# Patient Record
Sex: FEMALE | Race: BLACK OR AFRICAN AMERICAN | NOT HISPANIC OR LATINO | URBAN - METROPOLITAN AREA
[De-identification: names, ages, dates, MRNs, and addresses within clinical notes are randomized per-mention and may not be internally consistent; named-entity substitution may affect disease eponyms.]

---

## 2018-12-06 ENCOUNTER — EMERGENCY (EMERGENCY)
Facility: HOSPITAL | Age: 48
LOS: 1 days | Discharge: HOME | End: 2018-12-06
Attending: EMERGENCY MEDICINE | Admitting: EMERGENCY MEDICINE
Payer: MEDICAID

## 2018-12-06 VITALS
HEART RATE: 104 BPM | DIASTOLIC BLOOD PRESSURE: 75 MMHG | TEMPERATURE: 97 F | SYSTOLIC BLOOD PRESSURE: 112 MMHG | OXYGEN SATURATION: 100 % | RESPIRATION RATE: 16 BRPM

## 2018-12-06 LAB
ALBUMIN SERPL ELPH-MCNC: 3.7 G/DL — SIGNIFICANT CHANGE UP (ref 3.3–5)
ALP SERPL-CCNC: 103 U/L — SIGNIFICANT CHANGE UP (ref 40–120)
ALT FLD-CCNC: 13 U/L — SIGNIFICANT CHANGE UP (ref 4–33)
APPEARANCE UR: CLEAR — SIGNIFICANT CHANGE UP
APTT BLD: 34.9 SEC — SIGNIFICANT CHANGE UP (ref 27.5–36.3)
AST SERPL-CCNC: 12 U/L — SIGNIFICANT CHANGE UP (ref 4–32)
BASE EXCESS BLDV CALC-SCNC: 3.5 MMOL/L — SIGNIFICANT CHANGE UP
BASOPHILS # BLD AUTO: 0.02 K/UL — SIGNIFICANT CHANGE UP (ref 0–0.2)
BASOPHILS NFR BLD AUTO: 0.3 % — SIGNIFICANT CHANGE UP (ref 0–2)
BILIRUB SERPL-MCNC: < 0.2 MG/DL — LOW (ref 0.2–1.2)
BILIRUB UR-MCNC: NEGATIVE — SIGNIFICANT CHANGE UP
BLOOD GAS VENOUS - CREATININE: 0.9 MG/DL — SIGNIFICANT CHANGE UP (ref 0.5–1.3)
BLOOD UR QL VISUAL: NEGATIVE — SIGNIFICANT CHANGE UP
BUN SERPL-MCNC: 22 MG/DL — SIGNIFICANT CHANGE UP (ref 7–23)
CALCIUM SERPL-MCNC: 8.8 MG/DL — SIGNIFICANT CHANGE UP (ref 8.4–10.5)
CHLORIDE BLDV-SCNC: 106 MMOL/L — SIGNIFICANT CHANGE UP (ref 96–108)
CHLORIDE SERPL-SCNC: 104 MMOL/L — SIGNIFICANT CHANGE UP (ref 98–107)
CO2 SERPL-SCNC: 25 MMOL/L — SIGNIFICANT CHANGE UP (ref 22–31)
COLOR SPEC: COLORLESS — SIGNIFICANT CHANGE UP
CREAT SERPL-MCNC: 0.91 MG/DL — SIGNIFICANT CHANGE UP (ref 0.5–1.3)
EOSINOPHIL # BLD AUTO: 0 K/UL — SIGNIFICANT CHANGE UP (ref 0–0.5)
EOSINOPHIL NFR BLD AUTO: 0 % — SIGNIFICANT CHANGE UP (ref 0–6)
GAS PNL BLDV: 140 MMOL/L — SIGNIFICANT CHANGE UP (ref 136–146)
GLUCOSE BLDV-MCNC: 89 — SIGNIFICANT CHANGE UP (ref 70–99)
GLUCOSE SERPL-MCNC: 85 MG/DL — SIGNIFICANT CHANGE UP (ref 70–99)
GLUCOSE UR-MCNC: NEGATIVE — SIGNIFICANT CHANGE UP
HCG UR-SCNC: NEGATIVE — SIGNIFICANT CHANGE UP
HCO3 BLDV-SCNC: 26 MMOL/L — SIGNIFICANT CHANGE UP (ref 20–27)
HCT VFR BLD CALC: 32.5 % — LOW (ref 34.5–45)
HCT VFR BLDV CALC: 33.3 % — LOW (ref 34.5–45)
HGB BLD-MCNC: 10.1 G/DL — LOW (ref 11.5–15.5)
HGB BLDV-MCNC: 10.8 G/DL — LOW (ref 11.5–15.5)
IMM GRANULOCYTES # BLD AUTO: 0.03 # — SIGNIFICANT CHANGE UP
IMM GRANULOCYTES NFR BLD AUTO: 0.4 % — SIGNIFICANT CHANGE UP (ref 0–1.5)
INR BLD: 0.98 — SIGNIFICANT CHANGE UP (ref 0.88–1.17)
KETONES UR-MCNC: NEGATIVE — SIGNIFICANT CHANGE UP
LACTATE BLDV-MCNC: 1.1 MMOL/L — SIGNIFICANT CHANGE UP (ref 0.5–2)
LEUKOCYTE ESTERASE UR-ACNC: NEGATIVE — SIGNIFICANT CHANGE UP
LYMPHOCYTES # BLD AUTO: 2.9 K/UL — SIGNIFICANT CHANGE UP (ref 1–3.3)
LYMPHOCYTES # BLD AUTO: 39.3 % — SIGNIFICANT CHANGE UP (ref 13–44)
MCHC RBC-ENTMCNC: 26.6 PG — LOW (ref 27–34)
MCHC RBC-ENTMCNC: 31.1 % — LOW (ref 32–36)
MCV RBC AUTO: 85.5 FL — SIGNIFICANT CHANGE UP (ref 80–100)
MONOCYTES # BLD AUTO: 0.83 K/UL — SIGNIFICANT CHANGE UP (ref 0–0.9)
MONOCYTES NFR BLD AUTO: 11.3 % — SIGNIFICANT CHANGE UP (ref 2–14)
NEUTROPHILS # BLD AUTO: 3.59 K/UL — SIGNIFICANT CHANGE UP (ref 1.8–7.4)
NEUTROPHILS NFR BLD AUTO: 48.7 % — SIGNIFICANT CHANGE UP (ref 43–77)
NITRITE UR-MCNC: NEGATIVE — SIGNIFICANT CHANGE UP
NRBC # FLD: 0 — SIGNIFICANT CHANGE UP
NT-PROBNP SERPL-SCNC: 36.89 PG/ML — SIGNIFICANT CHANGE UP
PCO2 BLDV: 48 MMHG — SIGNIFICANT CHANGE UP (ref 41–51)
PH BLDV: 7.39 PH — SIGNIFICANT CHANGE UP (ref 7.32–7.43)
PH UR: 6 — SIGNIFICANT CHANGE UP (ref 5–8)
PLATELET # BLD AUTO: 281 K/UL — SIGNIFICANT CHANGE UP (ref 150–400)
PMV BLD: 10.1 FL — SIGNIFICANT CHANGE UP (ref 7–13)
PO2 BLDV: 34 MMHG — LOW (ref 35–40)
POTASSIUM BLDV-SCNC: 4.3 MMOL/L — SIGNIFICANT CHANGE UP (ref 3.4–4.5)
POTASSIUM SERPL-MCNC: 4.4 MMOL/L — SIGNIFICANT CHANGE UP (ref 3.5–5.3)
POTASSIUM SERPL-SCNC: 4.4 MMOL/L — SIGNIFICANT CHANGE UP (ref 3.5–5.3)
PROT SERPL-MCNC: 7.1 G/DL — SIGNIFICANT CHANGE UP (ref 6–8.3)
PROT UR-MCNC: NEGATIVE — SIGNIFICANT CHANGE UP
PROTHROM AB SERPL-ACNC: 10.9 SEC — SIGNIFICANT CHANGE UP (ref 9.8–13.1)
RBC # BLD: 3.8 M/UL — SIGNIFICANT CHANGE UP (ref 3.8–5.2)
RBC # FLD: 14.2 % — SIGNIFICANT CHANGE UP (ref 10.3–14.5)
SAO2 % BLDV: 59.1 % — LOW (ref 60–85)
SODIUM SERPL-SCNC: 140 MMOL/L — SIGNIFICANT CHANGE UP (ref 135–145)
SP GR SPEC: 1.02 — SIGNIFICANT CHANGE UP (ref 1–1.04)
TROPONIN T, HIGH SENSITIVITY: < 6 NG/L — SIGNIFICANT CHANGE UP (ref ?–14)
TSH SERPL-MCNC: 2.24 UIU/ML — SIGNIFICANT CHANGE UP (ref 0.27–4.2)
UROBILINOGEN FLD QL: NORMAL — SIGNIFICANT CHANGE UP
WBC # BLD: 7.37 K/UL — SIGNIFICANT CHANGE UP (ref 3.8–10.5)
WBC # FLD AUTO: 7.37 K/UL — SIGNIFICANT CHANGE UP (ref 3.8–10.5)

## 2018-12-06 PROCEDURE — 70450 CT HEAD/BRAIN W/O DYE: CPT | Mod: 26

## 2018-12-06 PROCEDURE — 71045 X-RAY EXAM CHEST 1 VIEW: CPT | Mod: 26

## 2018-12-06 PROCEDURE — 99284 EMERGENCY DEPT VISIT MOD MDM: CPT

## 2018-12-06 RX ORDER — SODIUM CHLORIDE 9 MG/ML
1000 INJECTION INTRAMUSCULAR; INTRAVENOUS; SUBCUTANEOUS ONCE
Qty: 0 | Refills: 0 | Status: COMPLETED | OUTPATIENT
Start: 2018-12-06 | End: 2018-12-06

## 2018-12-06 RX ADMIN — SODIUM CHLORIDE 1000 MILLILITER(S): 9 INJECTION INTRAMUSCULAR; INTRAVENOUS; SUBCUTANEOUS at 19:01

## 2018-12-06 NOTE — ED ADULT TRIAGE NOTE - CHIEF COMPLAINT QUOTE
patient coming [facility] as having blurred vision and dizziness and pain in the back with unsteady gait since few months getting worst".

## 2018-12-06 NOTE — ED ADULT TRIAGE NOTE - RESPIRATORY RATE (BREATHS/MIN)
16 Principal Discharge DX:	Abdominal pain Principal Discharge DX:	Abdominal pain  Goal:	ATTENDING ADDENDUM (Dr. Vin Spann): Goals of care include resolution of emergent/urgent symptoms and concerns, and restoration to baseline level of homeostasis.  Secondary Diagnosis:	Cyst of left ovary

## 2018-12-06 NOTE — ED PROVIDER NOTE - OBJECTIVE STATEMENT
47 yo female with PMH schizophrenia presenting to the ED from [facility] inpatient for unsteady gait and lightheadedness. Pt noted to be orthostatic positive by medical team at [facility], concerned that her medications may be playing role in her symptoms. In the ED, pt endorses mild generalized headache but declined analgesia. 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 She states she is three months pregnant but has been in [facility] longer than that. 49 yo female with PMH schizophrenia presenting to the ED from [facility] for unsteady gait and lightheadedness. Pt noted to be orthostatic positive by medical team at [facility], concerned that her medications may be playing role in her symptoms. In the ED, pt endorses mild generalized headache but declined analgesia. 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 She states she is three months pregnant but has been in [facility] longer than that.    Attending/Sung: 49 yo F, [facility] inpatient resident, poor historian referred for orthostasis. Pt reports ~ 3months of intermittent SSCP , lightheadedness, and generalized weakness. Denies fever/chills, SOB, exertional symptoms. ROS revealed general fatigue, and increase urinary frequency.

## 2018-12-06 NOTE — ED PROVIDER NOTE - PHYSICAL EXAMINATION
Attending/Sung: Well-appearing, NAD, obese; PERRL/EOMI, non-icterus, supple, no NEWTON, no JVD, RRR, CTAB; Abd-soft, NT/ND, no HSM; no LE edema, A&Ox3, nonfocal; Skin-warm/dry

## 2018-12-06 NOTE — ED PROVIDER NOTE - NSFOLLOWUPINSTRUCTIONS_ED_ALL_ED_FT
You need to stay hydrated by drinking water. Follow up with your medical physicians at [facility] within 24 hours.  Return to the emergency department promptly for any urgent concerns. [facility] should call the emergency department back within 24 hours for your thyroid blood lab results as these take more time to result.

## 2018-12-07 ENCOUNTER — EMERGENCY (EMERGENCY)
Facility: HOSPITAL | Age: 48
LOS: 1 days | Discharge: HOME | End: 2018-12-07
Attending: EMERGENCY MEDICINE | Admitting: EMERGENCY MEDICINE
Payer: MEDICAID

## 2018-12-07 VITALS
OXYGEN SATURATION: 99 % | DIASTOLIC BLOOD PRESSURE: 83 MMHG | SYSTOLIC BLOOD PRESSURE: 119 MMHG | RESPIRATION RATE: 18 BRPM | TEMPERATURE: 98 F | HEART RATE: 99 BPM

## 2018-12-07 VITALS — HEART RATE: 96 BPM

## 2018-12-07 PROCEDURE — 93306 TTE W/DOPPLER COMPLETE: CPT | Mod: 26

## 2018-12-07 PROCEDURE — 99284 EMERGENCY DEPT VISIT MOD MDM: CPT

## 2018-12-07 NOTE — ED PROVIDER NOTE - OBJECTIVE STATEMENT
48y F currently inpatient at [facility] seen yesterday in ED for dizziness presents again today c/o dizziness. Yesterday pt had unremarkable ED workup including CT head and labs including troponin and EKG. Today pt c/o dizziness and occurs with positional changes and exertion. Pt currently on clozapine and haldol. Pt is poor historian but denies CP, SOB, or lower extremity pain and edema 48y F currently inpatient at [facility] seen yesterday in ED for dizziness presents again today c/o dizziness. Yesterday pt had unremarkable ED workup including CT head and labs,  including troponin and EKG. Today pt c/o dizziness that occurs with positional changes and exertion. Pt currently on clozapine and haldol. Pt is poor historian but denies CP, SOB, or lower extremity pain and edema.

## 2018-12-07 NOTE — ED PROVIDER NOTE - MEDICAL DECISION MAKING DETAILS
48y F on clozapine with dizziness. Will obtain echo to r/o myocarditis. Obtain EKG, finger stick, and reassess

## 2018-12-07 NOTE — ED ADULT NURSE REASSESSMENT NOTE - NS ED NURSE REASSESS COMMENT FT1
Dr Bonner made aware of BGL of 64 mg/dl pt alert x 4 Dr Bonner advised pt may eat/drink and recheck BGL

## 2018-12-07 NOTE — ED PROVIDER NOTE - CONDUCTED A DETAILED DISCUSSION WITH PATIENT AND/OR GUARDIAN REGARDING, MDM
radiology results/discussed with [facility] staff at the bedside./return to ED if symptoms worsen, persist or questions arise/need for outpatient follow-up

## 2018-12-07 NOTE — ED ADULT TRIAGE NOTE - CHIEF COMPLAINT QUOTE
Patient brought to ER by EMS from [facility] for c/o lightheadedness and dizziness. Pt was fully assessed yesterday and findings were negative.

## 2018-12-07 NOTE — ED PROVIDER NOTE - CRANIAL NERVE AND PUPILLARY EXAM
no ataxia, dysmetria or disdiadochokinesia, negative rombergs, no pronator drift/cranial nerves 2-12 intact

## 2018-12-07 NOTE — ED PROVIDER NOTE - PROGRESS NOTE DETAILS
Ha: Pt has been asymptomatic since arriving in the ED, echo is unremarkable, pt ambulating without difficulty, tolerating PO. will d/c back to [facility] with copies of echo results. will also try to contact [facility] physician to relay results. Ha: discussed with DR Steen of [facility] @ [phone] regarding ED workup for the patient. Pt's vitals are normal with an episode of tachycardia while agitated. Pt now calm with normal HR and BP, pt ambulating without difficulty, no concerning signs/ symptoms. Neurology visits [facility] twice weekly. Recommended that DR steen to have the patient evaluated by neuro in the facility if she continues to complain of dizziness.

## 2018-12-07 NOTE — ED PROVIDER NOTE - MUSCULOSKELETAL, MLM
Spine appears normal, range of motion is not limited, no muscle or joint tenderness. No lower extremity edema. No calf tenderness

## 2018-12-07 NOTE — ED PROVIDER NOTE - NSFOLLOWUPINSTRUCTIONS_ED_ALL_ED_FT
Follow up as an outpatient with cardiology and neurology if your dizziness persists. Return to the Emergency Department for any new or worsening symptoms. Copies of your results have been provided to you.

## 2018-12-08 LAB
BACTERIA UR CULT: SIGNIFICANT CHANGE UP
SPECIMEN SOURCE: SIGNIFICANT CHANGE UP

## 2019-01-15 ENCOUNTER — EMERGENCY (EMERGENCY)
Facility: HOSPITAL | Age: 49
LOS: 1 days | Discharge: HOME | End: 2019-01-15
Attending: EMERGENCY MEDICINE | Admitting: EMERGENCY MEDICINE
Payer: MEDICAID

## 2019-01-15 VITALS
TEMPERATURE: 98 F | OXYGEN SATURATION: 100 % | HEART RATE: 106 BPM | DIASTOLIC BLOOD PRESSURE: 74 MMHG | SYSTOLIC BLOOD PRESSURE: 123 MMHG | RESPIRATION RATE: 18 BRPM

## 2019-01-15 VITALS — HEART RATE: 99 BPM

## 2019-01-15 PROBLEM — F20.9 SCHIZOPHRENIA, UNSPECIFIED: Chronic | Status: ACTIVE | Noted: 2018-12-06

## 2019-01-15 PROCEDURE — 93010 ELECTROCARDIOGRAM REPORT: CPT

## 2019-01-15 PROCEDURE — 99283 EMERGENCY DEPT VISIT LOW MDM: CPT | Mod: 25

## 2019-01-15 NOTE — ED PROVIDER NOTE - OBJECTIVE STATEMENT
Cabot: 48F [place] resident, here multiple times with intermittent lightheadedness with negative head CT, trops, normal EKG, normal ECHO, now back with minutes of lightheadedness that resolved.  Now asx and would like to eat, have a pregnancy test, and go home.  Denies F/C/N/V/D/urinary sx/CP/SOB/vision changes/numbness/weakness.

## 2019-01-15 NOTE — ED PROVIDER NOTE - NSFOLLOWUPINSTRUCTIONS_ED_ALL_ED_FT
You have been seen for lightheadedness.  You now are back to baseline, and your EKG was normal. Your pregnancy test was negative.  It is safe for you to be discharged at this time.  Please come back if you have recurrence of symptoms or other concerns.

## 2019-01-15 NOTE — ED CLERICAL - CLERICAL COMMENTS
Pt with hx of schizoaffective disorder [facility] in [facility] dizz[facility], lightheadedness, hypotension and tachycardia.

## 2019-01-15 NOTE — ED PROVIDER NOTE - MEDICAL DECISION MAKING DETAILS
Cabot: 48F [place] resident, here multiple times with intermittent lightheadedness with negative head CT, trops, normal EKG, normal ECHO, now back with minutes of lightheadedness that resolved. Normal exam and EKG.  Upreg neg.  Will discharge home.

## 2019-01-15 NOTE — PROVIDER CONTACT NOTE (OTHER) - ASSESSMENT
Pt from [facility] accompanied by staff. As per staff pt's [facility] ID and documents were given to EMT or Registration staff , and was never returned. Case discussed with Anca Blevins Spr, we looked all around and couldn't find. Then [facility] staff said she will call [facility] to get.

## 2019-01-15 NOTE — ED PROVIDER NOTE - PHYSICAL EXAMINATION
HDS, NAD, MMM, eyes clear, lungs CTAB, heart sounds normal, abd soft, NT, ND, no CVAT, LEs without edema, wwp, skin normal temperature and color, neuro: AAOx3, PERRLA, EOMIB, CN 2-12 intact, 5/5 strength, SILT, no drift

## 2019-02-01 ENCOUNTER — EMERGENCY (EMERGENCY)
Facility: HOSPITAL | Age: 49
LOS: 1 days | Discharge: HOME | End: 2019-02-01
Attending: EMERGENCY MEDICINE | Admitting: EMERGENCY MEDICINE
Payer: MEDICAID

## 2019-02-01 VITALS
RESPIRATION RATE: 16 BRPM | SYSTOLIC BLOOD PRESSURE: 120 MMHG | OXYGEN SATURATION: 98 % | HEART RATE: 88 BPM | DIASTOLIC BLOOD PRESSURE: 74 MMHG

## 2019-02-01 VITALS
RESPIRATION RATE: 16 BRPM | SYSTOLIC BLOOD PRESSURE: 96 MMHG | TEMPERATURE: 98 F | OXYGEN SATURATION: 98 % | HEART RATE: 96 BPM | DIASTOLIC BLOOD PRESSURE: 69 MMHG

## 2019-02-01 LAB
ALBUMIN SERPL ELPH-MCNC: 3.9 G/DL — SIGNIFICANT CHANGE UP (ref 3.3–5)
ALP SERPL-CCNC: 86 U/L — SIGNIFICANT CHANGE UP (ref 40–120)
ALT FLD-CCNC: 6 U/L — SIGNIFICANT CHANGE UP (ref 4–33)
ANION GAP SERPL CALC-SCNC: 12 MMO/L — SIGNIFICANT CHANGE UP (ref 7–14)
AST SERPL-CCNC: 14 U/L — SIGNIFICANT CHANGE UP (ref 4–32)
BASOPHILS # BLD AUTO: 0.02 K/UL — SIGNIFICANT CHANGE UP (ref 0–0.2)
BASOPHILS NFR BLD AUTO: 0.3 % — SIGNIFICANT CHANGE UP (ref 0–2)
BILIRUB SERPL-MCNC: 0.2 MG/DL — SIGNIFICANT CHANGE UP (ref 0.2–1.2)
BUN SERPL-MCNC: 13 MG/DL — SIGNIFICANT CHANGE UP (ref 7–23)
CALCIUM SERPL-MCNC: 9 MG/DL — SIGNIFICANT CHANGE UP (ref 8.4–10.5)
CHLORIDE SERPL-SCNC: 103 MMOL/L — SIGNIFICANT CHANGE UP (ref 98–107)
CO2 SERPL-SCNC: 27 MMOL/L — SIGNIFICANT CHANGE UP (ref 22–31)
CREAT SERPL-MCNC: 0.85 MG/DL — SIGNIFICANT CHANGE UP (ref 0.5–1.3)
EOSINOPHIL # BLD AUTO: 0 K/UL — SIGNIFICANT CHANGE UP (ref 0–0.5)
EOSINOPHIL NFR BLD AUTO: 0 % — SIGNIFICANT CHANGE UP (ref 0–6)
GLUCOSE SERPL-MCNC: 85 MG/DL — SIGNIFICANT CHANGE UP (ref 70–99)
HCT VFR BLD CALC: 37.7 % — SIGNIFICANT CHANGE UP (ref 34.5–45)
HGB BLD-MCNC: 11.7 G/DL — SIGNIFICANT CHANGE UP (ref 11.5–15.5)
IMM GRANULOCYTES NFR BLD AUTO: 0.5 % — SIGNIFICANT CHANGE UP (ref 0–1.5)
LYMPHOCYTES # BLD AUTO: 2.37 K/UL — SIGNIFICANT CHANGE UP (ref 1–3.3)
LYMPHOCYTES # BLD AUTO: 32.2 % — SIGNIFICANT CHANGE UP (ref 13–44)
MCHC RBC-ENTMCNC: 27.1 PG — SIGNIFICANT CHANGE UP (ref 27–34)
MCHC RBC-ENTMCNC: 31 % — LOW (ref 32–36)
MCV RBC AUTO: 87.3 FL — SIGNIFICANT CHANGE UP (ref 80–100)
MONOCYTES # BLD AUTO: 0.71 K/UL — SIGNIFICANT CHANGE UP (ref 0–0.9)
MONOCYTES NFR BLD AUTO: 9.6 % — SIGNIFICANT CHANGE UP (ref 2–14)
NEUTROPHILS # BLD AUTO: 4.22 K/UL — SIGNIFICANT CHANGE UP (ref 1.8–7.4)
NEUTROPHILS NFR BLD AUTO: 57.4 % — SIGNIFICANT CHANGE UP (ref 43–77)
NRBC # FLD: 0 K/UL — LOW (ref 25–125)
PLATELET # BLD AUTO: 278 K/UL — SIGNIFICANT CHANGE UP (ref 150–400)
PMV BLD: 10.2 FL — SIGNIFICANT CHANGE UP (ref 7–13)
POTASSIUM SERPL-MCNC: 4.5 MMOL/L — SIGNIFICANT CHANGE UP (ref 3.5–5.3)
POTASSIUM SERPL-SCNC: 4.5 MMOL/L — SIGNIFICANT CHANGE UP (ref 3.5–5.3)
PROT SERPL-MCNC: 7.2 G/DL — SIGNIFICANT CHANGE UP (ref 6–8.3)
RBC # BLD: 4.32 M/UL — SIGNIFICANT CHANGE UP (ref 3.8–5.2)
RBC # FLD: 14.8 % — HIGH (ref 10.3–14.5)
SODIUM SERPL-SCNC: 142 MMOL/L — SIGNIFICANT CHANGE UP (ref 135–145)
TROPONIN T, HIGH SENSITIVITY: < 6 NG/L — SIGNIFICANT CHANGE UP (ref ?–14)
WBC # BLD: 7.36 K/UL — SIGNIFICANT CHANGE UP (ref 3.8–10.5)
WBC # FLD AUTO: 7.36 K/UL — SIGNIFICANT CHANGE UP (ref 3.8–10.5)

## 2019-02-01 PROCEDURE — 99285 EMERGENCY DEPT VISIT HI MDM: CPT

## 2019-02-01 NOTE — ED ADULT NURSE NOTE - CHIEF COMPLAINT QUOTE
Pt brought in by EMS from [facility] for hypotensive and dizziness. As per EMS pts BP was in the 80s on arrival. Pt denies chest pain/ SOB, n/v/d, fever or chills.

## 2019-02-01 NOTE — ED PROVIDER NOTE - OBJECTIVE STATEMENT
48 F [facility] resident with PMH of schizophrenia who presents with lightheadedness. She developed lightheadedness and dizziness earlier this afternoon. She was lying on the floor due to weakness. Per notes, SBP 85/60 supine and 80/60 sitting. She denies headaches, vision changes, chest pain, palpitations. She does endorse abdominal pain and diarrhea for the past 3 days, but no fevers, chills, chest pain, palpitations, dysuria. Patient has had recurrent episodes of lightheadedness and dizziness since last month after she started clozapine in November. She has been weaning off clozapine with intent to be discontinued.  Workup including CT head, EKG, TTE, troponin in DEc 2018. Outpatient random cortisol 8.3 (01/2019). Review of outpatient CBC and CMP also wnl. Patient was sent in for evaluation of clozapine related cardiotoxicity. 48 F [facility] resident with PMH of schizophrenia who presents with lightheadedness. She developed lightheadedness and dizziness earlier this afternoon. She was lying on the floor due to weakness. Per notes, SBP 85/60 supine and 80/60 sitting. She denies headaches, vision changes, chest pain, palpitations. She does endorse abdominal pain and diarrhea for the past 3 days, but no fevers, chills, chest pain, palpitations, dysuria. Patient has had recurrent episodes of lightheadedness and dizziness since last month after she started clozapine in November. She has been weaning off clozapine with intent to be discontinued.  Workup including CT head, EKG, TTE, troponin in Dec 2018. Outpatient random cortisol 8.3 (01/2019). Review of outpatient CBC and CMP also wnl. Patient was sent in for evaluation of clozapine related cardiotoxicity.

## 2019-02-01 NOTE — ED PROVIDER NOTE - MEDICAL DECISION MAKING DETAILS
Brice:  pt with periodic episodes of hypotension with work up for possible cardiomyopathy from clozaril but echo negative, pt's psychiatrist aware that cause of hypotension may be clozaril use and will adjust accordingly.
Spontaneous, unlabored and symmetrical

## 2019-02-01 NOTE — ED ADULT NURSE NOTE - NSIMPLEMENTINTERV_GEN_ALL_ED
Implemented All Universal Safety Interventions:  [place] to call system. Call bell, personal items and telephone within reach. Instruct patient to call for assistance. Room bathroom lighting operational. Non-slip footwear when patient is off stretcher. Physically safe environment: no spills, clutter or unnecessary equipment. Stretcher in lowest position, wheels locked, appropriate side rails in place.

## 2019-02-01 NOTE — ED PROVIDER NOTE - PHYSICAL EXAMINATION
PHYSICAL EXAM:   General: No acute distress.  HEENT: NCAT.  PERRL.  EOMI.  No scleral icterus or injection.  Moist MM.  No oropharyngeal exudates.    Neck: Supple.  Full ROM.  No JVD.  No thyromegaly. No lymphadenopathy.   Heart: RRR.  Normal S1 and S2.  No murmurs, rubs, or gallops.   Lungs: CTAB. No wheezes, crackles, or rhonchi.    Abdomen: BS+, soft, ND.  No organomegaly. + periumbilical   Skin: Warm and dry.  No rashes.  Extremities: No edema, clubbing, or cyanosis.  2+ peripheral pulses b/l.  Musculoskeletal: No deformities.  No spinal or paraspinal tenderness.  Neuro: A&Ox3.  CN II-XII intact.  5/5 strength in UE and LE b/l.  Tactile sensation intact in UE and LE b/l.  Cerebellar function intact

## 2019-02-01 NOTE — ED PROVIDER NOTE - NS ED ROS FT
REVIEW OF SYSTEMS:    CONSTITUTIONAL: + dizziness  EYES/ENT: No visual changes;  No vertigo or throat pain   NECK: No pain or stiffness  RESPIRATORY: No cough, wheezing, hemoptysis; No shortness of breath  CARDIOVASCULAR: No chest pain or palpitations  GASTROINTESTINAL: + abdominal pain  GENITOURINARY: No dysuria, frequency or hematuria  NEUROLOGICAL: No numbness or weakness  SKIN: No itching, burning, rashes, or lesions   All other review of systems is negative unless indicated above.

## 2019-02-01 NOTE — ED PROVIDER NOTE - PROGRESS NOTE DETAILS
spoke with Dr. Buffy Zaidi at [facility]- discussed that symtpoms most like related to clozapine, of which plan to eventually discontinue. Labs reviewed within normal limits. Troponin neg and EKG wnl. BPs stable

## 2019-02-01 NOTE — ED PROVIDER NOTE - NSFOLLOWUPINSTRUCTIONS_ED_ALL_ED_FT
-- Continue all previously prescribed medications as directed unless otherwise instructed.    -- Follow up with your primary care physician in 48-72 hours- bring copies of your results.    -- Return to the ER for worsening or persistent symptoms, and/or ANY NEW OR CONCERNING SYMPTOMS.    -- If you have issues obtaining follow up, please call: [phone] to obtain a [facility] doctor or specialist who takes your insurance in your area.

## 2019-02-01 NOTE — ED ADULT TRIAGE NOTE - CHIEF COMPLAINT QUOTE
Pt brought in by EMS from [facility] for hypotensive and dizziness. As per EMS pts BP was in the 80s on arrival. Pt denies chest pain/ SOB, n/v/d, fever or chills.

## 2019-12-05 ENCOUNTER — EMERGENCY (EMERGENCY)
Facility: HOSPITAL | Age: 49
LOS: 1 days | Discharge: HOME | End: 2019-12-05
Attending: EMERGENCY MEDICINE | Admitting: EMERGENCY MEDICINE
Payer: MEDICAID

## 2019-12-05 VITALS
RESPIRATION RATE: 18 BRPM | TEMPERATURE: 98 F | HEART RATE: 99 BPM | DIASTOLIC BLOOD PRESSURE: 69 MMHG | OXYGEN SATURATION: 98 % | SYSTOLIC BLOOD PRESSURE: 111 MMHG

## 2019-12-05 VITALS
TEMPERATURE: 98 F | DIASTOLIC BLOOD PRESSURE: 74 MMHG | SYSTOLIC BLOOD PRESSURE: 107 MMHG | RESPIRATION RATE: 16 BRPM | HEART RATE: 94 BPM | OXYGEN SATURATION: 97 %

## 2019-12-05 LAB
ALBUMIN SERPL ELPH-MCNC: 3.7 G/DL — SIGNIFICANT CHANGE UP (ref 3.3–5)
ALP SERPL-CCNC: 89 U/L — SIGNIFICANT CHANGE UP (ref 40–120)
ALT FLD-CCNC: 7 U/L — SIGNIFICANT CHANGE UP (ref 4–33)
ANION GAP SERPL CALC-SCNC: 11 MMO/L — SIGNIFICANT CHANGE UP (ref 7–14)
AST SERPL-CCNC: 10 U/L — SIGNIFICANT CHANGE UP (ref 4–32)
BASOPHILS # BLD AUTO: 0.03 K/UL — SIGNIFICANT CHANGE UP (ref 0–0.2)
BASOPHILS NFR BLD AUTO: 0.3 % — SIGNIFICANT CHANGE UP (ref 0–2)
BILIRUB SERPL-MCNC: < 0.2 MG/DL — LOW (ref 0.2–1.2)
BUN SERPL-MCNC: 24 MG/DL — HIGH (ref 7–23)
CALCIUM SERPL-MCNC: 8.9 MG/DL — SIGNIFICANT CHANGE UP (ref 8.4–10.5)
CHLORIDE SERPL-SCNC: 105 MMOL/L — SIGNIFICANT CHANGE UP (ref 98–107)
CO2 SERPL-SCNC: 27 MMOL/L — SIGNIFICANT CHANGE UP (ref 22–31)
CREAT SERPL-MCNC: 0.86 MG/DL — SIGNIFICANT CHANGE UP (ref 0.5–1.3)
EOSINOPHIL # BLD AUTO: 0.17 K/UL — SIGNIFICANT CHANGE UP (ref 0–0.5)
EOSINOPHIL NFR BLD AUTO: 1.7 % — SIGNIFICANT CHANGE UP (ref 0–6)
GLUCOSE SERPL-MCNC: 96 MG/DL — SIGNIFICANT CHANGE UP (ref 70–99)
HCG SERPL-ACNC: < 5 MIU/ML — SIGNIFICANT CHANGE UP
HCT VFR BLD CALC: 31.6 % — LOW (ref 34.5–45)
HGB BLD-MCNC: 9.6 G/DL — LOW (ref 11.5–15.5)
IMM GRANULOCYTES NFR BLD AUTO: 0.4 % — SIGNIFICANT CHANGE UP (ref 0–1.5)
LYMPHOCYTES # BLD AUTO: 3.09 K/UL — SIGNIFICANT CHANGE UP (ref 1–3.3)
LYMPHOCYTES # BLD AUTO: 31.1 % — SIGNIFICANT CHANGE UP (ref 13–44)
MCHC RBC-ENTMCNC: 26.1 PG — LOW (ref 27–34)
MCHC RBC-ENTMCNC: 30.4 % — LOW (ref 32–36)
MCV RBC AUTO: 85.9 FL — SIGNIFICANT CHANGE UP (ref 80–100)
MONOCYTES # BLD AUTO: 1.09 K/UL — HIGH (ref 0–0.9)
MONOCYTES NFR BLD AUTO: 11 % — SIGNIFICANT CHANGE UP (ref 2–14)
NEUTROPHILS # BLD AUTO: 5.51 K/UL — SIGNIFICANT CHANGE UP (ref 1.8–7.4)
NEUTROPHILS NFR BLD AUTO: 55.5 % — SIGNIFICANT CHANGE UP (ref 43–77)
NRBC # FLD: 0.03 K/UL — SIGNIFICANT CHANGE UP (ref 0–0)
PLATELET # BLD AUTO: 317 K/UL — SIGNIFICANT CHANGE UP (ref 150–400)
PMV BLD: 9.7 FL — SIGNIFICANT CHANGE UP (ref 7–13)
POTASSIUM SERPL-MCNC: 4.4 MMOL/L — SIGNIFICANT CHANGE UP (ref 3.5–5.3)
POTASSIUM SERPL-SCNC: 4.4 MMOL/L — SIGNIFICANT CHANGE UP (ref 3.5–5.3)
PROT SERPL-MCNC: 7.1 G/DL — SIGNIFICANT CHANGE UP (ref 6–8.3)
RBC # BLD: 3.68 M/UL — LOW (ref 3.8–5.2)
RBC # FLD: 15.5 % — HIGH (ref 10.3–14.5)
SODIUM SERPL-SCNC: 143 MMOL/L — SIGNIFICANT CHANGE UP (ref 135–145)
TROPONIN T, HIGH SENSITIVITY: 8 NG/L — SIGNIFICANT CHANGE UP (ref ?–14)
TROPONIN T, HIGH SENSITIVITY: 8 NG/L — SIGNIFICANT CHANGE UP (ref ?–14)
WBC # BLD: 9.93 K/UL — SIGNIFICANT CHANGE UP (ref 3.8–10.5)
WBC # FLD AUTO: 9.93 K/UL — SIGNIFICANT CHANGE UP (ref 3.8–10.5)

## 2019-12-05 PROCEDURE — 71046 X-RAY EXAM CHEST 2 VIEWS: CPT | Mod: 26

## 2019-12-05 PROCEDURE — 99284 EMERGENCY DEPT VISIT MOD MDM: CPT

## 2019-12-05 NOTE — ED PROVIDER NOTE - PATIENT PORTAL LINK FT
You can access the FollowMyHealth Patient Portal offered by [facility] by registering at the following website: [email or web address]. By joining [employer]’s FollowMyHealth portal, you will also be able to view your health information using other applications (apps) compatible with our system.

## 2019-12-05 NOTE — ED PROVIDER NOTE - CLINICAL SUMMARY MEDICAL DECISION MAKING FREE TEXT BOX
49 F [place] resident ([facility]) with PMH of schizophrenia pw cc of chest discomfort . Resolved, no cardiac hx, low concern for ACS based on story, will get trop/cxr/ekg and likely d/c if negative 49 F [place] resident ([facility]) with PMH of schizophrenia pw cc of chest discomfort . Resolved, no cardiac hx, low concern for ACS based on story, will get trop/cxr/ekg and likely d/c if negative  RGerber PGY1 - received signout. No concern for ACS. Discharging patient at this time. SW arranged transport.   d/w Dr. Lund

## 2019-12-05 NOTE — PROVIDER CONTACT NOTE (OTHER) - ASSESSMENT
Pt is cleared to return, called [facility] [phone], spoke with Kayy CARTER, confirmed that pt is a resident. She asked me to send her by Taxi. Called [facility] obtained Auth # [id number]. Benjamin Israeli. P/U 11 PM. I put her in the Taxi. Monica form was filled out.

## 2019-12-05 NOTE — ED PROVIDER NOTE - NS ED ROS FT
CONSTITUTIONAL: No fevers, no chills  Cardiovascular: No Chest pain currently   Gastrointestinal: No n/v/d, no abd pain  Genitourinary: no dysuria, no hematuria  SKIN: no rashes.  NEURO: no weakness or numbness  PSYCHIATRIC: refer to HPI

## 2019-12-05 NOTE — ED PROVIDER NOTE - PHYSICAL EXAMINATION
General:  NAD  HEENT: pupils equal and reactive, normal oropharynx, normal external ears bilaterally   Cardiac: RRR, no MRG appreciated  Resp: lungs clear to auscultation bilaterally, symmetric chest wall rise  Abd: soft, nontender, nondistended, normoactive bowel sounds  : no CVA tenderness  Neuro: Moving all extremities  Skin:  normal color for race

## 2019-12-05 NOTE — ED ADULT NURSE NOTE - NSIMPLEMENTINTERV_GEN_ALL_ED
Implemented All Universal Safety Interventions:  [place] to call system. Call bell, personal items and telephone within reach. Instruct patient to call for assistance. Room bathroom lighting operational. Non-slip footwear when patient is off stretcher. Physically safe environment: no spills, clutter or unnecessary equipment. Stretcher in lowest position, wheels locked, appropriate side rails in place. 21-Oct-2018 09:45

## 2019-12-05 NOTE — ED PROVIDER NOTE - ATTENDING CONTRIBUTION TO CARE
agree with resident note    "49 F [place] resident ([facility]) with PMH of schizophrenia pw cc of chest discomfort     Was outside, while sitting anterior chest wall started feeling chest pressure.  Pressure resolved within 1 minute. Went and told the supervisor at [facility], 911 was called.  No cardiac hx, no hx of stress tests. ECHO - in Dec trop -  in feb . No SOB. No hx of blood clots. No leg swelling. No blood in cough, no cough.   No FH of early Cardiac events   Does not smoke or drink"    When speaking to her said she was "exhausted" and that prompted her to come in.  Denies CP actively.  Denies SOb    PE: well appearing in no distress; VSS: CTAB/L; s1 s2 no m/r/g abd soft/NT/ND ext: no edema

## 2019-12-05 NOTE — ED PROVIDER NOTE - PROGRESS NOTE DETAILS
Esteban PGY1  Received sign out on patient. Work up negative. Trop 8, rpt 8. Cleared for discharge home.

## 2019-12-05 NOTE — ED PROVIDER NOTE - OBJECTIVE STATEMENT
49 F [place] resident ([facility]) with PMH of schizophrenia pw cc of chest discomfort     Was outside, while sitting anterior chest wall started feeling chest pressure.  Pressure resolved within 1 minute. Went and told the supervisor at [facility], 911 was called.  No cardiac hx, no hx of stress tests. ECHO - in Dec trop -  in feb . No SOB. No hx of blood clots. No leg swelling. No blood in cough, no cough.   No FH of early Cardiac events   Does not smoke or drink  Unsure of meds  NDKA

## 2019-12-05 NOTE — ED ADULT TRIAGE NOTE - CHIEF COMPLAINT QUOTE
Arrives via EMS from [place], with c/o palpitations, SOB, and sweating while standing at the stove.  c/o light headiness and blurred vision that has resolved

## 2019-12-05 NOTE — ED ADULT NURSE NOTE - OBJECTIVE STATEMENT
49F hx of schizophrenia, [facility] outpatient presents with "chest weakness" earlier today. Pt states she felt weak in the chest and that she wanted to pass out. States she had blurry vision earlier, but that its getting better now. Pt states that someone wants to harm her  and she must get back to [facility]. Pt denies chest pain, SOB, dizziness, blurry vision. Resp even and unlabored. NSR on CM.

## 2019-12-06 NOTE — ED ADULT NURSE REASSESSMENT NOTE - NS ED NURSE REASSESS COMMENT FT1
Pt discharged to outpatient [facility]. Report given to staff by EMMA Coker. IV removed, discharge paperwork provided. Pt sent back via taxi set up by  Ronaldo. Pt stable and in no apparent distress. Group home paperwork completed.

## 2020-04-14 ENCOUNTER — EMERGENCY (EMERGENCY)
Facility: HOSPITAL | Age: 50
LOS: 1 days | Discharge: HOME | End: 2020-04-14
Attending: EMERGENCY MEDICINE | Admitting: EMERGENCY MEDICINE
Payer: MEDICAID

## 2020-04-14 VITALS
OXYGEN SATURATION: 100 % | HEART RATE: 86 BPM | SYSTOLIC BLOOD PRESSURE: 123 MMHG | DIASTOLIC BLOOD PRESSURE: 76 MMHG | TEMPERATURE: 98 F | RESPIRATION RATE: 18 BRPM

## 2020-04-14 VITALS
TEMPERATURE: 99 F | OXYGEN SATURATION: 98 % | DIASTOLIC BLOOD PRESSURE: 69 MMHG | SYSTOLIC BLOOD PRESSURE: 110 MMHG | HEART RATE: 106 BPM | RESPIRATION RATE: 16 BRPM

## 2020-04-14 LAB
ALBUMIN SERPL ELPH-MCNC: 3.5 G/DL — SIGNIFICANT CHANGE UP (ref 3.3–5)
ALP SERPL-CCNC: 85 U/L — SIGNIFICANT CHANGE UP (ref 40–120)
ALT FLD-CCNC: SIGNIFICANT CHANGE UP U/L (ref 4–33)
ANION GAP SERPL CALC-SCNC: 14 MMO/L — SIGNIFICANT CHANGE UP (ref 7–14)
AST SERPL-CCNC: 28 U/L — SIGNIFICANT CHANGE UP (ref 4–32)
BASOPHILS # BLD AUTO: 0.03 K/UL — SIGNIFICANT CHANGE UP (ref 0–0.2)
BASOPHILS NFR BLD AUTO: 0.3 % — SIGNIFICANT CHANGE UP (ref 0–2)
BILIRUB SERPL-MCNC: < 0.2 MG/DL — LOW (ref 0.2–1.2)
BUN SERPL-MCNC: 15 MG/DL — SIGNIFICANT CHANGE UP (ref 7–23)
CALCIUM SERPL-MCNC: 9.1 MG/DL — SIGNIFICANT CHANGE UP (ref 8.4–10.5)
CHLORIDE SERPL-SCNC: 105 MMOL/L — SIGNIFICANT CHANGE UP (ref 98–107)
CO2 SERPL-SCNC: 25 MMOL/L — SIGNIFICANT CHANGE UP (ref 22–31)
CREAT SERPL-MCNC: 1.09 MG/DL — SIGNIFICANT CHANGE UP (ref 0.5–1.3)
EOSINOPHIL # BLD AUTO: 0.17 K/UL — SIGNIFICANT CHANGE UP (ref 0–0.5)
EOSINOPHIL NFR BLD AUTO: 1.4 % — SIGNIFICANT CHANGE UP (ref 0–6)
GLUCOSE SERPL-MCNC: 96 MG/DL — SIGNIFICANT CHANGE UP (ref 70–99)
HCG SERPL-ACNC: < 5 MIU/ML — SIGNIFICANT CHANGE UP
HCT VFR BLD CALC: 27 % — LOW (ref 34.5–45)
HGB BLD-MCNC: 7.9 G/DL — LOW (ref 11.5–15.5)
IMM GRANULOCYTES NFR BLD AUTO: 0.6 % — SIGNIFICANT CHANGE UP (ref 0–1.5)
LYMPHOCYTES # BLD AUTO: 2.63 K/UL — SIGNIFICANT CHANGE UP (ref 1–3.3)
LYMPHOCYTES # BLD AUTO: 22.1 % — SIGNIFICANT CHANGE UP (ref 13–44)
MAGNESIUM SERPL-MCNC: 1.8 MG/DL — SIGNIFICANT CHANGE UP (ref 1.6–2.6)
MCHC RBC-ENTMCNC: 22.6 PG — LOW (ref 27–34)
MCHC RBC-ENTMCNC: 29.3 % — LOW (ref 32–36)
MCV RBC AUTO: 77.1 FL — LOW (ref 80–100)
MONOCYTES # BLD AUTO: 1.22 K/UL — HIGH (ref 0–0.9)
MONOCYTES NFR BLD AUTO: 10.3 % — SIGNIFICANT CHANGE UP (ref 2–14)
NEUTROPHILS # BLD AUTO: 7.77 K/UL — HIGH (ref 1.8–7.4)
NEUTROPHILS NFR BLD AUTO: 65.3 % — SIGNIFICANT CHANGE UP (ref 43–77)
NRBC # FLD: 0.07 K/UL — SIGNIFICANT CHANGE UP (ref 0–0)
PHOSPHATE SERPL-MCNC: 3 MG/DL — SIGNIFICANT CHANGE UP (ref 2.5–4.5)
PLATELET # BLD AUTO: 351 K/UL — SIGNIFICANT CHANGE UP (ref 150–400)
PMV BLD: 9.9 FL — SIGNIFICANT CHANGE UP (ref 7–13)
POTASSIUM SERPL-MCNC: 5.3 MMOL/L — SIGNIFICANT CHANGE UP (ref 3.5–5.3)
POTASSIUM SERPL-SCNC: 5.3 MMOL/L — SIGNIFICANT CHANGE UP (ref 3.5–5.3)
PROT SERPL-MCNC: 6.9 G/DL — SIGNIFICANT CHANGE UP (ref 6–8.3)
RBC # BLD: 3.5 M/UL — LOW (ref 3.8–5.2)
RBC # FLD: 18.4 % — HIGH (ref 10.3–14.5)
SODIUM SERPL-SCNC: 144 MMOL/L — SIGNIFICANT CHANGE UP (ref 135–145)
TROPONIN T, HIGH SENSITIVITY: < 6 NG/L — SIGNIFICANT CHANGE UP (ref ?–14)
WBC # BLD: 11.89 K/UL — HIGH (ref 3.8–10.5)
WBC # FLD AUTO: 11.89 K/UL — HIGH (ref 3.8–10.5)

## 2020-04-14 PROCEDURE — 71045 X-RAY EXAM CHEST 1 VIEW: CPT | Mod: 26

## 2020-04-14 PROCEDURE — 99285 EMERGENCY DEPT VISIT HI MDM: CPT

## 2020-04-14 RX ORDER — SODIUM CHLORIDE 9 MG/ML
500 INJECTION INTRAMUSCULAR; INTRAVENOUS; SUBCUTANEOUS ONCE
Refills: 0 | Status: COMPLETED | OUTPATIENT
Start: 2020-04-14 | End: 2020-04-14

## 2020-04-14 RX ADMIN — SODIUM CHLORIDE 500 MILLILITER(S): 9 INJECTION INTRAMUSCULAR; INTRAVENOUS; SUBCUTANEOUS at 21:50

## 2020-04-14 NOTE — ED PROVIDER NOTE - NSFOLLOWUPINSTRUCTIONS_ED_ALL_ED_FT
Patient advised to follow up with PRIMARY CARE DOCTOR and told to return to the emergency department immediately for any new or concerning symptoms OR ANY OTHER COMPLAINTS. Patient agrees with plan.    -Patient found to have orthostatic hypotension, was given fluids   -Patient also noted to be anemic, would recommend patient start iron   -Monitor for any signs of bleeding and would repeat CBC outpatient.   -If any new or worsening symptoms patient to return to ER.

## 2020-04-14 NOTE — ED PROVIDER NOTE - CLINICAL SUMMARY MEDICAL DECISION MAKING FREE TEXT BOX
Patient is a 49 y.o female with PMHx of schizoaffective disorder, anemia who resides at [facility] and presents to ED c/o lightheadedness and sob over past day with slight headache. DDx- orthostatic, dehydrated. Plan- labs, cxr fluids, will monitor and reassess.

## 2020-04-14 NOTE — ED PROVIDER NOTE - OBJECTIVE STATEMENT
HPI: Patient is a 49 y.o female with PMHx of schizoaffective disorder, anemia who resides at [place] and presents to ED c/o lightheadedness and sob over past day with slight headache. Pt states that for several years she has had this waxing and waning dizziness described more as lightheaded like "I feel I am gonna faint". Pt also notes intermittent sob for years, seemed to be worse recently. Pt notes previous headache but not currently having pain. Pt denies coughing, headaches, cp, pleuritic pain, LE swelling, n/v/d, abdominal pain, diplopia, numbness or tingling, weakness, unsteady gait or any other complaints. Pt has been seen in past for similar complaint. Had Echo 2 years ago that was normal. Most recent trops neg x 2 in december.

## 2020-04-14 NOTE — ED PROVIDER NOTE - ATTENDING CONTRIBUTION TO CARE
Agree with above, pt with likely orthostatic HOTN, no h/o chest pain, dyspnea, LOC, headache, f/c/s. Pt with drop in her Hgb from baseline, likely subacute to chronic, pt denies any h/o bleeding, no tachycardia. Pt states she feels improved. Will call [place] to discuss close f/u and repeat CBC.

## 2020-04-14 NOTE — ED PROVIDER NOTE - PROGRESS NOTE DETAILS
JESSI Marin- Patient trop <6, cxr normal. Vitals stable. Patient slightly anemic, we were going to transfuse but patient refusing. Unable to get consent. Pt denies bleeding. Pt otherwise stable so plan to DC back to [place]. Spoke to Staff Ayesha who is aware of patient and need for monitor of CBC for anemia and to have patient return if continued symptoms. Aware patient should start iron. All results sent back with patient. Pt understood plan. SW consulted to arrange transport. JESSI Marin- Spoke to Ms. Mckeon from [place], answered all questions/concerns. Aware of dispo plan and need to monitor anemia. Understood. Strict return instructions given.

## 2020-04-14 NOTE — ED ADULT NURSE NOTE - NSIMPLEMENTINTERV_GEN_ALL_ED
Implemented All Fall Risk Interventions:  [place] to call system. Call bell, personal items and telephone within reach. Instruct patient to call for assistance. Room bathroom lighting operational. Non-slip footwear when patient is off stretcher. Physically safe environment: no spills, clutter or unnecessary equipment. Stretcher in lowest position, wheels locked, appropriate side rails in place. Provide visual cue, wrist band, yellow gown, etc. Monitor gait and stability. Monitor for mental status changes and reorient to person, place, and time. Review medications for side effects contributing to fall risk. Reinforce activity limits and safety measures with patient and family.

## 2020-04-14 NOTE — ED ADULT NURSE NOTE - OBJECTIVE STATEMENT
pt received in room 22, A&OX3, pt from [facility] c/o dizziness & lightheadedness. Denies CP, SOB, cough, fevers, N/V/D. Denies any dizziness or lightheadedness to this author at this time. Resp even and unlabored. Pt has +orthostatic vs, notified provider. 20G IV placed to L hand. labs drawn and sent. Will continue to monitor.

## 2020-04-14 NOTE — ED PROVIDER NOTE - CARE PLAN
Principal Discharge DX:	Lightheaded  Secondary Diagnosis:	Anemia  Secondary Diagnosis:	Orthostatic dizziness

## 2020-04-14 NOTE — ED PROVIDER NOTE - PHYSICAL EXAMINATION
Vital signs reviewed.   CONSTITUTIONAL: Well-appearing; well-nourished; in no apparent distress. Non-toxic appearing.   HEAD: Normocephalic, atraumatic.  EYES: PERRL, EOM intact, conjunctiva and sclera WNL.  ENT: normal nose; no rhinorrhea  NECK/LYMPH: Supple; non-tender;  CARD: Normal S1, S2; no murmurs, rubs, or gallops noted.  RESP: Normal chest excursion with respiration; breath sounds clear and equal bilaterally; no wheezes, rhonchi, or rales.  ABD/GI: soft, non-distended; non-tender  EXT/MS: moves all extremities;  no pedal edema.  SKIN: Normal for age and race; warm; dry;   NEURO: Awake, alert, oriented x 3, no gross deficits. 5/5 strength ue/le. No slurred speech or facial droop. No pronator drift. Pt ambulatory in unit.   PSYCH: Normal mood; appropriate affect.

## 2020-04-14 NOTE — ED PROVIDER NOTE - PATIENT PORTAL LINK FT
You can access the FollowMyHealth Patient Portal offered by [facility] by registering at the following website: [email or web address]. By joining [employer]’s FollowMyHealth portal, you will also be able to view your health information using other applications (apps) compatible with our system.

## 2020-04-15 NOTE — PROVIDER CONTACT NOTE (OTHER) - ASSESSMENT
SW contacted residence at [phone]; spoke with MsMarizol Linda to confirm that pt can return to the residence.  Confirmed pt's mode of travel as ambulette; discussed with provider who is in agreement with pt traveling via ambulette. PETE contacted residence at [phone]; spoke with Ms. Mckeon to confirm that pt can return to the residence.  Confirmed pt's mode of travel as ambulette; discussed with provider who is in agreement with pt traveling via ambulette.  Addendum:  Pt has Medicaid; ID # [id number].  Called [facility] to obtain auth for ambulette-[facility] confirmation # is [id number]; [employer] informed.

## 2020-04-15 NOTE — PROVIDER CONTACT NOTE (OTHER) - SITUATION
Notified that pt is from [facility]; group home residence on the grounds of [facility].

## 2020-06-11 ENCOUNTER — INPATIENT (INPATIENT)
Facility: HOSPITAL | Age: 50
LOS: 5 days | Discharge: NURSING, REHAB OR LONG-TERM CARE | End: 2020-06-17
Attending: INTERNAL MEDICINE | Admitting: INTERNAL MEDICINE
Payer: MEDICAID

## 2020-06-11 VITALS
OXYGEN SATURATION: 99 % | HEART RATE: 115 BPM | TEMPERATURE: 99 F | SYSTOLIC BLOOD PRESSURE: 109 MMHG | DIASTOLIC BLOOD PRESSURE: 83 MMHG | RESPIRATION RATE: 18 BRPM

## 2020-06-11 DIAGNOSIS — D64.9 ANEMIA, UNSPECIFIED: ICD-10-CM

## 2020-06-11 LAB
ALBUMIN SERPL ELPH-MCNC: 3.8 G/DL — SIGNIFICANT CHANGE UP (ref 3.3–5)
ALP SERPL-CCNC: 94 U/L — SIGNIFICANT CHANGE UP (ref 40–120)
ALT FLD-CCNC: 12 U/L — SIGNIFICANT CHANGE UP (ref 4–33)
ANION GAP SERPL CALC-SCNC: 12 MMO/L — SIGNIFICANT CHANGE UP (ref 7–14)
APPEARANCE UR: CLEAR — SIGNIFICANT CHANGE UP
AST SERPL-CCNC: 15 U/L — SIGNIFICANT CHANGE UP (ref 4–32)
BACTERIA # UR AUTO: NEGATIVE — SIGNIFICANT CHANGE UP
BASOPHILS # BLD AUTO: 0.03 K/UL — SIGNIFICANT CHANGE UP (ref 0–0.2)
BASOPHILS NFR BLD AUTO: 0.4 % — SIGNIFICANT CHANGE UP (ref 0–2)
BILIRUB SERPL-MCNC: < 0.2 MG/DL — LOW (ref 0.2–1.2)
BILIRUB UR-MCNC: NEGATIVE — SIGNIFICANT CHANGE UP
BLD GP AB SCN SERPL QL: NEGATIVE — SIGNIFICANT CHANGE UP
BLOOD UR QL VISUAL: NEGATIVE — SIGNIFICANT CHANGE UP
BUN SERPL-MCNC: 16 MG/DL — SIGNIFICANT CHANGE UP (ref 7–23)
CALCIUM SERPL-MCNC: 8.6 MG/DL — SIGNIFICANT CHANGE UP (ref 8.4–10.5)
CHLORIDE SERPL-SCNC: 103 MMOL/L — SIGNIFICANT CHANGE UP (ref 98–107)
CO2 SERPL-SCNC: 26 MMOL/L — SIGNIFICANT CHANGE UP (ref 22–31)
COLOR SPEC: YELLOW — SIGNIFICANT CHANGE UP
CREAT SERPL-MCNC: 1 MG/DL — SIGNIFICANT CHANGE UP (ref 0.5–1.3)
D DIMER BLD IA.RAPID-MCNC: 316 NG/ML — SIGNIFICANT CHANGE UP
EOSINOPHIL # BLD AUTO: 0.13 K/UL — SIGNIFICANT CHANGE UP (ref 0–0.5)
EOSINOPHIL NFR BLD AUTO: 1.6 % — SIGNIFICANT CHANGE UP (ref 0–6)
GLUCOSE SERPL-MCNC: 112 MG/DL — HIGH (ref 70–99)
GLUCOSE UR-MCNC: NEGATIVE — SIGNIFICANT CHANGE UP
HCG SERPL-ACNC: < 5 MIU/ML — SIGNIFICANT CHANGE UP
HCT VFR BLD CALC: 24.8 % — LOW (ref 34.5–45)
HGB BLD-MCNC: 7 G/DL — CRITICAL LOW (ref 11.5–15.5)
HYALINE CASTS # UR AUTO: SIGNIFICANT CHANGE UP
IMM GRANULOCYTES NFR BLD AUTO: 1.2 % — SIGNIFICANT CHANGE UP (ref 0–1.5)
KETONES UR-MCNC: NEGATIVE — SIGNIFICANT CHANGE UP
LEUKOCYTE ESTERASE UR-ACNC: SIGNIFICANT CHANGE UP
LYMPHOCYTES # BLD AUTO: 1.83 K/UL — SIGNIFICANT CHANGE UP (ref 1–3.3)
LYMPHOCYTES # BLD AUTO: 22.6 % — SIGNIFICANT CHANGE UP (ref 13–44)
MCHC RBC-ENTMCNC: 20.4 PG — LOW (ref 27–34)
MCHC RBC-ENTMCNC: 28.2 % — LOW (ref 32–36)
MCV RBC AUTO: 72.3 FL — LOW (ref 80–100)
MONOCYTES # BLD AUTO: 1.02 K/UL — HIGH (ref 0–0.9)
MONOCYTES NFR BLD AUTO: 12.6 % — SIGNIFICANT CHANGE UP (ref 2–14)
NEUTROPHILS # BLD AUTO: 4.98 K/UL — SIGNIFICANT CHANGE UP (ref 1.8–7.4)
NEUTROPHILS NFR BLD AUTO: 61.6 % — SIGNIFICANT CHANGE UP (ref 43–77)
NITRITE UR-MCNC: NEGATIVE — SIGNIFICANT CHANGE UP
NRBC # FLD: 0.15 K/UL — SIGNIFICANT CHANGE UP (ref 0–0)
NRBC FLD-RTO: 1.9 — SIGNIFICANT CHANGE UP
OB PNL STL: NEGATIVE — SIGNIFICANT CHANGE UP
PH UR: 6 — SIGNIFICANT CHANGE UP (ref 5–8)
PLATELET # BLD AUTO: 376 K/UL — SIGNIFICANT CHANGE UP (ref 150–400)
PMV BLD: 9.1 FL — SIGNIFICANT CHANGE UP (ref 7–13)
POTASSIUM SERPL-MCNC: 4.2 MMOL/L — SIGNIFICANT CHANGE UP (ref 3.5–5.3)
POTASSIUM SERPL-SCNC: 4.2 MMOL/L — SIGNIFICANT CHANGE UP (ref 3.5–5.3)
PROT SERPL-MCNC: 6.9 G/DL — SIGNIFICANT CHANGE UP (ref 6–8.3)
PROT UR-MCNC: 20 — SIGNIFICANT CHANGE UP
RBC # BLD: 3.43 M/UL — LOW (ref 3.8–5.2)
RBC # FLD: 19.9 % — HIGH (ref 10.3–14.5)
RBC CASTS # UR COMP ASSIST: SIGNIFICANT CHANGE UP (ref 0–?)
RH IG SCN BLD-IMP: POSITIVE — SIGNIFICANT CHANGE UP
SODIUM SERPL-SCNC: 141 MMOL/L — SIGNIFICANT CHANGE UP (ref 135–145)
SP GR SPEC: 1.03 — SIGNIFICANT CHANGE UP (ref 1–1.04)
SQUAMOUS # UR AUTO: SIGNIFICANT CHANGE UP
TROPONIN T, HIGH SENSITIVITY: 7 NG/L — SIGNIFICANT CHANGE UP (ref ?–14)
UROBILINOGEN FLD QL: NORMAL — SIGNIFICANT CHANGE UP
VALPROATE SERPL-MCNC: 55.6 UG/ML — SIGNIFICANT CHANGE UP (ref 50–100)
WBC # BLD: 8.09 K/UL — SIGNIFICANT CHANGE UP (ref 3.8–10.5)
WBC # FLD AUTO: 8.09 K/UL — SIGNIFICANT CHANGE UP (ref 3.8–10.5)
WBC UR QL: SIGNIFICANT CHANGE UP (ref 0–?)

## 2020-06-11 PROCEDURE — 99285 EMERGENCY DEPT VISIT HI MDM: CPT

## 2020-06-11 PROCEDURE — 71046 X-RAY EXAM CHEST 2 VIEWS: CPT | Mod: 26

## 2020-06-11 RX ORDER — SODIUM CHLORIDE 9 MG/ML
1000 INJECTION INTRAMUSCULAR; INTRAVENOUS; SUBCUTANEOUS ONCE
Refills: 0 | Status: COMPLETED | OUTPATIENT
Start: 2020-06-11 | End: 2020-06-11

## 2020-06-11 RX ORDER — PANTOPRAZOLE SODIUM 20 MG/1
80 TABLET, DELAYED RELEASE ORAL ONCE
Refills: 0 | Status: COMPLETED | OUTPATIENT
Start: 2020-06-11 | End: 2020-06-11

## 2020-06-11 RX ADMIN — SODIUM CHLORIDE 1000 MILLILITER(S): 9 INJECTION INTRAMUSCULAR; INTRAVENOUS; SUBCUTANEOUS at 19:45

## 2020-06-11 RX ADMIN — SODIUM CHLORIDE 1000 MILLILITER(S): 9 INJECTION INTRAMUSCULAR; INTRAVENOUS; SUBCUTANEOUS at 22:07

## 2020-06-11 RX ADMIN — PANTOPRAZOLE SODIUM 80 MILLIGRAM(S): 20 TABLET, DELAYED RELEASE ORAL at 22:15

## 2020-06-11 NOTE — ED PROVIDER NOTE - OBJECTIVE STATEMENT
49F hx of anxiety/schizophrenia presents from outpt group home with a cc of SOB lightheadedness and dizziness x1 day, reports today was walking when felt light headed and dizzy a/w SOB has been having episode of lightheadedness for days now, no chest pain, SOB is also chronic, did not fall, no LOC, recalls entire event, also notes did not eat regular meals today, was given food and since then has been feeling better, no prior hx of dvt or pe, no chills, diaphoresis, LE swelling at baseline. No palpitations, limited historian however does endorse medication compliance. Denies n/v/f/c/. Denies headache, syncope, Denies chest palpitations, abdominal pain. Denies dysuria, hematuria, hematochezia, BRBPR, tarry stools, diarrhea, constipation.

## 2020-06-11 NOTE — ED PROVIDER NOTE - CLINICAL SUMMARY MEDICAL DECISION MAKING FREE TEXT BOX
Joaquín PGY3: 49F hx of anxiety/schizophrenia presents from outpt group home with a cc of SOB lightheadedness and dizziness x1 day, reports today was walking when felt light headed and dizzy a/w SOB has been having episode of lightheadedness for days now, no chest pain, SOB is also chronic, did not fall, no LOC, recalls entire event, also notes did not eat regular meals today, was given food and since then has been feeling better exam vss non toxic tachycardic otherwise non focal, cannot exclude pe, will get d dimer, check labs, low c/f acs, consider dehydration vs pe vs transient hypoglycemia, reassess thereafter.

## 2020-06-11 NOTE — ED ADULT NURSE NOTE - OBJECTIVE STATEMENT
Patient received in exam room 22. history of schizophrenia. Brought in by EMS from [facility] for weakness and lightheadedness today. Alert and oriented x3, ambulates. Denies nausea, vomiting, fevers, chills, diarrhea, chest pain, sob. Skin intact. Labs to be sent. In no acute distress. Will monitor. Patient received in exam room 22. history of schizophrenia. Brought in by EMS from [place] [id number] for weakness and lightheadedness today. Alert and oriented x3, ambulates. Denies nausea, vomiting, fevers, chills, diarrhea, chest pain, sob. Skin intact. Labs sent. 20 gauge IV to right hand inserted. In no acute distress. Will monitor.

## 2020-06-11 NOTE — ED PROVIDER NOTE - ATTENDING CONTRIBUTION TO CARE
I have personally performed a face to face bedside history and physical examination of this patient. I have discussed the history, examination, review of systems, assessment and plan of management with the resident. I have reviewed the electronic medical record and amended it to reflect my history, review of systems, physical exam, assessment and plan.    Brief HPI:  49F hx of anxiety/schizophrenia presents from outpt group home with a cc of SOB lightheadedness and dizziness x1 day.      Vitals:   Reviewed    Exam:    GEN:  Non-toxic appearing, non-distressed, speaking full sentences, non-diaphoretic, AAOx3  HEENT:  NCAT, neck supple, EOMI, PERRLA, sclera anicteric, no conjunctival pallor or injection, no stridor, normal voice, no tonsillar exudate, uvula midline, tympanic membranes and external auditory canals normal appearing bilaterally   CV:  regular rhythm and rate, s1/s2 audible, no murmurs, rubs or gallops, peripheral pulses 2+ and symmetric  PULM:  non-labored respirations, lungs clear to auscultation bilaterally, no wheezes, crackles or rales  ABD:  non distended, non-tender, no rebound, no guarding, negative Odom's sign, bowel sounds normal, no cvat  MSK:  no gross deformity, non-tender extremities and joints, range of motion grossly normal appearing, no extremity edema, extremities warm and well perfused   NEURO:  AAOx3, CN II-XII intact, motor 5/5 in upper and lower extremities bilaterally, sensation grossly intact in extremities and trunk, finger to nose testing wnl, no nystagmus, negative Romberg, no pronator drift, no gait deficit  SKIN:  warm, dry, no rash or vesicles     A/P:  49F hx of anxiety/schizophrenia presents from outpt group home with a cc of SOB lightheadedness and dizziness x1 day.  Low c/f ACS given lack of cp.  No clear infectious etiology.  Possible anemia given history.  Will send labs, cxr, supportive care.  Dispo pending.

## 2020-06-11 NOTE — ED ADULT NURSE REASSESSMENT NOTE - NS ED NURSE REASSESS COMMENT FT1
pt is awake, alert and oriented, breathing even and unlabored at rest with no complaints at this time. pt is on continuous cardiac monitoring and continuous pulse ox, oxygen titrated to 2.5 liters and pt is at 96% O2 with respirations 20-25. will continue to monitor.

## 2020-06-11 NOTE — ED ADULT NURSE NOTE - NSIMPLEMENTINTERV_GEN_ALL_ED
Implemented All Fall with Harm Risk Interventions:  [place] to call system. Call bell, personal items and telephone within reach. Instruct patient to call for assistance. Room bathroom lighting operational. Non-slip footwear when patient is off stretcher. Physically safe environment: no spills, clutter or unnecessary equipment. Stretcher in lowest position, wheels locked, appropriate side rails in place. Provide visual cue, wrist band, yellow gown, etc. Monitor gait and stability. Monitor for mental status changes and reorient to person, place, and time. Review medications for side effects contributing to fall risk. Reinforce activity limits and safety measures with patient and family. Provide visual clues: red socks.

## 2020-06-11 NOTE — ED PROVIDER NOTE - PROGRESS NOTE DETAILS
Joaquín PGY3: noted low h/h to 7  and positive d dimer, in ED, pt denies hx of darker colored stools, no BRBPR, discussed need for cta w ivc pt declines both blood transfusion and cta w ivc, pt is able to express understanding of risk of missed PE and anemia including respiratory arrest, syncope, and possibly death, pt agrees w plan for admission to hospital for further evaluation. Joaquín PGY3: informed [facility] pt to be admitted, spoke with RN on call.

## 2020-06-12 DIAGNOSIS — D50.9 IRON DEFICIENCY ANEMIA, UNSPECIFIED: ICD-10-CM

## 2020-06-12 DIAGNOSIS — Z29.9 ENCOUNTER FOR PROPHYLACTIC MEASURES, UNSPECIFIED: ICD-10-CM

## 2020-06-12 DIAGNOSIS — D64.9 ANEMIA, UNSPECIFIED: ICD-10-CM

## 2020-06-12 DIAGNOSIS — K59.01 SLOW TRANSIT CONSTIPATION: ICD-10-CM

## 2020-06-12 DIAGNOSIS — F41.9 ANXIETY DISORDER, UNSPECIFIED: ICD-10-CM

## 2020-06-12 DIAGNOSIS — F20.9 SCHIZOPHRENIA, UNSPECIFIED: ICD-10-CM

## 2020-06-12 DIAGNOSIS — Z02.9 ENCOUNTER FOR ADMINISTRATIVE EXAMINATIONS, UNSPECIFIED: ICD-10-CM

## 2020-06-12 DIAGNOSIS — E66.9 OBESITY, UNSPECIFIED: ICD-10-CM

## 2020-06-12 LAB
24R-OH-CALCIDIOL SERPL-MCNC: 23 NG/ML — LOW (ref 30–80)
ANION GAP SERPL CALC-SCNC: 12 MMO/L — SIGNIFICANT CHANGE UP (ref 7–14)
ANISOCYTOSIS BLD QL: SIGNIFICANT CHANGE UP
APTT BLD: 29 SEC — SIGNIFICANT CHANGE UP (ref 27.5–36.3)
BASOPHILS # BLD AUTO: 0.02 K/UL — SIGNIFICANT CHANGE UP (ref 0–0.2)
BASOPHILS NFR BLD AUTO: 0.3 % — SIGNIFICANT CHANGE UP (ref 0–2)
BASOPHILS NFR SPEC: 1.8 % — SIGNIFICANT CHANGE UP (ref 0–2)
BLASTS # FLD: 0 % — SIGNIFICANT CHANGE UP (ref 0–0)
BUN SERPL-MCNC: 13 MG/DL — SIGNIFICANT CHANGE UP (ref 7–23)
CALCIUM SERPL-MCNC: 7.9 MG/DL — LOW (ref 8.4–10.5)
CHLORIDE SERPL-SCNC: 106 MMOL/L — SIGNIFICANT CHANGE UP (ref 98–107)
CHOLEST SERPL-MCNC: 131 MG/DL — SIGNIFICANT CHANGE UP (ref 120–199)
CO2 SERPL-SCNC: 24 MMOL/L — SIGNIFICANT CHANGE UP (ref 22–31)
CREAT SERPL-MCNC: 0.75 MG/DL — SIGNIFICANT CHANGE UP (ref 0.5–1.3)
CULTURE RESULTS: SIGNIFICANT CHANGE UP
ELLIPTOCYTES BLD QL SMEAR: SLIGHT — SIGNIFICANT CHANGE UP
EOSINOPHIL # BLD AUTO: 0.1 K/UL — SIGNIFICANT CHANGE UP (ref 0–0.5)
EOSINOPHIL NFR BLD AUTO: 1.3 % — SIGNIFICANT CHANGE UP (ref 0–6)
EOSINOPHIL NFR FLD: 0.9 % — SIGNIFICANT CHANGE UP (ref 0–6)
FERRITIN SERPL-MCNC: 7.23 NG/ML — LOW (ref 15–150)
FOLATE SERPL-MCNC: 8.7 NG/ML — SIGNIFICANT CHANGE UP (ref 4.7–20)
GIANT PLATELETS BLD QL SMEAR: PRESENT — SIGNIFICANT CHANGE UP
GLUCOSE SERPL-MCNC: 90 MG/DL — SIGNIFICANT CHANGE UP (ref 70–99)
HCT VFR BLD CALC: 22.5 % — LOW (ref 34.5–45)
HCT VFR BLD CALC: 22.7 % — LOW (ref 34.5–45)
HCT VFR BLD CALC: 24.2 % — LOW (ref 34.5–45)
HDLC SERPL-MCNC: 45 MG/DL — SIGNIFICANT CHANGE UP (ref 45–65)
HGB BLD-MCNC: 6.3 G/DL — CRITICAL LOW (ref 11.5–15.5)
HGB BLD-MCNC: 6.6 G/DL — CRITICAL LOW (ref 11.5–15.5)
HGB BLD-MCNC: 6.7 G/DL — CRITICAL LOW (ref 11.5–15.5)
HYPOCHROMIA BLD QL: SLIGHT — SIGNIFICANT CHANGE UP
IMM GRANULOCYTES NFR BLD AUTO: 0.8 % — SIGNIFICANT CHANGE UP (ref 0–1.5)
INR BLD: 0.96 — SIGNIFICANT CHANGE UP (ref 0.88–1.17)
IRON SATN MFR SERPL: 36 UG/DL — SIGNIFICANT CHANGE UP (ref 30–160)
IRON SATN MFR SERPL: 448 UG/DL — SIGNIFICANT CHANGE UP (ref 140–530)
LIPID PNL WITH DIRECT LDL SERPL: 76 MG/DL — SIGNIFICANT CHANGE UP
LYMPHOCYTES # BLD AUTO: 2.4 K/UL — SIGNIFICANT CHANGE UP (ref 1–3.3)
LYMPHOCYTES # BLD AUTO: 32 % — SIGNIFICANT CHANGE UP (ref 13–44)
LYMPHOCYTES NFR SPEC AUTO: 22.5 % — SIGNIFICANT CHANGE UP (ref 13–44)
MAGNESIUM SERPL-MCNC: 1.9 MG/DL — SIGNIFICANT CHANGE UP (ref 1.6–2.6)
MCHC RBC-ENTMCNC: 20 PG — LOW (ref 27–34)
MCHC RBC-ENTMCNC: 20.6 PG — LOW (ref 27–34)
MCHC RBC-ENTMCNC: 21.1 PG — LOW (ref 27–34)
MCHC RBC-ENTMCNC: 27.7 % — LOW (ref 32–36)
MCHC RBC-ENTMCNC: 28 % — LOW (ref 32–36)
MCHC RBC-ENTMCNC: 28.8 % — LOW (ref 32–36)
MCV RBC AUTO: 72.2 FL — LOW (ref 80–100)
MCV RBC AUTO: 73.2 FL — LOW (ref 80–100)
MCV RBC AUTO: 73.5 FL — LOW (ref 80–100)
METAMYELOCYTES # FLD: 0 % — SIGNIFICANT CHANGE UP (ref 0–1)
MICROCYTES BLD QL: SIGNIFICANT CHANGE UP
MONOCYTES # BLD AUTO: 1.34 K/UL — HIGH (ref 0–0.9)
MONOCYTES NFR BLD AUTO: 17.8 % — HIGH (ref 2–14)
MONOCYTES NFR BLD: 9 % — SIGNIFICANT CHANGE UP (ref 2–9)
MYELOCYTES NFR BLD: 0 % — SIGNIFICANT CHANGE UP (ref 0–0)
NEUTROPHIL AB SER-ACNC: 61.3 % — SIGNIFICANT CHANGE UP (ref 43–77)
NEUTROPHILS # BLD AUTO: 3.59 K/UL — SIGNIFICANT CHANGE UP (ref 1.8–7.4)
NEUTROPHILS NFR BLD AUTO: 47.8 % — SIGNIFICANT CHANGE UP (ref 43–77)
NEUTS BAND # BLD: 0 % — SIGNIFICANT CHANGE UP (ref 0–6)
NRBC # BLD: 2 /100WBC — SIGNIFICANT CHANGE UP
NRBC # FLD: 0.09 K/UL — SIGNIFICANT CHANGE UP (ref 0–0)
NRBC # FLD: 0.11 K/UL — SIGNIFICANT CHANGE UP (ref 0–0)
NRBC # FLD: 0.12 K/UL — SIGNIFICANT CHANGE UP (ref 0–0)
NRBC FLD-RTO: 1.3 — SIGNIFICANT CHANGE UP
NRBC FLD-RTO: 1.5 — SIGNIFICANT CHANGE UP
NRBC FLD-RTO: 1.7 — SIGNIFICANT CHANGE UP
OTHER - HEMATOLOGY %: 0 — SIGNIFICANT CHANGE UP
OVALOCYTES BLD QL SMEAR: SLIGHT — SIGNIFICANT CHANGE UP
PHOSPHATE SERPL-MCNC: 2.2 MG/DL — LOW (ref 2.5–4.5)
PLATELET # BLD AUTO: 329 K/UL — SIGNIFICANT CHANGE UP (ref 150–400)
PLATELET # BLD AUTO: 340 K/UL — SIGNIFICANT CHANGE UP (ref 150–400)
PLATELET # BLD AUTO: 342 K/UL — SIGNIFICANT CHANGE UP (ref 150–400)
PLATELET COUNT - ESTIMATE: NORMAL — SIGNIFICANT CHANGE UP
PMV BLD: 8.8 FL — SIGNIFICANT CHANGE UP (ref 7–13)
PMV BLD: 9.1 FL — SIGNIFICANT CHANGE UP (ref 7–13)
PMV BLD: 9.4 FL — SIGNIFICANT CHANGE UP (ref 7–13)
POIKILOCYTOSIS BLD QL AUTO: SIGNIFICANT CHANGE UP
POLYCHROMASIA BLD QL SMEAR: SIGNIFICANT CHANGE UP
POTASSIUM SERPL-MCNC: 4.2 MMOL/L — SIGNIFICANT CHANGE UP (ref 3.5–5.3)
POTASSIUM SERPL-SCNC: 4.2 MMOL/L — SIGNIFICANT CHANGE UP (ref 3.5–5.3)
PROMYELOCYTES # FLD: 0 % — SIGNIFICANT CHANGE UP (ref 0–0)
PROTHROM AB SERPL-ACNC: 10.9 SEC — SIGNIFICANT CHANGE UP (ref 9.8–13.1)
RBC # BLD: 3.06 M/UL — LOW (ref 3.8–5.2)
RBC # BLD: 3.13 M/UL — LOW (ref 3.8–5.2)
RBC # BLD: 3.35 M/UL — LOW (ref 3.8–5.2)
RBC # FLD: 19.9 % — HIGH (ref 10.3–14.5)
RBC # FLD: 20 % — HIGH (ref 10.3–14.5)
RBC # FLD: 20.2 % — HIGH (ref 10.3–14.5)
RETICS #: 76 K/UL — SIGNIFICANT CHANGE UP (ref 25–125)
RETICS/RBC NFR: 2.3 % — SIGNIFICANT CHANGE UP (ref 0.5–2.5)
SARS-COV-2 RNA SPEC QL NAA+PROBE: SIGNIFICANT CHANGE UP
SMUDGE CELLS # BLD: PRESENT — SIGNIFICANT CHANGE UP
SODIUM SERPL-SCNC: 142 MMOL/L — SIGNIFICANT CHANGE UP (ref 135–145)
SPECIMEN SOURCE: SIGNIFICANT CHANGE UP
TRIGL SERPL-MCNC: 95 MG/DL — SIGNIFICANT CHANGE UP (ref 10–149)
TROPONIN T, HIGH SENSITIVITY: 8 NG/L — SIGNIFICANT CHANGE UP (ref ?–14)
TSH SERPL-MCNC: 4.06 UIU/ML — SIGNIFICANT CHANGE UP (ref 0.27–4.2)
UIBC SERPL-MCNC: 411.5 UG/DL — HIGH (ref 110–370)
VARIANT LYMPHS # BLD: 4.5 % — SIGNIFICANT CHANGE UP
WBC # BLD: 6.8 K/UL — SIGNIFICANT CHANGE UP (ref 3.8–10.5)
WBC # BLD: 7.18 K/UL — SIGNIFICANT CHANGE UP (ref 3.8–10.5)
WBC # BLD: 7.51 K/UL — SIGNIFICANT CHANGE UP (ref 3.8–10.5)
WBC # FLD AUTO: 6.8 K/UL — SIGNIFICANT CHANGE UP (ref 3.8–10.5)
WBC # FLD AUTO: 7.18 K/UL — SIGNIFICANT CHANGE UP (ref 3.8–10.5)
WBC # FLD AUTO: 7.51 K/UL — SIGNIFICANT CHANGE UP (ref 3.8–10.5)

## 2020-06-12 PROCEDURE — 12345: CPT | Mod: NC

## 2020-06-12 PROCEDURE — 99222 1ST HOSP IP/OBS MODERATE 55: CPT | Mod: GC

## 2020-06-12 PROCEDURE — 99223 1ST HOSP IP/OBS HIGH 75: CPT

## 2020-06-12 PROCEDURE — 99223 1ST HOSP IP/OBS HIGH 75: CPT | Mod: GC

## 2020-06-12 RX ORDER — POTASSIUM PHOSPHATE, MONOBASIC POTASSIUM PHOSPHATE, DIBASIC 236; 224 MG/ML; MG/ML
15 INJECTION, SOLUTION INTRAVENOUS ONCE
Refills: 0 | Status: COMPLETED | OUTPATIENT
Start: 2020-06-12 | End: 2020-06-12

## 2020-06-12 RX ORDER — DIVALPROEX SODIUM 500 MG/1
1000 TABLET, DELAYED RELEASE ORAL AT BEDTIME
Refills: 0 | Status: DISCONTINUED | OUTPATIENT
Start: 2020-06-12 | End: 2020-06-17

## 2020-06-12 RX ORDER — OLANZAPINE 15 MG/1
20 TABLET, FILM COATED ORAL AT BEDTIME
Refills: 0 | Status: DISCONTINUED | OUTPATIENT
Start: 2020-06-12 | End: 2020-06-17

## 2020-06-12 RX ORDER — IRON SUCROSE 20 MG/ML
100 INJECTION, SOLUTION INTRAVENOUS EVERY 24 HOURS
Refills: 0 | Status: COMPLETED | OUTPATIENT
Start: 2020-06-12 | End: 2020-06-14

## 2020-06-12 RX ADMIN — OLANZAPINE 20 MILLIGRAM(S): 15 TABLET, FILM COATED ORAL at 21:59

## 2020-06-12 RX ADMIN — POTASSIUM PHOSPHATE, MONOBASIC POTASSIUM PHOSPHATE, DIBASIC 62.5 MILLIMOLE(S): 236; 224 INJECTION, SOLUTION INTRAVENOUS at 18:50

## 2020-06-12 RX ADMIN — DIVALPROEX SODIUM 1000 MILLIGRAM(S): 500 TABLET, DELAYED RELEASE ORAL at 21:59

## 2020-06-12 RX ADMIN — IRON SUCROSE 210 MILLIGRAM(S): 20 INJECTION, SOLUTION INTRAVENOUS at 16:35

## 2020-06-12 NOTE — H&P ADULT - ASSESSMENT
48 yo F PMHx anxiety, schizophrenia, [facility] resident, who presents with sob, lightheadedness, and dizziness x1 day. 48 yo F PMHx anxiety, schizophrenia, [facility] resident, who presents with sob and lightheadedness 2/2 microcytic anemia likely 2/2 chronic LGIB given BRBPR. 48 yo F PMHx anxiety, schizophrenia, [place] resident, who presents with sob and lightheadedness 2/2 microcytic anemia likely 2/2 chronic LGIB given BRBPR.    [ x ]  Lab studies reviewed  [ x ]  Radiology reviewed 48 yo F PMHx anxiety, schizophrenia, [place] resident, who presents with sob and lightheadedness 2/2 microcytic anemia likely 2/2 chronic LGIB given BRBPR.    [ x ]  Lab studies reviewed  [ x ]  Radiology reviewed  [ x ]  Old charts reviewed

## 2020-06-12 NOTE — BEHAVIORAL HEALTH ASSESSMENT NOTE - NS ED BHA ALCOHOL
Patient tolerating oral liquids without difficulty. No apparent signs and/or symptoms of distress noted at this time. No complaints voiced at this time. Discharge instructions reviewed with patient/family/friend with good verbal feedback received. Patient ready for discharge     None known

## 2020-06-12 NOTE — H&P ADULT - PROBLEM SELECTOR PLAN 1
Microcytic anemia suggesting iron deficiency vs. chronic bleed ? GIB though FOBT neg.   -2 large bore PIVs  -maintain active type and screen  -check coags  -iron studies (serum Fe, TIBC, ferritin, retic ct, B12, folate) Microcytic anemia suggesting iron deficiency vs. chronic bleed ? GIB though FOBT neg.   -2 large bore PIVs  -maintain active type and screen  -check coags  -iron studies (serum Fe, TIBC, ferritin, retic ct, B12, folate)  -GI consult  -CBC Q12 Microcytic anemia suggesting iron deficiency vs. chronic bleed ? GIB though FOBT neg.   -Retic index 0.68, hypoproliferative  -2 large bore PIVs  -maintain active type and screen  -check coags  -iron studies (serum Fe, TIBC, ferritin, retic ct, B12, folate)  -GI consult  -CBC Q12 Microcytic anemia suggesting iron deficiency vs. chronic bleed ? GIB though FOBT neg.  -reports blood on toilet paper for some time; indicates not from vaginal area  -Retic index 0.68, hypoproliferative  -2 large bore PIVs  -maintain active type and screen  -check coags  -iron studies (serum Fe, TIBC, ferritin, retic ct, B12, folate)  -GI consult  -CBC Q12 Presented with report of shortness of breath, dizziness, lightheadedness for the past day.  Tachycardic in ED  Noted with microcytic anemia suggesting iron deficiency vs. chronic bleed ? GIB though FOBT neg.  -reports blood on toilet paper over the recent past; indicates not from vaginal area  -Retic index 0.68, hypoproliferative  -2 large bore PIVs  -maintain active type and screen  -check coags  -iron studies (serum Fe, TIBC, ferritin, retic ct, B12, folate)  -GI consult  -CBC Q12  -Ensure optimal hydration Presented with report of shortness of breath, dizziness, lightheadedness, blurred vision for the past day.  Easily fatigued.  Tachycardic in ED.  Patient now reports symptoms occurring intermittently over the past years.  Recalls being told that she is anemic.  Microcytic anemia suggesting iron deficiency vs. chronic bleed ?GIB though FOBT neg.  -reports small steaks of blood on toilet paper intermittently over the past years; indicates not from vaginal area, but due to "cuts" at anus because of hard stools  -Retic index 0.68, hypoproliferative  -2 large bore PIVs  -maintain active type and screen  -check coags  -iron studies (serum Fe, TIBC, ferritin, retic ct, B12, folate)  -would give vitamin C to aid absorption or iron when starting iron supplement.  Also, would likely benefit from stool softener/bowel regimen when starting iron  -GI consult  -CBC Q12  -Ensure optimal hydration

## 2020-06-12 NOTE — PROGRESS NOTE ADULT - PROBLEM SELECTOR PLAN 3
-no issues presently, but w/ intermittent difficulty  -ensure optimal hydration  -may need stool softener/bowel regimen when

## 2020-06-12 NOTE — H&P ADULT - ATTENDING COMMENTS
49 years old female, [facility] resident, with past history significant for Schizophrenia, Anxiety and Obesity, presented to the ED secondary to shortness of breath and dizziness.  Discovered w/ Hgb of 7.9, and anemia profile/iron studies indicative of iron deficiency anemia.  Patient, however, w/ report of blood on toilet paper for some time now; states not from vaginal area.  Currently not receptive re possibility of blood transfusion.  GI consult being submitted; holding off on supplementing iron (along w/ vitamin C to aid absorption) until after GI eval completed.  Maintain active Type & Cross.  Low threshold for Psychiatry eval if signs of decompensation; currently appears to laugh inappropriately at times.    All other management as prescribed. 49 years old female, [facility] resident, with past history significant for Schizophrenia, Anxiety and Obesity, presented to the ED secondary to shortness of breath and dizziness.  Discovered w/ Hgb of 7.9, and anemia profile/iron studies indicative of iron deficiency anemia.  Patient, however, w/ report of blood on toilet paper for some time now; states not from vaginal area.  Currently not receptive re possibility of blood transfusion.  GI consult being submitted; holding off on supplementing iron (along w/ vitamin C to aid absorption) until after GI eval completed.  Maintain active Type & Cross.  Low threshold for Psychiatry eval if signs of decompensation; currently appears to laugh inappropriately at times.  F/U repeat lab-work, GI eval.    All other management as prescribed. 49 years old female, [facility] resident, with past history significant for Anemia, Schizophrenia, Anxiety, Obesity and Constipation, presented to the ED secondary to shortness of breath and dizziness.  Discovered w/ Hgb of 7.9, and anemia profile/iron studies indicative of iron deficiency anemia.  Patient, reports small streaks blood on toilet paper x years; associated with difficult bowel movements only, per patient.  States "cuts" occurring at the anus with forceful defecation due to constipation.  Last instance was ~ 3 days ago, and patient states cuts have healed.  No vaginal bleeding cited.  Reports being told years ago about being anemic.  Has been experiencing fatigue with exertion, shortness of breath, dizziness, lightheadedness and blurred vision intermittently over the years.  Currently not receptive re possibility of blood transfusion.  GI consult being submitted; holding off on supplementing iron (along w/ vitamin C to aid absorption) until after GI eval completed.  Maintain active Type & Cross.  Low threshold for Psychiatry eval if signs of decompensation; currently appears to laugh inappropriately at times.  F/U repeat lab-work, GI eval.    All other management as prescribed. 49 years old female, [facility] resident, with past history significant for Anemia, Schizophrenia, Anxiety, Obesity and Constipation, presented to the ED secondary to shortness of breath and dizziness.  Noted w/ Hgb of 7.9, and anemia profile/iron studies indicative of iron deficiency anemia.  Patient, reports small streaks blood on toilet paper x years; associated with difficult bowel movements only, per patient.  States "cuts" occurring at the anus with forceful defecation due to constipation.  Last instance was ~ 3 days ago, and patient states cuts have healed.  No vaginal bleeding cited.  Reports being told years ago about being anemic.  Has been experiencing fatigue with exertion, shortness of breath, dizziness, lightheadedness and blurred vision intermittently over the years.  Review of previous ED visits indicate similar complaints/reasons for presentations since ~ 2018.  Currently not receptive re possibility of blood transfusion.  GI consult being submitted; holding off on supplementing iron (along w/ vitamin C to aid absorption) until after GI eval completed.  Maintain active Type & Cross.  Low threshold for Psychiatry eval if signs of decompensation; currently appears to laugh inappropriately at times.  F/U repeat lab-work, GI eval.    All other management as prescribed.

## 2020-06-12 NOTE — PATIENT PROFILE ADULT - DISASTER - FALL HARM RISK TYPE OF ASSESSMENT
admission
direct patient care (not related to procedure)/documentation/consultation with other physicians/interpretation of diagnostic studies

## 2020-06-12 NOTE — PROGRESS NOTE ADULT - ASSESSMENT
48 yo F PMHx anxiety, schizophrenia, [facility] resident, who presents with sob and lightheadedness 2/2 microcytic anemia likely 2/2 acute on chronic LGIB given BRBPR with acute blood loss anemia

## 2020-06-12 NOTE — BEHAVIORAL HEALTH ASSESSMENT NOTE - SUMMARY
48 yo F PMHx anxiety, schizophrenia, [place] resident, who presents with sob and lightheadedness 2/2 microcytic anemia likely 2/2 chronic LGIB given BRBPR.     Pt is consistently refusing blood transfusion based on delusional belief that her blood is literally fire and everyone else's blood is water, therefore she would die (the water would put out the fire) if she received a blood transfusion. Understands the risks of not getting blood. Accepts alternative treatment. 50 yo F PMHx anxiety, schizophrenia, [place] resident, who presents with sob and lightheadedness 2/2 microcytic anemia likely 2/2 chronic LGIB given BRBPR.     Pt is consistently refusing blood transfusion based on delusion that her blood is literally fire and everyone else's blood is water, therefore she would die (the water would put out the fire) if she received a blood transfusion. Her rationale for refusing blood is reasonable / logical given her belief. It is possible that her "spiritual belief" is loosely related to Islam, although she denies being one. Understands that the risk of refusing blood is possible death. She would accept alternative treatment. 48 yo F PMHx anxiety, schizophrenia, [place] resident, who presents with sob and lightheadedness 2/2 microcytic anemia likely 2/2 chronic LGIB given BRBPR.     Pt is consistently refusing blood transfusion based on delusion that her blood is literally fire and everyone else's blood is water, therefore she would die (the water would put out the fire) if she received a blood transfusion. Because the patient does not display a factual understanding of what a blood transfusion would do for her, she does not have capacity to refuse blood transfusion in an emergency situation. In other words, she does not agree that a blood transfusion would be a life-saving intervention for her. This is in contrast to Jehovah's witnesses, who understand/believe that a blood transfusion would save their lives but still do not accept it for moral/Rastafari reasons.

## 2020-06-12 NOTE — BEHAVIORAL HEALTH ASSESSMENT NOTE - NSBHADMITCOUNSEL_PSY_A_CORE
risks and benefits of treatment options/client/family/caregiver education/prognosis/Discussed with pt the potential risks/benefits/alternatives of treatment with a blood transfusion./risk factor reduction

## 2020-06-12 NOTE — CONSULT NOTE ADULT - SUBJECTIVE AND OBJECTIVE BOX
Chief Complaint:  Patient is a 49y old  Female who presents with a chief complaint of SOB, anemia (2020 11:17)      HPI:    49 year old man with schizophrenia, obesity, anxiety and resident of [facility] ([phone]), who presents with "near faint."     Allergies:  No Known Allergies      Home Medications:    · 	haloperidol decanoate: Last Dose Taken:  , 125 milligram(s) intramuscular every 4 weeks  · 	Depakote 500 mg oral delayed release tablet: Last Dose Taken:  , 2 tab(s) orally once a day (at bedtime)  · 	ZyPREXA 10 mg oral tablet: Last Dose Taken:  , 2 tab(s) orally once a day (at bedtime)  · 	ZyPREXA 5 mg oral tablet: Last Dose Taken:  , 1 tab(s) orally once a day (at bedtime)  · 	biotin 5 mg oral capsule: Last Dose Taken:  , 1 cap(s) orally 3 times a day      Hospital Medications:  diVALproex DR 1000 milliGRAM(s) Oral at bedtime  OLANZapine 20 milliGRAM(s) Oral at bedtime      PMHX/PSHX:  Anemia  Anxiety  Obesity  Constipation  Schizophrenia  No significant past surgical history      Family history:  Patient unable to provide medical history      Social History:     ROS:     General:  No weight loss, fevers, chills, night sweats, fatigue  Eyes:  No vision changes, no yellowing of eyes   ENT:  No throat pain, runny nose  CV:  No chest pain, palpitations  Resp:  No SOB, cough, wheezing  GI:  See HPI  :  No burning with urination, no hematuria   Muscle:  No muscle pain, weakness  Neuro:  No numbness/tingling, memory problems  Psych:  No fatigue, insomnia, mood problems  Heme:  No easy bruisability  Skin:  No rash, itching       PHYSICAL EXAM:     GENERAL:  Appears stated age, well-groomed, well-nourished, no distress  HEENT:  NC/AT,  conjunctivae clear and pink,  no JVD  CHEST:  Full & symmetric excursion, no increased effort, breath sounds clear  HEART:  Regular rhythm, S1, S2, no murmur/rub/S3/S4, no abdominal bruit, no edema  ABDOMEN:  Soft, non-tender, non-distended, normoactive bowel sounds,  no masses ,  EXTREMITIES:  no cyanosis,clubbing or edema  SKIN:  No rash/erythema/ecchymoses/petechiae/wounds/abscess/warm/dry  NEURO:  Alert, oriented    Vital Signs:  Vital Signs Last 24 Hrs  T(C): 37.1 (2020 12:31), Max: 37.2 (2020 18:16)  T(F): 98.7 (2020 12:31), Max: 99 (2020 18:16)  HR: 93 (:) (78 - 115)  BP: 137/79 (2020 12:31) (108/61 - 137/79)  BP(mean): 87 (2020 20:32) (87 - 87)  RR: 18 (2020 00:30) (18 - 24)  SpO2: 98% (:) (97% - 100%)  Daily Height in cm: 168.91 (2020 00:30)    Daily     LABS:                        6.6    7.51  )-----------( 329      ( 2020 09:09 )             22.7     06-12    142  |  106  |  13  ----------------------------<  90  4.2   |  24  |  0.75    Ca    7.9<L>      2020 06:00  Phos  2.2     06-12  Mg     1.9     06-12    TPro  6.9  /  Alb  3.8  /  TBili  < 0.2<L>  /  DBili  x   /  AST  15  /  ALT  12  /  AlkPhos  94  06-11    LIVER FUNCTIONS - ( 2020 19:40 )  Alb: 3.8 g/dL / Pro: 6.9 g/dL / ALK PHOS: 94 u/L / ALT: 12 u/L / AST: 15 u/L / GGT: x           PT/INR - ( 2020 06:00 )   PT: 10.9 SEC;   INR: 0.96     PTT - ( 2020 06:00 )  PTT:29.0 SEC  Urinalysis Basic - ( 2020 19:57 )    Color: YELLOW / Appearance: CLEAR / S.028 / pH: 6.0  Gluc: NEGATIVE / Ketone: NEGATIVE  / Bili: NEGATIVE / Urobili: NORMAL   Blood: NEGATIVE / Protein: 20 / Nitrite: NEGATIVE   Leuk Esterase: SMALL / RBC: 0-2 / WBC 3-5   Sq Epi: FEW / Non Sq Epi: x / Bacteria: NEGATIVE          Imaging: Chief Complaint:  Patient is a 49y old  Female who presents with a chief complaint of SOB, anemia (2020 11:17)      HPI:    49 year old man with schizophrenia, obesity, anxiety and resident of [facility] ([phone]), who presents with "near faint" for one day and found to be anemic. Gastroenterology consulted for further evaluation of anemia and reported rectal bleeding. She reports weakness and some lightheadedness while walkingfor one day. No chest pain or shortness of breath. As for bleeding, she admits to small amount of bleeding with bowel movements for 3-4 years. Bleeding is described as a single drop of blood on toilet paper after wiping however now occurs very infrequently (approx. one time monthly as compared to daily years ago). She denies blood filling the toilet bowel. She states her stools are soft and denies significant straining/pushing with bowel movements. No black stools or hematemesis. On occasion, she admits stools are hard and bulky. She has not had a prior upper endoscopy or colonoscopy. She is not aware of any family history of colon/gastric/pancreatic cancer. Of note, patient noted to be speaking to no one/responding to interval stimuli upon initiation of encounter.       Allergies:  No Known Allergies      Home Medications:    · 	haloperidol decanoate: Last Dose Taken:  , 125 milligram(s) intramuscular every 4 weeks  · 	Depakote 500 mg oral delayed release tablet: Last Dose Taken:  , 2 tab(s) orally once a day (at bedtime)  · 	ZyPREXA 10 mg oral tablet: Last Dose Taken:  , 2 tab(s) orally once a day (at bedtime)  · 	ZyPREXA 5 mg oral tablet: Last Dose Taken:  , 1 tab(s) orally once a day (at bedtime)  · 	biotin 5 mg oral capsule: Last Dose Taken:  , 1 cap(s) orally 3 times a day      Hospital Medications:  diVALproex DR 1000 milliGRAM(s) Oral at bedtime  OLANZapine 20 milliGRAM(s) Oral at bedtime      PMHX/PSHX:  Anemia  Anxiety  Obesity  Constipation  Schizophrenia  No significant past surgical history      Family history:  Patient unable to provide medical history      Social History:     ROS:     General:  No weight loss, fevers, chills, night sweats, fatigue  Eyes:  No vision changes, no yellowing of eyes   ENT:  No throat pain, runny nose  CV:  No chest pain, palpitations  Resp:  No SOB, cough, wheezing  GI:  See HPI  :  No burning with urination, no hematuria   Muscle:  No muscle pain, weakness  Neuro:  No numbness/tingling, memory problems  Psych:  No fatigue, insomnia, mood problems  Heme:  No easy bruisability  Skin:  No rash, itching       PHYSICAL EXAM:     GENERAL:  Morbidly obese, no acute distress, cooperative, appears to be responding to internal stimuli at points   HEENT:  Sclera anicteric   CHEST:  No increased respiratory effort, breathing non-labored   HEART:  Regular rhythm & rate   ABDOMEN:  Soft, obese, non-tender, no palpable masses   EXTREMITIES:  no LE edema  SKIN:  No rash/erythema/ecchymoses/jaundice   NEURO:  Alert, orientedx2 (person, place)     Vital Signs:  Vital Signs Last 24 Hrs  T(C): 37.1 (2020 12:31), Max: 37.2 (2020 18:16)  T(F): 98.7 (2020 12:31), Max: 99 (2020 18:16)  HR: 93 (2020 12:31) (78 - 115)  BP: 137/79 (2020 12:31) (108/61 - 137/79)  BP(mean): 87 (2020 20:32) (87 - 87)  RR: 18 (2020 00:30) (18 - 24)  SpO2: 98% (2020 12:31) (97% - 100%)  Daily Height in cm: 168.91 (2020 00:30)    Daily     LABS:                        6.6    7.51  )-----------( 329      ( 2020 09:09 )             22.7     06-12    142  |  106  |  13  ----------------------------<  90  4.2   |  24  |  0.75    Ca    7.9<L>      2020 06:00  Phos  2.2     06-12  Mg     1.9     06-12    TPro  6.9  /  Alb  3.8  /  TBili  < 0.2<L>  /  DBili  x   /  AST  15  /  ALT  12  /  AlkPhos  94  06-11    LIVER FUNCTIONS - ( 2020 19:40 )  Alb: 3.8 g/dL / Pro: 6.9 g/dL / ALK PHOS: 94 u/L / ALT: 12 u/L / AST: 15 u/L / GGT: x           PT/INR - ( 2020 06:00 )   PT: 10.9 SEC;   INR: 0.96     PTT - ( 2020 06:00 )  PTT:29.0 SEC  Urinalysis Basic - ( 2020 19:57 )    Color: YELLOW / Appearance: CLEAR / S.028 / pH: 6.0  Gluc: NEGATIVE / Ketone: NEGATIVE  / Bili: NEGATIVE / Urobili: NORMAL   Blood: NEGATIVE / Protein: 20 / Nitrite: NEGATIVE   Leuk Esterase: SMALL / RBC: 0-2 / WBC 3-5   Sq Epi: FEW / Non Sq Epi: x / Bacteria: NEGATIVE      Imaging: Chief Complaint:  Patient is a 49y old  Female who presents with a chief complaint of SOB, anemia (2020 11:17)      HPI:    49 year old man with schizophrenia, obesity, anxiety and resident of [facility] ([phone]), who presents with "near faint" for one day and found to be anemic. Gastroenterology consulted for further evaluation of anemia and reported rectal bleeding. She reports weakness and some lightheadedness while walkingfor one day. No chest pain or shortness of breath. As for bleeding, she admits to small amount of bleeding with bowel movements for 3-4 years. Bleeding is described as a single drop of blood on toilet paper after wiping however now occurs very infrequently (approx. one time monthly as compared to daily years ago). She denies blood filling the toilet bowel. She states her stools are soft and denies significant straining/pushing with bowel movements. No black stools or hematemesis. On occasion, she admits stools are hard and bulky. She has not had a prior upper endoscopy or colonoscopy. She is not aware of any family history of colon/gastric/pancreatic cancer. Of note, patient noted to be speaking to no one/responding to interval stimuli upon initiation of encounter.       Allergies:  No Known Allergies      Home Medications:    · 	haloperidol decanoate: Last Dose Taken:  , 125 milligram(s) intramuscular every 4 weeks  · 	Depakote 500 mg oral delayed release tablet: Last Dose Taken:  , 2 tab(s) orally once a day (at bedtime)  · 	ZyPREXA 10 mg oral tablet: Last Dose Taken:  , 2 tab(s) orally once a day (at bedtime)  · 	ZyPREXA 5 mg oral tablet: Last Dose Taken:  , 1 tab(s) orally once a day (at bedtime)  · 	biotin 5 mg oral capsule: Last Dose Taken:  , 1 cap(s) orally 3 times a day      Hospital Medications:  diVALproex DR 1000 milliGRAM(s) Oral at bedtime  OLANZapine 20 milliGRAM(s) Oral at bedtime      PMHX/PSHX:  Anemia  Anxiety  Obesity  Constipation  Schizophrenia  No significant past surgical history      Family history:  Patient unable to provide medical history      Social History:     ROS:     General:  No weight loss, fevers, chills, night sweats, fatigue  Eyes:  No vision changes, no yellowing of eyes   ENT:  No throat pain, runny nose  CV:  No chest pain, palpitations  Resp:  No SOB, cough, wheezing  GI:  See HPI  :  No burning with urination, no hematuria   Muscle:  No muscle pain, weakness  Neuro:  No numbness/tingling, memory problems  Psych:  No fatigue, insomnia, mood problems  Heme:  No easy bruisability  Skin:  No rash, itching       PHYSICAL EXAM:     GENERAL:  Morbidly obese, no acute distress, cooperative, appears to be responding to internal stimuli at points   HEENT:  Sclera anicteric   CHEST:  No increased respiratory effort, breathing non-labored   HEART:  Regular rhythm & rate   ABDOMEN:  Soft, obese, non-tender, no palpable masses   EXTREMITIES:  no LE edema  SKIN:  No rash/erythema/ecchymoses/jaundice   NEURO:  Alert, orientedx2 (person, place)     Vital Signs:  Vital Signs Last 24 Hrs  T(C): 37.1 (2020 12:31), Max: 37.2 (2020 18:16)  T(F): 98.7 (2020 12:31), Max: 99 (2020 18:16)  HR: 93 (2020 12:31) (78 - 115)  BP: 137/79 (2020 12:31) (108/61 - 137/79)  BP(mean): 87 (2020 20:32) (87 - 87)  RR: 18 (2020 00:30) (18 - 24)  SpO2: 98% (2020 12:31) (97% - 100%)  Daily Height in cm: 168.91 (2020 00:30)    Daily     LABS:                            6.6    7.51  )-----------( 329      ( 2020 09:09 )             22.7     06-12    142  |  106  |  13  ----------------------------<  90  4.2   |  24  |  0.75    Ca    7.9<L>      2020 06:00  Phos  2.2     06-12  Mg     1.9     06-12    TPro  6.9  /  Alb  3.8  /  TBili  < 0.2<L>  /  DBili  x   /  AST  15  /  ALT  12  /  AlkPhos  94  06-11    LIVER FUNCTIONS - ( 2020 19:40 )  Alb: 3.8 g/dL / Pro: 6.9 g/dL / ALK PHOS: 94 u/L / ALT: 12 u/L / AST: 15 u/L / GGT: x           PT/INR - ( 2020 06:00 )   PT: 10.9 SEC;   INR: 0.96     PTT - ( 2020 06:00 )  PTT:29.0 SEC  Urinalysis Basic - ( 2020 19:57 )    Color: YELLOW / Appearance: CLEAR / S.028 / pH: 6.0  Gluc: NEGATIVE / Ketone: NEGATIVE  / Bili: NEGATIVE / Urobili: NORMAL   Blood: NEGATIVE / Protein: 20 / Nitrite: NEGATIVE   Leuk Esterase: SMALL / RBC: 0-2 / WBC 3-5   Sq Epi: FEW / Non Sq Epi: x / Bacteria: NEGATIVE      Imaging: Chief Complaint:  Patient is a 49y old  Female who presents with a chief complaint of SOB, anemia (2020 11:17)      HPI:    49 year old man with schizophrenia, obesity, anxiety and resident of [facility] ([phone]), who presents with "near faint" for one day and found to be anemic. Gastroenterology consulted for further evaluation of anemia and reported rectal bleeding. She reports weakness and some lightheadedness while walkingfor one day. No chest pain or shortness of breath. As for bleeding, she admits to small amount of bleeding with bowel movements for 3-4 years. Bleeding is described as a single drop of blood on toilet paper after wiping however now occurs very infrequently (approx. one time monthly as compared to daily years ago). She denies blood filling the toilet bowel. She states her stools are soft and denies significant straining/pushing with bowel movements. No black stools or hematemesis. On occasion, she admits stools are hard and bulky. She has not had a prior upper endoscopy or colonoscopy. She is not aware of any family history of colon/gastric/pancreatic cancer. Of note, patient noted to be speaking to no one/responding to interval stimuli upon initiation of encounter.       Allergies:  No Known Allergies      Home Medications:    · 	haloperidol decanoate: Last Dose Taken:  , 125 milligram(s) intramuscular every 4 weeks  · 	Depakote 500 mg oral delayed release tablet: Last Dose Taken:  , 2 tab(s) orally once a day (at bedtime)  · 	ZyPREXA 10 mg oral tablet: Last Dose Taken:  , 2 tab(s) orally once a day (at bedtime)  · 	ZyPREXA 5 mg oral tablet: Last Dose Taken:  , 1 tab(s) orally once a day (at bedtime)  · 	biotin 5 mg oral capsule: Last Dose Taken:  , 1 cap(s) orally 3 times a day      Hospital Medications:  diVALproex DR 1000 milliGRAM(s) Oral at bedtime  OLANZapine 20 milliGRAM(s) Oral at bedtime      PMHX/PSHX:  Anemia  Anxiety  Obesity  Constipation  Schizophrenia  No significant past surgical history      Family history:  Patient unable to provide medical history      Social History:     ROS:     General:  No weight loss, fevers, chills, night sweats, fatigue  Eyes:  No vision changes, no yellowing of eyes   ENT:  No throat pain, runny nose  CV:  No chest pain, palpitations  Resp:  No SOB, cough, wheezing  GI:  See HPI  :  No burning with urination, no hematuria   Muscle:  No muscle pain, weakness  Neuro:  No numbness/tingling, memory problems  Psych:  No fatigue, insomnia, mood problems  Heme:  No easy bruisability  Skin:  No rash, itching       PHYSICAL EXAM:     GENERAL:  Morbidly obese, no acute distress, cooperative, appears to be responding to internal stimuli at points   HEENT:  Sclera anicteric   CHEST:  No increased respiratory effort, breathing non-labored   HEART:  Regular rhythm & rate   ABDOMEN:  Soft, obese, non-tender, no palpable masses   EXTREMITIES:  no LE edema  SKIN:  No rash/erythema/ecchymoses/jaundice   NEURO:  Alert, orientedx2 (person, place)   RECTAL: brown stool, limited exam given body habitus    Vital Signs:  Vital Signs Last 24 Hrs  T(C): 37.1 (2020 12:31), Max: 37.2 (2020 18:16)  T(F): 98.7 (2020 12:31), Max: 99 (2020 18:16)  HR: 93 (2020 12:31) (78 - 115)  BP: 137/79 (2020 12:31) (108/61 - 137/79)  BP(mean): 87 (2020 20:32) (87 - 87)  RR: 18 (2020 00:30) (18 - 24)  SpO2: 98% (2020 12:31) (97% - 100%)  Daily Height in cm: 168.91 (2020 00:30)    Daily     LABS:                            6.6    7.51  )-----------( 329      ( 2020 09:09 )             22.7     06-12    142  |  106  |  13  ----------------------------<  90  4.2   |  24  |  0.75    Ca    7.9<L>      2020 06:00  Phos  2.2     06-12  Mg     1.9     06-12    TPro  6.9  /  Alb  3.8  /  TBili  < 0.2<L>  /  DBili  x   /  AST  15  /  ALT  12  /  AlkPhos  94  06-11    LIVER FUNCTIONS - ( 2020 19:40 )  Alb: 3.8 g/dL / Pro: 6.9 g/dL / ALK PHOS: 94 u/L / ALT: 12 u/L / AST: 15 u/L / GGT: x           PT/INR - ( 2020 06:00 )   PT: 10.9 SEC;   INR: 0.96     PTT - ( 2020 06:00 )  PTT:29.0 SEC  Urinalysis Basic - ( 2020 19:57 )    Color: YELLOW / Appearance: CLEAR / S.028 / pH: 6.0  Gluc: NEGATIVE / Ketone: NEGATIVE  / Bili: NEGATIVE / Urobili: NORMAL   Blood: NEGATIVE / Protein: 20 / Nitrite: NEGATIVE   Leuk Esterase: SMALL / RBC: 0-2 / WBC 3-5   Sq Epi: FEW / Non Sq Epi: x / Bacteria: NEGATIVE      Imaging:

## 2020-06-12 NOTE — H&P ADULT - PROBLEM SELECTOR PLAN 6
1.  PCP Name                           =   2.  PCP Contacted at Admission =  [  ] Y       [  ] N          [  ] N/A  3.  PCP Contacted at Discharge  =  [  ] Y       [  ] N          [  ] N/A  4.  Post-Discharge Appointment Date and Location =   5.  Summary of Handoff Given to PCP                    = DVT PPx: SCDs 2/2 bleed  Diet: regular    Transitions of Care Status:  1.  Name of PCP: Diane Dubon  2.  PCP Contacted on Admission: [ ] Y    [ ] N    3.  PCP contacted at Discharge: [ ] Y    [ ] N    [ ] N/A  4.  Post-Discharge Appointment Date and Location:  5.  Summary of Handoff given to PCP:

## 2020-06-12 NOTE — CONSULT NOTE ADULT - ATTENDING COMMENTS
GI consulted for anemia and scant rectal bleeding in setting of constipation.   Patient has BRYON. Likely chronic given HD stability.   Currently refusing transfusions, but per psych does not have capacity.   Tentatively will plan for EGD/colonoscopy on Monday.     Above plan was reviewed extensively with primary team, Dr. Ly. Would encourage primary team to obtain second GI opinion if they have additional concerns.

## 2020-06-12 NOTE — H&P ADULT - HISTORY OF PRESENT ILLNESS
48 yo F PMHx anxiety, schizophrenia, [facility] resident, who presents with sob, lightheadedness, and dizziness x1 day. It started while she was out walking and she first felt her symptoms. Reports intermittent episodes of dizziness and lightheadedness for several days now. Did not eat regular full meals today. Feels better after eating.   No cp, abdominal pain, fevers, sweats, or chills. No palpitations. No cough, sore throat, or sick contacts.     ED Course:  T 99, , /83, RR 18, 99% RA  Became tachypneic to 24, started on NC @4L satting 99%  S/p panto 80mg IV x1, 1L NS x1  Hgb 7.0 (MCV 72.3)  D dimer 316  hst tpt 7  Valproate 55.6  UA neg  CXR: prelim - clear Patient somewhat unreliable historian.     48 yo F PMHx obesity anxiety, schizophrenia, [facility] resident ([phone]), who presents with sob, lightheadedness, and dizziness x1 day. She feels like she has to breath more rapidly. It started while she was out walking and she first felt her symptoms. Reports intermittent episodes of lightheadedness and generalized weakness while walking for several days now. Did not eat regular full meals today. Felt a little better after eating. Notes that she has been seeing bright red blood on her toilet paper, but could not say for how long this has been happening. Believes she may have been on iron tabs in the past for anemia. Notes that she does not have periods anymore. No hematuria. No bleeding elsewhere. No melena or hematemesis.     No cp, abdominal pain, fevers, sweats, or chills. No palpitations. No cough, sore throat, or sick contacts.     ED Course:  T 99, , /83, RR 18, 99% RA  Became tachypneic to 24, started on NC @4L satting 99%  S/p panto 80mg IV x1, 1L NS x1  Hgb 7.0 (MCV 72.3)  D dimer 316  hst tpt 7->8  Valproate 55.6  UA neg  CXR: prelim - clear Patient somewhat unreliable historian.     50 yo F PMHx obesity anxiety, schizophrenia, [facility] resident ([phone]), who presents with sob, lightheadedness, and dizziness x1 day. She feels like she has to breath more rapidly. It started while she was out walking and she first felt her symptoms. Reports intermittent episodes of lightheadedness and generalized weakness while walking for several days now. Did not eat regular full meals today. Felt a little better after eating. Notes that she has been seeing small streaks of bright red blood on her toilet paper - intermittently, over the past years, and associated with constipation.  States anal "cuts" whenever stools are hard and difficult to expel.  Last episode was ~ 3 days ago; no difficulties presently.  Believes she may have been on iron tabs in the past for anemia. Notes that she does not have periods anymore. No hematuria. No bleeding elsewhere. No melena or hematemesis.     No cp, abdominal pain, fevers, sweats, or chills. No palpitations. No cough, sore throat, or sick contacts.     ED Course:  T 99, , /83, RR 18, 99% RA  Became tachypneic to 24, started on NC @4L satting 99%  S/p panto 80mg IV x1, 1L NS x1  Hgb 7.0 (MCV 72.3)  D dimer 316  hst tpt 7->8  Valproate 55.6  UA neg  CXR: prelim - clear

## 2020-06-12 NOTE — BEHAVIORAL HEALTH ASSESSMENT NOTE - NSBHREFERDETAILS_PSY_A_CORE_FT
does pt have capacity to refuse blood transfusion for Hb 6.5 does pt have capacity to refuse blood transfusion for Hb 6.5?

## 2020-06-12 NOTE — H&P ADULT - NSHPPHYSICALEXAM_GEN_ALL_CORE
T(C): 36.4 (06-11-20 @ 20:32), Max: 37.2 (06-11-20 @ 18:16)  HR: 82 (06-11-20 @ 23:45) (78 - 115)  BP: 114/50 (06-11-20 @ 23:45) (108/61 - 117/75)  RR: 22 (06-11-20 @ 23:45) (18 - 24)  SpO2: 99% (06-11-20 @ 23:45) (97% - 99%)    PHYSICAL EXAM:  GENERAL: NAD, well-developed  HEAD:  Atraumatic, Normocephalic  EYES: EOMI, PERRLA, conjunctiva and sclera clear  NECK: Supple, No JVD  CHEST/LUNG: Clear to auscultation bilaterally; No wheeze  HEART: NL s1s2, regular rate and rhythm; No murmurs, rubs, or gallops  ABDOMEN: Soft, Nontender, Nondistended; Bowel sounds present  EXTREMITIES:  2+ Peripheral Pulses, No clubbing, cyanosis, or edema  PSYCH: AAOx3  NEUROLOGY: non-focal  SKIN: No rashes or lesions T(C): 36.4 (06-11-20 @ 20:32), Max: 37.2 (06-11-20 @ 18:16)  HR: 82 (06-11-20 @ 23:45) (78 - 115)  BP: 114/50 (06-11-20 @ 23:45) (108/61 - 117/75)  RR: 22 (06-11-20 @ 23:45) (18 - 24)  SpO2: 99% (06-11-20 @ 23:45) (97% - 99%)    PHYSICAL EXAM:  GENERAL: NAD, obese  HEAD:  Atraumatic, Normocephalic  EYES: EOMI, PERRLA, conjunctival pallor  NECK: Supple, No JVD  CHEST/LUNG: Clear to auscultation bilaterally; No wheeze  HEART: NL s1s2, regular rate and rhythm; No murmurs, rubs, or gallops  ABDOMEN: Soft, Nontender, Nondistended; Bowel sounds present  EXTREMITIES:  2+ Peripheral Pulses, No clubbing, cyanosis, or edema  PSYCH: AAOx3  NEUROLOGY: non-focal  SKIN: No rashes or lesions T(C): 36.4 (06-11-20 @ 20:32), Max: 37.2 (06-11-20 @ 18:16)  HR: 82 (06-11-20 @ 23:45) (78 - 115)  BP: 114/50 (06-11-20 @ 23:45) (108/61 - 117/75)  RR: 22 (06-11-20 @ 23:45) (18 - 24)  SpO2: 99% (06-11-20 @ 23:45) (97% - 99%)    PHYSICAL EXAM:  GENERAL: NAD, obese  HEAD:  Atraumatic, Normocephalic  EYES: EOMI, PERRL, conjunctival pallor  NECK: Supple, No JVD  CHEST/LUNG: Clear to auscultation bilaterally; No wheeze  HEART: NL s1s2, regular rate and rhythm; No murmurs, rubs, or gallops  ABDOMEN: Soft, Nontender, Nondistended; Bowel sounds present  EXTREMITIES:  2+ Peripheral Pulses, No clubbing, cyanosis, or edema  PSYCH: AAOx3; tends to laugh inappropriately at times  NEUROLOGY: non-focal.  SCHMIDT against gravity  SKIN: No rashes or lesions

## 2020-06-12 NOTE — H&P ADULT - PROBLEM SELECTOR PLAN 2
-20mg zyprexa qhs -20mg zyprexa qhs  -divalproex 1000mg qhs -20mg Zyprexa qhs  -divalproex 1000mg qhs  -f/u TSH, vit D level -20mg Zyprexa qhs  -divalproex 1000mg qhs  -f/u TSH, vit D levels

## 2020-06-12 NOTE — H&P ADULT - PROBLEM SELECTOR PLAN 4
DVT PPx: SCDs 2/2 bleed  Diet:     Transitions of Care Status:  1.  Name of PCP:  2.  PCP Contacted on Admission: [ ] Y    [ ] N    3.  PCP contacted at Discharge: [ ] Y    [ ] N    [ ] N/A  4.  Post-Discharge Appointment Date and Location:  5.  Summary of Handoff given to PCP: -Lipid panel  -A1c -no overt issues presently  -Zyprexa as above

## 2020-06-12 NOTE — BEHAVIORAL HEALTH ASSESSMENT NOTE - OTHER
sitting persistent delusion about  named Pierce, having 100+ kids, currently pregnant with many kids none intermittent inappropriate laughter

## 2020-06-12 NOTE — BEHAVIORAL HEALTH ASSESSMENT NOTE - ORIENTATION OTHER
says it's June 7th, 2020 for her, but for the rest of us it's July 23, 2020 - says she lives in a slightly different time

## 2020-06-12 NOTE — H&P ADULT - PROBLEM SELECTOR PLAN 5
DVT PPx: SCDs 2/2 bleed  Diet: regular    Transitions of Care Status:  1.  Name of PCP: Diane Dubon  2.  PCP Contacted on Admission: [ ] Y    [ ] N    3.  PCP contacted at Discharge: [ ] Y    [ ] N    [ ] N/A  4.  Post-Discharge Appointment Date and Location:  5.  Summary of Handoff given to PCP: -Lipid panel  -A1c

## 2020-06-12 NOTE — BEHAVIORAL HEALTH ASSESSMENT NOTE - NSBHCHARTREVIEWVS_PSY_A_CORE FT
Vital Signs Last 24 Hrs  T(C): 37.1 (12 Jun 2020 12:31), Max: 37.2 (11 Jun 2020 18:16)  T(F): 98.7 (12 Jun 2020 12:31), Max: 99 (11 Jun 2020 18:16)  HR: 93 (12 Jun 2020 12:31) (78 - 115)  BP: 137/79 (12 Jun 2020 12:31) (108/61 - 137/79)  BP(mean): 87 (11 Jun 2020 20:32) (87 - 87)  RR: 18 (12 Jun 2020 00:30) (18 - 24)  SpO2: 98% (12 Jun 2020 12:31) (97% - 100%)

## 2020-06-12 NOTE — CHART NOTE - NSCHARTNOTEFT_GEN_A_CORE
Spoke with Alondra Garcia, Care Manager at [facility].  Patient does not have a HCP on chart.  Psychiatry has seen patient and states she does not have capacity to make health care decisions.

## 2020-06-12 NOTE — H&P ADULT - NSHPREVIEWOFSYSTEMS_GEN_ALL_CORE
REVIEW OF SYSTEMS:    CONSTITUTIONAL: No weakness, fevers or chills  EYES: No visual changes  ENT: No vertigo or throat pain   NECK: No pain or stiffness  RESPIRATORY: No cough, wheezing, hemoptysis; No shortness of breath  CARDIOVASCULAR: No chest pain or palpitations  GASTROINTESTINAL: No abdominal or epigastric pain. No nausea, vomiting, or hematemesis; No diarrhea or constipation. No melena or hematochezia.  GENITOURINARY: No dysuria, frequency or hematuria  NEUROLOGICAL: No numbness or weakness  SKIN: No itching, burning, rashes, or lesions   LYMPHATICS: No swollen lymph nodes.  All other review of systems is negative unless indicated above. REVIEW OF SYSTEMS:    CONSTITUTIONAL: +weakness. No fevers or chills  EYES: No visual changes  ENT: No vertigo or throat pain   NECK: No pain or stiffness  RESPIRATORY: No cough, wheezing, hemoptysis; +shortness of breath  CARDIOVASCULAR: No chest pain or palpitations  GASTROINTESTINAL: +BRBPR.  GENITOURINARY: No dysuria, frequency or hematuria  NEUROLOGICAL: No numbness or weakness  SKIN: No itching, burning, rashes, or lesions   LYMPHATICS: No swollen lymph nodes.  All other review of systems is negative unless indicated above.

## 2020-06-12 NOTE — PROGRESS NOTE ADULT - PROBLEM SELECTOR PLAN 2
-20mg Zyprexa qhs  -divalproex 1000mg qhs  - psych eval for capacity to refuse care - affect and speech are inappropriate

## 2020-06-12 NOTE — H&P ADULT - PROBLEM SELECTOR PLAN 3
-zyprexa as above -no overt issues presently  -Zyprexa as above -no issues presently, but w/ intermittent difficulty  -ensure optimal hydration  -may need stool softener/bowel regimen when - especially when starting iron tx  -f/u TSH level in the AM

## 2020-06-12 NOTE — H&P ADULT - NSICDXPASTMEDICALHX_GEN_ALL_CORE_FT
PAST MEDICAL HISTORY:  Schizophrenia PAST MEDICAL HISTORY:  Anemia     Anxiety     Constipation     Obesity     Schizophrenia

## 2020-06-12 NOTE — ED ADULT NURSE REASSESSMENT NOTE - NS ED NURSE REASSESS COMMENT FT1
transfer delayed due to shift change, pt left unit at 0012, pt was alert and oriented, breathing even and unlabored, NAD. pt transported by wheelchair to floor.

## 2020-06-12 NOTE — H&P ADULT - NSHPSOCIALHISTORY_GEN_ALL_CORE
Lives in the [facility]. No smoking, alcohol, or recreational drugs. [facility] is her emergency contact. Lives in the [facility].   No smoking, alcohol, or recreational drugs. [facility] is her emergency contact.

## 2020-06-12 NOTE — H&P ADULT - NSHPLABSRESULTS_GEN_ALL_CORE
7.0    8.09  )-----------( 376      ( 2020 19:40 )             24.8       06-11    141  |  103  |  16  ----------------------------<  112<H>  4.2   |  26  |  1.00    Ca    8.6      2020 19:40    TPro  6.9  /  Alb  3.8  /  TBili  < 0.2<L>  /  DBili  x   /  AST  15  /  ALT  12  /  AlkPhos  94  06-11              Urinalysis Basic - ( 2020 19:57 )    Color: YELLOW / Appearance: CLEAR / S.028 / pH: 6.0  Gluc: NEGATIVE / Ketone: NEGATIVE  / Bili: NEGATIVE / Urobili: NORMAL   Blood: NEGATIVE / Protein: 20 / Nitrite: NEGATIVE   Leuk Esterase: SMALL / RBC: 0-2 / WBC 3-5   Sq Epi: FEW / Non Sq Epi: x / Bacteria: NEGATIVE      Valproic Acid Level, Serum: 55.6 ug/mL (20 @ 19:40)  D-Dimer Assay, Quantitative: 316:   Troponin T, High Sensitivity: 7:  Occult Blood: NEGATIVE: 7.0    8.09  )-----------( 376      ( 2020 19:40 )             24.8       06-11    141  |  103  |  16  ----------------------------<  112<H>  4.2   |  26  |  1.00    Ca    8.6      2020 19:40    TPro  6.9  /  Alb  3.8  /  TBili  < 0.2<L>  /  DBili  x   /  AST  15  /  ALT  12  /  AlkPhos  94  06-11              Urinalysis Basic - ( 2020 19:57 )    Color: YELLOW / Appearance: CLEAR / S.028 / pH: 6.0  Gluc: NEGATIVE / Ketone: NEGATIVE  / Bili: NEGATIVE / Urobili: NORMAL   Blood: NEGATIVE / Protein: 20 / Nitrite: NEGATIVE   Leuk Esterase: SMALL / RBC: 0-2 / WBC 3-5   Sq Epi: FEW / Non Sq Epi: x / Bacteria: NEGATIVE      Valproic Acid Level, Serum: 55.6 ug/mL (20 @ 19:40)  D-Dimer Assay, Quantitative: 316:   Troponin T, High Sensitivity: 7:  Occult Blood: NEGATIVE:    EKG 20 @ 19:50  NSR 93, QTc 427

## 2020-06-12 NOTE — BEHAVIORAL HEALTH ASSESSMENT NOTE - CASE SUMMARY
Patient seen/evaluated with BEV Maynard, agree with above. 50 yo F h/o chronic schizophrenia and anxiety, domiciled at [facility], admitted with SOB and lightheadedness found to be anemic with Hb 6.5, currently refusing blood transfusion however is agreeing to get endoscopy and colonoscopy. On exam pt with multiple delusions that her blood is made of fire and other people's blood is made of water so the two cannot mix, also states she is pregnant with multiple babies, and talks about her  who (per residence staff) does not actually exist. Denies SI/HI, denies Ah/VH/PI/IOR. She is A&Ox3. Impression: chronic schizophrenia; anxiety. Plan: as above- c/w current psychotropic meds as above. At present time, pt lacks capacity to refuse blood transfusion- even though she communicates a consistent choice, the rest of her discussion and reasoning about the transfusion exists in a delusional/psychotic framework, and theoretically if her psychosis were to be fully (or mostly) treated, these delusions would resolve and she would perhaps agree to the procedure. In addition, in contrast to [employer] patients, this patient does not believe in the efficacy of the transfusion and believe that in fact she would die from getting the transfusion. Ethically speaking, at this point or until her medical situation is deemed an emergency where she faces an imminent risk of death, I do not think she should be forced to receive a blood transfusion against her well. Pt accepting of any other intervention to treat her anemia, including iron supplements. In addition, patient agreeable to return to hospital should her symptoms get even worse after discharge. Please call our team if we can be of further assistance with this patient, spectra [phone] (weekdays 9-4:30pm).

## 2020-06-12 NOTE — BEHAVIORAL HEALTH ASSESSMENT NOTE - RISK ASSESSMENT
Low Acute Suicide Risk risk factors: chronic mental illness, prior psychiatric hospitalization, chronic delusion    protective factors: wants to live, no current SI/I/P, no prior suicide attempts, medication compliant, stable residence at [place]

## 2020-06-12 NOTE — CONSULT NOTE ADULT - ASSESSMENT
Impression:    #Anemia, microcytic: Suspect. Iron studies consistent with severe iron deficiency (iron sat 8%, ferritin 7)  #Schizophrenia   #Morbid obesity Impression:    #Symptomatic anemia: Microcytic and hypochromic. Admission Hb 6.3 on admission - previously 7.9 in 04/2020. Iron studies consistent with severe iron deficiency (iron sat 8%, ferritin 7). Brown stool on rectal exam.   - In absence of overt GI bleeding, differential remains broad and includes occult blood loss from GI tract vs. malignancy vs. bone marrow suppression/process.    #Schizophrenia   #Morbid obesity     Recommendations:  - transfuse 1U pRBC for symptomatic anemia   - follow up post-transfusion CBC and trend Hb daily   - can performed upper endoscopy and colonoscopy for evaluation of anemia once patient is resuscitated however she is currently refusing - please determine whether patient has capacity to make healthcare decisions       Etelvina Shipley PGY-4  Gastroenterology Fellow  Pager #[phone] or [phone]  Pager #[phone] 5pm-7am on weekdays, and on weekends Impression:    #Symptomatic anemia: Microcytic and hypochromic. Admission Hb 6.3 on admission - previously 7.9 in 04/2020. Iron studies consistent with severe iron deficiency (iron sat 8%, ferritin 7). Brown stool on rectal exam.   - In absence of overt GI bleeding, differential remains broad and includes occult blood loss from GI tract vs. malignancy vs. bone marrow suppression/process.    #Schizophrenia   #Morbid obesity     Recommendations:  - transfuse 1U pRBC for symptomatic anemia   - follow up post-transfusion CBC and trend Hb daily   - can performed upper endoscopy and colonoscopy for evaluation of anemia once patient is resuscitated with pRBC transfusion, consent for procedures can be obtained, and patient is willing to take bowel preparation - earliest on Monday Elizabeth 15, 2020; alternatively can consider CT colonography and barium esophagram with follow through to duodenum to rule out large intraluminal masses   -     Etelvina Shipley PGY-4  Gastroenterology Fellow  Pager #[phone] or [phone]  Pager #[phone] 5pm-7am on weekdays, and on weekends Impression:    #Symptomatic anemia: Microcytic and hypochromic. Admission Hb 6.3 on admission - previously 7.9 in 04/2020. Iron studies consistent with severe iron deficiency (iron sat 8%, ferritin 7). Brown stool on rectal exam.   - In absence of overt GI bleeding, differential remains broad and includes occult blood loss from GI tract vs. malignancy vs. bone marrow suppression/process.    #Schizophrenia   #Morbid obesity   #Constipation    Recommendations:  - transfuse 1U pRBC for symptomatic anemia (no capacity per psych to refuse)  - IV iron (would avoid PO while hospitalized)  - follow up post-transfusion CBC and trend Hb daily   - Miralax BID for constipation  - will tentatively plan for colonoscopy/EGD on Monday; patient will need to be prepped over weekend. as she does not have capacity, she will need a two physician consent for the procedure (no HCP identified)  - given HD stability at this time and likely chronic nature of anemia, no emergent indication for procedure. this will be reassessed as needed and timing of endoscopy can be adjusted if needed  - please feel free to obtain second opinion GI consultation    Etelvina Shipley PGY-4  Gastroenterology Fellow  Pager #[phone] or [phone]  Pager #[phone] 5pm-7am on weekdays, and on weekends Impression:    #Symptomatic anemia: Microcytic and hypochromic. Admission Hb 6.3 on admission - previously 7.9 in 04/2020. Iron studies consistent with severe iron deficiency (iron sat 8%, ferritin 7). Brown stool on rectal exam.   - In absence of overt GI bleeding, differential remains broad and includes occult blood loss from GI tract vs. malignancy vs. bone marrow suppression/process.    #Schizophrenia   #Morbid obesity   #Constipation    Recommendations:  - transfuse 1U pRBC for symptomatic anemia (no capacity per psych to refuse)  - IV iron (would avoid PO while hospitalized)  - follow up post-transfusion CBC and trend Hb daily   - Miralax BID for constipation  - will tentatively plan for colonoscopy/EGD on Monday; patient will need to be prepped over weekend. as she does not have capacity, she will need a two physician consent for the procedure (no HCP identified)  - if unable to perform colonoscopy (ie patient unwilling to prep, etc), can consider CTAP and/or UGIS with SB follow through to further evaluate for underlying GI pathology. however, this would only be diagnostic and would be miss small lesions, ulcers, ectasias, etc that may be contributing to patient's anemia  - given HD stability at this time and likely chronic nature of anemia, no emergent indication for procedure. this will be reassessed as needed and timing of endoscopy can be adjusted if needed  - please feel free to obtain second opinion GI consultation    Etelvina Shipley PGY-4  Gastroenterology Fellow  Pager #[phone] or [phone]  Pager #[phone] 5pm-7am on weekdays, and on weekends

## 2020-06-12 NOTE — BEHAVIORAL HEALTH ASSESSMENT NOTE - NSBHCONSULTFOLLOWAFTERCARE_PSY_A_CORE FT
pt to return to [place] pt to return to [facility] and outpatient psychiatrist once medically stable

## 2020-06-12 NOTE — PROGRESS NOTE ADULT - PROBLEM SELECTOR PLAN 6
DVT PPx: SCDs 2/2 bleed  Diet: npo for now     Transitions of Care Status:  1.  Name of PCP: Diane Dubon  2.  PCP Contacted on Admission: [ ] Y    [ ] N    3.  PCP contacted at Discharge: [ ] Y    [ ] N    [ ] N/A  4.  Post-Discharge Appointment Date and Location:  5.  Summary of Handoff given to PCP:

## 2020-06-12 NOTE — BEHAVIORAL HEALTH ASSESSMENT NOTE - HPI (INCLUDE ILLNESS QUALITY, SEVERITY, DURATION, TIMING, CONTEXT, MODIFYING FACTORS, ASSOCIATED SIGNS AND SYMPTOMS)
Pt seen and examined at bedside. Pt is pleasant and cooperative. A&O to person, place, month, year, but not date. Pt states that she is in the hospital because she was feeling dizzy and was found to have low hemoglobin. She understands that this is because her "blood is leaking from somewhere inside" and that the GI doctor wants to "put a camera up her behind and down her mouth to find the leak". She is worried about pain from these procedures, but understands that she "will be knocked out" and is willing to undergo these procedures. However, she does not want to receive a blood transfusion because her blood is fire and everyone else's blood is water, so the water would put out the fire and she would die if she received a blood transfusion. States that she would accept blood from her , Pierce, because his blood is also fire. Pt denies that she is a [employer]. Pt understands that she could die from blood loss if she does not receive a transfusion. Strongly affirms that even in an emergency, when blood transfusion would be potentially lifesaving, she still would not accept blood. Pt inquires about alternative treatments to blood transfusion - she is willing to be treated by anything else.    Spoke to pt's , Baylee [phone], who confirmed pt's persistent delusion about having a  named Pierce, having 100+ kids, being currently pregnant with multiple kids. Baylee states that the patient does not have a  or any children. She has not heard anything about refusing blood. Says patient is compliant with meds and ADLs. 49F PPx of schizophrenia and anxiety, lives at [place], p/w SOB and lightheadedness, found to have low Hb<7, possible GI bleed. Pt seen and examined at bedside. Pt is pleasant and cooperative. A&O to person, place, month, year, but not date. Pt states that she is in the hospital because she was feeling dizzy and was found to have low hemoglobin. She understands that this is because her "blood is leaking from somewhere inside" and that the GI doctor wants to "put a camera up her behind and down her mouth to find the leak". She is worried about pain from these procedures, but understands that she "will be knocked out" and is willing to undergo these procedures. However, she does not want to receive a blood transfusion because it is her "spiritual belief" that her blood is fire and everyone else's blood is water, so the water would put out the fire and she would die if she received a blood transfusion. States that she would accept blood from her , Pierce, because his blood is also fire. Pt denies that she is a [employer]. Pt understands that she could die from blood loss if she does not receive a transfusion. Strongly affirms that even in an emergency, when blood transfusion would be potentially lifesaving, she still would not accept blood. Pt inquires about alternative treatments to blood transfusion - she is willing to be treated by anything else. Pt believes she is treated at [facility] for anxiety, no mention of schizophrenia.    Spoke to pt's , Baylee [phone], who confirmed pt's persistent delusion about having a  named Pierce, having 100+ kids, being currently pregnant with multiple kids. Baylee states that the patient does not have a  or any children. She has not heard anything about refusing blood. Says patient is compliant with meds and ADLs.

## 2020-06-12 NOTE — PROGRESS NOTE ADULT - PROBLEM SELECTOR PLAN 1
- refusing PRBC - h/h 6.6  - says no further bleed, but unreliable historian  - GI consulted - npo, on PPI  - psych eval for capacity - at this time, symptomatic anemia but hemodynamically stable    - will consider Venofer - refusing PRBC - h/h 6.6  - says no further bleed, but unreliable historian  - GI consulted - npo, on PPI  - psych eval for capacity - at this time, symptomatic anemia but hemodynamically stable    - will consider Venofer  - I guerline GI fellow - even if pt lacks capcity, ethically very challagening to give transfusion against pt started wishes - however if agreeable to EGD, c-scope, might benefit from early intervention while hemodynamically stable prior to h/h deteriorating further - fellow to d/w attending and f/u - consent for EGD likely to be obtained based on [facility] documentation of next of kin / guardian or individual who has been consenting inn the past - refusing PRBC - h/h 6.6  - says no further bleed, but unreliable historian  - GI consulted - npo, on PPI  - psych eval for capacity - at this time, symptomatic anemia but hemodynamically stable    - will consider Venofer  - I guerline GI fellow - even if pt lacks capcity, ethically very challagening to give transfusion against pt started wishes - however if agreeable to EGD, c-scope, might benefit from early intervention while hemodynamically stable prior to h/h deteriorating further - fellow to d/w attending and f/u - consent for EGD likely to be obtained based on [facility] documentation of next of kin / guardian or individual who has been consenting inn the past - I called pt's SW at [facility], wasn't able to get through, left VM but psych team spoke to her confirming pt has been delusional for some time and has no  or children - might need 2pc for EGD

## 2020-06-12 NOTE — BEHAVIORAL HEALTH ASSESSMENT NOTE - NSBHCHARTREVIEWLAB_PSY_A_CORE FT
6.6    7.51  )-----------( 329      ( 12 Jun 2020 09:09 )             22.7   06-12    142  |  106  |  13  ----------------------------<  90  4.2   |  24  |  0.75    Ca    7.9<L>      12 Jun 2020 06:00  Phos  2.2     06-12  Mg     1.9     06-12    TPro  6.9  /  Alb  3.8  /  TBili  < 0.2<L>  /  DBili  x   /  AST  15  /  ALT  12  /  AlkPhos  94  06-11

## 2020-06-13 LAB
ANION GAP SERPL CALC-SCNC: 11 MMO/L — SIGNIFICANT CHANGE UP (ref 7–14)
BUN SERPL-MCNC: 7 MG/DL — SIGNIFICANT CHANGE UP (ref 7–23)
CALCIUM SERPL-MCNC: 8.5 MG/DL — SIGNIFICANT CHANGE UP (ref 8.4–10.5)
CHLORIDE SERPL-SCNC: 108 MMOL/L — HIGH (ref 98–107)
CO2 SERPL-SCNC: 25 MMOL/L — SIGNIFICANT CHANGE UP (ref 22–31)
CREAT SERPL-MCNC: 0.81 MG/DL — SIGNIFICANT CHANGE UP (ref 0.5–1.3)
GLUCOSE SERPL-MCNC: 101 MG/DL — HIGH (ref 70–99)
HCT VFR BLD CALC: 24.1 % — LOW (ref 34.5–45)
HCT VFR BLD CALC: 25 % — LOW (ref 34.5–45)
HGB BLD-MCNC: 6.8 G/DL — CRITICAL LOW (ref 11.5–15.5)
HGB BLD-MCNC: 7 G/DL — CRITICAL LOW (ref 11.5–15.5)
MAGNESIUM SERPL-MCNC: 2.1 MG/DL — SIGNIFICANT CHANGE UP (ref 1.6–2.6)
MCHC RBC-ENTMCNC: 20.3 PG — LOW (ref 27–34)
MCHC RBC-ENTMCNC: 20.7 PG — LOW (ref 27–34)
MCHC RBC-ENTMCNC: 28 % — LOW (ref 32–36)
MCHC RBC-ENTMCNC: 28.2 % — LOW (ref 32–36)
MCV RBC AUTO: 72.7 FL — LOW (ref 80–100)
MCV RBC AUTO: 73.5 FL — LOW (ref 80–100)
NRBC # FLD: 0.11 K/UL — SIGNIFICANT CHANGE UP (ref 0–0)
NRBC # FLD: 0.18 K/UL — SIGNIFICANT CHANGE UP (ref 0–0)
NRBC FLD-RTO: 1.6 — SIGNIFICANT CHANGE UP
NRBC FLD-RTO: 2.1 — SIGNIFICANT CHANGE UP
PHOSPHATE SERPL-MCNC: 3.4 MG/DL — SIGNIFICANT CHANGE UP (ref 2.5–4.5)
PLATELET # BLD AUTO: 331 K/UL — SIGNIFICANT CHANGE UP (ref 150–400)
PLATELET # BLD AUTO: 349 K/UL — SIGNIFICANT CHANGE UP (ref 150–400)
PMV BLD: 9 FL — SIGNIFICANT CHANGE UP (ref 7–13)
PMV BLD: 9 FL — SIGNIFICANT CHANGE UP (ref 7–13)
POTASSIUM SERPL-MCNC: 4 MMOL/L — SIGNIFICANT CHANGE UP (ref 3.5–5.3)
POTASSIUM SERPL-SCNC: 4 MMOL/L — SIGNIFICANT CHANGE UP (ref 3.5–5.3)
RBC # BLD: 3.28 M/UL — LOW (ref 3.8–5.2)
RBC # BLD: 3.44 M/UL — LOW (ref 3.8–5.2)
RBC # FLD: 20 % — HIGH (ref 10.3–14.5)
RBC # FLD: 20.1 % — HIGH (ref 10.3–14.5)
SODIUM SERPL-SCNC: 144 MMOL/L — SIGNIFICANT CHANGE UP (ref 135–145)
WBC # BLD: 6.78 K/UL — SIGNIFICANT CHANGE UP (ref 3.8–10.5)
WBC # BLD: 8.58 K/UL — SIGNIFICANT CHANGE UP (ref 3.8–10.5)
WBC # FLD AUTO: 6.78 K/UL — SIGNIFICANT CHANGE UP (ref 3.8–10.5)
WBC # FLD AUTO: 8.58 K/UL — SIGNIFICANT CHANGE UP (ref 3.8–10.5)

## 2020-06-13 PROCEDURE — 99233 SBSQ HOSP IP/OBS HIGH 50: CPT

## 2020-06-13 PROCEDURE — 99232 SBSQ HOSP IP/OBS MODERATE 35: CPT | Mod: GC

## 2020-06-13 RX ADMIN — OLANZAPINE 20 MILLIGRAM(S): 15 TABLET, FILM COATED ORAL at 21:19

## 2020-06-13 RX ADMIN — IRON SUCROSE 210 MILLIGRAM(S): 20 INJECTION, SOLUTION INTRAVENOUS at 17:17

## 2020-06-13 RX ADMIN — DIVALPROEX SODIUM 1000 MILLIGRAM(S): 500 TABLET, DELAYED RELEASE ORAL at 21:19

## 2020-06-13 NOTE — PROGRESS NOTE ADULT - ATTENDING COMMENTS
As above.    Impression:  Anemia, iron deficient, severe    Recommendations:    Patient refusing colonoscopy and CT colonography.  Check stool DNA and upper GI series.  Check H.pylori stool antigen.  Empiric omeprazole 20 mg daily. As above.    Impression:  Anemia, iron deficient, severe  Schizophrenia    Recommendations:    Patient refusing colonoscopy and CT colonography.  Check stool DNA and upper GI series.  Check H.pylori stool antigen.  Empiric omeprazole 20 mg daily.

## 2020-06-13 NOTE — PROGRESS NOTE ADULT - ASSESSMENT
48 yo F PMHx anxiety, schizophrenia, [facility] resident, who presents with sob and lightheadedness 2/2 microcytic anemia likely 2/2 acute on chronic LGIB given BRBPR with acute blood loss anemia

## 2020-06-13 NOTE — PROGRESS NOTE ADULT - PROBLEM SELECTOR PLAN 2
Medication:   Requested Prescriptions     Pending Prescriptions Disp Refills    montelukast (SINGULAIR) 10 MG tablet 30 tablet 11     Sig: TAKE 1 TABLET (10MG) BY MOUTH EVERY DAY    glimepiride (AMARYL) 4 MG tablet 15 tablet 11     Sig: TAKE 1/2 TABLET (2MG) BY MOUTH DAILY        Last Filled: Both were filled 2/1/2019 #30 w/11 refills and #15 w/11 refills     Patient Phone Number: [phone] (home)     Last appt: 7/31/2019   Next appt: 2/19/2020    Last OARRS: No flowsheet data found. [facility] psych input  cw meds  capacity recommendations noted

## 2020-06-13 NOTE — PROGRESS NOTE ADULT - PROBLEM SELECTOR PLAN 6
DVT PPx: SCDs 2/2 bleed  Diet: CLD for now     Transitions of Care Status:  1.  Name of PCP: Diane Dubon  2.  PCP Contacted on Admission: [ ] Y    [ ] N    3.  PCP contacted at Discharge: [ ] Y    [ ] N    [ ] N/A  4.  Post-Discharge Appointment Date and Location:  5.  Summary of Handoff given to PCP:

## 2020-06-13 NOTE — PROGRESS NOTE ADULT - PROBLEM SELECTOR PLAN 1
- refusing PRBC - h/h mid 6 range, awaiting CBC for today   - says no further bleed, but unreliable historian - per nursing no bleed noted   - GI consult [facility]guni - discussed with GI attending extensively yesterday - plan for c-scope on mon if pt remains agreeable  - agree with psych that does not have capacity to refuse pRBC and as long as remains hemodynamically stable, unethical to administer against stated wishes.   - cw IV Venofer  - will update GI about clinical course, labs  - no family, no HCP per [facility] PETE and CM

## 2020-06-13 NOTE — PROGRESS NOTE ADULT - ASSESSMENT
Impression:    #Microcytic anemia: hgb stable overnight. No overt bleeding. Patient refusing blood products although deemed to not have capacity. Patient frrequently changing mind on endoscopic evaluation. Iron studies consistent with severe iron deficiency (iron sat 8%, ferritin 7). Brown stool on rectal exam. In absence of overt GI bleeding, differential includes occult blood loss GI tract (possible GI pruimary malignancy vs other malignancy) vs malabsorption    #Schizophrenia: deemed to not have capacity  #Morbid obesity   #Constipation    Recommendations:  - please transfuse 1 unit pRBC for anemia as patient deemed to not have capacity per psych to refuse  - IV iron supplementation  - minimize blood draws and try to use pediatric testing tubes  - monitor CBC, CMP and INR  - Miralax BID for constipation  - will plan for colonoscopy/EGD on Monday; patient will need to be prepped over weekend. as she does not have capacity, she will need a two physician consent for the procedure (no HCP identified)  - keep on CLD tmro, and NPO at midnight for procedure monday  - GI fellow to order bowel prep tmro  - if patient unable to prep, will consider NGT palcement for prep or can consider CTAP and/or UGIS with small bowel follow through and/or CT colonography to further evaluate for underlying GI pathology. However, these radiologic studies would only be diagnostic and would be miss small lesions, ulcers, ectasias, etc that may be contributing to patient's anemia  - given HD stability at this time and likely chronic nature of anemia from indolent process with relatively stable hgb and stable vitals there is no emergent indication for procedure  - please feel free to obtain second opinion GI consultation if preferred  - supportive care per primary team Impression:    #Microcytic anemia: hgb stable overnight. No overt bleeding. Patient refusing blood products although deemed to not have capacity. Patient refusing colonoscopy now, suspect she will not comply with prep and procedure. Iron studies consistent with severe iron deficiency (iron sat 8%, ferritin 7). Brown stool on rectal exam. In absence of overt GI bleeding, differential includes occult blood loss GI tract (possible GI pruimary malignancy vs other malignancy) vs malabsorption    #Schizophrenia: deemed to not have capacity  #Morbid obesity   #Constipation    Recommendations:  - please transfuse 1 unit pRBC for anemia as patient deemed to not have capacity per psych to refuse  - IV iron supplementation  - minimize blood draws and try to use pediatric testing tubes  - monitor CBC, CMP and INR  - Miralax BID for constipation  - will plan for colonoscopy/EGD on Monday; patient will need to be prepped over weekend. as she does not have capacity, she will need a two physician consent for the procedure (no HCP identified)  - keep on CLD tmro, and NPO at midnight for procedure monday  - GI fellow to order bowel prep tmro  - if patient unable/refuses to prep, can consider CTAP and/or UGIS with small bowel follow through and/or CT colonography (although will need prep for this) to further evaluate for underlying GI pathology. However, these radiologic studies would only be diagnostic and would be miss small lesions, ulcers, ectasias, etc that may be contributing to patient's anemia  - given HD stability at this time and likely chronic nature of anemia from indolent process with relatively stable hgb and stable vitals there is no emergent indication for procedure  - please feel free to obtain second opinion GI consultation if preferred  - supportive care per primary team

## 2020-06-14 LAB
ANION GAP SERPL CALC-SCNC: 11 MMO/L — SIGNIFICANT CHANGE UP (ref 7–14)
BUN SERPL-MCNC: 6 MG/DL — LOW (ref 7–23)
CALCIUM SERPL-MCNC: 8.5 MG/DL — SIGNIFICANT CHANGE UP (ref 8.4–10.5)
CHLORIDE SERPL-SCNC: 109 MMOL/L — HIGH (ref 98–107)
CO2 SERPL-SCNC: 25 MMOL/L — SIGNIFICANT CHANGE UP (ref 22–31)
CREAT SERPL-MCNC: 0.84 MG/DL — SIGNIFICANT CHANGE UP (ref 0.5–1.3)
GLUCOSE SERPL-MCNC: 92 MG/DL — SIGNIFICANT CHANGE UP (ref 70–99)
HCT VFR BLD CALC: 23.5 % — LOW (ref 34.5–45)
HCT VFR BLD CALC: 26 % — LOW (ref 34.5–45)
HGB BLD-MCNC: 6.7 G/DL — CRITICAL LOW (ref 11.5–15.5)
HGB BLD-MCNC: 7.2 G/DL — LOW (ref 11.5–15.5)
MAGNESIUM SERPL-MCNC: 2.1 MG/DL — SIGNIFICANT CHANGE UP (ref 1.6–2.6)
MCHC RBC-ENTMCNC: 20.3 PG — LOW (ref 27–34)
MCHC RBC-ENTMCNC: 21.1 PG — LOW (ref 27–34)
MCHC RBC-ENTMCNC: 27.7 % — LOW (ref 32–36)
MCHC RBC-ENTMCNC: 28.5 % — LOW (ref 32–36)
MCV RBC AUTO: 73.4 FL — LOW (ref 80–100)
MCV RBC AUTO: 73.9 FL — LOW (ref 80–100)
NRBC # FLD: 0.19 K/UL — SIGNIFICANT CHANGE UP (ref 0–0)
NRBC # FLD: 0.3 K/UL — SIGNIFICANT CHANGE UP (ref 0–0)
NRBC FLD-RTO: 2.6 — SIGNIFICANT CHANGE UP
NRBC FLD-RTO: 3.5 — SIGNIFICANT CHANGE UP
PHOSPHATE SERPL-MCNC: 3.6 MG/DL — SIGNIFICANT CHANGE UP (ref 2.5–4.5)
PLATELET # BLD AUTO: 362 K/UL — SIGNIFICANT CHANGE UP (ref 150–400)
PLATELET # BLD AUTO: 404 K/UL — HIGH (ref 150–400)
PMV BLD: 9 FL — SIGNIFICANT CHANGE UP (ref 7–13)
PMV BLD: 9.6 FL — SIGNIFICANT CHANGE UP (ref 7–13)
POTASSIUM SERPL-MCNC: 4 MMOL/L — SIGNIFICANT CHANGE UP (ref 3.5–5.3)
POTASSIUM SERPL-SCNC: 4 MMOL/L — SIGNIFICANT CHANGE UP (ref 3.5–5.3)
RBC # BLD: 3.18 M/UL — LOW (ref 3.8–5.2)
RBC # BLD: 3.54 M/UL — LOW (ref 3.8–5.2)
RBC # FLD: 20.3 % — HIGH (ref 10.3–14.5)
RBC # FLD: 20.3 % — HIGH (ref 10.3–14.5)
SARS-COV-2 RNA SPEC QL NAA+PROBE: SIGNIFICANT CHANGE UP
SODIUM SERPL-SCNC: 145 MMOL/L — SIGNIFICANT CHANGE UP (ref 135–145)
WBC # BLD: 7.2 K/UL — SIGNIFICANT CHANGE UP (ref 3.8–10.5)
WBC # BLD: 8.54 K/UL — SIGNIFICANT CHANGE UP (ref 3.8–10.5)
WBC # FLD AUTO: 7.2 K/UL — SIGNIFICANT CHANGE UP (ref 3.8–10.5)
WBC # FLD AUTO: 8.54 K/UL — SIGNIFICANT CHANGE UP (ref 3.8–10.5)

## 2020-06-14 PROCEDURE — 99233 SBSQ HOSP IP/OBS HIGH 50: CPT

## 2020-06-14 RX ADMIN — DIVALPROEX SODIUM 1000 MILLIGRAM(S): 500 TABLET, DELAYED RELEASE ORAL at 21:54

## 2020-06-14 RX ADMIN — IRON SUCROSE 210 MILLIGRAM(S): 20 INJECTION, SOLUTION INTRAVENOUS at 16:10

## 2020-06-14 RX ADMIN — OLANZAPINE 20 MILLIGRAM(S): 15 TABLET, FILM COATED ORAL at 21:54

## 2020-06-14 NOTE — CHART NOTE - NSCHARTNOTEFT_GEN_A_CORE
Patient refusing endoscopic evaluation. Will order bowel prep today however if patient refuses will not be able to proceed with procedure. Hgb has been stable without overt bleeding.  - CLD, NPO at midnight  - GI fellow to order prep  - will need repeat COVID swab stat prior to procedure as needed within 72 hours of procedure

## 2020-06-14 NOTE — PROGRESS NOTE ADULT - PROBLEM SELECTOR PLAN 1
- Hb remains about stable, refusing pRBC   - says no further bleed, but unreliable historian - per nursing no bleed noted   - GI consult falguni - plan for c-scope but may very well refuse   - agree with psych that does not have capacity to refused emergent pRBC however as long as remains hemodynamically stable, unethical to administer against stated wishes.   - cw IV Venofer  - no family, no HCP per [facility] PETE and CM

## 2020-06-14 NOTE — PROGRESS NOTE ADULT - PROBLEM SELECTOR PLAN 3
-no issues presently, but w/ intermittent difficulty  - ensure optimal hydration, may need stool softener/bowel regimen prn

## 2020-06-14 NOTE — PROVIDER CONTACT NOTE (CRITICAL VALUE NOTIFICATION) - ACTION/TREATMENT ORDERED:
MICAELA Lehman aware. No new orders.
continue to monitor for bleeding precautions
No intervention at this time
No intervention at this time

## 2020-06-14 NOTE — PROGRESS NOTE ADULT - ASSESSMENT
48 yo F PMHx anxiety, schizophrenia, [facility] resident, who presents with sob and lightheadedness iron deficiency anemia in setting of acute on chronic LGIB given BRBPR.   Refusing pRBC and refusing endoscopic eval.

## 2020-06-15 LAB
ANION GAP SERPL CALC-SCNC: 12 MMO/L — SIGNIFICANT CHANGE UP (ref 7–14)
BLD GP AB SCN SERPL QL: NEGATIVE — SIGNIFICANT CHANGE UP
BUN SERPL-MCNC: 6 MG/DL — LOW (ref 7–23)
CALCIUM SERPL-MCNC: 8.5 MG/DL — SIGNIFICANT CHANGE UP (ref 8.4–10.5)
CHLORIDE SERPL-SCNC: 106 MMOL/L — SIGNIFICANT CHANGE UP (ref 98–107)
CO2 SERPL-SCNC: 25 MMOL/L — SIGNIFICANT CHANGE UP (ref 22–31)
CREAT SERPL-MCNC: 0.83 MG/DL — SIGNIFICANT CHANGE UP (ref 0.5–1.3)
GLUCOSE SERPL-MCNC: 94 MG/DL — SIGNIFICANT CHANGE UP (ref 70–99)
HCT VFR BLD CALC: 23.8 % — LOW (ref 34.5–45)
HGB BLD-MCNC: 6.8 G/DL — CRITICAL LOW (ref 11.5–15.5)
MAGNESIUM SERPL-MCNC: 2.1 MG/DL — SIGNIFICANT CHANGE UP (ref 1.6–2.6)
MCHC RBC-ENTMCNC: 21 PG — LOW (ref 27–34)
MCHC RBC-ENTMCNC: 28.6 % — LOW (ref 32–36)
MCV RBC AUTO: 73.5 FL — LOW (ref 80–100)
NRBC # FLD: 0.26 K/UL — SIGNIFICANT CHANGE UP (ref 0–0)
NRBC FLD-RTO: 3.2 — SIGNIFICANT CHANGE UP
PHOSPHATE SERPL-MCNC: 3.6 MG/DL — SIGNIFICANT CHANGE UP (ref 2.5–4.5)
PLATELET # BLD AUTO: 372 K/UL — SIGNIFICANT CHANGE UP (ref 150–400)
PMV BLD: 9.3 FL — SIGNIFICANT CHANGE UP (ref 7–13)
POTASSIUM SERPL-MCNC: 3.8 MMOL/L — SIGNIFICANT CHANGE UP (ref 3.5–5.3)
POTASSIUM SERPL-SCNC: 3.8 MMOL/L — SIGNIFICANT CHANGE UP (ref 3.5–5.3)
RBC # BLD: 3.24 M/UL — LOW (ref 3.8–5.2)
RBC # FLD: 20.3 % — HIGH (ref 10.3–14.5)
RH IG SCN BLD-IMP: POSITIVE — SIGNIFICANT CHANGE UP
SODIUM SERPL-SCNC: 143 MMOL/L — SIGNIFICANT CHANGE UP (ref 135–145)
WBC # BLD: 8.05 K/UL — SIGNIFICANT CHANGE UP (ref 3.8–10.5)
WBC # FLD AUTO: 8.05 K/UL — SIGNIFICANT CHANGE UP (ref 3.8–10.5)

## 2020-06-15 PROCEDURE — 99232 SBSQ HOSP IP/OBS MODERATE 35: CPT

## 2020-06-15 PROCEDURE — 99233 SBSQ HOSP IP/OBS HIGH 50: CPT

## 2020-06-15 PROCEDURE — 99232 SBSQ HOSP IP/OBS MODERATE 35: CPT | Mod: GC

## 2020-06-15 RX ADMIN — OLANZAPINE 20 MILLIGRAM(S): 15 TABLET, FILM COATED ORAL at 22:35

## 2020-06-15 RX ADMIN — DIVALPROEX SODIUM 1000 MILLIGRAM(S): 500 TABLET, DELAYED RELEASE ORAL at 22:35

## 2020-06-15 NOTE — PROGRESS NOTE ADULT - ATTENDING COMMENTS
Patient seen and examined with the GI fellow. I agree with the above assessment and plan. Thank you for allowing us to care for your patient.    Agree with above.

## 2020-06-15 NOTE — PROGRESS NOTE BEHAVIORAL HEALTH - CASE SUMMARY
Pt seen with PGY2 Dr. Mir, agree with above. Pt lacks capacity to refuse colonoscopy at this time, however would not be ethically appropriate to force her. Will continue to follow patient while here.

## 2020-06-15 NOTE — PROGRESS NOTE BEHAVIORAL HEALTH - NSBHCONSULTFOLLOWAFTERCARE_PSY_A_CORE FT
pt to return to [facility] and outpatient psychiatrist once medically stable

## 2020-06-15 NOTE — PROGRESS NOTE ADULT - PROBLEM SELECTOR PLAN 1
- Hb remains relatively stable, refusing pRBC, denying symptoms of anemia  - pt states no further bleed, but unreliable historian - no bleed reported by staff  - GI consulted - patient refusing bowel prep or endoscopic evaluation   - agree with psych that does not have capacity to refused emergent pRBC however as long as remains hemodynamically stable, unethical to administer against stated wishes.   - cw IV Venofer  - no family, no HCP per [facility] SW and CM  - continue to monitor hg daily, pedi tubes if possible

## 2020-06-15 NOTE — PROGRESS NOTE ADULT - ASSESSMENT
Impression:    #Microcytic anemia: hgb stable overnight. No overt bleeding. Patient refusing blood products although deemed to not have capacity. Patient refusing colonoscopy now, suspect she will not comply with prep and procedure. Iron studies consistent with severe iron deficiency (iron sat 8%, ferritin 7). Brown stool on rectal exam. In absence of overt GI bleeding, differential includes occult blood loss GI tract (possible GI pruimary malignancy vs other malignancy) vs malabsorption    #Schizophrenia: deemed to not have capacity  #Morbid obesity   #Constipation    Recommendations:    - IV iron supplementation  - minimize blood draws and try to use pediatric testing tubes  - monitor CBC (Hb trend) CMP (accelerated prerenal azotemia) daily   -  upper endoscopy and colonoscopy indication for evaluation of anemia on non-urgent basis given patient remains hemodynamically stable and Hb has remained largely unchanged without overt GI bleeding for 72 hours   - patient does not have capacity to medical decision-making and will need two physician consent (no HCP identified) for any endoscopic evaluation however this will not possible as long as patient is not cooperative with bowel preparation  - can consider CT A/P and/or UGIS with small bowel follow through and/or CT colonography (will require bowel preparation as well) to evaluate for underlying GI pathology; however, these radiologic studies would only be diagnostic and would be miss small lesions, ulcers, ectasias, etc. that may be contributory to anemia   - please feel free to obtain second opinion GI consultation if preferred  - supportive care per primary team      Etelvina Shipley PGY-4  Gastroenterology Fellow  Pager #[phone]  Pager #[phone] 5pm-7am on weekdays, and on weekends

## 2020-06-15 NOTE — PROGRESS NOTE BEHAVIORAL HEALTH - NSBHCHARTREVIEWINVESTIGATE_PSY_A_CORE FT
< from: 12 Lead ECG (06.11.20 @ 19:50) >      Ventricular Rate 93 BPM    Atrial Rate 93 BPM    P-R Interval 170 ms    QRS Duration 72 ms    Q-T Interval 344 ms    QTC Calculation(Bezet) 427 ms    P Axis 55 degrees    R Axis 3 degrees    T Axis -5 degrees    Diagnosis Line Normal sinus rhythm  Cannot rule out Anterior infarct , age undetermined  Abnormal ECG    < end of copied text >

## 2020-06-15 NOTE — PROGRESS NOTE BEHAVIORAL HEALTH - NSBHFUPINTERVALHXFT_PSY_A_CORE
Patient seen sleeping, pretends to fall asleep at one point during interview but mostly cooperative. She states she is more sedated because of the depakote. Has good appetite and has been sleeping well - asks multiple times for food. Of note, she is NPO for a planned colonoscopy. Patient states she does not want to get the colonscopy done. When asked why, she says "for spiritual reasons" but will not elaborate. She states she cannot explain, and that it is just for spiritual reasons. She understands that the scope is to determine where she is bleeding from, but she states "I'm in the process of healing myself, it's not finished yet, but I am healing." She says she still feels lightheaded sometimes, and that's how she knows she's not completely healed.

## 2020-06-15 NOTE — PROGRESS NOTE BEHAVIORAL HEALTH - RISK ASSESSMENT
risk factors: chronic mental illness, prior psychiatric hospitalization, chronic delusions    protective factors: wants to live, no current SI/I/P, no prior suicide attempts, medication compliant, stable residence at [facility]

## 2020-06-15 NOTE — PROGRESS NOTE BEHAVIORAL HEALTH - NSBHCHARTREVIEWLAB_PSY_A_CORE FT
6.8    8.05  )-----------( 372      ( 15 Shon 2020 06:00 )             23.8     06-15    143  |  106  |  6<L>  ----------------------------<  94  3.8   |  25  |  0.83    Ca    8.5      15 Shon 2020 06:00  Phos  3.6     06-15  Mg     2.1     06-15

## 2020-06-15 NOTE — PROGRESS NOTE ADULT - ASSESSMENT
48 yo F PMHx anxiety, schizophrenia, [facility] resident, who presents with sob and lightheadedness iron deficiency anemia in setting of acute on chronic LGIB given BRBPR.   Refusing pRBC and refusing endoscopic eval.

## 2020-06-15 NOTE — PROGRESS NOTE BEHAVIORAL HEALTH - SUMMARY
50 yo F PMHx anxiety, schizophrenia, [facility] resident, who presents with sob and lightheadedness 2/2 microcytic anemia likely 2/2 chronic LGIB given BRBPR.     Pt now refusing colonoscopy, is able to communicate a consistent choice, her discussion and reasoning exists in a delusions/psychotic framework - she is unable to state clear explanation as to why she is refusing, states it's for "spiritual" reasons. As of Friday, patient was willing to get this imaging study done. Patient continues to lack capacity, however as long as remains hemodynamically stable, unethical to administer tests against her stated wishes. Patient agreeable to return to hospital should her symptoms get worse after discharge.

## 2020-06-16 ENCOUNTER — TRANSCRIPTION ENCOUNTER (OUTPATIENT)
Age: 50
End: 2020-06-16

## 2020-06-16 DIAGNOSIS — D64.9 ANEMIA, UNSPECIFIED: ICD-10-CM

## 2020-06-16 LAB
ALBUMIN SERPL ELPH-MCNC: 3.6 G/DL — SIGNIFICANT CHANGE UP (ref 3.3–5)
ALP SERPL-CCNC: 99 U/L — SIGNIFICANT CHANGE UP (ref 40–120)
ALT FLD-CCNC: 35 U/L — HIGH (ref 4–33)
ANION GAP SERPL CALC-SCNC: 11 MMO/L — SIGNIFICANT CHANGE UP (ref 7–14)
AST SERPL-CCNC: 36 U/L — HIGH (ref 4–32)
BILIRUB SERPL-MCNC: < 0.2 MG/DL — LOW (ref 0.2–1.2)
BUN SERPL-MCNC: 9 MG/DL — SIGNIFICANT CHANGE UP (ref 7–23)
CALCIUM SERPL-MCNC: 8.7 MG/DL — SIGNIFICANT CHANGE UP (ref 8.4–10.5)
CHLORIDE SERPL-SCNC: 107 MMOL/L — SIGNIFICANT CHANGE UP (ref 98–107)
CO2 SERPL-SCNC: 26 MMOL/L — SIGNIFICANT CHANGE UP (ref 22–31)
CREAT SERPL-MCNC: 0.92 MG/DL — SIGNIFICANT CHANGE UP (ref 0.5–1.3)
FOLATE SERPL-MCNC: 10.4 NG/ML — SIGNIFICANT CHANGE UP (ref 4.7–20)
GLUCOSE SERPL-MCNC: 90 MG/DL — SIGNIFICANT CHANGE UP (ref 70–99)
HCT VFR BLD CALC: 25.7 % — LOW (ref 34.5–45)
HGB BLD-MCNC: 7.3 G/DL — LOW (ref 11.5–15.5)
MCHC RBC-ENTMCNC: 21 PG — LOW (ref 27–34)
MCHC RBC-ENTMCNC: 28.4 % — LOW (ref 32–36)
MCV RBC AUTO: 74.1 FL — LOW (ref 80–100)
NRBC # FLD: 0.33 K/UL — SIGNIFICANT CHANGE UP (ref 0–0)
NRBC FLD-RTO: 4.1 — SIGNIFICANT CHANGE UP
PLATELET # BLD AUTO: 384 K/UL — SIGNIFICANT CHANGE UP (ref 150–400)
PMV BLD: 8.9 FL — SIGNIFICANT CHANGE UP (ref 7–13)
POTASSIUM SERPL-MCNC: 3.8 MMOL/L — SIGNIFICANT CHANGE UP (ref 3.5–5.3)
POTASSIUM SERPL-SCNC: 3.8 MMOL/L — SIGNIFICANT CHANGE UP (ref 3.5–5.3)
PROT SERPL-MCNC: 6.8 G/DL — SIGNIFICANT CHANGE UP (ref 6–8.3)
RBC # BLD: 3.47 M/UL — LOW (ref 3.8–5.2)
RBC # FLD: 21 % — HIGH (ref 10.3–14.5)
SODIUM SERPL-SCNC: 144 MMOL/L — SIGNIFICANT CHANGE UP (ref 135–145)
VIT B12 SERPL-MCNC: 1644 PG/ML — HIGH (ref 200–900)
WBC # BLD: 8.11 K/UL — SIGNIFICANT CHANGE UP (ref 3.8–10.5)
WBC # FLD AUTO: 8.11 K/UL — SIGNIFICANT CHANGE UP (ref 3.8–10.5)

## 2020-06-16 PROCEDURE — 99233 SBSQ HOSP IP/OBS HIGH 50: CPT

## 2020-06-16 RX ADMIN — DIVALPROEX SODIUM 1000 MILLIGRAM(S): 500 TABLET, DELAYED RELEASE ORAL at 21:26

## 2020-06-16 RX ADMIN — OLANZAPINE 20 MILLIGRAM(S): 15 TABLET, FILM COATED ORAL at 21:26

## 2020-06-16 NOTE — DISCHARGE NOTE PROVIDER - CARE PROVIDER_API CALL
Diane Dubon  INTERNAL MEDICINE  [address] [address] [place], [place] [id number]  Phone: [phone]  Fax: [phone]  Follow Up Time:

## 2020-06-16 NOTE — DISCHARGE NOTE PROVIDER - NSDCCPCAREPLAN_GEN_ALL_CORE_FT
PRINCIPAL DISCHARGE DIAGNOSIS  Diagnosis: Anemia  Assessment and Plan of Treatment: Blood transfusion was recommended however you declined. EGD/Colonoscopy was recommened however you declined. Hemoglobin stabilized prior to discharge.  Follow-up with your outpatient provider for further care/recommendations. Monitor for signs/symptoms indicating worsening of disease, such as, easy bleeding/bruising, pale skin, fatigue, dizziness, increased heart rate, or chest pain.      SECONDARY DISCHARGE DIAGNOSES  Diagnosis: Schizophrenia, unspecified type  Assessment and Plan of Treatment: Continue with  Zyprexa and Divalproex. Follow up with psychiatrist per routine. PRINCIPAL DISCHARGE DIAGNOSIS  Diagnosis: Anemia  Assessment and Plan of Treatment: Blood transfusion was recommended however you declined. EGD/Colonoscopy was recommened however you declined. Hemoglobin stabilized prior to discharge.  Follow-up with your outpatient provider for further care/recommendations. Monitor for signs/symptoms indicating worsening of disease, such as, easy bleeding/bruising, pale skin, fatigue, dizziness, increased heart rate, or chest pain.      SECONDARY DISCHARGE DIAGNOSES  Diagnosis: Schizophrenia, unspecified type  Assessment and Plan of Treatment: Continue with  Zyprexa and Divalproex. No changes were made to your psychiatric medications during hospitalization. Follow up with psychiatrist per routine.

## 2020-06-16 NOTE — PROGRESS NOTE ADULT - PROBLEM SELECTOR PLAN 1
Denies symptoms, hg stable, no recent reported bleeding. Patient continues to refuse pRBC even if recommended and declining GI endoscopic evaluation.   - agree with psych that does not have capacity to refused emergent pRBC however as long as remains hemodynamically stable, unethical to administer against stated wishes  - cw IV Venofer  - no family, no HCP per [facility] SW and CM  - continue to monitor hg daily, pedi tubes if possible

## 2020-06-16 NOTE — PROVIDER CONTACT NOTE (OTHER) - ASSESSMENT
Admitted due to anemia
Vitals stable. Asymptomatic. No s/s of acute distress. Patient denies: lightheadedness, dizziness, headache, SOB, chest pain.

## 2020-06-16 NOTE — DISCHARGE NOTE PROVIDER - HOSPITAL COURSE
48 yo F PMHx anxiety, schizophrenia, [facility] resident, who presents with sob and lightheadedness 2/2 microcytic anemia likely 2/2 chronic LGIB given BRBPR.        Hospital course:         Symptomatic anemia.     -Presented with report of shortness of breath, dizziness, lightheadedness, blurred vision for the past day.  Easily fatigued. Patient now reports symptoms occurring intermittently over the past years.  Recalls being told that she is anemic.    Microcytic anemia suggesting iron deficiency vs. chronic bleed ?GIB though FOBT neg.    -reports small steaks of blood on toilet paper intermittently over the past years; indicates not from vaginal area, but due to "cuts" at anus because of hard stools    -Hgb 6.3 - 7.3 throughout hospitalization --> pt refusing blood and despite not having capacity it was decided not to transfuse pt as it is unethical given pt's hemodynamic stability     -S/p Venofer 100 mg x3 days     -GI consulted    -Was planned for EGD/Colonoscopy on 6/15, however pt refused prep and procedure --> given stable anemia and hemodynamic stability, this is non urgent and can be done out pt if pt eventually agrees     -Pt's hgb stabilized prior to discharge             Schizophrenia.      -Continued home meds: 20mg Zyprexa qhs and divalproex 1000mg qhs            Preventive measure.     -DVT PPx: SCDs 2/2 bleed    -Covid on admission negative     -Repeat covid on 6/14 negative    -Diet: initially on clears and was then advanced to regular        Pt medically stable for discharge on ___ as per discussion with ____.        Dispo: Back to [place] 48 yo F PMHx anxiety, schizophrenia, [facility] resident, who presents with sob and lightheadedness 2/2 microcytic anemia likely 2/2 chronic LGIB given BRBPR.        Hospital course:         Symptomatic anemia.     -Presented with report of shortness of breath, dizziness, lightheadedness, blurred vision for the past day.  Easily fatigued. Patient now reports symptoms occurring intermittently over the past years.  Recalls being told that she is anemic.    Microcytic anemia suggesting iron deficiency vs. chronic bleed ?GIB though FOBT neg.    -reports small steaks of blood on toilet paper intermittently over the past years; indicates not from vaginal area, but due to "cuts" at anus because of hard stools    -Hgb 6.3 - 7.3 throughout hospitalization --> pt refusing blood and despite not having capacity it was decided not to transfuse pt as it is unethical given pt's hemodynamic stability     -S/p Venofer 100 mg x3 days     -GI consulted    -Was planned for EGD/Colonoscopy on 6/15, however pt refused prep and procedure --> given stable anemia and hemodynamic stability, this is non urgent and can be done out pt if pt eventually agrees     -Pt's hgb stabilized prior to discharge             Schizophrenia.      -Continued home meds: 20mg Zyprexa qhs and divalproex 1000mg qhs            Preventive measure.     -DVT PPx: SCDs 2/2 bleed    -Covid on admission negative     -Repeat covid on 6/14 negative    -Diet: initially on clears and was then advanced to regular        Discussed case with pt's  on 6/16 (Christen [phone] - case manger at [facility]) who was updated on discharge plan and was in agreement.    Pt medically stable for discharge on 6/17 as per discussion with Dr. Menon.        Dispo: Back to [facility] 50 yo F PMHx anxiety, schizophrenia, [facility] resident, who presents with sob and lightheadedness 2/2 microcytic anemia likely 2/2 chronic LGIB given BRBPR.        Hospital course:         Symptomatic anemia.     -Presented with report of shortness of breath, dizziness, lightheadedness, blurred vision for the past day.  Easily fatigued. Patient now reports symptoms occurring intermittently over the past years.  Recalls being told that she is anemic.    Microcytic anemia suggesting iron deficiency vs. chronic bleed ?GIB though FOBT neg.    -reports small steaks of blood on toilet paper intermittently over the past years; indicates not from vaginal area, but due to "cuts" at anus because of hard stools    -Hgb 6.3 - 7.3 throughout hospitalization --> pt refusing blood and despite not having capacity it was decided not to transfuse pt as it is unethical given pt's hemodynamic stability     -S/p Venofer 100 mg x3 days     -GI consulted    -Was planned for EGD/Colonoscopy on 6/15, however pt refused prep and procedure --> given stable anemia and hemodynamic stability, this is non urgent and can be done out pt if pt eventually agrees     -Pt's hgb stabilized prior to discharge             Schizophrenia.      -Continued home meds: 20mg Zyprexa qhs and divalproex 1000mg qhs            Preventive measure.     -DVT PPx: SCDs 2/2 bleed    -Covid on admission negative     -Repeat covid on 6/14 negative    -Diet: initially on clears and was then advanced to regular        Discussed case with pt's  on 6/16 (Christen [phone] - case manger at [facility]) who was updated on discharge plan and was in agreement.    Pt medically stable for discharge on 6/17 as per discussion with Dr. Menon.        Dispo: Back to [facility]            Patient refusing pRBC transfusion and GI endoscopic evaluation, evaluated by psych for capacity, per psych does not have capacity to refused emergent pRBC however as long as remains hemodynamically stable, unethical to administer against stated wishes. Remains hemodynamically stable, no further signs of bleeding, hemoglobin stable, no transfusion indication at this point. Stable for discharge.

## 2020-06-16 NOTE — DISCHARGE NOTE PROVIDER - NSFOLLOWUPCLINICS_GEN_ALL_ED_FT
[facility]  Gastroenterology  [address] [place] [id number]  Phone: [phone]  Fax:   Follow Up Time:

## 2020-06-16 NOTE — PROVIDER CONTACT NOTE (OTHER) - SITUATION
Unable to draw lab with pediatric tubes. Attempted twice but blood wouldn't fill the tube. Was able to fill regular adult tubes.

## 2020-06-16 NOTE — DISCHARGE NOTE PROVIDER - NSDCMRMEDTOKEN_GEN_ALL_CORE_FT
biotin 5 mg oral capsule: 1 cap(s) orally 3 times a day  Depakote 500 mg oral delayed release tablet: 2 tab(s) orally once a day (at bedtime)  haloperidol decanoate: 125 milligram(s) intramuscular every 4 weeks  ZyPREXA 10 mg oral tablet: 2 tab(s) orally once a day (at bedtime)  ZyPREXA 5 mg oral tablet: 1 tab(s) orally once a day (at bedtime) biotin 5 mg oral capsule: 1 cap(s) orally 3 times a day  Depakote 500 mg oral delayed release tablet: 2 tab(s) orally once a day (at bedtime)  haloperidol decanoate: 125 milligram(s) intramuscular every 4 weeks  ZyPREXA 10 mg oral tablet: 2 tab(s) orally once a day (at bedtime)

## 2020-06-16 NOTE — PROGRESS NOTE ADULT - ASSESSMENT
50 yo F PMHx anxiety, schizophrenia, [facility] resident, who presents with sob and lightheadedness iron deficiency anemia in setting of acute on chronic LGIB given BRBPR.   Refusing pRBC and refusing endoscopic eval.

## 2020-06-17 ENCOUNTER — TRANSCRIPTION ENCOUNTER (OUTPATIENT)
Age: 50
End: 2020-06-17

## 2020-06-17 VITALS
SYSTOLIC BLOOD PRESSURE: 121 MMHG | DIASTOLIC BLOOD PRESSURE: 76 MMHG | TEMPERATURE: 98 F | OXYGEN SATURATION: 95 % | HEART RATE: 86 BPM | RESPIRATION RATE: 17 BRPM

## 2020-06-17 LAB
HCT VFR BLD CALC: 27 % — LOW (ref 34.5–45)
HGB BLD-MCNC: 7.5 G/DL — LOW (ref 11.5–15.5)
MCHC RBC-ENTMCNC: 21.1 PG — LOW (ref 27–34)
MCHC RBC-ENTMCNC: 27.8 % — LOW (ref 32–36)
MCV RBC AUTO: 75.8 FL — LOW (ref 80–100)
NRBC # FLD: 0.29 K/UL — SIGNIFICANT CHANGE UP (ref 0–0)
NRBC FLD-RTO: 3.2 — SIGNIFICANT CHANGE UP
PLATELET # BLD AUTO: 364 K/UL — SIGNIFICANT CHANGE UP (ref 150–400)
PMV BLD: 8.7 FL — SIGNIFICANT CHANGE UP (ref 7–13)
RBC # BLD: 3.56 M/UL — LOW (ref 3.8–5.2)
RBC # FLD: 22.1 % — HIGH (ref 10.3–14.5)
WBC # BLD: 8.99 K/UL — SIGNIFICANT CHANGE UP (ref 3.8–10.5)
WBC # FLD AUTO: 8.99 K/UL — SIGNIFICANT CHANGE UP (ref 3.8–10.5)

## 2020-06-17 PROCEDURE — 99239 HOSP IP/OBS DSCHRG MGMT >30: CPT

## 2020-06-17 RX ORDER — OLANZAPINE 15 MG/1
1 TABLET, FILM COATED ORAL
Qty: 0 | Refills: 0 | DISCHARGE

## 2020-06-17 NOTE — PROGRESS NOTE ADULT - ATTENDING COMMENTS
Hemoglobin stable, 7.5 today, no further bleeding, patient denies symptoms  Earlier in hospital course, patient refusing pRBC transfusion and GI endoscopic evaluation, evaluated by psych for capacity, per psych does not have capacity to refused emergent pRBC however as long as remains hemodynamically stable, unethical to administer against stated wishes. Remains hemodynamically stable, no further signs of bleeding, no transfusion indication at this point.   Medically stable for DC, discharge planning time 35 minutes

## 2020-06-17 NOTE — PROGRESS NOTE ADULT - REASON FOR ADMISSION
SOB, anemia
symptomatic anemia
SOB, anemia

## 2020-06-17 NOTE — PROGRESS NOTE ADULT - ASSESSMENT
48 yo F PMHx anxiety, schizophrenia, [facility] resident, who presents with sob and lightheadedness iron deficiency anemia in setting of acute on chronic LGIB given BRBPR.   Refusing pRBC and refusing endoscopic eval.

## 2020-06-17 NOTE — PROGRESS NOTE ADULT - PROBLEM SELECTOR PLAN 1
Denies symptoms, hg stable, no recent reported bleeding. Patient continues to refuse pRBC if recommended and declining GI endoscopic evaluation.   - agree with psych that does not have capacity to refused emergent pRBC however as long as remains hemodynamically stable, unethical to administer against stated wishes  - no family, no HCP per [facility] SW and CM  - Hg stable, 7.5, no indication for transfusion

## 2020-06-17 NOTE — PROGRESS NOTE ADULT - PROVIDER SPECIALTY LIST ADULT
Gastroenterology
Hospitalist
Gastroenterology

## 2020-06-17 NOTE — PROGRESS NOTE ADULT - SUBJECTIVE AND OBJECTIVE BOX
[facility] Division of Hospital Medicine  Elizabeth Menon MD  Pager #[phone]    Patient is a 49y old  Female who presents with a chief complaint of SOB, anemia    SUBJECTIVE / OVERNIGHT EVENTS: No acute events, no bleeding, pt reports feeling well without pain or discomfort    MEDICATIONS  (STANDING):  diVALproex DR 1000 milliGRAM(s) Oral at bedtime  OLANZapine 20 milliGRAM(s) Oral at bedtime    MEDICATIONS  (PRN):    Vital Signs Last 24 Hrs  T(C): 36.6 (17 Jun 2020 06:01), Max: 37 (16 Jun 2020 13:33)  T(F): 97.9 (17 Jun 2020 06:01), Max: 98.6 (16 Jun 2020 13:33)  HR: 86 (17 Jun 2020 06:01) (85 - 88)  BP: 121/76 (17 Jun 2020 06:01) (103/70 - 121/76)  BP(mean): --  RR: 17 (17 Jun 2020 06:01) (17 - 17)  SpO2: 95% (17 Jun 2020 06:01) (95% - 97%)    I&O's Detail    CAPILLARY BLOOD GLUCOSE    PHYSICAL EXAM:  GENERAL: NAD, obese, laying in bed  CHEST/LUNG: Clear to auscultation bilaterally; No wheezing  HEART: Regular rate and rhythm; No murmurs, rubs, or gallops  ABDOMEN: Soft, Nontender, Nondistended; Bowel sounds present  EXTREMITIES: warm and well perfused, No clubbing, cyanosis, or edema  PSYCH: calm  SKIN: No rashes or lesions    LABS:                         7.5    8.99  )-----------( 364      ( 17 Jun 2020 06:00 )             27.0     06-16    144  |  107  |  9   ----------------------------<  90  3.8   |  26  |  0.92    Ca    8.7      16 Jun 2020 05:43    TPro  6.8  /  Alb  3.6  /  TBili  < 0.2<L>  /  DBili  x   /  AST  36<H>  /  ALT  35<H>  /  AlkPhos  99  06-16    RADIOLOGY, & ADDITIONAL TESTS: Reviewed.     Consultant(s) Notes Reviewed:  GI
Chief Complaint:  Patient is a 49y old  Female who presents with a chief complaint of SOB, anemia (2020 09:07)      Interval Events: no reported overnight bleeding. Denies shortness of breath, n/v, melena, or hematemesis.    Allergies:  No Known Allergies      Hospital Medications:  diVALproex DR 1000 milliGRAM(s) Oral at bedtime  iron sucrose IVPB 100 milliGRAM(s) IV Intermittent every 24 hours  OLANZapine 20 milliGRAM(s) Oral at bedtime      PMHX/PSHX:  Anemia  Anxiety  Obesity  Constipation  Schizophrenia  No significant past surgical history      Family history:  Patient unable to provide medical history      ROS:     General:  No wt loss, fevers, chills, night sweats, fatigue,   Eyes:  Good vision, no reported pain  ENT:  No sore throat, pain, runny nose, dysphagia  CV:  No pain, palpitations, hypo/hypertension  Resp:  No dyspnea, cough, tachypnea, wheezing  GI:  See HPI  :  No pain, bleeding, incontinence, nocturia  Muscle:  No pain, weakness  Neuro:  No weakness, tingling, memory problems  Psych:  No fatigue, insomnia, mood problems, depression  Endocrine:  No polyuria, polydipsia, cold/heat intolerance  Heme:  No petechiae, ecchymosis, easy bruisability  Skin:  No rash, edema      PHYSICAL EXAM:     Vital Signs:  Vital Signs Last 24 Hrs  T(C): 36.7 (2020 05:42), Max: 37.1 (2020 12:31)  T(F): 98.1 (2020 05:42), Max: 98.7 (2020 12:31)  HR: 102 (2020 05:42) (93 - 102)  BP: 132/77 (2020 05:42) (132/77 - 145/88)  BP(mean): --  RR: 17 (2020 05:42) (12 - 17)  SpO2: 95% (2020 05:42) (95% - 98%)  Daily     Daily     GENERAL:  Morbidly obese, no acute distress, cooperative  HEENT:  Sclera anicteric   CHEST:  No increased respiratory effort, breathing non-labored   HEART:  Regular rhythm & rate   ABDOMEN:  Soft, obese, non-tender, no palpable masses   EXTREMITIES:  no LE edema  SKIN:  No rash/erythema/ecchymoses/jaundice   NEURO:  Alert, orientedx2 (person, place)     LABS:                        6.7    6.80  )-----------( 342      ( 2020 22:20 )             24.2     06-12    142  |  106  |  13  ----------------------------<  90  4.2   |  24  |  0.75    Ca    7.9<L>      2020 06:00  Phos  2.2     06-12  Mg     1.9     06-12    TPro  6.9  /  Alb  3.8  /  TBili  < 0.2<L>  /  DBili  x   /  AST  15  /  ALT  12  /  AlkPhos  94  06-11    LIVER FUNCTIONS - ( 2020 19:40 )  Alb: 3.8 g/dL / Pro: 6.9 g/dL / ALK PHOS: 94 u/L / ALT: 12 u/L / AST: 15 u/L / GGT: x           PT/INR - ( 2020 06:00 )   PT: 10.9 SEC;   INR: 0.96          PTT - ( 2020 06:00 )  PTT:29.0 SEC  Urinalysis Basic - ( 2020 19:57 )    Color: YELLOW / Appearance: CLEAR / S.028 / pH: 6.0  Gluc: NEGATIVE / Ketone: NEGATIVE  / Bili: NEGATIVE / Urobili: NORMAL   Blood: NEGATIVE / Protein: 20 / Nitrite: NEGATIVE   Leuk Esterase: SMALL / RBC: 0-2 / WBC 3-5   Sq Epi: FEW / Non Sq Epi: x / Bacteria: NEGATIVE          Imaging:
[facility] Division of Hospital Medicine  Elizabeth Menon MD  Pager #[phone]    Patient is a 49y old  Female who presents with a chief complaint of SOB, anemia    SUBJECTIVE / OVERNIGHT EVENTS: No acute events, no reported bleeding, pt reports feeling well without chest pain, headache, shortness of breath. continues to refuse GI endoscopic evaluation or blood transfusions is recommended    MEDICATIONS  (STANDING):  diVALproex DR 1000 milliGRAM(s) Oral at bedtime  OLANZapine 20 milliGRAM(s) Oral at bedtime    MEDICATIONS  (PRN):    Vital Signs Last 24 Hrs  T(C): 36.3 (16 Jun 2020 04:47), Max: 37.2 (15 Shon 2020 21:53)  T(F): 97.4 (16 Jun 2020 04:47), Max: 98.9 (15 Shon 2020 21:53)  HR: 74 (16 Jun 2020 04:47) (73 - 80)  BP: 126/78 (16 Jun 2020 04:47) (111/55 - 126/78)  BP(mean): --  RR: 14 (16 Jun 2020 04:47) (13 - 14)  SpO2: 95% (16 Jun 2020 04:47) (95% - 97%)    I&O's Detail    CAPILLARY BLOOD GLUCOSE    PHYSICAL EXAM:  GENERAL: NAD, obese, laying in bed  CHEST/LUNG: Clear to auscultation bilaterally; No wheezing  HEART: Regular rate and rhythm; No murmurs, rubs, or gallops  ABDOMEN: Soft, Nontender, Nondistended; Bowel sounds present  EXTREMITIES: warm and well perfused, No clubbing, cyanosis, or edema  PSYCH: calm  SKIN: No rashes or lesions    LABS:                         7.3    8.11  )-----------( 384      ( 16 Jun 2020 05:43 )             25.7     06-16    144  |  107  |  9   ----------------------------<  90  3.8   |  26  |  0.92    Ca    8.7      16 Jun 2020 05:43  Phos  3.6     06-15  Mg     2.1     06-15    TPro  6.8  /  Alb  3.6  /  TBili  < 0.2<L>  /  DBili  x   /  AST  36<H>  /  ALT  35<H>  /  AlkPhos  99  06-16    RADIOLOGY, & ADDITIONAL TESTS: Reviewed.     Consultant(s) Notes Reviewed:  GI
[facility] Division of Hospital Medicine  Elizabeth Menon MD  Pager #[phone]    Patient is a 49y old  Female who presents with a chief complaint of SOB, anemia    SUBJECTIVE / OVERNIGHT EVENTS: No acute events, continued to decline transfusions or GI endoscopic evaluation, denies any pain or discomfort, no chest pain, no dizziness, no shortness of breath    MEDICATIONS  (STANDING):  diVALproex DR 1000 milliGRAM(s) Oral at bedtime  OLANZapine 20 milliGRAM(s) Oral at bedtime    MEDICATIONS  (PRN):    Vital Signs Last 24 Hrs  T(C): 37.1 (15 Shon 2020 13:11), Max: 37.3 (14 Jun 2020 15:31)  T(F): 98.8 (15 Shon 2020 13:11), Max: 99.2 (14 Jun 2020 15:31)  HR: 80 (15 Shon 2020 13:11) (76 - 92)  BP: 122/89 (15 Shon 2020 13:11) (120/73 - 135/85)  BP(mean): --  RR: 18 (15 Shon 2020 06:10) (18 - 18)  SpO2: 97% (15 Shon 2020 13:11) (97% - 100%)    I&O's Detail    CAPILLARY BLOOD GLUCOSE    PHYSICAL EXAM:  GENERAL: NAD, obese   CHEST/LUNG: Clear to auscultation bilaterally; No wheezing  HEART: Regular rate and rhythm; No murmurs, rubs, or gallops  ABDOMEN: Soft, Nontender, Nondistended; Bowel sounds present  EXTREMITIES: warm and well perfused, No clubbing, cyanosis, or edema  PSYCH: calm  SKIN: No rashes or lesions    LABS:                         6.8    8.05  )-----------( 372      ( 15 Shon 2020 06:00 )             23.8     06-15    143  |  106  |  6<L>  ----------------------------<  94  3.8   |  25  |  0.83    Ca    8.5      15 Shon 2020 06:00  Phos  3.6     06-15  Mg     2.1     06-15      RADIOLOGY, & ADDITIONAL TESTS: Reviewed.     Consultant(s) Notes Reviewed:  GI  Care Discussed with Consultants/Other Providers:
Patient is a 49y old  Female who presents with a chief complaint of SOB, anemia    SUBJECTIVE / OVERNIGHT EVENTS:    Feels fine, no CP, SOB  Still doesn't want blood, also states doesn't want endoscopy     MEDICATIONS  (STANDING):  diVALproex DR 1000 milliGRAM(s) Oral at bedtime  iron sucrose IVPB 100 milliGRAM(s) IV Intermittent every 24 hours  OLANZapine 20 milliGRAM(s) Oral at bedtime    T(C): 36.2 (06-14-20 @ 05:29), Max: 36.6 (06-13-20 @ 22:00)  HR: 82 (06-14-20 @ 05:29) (82 - 89)  BP: 113/67 (06-14-20 @ 05:29) (113/67 - 125/71)  RR: 17 (06-14-20 @ 05:29) (17 - 18)  SpO2: 98% (06-14-20 @ 05:29) (96% - 98%)    PHYSICAL EXAM:  GENERAL: NAD, obese   CHEST/LUNG: Clear to auscultation bilaterally; No wheeze  HEART: Regular rate and rhythm; No murmurs, rubs, or gallops  ABDOMEN: Soft, Nontender, Nondistended; Bowel sounds present  EXTREMITIES:   warm and well perfused, No clubbing, cyanosis, or edema  PSYCH: odd  NEUROLOGY: non-focal  SKIN: No rashes or lesions    LABS:                        6.7    7.20  )-----------( 362      ( 14 Jun 2020 06:03 )             23.5     06-14    145  |  109<H>  |  6<L>  ----------------------------<  92  4.0   |  25  |  0.84    Ca    8.5      14 Jun 2020 06:03  Phos  3.6     06-14  Mg     2.1     06-14      Microbiology: Culture Results:   >=3 organisms. Probable collection contamination. (06-11 @ 23:53)      Consultant(s) Notes Reviewed:  GI  Care Discussed with Consultants/Other Providers:
Patient is a 49y old  Female who presents with a chief complaint of SOB, anemia (2020 00:21)      SUBJECTIVE / OVERNIGHT EVENTS: denies further bloody BM - says last BM this morning normal - had inappropriate affect, non-sensical speech - per nursing was having conversation with herself all morning.     ROS:  No HA/DZ  No Vision changes   No CP, SOB  No N/V/D  No Edema  No Rash  NO weakness, numbness    MEDICATIONS  (STANDING):  diVALproex DR 1000 milliGRAM(s) Oral at bedtime  OLANZapine 20 milliGRAM(s) Oral at bedtime    MEDICATIONS  (PRN):      T(C): 37 (20 @ 00:30)  HR: 97 (20 @ 00:30)  BP: 129/81 (20 @ 00:30)  RR: 18 (20 @ 00:30)  SpO2: 100% (20 @ 00:30)  CAPILLARY BLOOD GLUCOSE        I&O's Summary      PHYSICAL EXAM:  GENERAL: NAD, well-developed, AOx3  HEAD:  Atraumatic, Normocephalic  EYES: EOMI, PERRL, conjunctiva and sclera clear  NECK: Supple, No JVD  CHEST/LUNG: Clear to auscultation bilaterally  HEART: Regular rate and rhythm; No murmurs, rubs, or gallops, No Edema  ABDOMEN: Soft, Nontender, Nondistended; Bowel sounds present  EXTREMITIES:  2+ Peripheral Pulses, No clubbing, cyanosis  PSYCH: No SI/HI, inappropriate affect, nonsensical speech   NEUROLOGY: non-focal  SKIN: No rashes or lesions    LABS:                        6.6    7.51  )-----------( 329      ( 2020 09:09 )             22.7     06-12    142  |  106  |  13  ----------------------------<  90  4.2   |  24  |  0.75    Ca    7.9<L>      2020 06:00  Phos  2.2     06-12  Mg     1.9     06-12    TPro  6.9  /  Alb  3.8  /  TBili  < 0.2<L>  /  DBili  x   /  AST  15  /  ALT  12  /  AlkPhos  94  06-11    PT/INR - ( 2020 06:00 )   PT: 10.9 SEC;   INR: 0.96          PTT - ( 2020 06:00 )  PTT:29.0 SEC      Urinalysis Basic - ( 2020 19:57 )    Color: YELLOW / Appearance: CLEAR / S.028 / pH: 6.0  Gluc: NEGATIVE / Ketone: NEGATIVE  / Bili: NEGATIVE / Urobili: NORMAL   Blood: NEGATIVE / Protein: 20 / Nitrite: NEGATIVE   Leuk Esterase: SMALL / RBC: 0-2 / WBC 3-5   Sq Epi: FEW / Non Sq Epi: x / Bacteria: NEGATIVE            RADIOLOGY & ADDITIONAL TESTS:    Imaging Personally Reviewed:    Consultant(s) Notes Reviewed:      Care Discussed with Consultants/Other Providers:
Patient is a 49y old  Female who presents with a chief complaint of SOB, anemia (2020 13:06)      SUBJECTIVE / OVERNIGHT EVENTS: no overnight events - remains delusional about blood transfusion, still refusing but remains agreeable to c-scope. denies further bleed or black stool. is asymptomatic     ROS:  No HA/DZ  No Vision changes   No CP, SOB  No N/V/D  No Edema  No Rash  NO weakness, numbness    MEDICATIONS  (STANDING):  diVALproex DR 1000 milliGRAM(s) Oral at bedtime  iron sucrose IVPB 100 milliGRAM(s) IV Intermittent every 24 hours  OLANZapine 20 milliGRAM(s) Oral at bedtime    MEDICATIONS  (PRN):      T(C): 36.7 (20 @ 05:42)  HR: 102 (20 @ 05:42)  BP: 132/77 (20 @ 05:42)  RR: 17 (20 @ 05:42)  SpO2: 95% (20 @ 05:42)  CAPILLARY BLOOD GLUCOSE        I&O's Summary      PHYSICAL EXAM:  GENERAL: NAD, well-developed, AOx3  HEAD:  Atraumatic, Normocephalic  EYES: EOMI, PERRL, conjunctiva and sclera clear  NECK: Supple, No JVD  CHEST/LUNG: Clear to auscultation bilaterally  HEART: Regular rate and rhythm; No murmurs, rubs, or gallops, No Edema  ABDOMEN: Soft, Nontender, Nondistended; Bowel sounds present  EXTREMITIES:  2+ Peripheral Pulses, No clubbing, cyanosis  PSYCH: No SI/HI, inappropriate affect, delusional thoughts   NEUROLOGY: non-focal  SKIN: No rashes or lesions    LABS:                        6.7    6.80  )-----------( 342      ( 2020 22:20 )             24.2     06-12    142  |  106  |  13  ----------------------------<  90  4.2   |  24  |  0.75    Ca    7.9<L>      2020 06:00  Phos  2.2     06-12  Mg     1.9     06-12    TPro  6.9  /  Alb  3.8  /  TBili  < 0.2<L>  /  DBili  x   /  AST  15  /  ALT  12  /  AlkPhos  94  06-11    PT/INR - ( 2020 06:00 )   PT: 10.9 SEC;   INR: 0.96          PTT - ( 2020 06:00 )  PTT:29.0 SEC      Urinalysis Basic - ( 2020 19:57 )    Color: YELLOW / Appearance: CLEAR / S.028 / pH: 6.0  Gluc: NEGATIVE / Ketone: NEGATIVE  / Bili: NEGATIVE / Urobili: NORMAL   Blood: NEGATIVE / Protein: 20 / Nitrite: NEGATIVE   Leuk Esterase: SMALL / RBC: 0-2 / WBC 3-5   Sq Epi: FEW / Non Sq Epi: x / Bacteria: NEGATIVE            RADIOLOGY & ADDITIONAL TESTS:    Imaging Personally Reviewed:    Consultant(s) Notes Reviewed:      Care Discussed with Consultants/Other Providers:
Chief Complaint:  Patient is a 49y old  Female who presents with a chief complaint of symptomatic anemia (14 Jun 2020 12:26)      Interval Events:     Patient refusing colonoscopy. She did not attempt to take bowel preparation. She had bowel movements this week but admits she did not examine stool for any blood.     Allergies:  No Known Allergies      Hospital Medications:  diVALproex DR 1000 milliGRAM(s) Oral at bedtime  OLANZapine 20 milliGRAM(s) Oral at bedtime      PMHX/PSHX:  Anemia  Anxiety  Obesity  Constipation  Schizophrenia  No significant past surgical history      Family history:  Patient unable to provide medical history      ROS:     General:  No weight loss, fevers, chills, night sweats, fatigue   Eyes:  No vision changes  ENT:  No sore throat, pain, runny nose  CV:  No chest pain, palpitations, dizziness   Resp:  No SOB, cough, wheezing  GI:  See HPI  :  No burning with urination, hematuria  Muscle:  No pain, weakness  Neuro:  No weakness/tingling, memory problems  Psych:  No fatigue, insomnia, mood problems, depression  Heme:  No easy bruisability  Skin:  No rash, edema      PHYSICAL EXAM:     GENERAL:  Morbidly obese, no acute distress, sleeping buy arousable   HEENT:  Sclera anicteric   CHEST:  No increased respiratory effort, breathing non-labored   HEART:  Regular rhythm & rate   ABDOMEN:  Soft, obese, non-tender, no palpable masses   EXTREMITIES:  no LE edema  SKIN:  No rash/erythema/ecchymoses/jaundice   NEURO:  Alert, orientedx2 (person, place)       Vital Signs:  Vital Signs Last 24 Hrs  T(C): 36.8 (15 Shon 2020 06:10), Max: 37.3 (14 Jun 2020 15:31)  T(F): 98.2 (15 Shon 2020 06:10), Max: 99.2 (14 Jun 2020 15:31)  HR: 82 (15 Shon 2020 06:10) (76 - 92)  BP: 120/73 (15 Shon 2020 06:10) (120/73 - 135/85)  BP(mean): --  RR: 18 (15 Shon 2020 06:10) (18 - 18)  SpO2: 100% (15 Shon 2020 06:10) (97% - 100%)  Daily     Daily     LABS:                        6.8    8.05  )-----------( 372      ( 15 Shon 2020 06:00 )             23.8     06-15    143  |  106  |  6<L>  ----------------------------<  94  3.8   |  25  |  0.83    Ca    8.5      15 Shon 2020 06:00  Phos  3.6     06-15  Mg     2.1     06-15      Imaging:

## 2020-06-17 NOTE — DISCHARGE NOTE NURSING/CASE MANAGEMENT/SOCIAL WORK - PATIENT PORTAL LINK FT
You can access the FollowMyHealth Patient Portal offered by [facility] by registering at the following website: [email or web address]. By joining [employer]’s FollowMyHealth portal, you will also be able to view your health information using other applications (apps) compatible with our system.

## 2020-06-24 PROBLEM — Z00.00 ENCOUNTER FOR PREVENTIVE HEALTH EXAMINATION: Status: ACTIVE | Noted: 2020-06-24

## 2021-03-04 ENCOUNTER — EMERGENCY (EMERGENCY)
Facility: HOSPITAL | Age: 51
LOS: 1 days | Discharge: HOME | End: 2021-03-04
Attending: HOSPITALIST | Admitting: HOSPITALIST
Payer: MEDICAID

## 2021-03-04 VITALS
DIASTOLIC BLOOD PRESSURE: 75 MMHG | TEMPERATURE: 98 F | OXYGEN SATURATION: 100 % | SYSTOLIC BLOOD PRESSURE: 134 MMHG | RESPIRATION RATE: 18 BRPM | HEART RATE: 79 BPM

## 2021-03-04 VITALS
RESPIRATION RATE: 18 BRPM | DIASTOLIC BLOOD PRESSURE: 78 MMHG | SYSTOLIC BLOOD PRESSURE: 113 MMHG | OXYGEN SATURATION: 95 % | HEIGHT: 66.5 IN | HEART RATE: 79 BPM | TEMPERATURE: 98 F

## 2021-03-04 PROBLEM — F41.9 ANXIETY DISORDER, UNSPECIFIED: Chronic | Status: ACTIVE | Noted: 2020-06-12

## 2021-03-04 PROBLEM — K59.00 CONSTIPATION, UNSPECIFIED: Chronic | Status: ACTIVE | Noted: 2020-06-12

## 2021-03-04 PROBLEM — D64.9 ANEMIA, UNSPECIFIED: Chronic | Status: ACTIVE | Noted: 2020-06-12

## 2021-03-04 PROBLEM — E66.9 OBESITY, UNSPECIFIED: Chronic | Status: ACTIVE | Noted: 2020-06-12

## 2021-03-04 LAB
ALBUMIN SERPL ELPH-MCNC: 4.4 G/DL — SIGNIFICANT CHANGE UP (ref 3.3–5)
ALP SERPL-CCNC: 111 U/L — SIGNIFICANT CHANGE UP (ref 40–120)
ALT FLD-CCNC: 11 U/L — SIGNIFICANT CHANGE UP (ref 4–33)
ANION GAP SERPL CALC-SCNC: 12 MMOL/L — SIGNIFICANT CHANGE UP (ref 7–14)
AST SERPL-CCNC: 8 U/L — SIGNIFICANT CHANGE UP (ref 4–32)
BASOPHILS # BLD AUTO: 0.02 K/UL — SIGNIFICANT CHANGE UP (ref 0–0.2)
BASOPHILS NFR BLD AUTO: 0.3 % — SIGNIFICANT CHANGE UP (ref 0–2)
BILIRUB SERPL-MCNC: 0.3 MG/DL — SIGNIFICANT CHANGE UP (ref 0.2–1.2)
BUN SERPL-MCNC: 13 MG/DL — SIGNIFICANT CHANGE UP (ref 7–23)
CALCIUM SERPL-MCNC: 9.9 MG/DL — SIGNIFICANT CHANGE UP (ref 8.4–10.5)
CHLORIDE SERPL-SCNC: 103 MMOL/L — SIGNIFICANT CHANGE UP (ref 98–107)
CO2 SERPL-SCNC: 26 MMOL/L — SIGNIFICANT CHANGE UP (ref 22–31)
CREAT SERPL-MCNC: 0.93 MG/DL — SIGNIFICANT CHANGE UP (ref 0.5–1.3)
EOSINOPHIL # BLD AUTO: 0 K/UL — SIGNIFICANT CHANGE UP (ref 0–0.5)
EOSINOPHIL NFR BLD AUTO: 0 % — SIGNIFICANT CHANGE UP (ref 0–6)
GLUCOSE SERPL-MCNC: 85 MG/DL — SIGNIFICANT CHANGE UP (ref 70–99)
HCT VFR BLD CALC: 38.6 % — SIGNIFICANT CHANGE UP (ref 34.5–45)
HGB BLD-MCNC: 11.2 G/DL — LOW (ref 11.5–15.5)
IANC: 4 K/UL — SIGNIFICANT CHANGE UP (ref 1.5–8.5)
IMM GRANULOCYTES NFR BLD AUTO: 0.3 % — SIGNIFICANT CHANGE UP (ref 0–1.5)
LYMPHOCYTES # BLD AUTO: 2.55 K/UL — SIGNIFICANT CHANGE UP (ref 1–3.3)
LYMPHOCYTES # BLD AUTO: 35.7 % — SIGNIFICANT CHANGE UP (ref 13–44)
MCHC RBC-ENTMCNC: 25.5 PG — LOW (ref 27–34)
MCHC RBC-ENTMCNC: 29 GM/DL — LOW (ref 32–36)
MCV RBC AUTO: 87.9 FL — SIGNIFICANT CHANGE UP (ref 80–100)
MONOCYTES # BLD AUTO: 0.55 K/UL — SIGNIFICANT CHANGE UP (ref 0–0.9)
MONOCYTES NFR BLD AUTO: 7.7 % — SIGNIFICANT CHANGE UP (ref 2–14)
NEUTROPHILS # BLD AUTO: 4 K/UL — SIGNIFICANT CHANGE UP (ref 1.8–7.4)
NEUTROPHILS NFR BLD AUTO: 56 % — SIGNIFICANT CHANGE UP (ref 43–77)
NRBC # BLD: 0 /100 WBCS — SIGNIFICANT CHANGE UP
NRBC # FLD: 0 K/UL — SIGNIFICANT CHANGE UP
PLATELET # BLD AUTO: 396 K/UL — SIGNIFICANT CHANGE UP (ref 150–400)
POTASSIUM SERPL-MCNC: 4 MMOL/L — SIGNIFICANT CHANGE UP (ref 3.5–5.3)
POTASSIUM SERPL-SCNC: 4 MMOL/L — SIGNIFICANT CHANGE UP (ref 3.5–5.3)
PROT SERPL-MCNC: 8.2 G/DL — SIGNIFICANT CHANGE UP (ref 6–8.3)
RBC # BLD: 4.39 M/UL — SIGNIFICANT CHANGE UP (ref 3.8–5.2)
RBC # FLD: 15.3 % — HIGH (ref 10.3–14.5)
SODIUM SERPL-SCNC: 141 MMOL/L — SIGNIFICANT CHANGE UP (ref 135–145)
WBC # BLD: 7.14 K/UL — SIGNIFICANT CHANGE UP (ref 3.8–10.5)
WBC # FLD AUTO: 7.14 K/UL — SIGNIFICANT CHANGE UP (ref 3.8–10.5)

## 2021-03-04 PROCEDURE — 99284 EMERGENCY DEPT VISIT MOD MDM: CPT | Mod: 25

## 2021-03-04 PROCEDURE — 93010 ELECTROCARDIOGRAM REPORT: CPT

## 2021-03-04 RX ORDER — SODIUM CHLORIDE 9 MG/ML
1000 INJECTION, SOLUTION INTRAVENOUS ONCE
Refills: 0 | Status: COMPLETED | OUTPATIENT
Start: 2021-03-04 | End: 2021-03-04

## 2021-03-04 RX ADMIN — SODIUM CHLORIDE 1000 MILLILITER(S): 9 INJECTION, SOLUTION INTRAVENOUS at 15:45

## 2021-03-04 NOTE — ED ADULT NURSE NOTE - CHIEF COMPLAINT QUOTE
c/o dizziness and blurry vision onset this afternoon while out shopping, pt from [facility] with Hx of schizoaffective disorder, answers to questions appropriately, denies SI, Hi or AVH, calm and cooperative, denies c/p, SOB. PERRLA extremities are strong and equal. pt with Hx of anemia and blood transfusion in the past, reports symptoms are similar to the past episodes of anemia.

## 2021-03-04 NOTE — ED PROVIDER NOTE - OBJECTIVE STATEMENT
49 y/o F w/ hx anemia presents with pre-syncopal episode, per patient while she was walking at the mall around 1130am felt episode of dizziness, light-headedness, blurry vision that lasted about 5 mintues and then spontaneously resolved. While she was dizzy, per EMS patient had lower blood pressure and her heart rate was fast. Patient has not eaten anything today. Denies falling, head trauma, injury. States after resting and sitting her symptoms resolved within 3 minutes. Denies chest pressure/pain/palpitations. States this has happened 6 times previously, ever since she was 17. Saw a neurologist many years ago, was told she was fine, had a CT scan many years ago which was reportedly normal. Denies visual changes, flashes/floaters, abd pain, chest pain, fevers/chills, upper respiratory sx. Denies covid exposure sick contacts.    meds: biotin, iron pills, zyprexa  allergies: denies  surg: denies  social: denies smoking drinking or illicit drugs

## 2021-03-04 NOTE — PROVIDER CONTACT NOTE (OTHER) - SITUATION
Pt. is a resident of [facility] on the [facility] of [facility] ([address], [address], [place] [address] p. [phone]).

## 2021-03-04 NOTE — ED PROVIDER NOTE - ATTENDING CONTRIBUTION TO CARE
50F with hx of iron def anemia (on iron supplementation, refuses transfusions) p/w near syncopal episode. patient was walking around the mall and felt lightheaded and dizzy, felt like she was going to pass out. told someone she was with and they called EMS. sx lasted a few mins and 50F with hx of iron def anemia (on iron supplementation, refuses transfusions) p/w near syncopal episode. patient was walking around the mall and felt lightheaded and dizzy, felt like she was going to pass out. told someone she was with and they called EMS. sx lasted a few mins and and improved. thinks it is related to feeling hungry at the time. no cp, sob.    ***GEN - NAD; well appearing; A+O x3 ***HEAD - NC/AT ***EYES/NOSE - PERRL, EOMI, mucous membranes moist, no discharge ***THROAT: Oral cavity and pharynx normal. No inflammation, swelling, exudate, or lesions.  ***NECK: Neck supple, non-tender without lymphadenopathy, no masses, no thyromegaly.   ***PULMONARY - CTA b/l, symmetric breath sounds. ***CARDIAC -s1s2, RRR, no M,G,R  ***ABDOMEN - +BS, ND, NT, soft, no guarding, no rebound, no masses   ***BACK - no CVA tenderness, Normal  spine ***EXTREMITIES - symmetric pulses, 2+ dp, capillary refill < 2 seconds, no clubbing, no cyanosis, no edema ***SKIN - no rash or bruising   ***NEUROLOGIC - alert, CN 2-12 intact    MDM: 50F with episode of near syncope. hx of anemia. no bleeding. feeling at baseline now. will check labs, give fluids, ekg and reassess.

## 2021-03-04 NOTE — ED ADULT NURSE NOTE - OBJECTIVE STATEMENT
EMT/paramedic
pt presents with c/o dizzziness and blurred vision that started today, states she is from [facility]

## 2021-03-04 NOTE — ED PROVIDER NOTE - NSFOLLOWUPINSTRUCTIONS_ED_ALL_ED_FT
You were seen in the Emergency Department for dizziness.  1) Advance activity as tolerated.    2) Continue all previously prescribed medications as directed.    3) Follow up with your primary care physician in 24-48 hours - take copies of your results.    4) Return to the Emergency Department for worsening or persistent symptoms, and/or ANY NEW OR CONCERNING SYMPTOMS as described below.    Dizziness    Dizziness can manifest as a feeling of unsteadiness or light-headedness. You may feel like you are about to faint. This condition can be caused by a number of things, including medicines, dehydration, or illness. Drink enough fluid to keep your urine clear or pale yellow. Do not drink alcohol and limit your caffeine intake. Avoid quick or sudden movements.  Rise slowly from chairs and steady yourself until you feel okay. In the morning, first sit up on the side of the bed.    SEEK IMMEDIATE MEDICAL CARE IF YOU HAVE ANY OF THE FOLLOWING SYMPTOMS: vomiting, changes in your vision or speech, weakness in your arms or legs, trouble speaking or swallowing, chest pain, abdominal pain, shortness of breath, sweating, bleeding, headache, neck pain, or fever.

## 2021-03-04 NOTE — ED PROVIDER NOTE - PATIENT PORTAL LINK FT
You can access the FollowMyHealth Patient Portal offered by [facility] by registering at the following website: [email or web address]. By joining [employer]’s FollowMyHealth portal, you will also be able to view your health information using other applications (apps) compatible with our system. You can access the FollowMyHealth Patient Portal offered by [facility] by registering at the following website: [email or web address]. By joining [employer]’s FollowMyHealth portal, you will also be able to view your health information using other applications (apps) compatible with our system.

## 2021-03-04 NOTE — ED PROVIDER NOTE - PHYSICAL EXAMINATION
[Const] well-appearing, resting comfortably, no acute distress  [HEENT] PERRL, EOMI, moist mucus membranes  [Neck] Supple, trachea midline  [CV] +S1/S2, no m/r/g appreciated  [Lungs] Clear to auscultations bilaterally, no adventitious lung sounds  [Abd] soft, non-tender, nondistended in all 4 quadrants  [MSK] 5/5 upper extremity and lower extremity str bilaterally  [Skin] warm, dry, well-perfused  [Neuro] A&Ox3, Cranial Nerves II-XII intact

## 2021-03-04 NOTE — ED ADULT TRIAGE NOTE - CHIEF COMPLAINT QUOTE
c/o dizziness and blurry vision onset this afternoon while out shopping, pt from [facility] with Hx of schizoaffective disorder, answers to questions appropriately, denies SI, Hi or AVH, calm and cooperative, denies c/p, SOB. PERRLA extremities are strong and equal. pt with Hx of anemia and blood transfusion in the past, reports symptoms are similar to the past episodes of anemia.

## 2021-03-04 NOTE — PROVIDER CONTACT NOTE (OTHER) - ASSESSMENT
SW spoke to , ([phone]). SW confirmed pts mode of transportation is taxi and pt travels independently. Clinical provider is in agreement with taxi back to group home. PETE arranged taxi via [employer] auth online (#[phone]), "[employer] ([phone]) to complete trip. Verbal huddle completed. PETE spoke to Christen Van,  ([phone]). SW confirmed pts mode of transportation is taxi and pt travels independently. Clinical provider is in agreement with taxi back to group home. PETE arranged taxi via [employer] auth online (#[phone]), [employer] ([phone]) to complete trip. Verbal huddle completed.

## 2021-03-04 NOTE — PROVIDER CONTACT NOTE (OTHER) - BACKGROUND
PETE has spoken with Christen Garcia, , and confirmed that pt.’s mode of transportation is taxi and that pt. travel independently.
Per Resident Epi, pt is cleared and can turn return to their previous residence, [place] [id number] ([address]).

## 2021-03-04 NOTE — ED PROVIDER NOTE - CLINICAL SUMMARY MEDICAL DECISION MAKING FREE TEXT BOX
49 y/o F w/ hx anemia, schizophrenia presents with presyncopal episode, occurred 6 times in the past. ddx vasovagal orthostatic, patient also did not eat today, benign pex and vss. OBtain labs, give fluids, ekg/u-preg, likely d/c.

## 2021-03-05 LAB — SARS-COV-2 RNA SPEC QL NAA+PROBE: SIGNIFICANT CHANGE UP

## 2021-04-22 ENCOUNTER — EMERGENCY (EMERGENCY)
Facility: HOSPITAL | Age: 51
LOS: 1 days | Discharge: HOME | End: 2021-04-22
Attending: EMERGENCY MEDICINE | Admitting: EMERGENCY MEDICINE
Payer: MEDICAID

## 2021-04-22 VITALS
DIASTOLIC BLOOD PRESSURE: 63 MMHG | TEMPERATURE: 98 F | HEART RATE: 77 BPM | OXYGEN SATURATION: 100 % | SYSTOLIC BLOOD PRESSURE: 135 MMHG | RESPIRATION RATE: 16 BRPM

## 2021-04-22 VITALS
HEART RATE: 90 BPM | SYSTOLIC BLOOD PRESSURE: 108 MMHG | HEIGHT: 66.5 IN | DIASTOLIC BLOOD PRESSURE: 53 MMHG | RESPIRATION RATE: 17 BRPM | OXYGEN SATURATION: 98 % | TEMPERATURE: 98 F

## 2021-04-22 LAB
ALBUMIN SERPL ELPH-MCNC: 4.2 G/DL — SIGNIFICANT CHANGE UP (ref 3.3–5)
ALP SERPL-CCNC: 118 U/L — SIGNIFICANT CHANGE UP (ref 40–120)
ALT FLD-CCNC: 25 U/L — SIGNIFICANT CHANGE UP (ref 4–33)
ANION GAP SERPL CALC-SCNC: 11 MMOL/L — SIGNIFICANT CHANGE UP (ref 7–14)
AST SERPL-CCNC: 15 U/L — SIGNIFICANT CHANGE UP (ref 4–32)
BASOPHILS # BLD AUTO: 0.02 K/UL — SIGNIFICANT CHANGE UP (ref 0–0.2)
BASOPHILS NFR BLD AUTO: 0.2 % — SIGNIFICANT CHANGE UP (ref 0–2)
BILIRUB SERPL-MCNC: 0.2 MG/DL — SIGNIFICANT CHANGE UP (ref 0.2–1.2)
BUN SERPL-MCNC: 15 MG/DL — SIGNIFICANT CHANGE UP (ref 7–23)
CALCIUM SERPL-MCNC: 9.5 MG/DL — SIGNIFICANT CHANGE UP (ref 8.4–10.5)
CHLORIDE SERPL-SCNC: 106 MMOL/L — SIGNIFICANT CHANGE UP (ref 98–107)
CO2 SERPL-SCNC: 26 MMOL/L — SIGNIFICANT CHANGE UP (ref 22–31)
CREAT SERPL-MCNC: 0.93 MG/DL — SIGNIFICANT CHANGE UP (ref 0.5–1.3)
EOSINOPHIL # BLD AUTO: 0.01 K/UL — SIGNIFICANT CHANGE UP (ref 0–0.5)
EOSINOPHIL NFR BLD AUTO: 0.1 % — SIGNIFICANT CHANGE UP (ref 0–6)
GLUCOSE SERPL-MCNC: 89 MG/DL — SIGNIFICANT CHANGE UP (ref 70–99)
HCG SERPL-ACNC: <5 MIU/ML — SIGNIFICANT CHANGE UP
HCT VFR BLD CALC: 35.5 % — SIGNIFICANT CHANGE UP (ref 34.5–45)
HGB BLD-MCNC: 11 G/DL — LOW (ref 11.5–15.5)
HIV 1+2 AB+HIV1 P24 AG SERPL QL IA: SIGNIFICANT CHANGE UP
IANC: 5.2 K/UL — SIGNIFICANT CHANGE UP (ref 1.5–8.5)
IMM GRANULOCYTES NFR BLD AUTO: 0.7 % — SIGNIFICANT CHANGE UP (ref 0–1.5)
LYMPHOCYTES # BLD AUTO: 2.86 K/UL — SIGNIFICANT CHANGE UP (ref 1–3.3)
LYMPHOCYTES # BLD AUTO: 32.2 % — SIGNIFICANT CHANGE UP (ref 13–44)
MCHC RBC-ENTMCNC: 26.2 PG — LOW (ref 27–34)
MCHC RBC-ENTMCNC: 31 GM/DL — LOW (ref 32–36)
MCV RBC AUTO: 84.5 FL — SIGNIFICANT CHANGE UP (ref 80–100)
MONOCYTES # BLD AUTO: 0.74 K/UL — SIGNIFICANT CHANGE UP (ref 0–0.9)
MONOCYTES NFR BLD AUTO: 8.3 % — SIGNIFICANT CHANGE UP (ref 2–14)
NEUTROPHILS # BLD AUTO: 5.2 K/UL — SIGNIFICANT CHANGE UP (ref 1.8–7.4)
NEUTROPHILS NFR BLD AUTO: 58.5 % — SIGNIFICANT CHANGE UP (ref 43–77)
NRBC # BLD: 0 /100 WBCS — SIGNIFICANT CHANGE UP
NRBC # FLD: 0 K/UL — SIGNIFICANT CHANGE UP
PLATELET # BLD AUTO: 404 K/UL — HIGH (ref 150–400)
POTASSIUM SERPL-MCNC: 4.3 MMOL/L — SIGNIFICANT CHANGE UP (ref 3.5–5.3)
POTASSIUM SERPL-SCNC: 4.3 MMOL/L — SIGNIFICANT CHANGE UP (ref 3.5–5.3)
PROT SERPL-MCNC: 7.7 G/DL — SIGNIFICANT CHANGE UP (ref 6–8.3)
RBC # BLD: 4.2 M/UL — SIGNIFICANT CHANGE UP (ref 3.8–5.2)
RBC # FLD: 15.3 % — HIGH (ref 10.3–14.5)
SODIUM SERPL-SCNC: 143 MMOL/L — SIGNIFICANT CHANGE UP (ref 135–145)
WBC # BLD: 8.89 K/UL — SIGNIFICANT CHANGE UP (ref 3.8–10.5)
WBC # FLD AUTO: 8.89 K/UL — SIGNIFICANT CHANGE UP (ref 3.8–10.5)

## 2021-04-22 PROCEDURE — 93010 ELECTROCARDIOGRAM REPORT: CPT

## 2021-04-22 PROCEDURE — 99284 EMERGENCY DEPT VISIT MOD MDM: CPT | Mod: 25

## 2021-04-22 NOTE — ED PROVIDER NOTE - PHYSICAL EXAMINATION
General:  NAD  HEENT: pupils equal and reactive, normal external ears bilaterally   Cardiac: RRR, no MRG appreciated  Resp: lungs clear to auscultation bilaterally, symmetric chest wall rise  Abd: soft, nontender, nondistended,   : no CVA tenderness  Neuro: Moving all extremities  Skin:  normal color for race

## 2021-04-22 NOTE — ED PROVIDER NOTE - NSFOLLOWUPCLINICS_GEN_ALL_ED_FT
[facility]  Cardiology  [address] [place] [address]  Phone: [phone]  Fax:   Follow Up Time: 1-3 Days

## 2021-04-22 NOTE — ED ADULT NURSE NOTE - OBJECTIVE STATEMENT
Yrn RN: Patient arrives to room 2, alert and oriented X 3, ambulatory, c/o a syncopal episode earlier today. Pt says she was food shopping when she had the syncopal episode. Also endorsed some blurred vision. Currently subsided. Respirations even and unlabored, chest rise equal b/l. Denies hitting head. Phx: schizophrenia, anxiety, obesity. Pt pleasant on assessment. Appears comfortable. States "I would like HIV test". 20 G to R AC. Labs drawn and sent. VSS. NAD. Bed set in lowest position. Report to primary RN Connie.

## 2021-04-22 NOTE — ED PROVIDER NOTE - NS ED ROS FT
CONSTITUTIONAL: No fevers, no chills  Eyes: No vision changes  Cardiovascular: No Chest pain  Respiratory: No SOB  Gastrointestinal: No n/v/d, no abd pain  Genitourinary: no dysuria, no hematuria  SKIN: no rashes.  NEURO: no headache, no weakness or numbness  PSYCHIATRIC: refer to HPI  Endocrine: No unexplained weight gain

## 2021-04-22 NOTE — PROVIDER CONTACT NOTE (OTHER) - BACKGROUND
PETE has spoken with Chloe, therapy aide, and confirmed that pt.’s mode of transportation is taxi and that pt. travels independently.

## 2021-04-22 NOTE — ED PROVIDER NOTE - PROGRESS NOTE DETAILS
Jerry PGY-3:  Bloodwork wnl, orthostatics negative, pt continues to be well appearing. Will refer to cardiology, give return precautions and d/c

## 2021-04-22 NOTE — ED ADULT NURSE NOTE - CHIEF COMPLAINT QUOTE
pt resides at [place] [address], was shopping at [place] and had a whitnessed syncopal episode. currently c/o blurry vision. MD CHAPMAN.S to triage, no code stroke called.

## 2021-04-22 NOTE — ED PROVIDER NOTE - NSFOLLOWUPINSTRUCTIONS_ED_ALL_ED_FT
No signs of emergency medical condition on today's workup.  Presumptive diagnosis made, but further evaluation may be required by your primary care doctor or specialist for a definitive diagnosis.  Therefore, follow up as directed and if symptoms change/worsen or any emergency conditions, please return to the ER    (1) Follow up with your primary care physician as discussed. In addition, we did not find evidence of a life threatening illness on your testing here today, but listed below are the specialists that will be necessary to see as an outpatient to continue the workup.  Please call the numbers listed below or [phone] to set up the necessary appointments.  (2) Immediately seek care at your nearest emergency room if your worsen, persist, or do not resolve   (3) Take Tylenol (up to 1000mg or 1 g)  and/or Motrin (up to 600mg) up to every 6 hours as needed for pain.

## 2021-04-22 NOTE — ED PROVIDER NOTE - OBJECTIVE STATEMENT
51yo F pmhx syncopal episodes here after fall    States "legs suddenly gave out" when grocery shopping, no CP/SOB no LOC did not hit head. Fell onto rear. Bystander called EMS and brought pt to ED. 51yo F pmhx syncopal episodes here after fall    States "legs suddenly gave out" when grocery shopping, no CP/SOB no LOC did not hit head. Fell onto rear. Bystander called EMS and brought pt to ED.  No cardiac history. No hi/si.    Taking meds for psychiatric hx, no meds for anything else

## 2021-04-22 NOTE — ED ADULT NURSE NOTE - NSIMPLEMENTINTERV_GEN_ALL_ED
Implemented All Fall Risk Interventions:  [place] to call system. Call bell, personal items and telephone within reach. Instruct patient to call for assistance. Room bathroom lighting operational. Non-slip footwear when patient is off stretcher. Physically safe environment: no spills, clutter or unnecessary equipment. Stretcher in lowest position, wheels locked, appropriate side rails in place. Provide visual cue, wrist band, yellow gown, etc. Monitor gait and stability. Monitor for mental status changes and reorient to person, place, and time. Review medications for side effects contributing to fall risk. Reinforce activity limits and safety measures with patient and family.

## 2021-04-22 NOTE — ED PROVIDER NOTE - PATIENT PORTAL LINK FT
You can access the FollowMyHealth Patient Portal offered by [facility] by registering at the following website: [email or web address]. By joining [employer]’s FollowMyHealth portal, you will also be able to view your health information using other applications (apps) compatible with our system.

## 2021-04-22 NOTE — ED ADULT TRIAGE NOTE - CHIEF COMPLAINT QUOTE
pt resides at [place] [address], was shopping at [place] and had a whitnessed syncopal episode. currently c/o blurry vision. MD CHAPMAN.S to triage, no code stroke called.

## 2021-04-22 NOTE — ED PROVIDER NOTE - ATTENDING CONTRIBUTION TO CARE
DR. BLOCH, ATTENDING MD-  I performed a face to face bedside interview with patient regarding history of present illness, review of symptoms and past medical history. I completed an independent physical exam.  I have discussed patient's plan of care with the resident.  Patient well appearing NAD HEENT nml heart sounds nml lungs clear, abd soft nontender, extrem no edema, pulses intact, neuro motor 5/5, sensory intact, no cerebellar findings

## 2021-04-22 NOTE — PROVIDER CONTACT NOTE (OTHER) - ASSESSMENT
Pt. is a resident of [facility] [place] ([address] [facility], [place] [address] p. [phone]).  SW has spoken with Santos therapy aide, and confirmed that pt.’s mode of transportation is taxi and that pt. travels independently. I arranged [employer] [phone]. MAS Auth# [id number]. I took her to Taxi. Verbal Huddle completed.

## 2021-04-22 NOTE — PROVIDER CONTACT NOTE (OTHER) - SITUATION
Pt. is a resident of [facility] the [facility] of [place] ([address] [facility], [place] [address] p. [phone]).

## 2021-05-10 NOTE — ED ADULT TRIAGE NOTE - INTERNATIONAL TRAVEL
No Sski Pregnancy And Lactation Text: This medication is Pregnancy Category D and isn't considered safe during pregnancy. It is excreted in breast milk.

## 2021-05-10 NOTE — PROGRESS NOTE BEHAVIORAL HEALTH - NS ED BHA MED ROS MUSCULOSKELETAL
No complaints Anxiety    BPH with urinary obstruction    Coronary artery disease  s/p stent May 2020  Dyslipidemia    H/O aortic dissection  1.7CM  Heart attack  5/2020  Hypertension

## 2021-07-14 NOTE — ED PROVIDER NOTE - PMH
Schizophrenia
events noted, DKA, poor compliance, AG improved, serial CMP, IVF. insulin, ENDO, possible downgrade in PM

## 2021-08-11 ENCOUNTER — EMERGENCY (EMERGENCY)
Facility: HOSPITAL | Age: 51
LOS: 1 days | Discharge: HOME | End: 2021-08-11
Attending: EMERGENCY MEDICINE | Admitting: EMERGENCY MEDICINE
Payer: MEDICAID

## 2021-08-11 VITALS
HEART RATE: 91 BPM | RESPIRATION RATE: 18 BRPM | SYSTOLIC BLOOD PRESSURE: 112 MMHG | TEMPERATURE: 98 F | OXYGEN SATURATION: 100 % | DIASTOLIC BLOOD PRESSURE: 66 MMHG

## 2021-08-11 VITALS
SYSTOLIC BLOOD PRESSURE: 103 MMHG | TEMPERATURE: 98 F | HEART RATE: 87 BPM | OXYGEN SATURATION: 99 % | HEIGHT: 66.5 IN | DIASTOLIC BLOOD PRESSURE: 71 MMHG | RESPIRATION RATE: 18 BRPM

## 2021-08-11 LAB
ALBUMIN SERPL ELPH-MCNC: 4.1 G/DL — SIGNIFICANT CHANGE UP (ref 3.3–5)
ALP SERPL-CCNC: 120 U/L — SIGNIFICANT CHANGE UP (ref 40–120)
ALT FLD-CCNC: 13 U/L — SIGNIFICANT CHANGE UP (ref 4–33)
ANION GAP SERPL CALC-SCNC: 16 MMOL/L — HIGH (ref 7–14)
AST SERPL-CCNC: 17 U/L — SIGNIFICANT CHANGE UP (ref 4–32)
BILIRUB SERPL-MCNC: 0.3 MG/DL — SIGNIFICANT CHANGE UP (ref 0.2–1.2)
BUN SERPL-MCNC: 13 MG/DL — SIGNIFICANT CHANGE UP (ref 7–23)
CALCIUM SERPL-MCNC: 9.3 MG/DL — SIGNIFICANT CHANGE UP (ref 8.4–10.5)
CHLORIDE SERPL-SCNC: 102 MMOL/L — SIGNIFICANT CHANGE UP (ref 98–107)
CO2 SERPL-SCNC: 22 MMOL/L — SIGNIFICANT CHANGE UP (ref 22–31)
CREAT SERPL-MCNC: 0.96 MG/DL — SIGNIFICANT CHANGE UP (ref 0.5–1.3)
GLUCOSE SERPL-MCNC: 88 MG/DL — SIGNIFICANT CHANGE UP (ref 70–99)
HCT VFR BLD CALC: 35.8 % — SIGNIFICANT CHANGE UP (ref 34.5–45)
HGB BLD-MCNC: 10.8 G/DL — LOW (ref 11.5–15.5)
MCHC RBC-ENTMCNC: 25.7 PG — LOW (ref 27–34)
MCHC RBC-ENTMCNC: 30.2 GM/DL — LOW (ref 32–36)
MCV RBC AUTO: 85 FL — SIGNIFICANT CHANGE UP (ref 80–100)
NRBC # BLD: 0 /100 WBCS — SIGNIFICANT CHANGE UP
NRBC # FLD: 0 K/UL — SIGNIFICANT CHANGE UP
PLATELET # BLD AUTO: 400 K/UL — SIGNIFICANT CHANGE UP (ref 150–400)
POTASSIUM SERPL-MCNC: 4.3 MMOL/L — SIGNIFICANT CHANGE UP (ref 3.5–5.3)
POTASSIUM SERPL-SCNC: 4.3 MMOL/L — SIGNIFICANT CHANGE UP (ref 3.5–5.3)
PROT SERPL-MCNC: 7.8 G/DL — SIGNIFICANT CHANGE UP (ref 6–8.3)
RBC # BLD: 4.21 M/UL — SIGNIFICANT CHANGE UP (ref 3.8–5.2)
RBC # FLD: 14.9 % — HIGH (ref 10.3–14.5)
SODIUM SERPL-SCNC: 140 MMOL/L — SIGNIFICANT CHANGE UP (ref 135–145)
TROPONIN T, HIGH SENSITIVITY RESULT: 7 NG/L — SIGNIFICANT CHANGE UP
TROPONIN T, HIGH SENSITIVITY RESULT: 7 NG/L — SIGNIFICANT CHANGE UP
TSH SERPL-MCNC: 0.71 UIU/ML — SIGNIFICANT CHANGE UP (ref 0.27–4.2)
WBC # BLD: 6.67 K/UL — SIGNIFICANT CHANGE UP (ref 3.8–10.5)
WBC # FLD AUTO: 6.67 K/UL — SIGNIFICANT CHANGE UP (ref 3.8–10.5)

## 2021-08-11 PROCEDURE — 99285 EMERGENCY DEPT VISIT HI MDM: CPT | Mod: 25

## 2021-08-11 PROCEDURE — 93010 ELECTROCARDIOGRAM REPORT: CPT

## 2021-08-11 RX ORDER — SODIUM CHLORIDE 9 MG/ML
2000 INJECTION INTRAMUSCULAR; INTRAVENOUS; SUBCUTANEOUS ONCE
Refills: 0 | Status: COMPLETED | OUTPATIENT
Start: 2021-08-11 | End: 2021-08-11

## 2021-08-11 RX ADMIN — SODIUM CHLORIDE 1000 MILLILITER(S): 9 INJECTION INTRAMUSCULAR; INTRAVENOUS; SUBCUTANEOUS at 12:18

## 2021-08-11 NOTE — ED PROVIDER NOTE - NSFOLLOWUPINSTRUCTIONS_ED_ALL_ED_FT
Take medications as prescribed.    Return if symptoms recur.    Discuss with your primary care physician about colon cancer and cervical cancer screening.

## 2021-08-11 NOTE — ED PROVIDER NOTE - CLINICAL SUMMARY MEDICAL DECISION MAKING FREE TEXT BOX
Familia: Dizziness likely 2/2 anemia. Check Hb. DDx: ACS, hyperthyroid. Check trop, TSH. Give IVF. Contacted her Care Manager and PCP to discuss CRC and cervical cancer screening.

## 2021-08-11 NOTE — ED PROVIDER NOTE - OBJECTIVE STATEMENT
Familia: H/o recurrent dizziness and Fe-def anemia. Feels dizzy when exerts self. Today, was walking and felt dizzy (not vertigo). No CP/SOB/HA/syncope. Has dark stool 2/2 Fe tablets. Schizophrenia or schizo-affective disorder (on meds, that were adjusted down 2/2 considerations of side effects causing dizziness). Hasn't had colonoscopy; last Pap smear unremarkable.

## 2021-08-11 NOTE — PROVIDER CONTACT NOTE (OTHER) - BACKGROUND
SW received HIE email. Pt is from [facility], [facility] ([address] [facility]).
Per provider, MD Barbosa, pt is medically cleared for DC back to [facility].

## 2021-08-11 NOTE — ED PROVIDER NOTE - PROGRESS NOTE DETAILS
Familia: EKG and trop unremarkable x 2 Familia:  I spoke with her Care Manager [place] ([phone]), who says pt. has been reticent in the past to get CRC screening, but is getting more receptive to the idea. Pt. can discuss w/ her PCP Dr. Dubon.

## 2021-08-11 NOTE — ED ADULT TRIAGE NOTE - CHIEF COMPLAINT QUOTE
Pt brought in by EMS from [place] [address] complaining of dizziness. Pt denies chest pain, sob, n/v/d, fever or chills.

## 2021-08-11 NOTE — ED ADULT NURSE NOTE - CHIEF COMPLAINT QUOTE
Pt brought in by EMS from [place] [address] complaining of dizziness. Pt denies chest pain, sob, n/v/d, fever or chills.

## 2021-08-11 NOTE — ED PROVIDER NOTE - PHYSICAL EXAMINATION
Well appearing, well nourished, awake, alert, oriented to person, place, time/situation and in no apparent distress.    Airway patent    Eyes without scleral injection. No jaundice.    Strong pulse.    Respirations unlabored.    Abdomen soft, non-tender, no guarding.    Spine appears normal, range of motion is not limited, no muscle or joint tenderness.    Alert and oriented, no gross motor or sensory deficits.    Skin normal color for race, warm, dry and intact. No evidence of rash. No LE edema.     No SI/HI.

## 2021-08-11 NOTE — PROVIDER CONTACT NOTE (OTHER) - ASSESSMENT
Pt. is a resident of [facility] the [facility] of [place] ([address] [facility], [place] [address] p. [phone]).  PETE has spoken with Emelia , and confirmed that pt.’s mode of transportation is taxi and that pt. travels independently. PETE arranged [employer] [phone]. MAS Auth# [id number].  Verbal huddle completed with provider.
PETE has spoken to Ms. Alba RN ([phone]). SW confirmed pts mode of transportation is taxi and pt travels independently. SW to follow to arrange transport when pt is cleared for d/c.

## 2021-08-11 NOTE — ED PROVIDER NOTE - PATIENT PORTAL LINK FT
You can access the FollowMyHealth Patient Portal offered by [facility] by registering at the following website: [email or web address]. By joining [employer]’s FollowMyHealth portal, you will also be able to view your health information using other applications (apps) compatible with our system.

## 2021-08-11 NOTE — ED ADULT NURSE NOTE - OBJECTIVE STATEMENT
Pt received to intake presents with dizziness. pt from jeana, davidson&ox3, pt respirations even and unlabored, 20g IV placed in rt arm, labs drawn and sent, pt medicated as per ordered.

## 2021-09-15 NOTE — ED ADULT NURSE NOTE - PRIMARY CARE PROVIDER
FAXED COUMADIN HOLD TO DR Moses Carrasco @ [phone] FOR PATIENT TO BE SCHEDULED FOR L4-5 LESI unk

## 2021-09-30 NOTE — ED ADULT NURSE NOTE - PAIN: PRESENCE, MLM
Advance Directives discussed. The patient verbalizes understanding of the concept and is provided with an Advanced Directives form to complete at home and return for entry in his medical record.   Primary decision-maker identified complains of pain/discomfort

## 2021-10-19 NOTE — ED ADULT NURSE NOTE - NS ED NURSE REPORT GIVEN DT
[facility]

                                       

Test Date:    2021-10-18               Test Time:    20:51:47

Pat Name:     SOTERO GARCIA          Department:   

Patient ID:   [id number]          Room:          

Gender:       F                        Technician:   [id number]

:          1953               Requested By: TOMASA WHITE

Order Number: [id number]              Reading MD:   Jose Guadalupe Beasley

                                 Measurements

Intervals                              Axis          

Rate:         77                       P:            30

UT:           149                      QRS:          -9

QRSD:         109                      T:            41

QT:           384                                    

QTc:          435                                    

                           Interpretive Statements

Sinus rhythm

No previous ECG available for comparison

Electronically Signed On 10- 11:03:14 EDT by Jose Guadalupe Beasley 11-Jun-2020 23:43

## 2021-11-02 ENCOUNTER — APPOINTMENT (OUTPATIENT)
Dept: CARDIOLOGY | Facility: HOSPITAL | Age: 51
End: 2021-11-02

## 2021-11-03 ENCOUNTER — APPOINTMENT (OUTPATIENT)
Dept: CARDIOLOGY | Facility: HOSPITAL | Age: 51
End: 2021-11-03

## 2021-11-03 ENCOUNTER — NON-APPOINTMENT (OUTPATIENT)
Age: 51
End: 2021-11-03

## 2021-11-03 VITALS
HEIGHT: 67 IN | OXYGEN SATURATION: 97 % | SYSTOLIC BLOOD PRESSURE: 105 MMHG | HEART RATE: 97 BPM | BODY MASS INDEX: 48.14 KG/M2 | DIASTOLIC BLOOD PRESSURE: 75 MMHG

## 2021-11-03 VITALS — WEIGHT: 293 LBS

## 2021-11-03 DIAGNOSIS — F20.0 PARANOID SCHIZOPHRENIA: ICD-10-CM

## 2021-11-03 DIAGNOSIS — R06.00 DYSPNEA, UNSPECIFIED: ICD-10-CM

## 2021-11-03 DIAGNOSIS — Z86.59 PERSONAL HISTORY OF OTHER MENTAL AND BEHAVIORAL DISORDERS: ICD-10-CM

## 2021-11-03 DIAGNOSIS — R55 SYNCOPE AND COLLAPSE: ICD-10-CM

## 2021-11-03 RX ORDER — OLANZAPINE 10 MG/1
10 TABLET, FILM COATED ORAL TWICE DAILY
Qty: 30 | Refills: 0 | Status: ACTIVE | COMMUNITY
Start: 2021-11-03

## 2021-11-03 RX ORDER — NICOTINE POLACRILEX 2 MG
1 GUM BUCCAL DAILY
Qty: 30 | Refills: 0 | Status: ACTIVE | COMMUNITY
Start: 2021-11-03

## 2021-11-03 NOTE — REVIEW OF SYSTEMS
[Feeling Fatigued] : feeling fatigued [SOB] : shortness of breath [Dyspnea on exertion] : dyspnea during exertion [Syncope] : syncope [Negative] : Heme/Lymph [Fever] : no fever [Headache] : no headache [Chills] : no chills [Blurry Vision] : no blurred vision [Seeing Double (Diplopia)] : no diplopia [Sore Throat] : no sore throat [Sinus Pressure] : no sinus pressure [Chest Discomfort] : no chest discomfort [Cough] : no cough [Wheezing] : no wheezing [Abdominal Pain] : no abdominal pain [Nausea] : no nausea [Change in Appetite] : no change in appetite [Change In The Stool] : no change in stool [FreeTextEntry5] : see above

## 2021-11-03 NOTE — END OF VISIT
[FreeTextEntry3] : Ms Avery has history of schizophrenia and schizoaffective disorder. She p/w episodes of syncope and near syncope with no observed arrhythmia and dyspnea with minimal exertion.  She was previously on valproic acid; now on olanzapine (Zyprexa). There is class III obesity, but no h/o HTN or HLD. Olanzapine has been associated with both QT-interval prolongation and QT-interval shortening (Pablo SS, Cielo PF. Cardiovascular Psychiatry and Neurology. 2014. [email or web address]). ECG 11/3/21 showed NSR with normal appearing ST-segments and T-waves,  ms / QTc 394 ms. I agree with management plan as outlined.

## 2021-11-03 NOTE — ASSESSMENT
[FreeTextEntry1] : \par Ms Avery is a 50 yo female with a history of schizophrenia and schizoaffective disorder who is here following hospitalization.  She presented to ED in April, June, July for syncope (I can only see April in chart).  At that time no etiology was discovered.   She complains of dyspnea and light-headedness on exertion.  She has has multiple pre-syncopal episodes she related to standing or stress. She feels weak and has sensation she will faint before it happens.  Reports having a syncopal episode every 2-3 months. History is not consistent, would prefer definitive testing to guide my risk stratification.\par \par Will need to rule out cardiac etiology.  No other risk factors at this time.\par \par Syncope\par - history unclear, likely vasovagal given frequency and possibly with prodrome, TTE\par \par Dyspnea on exertion\par - patient is low to intermediate risk for heart disease given BMI and use of antipsychotics, unreliable history so difficult to definitely rule out pathology \par - stress echo, discussed myocardial perfusion stress test, however, given patient body habitus not candidate for SPECT study

## 2021-11-03 NOTE — REASON FOR VISIT
[FreeTextEntry1] : PCP: no pcp, follows with RN\par \par Ms Avery is a 52 yo female with a history of schizophrenia and schizoaffective disorder who is here following hospitalization.  She presented to ED in April, June, July for syncope (I can only see April in chart).  At that time no etiology was discovered.   She complains of dyspnea and light-headedness on exertion.  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

## 2021-11-30 ENCOUNTER — OUTPATIENT (OUTPATIENT)
Dept: OUTPATIENT SERVICES | Facility: HOSPITAL | Age: 51
LOS: 1 days | End: 2021-11-30

## 2021-11-30 ENCOUNTER — APPOINTMENT (OUTPATIENT)
Dept: CV DIAGNOSITCS | Facility: HOSPITAL | Age: 51
End: 2021-11-30
Payer: MEDICAID

## 2021-11-30 DIAGNOSIS — R06.00 DYSPNEA, UNSPECIFIED: ICD-10-CM

## 2021-11-30 LAB — HCG UR QL: NEGATIVE — SIGNIFICANT CHANGE UP

## 2021-11-30 PROCEDURE — 93325 DOPPLER ECHO COLOR FLOW MAPG: CPT | Mod: 26,GC

## 2021-11-30 PROCEDURE — 93320 DOPPLER ECHO COMPLETE: CPT | Mod: 26,GC

## 2021-11-30 PROCEDURE — 93351 STRESS TTE COMPLETE: CPT | Mod: 26,GC

## 2021-12-10 ENCOUNTER — APPOINTMENT (OUTPATIENT)
Dept: INTERNAL MEDICINE | Facility: CLINIC | Age: 51
End: 2021-12-10

## 2022-01-05 ENCOUNTER — APPOINTMENT (OUTPATIENT)
Dept: CARDIOLOGY | Facility: HOSPITAL | Age: 52
End: 2022-01-05

## 2022-08-14 ENCOUNTER — EMERGENCY (EMERGENCY)
Facility: HOSPITAL | Age: 52
LOS: 1 days | Discharge: HOME | End: 2022-08-14
Attending: EMERGENCY MEDICINE | Admitting: EMERGENCY MEDICINE

## 2022-08-14 VITALS
OXYGEN SATURATION: 98 % | TEMPERATURE: 98 F | HEIGHT: 66.5 IN | DIASTOLIC BLOOD PRESSURE: 84 MMHG | HEART RATE: 106 BPM | SYSTOLIC BLOOD PRESSURE: 115 MMHG | RESPIRATION RATE: 17 BRPM

## 2022-08-14 VITALS
RESPIRATION RATE: 16 BRPM | TEMPERATURE: 98 F | OXYGEN SATURATION: 100 % | DIASTOLIC BLOOD PRESSURE: 78 MMHG | HEART RATE: 80 BPM | SYSTOLIC BLOOD PRESSURE: 126 MMHG

## 2022-08-14 PROCEDURE — 99283 EMERGENCY DEPT VISIT LOW MDM: CPT

## 2022-08-14 RX ORDER — HALOPERIDOL DECANOATE 100 MG/ML
5 INJECTION INTRAMUSCULAR ONCE
Refills: 0 | Status: COMPLETED | OUTPATIENT
Start: 2022-08-14 | End: 2022-08-14

## 2022-08-14 RX ADMIN — Medication 1 MILLIGRAM(S): at 06:24

## 2022-08-14 RX ADMIN — HALOPERIDOL DECANOATE 5 MILLIGRAM(S): 100 INJECTION INTRAMUSCULAR at 06:24
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
## 2022-08-14 NOTE — ED ADULT NURSE REASSESSMENT NOTE - NS ED NURSE REASSESS COMMENT FT1
Received report from night RN, pt calm denies si/hi/avh presently, pt cleared & d/c by provider transported back to [facility] via EMS.

## 2022-08-14 NOTE — ED PROVIDER NOTE - PHYSICAL EXAMINATION
General: Patient alert in EMS stretcher  Skin: Dry and intact  HEENT: Head atraumatic. Oral mucosa moist.   Eyes: Conjunctiva normal  Cardiac: Regular rhythm and rate. No pretibial edema b/l  Respiratory: Lungs clear b/l and symmetric. No respiratory distress. Able to speak in complete sentences.  Gastrointestinal: Abdomen soft, nondistended, nontender  Musculoskeletal: Moves all extremities spontaneously  Neurological: alert and oriented to person, place, and time  Psychiatric: agitated, hyperverbal

## 2022-08-14 NOTE — ED BEHAVIORAL HEALTH NOTE - BEHAVIORAL HEALTH NOTE
As per Dr. Rosales, pt arrives from [facility] with collateral requested. Writer spoke with staff member, Ihasn, who reported the following:     Pt with hx of Schizoaffective Disorder. Pt with recent medical hospitalization for about a month but no recent psych admissions. Pt tonight reported to be acting very strange. Pt was screaming, taking off her clothes and speaking to voices about hurting herself and staff. The incident was said to be observed after checking on pt for screaming. Pt was then saying "oh you are going coming to hurt me, I'm going to hurt you first", "you're jumping me" and "oh jump them and I'll tell you what to do with yourself after". Pt said to also be screaming and each time coming towards staff. Pt said to normally respond to internal stimuli but never in an aggressive manner or threatening towards staff. Not the first time pt acting in this manner this week but became worse tonight. In staff member cleaning pt's room the other day pt was also responding to AH in saying what you want me to do to her, choke her/hurt individual with increased AH.  Pt intermittently noncompliant with medication "skipping days".  Pt did not take medication today as per staff member. No substance use reported.  Pt eating, sleeping with no other changes to daily routine reported.      Staff member declines pt being safe to travel by taxi if d/leti. Staff member reports pt does not travel by herself. Mode of transport for discharge to be AMBULANCE with EMS SUPERVISION. Case discussed with MD. As per Dr. Rosales, pt arrives from [facility] with collateral requested. Writer spoke with staff member, Ihsan, who reported the following:     Pt with hx of Schizoaffective Disorder. Pt with recent medical hospitalization for about a month but no recent psych admissions. Pt tonight reported to be acting very strange. Pt was screaming, taking off her clothes and speaking to voices about hurting herself and staff. The incident was said to be observed after checking on pt for screaming. Pt was then saying "oh you are coming to hurt me, I'm going to hurt you first", "you're jumping me" and "oh jump them and I'll tell you what to do with yourself after". Pt said to also be screaming and each time coming towards staff but not staff hurt/injured as staff was reported to remain in doorway. Pt said to normally respond to internal stimuli but never in an aggressive manner or threatening towards staff. Not the first time pt acting in this manner this week but became worse tonight. In staff member cleaning pt's room the other day pt was also responding to AH in saying what do you want me to do to her, choke her/hurt individual with increased AH.  Pt intermittently noncompliant with medication "skipping days".  Pt did not take medication today as per staff member. No substance use reported.  Pt eating, sleeping with no other changes to daily routine reported.      Staff member declines pt being safe to travel by taxi if d/leti. Staff member reports pt does not travel by herself. Mode of transport for discharge to be AMBULANCE with EMS SUPERVISION. Case discussed with MD. As per Dr. Rosales, pt arrives from [facility] with collateral requested. Pt found to be a resident of [facility] on the grounds of [place] ([address], [place] [address]) at [phone]. Writer contacted [facility] and spoke with staff member, Ihsan, who reported the following:     Pt with hx of Schizoaffective Disorder. Pt with recent medical hospitalization for about a month but no recent psych admissions. Pt tonight reported to be acting very strange. Pt was screaming, taking off her clothes and speaking to voices about hurting herself and staff. The incident was said to be observed after checking on pt for screaming. Pt was then saying "oh you are coming to hurt me, I'm going to hurt you first", "you're jumping me" and "oh jump them and I'll tell you what to do with yourself after". Pt said to also be screaming and each time coming towards staff. No staff hurt or injured with no direct contact, as staff was said to have remained in the doorway. Pt said to normally respond to internal stimuli but never in an aggressive manner or threatening towards staff. Not the first time pt acting in this manner this week but became worse tonight. In staff member cleaning pt's room the other day pt was also responding to AH in saying what do you want me to do to her, choke her/hurt individual with increased AH.  Pt intermittently noncompliant with medication "skipping days".  Pt did not take medication today as per staff member. No substance use reported.  Pt eating, sleeping with no other changes to daily routine reported.      Staff member declines pt being safe to travel by taxi if d/leti. Staff member reports pt does not travel by herself. Mode of transport for discharge to be AMBULANCE with EMS SUPERVISION. Case discussed with MD. As per Dr. Rosales, pt arrives from [facility] with collateral requested. Pt found to be a resident of [facility] on the grounds of [place] ([address], [place] [address]) at [phone]. Writer contacted [facility] and spoke with staff member, Ihsan, who reported the following:     Pt with hx of Schizoaffective Disorder. Pt with recent medical hospitalization about a month ago, but no recent psych admissions. Pt tonight reported to be acting very strange. Pt was screaming, taking off her clothes and speaking to voices about hurting herself and staff. The incident was said to be observed after checking on pt for screaming. Pt was then saying "oh you are coming to hurt me, I'm going to hurt you first", "you're jumping me" and "oh jump them and I'll tell you what to do with yourself after". Pt said to also be screaming and each time coming towards staff. No staff hurt or injured with no direct contact, as staff was said to have remained in the doorway. Pt said to normally respond to internal stimuli but never in an aggressive manner or threatening towards staff. Not the first time pt acting in this manner this week but became worse tonight. In staff member cleaning pt's room the other day pt was also responding to AH in saying what do you want me to do to her, choke her/hurt individual with increased AH.  Pt intermittently noncompliant with medication "skipping days".  Pt did not take medication today as per staff member. No substance use reported.  Pt eating, sleeping with no other changes to daily routine reported.      Staff member declines pt being safe to travel by taxi if d/leti. Staff member reports pt does not travel by herself. Mode of transport for discharge to be AMBULANCE with EMS SUPERVISION. Case discussed with MD.
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
## 2022-08-14 NOTE — ED PROVIDER NOTE - NSFOLLOWUPINSTRUCTIONS_ED_ALL_ED_FT
You were seen in the Emergency Room for agitation.  After a period of monitoring and medication for your symptoms you can be discharged home.    Please return to the Emergency Room if your symptoms change or worsen.    Please follow up routinely with your psychiatrist    If you are feeling suicidal or homicidal, please call 911 to get immediate help
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
## 2022-08-14 NOTE — ED PROVIDER NOTE - PATIENT PORTAL LINK FT
You can access the FollowMyHealth Patient Portal offered by [facility] by registering at the following website: [email or web address]. By joining [employer]’s FollowMyHealth portal, you will also be able to view your health information using other applications (apps) compatible with our system.
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
## 2022-12-19 ENCOUNTER — EMERGENCY (EMERGENCY)
Facility: HOSPITAL | Age: 52
LOS: 1 days | Discharge: HOME | End: 2022-12-19
Attending: EMERGENCY MEDICINE | Admitting: EMERGENCY MEDICINE

## 2022-12-19 VITALS
DIASTOLIC BLOOD PRESSURE: 86 MMHG | SYSTOLIC BLOOD PRESSURE: 153 MMHG | RESPIRATION RATE: 16 BRPM | OXYGEN SATURATION: 98 % | HEART RATE: 103 BPM | TEMPERATURE: 100 F

## 2022-12-19 VITALS
HEART RATE: 101 BPM | SYSTOLIC BLOOD PRESSURE: 126 MMHG | RESPIRATION RATE: 18 BRPM | TEMPERATURE: 99 F | OXYGEN SATURATION: 98 % | DIASTOLIC BLOOD PRESSURE: 63 MMHG

## 2022-12-19 LAB
ALBUMIN SERPL ELPH-MCNC: 3.6 G/DL — SIGNIFICANT CHANGE UP (ref 3.3–5)
ALP SERPL-CCNC: 91 U/L — SIGNIFICANT CHANGE UP (ref 40–120)
ALT FLD-CCNC: 11 U/L — SIGNIFICANT CHANGE UP (ref 4–33)
ANION GAP SERPL CALC-SCNC: 13 MMOL/L — SIGNIFICANT CHANGE UP (ref 7–14)
AST SERPL-CCNC: 25 U/L — SIGNIFICANT CHANGE UP (ref 4–32)
BASOPHILS # BLD AUTO: 0.04 K/UL — SIGNIFICANT CHANGE UP (ref 0–0.2)
BASOPHILS NFR BLD AUTO: 0.5 % — SIGNIFICANT CHANGE UP (ref 0–2)
BILIRUB SERPL-MCNC: 0.2 MG/DL — SIGNIFICANT CHANGE UP (ref 0.2–1.2)
BUN SERPL-MCNC: 20 MG/DL — SIGNIFICANT CHANGE UP (ref 7–23)
CALCIUM SERPL-MCNC: 9 MG/DL — SIGNIFICANT CHANGE UP (ref 8.4–10.5)
CHLORIDE SERPL-SCNC: 102 MMOL/L — SIGNIFICANT CHANGE UP (ref 98–107)
CO2 SERPL-SCNC: 23 MMOL/L — SIGNIFICANT CHANGE UP (ref 22–31)
CREAT SERPL-MCNC: 0.9 MG/DL — SIGNIFICANT CHANGE UP (ref 0.5–1.3)
EGFR: 76 ML/MIN/1.73M2 — SIGNIFICANT CHANGE UP
EOSINOPHIL # BLD AUTO: 0.07 K/UL — SIGNIFICANT CHANGE UP (ref 0–0.5)
EOSINOPHIL NFR BLD AUTO: 0.8 % — SIGNIFICANT CHANGE UP (ref 0–6)
FLUAV AG NPH QL: SIGNIFICANT CHANGE UP
FLUBV AG NPH QL: SIGNIFICANT CHANGE UP
GLUCOSE SERPL-MCNC: 85 MG/DL — SIGNIFICANT CHANGE UP (ref 70–99)
HCT VFR BLD CALC: 29.1 % — LOW (ref 34.5–45)
HGB BLD-MCNC: 8.3 G/DL — LOW (ref 11.5–15.5)
IANC: 5.94 K/UL — SIGNIFICANT CHANGE UP (ref 1.8–7.4)
IMM GRANULOCYTES NFR BLD AUTO: 0.5 % — SIGNIFICANT CHANGE UP (ref 0–0.9)
LYMPHOCYTES # BLD AUTO: 1.8 K/UL — SIGNIFICANT CHANGE UP (ref 1–3.3)
LYMPHOCYTES # BLD AUTO: 20.6 % — SIGNIFICANT CHANGE UP (ref 13–44)
MCHC RBC-ENTMCNC: 20.8 PG — LOW (ref 27–34)
MCHC RBC-ENTMCNC: 28.5 GM/DL — LOW (ref 32–36)
MCV RBC AUTO: 72.9 FL — LOW (ref 80–100)
MONOCYTES # BLD AUTO: 0.83 K/UL — SIGNIFICANT CHANGE UP (ref 0–0.9)
MONOCYTES NFR BLD AUTO: 9.5 % — SIGNIFICANT CHANGE UP (ref 2–14)
NEUTROPHILS # BLD AUTO: 5.94 K/UL — SIGNIFICANT CHANGE UP (ref 1.8–7.4)
NEUTROPHILS NFR BLD AUTO: 68.1 % — SIGNIFICANT CHANGE UP (ref 43–77)
NRBC # BLD: 0 /100 WBCS — SIGNIFICANT CHANGE UP (ref 0–0)
NRBC # FLD: 0.03 K/UL — HIGH (ref 0–0)
PLATELET # BLD AUTO: 446 K/UL — HIGH (ref 150–400)
POTASSIUM SERPL-MCNC: 3.8 MMOL/L — SIGNIFICANT CHANGE UP (ref 3.5–5.3)
POTASSIUM SERPL-SCNC: 3.8 MMOL/L — SIGNIFICANT CHANGE UP (ref 3.5–5.3)
PROT SERPL-MCNC: 7.2 G/DL — SIGNIFICANT CHANGE UP (ref 6–8.3)
RBC # BLD: 3.99 M/UL — SIGNIFICANT CHANGE UP (ref 3.8–5.2)
RBC # FLD: 23.2 % — HIGH (ref 10.3–14.5)
RSV RNA NPH QL NAA+NON-PROBE: SIGNIFICANT CHANGE UP
SARS-COV-2 RNA SPEC QL NAA+PROBE: DETECTED
SODIUM SERPL-SCNC: 138 MMOL/L — SIGNIFICANT CHANGE UP (ref 135–145)
WBC # BLD: 8.72 K/UL — SIGNIFICANT CHANGE UP (ref 3.8–10.5)
WBC # FLD AUTO: 8.72 K/UL — SIGNIFICANT CHANGE UP (ref 3.8–10.5)

## 2022-12-19 PROCEDURE — 99284 EMERGENCY DEPT VISIT MOD MDM: CPT

## 2022-12-19 NOTE — ED PROVIDER NOTE - NS ED ATTENDING STATEMENT MOD
This was a shared visit with the CRYS. I reviewed and verified the documentation and independently performed the documented:

## 2022-12-19 NOTE — PROVIDER CONTACT NOTE (OTHER) - ASSESSMENT
Called residence, spoke with Brittaney GARNER who confirms that pt is from the residence and can return.  As per Brittaney, pt cannot travel unsupervised as she is delusional at baseline and has difficulty with a taxi. [facility] service is not available at this time, per [facility].  Discussed with provider; will arrange for an ambulance due to pt's altered mental status; delusional thought process.

## 2022-12-19 NOTE — ED PROVIDER NOTE - OBJECTIVE STATEMENT
51 y/o female pmh schizophrenia, anemia c/o near syncope x today. PT was going to her doctor and when she got out of the car she started to feel lightheaded. pt sat down on the ground so she did not fall and someone helped her. Pt denies LOC or head trauma. Pt states that she is anemic and thinks it is from that. Pt denies cehst pain, sob, abd pain, n/v/d, numbness, tingling, weakness, fever or chills.

## 2022-12-19 NOTE — ED ADULT NURSE REASSESSMENT NOTE - NS ED NURSE REASSESS COMMENT FT1
Pt received to  from intake. Pt is d/c'd to home but is awaiting pickup. Pt wanded and belongings stored in  Locker [address]. Respirations are even and unlabored. Awaiting transport

## 2022-12-19 NOTE — ED PROVIDER NOTE - ATTENDING APP SHARED VISIT CONTRIBUTION OF CARE
DR. LAMAR, ATTENDING MD-  I personally saw the patient with the PA and performed a substantive portion of the visit including all aspects of the medical decision making.    52-year-old female history of schizophrenia anemia here with complaint of episode of lightheadedness.  She denies any other complaints, currently feels at her usual state of health.  She states that she has had low blood count in the past and would like her blood count checked.  She denies any black or bloody stool.  No vaginal bleeding.  Will obtain EKG CBC CMP and reassess.

## 2022-12-19 NOTE — ED ADULT TRIAGE NOTE - CHIEF COMPLAINT QUOTE
From [facility] for near-syncope, pt felt lightheaded and lowered self to floor, no LOC/hitting head. Speaking clear, able to follow commands. RR even and unlabored, Hx of anemia, schizophrenia

## 2022-12-19 NOTE — PROVIDER CONTACT NOTE (OTHER) - ACTION/TREATMENT ORDERED:
[facility] EMS arranged -  ETA between 9:30 and 10:00 p.m. trip # 662A.    [facility] auth # [id number].

## 2023-07-17 NOTE — ED PROVIDER NOTE - TOBACCO USE
DMII, well controlled. A1c went up a little 3 mos ago. Continue life style change. Check A1C, urine albumin. advise eye exam by an OD yearly and checking bilateral feet for skin lesions qhs. Healthy diet and regular exercise.     
Hyperlipidemia on statin. No GI upset or muscle ache. check labs for evaluation.  Health diet and regular exercise.  f/u in 4 mos.     
Unknown if ever smoked

## 2023-08-02 ENCOUNTER — EMERGENCY (EMERGENCY)
Facility: HOSPITAL | Age: 53
LOS: 1 days | Discharge: HOME | End: 2023-08-02
Admitting: EMERGENCY MEDICINE
Payer: MEDICAID

## 2023-08-02 VITALS
DIASTOLIC BLOOD PRESSURE: 57 MMHG | OXYGEN SATURATION: 98 % | RESPIRATION RATE: 18 BRPM | SYSTOLIC BLOOD PRESSURE: 106 MMHG | HEART RATE: 80 BPM

## 2023-08-02 LAB
ALBUMIN SERPL ELPH-MCNC: 3.9 G/DL — SIGNIFICANT CHANGE UP (ref 3.3–5)
ALP SERPL-CCNC: 112 U/L — SIGNIFICANT CHANGE UP (ref 40–120)
ALT FLD-CCNC: 20 U/L — SIGNIFICANT CHANGE UP (ref 4–33)
ANION GAP SERPL CALC-SCNC: 15 MMOL/L — HIGH (ref 7–14)
APAP SERPL-MCNC: <10 UG/ML — LOW (ref 15–25)
AST SERPL-CCNC: 13 U/L — SIGNIFICANT CHANGE UP (ref 4–32)
BASOPHILS # BLD AUTO: 0.02 K/UL — SIGNIFICANT CHANGE UP (ref 0–0.2)
BASOPHILS NFR BLD AUTO: 0.4 % — SIGNIFICANT CHANGE UP (ref 0–2)
BILIRUB SERPL-MCNC: 0.2 MG/DL — SIGNIFICANT CHANGE UP (ref 0.2–1.2)
BUN SERPL-MCNC: 10 MG/DL — SIGNIFICANT CHANGE UP (ref 7–23)
CALCIUM SERPL-MCNC: 9.5 MG/DL — SIGNIFICANT CHANGE UP (ref 8.4–10.5)
CHLORIDE SERPL-SCNC: 105 MMOL/L — SIGNIFICANT CHANGE UP (ref 98–107)
CO2 SERPL-SCNC: 20 MMOL/L — LOW (ref 22–31)
CREAT SERPL-MCNC: 0.72 MG/DL — SIGNIFICANT CHANGE UP (ref 0.5–1.3)
EGFR: 99 ML/MIN/1.73M2 — SIGNIFICANT CHANGE UP
EOSINOPHIL # BLD AUTO: 0.06 K/UL — SIGNIFICANT CHANGE UP (ref 0–0.5)
EOSINOPHIL NFR BLD AUTO: 1.1 % — SIGNIFICANT CHANGE UP (ref 0–6)
ETHANOL SERPL-MCNC: <10 MG/DL — SIGNIFICANT CHANGE UP
FLUAV AG NPH QL: SIGNIFICANT CHANGE UP
FLUBV AG NPH QL: SIGNIFICANT CHANGE UP
GLUCOSE SERPL-MCNC: 78 MG/DL — SIGNIFICANT CHANGE UP (ref 70–99)
HCT VFR BLD CALC: 30.6 % — LOW (ref 34.5–45)
HGB BLD-MCNC: 8.8 G/DL — LOW (ref 11.5–15.5)
IANC: 3.11 K/UL — SIGNIFICANT CHANGE UP (ref 1.8–7.4)
IMM GRANULOCYTES NFR BLD AUTO: 0.4 % — SIGNIFICANT CHANGE UP (ref 0–0.9)
LYMPHOCYTES # BLD AUTO: 1.59 K/UL — SIGNIFICANT CHANGE UP (ref 1–3.3)
LYMPHOCYTES # BLD AUTO: 28.5 % — SIGNIFICANT CHANGE UP (ref 13–44)
MCHC RBC-ENTMCNC: 21.9 PG — LOW (ref 27–34)
MCHC RBC-ENTMCNC: 28.8 GM/DL — LOW (ref 32–36)
MCV RBC AUTO: 76.1 FL — LOW (ref 80–100)
MONOCYTES # BLD AUTO: 0.77 K/UL — SIGNIFICANT CHANGE UP (ref 0–0.9)
MONOCYTES NFR BLD AUTO: 13.8 % — SIGNIFICANT CHANGE UP (ref 2–14)
NEUTROPHILS # BLD AUTO: 3.11 K/UL — SIGNIFICANT CHANGE UP (ref 1.8–7.4)
NEUTROPHILS NFR BLD AUTO: 55.8 % — SIGNIFICANT CHANGE UP (ref 43–77)
NRBC # BLD: 0 /100 WBCS — SIGNIFICANT CHANGE UP (ref 0–0)
NRBC # FLD: 0 K/UL — SIGNIFICANT CHANGE UP (ref 0–0)
PLATELET # BLD AUTO: 392 K/UL — SIGNIFICANT CHANGE UP (ref 150–400)
POTASSIUM SERPL-MCNC: 3.9 MMOL/L — SIGNIFICANT CHANGE UP (ref 3.5–5.3)
POTASSIUM SERPL-SCNC: 3.9 MMOL/L — SIGNIFICANT CHANGE UP (ref 3.5–5.3)
PROT SERPL-MCNC: 7.6 G/DL — SIGNIFICANT CHANGE UP (ref 6–8.3)
RBC # BLD: 4.02 M/UL — SIGNIFICANT CHANGE UP (ref 3.8–5.2)
RBC # FLD: 20.9 % — HIGH (ref 10.3–14.5)
RSV RNA NPH QL NAA+NON-PROBE: SIGNIFICANT CHANGE UP
SALICYLATES SERPL-MCNC: <0.3 MG/DL — LOW (ref 15–30)
SARS-COV-2 RNA SPEC QL NAA+PROBE: SIGNIFICANT CHANGE UP
SODIUM SERPL-SCNC: 140 MMOL/L — SIGNIFICANT CHANGE UP (ref 135–145)
TOXICOLOGY SCREEN, DRUGS OF ABUSE, SERUM RESULT: SIGNIFICANT CHANGE UP
TSH SERPL-MCNC: 0.99 UIU/ML — SIGNIFICANT CHANGE UP (ref 0.27–4.2)
WBC # BLD: 5.57 K/UL — SIGNIFICANT CHANGE UP (ref 3.8–10.5)
WBC # FLD AUTO: 5.57 K/UL — SIGNIFICANT CHANGE UP (ref 3.8–10.5)

## 2023-08-02 PROCEDURE — 93010 ELECTROCARDIOGRAM REPORT: CPT

## 2023-08-02 PROCEDURE — 99285 EMERGENCY DEPT VISIT HI MDM: CPT

## 2023-08-02 RX ORDER — HALOPERIDOL DECANOATE 100 MG/ML
5 INJECTION INTRAMUSCULAR ONCE
Refills: 0 | Status: COMPLETED | OUTPATIENT
Start: 2023-08-02 | End: 2023-08-02

## 2023-08-02 RX ADMIN — HALOPERIDOL DECANOATE 5 MILLIGRAM(S): 100 INJECTION INTRAMUSCULAR at 12:50

## 2023-08-02 RX ADMIN — Medication 2 MILLIGRAM(S): at 12:50

## 2023-08-02 NOTE — ED BEHAVIORAL HEALTH NOTE - BEHAVIORAL HEALTH NOTE
Per provider, LIANA alvarado, patient is cleared and is able to return to their previous residence, [facility] [address].  has spoken to JEROME Dudley [phone]  confirmed that patients mode of transportation is Ambulance and that patient travels w/ supervision. Clinical provider is in agreement with Ambulance back to skilled nursing. Verbal huddle regarding coordination of care completed with interdisciplinary team.   Transportation coordinated via nursing.   [facility] ([address] [address])  [phone].
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
## 2023-08-02 NOTE — ED PROVIDER NOTE - PROGRESS NOTE DETAILS
LIANA Keane- upon reevaluation pt feels much improved, blood work at baseline, anemia, oob , tolerated po intake, denies any si, hi, ah, and visual hallucination, good behavioral control.
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
## 2023-08-02 NOTE — ED PROVIDER NOTE - PATIENT PORTAL LINK FT
You can access the FollowMyHealth Patient Portal offered by [facility] by registering at the following website: [email or web address]. By joining [employer]’s FollowMyHealth portal, you will also be able to view your health information using other applications (apps) compatible with our system.

## 2023-08-02 NOTE — ED ADULT NURSE REASSESSMENT NOTE - NS ED NURSE REASSESS COMMENT FT1
Pt is awake, cooperative at this time. Pt offered PO meals, tolerating well. Pt is clear for dc by provider, EMS activated. Pending transport to go home
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
## 2023-08-02 NOTE — ED PROVIDER NOTE - NSFOLLOWUPINSTRUCTIONS_ED_ALL_ED_FT
.dcpsych Follow up with your primary care physician and psychiatrist in 48-72 hours.  You may also see the psychiatrist at [facility]:    [address] [place] [address]  Phone: [phone]    [facility] on [facility] Information:    -Walk-in hours: Monday to Friday, 9am to 3pm   -Almost all walk-in patients will be able to see a psych prescriber the same day   -Scheduled, non-urgent, evening remote/virtual appointments are available on a limited basis. Call our  to inquire about these: [phone]. A crisis center clinician screens these requests in the late afternoon and if appropriate it takes a few days to set-up.   -Visits take about 2 to 4 hours total   -Mornings are the best time for patients to arrive    For Telehealth options try:  [employer]: [email or web address] (to access psychiatrist or therapist)  [employer]: GELI (to access psychiatrist or therapist)  Better Help: [email or web address].com (Largest online therapy group)      SEEK IMMEDIATE MEDICAL CARE IF YOU HAVE ANY OF THE FOLLOWING SYMPTOMS: thoughts about hurting or killing yourself, thoughts about hurting or killing somebody else, hallucinations or any other worsening or persistent symptoms OR ANY NEW OR CONCERNING SYMPTOMS.
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
## 2023-08-02 NOTE — ED ADULT NURSE REASSESSMENT NOTE - NS ED NURSE REASSESS COMMENT FT1
Restraints released at 14:40, pt on stretcher with eyes closer, respirations even and non-labored. No visible signs of injuries. Safety measures maintained
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
## 2023-08-02 NOTE — ED PROVIDER NOTE - CCCP TRG CHIEF CMPLNT
Called patient no answer so I left a voicemail for them to call the office back. psychiatric evaluation

## 2023-08-03 ENCOUNTER — INPATIENT (INPATIENT)
Facility: HOSPITAL | Age: 53
LOS: 56 days | Discharge: ANOTHER HOSPITAL | End: 2023-09-29
Attending: STUDENT IN AN ORGANIZED HEALTH CARE EDUCATION/TRAINING PROGRAM | Admitting: PSYCHIATRY & NEUROLOGY
Payer: MEDICAID

## 2023-08-03 VITALS
RESPIRATION RATE: 17 BRPM | TEMPERATURE: 98 F | SYSTOLIC BLOOD PRESSURE: 153 MMHG | HEART RATE: 84 BPM | DIASTOLIC BLOOD PRESSURE: 63 MMHG | OXYGEN SATURATION: 100 %

## 2023-08-03 VITALS
TEMPERATURE: 97 F | OXYGEN SATURATION: 98 % | SYSTOLIC BLOOD PRESSURE: 134 MMHG | DIASTOLIC BLOOD PRESSURE: 85 MMHG | RESPIRATION RATE: 16 BRPM | HEART RATE: 91 BPM

## 2023-08-03 DIAGNOSIS — F25.9 SCHIZOAFFECTIVE DISORDER, UNSPECIFIED: ICD-10-CM

## 2023-08-03 LAB
ALBUMIN SERPL ELPH-MCNC: 4.1 G/DL — SIGNIFICANT CHANGE UP (ref 3.3–5)
ALP SERPL-CCNC: 115 U/L — SIGNIFICANT CHANGE UP (ref 40–120)
ALT FLD-CCNC: 20 U/L — SIGNIFICANT CHANGE UP (ref 4–33)
ANION GAP SERPL CALC-SCNC: 13 MMOL/L — SIGNIFICANT CHANGE UP (ref 7–14)
APAP SERPL-MCNC: <10 UG/ML — LOW (ref 15–25)
AST SERPL-CCNC: 16 U/L — SIGNIFICANT CHANGE UP (ref 4–32)
B PERT DNA SPEC QL NAA+PROBE: SIGNIFICANT CHANGE UP
B PERT+PARAPERT DNA PNL SPEC NAA+PROBE: SIGNIFICANT CHANGE UP
BASOPHILS # BLD AUTO: 0.02 K/UL — SIGNIFICANT CHANGE UP (ref 0–0.2)
BASOPHILS NFR BLD AUTO: 0.3 % — SIGNIFICANT CHANGE UP (ref 0–2)
BILIRUB SERPL-MCNC: 0.2 MG/DL — SIGNIFICANT CHANGE UP (ref 0.2–1.2)
BORDETELLA PARAPERTUSSIS (RAPRVP): SIGNIFICANT CHANGE UP
BUN SERPL-MCNC: 11 MG/DL — SIGNIFICANT CHANGE UP (ref 7–23)
C PNEUM DNA SPEC QL NAA+PROBE: SIGNIFICANT CHANGE UP
CALCIUM SERPL-MCNC: 9.9 MG/DL — SIGNIFICANT CHANGE UP (ref 8.4–10.5)
CHLORIDE SERPL-SCNC: 106 MMOL/L — SIGNIFICANT CHANGE UP (ref 98–107)
CO2 SERPL-SCNC: 24 MMOL/L — SIGNIFICANT CHANGE UP (ref 22–31)
CREAT SERPL-MCNC: 0.7 MG/DL — SIGNIFICANT CHANGE UP (ref 0.5–1.3)
EGFR: 103 ML/MIN/1.73M2 — SIGNIFICANT CHANGE UP
EOSINOPHIL # BLD AUTO: 0.09 K/UL — SIGNIFICANT CHANGE UP (ref 0–0.5)
EOSINOPHIL NFR BLD AUTO: 1.5 % — SIGNIFICANT CHANGE UP (ref 0–6)
ETHANOL SERPL-MCNC: <10 MG/DL — SIGNIFICANT CHANGE UP
FLUAV SUBTYP SPEC NAA+PROBE: SIGNIFICANT CHANGE UP
FLUBV RNA SPEC QL NAA+PROBE: SIGNIFICANT CHANGE UP
GLUCOSE SERPL-MCNC: 82 MG/DL — SIGNIFICANT CHANGE UP (ref 70–99)
HADV DNA SPEC QL NAA+PROBE: SIGNIFICANT CHANGE UP
HCG SERPL-ACNC: 2.8 MIU/ML — SIGNIFICANT CHANGE UP
HCOV 229E RNA SPEC QL NAA+PROBE: SIGNIFICANT CHANGE UP
HCOV HKU1 RNA SPEC QL NAA+PROBE: SIGNIFICANT CHANGE UP
HCOV NL63 RNA SPEC QL NAA+PROBE: SIGNIFICANT CHANGE UP
HCOV OC43 RNA SPEC QL NAA+PROBE: SIGNIFICANT CHANGE UP
HCT VFR BLD CALC: 30.3 % — LOW (ref 34.5–45)
HGB BLD-MCNC: 8.8 G/DL — LOW (ref 11.5–15.5)
HMPV RNA SPEC QL NAA+PROBE: SIGNIFICANT CHANGE UP
HPIV1 RNA SPEC QL NAA+PROBE: SIGNIFICANT CHANGE UP
HPIV2 RNA SPEC QL NAA+PROBE: SIGNIFICANT CHANGE UP
HPIV3 RNA SPEC QL NAA+PROBE: SIGNIFICANT CHANGE UP
HPIV4 RNA SPEC QL NAA+PROBE: SIGNIFICANT CHANGE UP
IANC: 3.5 K/UL — SIGNIFICANT CHANGE UP (ref 1.8–7.4)
IMM GRANULOCYTES NFR BLD AUTO: 0.2 % — SIGNIFICANT CHANGE UP (ref 0–0.9)
LYMPHOCYTES # BLD AUTO: 1.75 K/UL — SIGNIFICANT CHANGE UP (ref 1–3.3)
LYMPHOCYTES # BLD AUTO: 29.4 % — SIGNIFICANT CHANGE UP (ref 13–44)
M PNEUMO DNA SPEC QL NAA+PROBE: SIGNIFICANT CHANGE UP
MCHC RBC-ENTMCNC: 21.6 PG — LOW (ref 27–34)
MCHC RBC-ENTMCNC: 29 GM/DL — LOW (ref 32–36)
MCV RBC AUTO: 74.3 FL — LOW (ref 80–100)
MONOCYTES # BLD AUTO: 0.58 K/UL — SIGNIFICANT CHANGE UP (ref 0–0.9)
MONOCYTES NFR BLD AUTO: 9.7 % — SIGNIFICANT CHANGE UP (ref 2–14)
NEUTROPHILS # BLD AUTO: 3.5 K/UL — SIGNIFICANT CHANGE UP (ref 1.8–7.4)
NEUTROPHILS NFR BLD AUTO: 58.9 % — SIGNIFICANT CHANGE UP (ref 43–77)
NRBC # BLD: 0 /100 WBCS — SIGNIFICANT CHANGE UP (ref 0–0)
NRBC # FLD: 0 K/UL — SIGNIFICANT CHANGE UP (ref 0–0)
PLATELET # BLD AUTO: 430 K/UL — HIGH (ref 150–400)
POTASSIUM SERPL-MCNC: 3.6 MMOL/L — SIGNIFICANT CHANGE UP (ref 3.5–5.3)
POTASSIUM SERPL-SCNC: 3.6 MMOL/L — SIGNIFICANT CHANGE UP (ref 3.5–5.3)
PROT SERPL-MCNC: 7.8 G/DL — SIGNIFICANT CHANGE UP (ref 6–8.3)
RAPID RVP RESULT: SIGNIFICANT CHANGE UP
RBC # BLD: 4.08 M/UL — SIGNIFICANT CHANGE UP (ref 3.8–5.2)
RBC # FLD: 21.2 % — HIGH (ref 10.3–14.5)
RSV RNA SPEC QL NAA+PROBE: SIGNIFICANT CHANGE UP
RV+EV RNA SPEC QL NAA+PROBE: SIGNIFICANT CHANGE UP
SALICYLATES SERPL-MCNC: <0.3 MG/DL — LOW (ref 15–30)
SARS-COV-2 RNA SPEC QL NAA+PROBE: SIGNIFICANT CHANGE UP
SODIUM SERPL-SCNC: 143 MMOL/L — SIGNIFICANT CHANGE UP (ref 135–145)
TOXICOLOGY SCREEN, DRUGS OF ABUSE, SERUM RESULT: SIGNIFICANT CHANGE UP
TSH SERPL-MCNC: 1.25 UIU/ML — SIGNIFICANT CHANGE UP (ref 0.27–4.2)
VALPROATE SERPL-MCNC: <3.15 UG/ML — LOW (ref 50–100)
WBC # BLD: 5.95 K/UL — SIGNIFICANT CHANGE UP (ref 3.8–10.5)
WBC # FLD AUTO: 5.95 K/UL — SIGNIFICANT CHANGE UP (ref 3.8–10.5)

## 2023-08-03 PROCEDURE — 99285 EMERGENCY DEPT VISIT HI MDM: CPT

## 2023-08-03 RX ORDER — POLYETHYLENE GLYCOL 3350 17 G/17G
17 POWDER, FOR SOLUTION ORAL AT BEDTIME
Refills: 0 | Status: DISCONTINUED | OUTPATIENT
Start: 2023-08-03 | End: 2023-09-29

## 2023-08-03 RX ORDER — HALOPERIDOL DECANOATE 100 MG/ML
5 INJECTION INTRAMUSCULAR ONCE
Refills: 0 | Status: COMPLETED | OUTPATIENT
Start: 2023-08-03 | End: 2023-08-03

## 2023-08-03 RX ORDER — CHOLECALCIFEROL (VITAMIN D3) 125 MCG
1000 CAPSULE ORAL
Refills: 0 | Status: DISCONTINUED | OUTPATIENT
Start: 2023-08-03 | End: 2023-09-29

## 2023-08-03 RX ORDER — HALOPERIDOL DECANOATE 100 MG/ML
10 INJECTION INTRAMUSCULAR
Refills: 0 | Status: DISCONTINUED | OUTPATIENT
Start: 2023-08-03 | End: 2023-08-15

## 2023-08-03 RX ORDER — PANTOPRAZOLE SODIUM 20 MG/1
40 TABLET, DELAYED RELEASE ORAL
Refills: 0 | Status: DISCONTINUED | OUTPATIENT
Start: 2023-08-03 | End: 2023-09-29

## 2023-08-03 RX ORDER — DIPHENHYDRAMINE HCL 50 MG
50 CAPSULE ORAL EVERY 4 HOURS
Refills: 0 | Status: DISCONTINUED | OUTPATIENT
Start: 2023-08-03 | End: 2023-09-29

## 2023-08-03 RX ORDER — DIPHENHYDRAMINE HCL 50 MG
50 CAPSULE ORAL ONCE
Refills: 0 | Status: DISCONTINUED | OUTPATIENT
Start: 2023-08-03 | End: 2023-09-29

## 2023-08-03 RX ORDER — FERROUS SULFATE 325(65) MG
300 TABLET ORAL
Refills: 0 | Status: DISCONTINUED | OUTPATIENT
Start: 2023-08-03 | End: 2023-09-29

## 2023-08-03 RX ORDER — HALOPERIDOL DECANOATE 100 MG/ML
5 INJECTION INTRAMUSCULAR ONCE
Refills: 0 | Status: DISCONTINUED | OUTPATIENT
Start: 2023-08-03 | End: 2023-08-17

## 2023-08-03 RX ORDER — HALOPERIDOL DECANOATE 100 MG/ML
5 INJECTION INTRAMUSCULAR ONCE
Refills: 0 | Status: DISCONTINUED | OUTPATIENT
Start: 2023-08-03 | End: 2023-08-11

## 2023-08-03 RX ORDER — BENZTROPINE MESYLATE 1 MG
2 TABLET ORAL
Refills: 0 | Status: DISCONTINUED | OUTPATIENT
Start: 2023-08-03 | End: 2023-09-28

## 2023-08-03 RX ORDER — DIPHENHYDRAMINE HCL 50 MG
50 CAPSULE ORAL ONCE
Refills: 0 | Status: COMPLETED | OUTPATIENT
Start: 2023-08-03 | End: 2023-08-03

## 2023-08-03 RX ORDER — HALOPERIDOL DECANOATE 100 MG/ML
5 INJECTION INTRAMUSCULAR EVERY 4 HOURS
Refills: 0 | Status: DISCONTINUED | OUTPATIENT
Start: 2023-08-03 | End: 2023-09-29

## 2023-08-03 RX ORDER — VALPROIC ACID (AS SODIUM SALT) 250 MG/5ML
500 SOLUTION, ORAL ORAL
Refills: 0 | Status: DISCONTINUED | OUTPATIENT
Start: 2023-08-03 | End: 2023-09-29

## 2023-08-03 RX ADMIN — HALOPERIDOL DECANOATE 5 MILLIGRAM(S): 100 INJECTION INTRAMUSCULAR at 21:32

## 2023-08-03 RX ADMIN — Medication 2 MILLIGRAM(S): at 13:03

## 2023-08-03 RX ADMIN — HALOPERIDOL DECANOATE 5 MILLIGRAM(S): 100 INJECTION INTRAMUSCULAR at 13:03

## 2023-08-03 RX ADMIN — Medication 50 MILLIGRAM(S): at 13:03

## 2023-08-03 RX ADMIN — Medication 2 MILLIGRAM(S): at 21:32

## 2023-08-03 NOTE — ED ADULT NURSE REASSESSMENT NOTE - NS ED NURSE REASSESS COMMENT FT1
Break coverage RN: Pt sleeping at this time, respirations even and unlabored. Pt well appearing, no acute distress noted. pending EMS transport back to [facility]. Safety maintained.
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
## 2023-08-03 NOTE — ED BEHAVIORAL HEALTH ASSESSMENT NOTE - SUMMARY
Patient is a 53 year-old  woman, currently living in [facility], with prior psychiatric history of schizoaffective disorder, schizophrenia, unspecified, anxiety, bipolar, selective mutism, currently with [facility] ACT team, previously with [facility] which , with history of psychiatric hospitalization(s), without known history of self-injury or suicide attempts, with history of aggression in the setting on nonadherence with medication, who presents for the second time since yesterday for persistent aggressive behaviors.      Patient initially presented disorganized, psychotic and aggressive in the setting of nonadherence with medication.  Patient is unable to care for self and be managed safely in the community and has not been engaging with ACT treatment team. Patient is an acute danger to self and others at this time.  Patient lacks insight and judgment into illness and remains an acute safety risk and warrants inpatient psychiatric hospitalization for safety and stabilization.

## 2023-08-03 NOTE — ED BEHAVIORAL HEALTH ASSESSMENT NOTE - OTHER PAST PSYCHIATRIC HISTORY (INCLUDE DETAILS REGARDING ONSET, COURSE OF ILLNESS, INPATIENT/OUTPATIENT TREATMENT)
Multiple hospitalizations including last hospital discharge April 10 to 2023 at [facility] 2023 to 3/22/2023 , 2023 to 2023, [place] long term from 2018 to 7/10/2019  No known suicide attempts or self injurious behaviors.   Has ACT team with [facility] and [facility]
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
## 2023-08-03 NOTE — ED BEHAVIORAL HEALTH NOTE - BEHAVIORAL HEALTH NOTE
COVID Exposure Screen- Patient    Have you been tested for COVID-19 in the last 90 days?  (x  ) Yes  (  ) No   (  ) Unknown- Reason: _____  IF YES: Date of test(s), type of test(s), result(s) for ALL tests in last 90 days: 8/2/2023, neg  In the past 10 days, have you been around anyone with a positive COVID-19 test? (  ) Yes  (x  ) No   (  ) Unknown- Reason: ____  IF YES: Were you closer than 6 feet of them for a total of 15 minutes or more in a 24 hour period? (  ) Yes   (  ) No   ( ) Unknown- Reason: _____

## 2023-08-03 NOTE — ED BEHAVIORAL HEALTH ASSESSMENT NOTE - CURRENT MEDICATION
Haldol 10mg PO BID, Valproic acid 250mg 2 caps BID, Cogentin 2mg BID  BREWER Haldol 200mg due on 7/5/2023 which she has refused
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
## 2023-08-03 NOTE — ED BEHAVIORAL HEALTH ASSESSMENT NOTE - RISK ASSESSMENT
Patient with aggressive and unpredictable behaviors currently nonadherent with treatment.  She is an acute danger to self and others at this time.  Patient lacks insight and judgment into illness and remains an acute safety risk and warrants inpatient psychiatric hospitalization for safety and stabilization.

## 2023-08-03 NOTE — ED BEHAVIORAL HEALTH ASSESSMENT NOTE - PSYCHIATRIC ISSUES AND PLAN (INCLUDE STANDING AND PRN MEDICATION)
Haldol 10mg PO BID, Valproic acid 250mg 2 caps BID, Cogentin 2mg BID. BREWER Haldol 200mg due on 7/5/2023 which she has refused
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
## 2023-08-03 NOTE — BH PATIENT PROFILE - HOME MEDICATIONS
biotin 5 mg oral capsule , 1 cap(s) orally 3 times a day  ZyPREXA 10 mg oral tablet , 2 tab(s) orally once a day (at bedtime)  Depakote 500 mg oral delayed release tablet , 2 tab(s) orally once a day (at bedtime)  haloperidol decanoate , 125 milligram(s) intramuscular every 4 weeks

## 2023-08-03 NOTE — BH PATIENT PROFILE - FALL HARM RISK - UNIVERSAL INTERVENTIONS
Bed in lowest position, wheels locked, appropriate side rails in place/Call bell, personal items and telephone in reach/Instruct patient to call for assistance before getting out of bed or chair/Non-slip footwear when patient is out of bed/[place] to call system/Physically safe environment - no spills, clutter or unnecessary equipment/Purposeful Proactive Rounding/Room/bathroom lighting operational, light cord in reach
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
## 2023-08-03 NOTE — ED BEHAVIORAL HEALTH ASSESSMENT NOTE - DESCRIPTION
Patient initially agitated and psychotic and internally preoccupied.     Vital Signs Last 24 Hrs  T(C): 36.2 (03 Aug 2023 14:21), Max: 36.8 (02 Aug 2023 20:17)  T(F): 97.2 (03 Aug 2023 14:21), Max: 98.3 (02 Aug 2023 20:17)  HR: 91 (03 Aug 2023 14:21) (84 - 91)  BP: 134/85 (03 Aug 2023 14:21) (110/61 - 153/63)  BP(mean): --  RR: 16 (03 Aug 2023 14:21) (16 - 17)  SpO2: 98% (03 Aug 2023 14:21) (98% - 100%)    Parameters below as of 03 Aug 2023 14:21  Patient On (Oxygen Delivery Method): room air GERD, constipation, anemia (iron deficiency) lives at [place], unemployed, in treatment with [facility] ACT team, previously on AOT but .

## 2023-08-03 NOTE — ED ADULT NURSE REASSESSMENT NOTE - NS ED NURSE REASSESS COMMENT FT1
Report given to DONNY RN, EMS activated. Pending transport. Report given to DONNY RN, EMS activated. Pending transport. RN made aware pt has no belongings.
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
## 2023-08-03 NOTE — ED ADULT NURSE REASSESSMENT NOTE - NS ED NURSE REASSESS COMMENT FT1
Pt awake at this time, verbally aggressive towards staff, stating "shut the f*ck up" as RN does rounding. NP aware, pending orders
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
## 2023-08-04 PROCEDURE — 99222 1ST HOSP IP/OBS MODERATE 55: CPT | Mod: GC

## 2023-08-04 NOTE — DIETITIAN INITIAL EVALUATION ADULT - ADD RECOMMEND
c/w vitamin D3 and iron supplement as per MD order. Defer bowel regimen per MD order. Encourage diet adherence and honor food preferences PRN. Monitor PO intake/tolerance, weights, labs, BM's, and skin integrity.

## 2023-08-04 NOTE — BH SOCIAL WORK INITIAL PSYCHOSOCIAL EVALUATION - NSBHCOMMOTHER_PSY_ALL_CORE
CC: Epigastric pain    · Subjective and Objective:     59F.  hx chronic pain and follows w/ pain management.  admitted 07/27/20.  presented to ED c/o abdominal pain.  onset earlier in the day.  severity 10/10.  located mid epigastric region.  provoked with any movement.  associated w/ nausea.  she admits to binge drink and that her last drink of "4 vodkas" was on 07/26. In ED,  lipase was over 3K and CT AB/pelvis c/w acute pancreatitis.  WBC 17K, but she takes prednisone chronically.  her BP was elevated.  she was given NS 1L and Pepcid.  Pt was treated for acute pancreatitis, presumed due to alcohol/steroid use.  She was given IVFs, supportive care with resolution of pancreatitis.  Pt declined MRCP to further evaluate biliary tree.  Regardless her symptoms improving, she is tolerating diet and LFTs trending down.  Hospital course complicated by elevated BP improved with clonidine (allergic reaction to other bp meds).   Pt is HD stable, afebrile.  She complains of diarrhea for which is chronic and will need outpatient follow up.  Pt had ultrasound of right leg due to edema found to have Left soleal vein thrombosis for which she was started on anticoagulation with eliquis after discussion with pt.        REVIEW OF SYSTEMS: All other review of systems is negative unless indicated above.        Vital Signs Last 24 Hrs    T(C): 37.1 (31 Jul 2020 08:23), Max: 37.1 (30 Jul 2020 18:52)    T(F): 98.8 (31 Jul 2020 08:23), Max: 98.8 (31 Jul 2020 08:23)    HR: 74 (31 Jul 2020 08:23) (58 - 74)    BP: 126/71 (31 Jul 2020 08:23) (99/63 - 126/71)    BP(mean): --    RR: 18 (31 Jul 2020 08:23) (18 - 18)    SpO2: 99% (31 Jul 2020 08:23) (96% - 99%)        Physical Exam:     Constitutional: NAD.     HEENT: PERRL, EOMI, MMM.    Neck: Soft and supple, No carotid bruit, No JVD    Respiratory: Breath sounds are clear bilaterally, No wheezing, rales or rhonchi    Cardiovascular: S1 and S2, regular rate and rhythm, no murmur, rub or gallop.    Gastrointestinal: + epigastric tenderness.    Extremities: + peripheral edema    Vascular: 2+ peripheral pulses    Neurological: A/O x 3, no focal deficits    Skin:  no visible rashes.         med/labs: Reviewed and interpreted         Assessment and Plan:  59F.  admitted 07/27/20.  presented to ED c/o abdominal pain.        acute pancreatitis / hepatitis: Resolving    -due to ETOH    -CT AB/pelvis, moderately severe pancreatitis.  mild biliary ductal dilatation.    -MRCP declined by patient     -LFTs normalizing     -monitor/supplement electrolytes.    -advance diet tolerating    -stopped IVFs    -pain management.    -Protonix.    -Zofran.    -GI follow up from Dr Kingston appreciated        acute upon chronic pain.    -c/w home pain regimen.  Pt follows with pain management.         Hypokalemia and hypophosphatemia: RESOLVED        Left Soleal vein thrombosis-patient aware: Will start eliquis x 3 months for treatment.        EtOH abuse.    -Ativan CIWA protocol.    -stop standing ativan today    -MVI, thiamine and folic acid.    -GI prophylaxis w/ PPI.        elevated BP.    -suspect secondary to pain and anxiety.    -Continue clonidine, reduce dose upon discharge.  Will need close BP monitoring.         leukocytosis. Resolved    -suspect acute phase reactant due to pancreatitis and chronic steroids.        hx mixed connective tissue disease.    -Continue  medrol    -hydroxychloroquine.        hx depression, panic attacks and anxiety.    -c/w home dose benzos         hx adenoCA of lung.    -s/p RML wedge resection (2018).    -former tobacco.    -no active issues.        Dispo: home with close outpatient follow up.  Referred pt to pcp in community Dr Gunter made aware.  Instructed pt she will need close outpatient BP monitoring.  She will need repeat blood work including liver enzymes, cbc, monitoring of thrombocytopenia and DVT.         Attending Statement: 40 minutes spent on total encounter and discharge planning.
None

## 2023-08-04 NOTE — BH INPATIENT PSYCHIATRY ASSESSMENT NOTE - NSBHMETABOLIC_PSY_ALL_CORE_FT
BMI: BMI (kg/m2): 39.1 (08-03-23 @ 22:41)  HbA1c:   Glucose: POCT Blood Glucose.: 100 mg/dL (12-19-22 @ 15:06)    BP: 140/70 (08-03-23 @ 21:34) (129/66 - 140/70)  Lipid Panel:

## 2023-08-04 NOTE — BH INPATIENT PSYCHIATRY ASSESSMENT NOTE - NSBHCHARTREVIEWLAB_PSY_A_CORE FT
HcG -  TSH 1.25                       8.8    5.95  )-----------( 430      ( 03 Aug 2023 14:14 )             30.3     08-03    143  |  106  |  11  ----------------------------<  82  3.6   |  24  |  0.70    Ca    9.9      03 Aug 2023 14:14    TPro  7.8  /  Alb  4.1  /  TBili  0.2  /  DBili  x   /  AST  16  /  ALT  20  /  AlkPhos  115  08-03    Urinalysis Basic - ( 03 Aug 2023 14:14 )    Color: x / Appearance: x / SG: x / pH: x  Gluc: 82 mg/dL / Ketone: x  / Bili: x / Urobili: x   Blood: x / Protein: x / Nitrite: x   Leuk Esterase: x / RBC: x / WBC x   Sq Epi: x / Non Sq Epi: x / Bacteria: x

## 2023-08-04 NOTE — BH INPATIENT PSYCHIATRY ASSESSMENT NOTE - NSBHCHARTREVIEWVS_PSY_A_CORE FT
Vital Signs Last 24 Hrs  T(C): 36.8 (08-03-23 @ 22:41), Max: 36.8 (08-03-23 @ 21:34)  T(F): 98.2 (08-03-23 @ 22:41), Max: 98.2 (08-03-23 @ 21:34)  HR: 105 (08-03-23 @ 21:34) (86 - 105)  BP: 140/70 (08-03-23 @ 21:34) (129/66 - 140/70)  BP(mean): --  RR: 16 (08-03-23 @ 22:28) (16 - 16)  SpO2: 98% (08-03-23 @ 21:34) (98% - 100%)    Orthostatic VS  08-03-23 @ 22:41  Lying BP: 144/80 HR: 90  Sitting BP: 140/77 HR: 100  Standing BP: --/-- HR: --  Site: --  Mode: --   Vital Signs Last 24 Hrs  T(C): 36.8 (08-03-23 @ 22:41), Max: 36.8 (08-03-23 @ 21:34)  T(F): 98.2 (08-03-23 @ 22:41), Max: 98.2 (08-03-23 @ 21:34)  HR: 105 (08-03-23 @ 21:34) (105 - 105)  BP: 140/70 (08-03-23 @ 21:34) (140/70 - 140/70)  BP(mean): --  RR: 16 (08-03-23 @ 22:28) (16 - 16)  SpO2: 98% (08-03-23 @ 21:34) (98% - 98%)    Orthostatic VS  08-03-23 @ 22:41  Lying BP: 144/80 HR: 90  Sitting BP: 140/77 HR: 100  Standing BP: --/-- HR: --  Site: --  Mode: --

## 2023-08-04 NOTE — BH INPATIENT PSYCHIATRY ASSESSMENT NOTE - OTHER PAST PSYCHIATRIC HISTORY (INCLUDE DETAILS REGARDING ONSET, COURSE OF ILLNESS, INPATIENT/OUTPATIENT TREATMENT)
History of the following diagnoses: schizophrenia, schizoaffective dx, bipolar dx, anxiety  Multiple hospitalizations including last hospital discharge April 10 to 2023 at [facility] 2023 to 3/22/2023 , 2023 to 2023, [facility] term from 2018 to 7/10/2019  No known suicide attempts or self injurious behaviors.   Has ACT team with [facility] and [facility]   Lives in [facility] at [facility]

## 2023-08-04 NOTE — PSYCHIATRIC REHAB INITIAL EVALUATION - NSBHPRRECOMMEND_PSY_ALL_CORE
Writer attempted to meet with patient in order to orient patient to unit, provide patient with a unit schedule, and introduce patient to psychiatric rehabilitation staff and department functions. Due to patients severity of symptoms and agitation, writer was unable to meet with patient in order to conduct a complete individual assessment. Therefore, the following information will be based off patient’s chart. Writer and patient were unable to establish a collaborative psychiatric rehabilitation goal, therefore one will be chosen for her. Psychiatric Rehabilitation staff will continue to engage patient daily in order to develop therapeutic rapport.

## 2023-08-04 NOTE — BH INPATIENT PSYCHIATRY ASSESSMENT NOTE - CURRENT MEDICATION
MEDICATIONS  (STANDING):  benztropine 2 milliGRAM(s) Oral <User Schedule>  cholecalciferol 1000 Unit(s) Oral <User Schedule>  ferrous    sulfate Liquid 300 milliGRAM(s) Oral with breakfast  haloperidol     Tablet 10 milliGRAM(s) Oral <User Schedule>  pantoprazole    Tablet 40 milliGRAM(s) Oral before breakfast  polyethylene glycol 3350 17 Gram(s) Oral at bedtime  valproic acid 500 milliGRAM(s) Oral <User Schedule>    MEDICATIONS  (PRN):  diphenhydrAMINE 50 milliGRAM(s) Oral every 4 hours PRN agitation  diphenhydrAMINE Injectable 50 milliGRAM(s) IntraMuscular once PRN Agitation  haloperidol     Tablet 5 milliGRAM(s) Oral every 4 hours PRN agitation  haloperidol    Injectable 5 milliGRAM(s) IntraMuscular once PRN Agitation  haloperidol    Injectable 5 milliGRAM(s) IntraMuscular once PRN agitation  LORazepam     Tablet 2 milliGRAM(s) Oral every 4 hours PRN Agitation  LORazepam   Injectable 2 milliGRAM(s) IntraMuscular once PRN Agitation

## 2023-08-04 NOTE — DIETITIAN INITIAL EVALUATION ADULT - OTHER INFO
Patient is a 52 y/o female, currently living in [place], with PMHx of anemia, obesity, and constipation, PPHx of schizoaffective disorder, schizophrenia, anxiety, bipolar, selective mutism, currently with [facility] ACT team, previously with [facility] which , with history of psychiatric hospitalization(s), with history of aggression in the setting on nonadherence with medication, who presents for the second time since yesterday for persistent aggressive behaviors.     Patient did not engage in the interview with writer today due to disorganization. Collateral information obtained from patient's medical chart and RN. Per nursing staff, patient was observed to eat in the dining area today with good PO intake on in-house meals, tolerated diet well, no reports of chewing/swallowing difficulties on current diet. No food allergies documented. No food preferences obtained today. No GI distress (nausea/vomiting/diarrhea/ constipation) reported at this time, but noted hx of constipation, noted patient is on iron supplement, also on miralax for bowel regularity per MD order. Patient is not a candidate for diet education at this time.   Current wt: 250lb (8/3/23). Per [place] HIE: 307lb (11/3/21) -- wt down ~18.6% x ~2 years, ? intended vs unintended wt loss but desirable given obesity. Will monitor wt trend.
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
## 2023-08-04 NOTE — BH INPATIENT PSYCHIATRY ASSESSMENT NOTE - NSBHMEDSOTHERFT_PSY_A_CORE
Haldol 10 mg one-tab po twice a day  Nexium 40 mg one-tab po daily  Valproic acid 250 mg two cap twice a day  Haldol 50 mg injection every 28 days  Benztropine 2 mg once tab twice day    has not been taking for 2 months

## 2023-08-04 NOTE — BH INPATIENT PSYCHIATRY ASSESSMENT NOTE - DESCRIPTION
lives at [place], unemployed, in treatment with [facility] ACT team, previously on AOT but .

## 2023-08-04 NOTE — PSYCHIATRIC REHAB INITIAL EVALUATION - NSBHALCSUBTREAT_PSY_ALL_CORE
Pt receives outpatient treatment from [facility] ACT team./[facility] ([facility])/Outpatient clinic (specify)

## 2023-08-04 NOTE — BH INPATIENT PSYCHIATRY ASSESSMENT NOTE - MSE UNSTRUCTURED FT
Gen: The patient appears older than stated age, poor hygiene and grooming, malodorous, dressed in hospital gown   Beh: Hostile, uncooperative  Motor: No PMR/PMA   Speech: Normal volume and rate, menacing tone.   Mood: none of your business  Affect: flat  Thought process: disorganized, concrete, impaired reasoning  Thought content: delusional content about staff burning in flames, not in a hospital  Perception: Not observed responding to internal stimuli  Neuro: disoriented  Insight: poor   Judgment: poor  Impulse control: impaired Gen: The patient appears older than stated age, poor hygiene and grooming, malodorous, dressed in hospital gown   Beh: Hostile, uncooperative  Motor: No PMR/PMA   Speech: Normal volume and rate, menacing tone.   Mood: "none of your business"  Affect: irritable, blunted  Thought process: disorganized, concrete, impaired reasoning  Thought content: delusional content about staff burning in flames, not in a hospital  Perception: Not observed responding to internal stimuli  Neuro: disoriented  Insight: poor   Judgment: poor  Impulse control: impaired

## 2023-08-04 NOTE — DIETITIAN INITIAL EVALUATION ADULT - PERTINENT LABORATORY DATA
08-03    143  |  106  |  11  ----------------------------<  82  3.6   |  24  |  0.70    Ca    9.9      03 Aug 2023 14:14    TPro  7.8  /  Alb  4.1  /  TBili  0.2  /  DBili  x   /  AST  16  /  ALT  20  /  AlkPhos  115  08-03    8/3/23 H/H 8.8/30.3, MCV 74.3, MCH 21.6

## 2023-08-04 NOTE — BH INPATIENT PSYCHIATRY ASSESSMENT NOTE - NSBHASSESSSUMMFT_PSY_ALL_CORE
This is a 53 year old woman, currently living at [facility], PMH of GERD, anemia, PPH of schizophrenia, selective mutism, currently with [facility], previously with [facility] which , with history of psychiatric hospitalization(s), without known history of self-injury or suicide attempts, with history of aggression in the setting on nonadherence with medication, who is brought in by staff at residence for increasing agitation and aggression in the setting of 2 months of medication nonadherence.     Patient presents as hostile, disorganized, threatening, malodorous. Consistent with chronic schizophrenia with worsening aggression and disorganization iso med nonadherence.     Plan:  - admit patient on emergency admission   - continue outpatient psychiatric medications: Haldol 10mg PO BID, Valproic acid 250mg 2 caps BID, Cogentin 2mg BID. BREWER Haldol 200mg due on 2023 which she has refused  - Medical: Nexium 40mg, Feosol 300mg daily, Vit D3 1000U, Senokot 8.6mg qhs  - routine obs appropriate at this time, bed block for disorganization  	- haldol/ativan/benadryl PO/IM PRN for agitation  - dispo pending clinical improvement  - will likely have to apply for TOO and AOT

## 2023-08-04 NOTE — BH INPATIENT PSYCHIATRY ASSESSMENT NOTE - NSBHATTESTCOMMENTATTENDFT_PSY_A_CORE
Agree with above.  Patient largely hostile and uncooperative with interview today.  Was aggressive in the ED and towards her roommate overnight. Says she will refuse meds.  Will attempt to restart Haldol.

## 2023-08-05 PROCEDURE — 99232 SBSQ HOSP IP/OBS MODERATE 35: CPT

## 2023-08-05 NOTE — BH INPATIENT PSYCHIATRY PROGRESS NOTE - PRN MEDS
MEDICATIONS  (PRN):  diphenhydrAMINE 50 milliGRAM(s) Oral every 4 hours PRN agitation  diphenhydrAMINE Injectable 50 milliGRAM(s) IntraMuscular once PRN Agitation  haloperidol     Tablet 5 milliGRAM(s) Oral every 4 hours PRN agitation  haloperidol    Injectable 5 milliGRAM(s) IntraMuscular once PRN Agitation  haloperidol    Injectable 5 milliGRAM(s) IntraMuscular once PRN agitation  LORazepam     Tablet 2 milliGRAM(s) Oral every 4 hours PRN Agitation  LORazepam   Injectable 2 milliGRAM(s) IntraMuscular once PRN Agitation

## 2023-08-05 NOTE — BH INPATIENT PSYCHIATRY PROGRESS NOTE - NSBHFUPINTERVALHXFT_PSY_A_CORE
Follow-up for aggression/psychosis. Chart reviewed and discussed with nursing staff. Pt. w/ bed block for aggression. Pt. selectively mute; did not respond to writer or nurse. Pt. does not appear to be in acute distress.

## 2023-08-05 NOTE — BH INPATIENT PSYCHIATRY PROGRESS NOTE - NSBHCHARTREVIEWVS_PSY_A_CORE FT
Vital Signs Last 24 Hrs  T(C): --  T(F): --  HR: --  BP: --  BP(mean): --  RR: --  SpO2: --    Orthostatic VS  08-03-23 @ 22:41  Lying BP: 144/80 HR: 90  Sitting BP: 140/77 HR: 100  Standing BP: --/-- HR: --  Site: --  Mode: --

## 2023-08-05 NOTE — BH INPATIENT PSYCHIATRY PROGRESS NOTE - NSBHASSESSSUMMFT_PSY_ALL_CORE
This is a 53 year old woman, currently living at [facility] [place] [facility], PMH of GERD, anemia, PPH of schizophrenia, selective mutism, currently with [facility] ACT team, previously with [facility] which , with history of psychiatric hospitalization(s), without known history of self-injury or suicide attempts, with history of aggression in the setting on nonadherence with medication, who is brought in by staff at residence for increasing agitation and aggression in the setting of 2 months of medication nonadherence.     : pt. is selectively mute, lying in bed. Medication non-adherent. No acute concerns. Staff to offer food and drink as pt. is isolated to her room.     Patient presents as hostile, disorganized, threatening, malodorous. Consistent with chronic schizophrenia with worsening aggression and disorganization iso med nonadherence.     Plan:  - admit patient on emergency admission   - continue outpatient psychiatric medications: Haldol 10mg PO BID, Valproic acid 250mg 2 caps BID, Cogentin 2mg BID. BREWER Haldol 200mg due on 2023 which she has refused  - Medical: Nexium 40mg, Feosol 300mg daily, Vit D3 1000U, Senokot 8.6mg qhs  - routine obs appropriate at this time, bed block for disorganization  	- haldol/ativan/benadryl PO/IM PRN for agitation  - dispo pending clinical improvement  - will likely have to apply for TOO and AOT

## 2023-08-05 NOTE — BH INPATIENT PSYCHIATRY PROGRESS NOTE - MSE UNSTRUCTURED FT
Gen: The patient appears older than stated age, poor hygiene and grooming, malodorous, dressed in hospital gown   Beh: Hostile, uncooperative  Motor: No PMR/PMA   Speech: selectively mute  Mood: unable to assess  Affect: irritable, blunted  Thought process: disorganized, concrete, impaired reasoning  Thought content: delusional content about staff burning in flames, not in a hospital  Perception: Not observed responding to internal stimuli  Neuro: disoriented  Insight: poor   Judgment: poor  Impulse control: impaired

## 2023-08-06 PROCEDURE — 99232 SBSQ HOSP IP/OBS MODERATE 35: CPT

## 2023-08-06 NOTE — BH INPATIENT PSYCHIATRY PROGRESS NOTE - NSBHASSESSSUMMFT_PSY_ALL_CORE
This is a 53 year old woman, currently living at [facility] [place] [facility], PMH of GERD, anemia, PPH of schizophrenia, selective mutism, [facility], previously with [facility] which , with history of psychiatric hospitalization(s), without known history of self-injury or suicide attempts, with history of aggression in the setting on nonadherence with medication, who is brought in by staff at residence for increasing agitation and aggression in the setting of 2 months of medication nonadherence.     : no significant changes. Remains treatment non-adherent.   : pt. is selectively mute, lying in bed. Medication non-adherent. No acute concerns. Staff to offer food and drink as pt. is isolated to her room.     Patient presents as hostile, disorganized, threatening, malodorous. Consistent with chronic schizophrenia with worsening aggression and disorganization iso med nonadherence.     Plan:  - admit patient on emergency admission   - continue outpatient psychiatric medications: Haldol 10mg PO BID, Valproic acid 250mg 2 caps BID, Cogentin 2mg BID. BREWER Haldol 200mg due on 2023 which she has refused  - Medical: Nexium 40mg, Feosol 300mg daily, Vit D3 1000U, Senokot 8.6mg qhs  - routine obs appropriate at this time, bed block for disorganization  	- haldol/ativan/benadryl PO/IM PRN for agitation  - dispo pending clinical improvement  - will likely have to apply for TOO and AOT

## 2023-08-07 PROCEDURE — 99231 SBSQ HOSP IP/OBS SF/LOW 25: CPT | Mod: GC

## 2023-08-07 NOTE — BH INPATIENT PSYCHIATRY PROGRESS NOTE - NSBHASSESSSUMMFT_PSY_ALL_CORE
This is a 53 year old woman, currently living at [facility] [place] [facility], PMH of GERD, anemia, PPH of schizophrenia, selective mutism, [facility], previously with [facility] which , with history of psychiatric hospitalization(s), without known history of self-injury or suicide attempts, with history of aggression in the setting on nonadherence with medication, who is brought in by staff at residence for increasing agitation and aggression in the setting of 2 months of medication nonadherence.       Patient presents as hostile, disorganized, threatening, malodorous. Consistent with chronic schizophrenia with worsening aggression and disorganization iso med nonadherence.   Continues to refuse all medications in the hospital, is intermittently hostile with the team and selectively mute.     Will place application for TOO. AOT is under investigation.     Plan:  - need to convert patient from 9.39 to 9.29   - continue outpatient psychiatric medications: Haldol 10mg PO BID, Valproic acid 250mg 2 caps BID, Cogentin 2mg BID. BREWER Haldol 200mg due on 2023 which she has refused  - Medical: Nexium 40mg, Feosol 300mg daily, Vit D3 1000U, Senokot 8.6mg qhs  - routine obs appropriate at this time, bed block for disorganization  	- haldol/ativan/benadryl PO/IM PRN for agitation  - dispo pending clinical improvement  - planning to apply for TOO, AOT is under investigation - need to reach out to AOT team

## 2023-08-07 NOTE — BH INPATIENT PSYCHIATRY PROGRESS NOTE - MSE UNSTRUCTURED FT
Gen: The patient appears older than stated age, poor hygiene and grooming, malodorous, dressed in hospital gown   Beh: Hostile, uncooperative, mute  Motor: No PMR/PMA   Speech: selectively mute  Mood: unable to assess  Affect: irritable, blunted  Thought process: disorganized, concrete, impaired reasoning (IMTIAZ today)  Thought content: no content elicited today, previously with delusional content about staff burning in flames, not believing she is in a hospital  Perception: Not observed responding to internal stimuli  Neuro: disoriented  Insight: poor   Judgment: poor  Impulse control: impaired

## 2023-08-07 NOTE — BH INPATIENT PSYCHIATRY PROGRESS NOTE - NSBHFUPINTERVALHXFT_PSY_A_CORE
Follow-up for aggression/psychosis. Chart reviewed and discussed with nursing staff. Pt. w/ bed block for aggression.   Pt. selectively mute; did not respond to any questions today, patient with intense staring at provider throughout. Otherwise unmoving and unresponsive. Informed patient about the plan to pursue TOO.

## 2023-08-08 PROCEDURE — 99231 SBSQ HOSP IP/OBS SF/LOW 25: CPT | Mod: GC

## 2023-08-08 NOTE — BH INPATIENT PSYCHIATRY PROGRESS NOTE - NSBHFUPINTERVALHXFT_PSY_A_CORE
Follow-up for aggression/psychosis. Chart reviewed and discussed with nursing staff. Pt. w/ bed block for aggression.   Pt. selectively mute; did not respond to any questions today, patient with intense, threatening stare at provider throughout interview. Otherwise unmoving and unresponsive. Informed patient about the plan to pursue TOO and gave her her status and rights.      Follow-up for aggression/psychosis. Chart reviewed and discussed with nursing staff. Pt. w/ bed block for aggression.   Pt. selectively mute; did not respond to any questions today, patient with intense, threatening stare at provider throughout interview. Otherwise unmoving and unresponsive. Informed patient about the plan to pursue TOO and gave her new status and rights as a 927 status

## 2023-08-08 NOTE — BH INPATIENT PSYCHIATRY PROGRESS NOTE - NSBHASSESSSUMMFT_PSY_ALL_CORE
This is a 53 year old woman, currently living at [facility] on [place] grounds, PMH of GERD, anemia, PPH of schizophrenia, selective mutism, currently with [facility] ACT team, previously with [facility] which , with history of psychiatric hospitalization(s), without known history of self-injury or suicide attempts, with history of aggression in the setting on nonadherence with medication, who is brought in by staff at residence for increasing agitation and aggression in the setting of 2 months of medication nonadherence.     Patient presents as hostile, disorganized, threatening, malodorous. Consistent with chronic schizophrenia with worsening aggression and disorganization iso med nonadherence.   Continues to refuse all medications in the hospital, is intermittently hostile with the team and selectively mute.     Application for TOO submitted. Converted to 9.27 today.     Plan:  - convert to 9.27 involuntary admission for TOO   - continue outpatient psychiatric medications: Haldol 10mg PO BID, Valproic acid 250mg 2 caps BID, Cogentin 2mg BID. BREWER Haldol 200mg due on 2023 which she has refused  	- continues refusing all medications  - Medical: Nexium 40mg, Feosol 300mg daily, Vit D3 1000U, Senokot 8.6mg qhs  - routine obs appropriate at this time, bed block for disorganization  	- haldol/ativan/benadryl PO/IM PRN for agitation  - dispo pending clinical improvement  - applying for TOO, AOT is under investigation  - no family involved in care     This is a 53 year old woman, currently living at [facility] on [place] grounds, PMH of GERD, anemia, PPH of schizophrenia, selective mutism, currently with [facility], previously with [facility] which , with history of psychiatric hospitalization(s), without known history of self-injury or suicide attempts, with history of aggression in the setting on nonadherence with medication, who is brought in by staff at residence for increasing agitation and aggression in the setting of 2 months of medication nonadherence.     Patient presents as hostile, disorganized, threatening, malodorous. Consistent with chronic schizophrenia with worsening aggression and disorganization iso med nonadherence.   Continues to refuse all medications in the hospital, is intermittently hostile with the team and selectively mute.     Application for TOO submitted. Converted to 9.27 today.     Plan:  - convert to 9.27 involuntary admission for TOO   - continue outpatient psychiatric medications: Haldol 10mg PO BID, Valproic acid 250mg 2 caps BID, Cogentin 2mg BID. BREWER Haldol 200mg due on 2023 which she has refused  	- continues refusing all medications  - Medical: Nexium 40mg, Feosol 300mg daily, Vit D3 1000U, Senokot 8.6mg qhs  - routine obs appropriate at this time, bed block for disorganization  	- haldol/ativan/benadryl PO/IM PRN for agitation  - dispo pending clinical improvement  - applying for TOO, AOT is under investigation  - no family involved in care

## 2023-08-09 PROCEDURE — 99232 SBSQ HOSP IP/OBS MODERATE 35: CPT | Mod: GC

## 2023-08-09 NOTE — BH INPATIENT PSYCHIATRY PROGRESS NOTE - MSE UNSTRUCTURED FT
Gen: The patient appears older than stated age, poor hygiene and grooming, malodorous, dressed in hospital gown   Beh: Hostile, uncooperative, selectively mute, intense eye contact, not responding to questions  Motor: No PMR/PMA   Speech: normal volume, monotone, incomprehensible at times  Mood: unable to assess  Affect: irritable, blunted, threatening  Thought process: bizarre, incoherent, impaired reasoning, speaking from the third person   Thought content: speaking from the third person, vaguely threatening, paranoid, bizarre  Perception: Not observed responding to internal stimuli  Neuro: disoriented  Insight: poor   Judgment: poor  Impulse control: impaired

## 2023-08-09 NOTE — BH INPATIENT PSYCHIATRY PROGRESS NOTE - NSBHFUPINTERVALHXFT_PSY_A_CORE
Follow-up for aggression/psychosis. Chart reviewed and discussed with nursing staff. Pt. w/ bed block for aggression. Patient continues to refuse medications, isolates in her room, has not required PRNs.   Patient seen by medical director today for TOO. On evaluation today the patient was laying in bed with her head and torso raised at an angle, facing the team. She was speaking the entire time, not directed at anyone. The speech was bizarre, illogical, incoherent at times.   Speaking as if she were a third person, talking to herself. "All of these people are looking at you." "He's trying to get you to speak your mind." "He actually said he's trying to get you to look like him" "The doctor really killed her" "All of this didn't happen" "He's trying to put fear in you"  Did not respond to any questions or acknowledge the team. Remains malodorous.  Follow-up for aggression/psychosis. Chart reviewed and discussed with nursing staff. Pt. w/ bed block for aggression. Patient continues to refuse medications, isolates in her room, has not required PRNs.   Patient seen by medical director today for TOO [facility]  present.   On evaluation today the patient was laying in bed with her head and torso raised at an angle, facing the team. She was speaking the entire time, not directed at anyone. The speech was bizarre, illogical, incoherent at times.   Speaking as if she were a third person, talking to herself. "All of these people are looking at you." "He's trying to get you to speak your mind." "He actually said he's trying to get you to look like him" "The doctor really killed her" "All of this didn't happen" "He's trying to put fear in you"  Did not respond to any questions or acknowledge the team. Remains malodorous.

## 2023-08-09 NOTE — BH INPATIENT PSYCHIATRY PROGRESS NOTE - NSBHASSESSSUMMFT_PSY_ALL_CORE
This is a 53 year old woman, currently living at [facility] on [place] grounds, PMH of GERD, anemia, PPH of schizophrenia, selective mutism, currently with [facility] ACT team, previously with [facility] which , with history of psychiatric hospitalization(s), without known history of self-injury or suicide attempts, with history of aggression in the setting on nonadherence with medication, who is brought in by staff at residence for increasing agitation and aggression in the setting of 2 months of medication nonadherence.     Patient presents as hostile, disorganized, threatening, malodorous. Consistent with chronic schizophrenia with worsening aggression and disorganization iso med nonadherence.   Continues to refuse all medications in the hospital, is intermittently hostile with the team and selectively mute. Today she is incoherent, speaking from the third person, uncooperative. Remains malodorous, refusing ADLs.     Application for TOO submitted.     Plan:  - continue with 9.27 involuntary admission for TOO   - continue outpatient psychiatric medications: Haldol 10mg PO BID, Valproic acid 250mg 2 caps BID, Cogentin 2mg BID. BREWER Haldol 200mg due on 2023 which she has refused  	- continues refusing all medications  - Medical: Nexium 40mg, Feosol 300mg daily, Vit D3 1000U, Senokot 8.6mg qhs  - routine obs appropriate at this time, bed block for disorganization  	- haldol/ativan/benadryl PO/IM PRN for agitation  - dispo pending clinical improvement  - applying for TOO, AOT is under investigation  - no family involved in care     This is a 53 year old woman, currently living at [facility] on [place] grounds, PMH of GERD, anemia, PPH of schizophrenia, selective mutism, currently with [facility] ACT team, previously with [facility] which , with history of psychiatric hospitalization(s), without known history of self-injury or suicide attempts, with history of aggression in the setting on nonadherence with medication, who is brought in by staff at residence for increasing agitation and aggression in the setting of 2 months of medication nonadherence.     Patient presents as hostile, disorganized, threatening, malodorous. Consistent with chronic schizophrenia with worsening aggression and disorganization iso med nonadherence.   Continues to refuse all medications in the hospital, is intermittently hostile with the team and selectively mute. Today she is incoherent, speaking from the third person, uncooperative. Remains malodorous, refusing ADLs.     Application for TOO submitted  Met with Inpatient Director and [facility]  today for application for TOO which was approved     Plan:  - continue with 9.27 involuntary admission for TOO   - continue outpatient psychiatric medications: Haldol 10mg PO BID, Valproic acid 250mg 2 caps BID, Cogentin 2mg BID. BREWER Haldol 200mg due on 2023 which she has refused  	- continues refusing all medications  - Medical: Nexium 40mg, Feosol 300mg daily, Vit D3 1000U, Senokot 8.6mg qhs  - routine obs appropriate at this time, bed block for disorganization  	- haldol/ativan/benadryl PO/IM PRN for agitation  - dispo pending clinical improvement  - applying for TOO, AOT is under investigation  - no family involved in care

## 2023-08-09 NOTE — CHART NOTE - NSCHARTNOTEFT_GEN_A_CORE
MEDICAL DIRECTOR PSYCHIATRY NOTE:  I have evaluated the patient’s need for medication over her objection in the presence of the [facility]  (Anselmo Harmon), the risks and benefits of medication, and her ability to make a reasoned decision.      Briefly, the pt is a 53 year old woman, currently living at [facility] on [place] grounds, PMH of GERD, anemia, PPH of schizophrenia, selective mutism, currently with [facility], previously with [facility] which , with history of psychiatric hospitalization(s), without known history of self-injury or suicide attempts, with history of aggression in the setting on nonadherence with medication, who is brought in by staff at residence for increasing agitation and aggression in the setting of 2 months of medication nonadherence.     Patient presents as hostile, disorganized, threatening, malodorous.  Continues to refuse all medications in the hospital, is intermittently hostile with the team and selectively mute.   She has no insight into her illness.    It is my opinion that this patient currently experiences psychotic symptoms that are severely impacting her safety and ability to care for herself, and would likely benefit from antipsychotic medications.  Furthermore, it is my opinion that she lacks capacity to refuse antipsychotic therapy.  I have informed the patient of my decision and that unless she withdraws her objection to taking medications, we will make application to court for authorization to treat her over her objection. I have notified the patient and [facility] of this decision by letter.

## 2023-08-10 PROCEDURE — 99231 SBSQ HOSP IP/OBS SF/LOW 25: CPT | Mod: GC

## 2023-08-10 NOTE — BH INPATIENT PSYCHIATRY PROGRESS NOTE - MSE UNSTRUCTURED FT
Gen: The patient appears older than stated age, poor hygiene and grooming, malodorous, dressed in hospital gown   Beh: Hostile, uncooperative, selectively mute, intense eye contact  Motor: No PMR/PMA   Speech: normal volume, monotone, incomprehensible at times  Mood: unable to assess  Affect: irritable, blunted, threatening  Thought process: poverty of content  Thought content: refusing to answer questions  Perception: frequently observed talking to herself  Neuro: disoriented  Insight: poor   Judgment: poor  Impulse control: impaired

## 2023-08-10 NOTE — BH INPATIENT PSYCHIATRY PROGRESS NOTE - NSBHASSESSSUMMFT_PSY_ALL_CORE
This is a 53 year old woman, currently living at [facility] on [place] grounds, PMH of GERD, anemia, PPH of schizophrenia, selective mutism, currently with [facility] ACT team, previously with [facility] which , with history of psychiatric hospitalization(s), without known history of self-injury or suicide attempts, with history of aggression in the setting on nonadherence with medication, who is brought in by staff at residence for increasing agitation and aggression in the setting of 2 months of medication nonadherence.     Patient presents as hostile, disorganized, threatening, malodorous. Consistent with chronic schizophrenia with worsening aggression and disorganization iso med nonadherence.   Continues to refuse all medications in the hospital, is intermittently hostile with the team and selectively mute. Today she is incoherent, speaking from the third person, uncooperative. Remains malodorous, refusing ADLs.     Application for TOO submitted  Met with Inpatient Director and [facility]  today for application for TOO which was approved     Plan:  - continue with 9.27 involuntary admission for TOO   - continue outpatient psychiatric medications: Haldol 10mg PO BID, Valproic acid 250mg 2 caps BID, Cogentin 2mg BID. BREWER Haldol 200mg due on 2023 which she has refused  	- continues refusing all medications  - Medical: Nexium 40mg, Feosol 300mg daily, Vit D3 1000U, Senokot 8.6mg qhs  - routine obs appropriate at this time, bed block for disorganization  	- haldol/ativan/benadryl PO/IM PRN for agitation  - dispo pending clinical improvement  - applying for TOO, AOT is under investigation  - no family involved in care

## 2023-08-10 NOTE — BH INPATIENT PSYCHIATRY PROGRESS NOTE - NSBHFUPINTERVALHXFT_PSY_A_CORE
Follow-up for aggression/psychosis. Chart reviewed and discussed with nursing staff. Pt. w/ bed block for aggression. Patient continues to refuse medications, isolates in her room, has not required PRNs for agitation.   On evaluation today the patient was sitting up in bed. As soon as writer walked into the room, the patient said "I'm not taking medications. Don't even try." When asked how she was doing today the patient responded "Get out". She then refused to answer any further questions and stared at the floor. Remains malodorous.

## 2023-08-11 PROCEDURE — 99232 SBSQ HOSP IP/OBS MODERATE 35: CPT | Mod: GC

## 2023-08-11 RX ORDER — HALOPERIDOL DECANOATE 100 MG/ML
7.5 INJECTION INTRAMUSCULAR ONCE
Refills: 0 | Status: DISCONTINUED | OUTPATIENT
Start: 2023-08-11 | End: 2023-09-29

## 2023-08-11 RX ORDER — DIPHENHYDRAMINE HCL 50 MG
50 CAPSULE ORAL ONCE
Refills: 0 | Status: COMPLETED | OUTPATIENT
Start: 2023-08-11 | End: 2023-08-11

## 2023-08-11 RX ORDER — HALOPERIDOL DECANOATE 100 MG/ML
7.5 INJECTION INTRAMUSCULAR ONCE
Refills: 0 | Status: COMPLETED | OUTPATIENT
Start: 2023-08-11 | End: 2023-08-11

## 2023-08-11 RX ADMIN — HALOPERIDOL DECANOATE 7.5 MILLIGRAM(S): 100 INJECTION INTRAMUSCULAR at 09:44

## 2023-08-11 RX ADMIN — Medication 50 MILLIGRAM(S): at 09:44

## 2023-08-11 RX ADMIN — Medication 2 MILLIGRAM(S): at 09:44

## 2023-08-11 NOTE — BH INPATIENT PSYCHIATRY PROGRESS NOTE - MSE UNSTRUCTURED FT
Gen: The patient appears older than stated age, poor hygiene and grooming, malodorous, dressed in hospital gown damp with urine  Beh: Hostile, uncooperative, selectively mute, intense eye contact  Motor: No PMR/PMA   Speech: normal volume, monotone, incomprehensible at times  Mood: unable to assess  Affect: irritable, blunted, threatening  Thought process: poverty of content, bizarre  Thought content: refusing to answer questions, does not believe she is in a hospital, says her name is not Lolis Bennie  Perception: frequently observed talking to herself  Neuro: disoriented  Insight: poor   Judgment: poor  Impulse control: impaired

## 2023-08-11 NOTE — BH INPATIENT PSYCHIATRY PROGRESS NOTE - NSBHFUPINTERVALHXFT_PSY_A_CORE
Follow-up for aggression/psychosis. Chart reviewed and discussed with nursing staff. Pt. w/ bed block for aggression. Refuses vitals and labs. Patient continues to refuse medications, isolates in her room, not attending to ADLs despite prompting. Patient has been urinating on herself and her linens are soaked and malodorous.   This AM, staff approached patient to change her linens and she began threatening staff, telling them not to come closer. Refused to change her gown. Denied that she was in a hospital. Psych emergency was called and patient was given IM haldol 7.5mg, ativan 2mg, benadryl 50mg. Staff helped patient to change her gown with some resistance. Throughout the interaction patient was telling staff they should die. Patient refused to engage further with the treatment team.

## 2023-08-11 NOTE — BH INPATIENT PSYCHIATRY PROGRESS NOTE - NSBHASSESSSUMMFT_PSY_ALL_CORE
This is a 53 year old woman, currently living at [facility] on [place] grounds, PMH of GERD, anemia, PPH of schizophrenia, selective mutism, [facility] with [facility], previously with [facility] which , with history of psychiatric hospitalization(s), without known history of self-injury or suicide attempts, with history of aggression in the setting on nonadherence with medication, who is brought in by staff at residence for increasing agitation and aggression in the setting of 2 months of medication nonadherence.     Patient presents as hostile, disorganized, threatening, malodorous. Consistent with chronic schizophrenia with worsening aggression and disorganization iso med nonadherence.   Continues to refuse all medications in the hospital, is intermittently hostile with the team and selectively mute. Today she is incoherent, speaking from the third person, uncooperative. Remains malodorous, refusing ADLs.     Psych emergency today  for threatening staff, refusing to allow staff to change her linens or clothes despite being soiled with urine. Received IM haldol 7.5mg, ativan 2mg, benadryl 50mg    Plan:  - continue with 9.27 involuntary admission for TOO   - continue outpatient psychiatric medications: Haldol 10mg PO BID, Valproic acid 250mg 2 caps BID, Cogentin 2mg BID. BREWER Haldol 200mg due on 2023 which she has refused  	- continues refusing all medications  - Medical: Nexium 40mg, Feosol 300mg daily, Vit D3 1000U, Senokot 8.6mg qhs  - routine obs appropriate at this time, bed block for disorganization  	- haldol/ativan/benadryl PO/IM PRN for agitation  - dispo pending clinical improvement  - applying for TOO, AOT is under investigation  - no family involved in care

## 2023-08-11 NOTE — BH INPATIENT PSYCHIATRY PROGRESS NOTE - CURRENT MEDICATION
MEDICATIONS  (STANDING):  benztropine 2 milliGRAM(s) Oral <User Schedule>  cholecalciferol 1000 Unit(s) Oral <User Schedule>  ferrous    sulfate Liquid 300 milliGRAM(s) Oral with breakfast  haloperidol     Tablet 10 milliGRAM(s) Oral <User Schedule>  pantoprazole    Tablet 40 milliGRAM(s) Oral before breakfast  polyethylene glycol 3350 17 Gram(s) Oral at bedtime  valproic acid 500 milliGRAM(s) Oral <User Schedule>    MEDICATIONS  (PRN):  diphenhydrAMINE 50 milliGRAM(s) Oral every 4 hours PRN agitation  diphenhydrAMINE Injectable 50 milliGRAM(s) IntraMuscular once PRN Agitation  haloperidol     Tablet 5 milliGRAM(s) Oral every 4 hours PRN agitation  haloperidol    Injectable 5 milliGRAM(s) IntraMuscular once PRN Agitation  haloperidol    Injectable 7.5 milliGRAM(s) IntraMuscular once PRN psychotic agitation  LORazepam     Tablet 2 milliGRAM(s) Oral every 4 hours PRN Agitation  LORazepam   Injectable 2 milliGRAM(s) IntraMuscular once PRN Agitation   MEDICATIONS  (STANDING):  benztropine 2 milliGRAM(s) Oral <User Schedule>  cholecalciferol 1000 Unit(s) Oral <User Schedule>  ferrous    sulfate Liquid 300 milliGRAM(s) Oral with breakfast  haloperidol     Tablet 10 milliGRAM(s) Oral <User Schedule>  pantoprazole    Tablet 40 milliGRAM(s) Oral before breakfast  polyethylene glycol 3350 17 Gram(s) Oral at bedtime  valproic acid 500 milliGRAM(s) Oral <User Schedule>    MEDICATIONS  (PRN):  diphenhydrAMINE 50 milliGRAM(s) Oral every 4 hours PRN agitation  diphenhydrAMINE Injectable 50 milliGRAM(s) IntraMuscular once PRN Agitation  haloperidol     Tablet 5 milliGRAM(s) Oral every 4 hours PRN agitation  haloperidol    Injectable 5 milliGRAM(s) IntraMuscular once PRN Agitation  haloperidol    Injectable 7.5 milliGRAM(s) IntraMuscular once PRN psychotic agitation  LORazepam     Tablet 2 milliGRAM(s) Oral every 4 hours PRN Agitation  LORazepam   Injectable 3 milliGRAM(s) IntraMuscular once PRN severe agitation

## 2023-08-11 NOTE — BH INPATIENT PSYCHIATRY PROGRESS NOTE - PRN MEDS
MEDICATIONS  (PRN):  diphenhydrAMINE 50 milliGRAM(s) Oral every 4 hours PRN agitation  diphenhydrAMINE Injectable 50 milliGRAM(s) IntraMuscular once PRN Agitation  haloperidol     Tablet 5 milliGRAM(s) Oral every 4 hours PRN agitation  haloperidol    Injectable 5 milliGRAM(s) IntraMuscular once PRN Agitation  haloperidol    Injectable 7.5 milliGRAM(s) IntraMuscular once PRN psychotic agitation  LORazepam     Tablet 2 milliGRAM(s) Oral every 4 hours PRN Agitation  LORazepam   Injectable 2 milliGRAM(s) IntraMuscular once PRN Agitation   MEDICATIONS  (PRN):  diphenhydrAMINE 50 milliGRAM(s) Oral every 4 hours PRN agitation  diphenhydrAMINE Injectable 50 milliGRAM(s) IntraMuscular once PRN Agitation  haloperidol     Tablet 5 milliGRAM(s) Oral every 4 hours PRN agitation  haloperidol    Injectable 5 milliGRAM(s) IntraMuscular once PRN Agitation  haloperidol    Injectable 7.5 milliGRAM(s) IntraMuscular once PRN psychotic agitation  LORazepam     Tablet 2 milliGRAM(s) Oral every 4 hours PRN Agitation  LORazepam   Injectable 3 milliGRAM(s) IntraMuscular once PRN severe agitation

## 2023-08-14 PROCEDURE — 99231 SBSQ HOSP IP/OBS SF/LOW 25: CPT | Mod: GC

## 2023-08-14 NOTE — BH INPATIENT PSYCHIATRY PROGRESS NOTE - NSBHFUPINTERVALHXFT_PSY_A_CORE
Follow-up for aggression/psychosis. Chart reviewed and discussed with nursing staff. Pt. w/ bed block for aggression. Refuses vitals and labs. Patient continues to refuse medications, isolates in her room, not attending to ADLs despite prompting. Patient has been urinating on herself and her linens are soaked and malodorous.   Today, patient was observed laying in bed naked with a towel over her body, no linens on the bed. Patient refusing to get dressed or allow staff to change her bedding. Refuses to answer questions repeatedly says "no". Observed talking to herself in the third person. Reminded patient about court tomorrow for TOO. Patient did not respond.

## 2023-08-14 NOTE — BH INPATIENT PSYCHIATRY PROGRESS NOTE - CURRENT MEDICATION
MEDICATIONS  (STANDING):  benztropine 2 milliGRAM(s) Oral <User Schedule>  cholecalciferol 1000 Unit(s) Oral <User Schedule>  ferrous    sulfate Liquid 300 milliGRAM(s) Oral with breakfast  haloperidol     Tablet 10 milliGRAM(s) Oral <User Schedule>  pantoprazole    Tablet 40 milliGRAM(s) Oral before breakfast  polyethylene glycol 3350 17 Gram(s) Oral at bedtime  valproic acid 500 milliGRAM(s) Oral <User Schedule>    MEDICATIONS  (PRN):  diphenhydrAMINE 50 milliGRAM(s) Oral every 4 hours PRN agitation  diphenhydrAMINE Injectable 50 milliGRAM(s) IntraMuscular once PRN Agitation  haloperidol     Tablet 5 milliGRAM(s) Oral every 4 hours PRN agitation  haloperidol    Injectable 5 milliGRAM(s) IntraMuscular once PRN Agitation  haloperidol    Injectable 7.5 milliGRAM(s) IntraMuscular once PRN psychotic agitation  LORazepam     Tablet 2 milliGRAM(s) Oral every 4 hours PRN Agitation  LORazepam   Injectable 3 milliGRAM(s) IntraMuscular once PRN severe agitation

## 2023-08-14 NOTE — BH INPATIENT PSYCHIATRY PROGRESS NOTE - MSE UNSTRUCTURED FT
Gen: The patient appears older than stated age, poor hygiene and grooming, malodorous, naked in bed with blanket covering her body, towels on her bed  Beh: Hostile, uncooperative, selectively mute, intense eye contact  Motor: No PMR/PMA   Speech: normal volume, monotone, incomprehensible at times  Mood: unable to assess  Affect: irritable, blunted, threatening  Thought process: poverty of content, bizarre  Thought content: refusing to answer questions, does not believe she is in a hospital, says her name is not Lolis Bennie  Perception: frequently observed talking to herself  Neuro: disoriented  Insight: poor   Judgment: poor  Impulse control: impaired

## 2023-08-14 NOTE — BH INPATIENT PSYCHIATRY PROGRESS NOTE - PRN MEDS
MEDICATIONS  (PRN):  diphenhydrAMINE 50 milliGRAM(s) Oral every 4 hours PRN agitation  diphenhydrAMINE Injectable 50 milliGRAM(s) IntraMuscular once PRN Agitation  haloperidol     Tablet 5 milliGRAM(s) Oral every 4 hours PRN agitation  haloperidol    Injectable 5 milliGRAM(s) IntraMuscular once PRN Agitation  haloperidol    Injectable 7.5 milliGRAM(s) IntraMuscular once PRN psychotic agitation  LORazepam     Tablet 2 milliGRAM(s) Oral every 4 hours PRN Agitation  LORazepam   Injectable 3 milliGRAM(s) IntraMuscular once PRN severe agitation

## 2023-08-14 NOTE — BH INPATIENT PSYCHIATRY PROGRESS NOTE - NSBHASSESSSUMMFT_PSY_ALL_CORE
This is a 53 year old woman, currently living at [facility] on [place] grounds, PMH of GERD, anemia, PPH of schizophrenia, selective mutism, currently with [facility] team, previously with [facility] which , with history of psychiatric hospitalization(s), without known history of self-injury or suicide attempts, with history of aggression in the setting on nonadherence with medication, who is brought in by staff at residence for increasing agitation and aggression in the setting of 2 months of medication nonadherence.     Patient presents as hostile, disorganized, threatening, malodorous. Consistent with chronic schizophrenia with worsening aggression and disorganization iso med nonadherence.   Continues to refuse all medications in the hospital, is intermittently hostile with the team and selectively mute. Today she is incoherent, speaking from the third person, uncooperative. Remains malodorous, refusing ADLs.     Plan:  - continue with 9.27 involuntary admission for TOO   - continue outpatient psychiatric medications: Haldol 10mg PO BID, Valproic acid 250mg 2 caps BID, Cogentin 2mg BID. BREWER Haldol 200mg due on 2023 which she has refused  	- continues refusing all medications  - Medical: Nexium 40mg, Feosol 300mg daily, Vit D3 1000U, Senokot 8.6mg qhs  - routine obs appropriate at this time, bed block for disorganization  	- haldol/ativan/benadryl PO/IM PRN for agitation  - dispo pending clinical improvement  - applying for TOO, AOT is under investigation  	court tomorrow  - no family involved in care     This is a 53 year old woman, currently living at [facility] on [place] grounds, PMH of GERD, anemia, PPH of schizophrenia, selective mutism, currently with [facility] team, previously with [facility] which , with history of psychiatric hospitalization(s), without known history of self-injury or suicide attempts, with history of aggression in the setting on nonadherence with medication, who is brought in by staff at residence for increasing agitation and aggression in the setting of 2 months of medication nonadherence.     Patient presents as hostile, disorganized, threatening, malodorous. Consistent with chronic schizophrenia with worsening aggression and disorganization iso med nonadherence.   Continues to refuse all medications in the hospital, is intermittently hostile with the team and selectively mute. Today she is incoherent, speaking from the third person, uncooperative. Remains malodorous, refusing ADLs.     Plan:  - continue with 9.27 involuntary admission for TOO 8/15  - continue outpatient psychiatric medications: Haldol 10mg PO BID, Valproic acid 250mg 2 caps BID, Cogentin 2mg BID. BREWER Haldol 200mg due on 2023 which she has refused  	- continues refusing all medications  - Medical: Nexium 40mg, Feosol 300mg daily, Vit D3 1000U, Senokot 8.6mg qhs  - routine obs appropriate at this time, bed block for disorganization  	- haldol/ativan/benadryl PO/IM PRN for agitation  - dispo pending clinical improvement  - applying for TOO, AOT is under investigation  	court tomorrow  - no family involved in care

## 2023-08-15 PROCEDURE — 99233 SBSQ HOSP IP/OBS HIGH 50: CPT | Mod: GC

## 2023-08-15 RX ORDER — DIPHENHYDRAMINE HCL 50 MG
50 CAPSULE ORAL AT BEDTIME
Refills: 0 | Status: DISCONTINUED | OUTPATIENT
Start: 2023-08-15 | End: 2023-08-16

## 2023-08-15 RX ORDER — HALOPERIDOL DECANOATE 100 MG/ML
10 INJECTION INTRAMUSCULAR
Refills: 0 | Status: DISCONTINUED | OUTPATIENT
Start: 2023-08-15 | End: 2023-08-31

## 2023-08-15 RX ORDER — HALOPERIDOL DECANOATE 100 MG/ML
10 INJECTION INTRAMUSCULAR AT BEDTIME
Refills: 0 | Status: DISCONTINUED | OUTPATIENT
Start: 2023-08-15 | End: 2023-08-17

## 2023-08-15 RX ADMIN — Medication 50 MILLIGRAM(S): at 21:53

## 2023-08-15 RX ADMIN — Medication 2 MILLIGRAM(S): at 21:53

## 2023-08-15 RX ADMIN — HALOPERIDOL DECANOATE 10 MILLIGRAM(S): 100 INJECTION INTRAMUSCULAR at 21:53

## 2023-08-15 NOTE — BH INPATIENT PSYCHIATRY PROGRESS NOTE - MSE UNSTRUCTURED FT
Gen: The patient appears older than stated age, poor hygiene and grooming, malodorous, naked in bed with blanket covering her body, towels on her bed  Beh: Hostile, uncooperative, selectively mute, intense eye contact  Motor: No PMR/PMA   Speech: normal volume, monotone, incomprehensible at times, selectively mute  Mood: unable to assess  Affect: irritable, blunted, threatening  Thought process: poverty of content, bizarre  Thought content: refusing to answer questions, does not believe she is in a hospital, says her name is not Lolis Bennie  Perception: frequently observed talking to herself  Neuro: disoriented  Insight: poor   Judgment: poor  Impulse control: impaired

## 2023-08-15 NOTE — BH INPATIENT PSYCHIATRY PROGRESS NOTE - NSBHFUPINTERVALHXFT_PSY_A_CORE
Follow-up for aggression/psychosis. Pt. w/ bed block for aggression. Refuses vitals and labs. Patient continues to refuse medications, isolates in her room, not attending to ADLs despite prompting. Patient has been urinating on herself and her linens are soaked and malodorous.   Today, patient was observed laying in bed naked with a towel over her body, no linens on the bed. She remains selectively mute, staring at staff, does not respond to questions, refuses to get dressed or get out of bed.    Went to court today for TOO order. Patient refused to attend.

## 2023-08-15 NOTE — BH INPATIENT PSYCHIATRY PROGRESS NOTE - NSBHASSESSSUMMFT_PSY_ALL_CORE
This is a 53 year old woman, currently living at [facility] on [place] grounds, PMH of GERD, anemia, PPH of schizophrenia, selective mutism, currently with [facility] ACT team, previously with [facility] which , with history of psychiatric hospitalization(s), without known history of self-injury or suicide attempts, with history of aggression in the setting on nonadherence with medication, who is brought in by staff at residence for increasing agitation and aggression in the setting of 2 months of medication nonadherence.     Patient presents as hostile, disorganized, threatening, malodorous. Consistent with chronic schizophrenia with worsening aggression and disorganization iso med nonadherence.   Continues to refuse all medications in the hospital, is intermittently hostile with the team and selectively mute. Today she is incoherent, speaking from the third person, uncooperative. Remains malodorous, refusing ADLs.     Plan:  Legals:  	- continue with 9.27 involuntary admission   	- TOO obtained 8/15  Safety:  	- routine obs, bed block for aggression, currently ADLs are VERY poor  	- haldol 7.5mg/ativan 2mg/benadryl 50mg PO/IM for agitation  Psychiatric:  	- outpatient psychiatric medications: Haldol 10mg PO BID, Valproic acid 250mg 2 caps BID, Cogentin 2mg BID. Previously on haldol dec 200mg last 	received in 2023  Medical:   	- Nexium 40mg, Feosol 300mg daily, Vit D3 1000U, Senokot 8.6mg qhs  Dispo:  	- plan for state application vs returning to [facility]  	- AOT is under investigation,  in   	- no family involved in care

## 2023-08-16 PROCEDURE — 99231 SBSQ HOSP IP/OBS SF/LOW 25: CPT | Mod: GC

## 2023-08-16 RX ORDER — HALOPERIDOL DECANOATE 100 MG/ML
100 INJECTION INTRAMUSCULAR ONCE
Refills: 0 | Status: COMPLETED | OUTPATIENT
Start: 2023-08-16 | End: 2023-08-16

## 2023-08-16 RX ORDER — HALOPERIDOL DECANOATE 100 MG/ML
10 INJECTION INTRAMUSCULAR ONCE
Refills: 0 | Status: DISCONTINUED | OUTPATIENT
Start: 2023-08-16 | End: 2023-09-28

## 2023-08-16 RX ORDER — HALOPERIDOL DECANOATE 100 MG/ML
10 INJECTION INTRAMUSCULAR ONCE
Refills: 0 | Status: COMPLETED | OUTPATIENT
Start: 2023-08-16 | End: 2023-08-16

## 2023-08-16 RX ORDER — DIPHENHYDRAMINE HCL 50 MG
50 CAPSULE ORAL ONCE
Refills: 0 | Status: COMPLETED | OUTPATIENT
Start: 2023-08-16 | End: 2023-08-16

## 2023-08-16 RX ORDER — DIPHENHYDRAMINE HCL 50 MG
50 CAPSULE ORAL AT BEDTIME
Refills: 0 | Status: DISCONTINUED | OUTPATIENT
Start: 2023-08-16 | End: 2023-08-17

## 2023-08-16 RX ADMIN — HALOPERIDOL DECANOATE 100 MILLIGRAM(S): 100 INJECTION INTRAMUSCULAR at 11:10

## 2023-08-16 RX ADMIN — Medication 50 MILLIGRAM(S): at 11:51

## 2023-08-16 RX ADMIN — Medication 50 MILLIGRAM(S): at 20:41

## 2023-08-16 RX ADMIN — HALOPERIDOL DECANOATE 10 MILLIGRAM(S): 100 INJECTION INTRAMUSCULAR at 20:41

## 2023-08-16 RX ADMIN — HALOPERIDOL DECANOATE 10 MILLIGRAM(S): 100 INJECTION INTRAMUSCULAR at 11:51

## 2023-08-16 NOTE — BH INPATIENT PSYCHIATRY PROGRESS NOTE - CURRENT MEDICATION
MEDICATIONS  (STANDING):  benztropine 2 milliGRAM(s) Oral <User Schedule>  cholecalciferol 1000 Unit(s) Oral <User Schedule>  ferrous    sulfate Liquid 300 milliGRAM(s) Oral with breakfast  haloperidol     Tablet 10 milliGRAM(s) Oral <User Schedule>  pantoprazole    Tablet 40 milliGRAM(s) Oral before breakfast  polyethylene glycol 3350 17 Gram(s) Oral at bedtime  valproic acid 500 milliGRAM(s) Oral <User Schedule>    MEDICATIONS  (PRN):  diphenhydrAMINE 50 milliGRAM(s) Oral every 4 hours PRN agitation  diphenhydrAMINE Injectable 50 milliGRAM(s) IntraMuscular once PRN Agitation  diphenhydrAMINE Injectable 50 milliGRAM(s) IntraMuscular at bedtime PRN EPS ppx with IM Hadol  haloperidol     Tablet 5 milliGRAM(s) Oral every 4 hours PRN agitation  haloperidol    Injectable 10 milliGRAM(s) IntraMuscular at bedtime PRN ONLY if refusing court ordered PO Haldol  haloperidol    Injectable 7.5 milliGRAM(s) IntraMuscular once PRN psychotic agitation  haloperidol    Injectable 5 milliGRAM(s) IntraMuscular once PRN Agitation  LORazepam     Tablet 2 milliGRAM(s) Oral every 4 hours PRN Agitation  LORazepam   Injectable 3 milliGRAM(s) IntraMuscular once PRN severe agitation

## 2023-08-16 NOTE — BH INPATIENT PSYCHIATRY PROGRESS NOTE - NSBHASSESSSUMMFT_PSY_ALL_CORE
This is a 53 year old woman, currently living at [facility] on [place] grounds, PMH of GERD, anemia, PPH of schizophrenia, selective mutism, currently with [facility] ACT team, previously with [facility] which , with history of psychiatric hospitalization(s), without known history of self-injury or suicide attempts, with history of aggression in the setting on nonadherence with medication, who is brought in by staff at residence for increasing agitation and aggression in the setting of 2 months of medication nonadherence.     Patient presents as hostile, disorganized, threatening, malodorous. Consistent with chronic schizophrenia with worsening aggression and disorganization iso med nonadherence.   Continues to refuse all medications in the hospital, is intermittently hostile with the team and selectively mute.  Remains malodorous, refusing ADLs.     State hearing held and approved today. Application in process.     Plan:  Legals:  	- continue with 9.27 involuntary admission   	- TOO obtained 8/15  Safety:  	- routine obs, bed block for aggression, currently ADLs are VERY poor  	- haldol 10mg/ativan 2mg/benadryl 50mg PO/IM for agitation  Psychiatric:  	- outpatient psychiatric medications: Haldol 10mg PO BID, Valproic acid 250mg 2 caps BID, Cogentin 2mg BID.   	- if patient refuses court ordered medications: haldol 10mg, ativan 2mg, benadryl 50mg IM  	- patient received loading dose of haldol dec 100mg today , next dose next week  Medical:   	- Nexium 40mg, Feosol 300mg daily, Vit D3 1000U, Senokot 8.6mg qhs  Dispo:  	- plan for state application vs returning to [facility]  	- AOT is under investigation,  in   	- no family involved in care

## 2023-08-16 NOTE — BH INPATIENT PSYCHIATRY PROGRESS NOTE - NSBHFUPINTERVALHXFT_PSY_A_CORE
Follow-up for aggression/psychosis. Pt. w/ bed block for aggression. Refuses vitals and labs. Last night patient received IM haldol 10/ativan 2/benadryl 50 for refusing court ordered medications. Patient slept through the night.  This morning patient was in bed, naked under the blanket, smells of urine. Inpatient director visited patient with [facility]  present for state hearing. Patient refused to participate, repeatedly stating "Get out". Refused to engage further with treatment team.

## 2023-08-16 NOTE — BH INPATIENT PSYCHIATRY PROGRESS NOTE - PRN MEDS
MEDICATIONS  (PRN):  diphenhydrAMINE 50 milliGRAM(s) Oral every 4 hours PRN agitation  diphenhydrAMINE Injectable 50 milliGRAM(s) IntraMuscular once PRN Agitation  diphenhydrAMINE Injectable 50 milliGRAM(s) IntraMuscular at bedtime PRN EPS ppx with IM Hadol  haloperidol     Tablet 5 milliGRAM(s) Oral every 4 hours PRN agitation  haloperidol    Injectable 10 milliGRAM(s) IntraMuscular at bedtime PRN ONLY if refusing court ordered PO Haldol  haloperidol    Injectable 7.5 milliGRAM(s) IntraMuscular once PRN psychotic agitation  haloperidol    Injectable 5 milliGRAM(s) IntraMuscular once PRN Agitation  LORazepam     Tablet 2 milliGRAM(s) Oral every 4 hours PRN Agitation  LORazepam   Injectable 3 milliGRAM(s) IntraMuscular once PRN severe agitation

## 2023-08-16 NOTE — CHART NOTE - NSCHARTNOTEFT_GEN_A_CORE
DIRECTOR OF INPATIENT PSYCHIATRY NOTE:     An appeal process was conducted today to assess this patient's potential transfer to a state hospital in the presence of the [facility]  (Anselmo Harmon), the patient, and the attending physician, Dr. Jaramillo.  The patient did not want to participate in the hearing, and yelled to the team, and her  to get out.      Briefly, the patient is a 53 year old woman, currently living at [facility], PMH of GERD, anemia, PPH of schizophrenia, selective mutism, currently with [facility], previously with [facility] which , with history of psychiatric hospitalization(s), without known history of self-injury or suicide attempts, with history of aggression in the setting on nonadherence with medication, who is brought in by staff at residence for increasing agitation and aggression in the setting of 2 months of medication nonadherence.  Patient was refusing medications and is in the process of medications over objection.    Patient presents as selectively mute, malodorous with very poor ADLs..   She has no insight into her illness.  She displays poor judgment and impulse control and no insight into her illness.    Based on my review of the medical record and my interview with the patient today, I concur with the treatment team's recommendation that this patient requires additional hospitalization for stabilization and that a transfer to a state facility is indicated. DIRECTOR OF INPATIENT PSYCHIATRY NOTE:     An appeal process was conducted today to assess this patient's potential transfer to a state hospital in the presence of the [facility]  (Anselmo Harmon), the patient, and the attending physician, Dr. Jaramillo.  The patient did not want to participate in the hearing, and yelled to the team, and her  to get out.      Briefly, the patient is a 53 year old woman, currently living at [facility] on [facility], PMH of GERD, anemia, PPH of schizophrenia, selective mutism, currently with [facility], previously with [facility] which , with history of psychiatric hospitalization(s), without known history of self-injury or suicide attempts, with history of aggression in the setting on nonadherence with medication, who is brought in by staff at residence for increasing agitation and aggression in the setting of 2 months of medication nonadherence.  Patient was refusing medications and is in the process of medications over objection.    Patient presents as selectively mute at times, malodorous with very poor ADLs..   She has no insight into her illness.  She displays poor judgment and impulse control and no insight into her illness.    Based on my review of the medical record and my interview with the patient today, I concur with the treatment team's recommendation that this patient requires additional hospitalization for stabilization and that a transfer to a state facility is indicated.

## 2023-08-17 PROCEDURE — 99231 SBSQ HOSP IP/OBS SF/LOW 25: CPT | Mod: GC

## 2023-08-17 RX ORDER — DIPHENHYDRAMINE HCL 50 MG
50 CAPSULE ORAL
Refills: 0 | Status: DISCONTINUED | OUTPATIENT
Start: 2023-08-17 | End: 2023-09-05

## 2023-08-17 RX ORDER — HALOPERIDOL DECANOATE 100 MG/ML
10 INJECTION INTRAMUSCULAR
Refills: 0 | Status: DISCONTINUED | OUTPATIENT
Start: 2023-08-17 | End: 2023-08-31

## 2023-08-17 RX ADMIN — Medication 50 MILLIGRAM(S): at 08:59

## 2023-08-17 RX ADMIN — Medication 50 MILLIGRAM(S): at 20:59

## 2023-08-17 RX ADMIN — HALOPERIDOL DECANOATE 10 MILLIGRAM(S): 100 INJECTION INTRAMUSCULAR at 20:59

## 2023-08-17 RX ADMIN — Medication 2 MILLIGRAM(S): at 20:59

## 2023-08-17 RX ADMIN — HALOPERIDOL DECANOATE 10 MILLIGRAM(S): 100 INJECTION INTRAMUSCULAR at 08:59

## 2023-08-17 NOTE — BH INPATIENT PSYCHIATRY PROGRESS NOTE - NSBHFUPINTERVALCCFT_PSY_A_CORE
aggression/psychosis

## 2023-08-17 NOTE — BH INPATIENT PSYCHIATRY PROGRESS NOTE - PRN MEDS
MEDICATIONS  (PRN):  diphenhydrAMINE 50 milliGRAM(s) Oral every 4 hours PRN agitation  diphenhydrAMINE Injectable 50 milliGRAM(s) IntraMuscular once PRN Agitation  diphenhydrAMINE Injectable 50 milliGRAM(s) IntraMuscular at bedtime PRN EPS ppx with IM Hadol  haloperidol     Tablet 5 milliGRAM(s) Oral every 4 hours PRN agitation  haloperidol    Injectable 10 milliGRAM(s) IntraMuscular at bedtime PRN ONLY if refusing court ordered PO Haldol  haloperidol    Injectable 7.5 milliGRAM(s) IntraMuscular once PRN psychotic agitation  haloperidol    Injectable 5 milliGRAM(s) IntraMuscular once PRN Agitation  haloperidol    Injectable 10 milliGRAM(s) IntraMuscular once PRN agitation  LORazepam     Tablet 2 milliGRAM(s) Oral every 4 hours PRN Agitation  LORazepam   Injectable 3 milliGRAM(s) IntraMuscular once PRN severe agitation   MEDICATIONS  (PRN):  diphenhydrAMINE 50 milliGRAM(s) Oral every 4 hours PRN agitation  diphenhydrAMINE Injectable 50 milliGRAM(s) IntraMuscular once PRN Agitation  diphenhydrAMINE Injectable 50 milliGRAM(s) IntraMuscular at bedtime PRN EPS ppx with IM Hadol  haloperidol     Tablet 5 milliGRAM(s) Oral every 4 hours PRN agitation  haloperidol    Injectable 10 milliGRAM(s) IntraMuscular at bedtime PRN ONLY if refusing court ordered PO Haldol  haloperidol    Injectable 7.5 milliGRAM(s) IntraMuscular once PRN psychotic agitation  haloperidol    Injectable 10 milliGRAM(s) IntraMuscular once PRN agitation  LORazepam     Tablet 2 milliGRAM(s) Oral every 4 hours PRN Agitation  LORazepam   Injectable 3 milliGRAM(s) IntraMuscular once PRN severe agitation  LORazepam   Injectable 2 milliGRAM(s) IntraMuscular two times a day PRN refusal of court ordered meds

## 2023-08-17 NOTE — BH INPATIENT PSYCHIATRY PROGRESS NOTE - MSE UNSTRUCTURED FT
Gen: The patient appears older than stated age, poor hygiene and grooming, malodorous, naked in bed with blanket covering her body, towels on her bed  Beh: Hostile, uncooperative, selectively mute, intense eye contact  Motor: No PMR/PMA   Speech: normal volume, monotone, incomprehensible at times, selectively mute  Mood: unable to assess  Affect: irritable, blunted, threatening  Thought process: poverty of content, bizarre  Thought content: refusing to answer questions  Perception: frequently observed talking to herself  Neuro: disoriented  Insight: poor   Judgment: poor  Impulse control: impaired

## 2023-08-17 NOTE — BH INPATIENT PSYCHIATRY PROGRESS NOTE - NSBHFUPINTERVALHXFT_PSY_A_CORE
Follow-up for aggression/psychosis. Pt. w/ bed block for aggression. Refuses vitals and labs. Last night patient received IM haldol 10/benadryl 50 for refusing court ordered medications.   This morning patient was in bed, naked under the blanket, smells strongly of urine. Refused to engage or respond.

## 2023-08-17 NOTE — BH INPATIENT PSYCHIATRY PROGRESS NOTE - CURRENT MEDICATION
MEDICATIONS  (STANDING):  benztropine 2 milliGRAM(s) Oral <User Schedule>  cholecalciferol 1000 Unit(s) Oral <User Schedule>  ferrous    sulfate Liquid 300 milliGRAM(s) Oral with breakfast  haloperidol     Tablet 10 milliGRAM(s) Oral <User Schedule>  pantoprazole    Tablet 40 milliGRAM(s) Oral before breakfast  polyethylene glycol 3350 17 Gram(s) Oral at bedtime  valproic acid 500 milliGRAM(s) Oral <User Schedule>    MEDICATIONS  (PRN):  diphenhydrAMINE 50 milliGRAM(s) Oral every 4 hours PRN agitation  diphenhydrAMINE Injectable 50 milliGRAM(s) IntraMuscular once PRN Agitation  diphenhydrAMINE Injectable 50 milliGRAM(s) IntraMuscular at bedtime PRN EPS ppx with IM Hadol  haloperidol     Tablet 5 milliGRAM(s) Oral every 4 hours PRN agitation  haloperidol    Injectable 10 milliGRAM(s) IntraMuscular at bedtime PRN ONLY if refusing court ordered PO Haldol  haloperidol    Injectable 7.5 milliGRAM(s) IntraMuscular once PRN psychotic agitation  haloperidol    Injectable 5 milliGRAM(s) IntraMuscular once PRN Agitation  haloperidol    Injectable 10 milliGRAM(s) IntraMuscular once PRN agitation  LORazepam     Tablet 2 milliGRAM(s) Oral every 4 hours PRN Agitation  LORazepam   Injectable 3 milliGRAM(s) IntraMuscular once PRN severe agitation   MEDICATIONS  (STANDING):  benztropine 2 milliGRAM(s) Oral <User Schedule>  cholecalciferol 1000 Unit(s) Oral <User Schedule>  ferrous    sulfate Liquid 300 milliGRAM(s) Oral with breakfast  haloperidol     Tablet 10 milliGRAM(s) Oral <User Schedule>  pantoprazole    Tablet 40 milliGRAM(s) Oral before breakfast  polyethylene glycol 3350 17 Gram(s) Oral at bedtime  valproic acid 500 milliGRAM(s) Oral <User Schedule>    MEDICATIONS  (PRN):  diphenhydrAMINE 50 milliGRAM(s) Oral every 4 hours PRN agitation  diphenhydrAMINE Injectable 50 milliGRAM(s) IntraMuscular once PRN Agitation  diphenhydrAMINE Injectable 50 milliGRAM(s) IntraMuscular at bedtime PRN EPS ppx with IM Hadol  haloperidol     Tablet 5 milliGRAM(s) Oral every 4 hours PRN agitation  haloperidol    Injectable 10 milliGRAM(s) IntraMuscular at bedtime PRN ONLY if refusing court ordered PO Haldol  haloperidol    Injectable 7.5 milliGRAM(s) IntraMuscular once PRN psychotic agitation  haloperidol    Injectable 10 milliGRAM(s) IntraMuscular once PRN agitation  LORazepam     Tablet 2 milliGRAM(s) Oral every 4 hours PRN Agitation  LORazepam   Injectable 3 milliGRAM(s) IntraMuscular once PRN severe agitation  LORazepam   Injectable 2 milliGRAM(s) IntraMuscular two times a day PRN refusal of court ordered meds

## 2023-08-17 NOTE — BH INPATIENT PSYCHIATRY PROGRESS NOTE - NSBHASSESSSUMMFT_PSY_ALL_CORE
This is a 53 year old woman, currently living at [facility] on [place] grounds, PMH of GERD, anemia, PPH of schizophrenia, selective mutism, currently with [facility] team, previously with [facility] which , with history of psychiatric hospitalization(s), without known history of self-injury or suicide attempts, with history of aggression in the setting on nonadherence with medication, who is brought in by staff at residence for increasing agitation and aggression in the setting of 2 months of medication nonadherence.     Patient presents as hostile, disorganized, threatening, malodorous. Consistent with chronic schizophrenia with worsening aggression and disorganization iso med nonadherence.   Continues to refuse all medications in the hospital, is intermittently hostile with the team and selectively mute.  Remains malodorous, refusing ADLs.     State application in process.     Plan:  Legals:  	- continue with 9.27 involuntary admission   	- TOO obtained 8/15  Safety:  	- routine obs, bed block for aggression, currently ADLs are VERY poor  	- haldol 10mg/ativan 2mg/benadryl 50mg PO/IM for agitation  Psychiatric:  	- outpatient psychiatric medications: Haldol 10mg PO BID, Valproic acid 250mg 2 caps BID, Cogentin 2mg BID.   	- if patient refuses court ordered medications: haldol 10mg, ativan 2mg, benadryl 50mg IM  	- patient received loading dose of haldol dec 100mg today , next dose next week  Medical:   	- Nexium 40mg, Feosol 300mg daily, Vit D3 1000U, Senokot 8.6mg qhs  Dispo:  	- plan for state application vs returning to [facility]  	- AOT is under investigation,  in   	- no family involved in care

## 2023-08-18 PROCEDURE — 99231 SBSQ HOSP IP/OBS SF/LOW 25: CPT | Mod: GC

## 2023-08-18 RX ADMIN — Medication 2 MILLIGRAM(S): at 10:24

## 2023-08-18 RX ADMIN — HALOPERIDOL DECANOATE 10 MILLIGRAM(S): 100 INJECTION INTRAMUSCULAR at 10:24

## 2023-08-18 RX ADMIN — Medication 2 MILLIGRAM(S): at 21:14

## 2023-08-18 RX ADMIN — HALOPERIDOL DECANOATE 10 MILLIGRAM(S): 100 INJECTION INTRAMUSCULAR at 21:14

## 2023-08-18 RX ADMIN — Medication 50 MILLIGRAM(S): at 10:25

## 2023-08-18 RX ADMIN — Medication 50 MILLIGRAM(S): at 21:14

## 2023-08-18 NOTE — BH INPATIENT PSYCHIATRY PROGRESS NOTE - PRN MEDS
MEDICATIONS  (PRN):  diphenhydrAMINE 50 milliGRAM(s) Oral every 4 hours PRN agitation  diphenhydrAMINE Injectable 50 milliGRAM(s) IntraMuscular once PRN Agitation  diphenhydrAMINE Injectable 50 milliGRAM(s) IntraMuscular two times a day PRN EPS ppx with IM Hadol  haloperidol     Tablet 5 milliGRAM(s) Oral every 4 hours PRN agitation  haloperidol    Injectable 7.5 milliGRAM(s) IntraMuscular once PRN psychotic agitation  haloperidol    Injectable 10 milliGRAM(s) IntraMuscular two times a day PRN ONLY if refusing court ordered PO Haldol  haloperidol    Injectable 10 milliGRAM(s) IntraMuscular once PRN agitation  LORazepam     Tablet 2 milliGRAM(s) Oral every 4 hours PRN Agitation  LORazepam   Injectable 3 milliGRAM(s) IntraMuscular once PRN severe agitation  LORazepam   Injectable 2 milliGRAM(s) IntraMuscular two times a day PRN refusal of court ordered meds

## 2023-08-18 NOTE — BH INPATIENT PSYCHIATRY PROGRESS NOTE - NSBHASSESSSUMMFT_PSY_ALL_CORE
This is a 53 year old woman, currently living at [facility] on [place] grounds, PMH of GERD, anemia, PPH of schizophrenia, selective mutism, currently with [facility] ACT team, previously with [facility] which , with history of psychiatric hospitalization(s), without known history of self-injury or suicide attempts, with history of aggression in the setting on nonadherence with medication, who is brought in by staff at residence for increasing agitation and aggression in the setting of 2 months of medication nonadherence.     Patient presents as hostile, disorganized, threatening, malodorous. Consistent with chronic schizophrenia with worsening aggression and disorganization iso med nonadherence.   Continues to refuse all medications in the hospital, is intermittently hostile with the team and selectively mute.  Remains malodorous, refusing ADLs.     State application in process.     Plan:  Legals:  	- continue with 9.27 involuntary admission   	- TOO obtained 8/15  Safety:  	- routine obs, bed block for aggression, currently ADLs are VERY poor  	- haldol 10mg/ativan 2mg/benadryl 50mg PO/IM for agitation  Psychiatric:  	- outpatient psychiatric medications: Haldol 10mg PO BID, Valproic acid 250mg 2 caps BID, Cogentin 2mg BID.   	- if patient refuses court ordered medications: haldol 10mg, ativan 2mg, benadryl 50mg IM  	- patient received loading dose of haldol dec 100mg today , next dose next week  Medical:   	- Nexium 40mg, Feosol 300mg daily, Vit D3 1000U, Senokot 8.6mg qhs  Dispo:  	- plan for state application vs returning to [facility]  	- AOT is under investigation,  in   	- no family involved in care

## 2023-08-18 NOTE — BH INPATIENT PSYCHIATRY PROGRESS NOTE - CURRENT MEDICATION
MEDICATIONS  (STANDING):  benztropine 2 milliGRAM(s) Oral <User Schedule>  cholecalciferol 1000 Unit(s) Oral <User Schedule>  ferrous    sulfate Liquid 300 milliGRAM(s) Oral with breakfast  haloperidol     Tablet 10 milliGRAM(s) Oral <User Schedule>  pantoprazole    Tablet 40 milliGRAM(s) Oral before breakfast  polyethylene glycol 3350 17 Gram(s) Oral at bedtime  valproic acid 500 milliGRAM(s) Oral <User Schedule>    MEDICATIONS  (PRN):  diphenhydrAMINE 50 milliGRAM(s) Oral every 4 hours PRN agitation  diphenhydrAMINE Injectable 50 milliGRAM(s) IntraMuscular once PRN Agitation  diphenhydrAMINE Injectable 50 milliGRAM(s) IntraMuscular two times a day PRN EPS ppx with IM Hadol  haloperidol     Tablet 5 milliGRAM(s) Oral every 4 hours PRN agitation  haloperidol    Injectable 7.5 milliGRAM(s) IntraMuscular once PRN psychotic agitation  haloperidol    Injectable 10 milliGRAM(s) IntraMuscular two times a day PRN ONLY if refusing court ordered PO Haldol  haloperidol    Injectable 10 milliGRAM(s) IntraMuscular once PRN agitation  LORazepam     Tablet 2 milliGRAM(s) Oral every 4 hours PRN Agitation  LORazepam   Injectable 3 milliGRAM(s) IntraMuscular once PRN severe agitation  LORazepam   Injectable 2 milliGRAM(s) IntraMuscular two times a day PRN refusal of court ordered meds

## 2023-08-18 NOTE — BH INPATIENT PSYCHIATRY PROGRESS NOTE - NSBHFUPINTERVALHXFT_PSY_A_CORE
Follow-up for aggression/psychosis. Pt. w/ bed block for aggression. Refuses vitals and labs. Last night patient received IM haldol 10/benadryl 50 for refusing court ordered medications.   This morning patient was in bed, naked under the blanket, smells strongly of urine. Refused to engage or respond.      Follow-up for aggression/psychosis. Pt. w/ bed block for aggression. Refuses vitals and labs. Last night patient received IM haldol 10/benadryl 50 for refusing court ordered medications.   This morning patient was in bed, naked under the blanket, smells strongly of urine. Refused to engage or respond.   Psych emergency called to medicate patient this morning. When staff attempted to get patient to stand up so they could change out the mattress patient kicked staff and repeatedly stated "go die". Patient received IM haldol 10/ativan 2/benadryl 50 and her bedding was changed with assistance. Staff encouraged patient to shower.

## 2023-08-19 RX ADMIN — HALOPERIDOL DECANOATE 10 MILLIGRAM(S): 100 INJECTION INTRAMUSCULAR at 09:06

## 2023-08-19 RX ADMIN — Medication 2 MILLIGRAM(S): at 21:49

## 2023-08-19 RX ADMIN — Medication 50 MILLIGRAM(S): at 09:05

## 2023-08-19 RX ADMIN — HALOPERIDOL DECANOATE 10 MILLIGRAM(S): 100 INJECTION INTRAMUSCULAR at 21:48

## 2023-08-19 RX ADMIN — Medication 50 MILLIGRAM(S): at 21:48

## 2023-08-19 RX ADMIN — Medication 2 MILLIGRAM(S): at 09:05

## 2023-08-20 RX ADMIN — Medication 2 MILLIGRAM(S): at 21:41

## 2023-08-20 RX ADMIN — Medication 50 MILLIGRAM(S): at 09:29

## 2023-08-20 RX ADMIN — Medication 50 MILLIGRAM(S): at 21:40

## 2023-08-20 RX ADMIN — Medication 2 MILLIGRAM(S): at 09:29

## 2023-08-20 RX ADMIN — HALOPERIDOL DECANOATE 10 MILLIGRAM(S): 100 INJECTION INTRAMUSCULAR at 09:29

## 2023-08-20 RX ADMIN — HALOPERIDOL DECANOATE 10 MILLIGRAM(S): 100 INJECTION INTRAMUSCULAR at 21:40

## 2023-08-21 PROCEDURE — 99231 SBSQ HOSP IP/OBS SF/LOW 25: CPT | Mod: GC

## 2023-08-21 RX ADMIN — HALOPERIDOL DECANOATE 10 MILLIGRAM(S): 100 INJECTION INTRAMUSCULAR at 21:23

## 2023-08-21 RX ADMIN — Medication 50 MILLIGRAM(S): at 10:19

## 2023-08-21 RX ADMIN — Medication 50 MILLIGRAM(S): at 21:24

## 2023-08-21 RX ADMIN — Medication 2 MILLIGRAM(S): at 10:19

## 2023-08-21 RX ADMIN — Medication 2 MILLIGRAM(S): at 21:23

## 2023-08-21 RX ADMIN — HALOPERIDOL DECANOATE 10 MILLIGRAM(S): 100 INJECTION INTRAMUSCULAR at 10:19

## 2023-08-21 NOTE — BH INPATIENT PSYCHIATRY PROGRESS NOTE - NSBHFUPINTERVALHXFT_PSY_A_CORE
Follow-up for aggression/psychosis. Pt. w/ bed block for aggression. Refuses vitals and labs. Last night patient received IM haldol 10/benadryl 50 for refusing court ordered medications.   This morning patient is dressed, lying in bed, malodorous. On assessment the patient repeatedly stated "get out".   Still receiving haldol/ativan/benadryl IM BID, refusing PO medications.

## 2023-08-21 NOTE — BH INPATIENT PSYCHIATRY PROGRESS NOTE - NSBHASSESSSUMMFT_PSY_ALL_CORE
This is a 53 year old woman, currently living at [facility] on [place] grounds, PMH of GERD, anemia, PPH of schizophrenia, selective mutism, currently with [facility] ACT team, previously with [facility] which , with history of psychiatric hospitalization(s), without known history of self-injury or suicide attempts, with history of aggression in the setting on nonadherence with medication, who is brought in by staff at residence for increasing agitation and aggression in the setting of 2 months of medication nonadherence.     Patient presents as hostile, disorganized, threatening, malodorous. Consistent with chronic schizophrenia with worsening aggression and disorganization iso med nonadherence.   Continues to refuse all medications in the hospital, is intermittently hostile with the team and selectively mute.  Remains malodorous, refusing ADLs.     State application in process.     Plan:  Legals:  	- continue with 9.27 involuntary admission   	- TOO obtained 8/15  Safety:  	- routine obs, bed block for aggression, currently ADLs are VERY poor  	- haldol 10mg/ativan 2mg/benadryl 50mg PO/IM for agitation  Psychiatric:  	- outpatient psychiatric medications: Haldol 10mg PO BID, Valproic acid 250mg 2 caps BID, Cogentin 2mg BID.   	- if patient refuses court ordered medications: haldol 10mg, ativan 2mg, benadryl 50mg IM  	- patient received loading dose of haldol dec 100mg , next dose this week  Medical:   	- Nexium 40mg, Feosol 300mg daily, Vit D3 1000U, Senokot 8.6mg qhs  Dispo:  	- plan for state application vs returning to [facility]  	- AOT is under investigation,  in   	- no family involved in care

## 2023-08-21 NOTE — BH INPATIENT PSYCHIATRY PROGRESS NOTE - PRN MEDS
MEDICATIONS  (PRN):  diphenhydrAMINE 50 milliGRAM(s) Oral every 4 hours PRN agitation  diphenhydrAMINE Injectable 50 milliGRAM(s) IntraMuscular two times a day PRN EPS ppx with IM Hadol  diphenhydrAMINE Injectable 50 milliGRAM(s) IntraMuscular once PRN Agitation  haloperidol     Tablet 5 milliGRAM(s) Oral every 4 hours PRN agitation  haloperidol    Injectable 10 milliGRAM(s) IntraMuscular two times a day PRN ONLY if refusing court ordered PO Haldol  haloperidol    Injectable 10 milliGRAM(s) IntraMuscular once PRN agitation  haloperidol    Injectable 7.5 milliGRAM(s) IntraMuscular once PRN psychotic agitation  LORazepam     Tablet 2 milliGRAM(s) Oral every 4 hours PRN severe agitation  LORazepam   Injectable 3 milliGRAM(s) IntraMuscular once PRN severe agitation  LORazepam   Injectable 2 milliGRAM(s) IntraMuscular two times a day PRN refusal of court ordered meds   MEDICATIONS  (PRN):  diphenhydrAMINE 50 milliGRAM(s) Oral every 4 hours PRN agitation  diphenhydrAMINE Injectable 50 milliGRAM(s) IntraMuscular two times a day PRN EPS ppx with IM Hadol  diphenhydrAMINE Injectable 50 milliGRAM(s) IntraMuscular once PRN Agitation  haloperidol     Tablet 5 milliGRAM(s) Oral every 4 hours PRN agitation  haloperidol    Injectable 10 milliGRAM(s) IntraMuscular once PRN agitation  haloperidol    Injectable 7.5 milliGRAM(s) IntraMuscular once PRN psychotic agitation  haloperidol    Injectable 10 milliGRAM(s) IntraMuscular two times a day PRN ONLY if refusing court ordered PO Haldol  LORazepam     Tablet 2 milliGRAM(s) Oral every 4 hours PRN severe agitation  LORazepam   Injectable 3 milliGRAM(s) IntraMuscular once PRN severe agitation  LORazepam   Injectable 2 milliGRAM(s) IntraMuscular two times a day PRN refusal of court ordered meds

## 2023-08-21 NOTE — BH INPATIENT PSYCHIATRY PROGRESS NOTE - CURRENT MEDICATION
MEDICATIONS  (STANDING):  benztropine 2 milliGRAM(s) Oral <User Schedule>  cholecalciferol 1000 Unit(s) Oral <User Schedule>  ferrous    sulfate Liquid 300 milliGRAM(s) Oral with breakfast  haloperidol     Tablet 10 milliGRAM(s) Oral <User Schedule>  pantoprazole    Tablet 40 milliGRAM(s) Oral before breakfast  polyethylene glycol 3350 17 Gram(s) Oral at bedtime  valproic acid 500 milliGRAM(s) Oral <User Schedule>    MEDICATIONS  (PRN):  diphenhydrAMINE 50 milliGRAM(s) Oral every 4 hours PRN agitation  diphenhydrAMINE Injectable 50 milliGRAM(s) IntraMuscular two times a day PRN EPS ppx with IM Hadol  diphenhydrAMINE Injectable 50 milliGRAM(s) IntraMuscular once PRN Agitation  haloperidol     Tablet 5 milliGRAM(s) Oral every 4 hours PRN agitation  haloperidol    Injectable 10 milliGRAM(s) IntraMuscular two times a day PRN ONLY if refusing court ordered PO Haldol  haloperidol    Injectable 10 milliGRAM(s) IntraMuscular once PRN agitation  haloperidol    Injectable 7.5 milliGRAM(s) IntraMuscular once PRN psychotic agitation  LORazepam     Tablet 2 milliGRAM(s) Oral every 4 hours PRN severe agitation  LORazepam   Injectable 3 milliGRAM(s) IntraMuscular once PRN severe agitation  LORazepam   Injectable 2 milliGRAM(s) IntraMuscular two times a day PRN refusal of court ordered meds   MEDICATIONS  (STANDING):  benztropine 2 milliGRAM(s) Oral <User Schedule>  cholecalciferol 1000 Unit(s) Oral <User Schedule>  ferrous    sulfate Liquid 300 milliGRAM(s) Oral with breakfast  haloperidol     Tablet 10 milliGRAM(s) Oral <User Schedule>  pantoprazole    Tablet 40 milliGRAM(s) Oral before breakfast  polyethylene glycol 3350 17 Gram(s) Oral at bedtime  valproic acid 500 milliGRAM(s) Oral <User Schedule>    MEDICATIONS  (PRN):  diphenhydrAMINE 50 milliGRAM(s) Oral every 4 hours PRN agitation  diphenhydrAMINE Injectable 50 milliGRAM(s) IntraMuscular two times a day PRN EPS ppx with IM Hadol  diphenhydrAMINE Injectable 50 milliGRAM(s) IntraMuscular once PRN Agitation  haloperidol     Tablet 5 milliGRAM(s) Oral every 4 hours PRN agitation  haloperidol    Injectable 10 milliGRAM(s) IntraMuscular once PRN agitation  haloperidol    Injectable 7.5 milliGRAM(s) IntraMuscular once PRN psychotic agitation  haloperidol    Injectable 10 milliGRAM(s) IntraMuscular two times a day PRN ONLY if refusing court ordered PO Haldol  LORazepam     Tablet 2 milliGRAM(s) Oral every 4 hours PRN severe agitation  LORazepam   Injectable 3 milliGRAM(s) IntraMuscular once PRN severe agitation  LORazepam   Injectable 2 milliGRAM(s) IntraMuscular two times a day PRN refusal of court ordered meds

## 2023-08-22 PROCEDURE — 99231 SBSQ HOSP IP/OBS SF/LOW 25: CPT | Mod: GC

## 2023-08-22 RX ORDER — HALOPERIDOL DECANOATE 100 MG/ML
100 INJECTION INTRAMUSCULAR ONCE
Refills: 0 | Status: COMPLETED | OUTPATIENT
Start: 2023-08-23 | End: 2023-08-23

## 2023-08-22 RX ADMIN — Medication 50 MILLIGRAM(S): at 21:31

## 2023-08-22 RX ADMIN — Medication 2 MILLIGRAM(S): at 08:45

## 2023-08-22 RX ADMIN — Medication 2 MILLIGRAM(S): at 21:31

## 2023-08-22 RX ADMIN — HALOPERIDOL DECANOATE 10 MILLIGRAM(S): 100 INJECTION INTRAMUSCULAR at 08:45

## 2023-08-22 RX ADMIN — HALOPERIDOL DECANOATE 10 MILLIGRAM(S): 100 INJECTION INTRAMUSCULAR at 21:30

## 2023-08-22 RX ADMIN — Medication 50 MILLIGRAM(S): at 08:45

## 2023-08-22 NOTE — BH INPATIENT PSYCHIATRY PROGRESS NOTE - CURRENT MEDICATION
MEDICATIONS  (STANDING):  benztropine 2 milliGRAM(s) Oral <User Schedule>  cholecalciferol 1000 Unit(s) Oral <User Schedule>  ferrous    sulfate Liquid 300 milliGRAM(s) Oral with breakfast  haloperidol     Tablet 10 milliGRAM(s) Oral <User Schedule>  pantoprazole    Tablet 40 milliGRAM(s) Oral before breakfast  polyethylene glycol 3350 17 Gram(s) Oral at bedtime  valproic acid 500 milliGRAM(s) Oral <User Schedule>    MEDICATIONS  (PRN):  diphenhydrAMINE 50 milliGRAM(s) Oral every 4 hours PRN agitation  diphenhydrAMINE Injectable 50 milliGRAM(s) IntraMuscular two times a day PRN EPS ppx with IM Hadol  diphenhydrAMINE Injectable 50 milliGRAM(s) IntraMuscular once PRN Agitation  haloperidol     Tablet 5 milliGRAM(s) Oral every 4 hours PRN agitation  haloperidol    Injectable 10 milliGRAM(s) IntraMuscular two times a day PRN ONLY if refusing court ordered PO Haldol  haloperidol    Injectable 7.5 milliGRAM(s) IntraMuscular once PRN psychotic agitation  haloperidol    Injectable 10 milliGRAM(s) IntraMuscular once PRN agitation  LORazepam     Tablet 2 milliGRAM(s) Oral every 4 hours PRN severe agitation  LORazepam   Injectable 2 milliGRAM(s) IntraMuscular two times a day PRN refusal of court ordered meds  LORazepam   Injectable 3 milliGRAM(s) IntraMuscular once PRN severe agitation   MEDICATIONS  (STANDING):  benztropine 2 milliGRAM(s) Oral <User Schedule>  cholecalciferol 1000 Unit(s) Oral <User Schedule>  ferrous    sulfate Liquid 300 milliGRAM(s) Oral with breakfast  haloperidol     Tablet 10 milliGRAM(s) Oral <User Schedule>  pantoprazole    Tablet 40 milliGRAM(s) Oral before breakfast  polyethylene glycol 3350 17 Gram(s) Oral at bedtime  valproic acid 500 milliGRAM(s) Oral <User Schedule>    MEDICATIONS  (PRN):  diphenhydrAMINE 50 milliGRAM(s) Oral every 4 hours PRN agitation  diphenhydrAMINE Injectable 50 milliGRAM(s) IntraMuscular two times a day PRN EPS ppx with IM Hadol  diphenhydrAMINE Injectable 50 milliGRAM(s) IntraMuscular once PRN Agitation  haloperidol     Tablet 5 milliGRAM(s) Oral every 4 hours PRN agitation  haloperidol    Injectable 10 milliGRAM(s) IntraMuscular two times a day PRN ONLY if refusing court ordered PO Haldol  haloperidol    Injectable 10 milliGRAM(s) IntraMuscular once PRN agitation  haloperidol    Injectable 7.5 milliGRAM(s) IntraMuscular once PRN psychotic agitation  LORazepam     Tablet 2 milliGRAM(s) Oral every 4 hours PRN severe agitation  LORazepam   Injectable 2 milliGRAM(s) IntraMuscular two times a day PRN refusal of court ordered meds  LORazepam   Injectable 3 milliGRAM(s) IntraMuscular once PRN severe agitation

## 2023-08-22 NOTE — BH INPATIENT PSYCHIATRY PROGRESS NOTE - PRN MEDS
MEDICATIONS  (PRN):  diphenhydrAMINE 50 milliGRAM(s) Oral every 4 hours PRN agitation  diphenhydrAMINE Injectable 50 milliGRAM(s) IntraMuscular two times a day PRN EPS ppx with IM Hadol  diphenhydrAMINE Injectable 50 milliGRAM(s) IntraMuscular once PRN Agitation  haloperidol     Tablet 5 milliGRAM(s) Oral every 4 hours PRN agitation  haloperidol    Injectable 10 milliGRAM(s) IntraMuscular two times a day PRN ONLY if refusing court ordered PO Haldol  haloperidol    Injectable 7.5 milliGRAM(s) IntraMuscular once PRN psychotic agitation  haloperidol    Injectable 10 milliGRAM(s) IntraMuscular once PRN agitation  LORazepam     Tablet 2 milliGRAM(s) Oral every 4 hours PRN severe agitation  LORazepam   Injectable 2 milliGRAM(s) IntraMuscular two times a day PRN refusal of court ordered meds  LORazepam   Injectable 3 milliGRAM(s) IntraMuscular once PRN severe agitation   MEDICATIONS  (PRN):  diphenhydrAMINE 50 milliGRAM(s) Oral every 4 hours PRN agitation  diphenhydrAMINE Injectable 50 milliGRAM(s) IntraMuscular two times a day PRN EPS ppx with IM Hadol  diphenhydrAMINE Injectable 50 milliGRAM(s) IntraMuscular once PRN Agitation  haloperidol     Tablet 5 milliGRAM(s) Oral every 4 hours PRN agitation  haloperidol    Injectable 10 milliGRAM(s) IntraMuscular two times a day PRN ONLY if refusing court ordered PO Haldol  haloperidol    Injectable 10 milliGRAM(s) IntraMuscular once PRN agitation  haloperidol    Injectable 7.5 milliGRAM(s) IntraMuscular once PRN psychotic agitation  LORazepam     Tablet 2 milliGRAM(s) Oral every 4 hours PRN severe agitation  LORazepam   Injectable 2 milliGRAM(s) IntraMuscular two times a day PRN refusal of court ordered meds  LORazepam   Injectable 3 milliGRAM(s) IntraMuscular once PRN severe agitation

## 2023-08-22 NOTE — BH INPATIENT PSYCHIATRY PROGRESS NOTE - NSBHASSESSSUMMFT_PSY_ALL_CORE
This is a 53 year old woman, currently living at [facility] on [place] grounds, PMH of GERD, anemia, PPH of schizophrenia, selective mutism, currently with [facility], previously with [facility] which , with history of psychiatric hospitalization(s), without known history of self-injury or suicide attempts, with history of aggression in the setting on nonadherence with medication, who is brought in by staff at residence for increasing agitation and aggression in the setting of 2 months of medication nonadherence.     Patient presents as hostile, disorganized, threatening, malodorous. Consistent with chronic schizophrenia with worsening aggression and disorganization iso med nonadherence.   Continues to refuse all medications in the hospital, is intermittently hostile with the team and selectively mute.  Remains malodorous, refusing ADLs.     State application in process.     Plan:  Legals:  	- continue with 9.27 involuntary admission   	- TOO obtained 8/15  Safety:  	- routine obs, bed block for aggression, currently ADLs are VERY poor  	- haldol 10mg/ativan 2mg/benadryl 50mg PO/IM for agitation  Psychiatric:  	- outpatient psychiatric medications: Haldol 10mg PO BID, Valproic acid 250mg 2 caps BID, Cogentin 2mg BID.   	- if patient refuses court ordered medications: haldol 10mg, ativan 2mg, benadryl 50mg IM  	- s/p loading dose of haldol dec 100mg , next tomorrow  Medical:   	- Nexium 40mg, Feosol 300mg daily, Vit D3 1000U, Senokot 8.6mg qhs  Dispo:  	- plan for state application vs returning to [facility]  	- AOT is under investigation,  in   	- no family involved in care

## 2023-08-22 NOTE — BH INPATIENT PSYCHIATRY PROGRESS NOTE - NSBHFUPINTERVALHXFT_PSY_A_CORE
Follow-up for aggression/psychosis. Pt. w/ bed block for aggression. Refuses vitals and labs. Continues to receive BID IM haldol 10/ativan 2/benadryl 50 for refusing court ordered medications.   This morning patient is dressed, lying in bed, malodorous, mute. Refuses to talk to staff.

## 2023-08-23 PROCEDURE — 99231 SBSQ HOSP IP/OBS SF/LOW 25: CPT

## 2023-08-23 RX ADMIN — Medication 50 MILLIGRAM(S): at 20:56

## 2023-08-23 RX ADMIN — Medication 2 MILLIGRAM(S): at 20:56

## 2023-08-23 RX ADMIN — HALOPERIDOL DECANOATE 10 MILLIGRAM(S): 100 INJECTION INTRAMUSCULAR at 08:53

## 2023-08-23 RX ADMIN — HALOPERIDOL DECANOATE 10 MILLIGRAM(S): 100 INJECTION INTRAMUSCULAR at 20:57

## 2023-08-23 RX ADMIN — Medication 2 MILLIGRAM(S): at 08:53

## 2023-08-23 RX ADMIN — Medication 50 MILLIGRAM(S): at 08:54

## 2023-08-23 RX ADMIN — HALOPERIDOL DECANOATE 100 MILLIGRAM(S): 100 INJECTION INTRAMUSCULAR at 08:54

## 2023-08-23 NOTE — BH INPATIENT PSYCHIATRY PROGRESS NOTE - CURRENT MEDICATION
MEDICATIONS  (STANDING):  benztropine 2 milliGRAM(s) Oral <User Schedule>  cholecalciferol 1000 Unit(s) Oral <User Schedule>  ferrous    sulfate Liquid 300 milliGRAM(s) Oral with breakfast  haloperidol     Tablet 10 milliGRAM(s) Oral <User Schedule>  pantoprazole    Tablet 40 milliGRAM(s) Oral before breakfast  polyethylene glycol 3350 17 Gram(s) Oral at bedtime  valproic acid 500 milliGRAM(s) Oral <User Schedule>    MEDICATIONS  (PRN):  diphenhydrAMINE 50 milliGRAM(s) Oral every 4 hours PRN agitation  diphenhydrAMINE Injectable 50 milliGRAM(s) IntraMuscular two times a day PRN EPS ppx with IM Hadol  diphenhydrAMINE Injectable 50 milliGRAM(s) IntraMuscular once PRN Agitation  haloperidol     Tablet 5 milliGRAM(s) Oral every 4 hours PRN agitation  haloperidol    Injectable 10 milliGRAM(s) IntraMuscular two times a day PRN ONLY if refusing court ordered PO Haldol  haloperidol    Injectable 7.5 milliGRAM(s) IntraMuscular once PRN psychotic agitation  haloperidol    Injectable 10 milliGRAM(s) IntraMuscular once PRN agitation  LORazepam     Tablet 2 milliGRAM(s) Oral every 4 hours PRN severe agitation  LORazepam   Injectable 3 milliGRAM(s) IntraMuscular once PRN severe agitation  LORazepam   Injectable 2 milliGRAM(s) IntraMuscular two times a day PRN refusal of court ordered meds

## 2023-08-23 NOTE — BH INPATIENT PSYCHIATRY PROGRESS NOTE - PRN MEDS
MEDICATIONS  (PRN):  diphenhydrAMINE 50 milliGRAM(s) Oral every 4 hours PRN agitation  diphenhydrAMINE Injectable 50 milliGRAM(s) IntraMuscular two times a day PRN EPS ppx with IM Hadol  diphenhydrAMINE Injectable 50 milliGRAM(s) IntraMuscular once PRN Agitation  haloperidol     Tablet 5 milliGRAM(s) Oral every 4 hours PRN agitation  haloperidol    Injectable 10 milliGRAM(s) IntraMuscular two times a day PRN ONLY if refusing court ordered PO Haldol  haloperidol    Injectable 7.5 milliGRAM(s) IntraMuscular once PRN psychotic agitation  haloperidol    Injectable 10 milliGRAM(s) IntraMuscular once PRN agitation  LORazepam     Tablet 2 milliGRAM(s) Oral every 4 hours PRN severe agitation  LORazepam   Injectable 3 milliGRAM(s) IntraMuscular once PRN severe agitation  LORazepam   Injectable 2 milliGRAM(s) IntraMuscular two times a day PRN refusal of court ordered meds

## 2023-08-23 NOTE — BH INPATIENT PSYCHIATRY PROGRESS NOTE - NSBHASSESSSUMMFT_PSY_ALL_CORE
This is a 53 year old woman, currently living at [facility] on [place] grounds, PMH of GERD, anemia, PPH of schizophrenia, selective mutism, [facility], previously with [facility] which , with history of psychiatric hospitalization(s), without known history of self-injury or suicide attempts, with history of aggression in the setting on nonadherence with medication, who is brought in by staff at residence for increasing agitation and aggression in the setting of 2 months of medication nonadherence.     Patient presents as hostile, disorganized, threatening, malodorous. Consistent with chronic schizophrenia with worsening aggression and disorganization iso med nonadherence.   Continues to refuse all medications in the hospital, is intermittently hostile with the team and selectively mute.  Remains malodorous, refusing ADLs.     State application in process.     Plan:  Legals:  	- continue with 9.27 involuntary admission   	- TOO obtained 8/15  Safety:  	- routine obs, bed block for aggression, currently ADLs are VERY poor  	- haldol 10mg/ativan 2mg/benadryl 50mg PO/IM for agitation  Psychiatric:  	- outpatient psychiatric medications: Haldol 10mg PO BID, Valproic acid 250mg 2 caps BID, Cogentin 2mg BID.   	- if patient refuses court ordered medications: haldol 10mg, ativan 2mg, benadryl 50mg IM  	- s/p loading dose of haldol dec 100mg , next tomorrow  Medical:   	- Nexium 40mg, Feosol 300mg daily, Vit D3 1000U, Senokot 8.6mg qhs  Dispo:  	- plan for state application vs returning to [facility]  	- AOT is under investigation,  in   	- no family involved in care

## 2023-08-23 NOTE — BH INPATIENT PSYCHIATRY PROGRESS NOTE - NSBHFUPINTERVALHXFT_PSY_A_CORE
Follow-up for aggression/psychosis. Pt. w/ bed block for aggression. Refuses vitals and labs. Continues to receive BID IM haldol 10/ativan 2/benadryl 50 for refusing court ordered medications.   This afternoon patient is dressed, lying in bed, malodorous, mute. Refuses to talk to staff. Turns in bed when I knock and enter, looks at me, and refuses to speak.

## 2023-08-24 PROCEDURE — 99233 SBSQ HOSP IP/OBS HIGH 50: CPT

## 2023-08-24 RX ADMIN — Medication 2 MILLIGRAM(S): at 21:03

## 2023-08-24 RX ADMIN — Medication 50 MILLIGRAM(S): at 21:02

## 2023-08-24 RX ADMIN — Medication 50 MILLIGRAM(S): at 09:23

## 2023-08-24 RX ADMIN — HALOPERIDOL DECANOATE 10 MILLIGRAM(S): 100 INJECTION INTRAMUSCULAR at 21:03

## 2023-08-24 RX ADMIN — Medication 2 MILLIGRAM(S): at 09:23

## 2023-08-24 RX ADMIN — HALOPERIDOL DECANOATE 10 MILLIGRAM(S): 100 INJECTION INTRAMUSCULAR at 09:23

## 2023-08-24 NOTE — BH INPATIENT PSYCHIATRY PROGRESS NOTE - CURRENT MEDICATION
MEDICATIONS  (STANDING):  benztropine 2 milliGRAM(s) Oral <User Schedule>  cholecalciferol 1000 Unit(s) Oral <User Schedule>  ferrous    sulfate Liquid 300 milliGRAM(s) Oral with breakfast  haloperidol     Tablet 10 milliGRAM(s) Oral <User Schedule>  pantoprazole    Tablet 40 milliGRAM(s) Oral before breakfast  polyethylene glycol 3350 17 Gram(s) Oral at bedtime  valproic acid 500 milliGRAM(s) Oral <User Schedule>    MEDICATIONS  (PRN):  diphenhydrAMINE 50 milliGRAM(s) Oral every 4 hours PRN agitation  diphenhydrAMINE Injectable 50 milliGRAM(s) IntraMuscular once PRN Agitation  diphenhydrAMINE Injectable 50 milliGRAM(s) IntraMuscular two times a day PRN EPS ppx with IM Hadol  haloperidol     Tablet 5 milliGRAM(s) Oral every 4 hours PRN agitation  haloperidol    Injectable 10 milliGRAM(s) IntraMuscular two times a day PRN ONLY if refusing court ordered PO Haldol  haloperidol    Injectable 7.5 milliGRAM(s) IntraMuscular once PRN psychotic agitation  haloperidol    Injectable 10 milliGRAM(s) IntraMuscular once PRN agitation  LORazepam     Tablet 2 milliGRAM(s) Oral every 4 hours PRN severe agitation  LORazepam   Injectable 2 milliGRAM(s) IntraMuscular two times a day PRN refusal of court ordered meds  LORazepam   Injectable 3 milliGRAM(s) IntraMuscular once PRN severe agitation

## 2023-08-24 NOTE — BH INPATIENT PSYCHIATRY PROGRESS NOTE - NSBHASSESSSUMMFT_PSY_ALL_CORE
This is a 53 year old woman, currently living at [facility] on [place] grounds, PMH of GERD, anemia, PPH of schizophrenia, selective mutism, [facility], previously with [facility] which , with history of psychiatric hospitalization(s), without known history of self-injury or suicide attempts, with history of aggression in the setting on nonadherence with medication, who is brought in by staff at residence for increasing agitation and aggression in the setting of 2 months of medication nonadherence.     Patient presents as hostile, disorganized, threatening, malodorous. Consistent with chronic schizophrenia with worsening aggression and disorganization iso med nonadherence.   Continues to refuse all medications in the hospital, is intermittently hostile with the team and selectively mute.  Remains malodorous, refusing ADLs.     State application in process.     Plan:  Legals:  	- continue with 9.27 involuntary admission   	- TOO obtained 8/15  Safety:  	- routine obs, bed block for aggression, currently ADLs are VERY poor  	- haldol 10mg/ativan 2mg/benadryl 50mg PO/IM for agitation  Psychiatric:  	- outpatient psychiatric medications: Haldol 10mg PO BID, Valproic acid 250mg 2 caps BID, Cogentin 2mg BID.   	- if patient refuses court ordered medications: haldol 10mg, ativan 2mg, benadryl 50mg IM  	- s/p loading dose of haldol dec 100mg , next tomorrow  Medical:   	- Nexium 40mg, Feosol 300mg daily, Vit D3 1000U, Senokot 8.6mg qhs  Dispo:  	- plan for state application vs returning to [facility]  	- AOT is under investigation,  in   	- no family involved in care

## 2023-08-24 NOTE — BH INPATIENT PSYCHIATRY PROGRESS NOTE - PRN MEDS
MEDICATIONS  (PRN):  diphenhydrAMINE 50 milliGRAM(s) Oral every 4 hours PRN agitation  diphenhydrAMINE Injectable 50 milliGRAM(s) IntraMuscular once PRN Agitation  diphenhydrAMINE Injectable 50 milliGRAM(s) IntraMuscular two times a day PRN EPS ppx with IM Hadol  haloperidol     Tablet 5 milliGRAM(s) Oral every 4 hours PRN agitation  haloperidol    Injectable 10 milliGRAM(s) IntraMuscular two times a day PRN ONLY if refusing court ordered PO Haldol  haloperidol    Injectable 7.5 milliGRAM(s) IntraMuscular once PRN psychotic agitation  haloperidol    Injectable 10 milliGRAM(s) IntraMuscular once PRN agitation  LORazepam     Tablet 2 milliGRAM(s) Oral every 4 hours PRN severe agitation  LORazepam   Injectable 2 milliGRAM(s) IntraMuscular two times a day PRN refusal of court ordered meds  LORazepam   Injectable 3 milliGRAM(s) IntraMuscular once PRN severe agitation

## 2023-08-24 NOTE — BH INPATIENT PSYCHIATRY PROGRESS NOTE - NSBHFUPINTERVALHXFT_PSY_A_CORE
Follow-up for aggression/psychosis. Pt. w/ bed block for aggression. Refuses vitals and labs. Continues to receive BID IM haldol 10/ativan 2/benadryl 50 for refusing court ordered medications.   This morning patient is dressed, lying in bed, malodorous, mute. Refuses to talk to staff. Seen while being given court ordered IM medications.  States all the staff will die.  Refuses to say any more.  Approached a few minutes later with staff and she denies she is in the hospital, saying only "I am where I am supposed to be".  Seen retrieving water from the dispenser later in the day.

## 2023-08-25 PROCEDURE — 99233 SBSQ HOSP IP/OBS HIGH 50: CPT

## 2023-08-25 RX ADMIN — Medication 50 MILLIGRAM(S): at 20:22

## 2023-08-25 RX ADMIN — Medication 50 MILLIGRAM(S): at 09:32

## 2023-08-25 RX ADMIN — Medication 2 MILLIGRAM(S): at 09:37

## 2023-08-25 RX ADMIN — Medication 2 MILLIGRAM(S): at 20:22

## 2023-08-25 RX ADMIN — HALOPERIDOL DECANOATE 10 MILLIGRAM(S): 100 INJECTION INTRAMUSCULAR at 20:22

## 2023-08-25 RX ADMIN — HALOPERIDOL DECANOATE 10 MILLIGRAM(S): 100 INJECTION INTRAMUSCULAR at 09:31

## 2023-08-25 NOTE — BH INPATIENT PSYCHIATRY PROGRESS NOTE - MSE UNSTRUCTURED FT
Gen: The patient appears older than stated age, poor hygiene and grooming, malodorous, naked in bed with blanket covering her body, towels on her bed  Beh: Hostile, uncooperative, selectively mute, intense eye contact  Motor: No PMR/PMA   Speech: normal volume, rate, tone  Mood: "good"  Affect: neutral, blunted  Thought process: poverty of content, tangential, loosened associations, bizarre  Thought content: does not answer questions about SI/HI  Perception: frequently observed talking to herself  Neuro: disoriented  Insight: poor   Judgment: poor  Impulse control: impaired

## 2023-08-25 NOTE — BH INPATIENT PSYCHIATRY PROGRESS NOTE - NSBHASSESSSUMMFT_PSY_ALL_CORE
This is a 53 year old woman, currently living at [facility] [place] grounds, PMH of GERD, anemia, PPH of schizophrenia, selective mutism, currently with [facility] team, previously with [facility] which , with history of psychiatric hospitalization(s), without known history of self-injury or suicide attempts, with history of aggression in the setting on nonadherence with medication, who is brought in by staff at residence for increasing agitation and aggression in the setting of 2 months of medication nonadherence.     Patient presents as hostile, disorganized, threatening, malodorous. Consistent with chronic schizophrenia with worsening aggression and disorganization iso med nonadherence.   Continues to refuse all medications in the hospital, is intermittently hostile with the team and selectively mute.  More talkative today with severe thought disorder and delusions present.  Remains malodorous, refusing ADLs.   State application in process.     Plan:  Legals:  	- continue with 9.27 involuntary admission   	- TOO obtained 8/15  Safety:  	- routine obs, bed block for aggression, currently ADLs are VERY poor  	- haldol 10mg/ativan 2mg/benadryl 50mg PO/IM for agitation  Psychiatric:  	- outpatient psychiatric medications: Haldol 10mg PO BID, Valproic acid 250mg 2 caps BID, Cogentin 2mg BID.   	- if patient refuses court ordered medications: haldol 10mg, ativan 2mg, benadryl 50mg IM  	- s/p loading dose of haldol dec 100mg , next tomorrow  Medical:   	- Nexium 40mg, Feosol 300mg daily, Vit D3 1000U, Senokot 8.6mg qhs  Dispo:  	- plan for state application vs returning to [facility]  	- AOT is under investigation,  in   	- no family involved in care

## 2023-08-25 NOTE — BH INPATIENT PSYCHIATRY PROGRESS NOTE - CURRENT MEDICATION
MEDICATIONS  (STANDING):  benztropine 2 milliGRAM(s) Oral <User Schedule>  cholecalciferol 1000 Unit(s) Oral <User Schedule>  ferrous    sulfate Liquid 300 milliGRAM(s) Oral with breakfast  haloperidol     Tablet 10 milliGRAM(s) Oral <User Schedule>  pantoprazole    Tablet 40 milliGRAM(s) Oral before breakfast  polyethylene glycol 3350 17 Gram(s) Oral at bedtime  valproic acid 500 milliGRAM(s) Oral <User Schedule>    MEDICATIONS  (PRN):  diphenhydrAMINE 50 milliGRAM(s) Oral every 4 hours PRN agitation  diphenhydrAMINE Injectable 50 milliGRAM(s) IntraMuscular once PRN Agitation  diphenhydrAMINE Injectable 50 milliGRAM(s) IntraMuscular two times a day PRN EPS ppx with IM Hadol  haloperidol     Tablet 5 milliGRAM(s) Oral every 4 hours PRN agitation  haloperidol    Injectable 10 milliGRAM(s) IntraMuscular once PRN agitation  haloperidol    Injectable 10 milliGRAM(s) IntraMuscular two times a day PRN ONLY if refusing court ordered PO Haldol  haloperidol    Injectable 7.5 milliGRAM(s) IntraMuscular once PRN psychotic agitation  LORazepam     Tablet 2 milliGRAM(s) Oral every 4 hours PRN severe agitation  LORazepam   Injectable 2 milliGRAM(s) IntraMuscular two times a day PRN PRN for refusal of court ordered meds  LORazepam   Injectable 3 milliGRAM(s) IntraMuscular once PRN severe agitation

## 2023-08-25 NOTE — BH INPATIENT PSYCHIATRY PROGRESS NOTE - NSBHFUPINTERVALHXFT_PSY_A_CORE
Follow-up for aggression/psychosis. Pt. w/ bed block for aggression. Refuses vitals and labs. Continues to receive BID IM haldol 10/ativan 2/benadryl 50 for refusing court ordered medications.   This afternoon patient is dressed, lying in bed, awake, malodorous, mute. Patient today states this is not a mental institution.  Asked where we are then, she says "in [place]".  She denies any physical complaints, says she feels good.  I ask if she would consider taking medication by mouth, and she says she does not need it.  I suggest she has improved because of medication, she says she never gets medication.  Denies she is injected with medication and asks if she then gets this in her sleep which I deny, explaining I have seen her awake and talking while getting IM medication.  She says the conversation is over.  Tangentially states "I am not a girl, I am a feather."  Does not answer when encouraged to take a shower.

## 2023-08-25 NOTE — BH INPATIENT PSYCHIATRY PROGRESS NOTE - PRN MEDS
MEDICATIONS  (PRN):  diphenhydrAMINE 50 milliGRAM(s) Oral every 4 hours PRN agitation  diphenhydrAMINE Injectable 50 milliGRAM(s) IntraMuscular once PRN Agitation  diphenhydrAMINE Injectable 50 milliGRAM(s) IntraMuscular two times a day PRN EPS ppx with IM Hadol  haloperidol     Tablet 5 milliGRAM(s) Oral every 4 hours PRN agitation  haloperidol    Injectable 10 milliGRAM(s) IntraMuscular once PRN agitation  haloperidol    Injectable 10 milliGRAM(s) IntraMuscular two times a day PRN ONLY if refusing court ordered PO Haldol  haloperidol    Injectable 7.5 milliGRAM(s) IntraMuscular once PRN psychotic agitation  LORazepam     Tablet 2 milliGRAM(s) Oral every 4 hours PRN severe agitation  LORazepam   Injectable 2 milliGRAM(s) IntraMuscular two times a day PRN PRN for refusal of court ordered meds  LORazepam   Injectable 3 milliGRAM(s) IntraMuscular once PRN severe agitation

## 2023-08-26 RX ADMIN — Medication 2 MILLIGRAM(S): at 20:55

## 2023-08-26 RX ADMIN — Medication 50 MILLIGRAM(S): at 09:14

## 2023-08-26 RX ADMIN — HALOPERIDOL DECANOATE 10 MILLIGRAM(S): 100 INJECTION INTRAMUSCULAR at 09:14

## 2023-08-26 RX ADMIN — Medication 2 MILLIGRAM(S): at 09:14

## 2023-08-26 RX ADMIN — HALOPERIDOL DECANOATE 10 MILLIGRAM(S): 100 INJECTION INTRAMUSCULAR at 20:55

## 2023-08-26 RX ADMIN — Medication 50 MILLIGRAM(S): at 20:54

## 2023-08-26 NOTE — ED ADULT NURSE NOTE - NS ED NURSE LEVEL OF CONSCIOUSNESS ORIENTATION
OFFICE VISIT    Patient: Anna Canas   : 1958 MRN: [id number]    SUBJECTIVE:  Chief Complaint   Patient presents with   • Knee Pain     right       Patient has given consent to record this visit for documentation in their clinical record.    A 65 year old female presents for an evaluation of right knee pain.     HISTORY OF PRESENT ILLNESS:    Historian: Self.    Rash: She complains of rashes on her skin all over the body. She mentions that the \"rashes are breaking out.\"  Reports that the first onset was on  and second one was on . Admits itching. Previously she consulted a dermatologist regarding the rashes and was prescribed a cream and used the cream    Varicose veins of both lower extremities, unspecified whether complicated:  Has a history of varicose veins. She had the symptoms in 8618-2173. Currently, she she has recurrence of varicose veins, associated with swollen legs. States that sometimes feels like having a restless legs and poor blood circulation. Mentions that she is unable to sit properly. She has used compression stockings and felt beneficial for one month.     Acute pain of right knee: She had a history of fall. She had x-ray of right leg and the reports were normal. She complains of tenderness and  inflammation in the knee when she does some work like leaning or going under the bed and trying to get some stuff and  she had bruises.    Hyperlipidemia, unspecified hyperlipidemia type: Has a history of high cholesterol levels. Denies taking cholesterol medicine daily. She takes the medicine only once a month.    Additional comments:    Preventive screening:  Bone density screening: Due for  a DEXA scan.    Due for CT scan.     PAST MEDICAL HISTORY:  Past Medical History:   Diagnosis Date   • Breast cancer (CMD)    • No known problems      MEDICATIONS:  Current Outpatient Medications   Medication Sig Dispense Refill   • methylPREDNISolone (MEDROL DOSEPAK) 4 MG tablet  Take 1 tablet by mouth as directed. follow package directions 21 tablet 0   • meloxicam (MOBIC) 7.5 MG tablet Take 1 tablet by mouth daily. 30 tablet 0   • beclomethasone diprop HFA (QVAR REDIHALER) 40 MCG/ACT inhaler Inhale 2 puffs into the lungs in the morning and 2 puffs in the evening. 1 each 1   • rosuvastatin (CRESTOR) 10 MG tablet Take 1 tablet by mouth daily. 90 tablet 1   • guaiFENesin-codeine (GUAIFENESIN AC) 100-10 MG/5ML syrup Take 5 mLs by mouth 3 times daily as needed for Cough. 120 mL 0   • benzonatate (TESSALON PERLES) 100 MG capsule Take 1 capsule by mouth 3 times daily as needed for Cough. 30 capsule 0   • albuterol 108 (90 Base) MCG/ACT inhaler Inhale 2 puffs into the lungs every 4 hours as needed for Shortness of Breath or Wheezing. 1 each 11     No current facility-administered medications for this visit.     ALLERGIES:  Allergies as of 08/25/2023 - Reviewed 08/25/2023   Allergen Reaction Noted   • Dust Other (See Comments) 02/22/2019     FAMILY HISTORY:  Family History   Problem Relation Age of Onset   • Cancer, Breast Mother    • Cancer, Breast Sister    • Bilateral breast cancer Sister      SOCIAL HISTORY:  Social History     Tobacco Use   • Smoking status: Never   • Smokeless tobacco: Never   Substance Use Topics   • Alcohol use: Yes     Comment: ocassional     Past Surgical HISTORY  Past Surgical History:   Procedure Laterality Date   • Breast reconstruction     • Mastectomy modified radical Bilateral        REVIEW OF SYSTEMS:    Constitutional: Negative for activity change, appetite change, chills, fatigue and unexpected weight change.  HENT: Negative for congestion, ear pain, sneezing, sore throat and voice change.    Eyes: Negative for pain, discharge and visual disturbance.  Respiratory: Negative for cough, chest tightness, shortness of breath and wheezing.    Cardiovascular: Negative for chest pain, palpitations. Positive for leg swelling, varicose veins.  Gastrointestinal: Negative  for abdominal distention, abdominal pain, constipation, diarrhea, nausea and vomiting.  Genitourinary: Negative for difficulty urinating, dysuria and frequency.  Musculoskeletal: Negative for arthralgias, gait problem, joint swelling and neck stiffness.   Skin: Positive for skin rashes all over the body.  Neurological: Negative for dizziness, speech difficulty, weakness, light-headedness and headaches.  Psychiatric/Behavioral: Negative for agitation and confusion.    All other systems reviewed and negative.    OBJECTIVE:  Visit Vitals  /72   Pulse 64   Temp 96.4 °F (35.8 °C) (Temporal)   Resp 16   Ht 5' 5\"   Wt 80.3 kg (177 lb 0.5 oz)   SpO2 98%   BMI 29.46 kg/m²       PHYSICAL EXAM:  Constitutional: In no acute distress. Well-developed.  Eyes: The conjunctiva exhibited no abnormalities. The sclera was normal.  Psychiatric: Oriented to time, place, and person. The mood was normal. The affect was normal. The memory was unimpaired.   Skin: Skin rashes all over the body worse on abdomen   Musculoskeletal: No bruising. Tenderness on medial, proximal  aspect of knee.  Abdomen: Non vesicular, non petechial rash, BS+, nondistended    DIAGNOSTIC STUDIES:  LAB RESULTS:  Office Visit on 08/25/2023   Component Date Value Ref Range Status   • Cholesterol 08/25/2023 255 (H)  <=199 mg/dL Final   • Triglycerides 08/25/2023 137  <=149 mg/dL Final   • HDL 08/25/2023 70  >=50 mg/dL Final   • LDL 08/25/2023 158 (H)  <=129 mg/dL Final   • Non-HDL Cholesterol 08/25/2023 185  mg/dL Final   • Cholesterol/ HDL Ratio 08/25/2023 3.6  <=4.4 Final       ASSESSMENT AND PLAN:  This 65 year old female presents with :  1. Rash    2. Varicose veins of both lower extremities, unspecified whether complicated    3. Acute pain of right knee    4. Hyperlipidemia, unspecified hyperlipidemia type        Orders Placed This Encounter   • Lipid Panel With Reflex   • SERVICE TO ORTHOPEDICS   • methylPREDNISolone (MEDROL DOSEPAK) 4 MG tablet   •  meloxicam (MOBIC) 7.5 MG tablet       PLAN:    Rash:  Prescribed  MethylPREDNISolone (MEDROL DOSEPAK) 4 MG tablet; Take 1 tablet by mouth as directed. follow package directions  Dispense: 21 tablets.  Recommended continuing current medication.  Advised to use the cream on spots and more on the abdomen.    Varicose veins of both lower extremities, unspecified whether complicated/Acute pain of right knee:  We'll prescribed the Medrol Dosepak an anti inflammatory to help with knee pain and swelling. We'll start Meloxicam after completion of Medrol Dosepak.  Recommended walking and advised avoiding running.  Discussed that if the symptoms does not subside we'll refer to Orthopedics.  Advised to continue using compression stockings for varicose veins.  Referred SERVICE TO ORTHOPEDICS.    Hyperlipidemia, unspecified hyperlipidemia type:  Ordered Lipid Panel With Reflex.    Additional plan:  Advised CT Heart scan. To check  for any calcifications in blood vessels of the heart, as she has high cholesterol levels which can lead to atherosclerosis.  ADvised to get dexa scan done which was previously ordered.     Follow up as needed.    Refer to orders.  Medical compliance with plan discussed and risks of non-compliance reviewed.  Patient education completed on disease process, etiology & prognosis.  Proper usage and side effects of medications reviewed & discussed.  Patient understands and agrees with the plan.  Return to clinic as clinically indicated as discussed with the patient who verbalized understanding of the plan and is in agreement with the plan.    Follow-up in Feb I,  Dr. Moira Whiteside, have created a visit summary document based on the audio recording between Dr. Petrona Dao MD and this patient for the physician to review, edit as needed, and authenticate.    Creation Date: 8/26/2023      Disoriented

## 2023-08-27 RX ADMIN — Medication 50 MILLIGRAM(S): at 20:44

## 2023-08-27 RX ADMIN — HALOPERIDOL DECANOATE 10 MILLIGRAM(S): 100 INJECTION INTRAMUSCULAR at 20:44

## 2023-08-27 RX ADMIN — Medication 2 MILLIGRAM(S): at 20:44

## 2023-08-27 RX ADMIN — HALOPERIDOL DECANOATE 10 MILLIGRAM(S): 100 INJECTION INTRAMUSCULAR at 09:00

## 2023-08-27 RX ADMIN — Medication 50 MILLIGRAM(S): at 09:01

## 2023-08-27 RX ADMIN — Medication 2 MILLIGRAM(S): at 09:00

## 2023-08-28 PROCEDURE — 99231 SBSQ HOSP IP/OBS SF/LOW 25: CPT | Mod: GC

## 2023-08-28 RX ADMIN — HALOPERIDOL DECANOATE 10 MILLIGRAM(S): 100 INJECTION INTRAMUSCULAR at 09:01

## 2023-08-28 RX ADMIN — Medication 2 MILLIGRAM(S): at 21:08

## 2023-08-28 RX ADMIN — Medication 50 MILLIGRAM(S): at 21:08

## 2023-08-28 RX ADMIN — Medication 50 MILLIGRAM(S): at 09:01

## 2023-08-28 RX ADMIN — Medication 2 MILLIGRAM(S): at 09:01

## 2023-08-28 RX ADMIN — HALOPERIDOL DECANOATE 10 MILLIGRAM(S): 100 INJECTION INTRAMUSCULAR at 21:07

## 2023-08-28 NOTE — BH INPATIENT PSYCHIATRY PROGRESS NOTE - PRN MEDS
MEDICATIONS  (PRN):  diphenhydrAMINE 50 milliGRAM(s) Oral every 4 hours PRN agitation  diphenhydrAMINE Injectable 50 milliGRAM(s) IntraMuscular once PRN Agitation  diphenhydrAMINE Injectable 50 milliGRAM(s) IntraMuscular two times a day PRN EPS ppx with IM Hadol  haloperidol     Tablet 5 milliGRAM(s) Oral every 4 hours PRN agitation  haloperidol    Injectable 7.5 milliGRAM(s) IntraMuscular once PRN psychotic agitation  haloperidol    Injectable 10 milliGRAM(s) IntraMuscular two times a day PRN ONLY if refusing court ordered PO Haldol  haloperidol    Injectable 10 milliGRAM(s) IntraMuscular once PRN agitation  LORazepam     Tablet 2 milliGRAM(s) Oral every 4 hours PRN severe agitation  LORazepam   Injectable 2 milliGRAM(s) IntraMuscular two times a day PRN PRN for refusal of court ordered meds  LORazepam   Injectable 3 milliGRAM(s) IntraMuscular once PRN severe agitation

## 2023-08-28 NOTE — BH INPATIENT PSYCHIATRY PROGRESS NOTE - NSBHFUPINTERVALHXFT_PSY_A_CORE
Follow-up for aggression/psychosis. Pt. w/ bed block for aggression. Refuses PO medications. Continues to receive BID IM haldol 10/ativan 2/benadryl 50 for refusing court ordered medications. Per staff, patient showered over the weekend. Today, patient was seen lying in bed, under the covers. Patient refused to speak with the team.

## 2023-08-28 NOTE — BH INPATIENT PSYCHIATRY PROGRESS NOTE - CURRENT MEDICATION
MEDICATIONS  (STANDING):  benztropine 2 milliGRAM(s) Oral <User Schedule>  cholecalciferol 1000 Unit(s) Oral <User Schedule>  ferrous    sulfate Liquid 300 milliGRAM(s) Oral with breakfast  haloperidol     Tablet 10 milliGRAM(s) Oral <User Schedule>  pantoprazole    Tablet 40 milliGRAM(s) Oral before breakfast  polyethylene glycol 3350 17 Gram(s) Oral at bedtime  valproic acid 500 milliGRAM(s) Oral <User Schedule>    MEDICATIONS  (PRN):  diphenhydrAMINE 50 milliGRAM(s) Oral every 4 hours PRN agitation  diphenhydrAMINE Injectable 50 milliGRAM(s) IntraMuscular once PRN Agitation  diphenhydrAMINE Injectable 50 milliGRAM(s) IntraMuscular two times a day PRN EPS ppx with IM Hadol  haloperidol     Tablet 5 milliGRAM(s) Oral every 4 hours PRN agitation  haloperidol    Injectable 7.5 milliGRAM(s) IntraMuscular once PRN psychotic agitation  haloperidol    Injectable 10 milliGRAM(s) IntraMuscular two times a day PRN ONLY if refusing court ordered PO Haldol  haloperidol    Injectable 10 milliGRAM(s) IntraMuscular once PRN agitation  LORazepam     Tablet 2 milliGRAM(s) Oral every 4 hours PRN severe agitation  LORazepam   Injectable 2 milliGRAM(s) IntraMuscular two times a day PRN PRN for refusal of court ordered meds  LORazepam   Injectable 3 milliGRAM(s) IntraMuscular once PRN severe agitation

## 2023-08-28 NOTE — BH INPATIENT PSYCHIATRY PROGRESS NOTE - NSBHASSESSSUMMFT_PSY_ALL_CORE
This is a 53 year old woman, currently living at [facility] on [place] grounds, PMH of GERD, anemia, PPH of schizophrenia, selective mutism, currently with [facility] ACT team, previously with [facility] which , with history of psychiatric hospitalization(s), without known history of self-injury or suicide attempts, with history of aggression in the setting on nonadherence with medication, who is brought in by staff at residence for increasing agitation and aggression in the setting of 2 months of medication nonadherence.     Continues to refuse all PO medications, requiring IM haldol/benadryl/ativan, intermittently hostile with the team and selectively mute. Refusing to speak with team today. State application in process.     Plan:  Legals:  	- continue with 9.27 involuntary admission   	- TOO obtained 8/15  Safety:  	- routine obs, bed block for aggression, currently ADLs are VERY poor  	- haldol 10mg/ativan 2mg/benadryl 50mg PO/IM for agitation  Psychiatric:  	- outpatient psychiatric medications: Haldol 10mg PO BID, Valproic acid 250mg 2 caps BID, Cogentin 2mg BID.   	- if patient refuses court ordered medications: haldol 10mg, ativan 2mg, benadryl 50mg IM  	- s/p loading dose of haldol dec 100mg , next tomorrow  Medical:   	- Nexium 40mg, Feosol 300mg daily, Vit D3 1000U, Senokot 8.6mg qhs  Dispo:  	- plan for state application vs returning to [facility]  	- AOT is under investigation,  in   	- no family involved in care

## 2023-08-28 NOTE — BH INPATIENT PSYCHIATRY PROGRESS NOTE - MSE UNSTRUCTURED FT
Gen: The patient appears older than stated age, slightly improved hygiene and grooming  Beh: uncooperative, mute, intense eye contact  Motor: No PMR/PMA   Speech: unable to assess  Mood: unable to assess  Affect: unable to assess  Thought process: unable to assess  Thought content: does not answer questions about SI/HI  Perception: unable to assess  Neuro: unable to assess  Insight: poor   Judgment: poor  Impulse control: impaired

## 2023-08-29 PROCEDURE — 99232 SBSQ HOSP IP/OBS MODERATE 35: CPT | Mod: GC

## 2023-08-29 RX ADMIN — Medication 50 MILLIGRAM(S): at 10:09

## 2023-08-29 RX ADMIN — Medication 50 MILLIGRAM(S): at 21:39

## 2023-08-29 RX ADMIN — HALOPERIDOL DECANOATE 10 MILLIGRAM(S): 100 INJECTION INTRAMUSCULAR at 10:10

## 2023-08-29 RX ADMIN — Medication 2 MILLIGRAM(S): at 10:10

## 2023-08-29 RX ADMIN — HALOPERIDOL DECANOATE 10 MILLIGRAM(S): 100 INJECTION INTRAMUSCULAR at 21:39

## 2023-08-29 RX ADMIN — Medication 2 MILLIGRAM(S): at 21:40

## 2023-08-29 NOTE — BH INPATIENT PSYCHIATRY PROGRESS NOTE - NSBHFUPINTERVALHXFT_PSY_A_CORE
Follow-up for aggression/psychosis. Pt. w/ bed block for aggression. Refuses PO medications. Continues to receive BID IM haldol 10/ativan 2/benadryl 50 for refusing court ordered medications. Per staff, overall, patient appears less aggressive. Today, patient was seen lying in bed, under the covers. Patient refused to speak with writer.

## 2023-08-29 NOTE — BH INPATIENT PSYCHIATRY PROGRESS NOTE - PRN MEDS
MEDICATIONS  (PRN):  diphenhydrAMINE 50 milliGRAM(s) Oral every 4 hours PRN agitation  diphenhydrAMINE Injectable 50 milliGRAM(s) IntraMuscular once PRN Agitation  diphenhydrAMINE Injectable 50 milliGRAM(s) IntraMuscular two times a day PRN EPS ppx with IM Hadol  haloperidol     Tablet 5 milliGRAM(s) Oral every 4 hours PRN agitation  haloperidol    Injectable 7.5 milliGRAM(s) IntraMuscular once PRN psychotic agitation  haloperidol    Injectable 10 milliGRAM(s) IntraMuscular two times a day PRN ONLY if refusing court ordered PO Haldol  haloperidol    Injectable 10 milliGRAM(s) IntraMuscular once PRN agitation  LORazepam   Injectable 2 milliGRAM(s) IntraMuscular two times a day PRN PRN for refusal of court ordered meds  LORazepam   Injectable 3 milliGRAM(s) IntraMuscular once PRN severe agitation

## 2023-08-29 NOTE — BH INPATIENT PSYCHIATRY PROGRESS NOTE - CURRENT MEDICATION
MEDICATIONS  (STANDING):  benztropine 2 milliGRAM(s) Oral <User Schedule>  cholecalciferol 1000 Unit(s) Oral <User Schedule>  ferrous    sulfate Liquid 300 milliGRAM(s) Oral with breakfast  haloperidol     Tablet 10 milliGRAM(s) Oral <User Schedule>  pantoprazole    Tablet 40 milliGRAM(s) Oral before breakfast  polyethylene glycol 3350 17 Gram(s) Oral at bedtime  valproic acid 500 milliGRAM(s) Oral <User Schedule>    MEDICATIONS  (PRN):  diphenhydrAMINE 50 milliGRAM(s) Oral every 4 hours PRN agitation  diphenhydrAMINE Injectable 50 milliGRAM(s) IntraMuscular once PRN Agitation  diphenhydrAMINE Injectable 50 milliGRAM(s) IntraMuscular two times a day PRN EPS ppx with IM Hadol  haloperidol     Tablet 5 milliGRAM(s) Oral every 4 hours PRN agitation  haloperidol    Injectable 7.5 milliGRAM(s) IntraMuscular once PRN psychotic agitation  haloperidol    Injectable 10 milliGRAM(s) IntraMuscular two times a day PRN ONLY if refusing court ordered PO Haldol  haloperidol    Injectable 10 milliGRAM(s) IntraMuscular once PRN agitation  LORazepam   Injectable 2 milliGRAM(s) IntraMuscular two times a day PRN PRN for refusal of court ordered meds  LORazepam   Injectable 3 milliGRAM(s) IntraMuscular once PRN severe agitation

## 2023-08-29 NOTE — BH INPATIENT PSYCHIATRY PROGRESS NOTE - NSBHASSESSSUMMFT_PSY_ALL_CORE
This is a 53 year old woman, currently living at [facility] on [place] grounds, PMH of GERD, anemia, PPH of schizophrenia, selective mutism, currently with [facility] ACT team, previously with [facility] which , with history of psychiatric hospitalization(s), without known history of self-injury or suicide attempts, with history of aggression in the setting on nonadherence with medication, who is brought in by staff at residence for increasing agitation and aggression in the setting of 2 months of medication nonadherence.     Today, patient continues to refuse to speak with writer. Per staff, overall patient does appear less aggressive and over the weekend showered and let staff change her bedding. Will obtain haldol level for tomorrow AM. State application in process.     Patient continues to require inpatient psychiatric hospitalization for stabilization and treatment.     Plan:  Legals:  	- continue with 9.27 involuntary admission   	- TOO obtained 8/15  Safety:  	- routine obs, bed block for aggression, currently ADLs are VERY poor  	- haldol 10mg/ativan 2mg/benadryl 50mg PO/IM for agitation  Psychiatric:  	- outpatient psychiatric medications: Haldol 10mg PO BID, Valproic acid 250mg 2 caps BID, Cogentin 2mg BID.   	- if patient refuses court ordered medications: haldol 10mg, ativan 2mg, benadryl 50mg IM  	- s/p loading dose of haldol dec 100mg , next tomorrow  Medical:   	- Nexium 40mg, Feosol 300mg daily, Vit D3 1000U, Senokot 8.6mg qhs  Dispo:  	- plan for state application vs returning to [facility]  	- AOT is under investigation,  in   	- no family involved in care

## 2023-08-29 NOTE — BH INPATIENT PSYCHIATRY PROGRESS NOTE - MSE UNSTRUCTURED FT
Gen: The patient appears older than stated age, slightly improved hygiene and grooming  Beh: uncooperative, mute, poor eye contact  Motor: No PMR/PMA   Speech: unable to assess  Mood: unable to assess  Affect: unable to assess  Thought process: unable to assess  Thought content: does not answer questions about SI/HI  Perception: unable to assess  Neuro: unable to assess  Insight: poor   Judgment: poor  Impulse control: impaired

## 2023-08-30 LAB
ALBUMIN SERPL ELPH-MCNC: 3.5 G/DL — SIGNIFICANT CHANGE UP (ref 3.3–5)
ALP SERPL-CCNC: 127 U/L — HIGH (ref 40–120)
ALT FLD-CCNC: 21 U/L — SIGNIFICANT CHANGE UP (ref 4–33)
ANION GAP SERPL CALC-SCNC: 10 MMOL/L — SIGNIFICANT CHANGE UP (ref 7–14)
AST SERPL-CCNC: 16 U/L — SIGNIFICANT CHANGE UP (ref 4–32)
BASOPHILS # BLD AUTO: 0.01 K/UL — SIGNIFICANT CHANGE UP (ref 0–0.2)
BASOPHILS NFR BLD AUTO: 0.2 % — SIGNIFICANT CHANGE UP (ref 0–2)
BILIRUB SERPL-MCNC: <0.2 MG/DL — SIGNIFICANT CHANGE UP (ref 0.2–1.2)
BUN SERPL-MCNC: 15 MG/DL — SIGNIFICANT CHANGE UP (ref 7–23)
CALCIUM SERPL-MCNC: 9.2 MG/DL — SIGNIFICANT CHANGE UP (ref 8.4–10.5)
CHLORIDE SERPL-SCNC: 105 MMOL/L — SIGNIFICANT CHANGE UP (ref 98–107)
CK SERPL-CCNC: 30 U/L — SIGNIFICANT CHANGE UP (ref 25–170)
CO2 SERPL-SCNC: 27 MMOL/L — SIGNIFICANT CHANGE UP (ref 22–31)
CREAT SERPL-MCNC: 0.83 MG/DL — SIGNIFICANT CHANGE UP (ref 0.5–1.3)
EGFR: 84 ML/MIN/1.73M2 — SIGNIFICANT CHANGE UP
EOSINOPHIL # BLD AUTO: 0.1 K/UL — SIGNIFICANT CHANGE UP (ref 0–0.5)
EOSINOPHIL NFR BLD AUTO: 1.8 % — SIGNIFICANT CHANGE UP (ref 0–6)
FOLATE SERPL-MCNC: 6.4 NG/ML — SIGNIFICANT CHANGE UP (ref 3.1–17.5)
GLUCOSE SERPL-MCNC: 118 MG/DL — HIGH (ref 70–99)
HCG SERPL-ACNC: 1.2 MIU/ML — SIGNIFICANT CHANGE UP
HCT VFR BLD CALC: 28.4 % — LOW (ref 34.5–45)
HGB BLD-MCNC: 8.3 G/DL — LOW (ref 11.5–15.5)
HIV 1+2 AB+HIV1 P24 AG SERPL QL IA: SIGNIFICANT CHANGE UP
IANC: 3.43 K/UL — SIGNIFICANT CHANGE UP (ref 1.8–7.4)
IMM GRANULOCYTES NFR BLD AUTO: 0.2 % — SIGNIFICANT CHANGE UP (ref 0–0.9)
LYMPHOCYTES # BLD AUTO: 1.56 K/UL — SIGNIFICANT CHANGE UP (ref 1–3.3)
LYMPHOCYTES # BLD AUTO: 28.8 % — SIGNIFICANT CHANGE UP (ref 13–44)
MCHC RBC-ENTMCNC: 21.4 PG — LOW (ref 27–34)
MCHC RBC-ENTMCNC: 29.2 GM/DL — LOW (ref 32–36)
MCV RBC AUTO: 73.2 FL — LOW (ref 80–100)
MONOCYTES # BLD AUTO: 0.31 K/UL — SIGNIFICANT CHANGE UP (ref 0–0.9)
MONOCYTES NFR BLD AUTO: 5.7 % — SIGNIFICANT CHANGE UP (ref 2–14)
NEUTROPHILS # BLD AUTO: 3.43 K/UL — SIGNIFICANT CHANGE UP (ref 1.8–7.4)
NEUTROPHILS NFR BLD AUTO: 63.3 % — SIGNIFICANT CHANGE UP (ref 43–77)
NRBC # BLD: 0 /100 WBCS — SIGNIFICANT CHANGE UP (ref 0–0)
NRBC # FLD: 0 K/UL — SIGNIFICANT CHANGE UP (ref 0–0)
PLATELET # BLD AUTO: 311 K/UL — SIGNIFICANT CHANGE UP (ref 150–400)
POTASSIUM SERPL-MCNC: 3.8 MMOL/L — SIGNIFICANT CHANGE UP (ref 3.5–5.3)
POTASSIUM SERPL-SCNC: 3.8 MMOL/L — SIGNIFICANT CHANGE UP (ref 3.5–5.3)
PROT SERPL-MCNC: 7.3 G/DL — SIGNIFICANT CHANGE UP (ref 6–8.3)
RBC # BLD: 3.88 M/UL — SIGNIFICANT CHANGE UP (ref 3.8–5.2)
RBC # FLD: 21.1 % — HIGH (ref 10.3–14.5)
SODIUM SERPL-SCNC: 142 MMOL/L — SIGNIFICANT CHANGE UP (ref 135–145)
T PALLIDUM AB TITR SER: NEGATIVE — SIGNIFICANT CHANGE UP
VIT B12 SERPL-MCNC: 696 PG/ML — SIGNIFICANT CHANGE UP (ref 200–900)
WBC # BLD: 5.42 K/UL — SIGNIFICANT CHANGE UP (ref 3.8–10.5)
WBC # FLD AUTO: 5.42 K/UL — SIGNIFICANT CHANGE UP (ref 3.8–10.5)

## 2023-08-30 PROCEDURE — 99233 SBSQ HOSP IP/OBS HIGH 50: CPT | Mod: GC

## 2023-08-30 RX ADMIN — Medication 50 MILLIGRAM(S): at 21:16

## 2023-08-30 RX ADMIN — Medication 50 MILLIGRAM(S): at 09:21

## 2023-08-30 RX ADMIN — HALOPERIDOL DECANOATE 10 MILLIGRAM(S): 100 INJECTION INTRAMUSCULAR at 21:16

## 2023-08-30 RX ADMIN — Medication 2 MILLIGRAM(S): at 21:16

## 2023-08-30 RX ADMIN — HALOPERIDOL DECANOATE 10 MILLIGRAM(S): 100 INJECTION INTRAMUSCULAR at 09:20

## 2023-08-30 RX ADMIN — Medication 2 MILLIGRAM(S): at 09:21

## 2023-08-30 NOTE — BH INPATIENT PSYCHIATRY PROGRESS NOTE - NSBHFUPINTERVALHXFT_PSY_A_CORE
Follow-up for aggression/psychosis. Pt. w/ bed block for aggression. Refuses PO medications. Continues to receive BID IM haldol 10/ativan 2/benadryl 50 for refusing court ordered medications. Per staff, patient got blood drawn this morning and said "You're taking all my blood. I'm going to die". Today, patient was seen lying in bed, under the covers. Patient refused to speak with writer. Hygiene is poor.

## 2023-08-30 NOTE — BH INPATIENT PSYCHIATRY PROGRESS NOTE - MSE UNSTRUCTURED FT
Gen: The patient appears older than stated age, poor hygiene and grooming  Beh: uncooperative, mute, poor eye contact  Motor: No PMR/PMA   Speech: unable to assess  Mood: unable to assess  Affect: unable to assess  Thought process: unable to assess  Thought content: does not answer questions about SI/HI  Perception: unable to assess  Neuro: unable to assess  Insight: poor   Judgment: poor  Impulse control: impaired

## 2023-08-30 NOTE — BH INPATIENT PSYCHIATRY PROGRESS NOTE - NSBHASSESSSUMMFT_PSY_ALL_CORE
This is a 53 year old woman, currently living at [facility] on [place] grounds, PMH of GERD, anemia, PPH of schizophrenia, selective mutism, currently with [facility] ACT team, previously with [facility] which , with history of psychiatric hospitalization(s), without known history of self-injury or suicide attempts, with history of aggression in the setting on nonadherence with medication, who is brought in by staff at residence for increasing agitation and aggression in the setting of 2 months of medication nonadherence.     Today, patient continues to refuse to speak with writer and refuses to take PO medications. Hygiene and grooming are poor.  F/u haldol levels. Other labs (CBC, CMP, HIV, CK, Vit B12/folate) unremarkable. State application in process.     Patient continues to require inpatient psychiatric hospitalization for stabilization and treatment.     Plan:  Legals:  	- continue with 9.27 involuntary admission   	- TOO obtained 8/15  Safety:  	- routine obs, bed block for aggression, currently ADLs are VERY poor  	- haldol 10mg/ativan 2mg/benadryl 50mg PO/IM for agitation  Psychiatric:  	- outpatient psychiatric medications: Haldol 10mg PO BID, Valproic acid 250mg 2 caps BID, Cogentin 2mg BID.   	- if patient refuses court ordered medications: haldol 10mg, ativan 2mg, benadryl 50mg IM  	- s/p loading dose of haldol dec 100mg , next tomorrow  Medical:   	- Nexium 40mg, Feosol 300mg daily, Vit D3 1000U, Senokot 8.6mg qhs  Dispo:  	- plan for state application vs returning to Health system  	- AOT is under investigation,  in   	- no family involved in care

## 2023-08-31 LAB — DSDNA AB SER-ACNC: <12 IU/ML — SIGNIFICANT CHANGE UP

## 2023-08-31 PROCEDURE — 99214 OFFICE O/P EST MOD 30 MIN: CPT | Mod: GC

## 2023-08-31 RX ORDER — OLANZAPINE 15 MG/1
5 TABLET, FILM COATED ORAL
Refills: 0 | Status: DISCONTINUED | OUTPATIENT
Start: 2023-08-31 | End: 2023-09-05

## 2023-08-31 RX ADMIN — OLANZAPINE 5 MILLIGRAM(S): 15 TABLET, FILM COATED ORAL at 21:38

## 2023-08-31 RX ADMIN — Medication 50 MILLIGRAM(S): at 09:17

## 2023-08-31 RX ADMIN — HALOPERIDOL DECANOATE 10 MILLIGRAM(S): 100 INJECTION INTRAMUSCULAR at 09:16

## 2023-08-31 RX ADMIN — Medication 2 MILLIGRAM(S): at 09:17

## 2023-08-31 NOTE — BH INPATIENT PSYCHIATRY PROGRESS NOTE - NSBHFUPINTERVALHXFT_PSY_A_CORE
Follow-up for aggression/psychosis. Pt. w/ bed block for aggression. Refuses PO medications. Continues to receive BID IM haldol 10/ativan 2/benadryl 50 for refusing court ordered medications. Per staff, patient calmly got blood drawn this morning, not aggressive. Today, patient was seen lying in bed, under the covers. Patient continues to refuse to speak with writer.

## 2023-08-31 NOTE — BH INPATIENT PSYCHIATRY PROGRESS NOTE - NSBHASSESSSUMMFT_PSY_ALL_CORE
This is a 53 year old woman, currently living at [facility] on [place] grounds, PMH of GERD, anemia, PPH of schizophrenia, selective mutism, currently with [facility] ACT team, previously with [facility] which , with history of psychiatric hospitalization(s), without known history of self-injury or suicide attempts, with history of aggression in the setting on nonadherence with medication, who is brought in by staff at residence for increasing agitation and aggression in the setting of 2 months of medication nonadherence.     Today, patient continues to refuse to speak with writer and refuses to take PO medications. Hygiene and grooming are poor.  F/u haldol levels. Other labs (CBC, CMP, HIV, CK, Vit B12/folate) unremarkable. State application in process.     Patient continues to require inpatient psychiatric hospitalization for stabilization and treatment.     Plan:  Legals:  	- continue with 9.27 involuntary admission   	- TOO obtained 8/15  Safety:  	- routine obs, bed block for aggression, currently ADLs are VERY poor  	- haldol 10mg/ativan 2mg/benadryl 50mg PO/IM for agitation  Psychiatric:  	- outpatient psychiatric medications: Haldol 10mg PO BID, Valproic acid 250mg 2 caps BID, Cogentin 2mg BID.   	- if patient refuses court ordered medications: haldol 10mg, ativan 2mg, benadryl 50mg IM  	- s/p loading dose of haldol dec 100mg , next tomorrow  Medical:   	- Nexium 40mg, Feosol 300mg daily, Vit D3 1000U, Senokot 8.6mg qhs  Dispo:  	- plan for state application vs returning to [facility]  	- AOT is under investigation,  in   	- no family involved in care     This is a 53 year old woman, currently living at [facility] on [place] grounds, PMH of GERD, anemia, PPH of schizophrenia, selective mutism, currently with [facility] ACT team, previously with [facility] which , with history of psychiatric hospitalization(s), without known history of self-injury or suicide attempts, with history of aggression in the setting on nonadherence with medication, who is brought in by staff at residence for increasing agitation and aggression in the setting of 2 months of medication nonadherence.     Today, patient continues to refuse to speak with writer and refuses to take PO medications. Since patient has not had significant improvement on PO/IM haldol for 2 weeks, will change patient's PO/IM haldol 10mg BID to PO/IM Zyprexa 5mg BID. State application in process.     Patient continues to require inpatient psychiatric hospitalization for stabilization and treatment.     Plan:  Legals:  	- continue with 9.27 involuntary admission   	- TOO obtained 8/15  Safety:  	- routine obs, bed block for aggression, currently ADLs are VERY poor  	- haldol 10mg/benadryl 50mg PO/IM for agitation  Psychiatric:  	- outpatient psychiatric medications: Zyprexa 5mg PO BID, Valproic acid 250mg 2 caps BID, Cogentin 2mg BID.   	- if patient refuses court ordered medications: Zyprexa 5mg BID IM  	- s/p loading dose of haldol dec 100mg , next tomorrow  Medical:   	- Nexium 40mg, Feosol 300mg daily, Vit D3 1000U, Senokot 8.6mg qhs  Dispo:  	- plan for state application vs returning to [facility]  	- AOT is under investigation,  in   	- no family involved in care

## 2023-08-31 NOTE — BH INPATIENT PSYCHIATRY PROGRESS NOTE - PRN MEDS
MEDICATIONS  (PRN):  diphenhydrAMINE 50 milliGRAM(s) Oral every 4 hours PRN agitation  diphenhydrAMINE Injectable 50 milliGRAM(s) IntraMuscular two times a day PRN EPS ppx with IM Hadol  diphenhydrAMINE Injectable 50 milliGRAM(s) IntraMuscular once PRN Agitation  haloperidol     Tablet 5 milliGRAM(s) Oral every 4 hours PRN agitation  haloperidol    Injectable 10 milliGRAM(s) IntraMuscular once PRN agitation  haloperidol    Injectable 7.5 milliGRAM(s) IntraMuscular once PRN psychotic agitation  OLANZapine Injectable 5 milliGRAM(s) IntraMuscular <User Schedule> PRN ONLY if refusing court ordered PO Zyprexa   MEDICATIONS  (PRN):  diphenhydrAMINE 50 milliGRAM(s) Oral every 4 hours PRN agitation  diphenhydrAMINE Injectable 50 milliGRAM(s) IntraMuscular two times a day PRN EPS ppx with IM Hadol  diphenhydrAMINE Injectable 50 milliGRAM(s) IntraMuscular once PRN Agitation  haloperidol     Tablet 5 milliGRAM(s) Oral every 4 hours PRN agitation  haloperidol    Injectable 7.5 milliGRAM(s) IntraMuscular once PRN psychotic agitation  haloperidol    Injectable 10 milliGRAM(s) IntraMuscular once PRN agitation  OLANZapine Injectable 5 milliGRAM(s) IntraMuscular <User Schedule> PRN ONLY if refusing court ordered PO Zyprexa

## 2023-08-31 NOTE — BH INPATIENT PSYCHIATRY PROGRESS NOTE - CURRENT MEDICATION
MEDICATIONS  (STANDING):  benztropine 2 milliGRAM(s) Oral <User Schedule>  cholecalciferol 1000 Unit(s) Oral <User Schedule>  ferrous    sulfate Liquid 300 milliGRAM(s) Oral with breakfast  OLANZapine 5 milliGRAM(s) Oral <User Schedule>  pantoprazole    Tablet 40 milliGRAM(s) Oral before breakfast  polyethylene glycol 3350 17 Gram(s) Oral at bedtime  valproic acid 500 milliGRAM(s) Oral <User Schedule>    MEDICATIONS  (PRN):  diphenhydrAMINE 50 milliGRAM(s) Oral every 4 hours PRN agitation  diphenhydrAMINE Injectable 50 milliGRAM(s) IntraMuscular two times a day PRN EPS ppx with IM Hadol  diphenhydrAMINE Injectable 50 milliGRAM(s) IntraMuscular once PRN Agitation  haloperidol     Tablet 5 milliGRAM(s) Oral every 4 hours PRN agitation  haloperidol    Injectable 10 milliGRAM(s) IntraMuscular once PRN agitation  haloperidol    Injectable 7.5 milliGRAM(s) IntraMuscular once PRN psychotic agitation  OLANZapine Injectable 5 milliGRAM(s) IntraMuscular <User Schedule> PRN ONLY if refusing court ordered PO Zyprexa   MEDICATIONS  (STANDING):  benztropine 2 milliGRAM(s) Oral <User Schedule>  cholecalciferol 1000 Unit(s) Oral <User Schedule>  ferrous    sulfate Liquid 300 milliGRAM(s) Oral with breakfast  OLANZapine 5 milliGRAM(s) Oral <User Schedule>  pantoprazole    Tablet 40 milliGRAM(s) Oral before breakfast  polyethylene glycol 3350 17 Gram(s) Oral at bedtime  valproic acid 500 milliGRAM(s) Oral <User Schedule>    MEDICATIONS  (PRN):  diphenhydrAMINE 50 milliGRAM(s) Oral every 4 hours PRN agitation  diphenhydrAMINE Injectable 50 milliGRAM(s) IntraMuscular two times a day PRN EPS ppx with IM Hadol  diphenhydrAMINE Injectable 50 milliGRAM(s) IntraMuscular once PRN Agitation  haloperidol     Tablet 5 milliGRAM(s) Oral every 4 hours PRN agitation  haloperidol    Injectable 7.5 milliGRAM(s) IntraMuscular once PRN psychotic agitation  haloperidol    Injectable 10 milliGRAM(s) IntraMuscular once PRN agitation  OLANZapine Injectable 5 milliGRAM(s) IntraMuscular <User Schedule> PRN ONLY if refusing court ordered PO Zyprexa

## 2023-08-31 NOTE — BH INPATIENT PSYCHIATRY PROGRESS NOTE - NSBHCHARTREVIEWVS_PSY_A_CORE FT
Vital Signs Last 24 Hrs  T(C): 36.2 (08-31-23 @ 09:22), Max: 36.2 (08-31-23 @ 09:22)  T(F): 97.2 (08-31-23 @ 09:22), Max: 97.2 (08-31-23 @ 09:22)  HR: --  BP: --  BP(mean): --  RR: 16 (08-31-23 @ 09:22) (16 - 16)  SpO2: --    Orthostatic VS  08-31-23 @ 09:22  Lying BP: --/-- HR: --  Sitting BP: 102/60 HR: 78  Standing BP: --/-- HR: --  Site: --  Mode: --

## 2023-09-01 LAB — ANA TITR SER: NEGATIVE — SIGNIFICANT CHANGE UP

## 2023-09-01 PROCEDURE — 99233 SBSQ HOSP IP/OBS HIGH 50: CPT | Mod: GC

## 2023-09-01 RX ORDER — CLOZAPINE 150 MG/1
25 TABLET, ORALLY DISINTEGRATING ORAL AT BEDTIME
Refills: 0 | Status: DISCONTINUED | OUTPATIENT
Start: 2023-09-01 | End: 2023-09-29

## 2023-09-01 RX ADMIN — OLANZAPINE 5 MILLIGRAM(S): 15 TABLET, FILM COATED ORAL at 11:20

## 2023-09-01 RX ADMIN — Medication 50 MILLIGRAM(S): at 21:43

## 2023-09-01 RX ADMIN — OLANZAPINE 5 MILLIGRAM(S): 15 TABLET, FILM COATED ORAL at 21:43

## 2023-09-01 NOTE — BH INPATIENT PSYCHIATRY PROGRESS NOTE - NSBHFUPINTERVALHXFT_PSY_A_CORE
Follow-up for aggression/psychosis. Pt. w/ bed block for aggression. Refuses PO medications. Continues to receive BID IM haldol 10/ativan 2/benadryl 50 for refusing court ordered medications. Per staff, patient continues to be calm. Today, patient was seen lying in bed, under the covers. Patient continues to refuse to speak with writer. Patient was informed that we will be starting clozapine today. Patient briefly  her head up but did not respond.

## 2023-09-01 NOTE — BH INPATIENT PSYCHIATRY PROGRESS NOTE - NSBHASSESSSUMMFT_PSY_ALL_CORE
This is a 53 year old woman, currently living at [facility] on [place] grounds, PMH of GERD, anemia, PPH of schizophrenia, selective mutism, currently with [facility] ACT team, previously with [facility] which , with history of psychiatric hospitalization(s), without known history of self-injury or suicide attempts, with history of aggression in the setting on nonadherence with medication, who is brought in by staff at residence for increasing agitation and aggression in the setting of 2 months of medication nonadherence.     Today, patient continues to refuse to speak with writer and refuses to take PO medications. Since patient has not had significant improvement, will start clozapine 25mg qhs. State application in process.     Patient continues to require inpatient psychiatric hospitalization for stabilization and treatment.     Plan:  Legals:  	- continue with 9.27 involuntary admission   	- TOO obtained 8/15  Safety:  	- routine obs, bed block for aggression, currently ADLs are VERY poor  	- haldol 10mg/benadryl 50mg PO/IM for agitation  Psychiatric:  	- c/w psychiatric medications: Zyprexa 5mg PO BID, Valproic acid 250mg 2 caps BID, Cogentin 2mg BID.   	- start clozapine 25mg qhs  	- if patient refuses court ordered medications: Zyprexa 5mg BID IM  	- s/p loading dose of haldol dec 100mg , next tomorrow  Medical:   	- Nexium 40mg, Feosol 300mg daily, Vit D3 1000U, Senokot 8.6mg qhs  Dispo:  	- plan for state application vs returning to [facility]  	- AOT is under investigation,  in   	- no family involved in care

## 2023-09-01 NOTE — BH INPATIENT PSYCHIATRY PROGRESS NOTE - NSBHCHARTREVIEWVS_PSY_A_CORE FT
Vital Signs Last 24 Hrs  T(C): --  T(F): --  HR: --  BP: --  BP(mean): --  RR: --  SpO2: --    Orthostatic VS  08-31-23 @ 09:22  Lying BP: --/-- HR: --  Sitting BP: 102/60 HR: 78  Standing BP: --/-- HR: --  Site: --  Mode: --

## 2023-09-01 NOTE — BH INPATIENT PSYCHIATRY PROGRESS NOTE - PRN MEDS
MEDICATIONS  (PRN):  diphenhydrAMINE 50 milliGRAM(s) Oral every 4 hours PRN agitation  diphenhydrAMINE Injectable 50 milliGRAM(s) IntraMuscular once PRN Agitation  diphenhydrAMINE Injectable 50 milliGRAM(s) IntraMuscular two times a day PRN EPS ppx with IM Hadol  haloperidol     Tablet 5 milliGRAM(s) Oral every 4 hours PRN agitation  haloperidol    Injectable 7.5 milliGRAM(s) IntraMuscular once PRN psychotic agitation  haloperidol    Injectable 10 milliGRAM(s) IntraMuscular once PRN agitation  OLANZapine Injectable 5 milliGRAM(s) IntraMuscular <User Schedule> PRN ONLY if refusing court ordered PO Zyprexa   MEDICATIONS  (PRN):  diphenhydrAMINE 50 milliGRAM(s) Oral every 4 hours PRN agitation  diphenhydrAMINE Injectable 50 milliGRAM(s) IntraMuscular two times a day PRN EPS ppx with IM Hadol  diphenhydrAMINE Injectable 50 milliGRAM(s) IntraMuscular once PRN Agitation  haloperidol     Tablet 5 milliGRAM(s) Oral every 4 hours PRN agitation  haloperidol    Injectable 7.5 milliGRAM(s) IntraMuscular once PRN psychotic agitation  haloperidol    Injectable 10 milliGRAM(s) IntraMuscular once PRN agitation  OLANZapine Injectable 5 milliGRAM(s) IntraMuscular <User Schedule> PRN ONLY if refusing court ordered PO Zyprexa

## 2023-09-01 NOTE — BH INPATIENT PSYCHIATRY PROGRESS NOTE - CURRENT MEDICATION
MEDICATIONS  (STANDING):  benztropine 2 milliGRAM(s) Oral <User Schedule>  cholecalciferol 1000 Unit(s) Oral <User Schedule>  ferrous    sulfate Liquid 300 milliGRAM(s) Oral with breakfast  OLANZapine 5 milliGRAM(s) Oral <User Schedule>  pantoprazole    Tablet 40 milliGRAM(s) Oral before breakfast  polyethylene glycol 3350 17 Gram(s) Oral at bedtime  valproic acid 500 milliGRAM(s) Oral <User Schedule>    MEDICATIONS  (PRN):  diphenhydrAMINE 50 milliGRAM(s) Oral every 4 hours PRN agitation  diphenhydrAMINE Injectable 50 milliGRAM(s) IntraMuscular once PRN Agitation  diphenhydrAMINE Injectable 50 milliGRAM(s) IntraMuscular two times a day PRN EPS ppx with IM Hadol  haloperidol     Tablet 5 milliGRAM(s) Oral every 4 hours PRN agitation  haloperidol    Injectable 7.5 milliGRAM(s) IntraMuscular once PRN psychotic agitation  haloperidol    Injectable 10 milliGRAM(s) IntraMuscular once PRN agitation  OLANZapine Injectable 5 milliGRAM(s) IntraMuscular <User Schedule> PRN ONLY if refusing court ordered PO Zyprexa   MEDICATIONS  (STANDING):  benztropine 2 milliGRAM(s) Oral <User Schedule>  cholecalciferol 1000 Unit(s) Oral <User Schedule>  cloZAPine 25 milliGRAM(s) Oral at bedtime  ferrous    sulfate Liquid 300 milliGRAM(s) Oral with breakfast  OLANZapine 5 milliGRAM(s) Oral <User Schedule>  pantoprazole    Tablet 40 milliGRAM(s) Oral before breakfast  polyethylene glycol 3350 17 Gram(s) Oral at bedtime  valproic acid 500 milliGRAM(s) Oral <User Schedule>    MEDICATIONS  (PRN):  diphenhydrAMINE 50 milliGRAM(s) Oral every 4 hours PRN agitation  diphenhydrAMINE Injectable 50 milliGRAM(s) IntraMuscular two times a day PRN EPS ppx with IM Hadol  diphenhydrAMINE Injectable 50 milliGRAM(s) IntraMuscular once PRN Agitation  haloperidol     Tablet 5 milliGRAM(s) Oral every 4 hours PRN agitation  haloperidol    Injectable 7.5 milliGRAM(s) IntraMuscular once PRN psychotic agitation  haloperidol    Injectable 10 milliGRAM(s) IntraMuscular once PRN agitation  OLANZapine Injectable 5 milliGRAM(s) IntraMuscular <User Schedule> PRN ONLY if refusing court ordered PO Zyprexa

## 2023-09-02 RX ADMIN — Medication 50 MILLIGRAM(S): at 22:02

## 2023-09-02 RX ADMIN — Medication 50 MILLIGRAM(S): at 10:03

## 2023-09-02 RX ADMIN — OLANZAPINE 5 MILLIGRAM(S): 15 TABLET, FILM COATED ORAL at 22:02

## 2023-09-02 RX ADMIN — OLANZAPINE 5 MILLIGRAM(S): 15 TABLET, FILM COATED ORAL at 10:03

## 2023-09-03 RX ADMIN — OLANZAPINE 5 MILLIGRAM(S): 15 TABLET, FILM COATED ORAL at 09:10

## 2023-09-03 RX ADMIN — OLANZAPINE 5 MILLIGRAM(S): 15 TABLET, FILM COATED ORAL at 21:07

## 2023-09-03 RX ADMIN — Medication 50 MILLIGRAM(S): at 09:11

## 2023-09-04 RX ADMIN — OLANZAPINE 5 MILLIGRAM(S): 15 TABLET, FILM COATED ORAL at 09:07

## 2023-09-04 RX ADMIN — Medication 50 MILLIGRAM(S): at 21:03

## 2023-09-04 RX ADMIN — OLANZAPINE 5 MILLIGRAM(S): 15 TABLET, FILM COATED ORAL at 21:03

## 2023-09-05 RX ORDER — OLANZAPINE 15 MG/1
10 TABLET, FILM COATED ORAL
Refills: 0 | Status: DISCONTINUED | OUTPATIENT
Start: 2023-09-05 | End: 2023-09-25

## 2023-09-05 RX ADMIN — OLANZAPINE 5 MILLIGRAM(S): 15 TABLET, FILM COATED ORAL at 09:11

## 2023-09-05 RX ADMIN — OLANZAPINE 10 MILLIGRAM(S): 15 TABLET, FILM COATED ORAL at 21:16

## 2023-09-05 NOTE — BH INPATIENT PSYCHIATRY PROGRESS NOTE - MSE UNSTRUCTURED FT
Gen: The patient appears older than stated age, poor hygiene and grooming  Beh: uncooperative, mute, intense stare  Motor: No PMR/PMA   Speech: unable to assess  Mood: unable to assess  Affect: unable to assess  Thought process: unable to assess  Thought content: does not answer questions about SI/HI  Perception: unable to assess  Neuro: unable to assess  Insight: poor   Judgment: poor  Impulse control: impaired

## 2023-09-05 NOTE — BH INPATIENT PSYCHIATRY PROGRESS NOTE - CURRENT MEDICATION
MEDICATIONS  (STANDING):  benztropine 2 milliGRAM(s) Oral <User Schedule>  cholecalciferol 1000 Unit(s) Oral <User Schedule>  cloZAPine 25 milliGRAM(s) Oral at bedtime  ferrous    sulfate Liquid 300 milliGRAM(s) Oral with breakfast  OLANZapine 5 milliGRAM(s) Oral <User Schedule>  pantoprazole    Tablet 40 milliGRAM(s) Oral before breakfast  polyethylene glycol 3350 17 Gram(s) Oral at bedtime  valproic acid 500 milliGRAM(s) Oral <User Schedule>    MEDICATIONS  (PRN):  diphenhydrAMINE 50 milliGRAM(s) Oral every 4 hours PRN agitation  diphenhydrAMINE Injectable 50 milliGRAM(s) IntraMuscular once PRN Agitation  diphenhydrAMINE Injectable 50 milliGRAM(s) IntraMuscular two times a day PRN EPS ppx with IM Hadol  haloperidol     Tablet 5 milliGRAM(s) Oral every 4 hours PRN agitation  haloperidol    Injectable 10 milliGRAM(s) IntraMuscular once PRN agitation  haloperidol    Injectable 7.5 milliGRAM(s) IntraMuscular once PRN psychotic agitation  OLANZapine Injectable 5 milliGRAM(s) IntraMuscular <User Schedule> PRN ONLY if refusing court ordered PO Zyprexa   MEDICATIONS  (STANDING):  benztropine 2 milliGRAM(s) Oral <User Schedule>  cholecalciferol 1000 Unit(s) Oral <User Schedule>  cloZAPine 25 milliGRAM(s) Oral at bedtime  ferrous    sulfate Liquid 300 milliGRAM(s) Oral with breakfast  OLANZapine 5 milliGRAM(s) Oral <User Schedule>  pantoprazole    Tablet 40 milliGRAM(s) Oral before breakfast  polyethylene glycol 3350 17 Gram(s) Oral at bedtime  valproic acid 500 milliGRAM(s) Oral <User Schedule>    MEDICATIONS  (PRN):  diphenhydrAMINE 50 milliGRAM(s) Oral every 4 hours PRN agitation  diphenhydrAMINE Injectable 50 milliGRAM(s) IntraMuscular once PRN Agitation  haloperidol     Tablet 5 milliGRAM(s) Oral every 4 hours PRN agitation  haloperidol    Injectable 10 milliGRAM(s) IntraMuscular once PRN agitation  haloperidol    Injectable 7.5 milliGRAM(s) IntraMuscular once PRN psychotic agitation  OLANZapine Injectable 5 milliGRAM(s) IntraMuscular <User Schedule> PRN ONLY if refusing court ordered PO Zyprexa   MEDICATIONS  (STANDING):  benztropine 2 milliGRAM(s) Oral <User Schedule>  cholecalciferol 1000 Unit(s) Oral <User Schedule>  cloZAPine 25 milliGRAM(s) Oral at bedtime  ferrous    sulfate Liquid 300 milliGRAM(s) Oral with breakfast  OLANZapine 5 milliGRAM(s) Oral <User Schedule>  pantoprazole    Tablet 40 milliGRAM(s) Oral before breakfast  polyethylene glycol 3350 17 Gram(s) Oral at bedtime  valproic acid 500 milliGRAM(s) Oral <User Schedule>    MEDICATIONS  (PRN):  diphenhydrAMINE 50 milliGRAM(s) Oral every 4 hours PRN agitation  diphenhydrAMINE Injectable 50 milliGRAM(s) IntraMuscular once PRN Agitation  haloperidol     Tablet 5 milliGRAM(s) Oral every 4 hours PRN agitation  haloperidol    Injectable 7.5 milliGRAM(s) IntraMuscular once PRN psychotic agitation  haloperidol    Injectable 10 milliGRAM(s) IntraMuscular once PRN agitation  OLANZapine Injectable 5 milliGRAM(s) IntraMuscular <User Schedule> PRN ONLY if refusing court ordered PO Zyprexa

## 2023-09-05 NOTE — BH INPATIENT PSYCHIATRY PROGRESS NOTE - NSBHFUPINTERVALHXFT_PSY_A_CORE
Follow-up for aggression/psychosis. Pt. w/ bed block for aggression. Refuses PO medications. Refusing PO clozapine. Continues to receive IM Zyprexa 5mg BID for refusing court ordered medications. Per staff, patient continues to be calm. Today, patient was seen lying in bed, under the covers. Patient looked at the writer but then looked away. Shook her head when approached to do a physical exam. Patient continues to refuse to speak with writer.

## 2023-09-05 NOTE — BH INPATIENT PSYCHIATRY PROGRESS NOTE - PRN MEDS
MEDICATIONS  (PRN):  diphenhydrAMINE 50 milliGRAM(s) Oral every 4 hours PRN agitation  diphenhydrAMINE Injectable 50 milliGRAM(s) IntraMuscular once PRN Agitation  diphenhydrAMINE Injectable 50 milliGRAM(s) IntraMuscular two times a day PRN EPS ppx with IM Hadol  haloperidol     Tablet 5 milliGRAM(s) Oral every 4 hours PRN agitation  haloperidol    Injectable 10 milliGRAM(s) IntraMuscular once PRN agitation  haloperidol    Injectable 7.5 milliGRAM(s) IntraMuscular once PRN psychotic agitation  OLANZapine Injectable 5 milliGRAM(s) IntraMuscular <User Schedule> PRN ONLY if refusing court ordered PO Zyprexa   MEDICATIONS  (PRN):  diphenhydrAMINE 50 milliGRAM(s) Oral every 4 hours PRN agitation  diphenhydrAMINE Injectable 50 milliGRAM(s) IntraMuscular once PRN Agitation  haloperidol     Tablet 5 milliGRAM(s) Oral every 4 hours PRN agitation  haloperidol    Injectable 10 milliGRAM(s) IntraMuscular once PRN agitation  haloperidol    Injectable 7.5 milliGRAM(s) IntraMuscular once PRN psychotic agitation  OLANZapine Injectable 5 milliGRAM(s) IntraMuscular <User Schedule> PRN ONLY if refusing court ordered PO Zyprexa   MEDICATIONS  (PRN):  diphenhydrAMINE 50 milliGRAM(s) Oral every 4 hours PRN agitation  diphenhydrAMINE Injectable 50 milliGRAM(s) IntraMuscular once PRN Agitation  haloperidol     Tablet 5 milliGRAM(s) Oral every 4 hours PRN agitation  haloperidol    Injectable 7.5 milliGRAM(s) IntraMuscular once PRN psychotic agitation  haloperidol    Injectable 10 milliGRAM(s) IntraMuscular once PRN agitation  OLANZapine Injectable 5 milliGRAM(s) IntraMuscular <User Schedule> PRN ONLY if refusing court ordered PO Zyprexa

## 2023-09-05 NOTE — BH INPATIENT PSYCHIATRY PROGRESS NOTE - NSBHASSESSSUMMFT_PSY_ALL_CORE
This is a 53 year old woman, currently living at [facility] on [place] grounds, PMH of GERD, anemia, PPH of schizophrenia, selective mutism, currently with [facility] team, previously with [facility] which , with history of psychiatric hospitalization(s), without known history of self-injury or suicide attempts, with history of aggression in the setting on nonadherence with medication, who is brought in by staff at residence for increasing agitation and aggression in the setting of 2 months of medication nonadherence.     Today, patient continues to refuse to speak with writer and refuses to take PO medications. Refusing PO clozapine. State application in process.     Patient continues to require inpatient psychiatric hospitalization for stabilization and treatment.     Plan:  Legals:  	- continue with 9.27 involuntary admission   	- TOO obtained 8/15  Safety:  	- routine obs, bed block for aggression, currently ADLs are VERY poor  Psychiatric:  	- c/w psychiatric medications: Zyprexa 5mg PO BID, Valproic acid 250mg 2 caps BID, Cogentin 2mg BID.   	- start clozapine 25mg qhs  	- if patient refuses court ordered medications: Zyprexa 5mg BID IM  	- s/p loading dose of haldol dec 100mg   Medical:   	- Nexium 40mg, Feosol 300mg daily, Vit D3 1000U, Senokot 8.6mg qhs  Dispo:  	- plan for state application vs returning to [facility]  	- AOT is under investigation,  in   	- no family involved in care     This is a 53 year old woman, currently living at [facility] on [place] grounds, PMH of GERD, anemia, PPH of schizophrenia, selective mutism, [facility], previously with [facility] which , with history of psychiatric hospitalization(s), without known history of self-injury or suicide attempts, with history of aggression in the setting on nonadherence with medication, who is brought in by staff at residence for increasing agitation and aggression in the setting of 2 months of medication nonadherence.     Today, patient continues to refuse to speak with writer and refuses to take PO medications. Refusing PO clozapine. State application in process.     Patient continues to require inpatient psychiatric hospitalization for stabilization and treatment.     Plan:  Legals:  	- continue with 9.27 involuntary admission   	- TOO obtained 8/15  Safety:  	- routine obs, bed block for aggression, currently ADLs are VERY poor  Psychiatric:  	- c/w psychiatric medications: Zyprexa 5mg PO BID, Valproic acid 250mg 2 caps BID, Cogentin 2mg BID.   	- c/w clozapine 25mg qhs  	- if patient refuses court ordered medications: Zyprexa 5mg BID IM  	- s/p loading dose of haldol dec 100mg   Medical:   	- Nexium 40mg, Feosol 300mg daily, Vit D3 1000U, Senokot 8.6mg qhs  Dispo:  	- plan for state application vs returning to [facility]  	- AOT is under investigation,  in   	- no family involved in care     This is a 53 year old woman, currently living at [facility] on [place] grounds, PMH of GERD, anemia, PPH of schizophrenia, selective mutism, currently with [facility] ACT team, previously with [facility] which , with history of psychiatric hospitalization(s), without known history of self-injury or suicide attempts, with history of aggression in the setting on nonadherence with medication, who is brought in by staff at residence for increasing agitation and aggression in the setting of 2 months of medication nonadherence.     Today, patient continues to refuse to speak with writer and refuses to take PO medications. Refusing PO clozapine. Increase PO/IM Zyprexa to 10mg BID. State application in process.     Patient continues to require inpatient psychiatric hospitalization for stabilization and treatment.     Plan:  Legals:  	- continue with 9.27 involuntary admission   	- TOO obtained 8/15  Safety:  	- routine obs, bed block for aggression, currently ADLs are VERY poor  Psychiatric:  	- c/w psychiatric medications: Zyprexa 10mg PO BID (IM if refuse PO), Valproic acid 250mg 2 caps BID, Cogentin 2mg BID.   	- c/w clozapine 25mg qhs  	- s/p loading dose of haldol dec 100mg   Medical:   	- Nexium 40mg, Feosol 300mg daily, Vit D3 1000U, Senokot 8.6mg qhs  Dispo:  	- plan for state application vs returning to [facility]  	- AOT is under investigation,  in   	- no family involved in care

## 2023-09-06 PROCEDURE — 99231 SBSQ HOSP IP/OBS SF/LOW 25: CPT

## 2023-09-06 RX ADMIN — OLANZAPINE 10 MILLIGRAM(S): 15 TABLET, FILM COATED ORAL at 21:02

## 2023-09-06 RX ADMIN — OLANZAPINE 10 MILLIGRAM(S): 15 TABLET, FILM COATED ORAL at 09:50

## 2023-09-06 NOTE — BH INPATIENT PSYCHIATRY PROGRESS NOTE - PRN MEDS
MEDICATIONS  (PRN):  diphenhydrAMINE 50 milliGRAM(s) Oral every 4 hours PRN agitation  diphenhydrAMINE Injectable 50 milliGRAM(s) IntraMuscular once PRN Agitation  haloperidol     Tablet 5 milliGRAM(s) Oral every 4 hours PRN agitation  haloperidol    Injectable 7.5 milliGRAM(s) IntraMuscular once PRN psychotic agitation  haloperidol    Injectable 10 milliGRAM(s) IntraMuscular once PRN agitation  OLANZapine Injectable 10 milliGRAM(s) IntraMuscular <User Schedule> PRN ONLY if patient refuses court ordered PO zyprexa

## 2023-09-06 NOTE — BH INPATIENT PSYCHIATRY PROGRESS NOTE - CURRENT MEDICATION
MEDICATIONS  (STANDING):  benztropine 2 milliGRAM(s) Oral <User Schedule>  cholecalciferol 1000 Unit(s) Oral <User Schedule>  cloZAPine 25 milliGRAM(s) Oral at bedtime  ferrous    sulfate Liquid 300 milliGRAM(s) Oral with breakfast  OLANZapine 10 milliGRAM(s) Oral <User Schedule>  pantoprazole    Tablet 40 milliGRAM(s) Oral before breakfast  polyethylene glycol 3350 17 Gram(s) Oral at bedtime  valproic acid 500 milliGRAM(s) Oral <User Schedule>    MEDICATIONS  (PRN):  diphenhydrAMINE 50 milliGRAM(s) Oral every 4 hours PRN agitation  diphenhydrAMINE Injectable 50 milliGRAM(s) IntraMuscular once PRN Agitation  haloperidol     Tablet 5 milliGRAM(s) Oral every 4 hours PRN agitation  haloperidol    Injectable 7.5 milliGRAM(s) IntraMuscular once PRN psychotic agitation  haloperidol    Injectable 10 milliGRAM(s) IntraMuscular once PRN agitation  OLANZapine Injectable 10 milliGRAM(s) IntraMuscular <User Schedule> PRN ONLY if patient refuses court ordered PO zyprexa

## 2023-09-06 NOTE — BH INPATIENT PSYCHIATRY PROGRESS NOTE - NSBHASSESSSUMMFT_PSY_ALL_CORE
This is a 53 year old woman, currently living at [facility] on [place] grounds, PMH of GERD, anemia, PPH of schizophrenia, selective mutism, currently with [facility] ACT team, previously with [facility] which , with history of psychiatric hospitalization(s), without known history of self-injury or suicide attempts, with history of aggression in the setting on nonadherence with medication, who is brought in by staff at residence for increasing agitation and aggression in the setting of 2 months of medication nonadherence.     Today, patient speaks and has signficant FTD. She refuses to take PO medications. Refusing PO clozapine. PO/IM Zyprexa to 10mg BID. State application in process.     Patient continues to require inpatient psychiatric hospitalization for stabilization and treatment.     Plan:  Legals:  	- continue with 9.27 involuntary admission   	- TOO obtained 8/15  Safety:  	- routine obs, bed block for aggression, currently ADLs are VERY poor  Psychiatric:  	- c/w psychiatric medications: Zyprexa 10mg PO BID (IM if refuse PO), Valproic acid 250mg 2 caps BID, Cogentin 2mg BID.   	- c/w clozapine 25mg qhs  	- s/p loading dose of haldol dec 100mg   Medical:   	- Nexium 40mg, Feosol 300mg daily, Vit D3 1000U, Senokot 8.6mg qhs  Dispo:  	- plan for state application vs returning to [facility]  	- AOT is under investigation,  in   	- no family involved in care

## 2023-09-06 NOTE — BH INPATIENT PSYCHIATRY PROGRESS NOTE - MSE UNSTRUCTURED FT
Gen: The patient appears older than stated age, poor hygiene and grooming  Beh: uncooperative, mute, intense stare  Motor: No PMR/PMA   Speech: fluent, quiet volume  Mood: "good"  Affect: neutral  Thought process: tangential, loose associations  Thought content: does not answer questions about SI/HI  Perception: suspected AH  Neuro: awake, alert, oriented to year, otherwise cannot assess  Insight: poor   Judgment: poor  Impulse control: impaired

## 2023-09-06 NOTE — BH INPATIENT PSYCHIATRY PROGRESS NOTE - NSBHFUPINTERVALHXFT_PSY_A_CORE
Follow-up for aggression/psychosis. Pt. w/ bed block for aggression. Refuses PO medications. Refusing PO clozapine. Continues to receive IM Zyprexa 5mg BID for refusing court ordered medications. Per staff, patient continues to be calm. Today, patient was seen lying in bed.  She speaks to me, asks how I am.  She says she is fine and has no complaints.  She tells me not to call her by her name after I address her by it and gives me a different name, saying "I am a mister, Mr. **".  Cannot clearly make out what name she gives.  She states the year is 2023 for me, but it is still 2020 for her.  She tells me I can leave now, terminating the interview.  She is more visible in the community today.  She also put PO meds in her mouth today before spitting them out.

## 2023-09-07 LAB — HALOPERIDOL SERPL-MCNC: 41.1 NG/ML — HIGH (ref 1–40)

## 2023-09-07 PROCEDURE — 99232 SBSQ HOSP IP/OBS MODERATE 35: CPT

## 2023-09-07 RX ADMIN — OLANZAPINE 10 MILLIGRAM(S): 15 TABLET, FILM COATED ORAL at 21:32

## 2023-09-07 NOTE — BH INPATIENT PSYCHIATRY PROGRESS NOTE - NSBHASSESSSUMMFT_PSY_ALL_CORE
This is a 53 year old woman, currently living at [facility] on [place] grounds, PMH of GERD, anemia, PPH of schizophrenia, selective mutism, currently with [facility] ACT team, previously with [facility] which , with history of psychiatric hospitalization(s), without known history of self-injury or suicide attempts, with history of aggression in the setting on nonadherence with medication, who is brought in by staff at residence for increasing agitation and aggression in the setting of 2 months of medication nonadherence.     Today, patient speaks and has significant FTD. She refuses to take PO medications. Refusing PO clozapine. PO/IM Zyprexa to 10mg BID. State application in process.     Patient continues to require inpatient psychiatric hospitalization for stabilization and treatment.     Plan:  Legals:  	- continue with 9.27 involuntary admission   	- TOO obtained 8/15  Safety:  	- routine obs, bed block for aggression, currently ADLs are VERY poor  Psychiatric:  	- c/w psychiatric medications: Zyprexa 10mg PO BID (IM if refuse PO), Valproic acid 250mg 2 caps BID, Cogentin 2mg BID.   	- c/w clozapine 25mg qhs  	- s/p loading dose of haldol dec 100mg   Medical:   	- Nexium 40mg, Feosol 300mg daily, Vit D3 1000U, Senokot 8.6mg qhs  Dispo:  	- plan for state application vs returning to [facility]  	- AOT is under investigation,  in   	- no family involved in care

## 2023-09-07 NOTE — BH INPATIENT PSYCHIATRY PROGRESS NOTE - NSBHFUPINTERVALHXFT_PSY_A_CORE
Follow-up for aggression/psychosis. Pt. w/ bed block for aggression. Refuses PO medications. Refusing PO clozapine. Continues to receive IM Zyprexa 10mg BID for refusing court ordered medications. Per staff, patient continues to be calm. Today, patient was seen lying in bed.  Seen reading the newspaper.  When I ask about the news she says its none of my business.  She asks me to leave.  Says I may not enter her room.  Later She is in the day room.  She calls me over but when I sit down she says she does not want to talk to me and gets up from her chair.

## 2023-09-08 LAB
BASOPHILS # BLD AUTO: 0.03 K/UL — SIGNIFICANT CHANGE UP (ref 0–0.2)
BASOPHILS NFR BLD AUTO: 0.4 % — SIGNIFICANT CHANGE UP (ref 0–2)
CRP SERPL-MCNC: 28.8 MG/L — HIGH
EOSINOPHIL # BLD AUTO: 0.15 K/UL — SIGNIFICANT CHANGE UP (ref 0–0.5)
EOSINOPHIL NFR BLD AUTO: 2.2 % — SIGNIFICANT CHANGE UP (ref 0–6)
HCT VFR BLD CALC: 26.3 % — LOW (ref 34.5–45)
HGB BLD-MCNC: 7.9 G/DL — LOW (ref 11.5–15.5)
IANC: 4.35 K/UL — SIGNIFICANT CHANGE UP (ref 1.8–7.4)
IMM GRANULOCYTES NFR BLD AUTO: 0.1 % — SIGNIFICANT CHANGE UP (ref 0–0.9)
LYMPHOCYTES # BLD AUTO: 1.54 K/UL — SIGNIFICANT CHANGE UP (ref 1–3.3)
LYMPHOCYTES # BLD AUTO: 23.1 % — SIGNIFICANT CHANGE UP (ref 13–44)
MCHC RBC-ENTMCNC: 21.7 PG — LOW (ref 27–34)
MCHC RBC-ENTMCNC: 30 GM/DL — LOW (ref 32–36)
MCV RBC AUTO: 72.3 FL — LOW (ref 80–100)
MONOCYTES # BLD AUTO: 0.59 K/UL — SIGNIFICANT CHANGE UP (ref 0–0.9)
MONOCYTES NFR BLD AUTO: 8.8 % — SIGNIFICANT CHANGE UP (ref 2–14)
NEUTROPHILS # BLD AUTO: 4.35 K/UL — SIGNIFICANT CHANGE UP (ref 1.8–7.4)
NEUTROPHILS NFR BLD AUTO: 65.4 % — SIGNIFICANT CHANGE UP (ref 43–77)
NRBC # BLD: 0 /100 WBCS — SIGNIFICANT CHANGE UP (ref 0–0)
NRBC # FLD: 0.02 K/UL — HIGH (ref 0–0)
PLATELET # BLD AUTO: 351 K/UL — SIGNIFICANT CHANGE UP (ref 150–400)
RBC # BLD: 3.64 M/UL — LOW (ref 3.8–5.2)
RBC # FLD: 21.5 % — HIGH (ref 10.3–14.5)
TROPONIN T, HIGH SENSITIVITY RESULT: 9 NG/L — SIGNIFICANT CHANGE UP
WBC # BLD: 6.67 K/UL — SIGNIFICANT CHANGE UP (ref 3.8–10.5)
WBC # FLD AUTO: 6.67 K/UL — SIGNIFICANT CHANGE UP (ref 3.8–10.5)

## 2023-09-08 PROCEDURE — 93010 ELECTROCARDIOGRAM REPORT: CPT

## 2023-09-08 PROCEDURE — 99232 SBSQ HOSP IP/OBS MODERATE 35: CPT

## 2023-09-08 RX ADMIN — OLANZAPINE 10 MILLIGRAM(S): 15 TABLET, FILM COATED ORAL at 21:12

## 2023-09-08 RX ADMIN — OLANZAPINE 10 MILLIGRAM(S): 15 TABLET, FILM COATED ORAL at 08:56

## 2023-09-08 NOTE — BH INPATIENT PSYCHIATRY PROGRESS NOTE - MSE UNSTRUCTURED FT
Gen: The patient appears older than stated age, poor hygiene and grooming  Beh: uncooperative, still relatively mute, intense stare  Motor: No PMR/PMA   Speech: fluent, quiet volume  Mood: will not answer  Affect: neutral  Thought process: tangential, loose associations  Thought content: does not answer questions about SI/HI  Perception: suspected AH  Neuro: awake, alert, oriented to year, otherwise cannot assess  Insight: poor   Judgment: poor  Impulse control: impaired

## 2023-09-08 NOTE — BH INPATIENT PSYCHIATRY PROGRESS NOTE - NSBHFUPINTERVALHXFT_PSY_A_CORE
Follow-up for aggression/psychosis. Pt. w/ bed block for aggression. Refuses PO medications. Refusing PO clozapine. Continues to receive IM Zyprexa 10mg BID for refusing court ordered medications. Per staff, patient continues to be calm. Today, patient was seen lying in bed.  She says that I cannot work as a psychiatrist.  I ask her why not and she says it is none of my business.  Her speech is very soft in volume and some is inaudible.  She will not repeat what she says.  She asks me to leave.

## 2023-09-08 NOTE — BH INPATIENT PSYCHIATRY PROGRESS NOTE - NSBHASSESSSUMMFT_PSY_ALL_CORE
This is a 53 year old woman, currently living at [facility] on [place] grounds, PMH of GERD, anemia, PPH of schizophrenia, selective mutism, currently with [facility] ACT team, previously with [facility] which , with history of psychiatric hospitalization(s), without known history of self-injury or suicide attempts, with history of aggression in the setting on nonadherence with medication, who is brought in by staff at residence for increasing agitation and aggression in the setting of 2 months of medication nonadherence.     Today, patient speaks and has significant FTD. She refuses to take PO medications. Refusing PO clozapine. PO/IM Zyprexa to 10mg BID. State application in process.     Patient continues to require inpatient psychiatric hospitalization for stabilization and treatment.     Plan:  Legals:  	- continue with 9.27 involuntary admission   	- TOO obtained 8/15  Safety:  	- routine obs, bed block for aggression, currently ADLs are poor, slightly improved  Psychiatric:  	- c/w psychiatric medications: Zyprexa 10mg PO BID (IM if refuse PO), Valproic acid 250mg 2 caps BID, Cogentin 2mg BID.   	- c/w clozapine 25mg qhs  	- s/p loading dose of haldol dec 100mg   Medical:   	- Nexium 40mg, Feosol 300mg daily, Vit D3 1000U, Senokot 8.6mg qhs  Dispo:  	- plan for state application vs returning to [facility]  	- AOT is under investigation,  in   	- no family involved in care

## 2023-09-09 RX ORDER — BENZTROPINE MESYLATE 1 MG
2 TABLET ORAL DAILY
Refills: 0 | Status: DISCONTINUED | OUTPATIENT
Start: 2023-09-09 | End: 2023-09-09

## 2023-09-09 RX ORDER — BENZTROPINE MESYLATE 1 MG
2 TABLET ORAL ONCE
Refills: 0 | Status: COMPLETED | OUTPATIENT
Start: 2023-09-09 | End: 2023-09-09

## 2023-09-09 RX ORDER — BENZTROPINE MESYLATE 1 MG
2 TABLET ORAL
Refills: 0 | Status: DISCONTINUED | OUTPATIENT
Start: 2023-09-09 | End: 2023-09-25

## 2023-09-09 RX ADMIN — Medication 2 MILLIGRAM(S): at 10:07

## 2023-09-09 RX ADMIN — Medication 2 MILLIGRAM(S): at 21:00

## 2023-09-09 RX ADMIN — OLANZAPINE 10 MILLIGRAM(S): 15 TABLET, FILM COATED ORAL at 21:00

## 2023-09-09 RX ADMIN — OLANZAPINE 10 MILLIGRAM(S): 15 TABLET, FILM COATED ORAL at 10:07

## 2023-09-09 NOTE — BH CHART NOTE - NSEVENTNOTEFT_PSY_ALL_CORE
Interval History:  MARIO called as patient was seen to be having involuntary lip movements. Patient is mute and uncooperative with exam. Involuntary lip movements observed along with tremulous hands and an episode of rapid flickering of b/l eyelids. Patient refusing PO medications. Cogentin 2 mg IM x1 dose ordered.

## 2023-09-10 RX ADMIN — Medication 2 MILLIGRAM(S): at 20:33

## 2023-09-10 RX ADMIN — Medication 2 MILLIGRAM(S): at 09:28

## 2023-09-10 RX ADMIN — OLANZAPINE 10 MILLIGRAM(S): 15 TABLET, FILM COATED ORAL at 09:33

## 2023-09-10 RX ADMIN — OLANZAPINE 10 MILLIGRAM(S): 15 TABLET, FILM COATED ORAL at 20:35

## 2023-09-11 PROCEDURE — 99232 SBSQ HOSP IP/OBS MODERATE 35: CPT | Mod: GC

## 2023-09-11 RX ADMIN — Medication 2 MILLIGRAM(S): at 09:18

## 2023-09-11 RX ADMIN — OLANZAPINE 10 MILLIGRAM(S): 15 TABLET, FILM COATED ORAL at 21:07

## 2023-09-11 RX ADMIN — OLANZAPINE 10 MILLIGRAM(S): 15 TABLET, FILM COATED ORAL at 09:18

## 2023-09-11 RX ADMIN — Medication 2 MILLIGRAM(S): at 21:06

## 2023-09-11 NOTE — BH INPATIENT PSYCHIATRY PROGRESS NOTE - MSE UNSTRUCTURED FT
Gen: The patient appears older than stated age, poor hygiene and grooming  Beh: uncooperative, intense stare  Motor: No PMR/PMA   Speech: quiet, incoherent   Mood: will not answer  Affect: irritated  Thought process: tangential, loose associations  Thought content: does not answer questions about SI/HI. Reports thoughts about devil and hell.   Perception: suspected AH  Neuro: awake, alert  Insight: poor   Judgment: poor  Impulse control: impaired

## 2023-09-11 NOTE — BH INPATIENT PSYCHIATRY PROGRESS NOTE - CURRENT MEDICATION
MEDICATIONS  (STANDING):  benztropine 2 milliGRAM(s) Oral <User Schedule>  benztropine Injectable 2 milliGRAM(s) IntraMuscular two times a day  cholecalciferol 1000 Unit(s) Oral <User Schedule>  cloZAPine 25 milliGRAM(s) Oral at bedtime  ferrous    sulfate Liquid 300 milliGRAM(s) Oral with breakfast  OLANZapine 10 milliGRAM(s) Oral <User Schedule>  pantoprazole    Tablet 40 milliGRAM(s) Oral before breakfast  polyethylene glycol 3350 17 Gram(s) Oral at bedtime  valproic acid 500 milliGRAM(s) Oral <User Schedule>    MEDICATIONS  (PRN):  diphenhydrAMINE 50 milliGRAM(s) Oral every 4 hours PRN agitation  diphenhydrAMINE Injectable 50 milliGRAM(s) IntraMuscular once PRN Agitation  haloperidol     Tablet 5 milliGRAM(s) Oral every 4 hours PRN agitation  haloperidol    Injectable 7.5 milliGRAM(s) IntraMuscular once PRN psychotic agitation  haloperidol    Injectable 10 milliGRAM(s) IntraMuscular once PRN agitation  OLANZapine Injectable 10 milliGRAM(s) IntraMuscular <User Schedule> PRN ONLY if patient refuses court ordered PO zyprexa

## 2023-09-11 NOTE — BH INPATIENT PSYCHIATRY PROGRESS NOTE - NSBHASSESSSUMMFT_PSY_ALL_CORE
This is a 53 year old woman, currently living at [facility] on [place] grounds, PMH of GERD, anemia, PPH of schizophrenia, selective mutism, currently with [facility] ACT team, previously with [facility] which , with history of psychiatric hospitalization(s), without known history of self-injury or suicide attempts, with history of aggression in the setting on nonadherence with medication, who is brought in by staff at residence for increasing agitation and aggression in the setting of 2 months of medication nonadherence.     Today, patient speaks, disorganized TP, incoherent speech about the devil, irritated. She refuses to take PO medications. Refusing PO clozapine. PO/IM Zyprexa to 10mg BID with PO/IM cogentin 2mg BID. State application in process.     Patient continues to require inpatient psychiatric hospitalization for stabilization and treatment.     Plan:  Legals:  	- continue with 9.27 involuntary admission   	- TOO obtained 8/15  Safety:  	- routine obs, bed block for aggression, currently ADLs are poor, slightly improved  Psychiatric:  	- c/w psychiatric medications: Zyprexa 10mg PO BID (IM if refuse PO), Valproic acid 250mg 2 caps BID, Cogentin 2mg BID (IM if refuse PO).    	- c/w clozapine 25mg qhs  	- s/p loading dose of haldol dec 100mg   Medical:   	- Nexium 40mg, Feosol 300mg daily, Vit D3 1000U, Senokot 8.6mg qhs  Dispo:  	- plan for state application vs returning to [facility]  	- AOT is under investigation,  in   	- no family involved in care

## 2023-09-11 NOTE — BH INPATIENT PSYCHIATRY PROGRESS NOTE - NSBHFUPINTERVALHXFT_PSY_A_CORE
Follow-up for aggression/psychosis. Pt. w/ bed block for aggression. Refuses PO medications. Refusing PO clozapine. Continues to receive IM Zyprexa 10mg BID for refusing court ordered medications. Also receiving IM cogentin 2mg BID for EPS.   This morning, patient was found sitting up in bed. Speech is difficult to understand. States "You're going to hell..burn...drop dead". Stared at writer and said that we are the devil. Then, patient demanded 'get out' and would not engage further.

## 2023-09-12 PROCEDURE — 99232 SBSQ HOSP IP/OBS MODERATE 35: CPT | Mod: GC

## 2023-09-12 RX ADMIN — Medication 2 MILLIGRAM(S): at 20:43

## 2023-09-12 RX ADMIN — OLANZAPINE 10 MILLIGRAM(S): 15 TABLET, FILM COATED ORAL at 09:27

## 2023-09-12 RX ADMIN — OLANZAPINE 10 MILLIGRAM(S): 15 TABLET, FILM COATED ORAL at 21:02

## 2023-09-12 RX ADMIN — Medication 2 MILLIGRAM(S): at 09:27

## 2023-09-12 NOTE — BH INPATIENT PSYCHIATRY PROGRESS NOTE - NSBHASSESSSUMMFT_PSY_ALL_CORE
This is a 53 year old woman, currently living at [facility] on [place] grounds, PMH of GERD, anemia, PPH of schizophrenia, selective mutism, [facility], previously with [facility] which , with history of psychiatric hospitalization(s), without known history of self-injury or suicide attempts, with history of aggression in the setting on nonadherence with medication, who is brought in by staff at residence for increasing agitation and aggression in the setting of 2 months of medication nonadherence.     Today, patient continues to appear psychotic but is improving with ADLs. State application in process. Patient continues to require inpatient psychiatric hospitalization for stabilization and treatment.     Plan:  Legals:  	- continue with 9.27 involuntary admission   	- TOO obtained 8/15  Safety:  	- routine obs, bed block for aggression, currently ADLs are poor, slightly improved  Psychiatric:  	- c/w psychiatric medications: Zyprexa 10mg PO BID (IM if refuse PO), Valproic acid 250mg 2 caps BID, Cogentin 2mg BID (IM if refuse PO).    	- c/w clozapine 25mg qhs  	- s/p loading dose of haldol dec 100mg   Medical:   	- Nexium 40mg, Feosol 300mg daily, Vit D3 1000U, Senokot 8.6mg qhs  Dispo:  	- plan for state application vs returning to [facility]  	- AOT is under investigation,  in   	- no family involved in care

## 2023-09-12 NOTE — BH INPATIENT PSYCHIATRY PROGRESS NOTE - CURRENT MEDICATION
MEDICATIONS  (STANDING):  benztropine 2 milliGRAM(s) Oral <User Schedule>  benztropine Injectable 2 milliGRAM(s) IntraMuscular two times a day  cholecalciferol 1000 Unit(s) Oral <User Schedule>  cloZAPine 25 milliGRAM(s) Oral at bedtime  ferrous    sulfate Liquid 300 milliGRAM(s) Oral with breakfast  OLANZapine 10 milliGRAM(s) Oral <User Schedule>  pantoprazole    Tablet 40 milliGRAM(s) Oral before breakfast  polyethylene glycol 3350 17 Gram(s) Oral at bedtime  valproic acid 500 milliGRAM(s) Oral <User Schedule>    MEDICATIONS  (PRN):  diphenhydrAMINE 50 milliGRAM(s) Oral every 4 hours PRN agitation  diphenhydrAMINE Injectable 50 milliGRAM(s) IntraMuscular once PRN Agitation  haloperidol     Tablet 5 milliGRAM(s) Oral every 4 hours PRN agitation  haloperidol    Injectable 10 milliGRAM(s) IntraMuscular once PRN agitation  haloperidol    Injectable 7.5 milliGRAM(s) IntraMuscular once PRN psychotic agitation  OLANZapine Injectable 10 milliGRAM(s) IntraMuscular <User Schedule> PRN ONLY if patient refuses court ordered PO zyprexa

## 2023-09-12 NOTE — BH INPATIENT PSYCHIATRY PROGRESS NOTE - MSE UNSTRUCTURED FT
Gen: The patient appears older than stated age, poor hygiene and grooming  Beh: uncooperative, intense stare  Motor: No PMR/PMA   Speech: quiet, incoherent   Mood: will not answer  Affect: irritated  Thought process: tangential, loose associations  Thought content: does not answer questions about SI/HI. Reports thoughts about devil and demon.   Perception: suspected AH  Neuro: awake, alert  Insight: poor   Judgment: poor  Impulse control: impaired

## 2023-09-12 NOTE — BH INPATIENT PSYCHIATRY PROGRESS NOTE - PRN MEDS
MEDICATIONS  (PRN):  diphenhydrAMINE 50 milliGRAM(s) Oral every 4 hours PRN agitation  diphenhydrAMINE Injectable 50 milliGRAM(s) IntraMuscular once PRN Agitation  haloperidol     Tablet 5 milliGRAM(s) Oral every 4 hours PRN agitation  haloperidol    Injectable 10 milliGRAM(s) IntraMuscular once PRN agitation  haloperidol    Injectable 7.5 milliGRAM(s) IntraMuscular once PRN psychotic agitation  OLANZapine Injectable 10 milliGRAM(s) IntraMuscular <User Schedule> PRN ONLY if patient refuses court ordered PO zyprexa

## 2023-09-12 NOTE — BH INPATIENT PSYCHIATRY PROGRESS NOTE - NSBHFUPINTERVALHXFT_PSY_A_CORE
Follow-up for aggression/psychosis. Pt. w/ bed block for aggression. Refuses PO medications. Refusing PO clozapine. Continues to receive IM Zyprexa 10mg BID for refusing court ordered medications. Also receiving IM cogentin 2mg BID for EPS. Per staff, patient's ADLs have improved.  This morning, patient was found lying in bed. Refused to speak with writer. Later, patient reports "that  girl is a demon". Reports the medical team members are the 'devil'.

## 2023-09-13 PROCEDURE — 99232 SBSQ HOSP IP/OBS MODERATE 35: CPT | Mod: GC

## 2023-09-13 RX ADMIN — OLANZAPINE 10 MILLIGRAM(S): 15 TABLET, FILM COATED ORAL at 09:17

## 2023-09-13 RX ADMIN — Medication 2 MILLIGRAM(S): at 21:17

## 2023-09-13 RX ADMIN — OLANZAPINE 10 MILLIGRAM(S): 15 TABLET, FILM COATED ORAL at 21:16

## 2023-09-13 NOTE — BH INPATIENT PSYCHIATRY PROGRESS NOTE - MSE UNSTRUCTURED FT
Gen: The patient appears older than stated age, poor hygiene and grooming  Beh: uncooperative, intense stare  Motor: No PMR/PMA   Speech: loud, clear  Mood: will not answer  Affect: irritated  Thought process: unable to assess given poverty of thought  Thought content: does not answer questions about SI/HI.   Perception: suspected AH  Neuro: awake, alert  Insight: poor   Judgment: poor  Impulse control: impaired Gen: The patient appears older than stated age, poor hygiene and grooming  Beh: uncooperative, intense stare  Motor: No PMR/PMA   Speech: loud, clear  Mood: will not answer  Affect: irritable, hostile  Thought process: unable to assess given poverty of thought  Thought content: does not answer questions about SI/HI.   Perception: suspected AH  Neuro: awake, alert  Insight: poor   Judgment: poor  Impulse control: impaired

## 2023-09-13 NOTE — BH INPATIENT PSYCHIATRY PROGRESS NOTE - NSBHASSESSSUMMFT_PSY_ALL_CORE
This is a 53 year old woman, currently living at [facility] on [place] grounds, PMH of GERD, anemia, PPH of schizophrenia, selective mutism, [facility] with [facility], previously with [facility] which , with history of psychiatric hospitalization(s), without known history of self-injury or suicide attempts, with history of aggression in the setting on nonadherence with medication, who is brought in by staff at residence for increasing agitation and aggression in the setting of 2 months of medication nonadherence.     Today, patient continues to refuse PO medications and refuse to cooperate with interview. State application in process. Patient continues to require inpatient psychiatric hospitalization for stabilization and treatment.     Plan:  Legals:  	- continue with 9.27 involuntary admission   	- TOO obtained 8/15  Safety:  	- routine obs, bed block for aggression, currently ADLs are poor, slightly improved  Psychiatric:  	- c/w psychiatric medications: Zyprexa 10mg PO BID (IM if refuse PO), Valproic acid 250mg 2 caps BID, Cogentin 2mg BID (IM if refuse PO).    	- c/w clozapine 25mg qhs  	- s/p loading dose of haldol dec 100mg   Medical:   	- Nexium 40mg, Feosol 300mg daily, Vit D3 1000U, Senokot 8.6mg qhs  Dispo:  	- plan for state application vs returning to [facility]  	- AOT is under investigation,  in   	- no family involved in care

## 2023-09-13 NOTE — BH INPATIENT PSYCHIATRY PROGRESS NOTE - CURRENT MEDICATION
MEDICATIONS  (STANDING):  benztropine 2 milliGRAM(s) Oral <User Schedule>  benztropine Injectable 2 milliGRAM(s) IntraMuscular two times a day  cholecalciferol 1000 Unit(s) Oral <User Schedule>  cloZAPine 25 milliGRAM(s) Oral at bedtime  ferrous    sulfate Liquid 300 milliGRAM(s) Oral with breakfast  OLANZapine 10 milliGRAM(s) Oral <User Schedule>  pantoprazole    Tablet 40 milliGRAM(s) Oral before breakfast  polyethylene glycol 3350 17 Gram(s) Oral at bedtime  valproic acid 500 milliGRAM(s) Oral <User Schedule>    MEDICATIONS  (PRN):  diphenhydrAMINE 50 milliGRAM(s) Oral every 4 hours PRN agitation  diphenhydrAMINE Injectable 50 milliGRAM(s) IntraMuscular once PRN Agitation  haloperidol     Tablet 5 milliGRAM(s) Oral every 4 hours PRN agitation  haloperidol    Injectable 10 milliGRAM(s) IntraMuscular once PRN agitation  haloperidol    Injectable 7.5 milliGRAM(s) IntraMuscular once PRN psychotic agitation  OLANZapine Injectable 10 milliGRAM(s) IntraMuscular <User Schedule> PRN ONLY if patient refuses court ordered PO zyprexa   MEDICATIONS  (STANDING):  benztropine 2 milliGRAM(s) Oral <User Schedule>  benztropine Injectable 2 milliGRAM(s) IntraMuscular two times a day  cholecalciferol 1000 Unit(s) Oral <User Schedule>  cloZAPine 25 milliGRAM(s) Oral at bedtime  ferrous    sulfate Liquid 300 milliGRAM(s) Oral with breakfast  OLANZapine 10 milliGRAM(s) Oral <User Schedule>  pantoprazole    Tablet 40 milliGRAM(s) Oral before breakfast  polyethylene glycol 3350 17 Gram(s) Oral at bedtime  valproic acid 500 milliGRAM(s) Oral <User Schedule>    MEDICATIONS  (PRN):  diphenhydrAMINE 50 milliGRAM(s) Oral every 4 hours PRN agitation  diphenhydrAMINE Injectable 50 milliGRAM(s) IntraMuscular once PRN Agitation  haloperidol     Tablet 5 milliGRAM(s) Oral every 4 hours PRN agitation  haloperidol    Injectable 7.5 milliGRAM(s) IntraMuscular once PRN psychotic agitation  haloperidol    Injectable 10 milliGRAM(s) IntraMuscular once PRN agitation  OLANZapine Injectable 10 milliGRAM(s) IntraMuscular <User Schedule> PRN ONLY if patient refuses court ordered PO zyprexa

## 2023-09-13 NOTE — BH INPATIENT PSYCHIATRY PROGRESS NOTE - NSBHFUPINTERVALHXFT_PSY_A_CORE
Follow-up for aggression/psychosis. Pt. w/ bed block for aggression. Refuses PO medications. Refusing PO clozapine. Continues to receive IM Zyprexa 10mg BID for refusing court ordered medications. Also receiving IM cogentin 2mg BID for EPS.   This morning, patient was found lying in bed. When team asked to enter the room and took a step, patient reports "no" and demanded "get out" multiple times. Refuses to cooperate with interview.

## 2023-09-13 NOTE — BH INPATIENT PSYCHIATRY PROGRESS NOTE - PRN MEDS
MEDICATIONS  (PRN):  diphenhydrAMINE 50 milliGRAM(s) Oral every 4 hours PRN agitation  diphenhydrAMINE Injectable 50 milliGRAM(s) IntraMuscular once PRN Agitation  haloperidol     Tablet 5 milliGRAM(s) Oral every 4 hours PRN agitation  haloperidol    Injectable 10 milliGRAM(s) IntraMuscular once PRN agitation  haloperidol    Injectable 7.5 milliGRAM(s) IntraMuscular once PRN psychotic agitation  OLANZapine Injectable 10 milliGRAM(s) IntraMuscular <User Schedule> PRN ONLY if patient refuses court ordered PO zyprexa   MEDICATIONS  (PRN):  diphenhydrAMINE 50 milliGRAM(s) Oral every 4 hours PRN agitation  diphenhydrAMINE Injectable 50 milliGRAM(s) IntraMuscular once PRN Agitation  haloperidol     Tablet 5 milliGRAM(s) Oral every 4 hours PRN agitation  haloperidol    Injectable 7.5 milliGRAM(s) IntraMuscular once PRN psychotic agitation  haloperidol    Injectable 10 milliGRAM(s) IntraMuscular once PRN agitation  OLANZapine Injectable 10 milliGRAM(s) IntraMuscular <User Schedule> PRN ONLY if patient refuses court ordered PO zyprexa

## 2023-09-14 PROCEDURE — 99232 SBSQ HOSP IP/OBS MODERATE 35: CPT | Mod: GC

## 2023-09-14 RX ADMIN — OLANZAPINE 10 MILLIGRAM(S): 15 TABLET, FILM COATED ORAL at 09:07

## 2023-09-14 RX ADMIN — OLANZAPINE 10 MILLIGRAM(S): 15 TABLET, FILM COATED ORAL at 20:50

## 2023-09-14 RX ADMIN — Medication 2 MILLIGRAM(S): at 20:49

## 2023-09-14 NOTE — BH INPATIENT PSYCHIATRY PROGRESS NOTE - NSBHFUPINTERVALHXFT_PSY_A_CORE
Follow-up for aggression/psychosis. Pt. w/ bed block for aggression. Refuses PO medications. Refusing PO clozapine. Continues to receive IM Zyprexa 10mg BID for refusing court ordered medications. Also receiving IM cogentin 2mg BID for EPS.   This morning, patient was found lying in bed. Patient started shouting "Get out...drop dead...turn off!" Refuses to cooperate with interview.

## 2023-09-14 NOTE — BH INPATIENT PSYCHIATRY PROGRESS NOTE - MSE UNSTRUCTURED FT
Gen: The patient appears older than stated age, poor hygiene and grooming  Beh: uncooperative, intense stare  Motor: No PMR/PMA   Speech: loud, clear  Mood: will not answer  Affect: irritable, hostile  Thought process: unable to assess given poverty of thought  Thought content: does not answer questions about SI/HI.   Perception: suspected AH  Neuro: awake, alert  Insight: poor   Judgment: poor  Impulse control: impaired

## 2023-09-14 NOTE — BH INPATIENT PSYCHIATRY PROGRESS NOTE - CURRENT MEDICATION
MEDICATIONS  (STANDING):  benztropine 2 milliGRAM(s) Oral <User Schedule>  benztropine Injectable 2 milliGRAM(s) IntraMuscular two times a day  cholecalciferol 1000 Unit(s) Oral <User Schedule>  cloZAPine 25 milliGRAM(s) Oral at bedtime  ferrous    sulfate Liquid 300 milliGRAM(s) Oral with breakfast  OLANZapine 10 milliGRAM(s) Oral <User Schedule>  pantoprazole    Tablet 40 milliGRAM(s) Oral before breakfast  polyethylene glycol 3350 17 Gram(s) Oral at bedtime  valproic acid 500 milliGRAM(s) Oral <User Schedule>    MEDICATIONS  (PRN):  diphenhydrAMINE 50 milliGRAM(s) Oral every 4 hours PRN agitation  diphenhydrAMINE Injectable 50 milliGRAM(s) IntraMuscular once PRN Agitation  haloperidol     Tablet 5 milliGRAM(s) Oral every 4 hours PRN agitation  haloperidol    Injectable 7.5 milliGRAM(s) IntraMuscular once PRN psychotic agitation  haloperidol    Injectable 10 milliGRAM(s) IntraMuscular once PRN agitation  OLANZapine Injectable 10 milliGRAM(s) IntraMuscular <User Schedule> PRN ONLY if patient refuses court ordered PO zyprexa   MEDICATIONS  (STANDING):  benztropine 2 milliGRAM(s) Oral <User Schedule>  benztropine Injectable 2 milliGRAM(s) IntraMuscular two times a day  cholecalciferol 1000 Unit(s) Oral <User Schedule>  cloZAPine 25 milliGRAM(s) Oral at bedtime  ferrous    sulfate Liquid 300 milliGRAM(s) Oral with breakfast  OLANZapine 10 milliGRAM(s) Oral <User Schedule>  pantoprazole    Tablet 40 milliGRAM(s) Oral before breakfast  polyethylene glycol 3350 17 Gram(s) Oral at bedtime  valproic acid 500 milliGRAM(s) Oral <User Schedule>    MEDICATIONS  (PRN):  diphenhydrAMINE 50 milliGRAM(s) Oral every 4 hours PRN agitation  diphenhydrAMINE Injectable 50 milliGRAM(s) IntraMuscular once PRN Agitation  haloperidol     Tablet 5 milliGRAM(s) Oral every 4 hours PRN agitation  haloperidol    Injectable 10 milliGRAM(s) IntraMuscular once PRN agitation  haloperidol    Injectable 7.5 milliGRAM(s) IntraMuscular once PRN psychotic agitation  OLANZapine Injectable 10 milliGRAM(s) IntraMuscular <User Schedule> PRN ONLY if patient refuses court ordered PO zyprexa

## 2023-09-14 NOTE — BH INPATIENT PSYCHIATRY PROGRESS NOTE - NSBHASSESSSUMMFT_PSY_ALL_CORE
This is a 53 year old woman, currently living at [facility] on [place] grounds, PMH of GERD, anemia, PPH of schizophrenia, selective mutism, [facility], previously with [facility] which , with history of psychiatric hospitalization(s), without known history of self-injury or suicide attempts, with history of aggression in the setting on nonadherence with medication, who is brought in by staff at residence for increasing agitation and aggression in the setting of 2 months of medication nonadherence.     Today, patient continues to refuse PO medications and refuse to cooperate with interview. State application in process. Patient continues to require inpatient psychiatric hospitalization for stabilization and treatment.     Plan:  Legals:  	- continue with 9.27 involuntary admission   	- TOO obtained 8/15  Safety:  	- routine obs, bed block for aggression, currently ADLs are poor, slightly improved  Psychiatric:  	- c/w psychiatric medications: Zyprexa 10mg PO BID (IM if refuse PO), Valproic acid 250mg 2 caps BID, Cogentin 2mg BID (IM if refuse PO).    	- c/w clozapine 25mg qhs  	- s/p loading dose of haldol dec 100mg   Medical:   	- Nexium 40mg, Feosol 300mg daily, Vit D3 1000U, Senokot 8.6mg qhs  Dispo:  	- plan for state application vs returning to [facility]  	- AOT is under investigation,  in   	- no family involved in care

## 2023-09-14 NOTE — BH INPATIENT PSYCHIATRY PROGRESS NOTE - PRN MEDS
MEDICATIONS  (PRN):  diphenhydrAMINE 50 milliGRAM(s) Oral every 4 hours PRN agitation  diphenhydrAMINE Injectable 50 milliGRAM(s) IntraMuscular once PRN Agitation  haloperidol     Tablet 5 milliGRAM(s) Oral every 4 hours PRN agitation  haloperidol    Injectable 7.5 milliGRAM(s) IntraMuscular once PRN psychotic agitation  haloperidol    Injectable 10 milliGRAM(s) IntraMuscular once PRN agitation  OLANZapine Injectable 10 milliGRAM(s) IntraMuscular <User Schedule> PRN ONLY if patient refuses court ordered PO zyprexa   MEDICATIONS  (PRN):  diphenhydrAMINE 50 milliGRAM(s) Oral every 4 hours PRN agitation  diphenhydrAMINE Injectable 50 milliGRAM(s) IntraMuscular once PRN Agitation  haloperidol     Tablet 5 milliGRAM(s) Oral every 4 hours PRN agitation  haloperidol    Injectable 10 milliGRAM(s) IntraMuscular once PRN agitation  haloperidol    Injectable 7.5 milliGRAM(s) IntraMuscular once PRN psychotic agitation  OLANZapine Injectable 10 milliGRAM(s) IntraMuscular <User Schedule> PRN ONLY if patient refuses court ordered PO zyprexa

## 2023-09-15 PROCEDURE — 99232 SBSQ HOSP IP/OBS MODERATE 35: CPT | Mod: GC

## 2023-09-15 RX ADMIN — OLANZAPINE 10 MILLIGRAM(S): 15 TABLET, FILM COATED ORAL at 21:36

## 2023-09-15 RX ADMIN — OLANZAPINE 10 MILLIGRAM(S): 15 TABLET, FILM COATED ORAL at 09:25

## 2023-09-15 RX ADMIN — Medication 2 MILLIGRAM(S): at 21:36

## 2023-09-15 NOTE — BH INPATIENT PSYCHIATRY PROGRESS NOTE - NSBHASSESSSUMMFT_PSY_ALL_CORE
This is a 53 year old woman, currently living at [facility] on [place] grounds, PMH of GERD, anemia, PPH of schizophrenia, selective mutism, currently with [facility] ACT team, previously with [facility] which , with history of psychiatric hospitalization(s), without known history of self-injury or suicide attempts, with history of aggression in the setting on nonadherence with medication, who is brought in by staff at residence for increasing agitation and aggression in the setting of 2 months of medication nonadherence.     Today, patient continues to refuse PO medications and refuse to cooperate with interview. Patient continues to be psychotic. State application in process. Due to for haldol dec next week. Can consider transitioning IM Zyprexa to Relprev next week. Patient continues to require inpatient psychiatric hospitalization for stabilization and treatment.     Plan:  Legals:  	- continue with  involuntary admission   	- TOO obtained 8/15  Safety:  	- routine obs, bed block for aggression, currently ADLs are poor, slightly improved  Psychiatric:  	- c/w psychiatric medications: Zyprexa 10mg PO BID (IM if refuse PO, can consider Zyprexa Relprev), Valproic acid 250mg 2 caps BID, Cogentin 2mg BID (IM if refuse PO).    	- c/w clozapine 25mg qhs  	- s/p loading dose of haldol dec 100mg , next dose due  (plan to increase dose)  Medical:   	- Nexium 40mg, Feosol 300mg daily, Vit D3 1000U, Senokot 8.6mg qhs  Dispo:  	- plan for state application vs returning to [facility]  	- AOT is under investigation,  in   	- no family involved in care

## 2023-09-15 NOTE — BH INPATIENT PSYCHIATRY PROGRESS NOTE - NSBHFUPINTERVALHXFT_PSY_A_CORE
Follow-up for aggression/psychosis. Pt. w/ bed block for aggression. Refuses PO medications. Refusing PO clozapine. Continues to receive IM Zyprexa 10mg BID for refusing court ordered medications. Per staff, patient has been shouting 'cut off your head' and 'you're dead'.   This morning, patient was found sitting in the common area. Patient initially would not respond to writer. When writer stepped towards another member of the team, patient shouted "Don't let her touch you!". When asked why she would say that, patient started mumbling, speech incoherent, and then stopped talking again.

## 2023-09-15 NOTE — BH INPATIENT PSYCHIATRY PROGRESS NOTE - MSE UNSTRUCTURED FT
Gen: The patient appears older than stated age, poor hygiene and grooming  Beh: uncooperative, intense stare  Motor: No PMR/PMA   Speech: initially loud but then becoming incoherent  Mood: will not answer  Affect: irritable, hostile  Thought process: unable to assess given poverty of thought  Thought content: does not answer questions about SI/HI.   Perception: suspected AH  Neuro: awake, alert  Insight: poor   Judgment: poor  Impulse control: impaired

## 2023-09-16 RX ADMIN — OLANZAPINE 10 MILLIGRAM(S): 15 TABLET, FILM COATED ORAL at 22:08

## 2023-09-16 RX ADMIN — Medication 2 MILLIGRAM(S): at 22:07

## 2023-09-16 RX ADMIN — Medication 2 MILLIGRAM(S): at 09:37

## 2023-09-16 RX ADMIN — OLANZAPINE 10 MILLIGRAM(S): 15 TABLET, FILM COATED ORAL at 09:37

## 2023-09-17 RX ADMIN — Medication 2 MILLIGRAM(S): at 20:54

## 2023-09-17 RX ADMIN — Medication 2 MILLIGRAM(S): at 10:47

## 2023-09-17 RX ADMIN — OLANZAPINE 10 MILLIGRAM(S): 15 TABLET, FILM COATED ORAL at 10:47

## 2023-09-17 RX ADMIN — OLANZAPINE 10 MILLIGRAM(S): 15 TABLET, FILM COATED ORAL at 20:59

## 2023-09-18 PROCEDURE — 99232 SBSQ HOSP IP/OBS MODERATE 35: CPT | Mod: GC

## 2023-09-18 RX ADMIN — OLANZAPINE 10 MILLIGRAM(S): 15 TABLET, FILM COATED ORAL at 21:21

## 2023-09-18 RX ADMIN — Medication 2 MILLIGRAM(S): at 09:43

## 2023-09-18 RX ADMIN — Medication 2 MILLIGRAM(S): at 21:20

## 2023-09-18 RX ADMIN — OLANZAPINE 10 MILLIGRAM(S): 15 TABLET, FILM COATED ORAL at 09:43

## 2023-09-18 NOTE — BH INPATIENT PSYCHIATRY PROGRESS NOTE - MSE UNSTRUCTURED FT
Gen: The patient appears older than stated age, poor hygiene and grooming  Beh: uncooperative, intense stare  Motor: No PMR/PMA   Speech: selectively mute  Mood: will not answer  Affect: irritable, hostile  Thought process: unable to assess given poverty of thought  Thought content: does not answer questions about SI/HI.   Perception: suspected AH  Neuro: awake, alert  Insight: poor   Judgment: poor  Impulse control: impaired

## 2023-09-18 NOTE — BH INPATIENT PSYCHIATRY PROGRESS NOTE - NSBHFUPINTERVALHXFT_PSY_A_CORE
Follow-up for aggression/psychosis. Pt. w/ bed block for aggression. Refuses PO medications. Refusing PO clozapine. Continues to receive IM Zyprexa 10mg BID for refusing court ordered medications. Per staff, patient has been telling the nurses "All the nurses will die today".   This morning, patient was found sitting in the common area. Patient initially would not respond to writer, but then turned to look at writer and said "None of your business". Patient refused to continue with interview.

## 2023-09-18 NOTE — BH INPATIENT PSYCHIATRY PROGRESS NOTE - NSBHASSESSSUMMFT_PSY_ALL_CORE
This is a 53 year old woman, currently living at [facility] on [place] grounds, PMH of GERD, anemia, PPH of schizophrenia, selective mutism, currently with [facility] ACT team, previously with [facility] which , with history of psychiatric hospitalization(s), without known history of self-injury or suicide attempts, with history of aggression in the setting on nonadherence with medication, who is brought in by staff at residence for increasing agitation and aggression in the setting of 2 months of medication nonadherence.     Today, patient continues to be psychotic and refuse to cooperate with interview and treatment, however patient appears more visible in the common area with better attention to ADLs. State application in process. Can consider transitioning IM Zyprexa to Relprev. Patient continues to require inpatient psychiatric hospitalization for stabilization and treatment.     Plan:  Legals:  	- continue with  involuntary admission   	- TOO obtained 8/15  Safety:  	- routine obs, bed block for aggression, currently ADLs are poor, slightly improved  Psychiatric:  	- c/w psychiatric medications: Zyprexa 10mg PO BID (IM if refuse PO, can consider Zyprexa Relprev), Valproic acid 250mg 2 caps BID, Cogentin 2mg BID (IM if refuse PO).    	- c/w clozapine 25mg qhs  	- s/p loading dose of haldol dec 100mg , next dose due  (plan to increase dose)  Medical:   	- Nexium 40mg, Feosol 300mg daily, Vit D3 1000U, Senokot 8.6mg qhs  Dispo:  	- plan for state application vs returning to [facility]  	- AOT is under investigation,  in   	- no family involved in care

## 2023-09-19 PROCEDURE — 99232 SBSQ HOSP IP/OBS MODERATE 35: CPT | Mod: GC

## 2023-09-19 RX ORDER — HALOPERIDOL DECANOATE 100 MG/ML
150 INJECTION INTRAMUSCULAR ONCE
Refills: 0 | Status: COMPLETED | OUTPATIENT
Start: 2023-09-20 | End: 2023-09-20

## 2023-09-19 RX ADMIN — OLANZAPINE 10 MILLIGRAM(S): 15 TABLET, FILM COATED ORAL at 21:43

## 2023-09-19 RX ADMIN — Medication 2 MILLIGRAM(S): at 09:44

## 2023-09-19 RX ADMIN — OLANZAPINE 10 MILLIGRAM(S): 15 TABLET, FILM COATED ORAL at 09:44

## 2023-09-19 RX ADMIN — Medication 2 MILLIGRAM(S): at 21:43

## 2023-09-19 NOTE — BH INPATIENT PSYCHIATRY PROGRESS NOTE - MSE UNSTRUCTURED FT
lock Gen: The patient appears older than stated age, poor hygiene and grooming  Beh: uncooperative, intense stare  Motor: No PMR/PMA   Speech: selectively mute  Mood: will not answer  Affect: irritable, hostile  Thought process: unable to assess given poverty of thought  Thought content: does not answer questions about SI/HI.   Perception: suspected AH  Neuro: awake, alert  Insight: poor   Judgment: poor  Impulse control: impaired

## 2023-09-19 NOTE — BH INPATIENT PSYCHIATRY PROGRESS NOTE - NSBHFUPINTERVALHXFT_PSY_A_CORE
Follow-up for aggression/psychosis. Pt. w/ bed block for aggression. Refuses PO medications. Refusing PO clozapine. Continues to receive IM Zyprexa 10mg BID for refusing court ordered medications. Per staff, patient has been sitting in the common area and refusing to speak with anyone.   This morning, patient was found sitting in the common area. Patient refused to speak with team and did not cooperate with interview.

## 2023-09-20 PROCEDURE — 99231 SBSQ HOSP IP/OBS SF/LOW 25: CPT | Mod: GC

## 2023-09-20 RX ADMIN — HALOPERIDOL DECANOATE 150 MILLIGRAM(S): 100 INJECTION INTRAMUSCULAR at 13:47

## 2023-09-20 RX ADMIN — OLANZAPINE 10 MILLIGRAM(S): 15 TABLET, FILM COATED ORAL at 09:26

## 2023-09-20 RX ADMIN — Medication 2 MILLIGRAM(S): at 20:13

## 2023-09-20 RX ADMIN — OLANZAPINE 10 MILLIGRAM(S): 15 TABLET, FILM COATED ORAL at 20:14

## 2023-09-20 RX ADMIN — Medication 2 MILLIGRAM(S): at 09:26

## 2023-09-20 NOTE — BH TREATMENT PLAN - NSTXPATIENTPARTICIPATE_PSY_ALL_CORE
No, patient unwilling to participate

## 2023-09-20 NOTE — BH TREATMENT PLAN - NSTXDCOPNOGOAL_PSY_ALL_CORE
Will agree to participate in appropriate outpatient care
Other...

## 2023-09-20 NOTE — BH TREATMENT PLAN - NSTXDISORGGOAL_PSY_ALL_CORE
Will identify 2 coping skills that assist in organizing

## 2023-09-20 NOTE — BH INPATIENT PSYCHIATRY PROGRESS NOTE - NSBHASSESSSUMMFT_PSY_ALL_CORE
This is a 53 year old woman, currently living at [facility] on [place] grounds, PMH of GERD, anemia, PPH of schizophrenia, selective mutism, [facility], previously with [facility] which , with history of psychiatric hospitalization(s), without known history of self-injury or suicide attempts, with history of aggression in the setting on nonadherence with medication, who is brought in by staff at residence for increasing agitation and aggression in the setting of 2 months of medication nonadherence.     Today, patient continues to refuse to cooperate with interview and treatment. State application in process. Ordered haldol dec 150mg today. Patient continues to require inpatient psychiatric hospitalization for stabilization and treatment.     Plan:  Legals:  	- continue with  involuntary admission   	- TOO obtained 8/15  Safety:  	- routine obs, bed block for aggression, currently ADLs are poor, slightly improved  Psychiatric:  	- c/w psychiatric medications: Zyprexa 10mg PO BID (IM if refuse PO, can consider Zyprexa Relprev), Valproic acid 250mg 2 caps BID, Cogentin 2mg BID (IM if refuse PO).    	- c/w clozapine 25mg qhs  	- s/p loading dose of haldol dec 100mg , s/p haldol dec 150mg   Medical:   	- Nexium 40mg, Feosol 300mg daily, Vit D3 1000U, Senokot 8.6mg qhs  Dispo:  	- plan for state application vs returning to [facility]  	- AOT is under investigation,  in   	- no family involved in care

## 2023-09-20 NOTE — BH TREATMENT PLAN - NSTXPATIENTAGREEMENT_PSY_ALL_CORE
Patient unable to participate

## 2023-09-20 NOTE — BH TREATMENT PLAN - NSTXDISORGINTERPR_PSY_ALL_CORE
Pt continues to demonstrate minimal progress towards psychiatric rehabilitation goals over the past week. Psychiatric Rehabilitation staff will continue to meet with pt individually to provide support, encouragement, and counseling so that they will continue identifying and utilizing effective coping skills for better symptom management.
Pt continues to demonstrate minimal progress towards psychiatric rehabilitation goals over the past week. Psychiatric Rehabilitation staff will continue to meet with pt individually to provide support, encouragement, and counseling so that they will continue identifying and utilizing effective coping skills for better symptom management.
Pt has demonstrated minimal progress towards psychiatric rehabilitation goals over the past week. Psychiatric Rehabilitation staff will continue to meet with pt individually to provide support, encouragement, and counseling so that they will continue identifying and utilizing effective coping skills for better symptom management.
Pt has demonstrated minimal progress towards psychiatric rehabilitation goals over the past week. Psychiatric Rehabilitation staff will continue to meet with pt individually to provide support, encouragement, and counseling so that they will continue identifying and utilizing effective coping skills for better symptom management.
Patient will benefit from demonstrating medication compliance and engaging in individual and group sessions in order to identify and utilize healthy coping skills for improved symptom management and sustained recovery by day seven. Psychiatric Rehabilitation staff will engage patient daily in order to assist patient in exploring various coping strategies.
Pt continues to demonstrate minimal progress towards psychiatric rehabilitation goals over the past week. Psychiatric Rehabilitation staff will continue to meet with pt individually to provide support, encouragement, and counseling so that they will continue identifying and utilizing effective coping skills for better symptom management.
Pt has demonstrated minimal progress towards psychiatric rehabilitation goals over the past week. Psychiatric Rehabilitation staff will continue to meet with pt individually to provide support, encouragement, and counseling so that they will continue identifying and utilizing effective coping skills for better symptom management.
Pt continues to demonstrate minimal progress towards psychiatric rehabilitation goals over the past week. Psychiatric Rehabilitation staff will continue to meet with pt individually to provide support, encouragement, and counseling so that they will continue identifying and utilizing effective coping skills for better symptom management.

## 2023-09-20 NOTE — BH TREATMENT PLAN - NSBHPRIMARYDX_PSY_ALL_CORE
Schizophrenia    

## 2023-09-20 NOTE — BH TREATMENT PLAN - NSTXMEDICINTERRN_PSY_ALL_CORE
Medication education/ Reinforce +Monitor compliance
Patient continues to refuse PO pills and only accept IM medication despite education and encouragement.
Patient continues to refuse PO pills and only accept IM medication despite education and encouragement.
Medication education/ Reinforce +Monitor compliance
Patient continues to refuse PO pills and only accept IM medication despite education and encouragement.

## 2023-09-20 NOTE — BH INPATIENT PSYCHIATRY PROGRESS NOTE - CURRENT MEDICATION
MEDICATIONS  (STANDING):  benztropine 2 milliGRAM(s) Oral <User Schedule>  benztropine Injectable 2 milliGRAM(s) IntraMuscular two times a day  cholecalciferol 1000 Unit(s) Oral <User Schedule>  cloZAPine 25 milliGRAM(s) Oral at bedtime  ferrous    sulfate Liquid 300 milliGRAM(s) Oral with breakfast  haloperidol decanoate Injectable, Long Acting 150 milliGRAM(s) IntraMuscular once  OLANZapine 10 milliGRAM(s) Oral <User Schedule>  pantoprazole    Tablet 40 milliGRAM(s) Oral before breakfast  polyethylene glycol 3350 17 Gram(s) Oral at bedtime  valproic acid 500 milliGRAM(s) Oral <User Schedule>    MEDICATIONS  (PRN):  diphenhydrAMINE 50 milliGRAM(s) Oral every 4 hours PRN agitation  diphenhydrAMINE Injectable 50 milliGRAM(s) IntraMuscular once PRN Agitation  haloperidol     Tablet 5 milliGRAM(s) Oral every 4 hours PRN agitation  haloperidol    Injectable 7.5 milliGRAM(s) IntraMuscular once PRN psychotic agitation  haloperidol    Injectable 10 milliGRAM(s) IntraMuscular once PRN agitation  OLANZapine Injectable 10 milliGRAM(s) IntraMuscular <User Schedule> PRN ONLY if patient refuses court ordered PO zyprexa   MEDICATIONS  (STANDING):  benztropine 2 milliGRAM(s) Oral <User Schedule>  benztropine Injectable 2 milliGRAM(s) IntraMuscular two times a day  cholecalciferol 1000 Unit(s) Oral <User Schedule>  cloZAPine 25 milliGRAM(s) Oral at bedtime  ferrous    sulfate Liquid 300 milliGRAM(s) Oral with breakfast  OLANZapine 10 milliGRAM(s) Oral <User Schedule>  pantoprazole    Tablet 40 milliGRAM(s) Oral before breakfast  polyethylene glycol 3350 17 Gram(s) Oral at bedtime  valproic acid 500 milliGRAM(s) Oral <User Schedule>    MEDICATIONS  (PRN):  diphenhydrAMINE 50 milliGRAM(s) Oral every 4 hours PRN agitation  diphenhydrAMINE Injectable 50 milliGRAM(s) IntraMuscular once PRN Agitation  haloperidol     Tablet 5 milliGRAM(s) Oral every 4 hours PRN agitation  haloperidol    Injectable 7.5 milliGRAM(s) IntraMuscular once PRN psychotic agitation  haloperidol    Injectable 10 milliGRAM(s) IntraMuscular once PRN agitation  OLANZapine Injectable 10 milliGRAM(s) IntraMuscular <User Schedule> PRN ONLY if patient refuses court ordered PO zyprexa

## 2023-09-20 NOTE — BH TREATMENT PLAN - NSTXSLFCREGOAL_PSY_ALL_CORE
Will attend 100% scheduled activities neatly groomed
Be able to demonstrate the ability to care for self in 2 areas such as feeding oneself and hygiene
Will perform ADLs without assistance/prompts daily
Will attend 100% scheduled activities neatly groomed
Will clean and organize room daily
Will clean and organize room daily
Will perform ADLs without assistance/prompts daily
Will clean and organize room daily

## 2023-09-20 NOTE — BH TREATMENT PLAN - NSTXDISORGINTERRN_PSY_ALL_CORE
Basil will be able to identify coping at least 2 coping skills.
Identify positive coping strategies (e.g., music, spirituality, optimism, reframing, problem-solving).
Medication management/ psychoeducation/Marina
Medication management/ psychoeducation/Marina
Basil will be able to identify coping at least 2 coping skills.
Medication management/ psychoeducation/Marina
Medication management/ psychoeducation/Marina

## 2023-09-20 NOTE — BH TREATMENT PLAN - NSCMSPTSTRENGTHS_PSY_ALL_CORE
Unable to obtain (specify)
Pt is too disorganized to interview/Unable to obtain (specify)
Unable to obtain (specify)

## 2023-09-20 NOTE — BH TREATMENT PLAN - NSTXVIOLNTINTERMD_PSY_ALL_CORE
medications to target psychosis

## 2023-09-20 NOTE — BH TREATMENT PLAN - NSTXDCOPNOINTERRN_PSY_ALL_CORE
Encourage patient to attend outpatient appointments.

## 2023-09-20 NOTE — BH TREATMENT PLAN - NSTXVIOLNTINTERRN_PSY_ALL_CORE
Medication management/ Disciss use of adaptive coping skills
Medication management/ Disciss use of adaptive coping skills
Identify positive coping strategies (e.g., music, spirituality, optimism, reframing, problem-solving).
Medication management/ Disciss use of adaptive coping skills
Medication management/ Disciss use of adaptive coping skills

## 2023-09-20 NOTE — BH TREATMENT PLAN - NSPTSTATEDGOAL_PSY_ALL_CORE
unable to assess
Unable to provide
patient unable to state goal
unable to assess
patient unable to state goal
unable to assess
Unable to provide
unable to assess

## 2023-09-20 NOTE — BH TREATMENT PLAN - NSTXPLANTHERAPYSESSIONSFT_PSY_ALL_CORE
08-10-23  Type of therapy: Safety planning, Symptom management  Type of session: Individual  Level of patient participation: Not engaged, Resistance to participation  Duration of participation: Less than 15 minutes  Therapy conducted by: Psych rehab  Therapy Summary: Pt has demonstrated minimal progress towards psychiatric rehabilitation goals over the past week. Pt is currently unable to effectively engage in conversation due to severity of symptoms. Pt presents as disorganized and internally preoccupied. Pt continues to decline medications despite encouragement and education from staff. Pt has been isolative to room and is only visible for meals. Pt is observed talking to self throughout the day in her room. Pt presents with as malodorous and disheveled and will not attend to daily ADL’s despite encouragement. Pt is currently unable to effectively engage in safety planning at this time. Writer will continue to assess pt and encourage collaborative safety planning as pt’s symptoms improve. Over the past week, pt has been minimally receptive to skill development. Pt did not attend any daily psychiatric rehabilitation groups despite encouragement. Pt is not social with peers or staff and is currently unable to effectively verbalize thoughts, needs, and feelings. Pt has not been a behavioral management issue on the unit and has not been aggressive towards staff, however, pt continues to present with tense affect and tenuous control. Pt continues to demonstrate poor insight and judgement into symptoms and treatment. Writer will continue to encourage pt to engage in treatment in order to better develop coping skills and for continued support.  
  08-29-23  Type of therapy: Safety planning, Symptom management  Type of session: Individual  Level of patient participation: Not engaged, Resistance to participation  Duration of participation: Less than 15 minutes  Therapy conducted by: Psych rehab  Therapy Summary: Pt continues to demonstrate minimal progress towards psychiatric rehabilitation goals over the past week. Pt continues to present as disorganized, irritable, internally preoccupied, and unwilling to engage with staff. Pt continues to be isolative to room, however, attended to ADL’s with staff support and showered over the weekend. Pt continues to refuse PO medications, however pt is court ordered, therefore has been receiving IM medications daily and is tolerating them well. Pt continues to be unable to effectively engage in safety planning at this time. Writer will continue to assess pt and encourage collaborative safety planning as pt’s symptoms improve. Over the past week, pt has been minimally receptive to skill development. Pt did not attend any daily psychiatric rehabilitation groups despite encouragement. Pt is not visible in the milieu and does not socialize with peers or staff and is currently unable to effectively verbalize thoughts, needs, and feelings. Pt has presented with irritability and aggressive behavior towards staff, requiring IM medications. Pt continues to demonstrate poor insight and judgement into symptoms and treatment. Writer will continue to encourage pt to engage in treatment in order to better develop coping skills and for continued support.  
  08-23-23  Type of therapy: Symptom management  Type of session: Individual  Level of patient participation: Not engaged, Resistance to participation  Duration of participation: Less than 15 minutes  Therapy conducted by: Psych rehab  Therapy Summary: Pt continues to demonstrate minimal progress towards psychiatric rehabilitation goals over the past week. Pt continues to present as disorganized, irritable, and internally preoccupied with poor ADLs. Pt has been isolative to room and continues to present as malodorous, disheveled, and unwilling to attend to ADLs despite encouragement and support from staff. Pt continues to refuse PO medications, however pt is court ordered, therefore has been receiving IM medications daily. Pt continues to be unable to effectively engage in safety planning at this time. Writer will continue to assess pt and encourage collaborative safety planning as pt’s symptoms improve. Over the past week, pt has been minimally receptive to skill development. Pt did not attend any daily psychiatric rehabilitation groups despite encouragement. Pt is not visible in the milieu and does not socialize with peers or staff and is currently unable to effectively verbalize thoughts, needs, and feelings. Pt has presented with irritability and aggressive behavior towards staff, requiring IM medications. Pt continues to demonstrate poor insight and judgement into symptoms and treatment. Writer will continue to encourage pt to engage in treatment in order to better develop coping skills and for continued support.  
  09-19-23  Type of therapy: Symptom management  Type of session: Individual  Level of patient participation: Not engaged, Resistance to participation  Duration of participation: Less than 15 minutes  Therapy conducted by: Psych rehab  Therapy Summary: Pt continues to demonstrate minimal progress towards psychiatric rehabilitation goals over the past week. Pt continues to be uncooperative and minimally engaged. Pt is visible in the dayroom, however, does not engage with any staff or peers. Pt is observed talking to self and is verbally aggressive towards staff. Pt continues to refuse PO medications, however pt is court ordered, therefore has been receiving IM medications daily and is tolerating them well. Pt continues to be unable to effectively engage in safety planning at this time. Writer will continue to assess pt and encourage collaborative safety planning at an appropriate time. Pt presents with fair ADLs, however, continues to present as disheveled and disorganized. Over the past week, pt has been minimally receptive to skill development. Pt did not attend any daily psychiatric rehabilitation groups despite encouragement. Pt presents as internally preoccupied, paranoid, and labile. Pt continues to be unable to effectively verbalize thoughts, needs, and feelings. Writer will continue to encourage pt to engage in treatment in order to better develop coping skills and for continued support.  
  09-12-23  Type of therapy: Safety planning, Symptom management  Type of session: Individual  Level of patient participation: Not engaged, Resistance to participation  Duration of participation: Less than 15 minutes  Therapy conducted by: Psych rehab  Therapy Summary: Pt has demonstrated minimal progress towards psychiatric rehabilitation goals over the past week. Pt continues to be minimally engaged and is observed talking to self, regarding various Christian and paranoid delusions. Pt continues to refuse PO medications, however pt is court ordered, therefore has been receiving IM medications daily and is tolerating them well. Pt continues to be unable to effectively engage in safety planning at this time. Writer will continue to assess pt and encourage collaborative safety planning at an appropriate time. Pt’s ADLs are improving, however, pt continues to present as disheveled and disorganized. Over the past week, pt has been minimally receptive to skill development. Pt did not attend any daily psychiatric rehabilitation groups despite encouragement. Pt has been more visible in the milieu and is observed sitting in the dayroom throughout the day. Pt presents as internally preoccupied, paranoid, and labile. Pt does not engage in any socialization with staff and peers. Pt continues to be unable to effectively verbalize thoughts, needs, and feelings. Writer will continue to encourage pt to engage in treatment in order to better develop coping skills and for continued support.  
  08-10-23  Type of therapy: Safety planning, Symptom management  Type of session: Individual  Level of patient participation: Not engaged, Resistance to participation  Duration of participation: Less than 15 minutes  Therapy conducted by: Psych rehab  Therapy Summary: Pt has demonstrated minimal progress towards psychiatric rehabilitation goals over the past week. Pt is currently unable to effectively engage in conversation due to severity of symptoms. Pt presents as disorganized and internally preoccupied. Pt continues to decline medications despite encouragement and education from staff. Pt has been isolative to room and is only visible for meals. Pt is observed talking to self throughout the day in her room. Pt presents with as malodorous and disheveled and will not attend to daily ADL’s despite encouragement. Pt is currently unable to effectively engage in safety planning at this time. Writer will continue to assess pt and encourage collaborative safety planning as pt’s symptoms improve. Over the past week, pt has been minimally receptive to skill development. Pt did not attend any daily psychiatric rehabilitation groups despite encouragement. Pt is not social with peers or staff and is currently unable to effectively verbalize thoughts, needs, and feelings. Pt has not been a behavioral management issue on the unit and has not been aggressive towards staff, however, pt continues to present with tense affect and tenuous control. Pt continues to demonstrate poor insight and judgement into symptoms and treatment. Writer will continue to encourage pt to engage in treatment in order to better develop coping skills and for continued support.  
  09-05-23  Type of therapy: Symptom management  Type of session: Individual  Level of patient participation: Not engaged, Resistance to participation  Duration of participation: Less than 15 minutes  Therapy conducted by: Psych rehab  Therapy Summary: Pt continues to demonstrate minimal progress towards psychiatric rehabilitation goals over the past week. Pt continues to be minimally engaged and presents similar to previous weeks. Pt continues to be isolative to room with poor ADL’s and insight into symptoms and treatment. Pt presents as internally preoccupied and paranoid. Pt continues to refuse PO medications, however pt is court ordered, therefore has been receiving IM medications daily and is tolerating them well. Pt continues to be unable to effectively engage in safety planning at this time. Writer will continue to assess pt and encourage collaborative safety planning as pt’s symptoms improve. Over the past week, pt has been minimally receptive to skill development. Pt did not attend any daily psychiatric rehabilitation groups despite encouragement. Pt is minimally visible in the milieu and does not socialize with peers or staff and is currently unable to effectively verbalize thoughts, needs, and feelings. Writer will continue to encourage pt to engage in treatment in order to better develop coping skills and for continued support.

## 2023-09-20 NOTE — BH TREATMENT PLAN - NSTXSLFCREINTERRN_PSY_ALL_CORE
Patient continues to be unkempt and urinate on self. Patient allowed staff to change linen and agreed to change her gown.
Cont to encourage/ Reinforce showers/ change of clothes
Patient continues to be unkempt and urinate on self. Patient allowed staff to change linen and agreed to change her gown.
Patient continues to be unkempt and urinate on self. Patient allowed staff to change linen and agreed to change her gown.

## 2023-09-20 NOTE — BH TREATMENT PLAN - NSTXCAREGIVERAGREEMENT_PSY_P_CORE
Patient does not have family/caregiver involved in care

## 2023-09-20 NOTE — BH TREATMENT PLAN - NSTXIMPULSINTERRN_PSY_ALL_CORE
Medication management
Identify and utilize strengths; acknowledge progress and accomplishments.

## 2023-09-20 NOTE — BH INPATIENT PSYCHIATRY PROGRESS NOTE - NSBHFUPINTERVALHXFT_PSY_A_CORE
Follow-up for aggression/psychosis. Pt. w/ bed block for aggression. Refuses PO medications. Refusing PO clozapine. Continues to receive IM Zyprexa 10mg BID for refusing court ordered medications.   This morning, patient was found lying in bed. Patient refused to speak with team and did not cooperate with interview.

## 2023-09-20 NOTE — BH TREATMENT PLAN - NSTXMEDICGOAL_PSY_ALL_CORE
Be able to describe the benefit of medication/treatment
Take all medications as prescribed
Be able to describe the benefit of medication/treatment

## 2023-09-20 NOTE — BH TREATMENT PLAN - NSTXPSYCHOGOAL_PSY_ALL_CORE
Will identify 2 coping skills that help mitigate hallucinations
Will engage in a 15 minute conversation with no irrational content
Will engage in a 15 minute conversation with no irrational content
Will identify 2 coping skills that assist with focus on reality

## 2023-09-20 NOTE — BH TREATMENT PLAN - NSTXIMPULSGOAL_PSY_ALL_CORE
Will be able to demonstrate the ability to pause before acting out negatively
Will be able to recognize 2+ triggers
Will identify 1 negative consequence due to impulsivity
Will be able to recognize 2+ triggers
Will identify 1 behavior to cope with impulsive urges
20
Will identify 1 behavior to cope with impulsive urges
Will be able to demonstrate the ability to pause before acting out negatively
Will identify 1 behavior to cope with impulsive urges

## 2023-09-20 NOTE — BH TREATMENT PLAN - NSTXDCOPNOINTERSW_PSY_ALL_CORE
SW reviews pt's chart and obtains history, SW provides support and psychoed, contact with collaterals, discharge planning and collaboratipon with treatment team.
Support and psychoed are being provided and discharge plans to be arranged when appropriate.
SW provides support, psychoed, contact with collaterals, discharge planning and collaboration with treatment team.
SW obtains history, provides support, psychoed, contact with collaterals, discharge planning and collaboration with treatment team.
SW reviews EMR, provides support, psychoed, contact with collaterals, discharge planning and collaboration with treatment team.
SW obtains history, provides support, psychoed, contact with collaterals, discharge planning and collaboration with treatment team.
SW provides support, psychoed, contact with collaterals, disharge planning and collaboration with treatment team.
SW provides support, psychoed, contact with collaterals, discharge planning and collaboration with treatment team.

## 2023-09-20 NOTE — BH TREATMENT PLAN - NSTXCAREGIVERPARTICIPATE_PSY_P_CORE
No, patient unwilling to involve family/caregiver

## 2023-09-20 NOTE — BH TREATMENT PLAN - NSTXVIOLNTGOAL_PSY_ALL_CORE
Will demonstrate 2 problem solving strategies to decrease aggressive behavior
Will communicate an angry feeling in a controlled manner
Will identify 2 situations that cause an aggressive response
Will identify 2 situations that cause an aggressive response
Will communicate an angry feeling in a controlled manner
Will decrease the number/duration/intensity of angry/aggressive outbursts
Will communicate an angry feeling in a controlled manner
Will demonstrate ability to tolerate peer conflicts without aggression 3x weekly

## 2023-09-20 NOTE — BH TREATMENT PLAN - NSTXPROBSLFCRE_PSY_ALL_CORE
SELF-CARE DEFICIT

## 2023-09-20 NOTE — BH TREATMENT PLAN - NSDCCRITERIA_PSY_ALL_CORE
patient will be stable for discharge, no aggression
patient will be stable for discharge
patient will be stable for discharge, no aggression

## 2023-09-20 NOTE — BH TREATMENT PLAN - NSTXPSYCHOINTERMD_PSY_ALL_CORE
medications to target psychosis

## 2023-09-21 PROCEDURE — 99231 SBSQ HOSP IP/OBS SF/LOW 25: CPT | Mod: GC

## 2023-09-21 RX ADMIN — Medication 2 MILLIGRAM(S): at 20:28

## 2023-09-21 RX ADMIN — OLANZAPINE 10 MILLIGRAM(S): 15 TABLET, FILM COATED ORAL at 09:55

## 2023-09-21 RX ADMIN — OLANZAPINE 10 MILLIGRAM(S): 15 TABLET, FILM COATED ORAL at 20:28

## 2023-09-21 NOTE — BH INPATIENT PSYCHIATRY PROGRESS NOTE - NSBHASSESSSUMMFT_PSY_ALL_CORE
This is a 53 year old woman, currently living at [facility] on [place] grounds, PMH of GERD, anemia, PPH of schizophrenia, selective mutism, currently with [facility] team, previously with [facility] which , with history of psychiatric hospitalization(s), without known history of self-injury or suicide attempts, with history of aggression in the setting on nonadherence with medication, who is brought in by staff at residence for increasing agitation and aggression in the setting of 2 months of medication nonadherence.     Today, patient continues to refuse to cooperate with interview and treatment. State application in process. s/p haldol dec 150mg 23. Patient continues to require inpatient psychiatric hospitalization for stabilization and treatment.     Plan:  Legals:  	- continue with  involuntary admission   	- TOO obtained 8/15  Safety:  	- routine obs, bed block for aggression, currently ADLs are poor, slightly improved  Psychiatric:  	- c/w psychiatric medications: Zyprexa 10mg PO BID (IM if refuse PO, can consider Zyprexa Relprev), Valproic acid 250mg 2 caps BID, Cogentin 2mg BID (IM if refuse PO).    	- c/w clozapine 25mg qhs  	- s/p loading dose of haldol dec 100mg , s/p haldol dec 150mg   Medical:   	- Nexium 40mg, Feosol 300mg daily, Vit D3 1000U, Senokot 8.6mg qhs  Dispo:  	- plan for state application vs returning to [facility]  	- AOT is under investigation,  in   	- no family involved in care

## 2023-09-21 NOTE — BH INPATIENT PSYCHIATRY PROGRESS NOTE - NSBHFUPINTERVALHXFT_PSY_A_CORE
Follow-up for aggression/psychosis. Pt. w/ bed block for aggression. Refuses PO medications. Refusing PO clozapine. Continues to receive IM Zyprexa 10mg BID for refusing court ordered medications.   This morning, patient was found walking to her room eating banana. Patient refused to speak with team and did not cooperate with interview.  Follow-up for aggression/psychosis. Pt. w/ bed block for aggression. Refuses PO medications. Refusing PO clozapine. Continues to receive IM Zyprexa 10mg BID for refusing court ordered medications.   This morning, patient was found walking to her room eating banana. Patient refused to speak with team and did not cooperate with interview. Ordered for PPD Saturday for state falguni

## 2023-09-22 PROCEDURE — 99233 SBSQ HOSP IP/OBS HIGH 50: CPT

## 2023-09-22 RX ORDER — TUBERCULIN PURIFIED PROTEIN DERIVATIVE 5 [IU]/.1ML
5 INJECTION, SOLUTION INTRADERMAL ONCE
Refills: 0 | Status: COMPLETED | OUTPATIENT
Start: 2023-09-22 | End: 2023-09-22

## 2023-09-22 RX ORDER — OLANZAPINE 15 MG/1
405 TABLET, FILM COATED ORAL ONCE
Refills: 0 | Status: COMPLETED | OUTPATIENT
Start: 2023-09-22 | End: 2023-09-22

## 2023-09-22 RX ADMIN — OLANZAPINE 405 MILLIGRAM(S): 15 TABLET, FILM COATED ORAL at 12:31

## 2023-09-22 RX ADMIN — OLANZAPINE 10 MILLIGRAM(S): 15 TABLET, FILM COATED ORAL at 09:22

## 2023-09-22 RX ADMIN — Medication 2 MILLIGRAM(S): at 20:53

## 2023-09-22 RX ADMIN — OLANZAPINE 10 MILLIGRAM(S): 15 TABLET, FILM COATED ORAL at 20:54

## 2023-09-22 RX ADMIN — TUBERCULIN PURIFIED PROTEIN DERIVATIVE 5 UNIT(S): 5 INJECTION, SOLUTION INTRADERMAL at 12:32

## 2023-09-22 RX ADMIN — Medication 2 MILLIGRAM(S): at 11:27

## 2023-09-22 NOTE — BH INPATIENT PSYCHIATRY PROGRESS NOTE - NSBHASSESSSUMMFT_PSY_ALL_CORE
This is a 53 year old woman, currently living at [facility] on [place] grounds, PMH of GERD, anemia, PPH of schizophrenia, selective mutism, currently with [facility] ACT team, previously with [facility] which , with history of psychiatric hospitalization(s), without known history of self-injury or suicide attempts, with history of aggression in the setting on nonadherence with medication, who is brought in by staff at residence for increasing agitation and aggression in the setting of 2 months of medication nonadherence.     Today, patient continues to refuse to cooperate with interview and treatment. State application in process. s/p haldol dec 150mg 23. Ordered for Relprevv 405mg IM today (plan q4wks) will have patient observed for 3 hrs post injection.  Patient continues to require inpatient psychiatric hospitalization for stabilization and treatment.     Plan:  Legals:  	- continue with  involuntary admission   	- TOO obtained 8/15  Safety:  	- routine obs, bed block for aggression, currently ADLs are poor, slightly improved  Psychiatric:  	- c/w psychiatric medications: Zyprexa 10mg PO BID (IM if refuse PO, once Zyprexa Relprev is given today, will dc PO Olz and associated PRN IM),   	Valproic acid 250mg 2 caps BID, Cogentin 2mg BID.    	- c/w clozapine 25mg qhs  	- s/p loading dose of haldol dec 100mg , s/p haldol dec 150mg   Medical:   	-PPD ordered for Sat if patient gets it can be read on Monday   	- Nexium 40mg, Feosol 300mg daily, Vit D3 1000U, Senokot 8.6mg qhs  Dispo:  	- plan for state application vs returning to [facility]  	- AOT is under investigation,  in   	- no family involved in care

## 2023-09-22 NOTE — BH INPATIENT PSYCHIATRY PROGRESS NOTE - CURRENT MEDICATION
MEDICATIONS  (STANDING):  benztropine 2 milliGRAM(s) Oral <User Schedule>  benztropine Injectable 2 milliGRAM(s) IntraMuscular two times a day  cholecalciferol 1000 Unit(s) Oral <User Schedule>  cloZAPine 25 milliGRAM(s) Oral at bedtime  ferrous    sulfate Liquid 300 milliGRAM(s) Oral with breakfast  OLANZapine 10 milliGRAM(s) Oral <User Schedule>  OLANZapine pamoate Injectable, Long Acting 405 milliGRAM(s) IntraMuscular once  pantoprazole    Tablet 40 milliGRAM(s) Oral before breakfast  polyethylene glycol 3350 17 Gram(s) Oral at bedtime  valproic acid 500 milliGRAM(s) Oral <User Schedule>    MEDICATIONS  (PRN):  diphenhydrAMINE 50 milliGRAM(s) Oral every 4 hours PRN agitation  diphenhydrAMINE Injectable 50 milliGRAM(s) IntraMuscular once PRN Agitation  haloperidol     Tablet 5 milliGRAM(s) Oral every 4 hours PRN agitation  haloperidol    Injectable 7.5 milliGRAM(s) IntraMuscular once PRN psychotic agitation  haloperidol    Injectable 10 milliGRAM(s) IntraMuscular once PRN agitation  OLANZapine Injectable 10 milliGRAM(s) IntraMuscular <User Schedule> PRN ONLY if patient refuses court ordered PO zyprexa

## 2023-09-22 NOTE — BH INPATIENT PSYCHIATRY PROGRESS NOTE - MSE UNSTRUCTURED FT
Gen: The patient appears older than stated age, poor hygiene and grooming  Beh: uncooperative, intense stare  Motor: No PMR/PMA   Speech: selectively/volitionally mute  Mood: will not answer  Affect: irritable, hostile  Thought process: unable to assess  Thought content: does not answer questions about SI/HI.   Perception: suspected AH?  Neuro: awake, alert  Insight: poor   Judgment: poor  Impulse control: impaired

## 2023-09-22 NOTE — CHART NOTE - NSCHARTNOTEFT_GEN_A_CORE
Nutrition follow up assessment for length of stay. Patient is a 52 y/o female, currently living in [place], with PMHx of anemia, obesity, and constipation, PPHx of schizoaffective disorder, schizophrenia, anxiety, bipolar, selective mutism, currently with [facility] ACT team, previously with [facility] which , with history of psychiatric hospitalization(s), with history of aggression in the setting on nonadherence with medication, who presents for the second time since yesterday for persistent aggressive behaviors.     Unable to interview with patient today due to disorganization. Collateral information obtained from patient's medical chart and RN. Per RN, patient has been eating well with good PO intake on in-house meals, no reports of chewing/swallowing difficulties on current diet. No food preferences obtained today. No GI distress (nausea/vomiting/diarrhea/constipation) at this time, c/w iron supplement, on miralax for bowel regularity per MD order. Patient is not a candidate for diet education at this time.     Diet : Diet, DASH/TLC:   Sodium & Cholesterol Restricted (23 @ 16:58) [Active]    PO intake:  [ ] < 50%  [ ] 50-75%  [X] %  [ ] other :      Height: 170.2cm  Weight: 113.4kg (8/3/23)  -- no new wt to reassess  BMI: 39.2 kg/m^2    Skin: no pressure injuries     Edema: no edema    Pertinent Medications: MEDICATIONS  (STANDING):  benztropine 2 milliGRAM(s) Oral <User Schedule>  benztropine Injectable 2 milliGRAM(s) IntraMuscular two times a day  cholecalciferol 1000 Unit(s) Oral <User Schedule>  cloZAPine 25 milliGRAM(s) Oral at bedtime  ferrous    sulfate Liquid 300 milliGRAM(s) Oral with breakfast  OLANZapine 10 milliGRAM(s) Oral <User Schedule>  pantoprazole    Tablet 40 milliGRAM(s) Oral before breakfast  polyethylene glycol 3350 17 Gram(s) Oral at bedtime  valproic acid 500 milliGRAM(s) Oral <User Schedule>    MEDICATIONS  (PRN):  diphenhydrAMINE 50 milliGRAM(s) Oral every 4 hours PRN agitation  diphenhydrAMINE Injectable 50 milliGRAM(s) IntraMuscular once PRN Agitation  haloperidol     Tablet 5 milliGRAM(s) Oral every 4 hours PRN agitation  haloperidol    Injectable 10 milliGRAM(s) IntraMuscular once PRN agitation  haloperidol    Injectable 7.5 milliGRAM(s) IntraMuscular once PRN psychotic agitation  OLANZapine Injectable 10 milliGRAM(s) IntraMuscular <User Schedule> PRN ONLY if patient refuses court ordered PO zyprexa    Pertinent Labs:    C-Reactive Protein, Serum: 28.8 mg/L (. @ 09:43)     Glucose: 118 mg/dL (23 @ 10:00)     Alkaline Phosphatase: 127 U/L (23 @ 10:00)         Estimated Needs:   [X] no change since previous assessment  [ ] recalculated:       Previous Nutrition Diagnosis: Overweight/Obesity    Nutrition Diagnosis is [X] ongoing  [ ] resolved [ ] not applicable        New Nutrition Diagnosis: [X] not applicable        Recommendations:  1. Continue current DASH (cholesterol & Na restricted) diet order.   2. c/w vitamin D3 and iron supplement as per MD order.   3. Suggest checking iron studies, vitamin D, HgbA1C and blood lipids; defer to MD.  4. Encourage diet adherence and portion control.   5. Monitor PO intake/tolerance, weights, labs, BM's, and skin integrity.     -- Ryan Zaragoza, MS, RDN, Pager # [phone]

## 2023-09-22 NOTE — BH INPATIENT PSYCHIATRY PROGRESS NOTE - NSBHFUPINTERVALHXFT_PSY_A_CORE
Chart reviewed, case discussed in team. No acute events overnight, patient continues to refuse all PO meds, Pt. w/ bed block for aggression. Refusing PO clozapine. Continues to receive IM Zyprexa 10mg BID for refusing court ordered medications. To limit repeated IM administration, discussed starting zyprexa relprevv with patient today, however during entire interview patient just stared at writer without moving or speaking a word, however this is thought volitional as patient was earlier seen eating breakfast and speaking to peers from a distance. Pt slept well overnight.   Ordered for PPD Saturday for state falguni

## 2023-09-23 VITALS — TEMPERATURE: 97 F | DIASTOLIC BLOOD PRESSURE: 57 MMHG | SYSTOLIC BLOOD PRESSURE: 113 MMHG | HEART RATE: 79 BPM

## 2023-09-23 RX ADMIN — OLANZAPINE 10 MILLIGRAM(S): 15 TABLET, FILM COATED ORAL at 09:48

## 2023-09-23 RX ADMIN — Medication 2 MILLIGRAM(S): at 20:50

## 2023-09-23 RX ADMIN — OLANZAPINE 10 MILLIGRAM(S): 15 TABLET, FILM COATED ORAL at 20:51

## 2023-09-23 RX ADMIN — Medication 2 MILLIGRAM(S): at 09:48

## 2023-09-24 RX ADMIN — OLANZAPINE 10 MILLIGRAM(S): 15 TABLET, FILM COATED ORAL at 09:05

## 2023-09-24 RX ADMIN — TUBERCULIN PURIFIED PROTEIN DERIVATIVE 5 UNIT(S): 5 INJECTION, SOLUTION INTRADERMAL at 10:12

## 2023-09-24 RX ADMIN — OLANZAPINE 10 MILLIGRAM(S): 15 TABLET, FILM COATED ORAL at 21:12

## 2023-09-24 RX ADMIN — Medication 2 MILLIGRAM(S): at 09:05

## 2023-09-24 RX ADMIN — Medication 2 MILLIGRAM(S): at 21:12

## 2023-09-25 PROCEDURE — 99232 SBSQ HOSP IP/OBS MODERATE 35: CPT | Mod: GC

## 2023-09-25 RX ORDER — BENZTROPINE MESYLATE 1 MG
2 TABLET ORAL AT BEDTIME
Refills: 0 | Status: DISCONTINUED | OUTPATIENT
Start: 2023-09-25 | End: 2023-09-28

## 2023-09-25 RX ADMIN — Medication 2 MILLIGRAM(S): at 19:38

## 2023-09-25 NOTE — BH INPATIENT PSYCHIATRY PROGRESS NOTE - PRN MEDS
MEDICATIONS  (PRN):  diphenhydrAMINE 50 milliGRAM(s) Oral every 4 hours PRN agitation  diphenhydrAMINE Injectable 50 milliGRAM(s) IntraMuscular once PRN Agitation  haloperidol     Tablet 5 milliGRAM(s) Oral every 4 hours PRN agitation  haloperidol    Injectable 7.5 milliGRAM(s) IntraMuscular once PRN psychotic agitation  haloperidol    Injectable 10 milliGRAM(s) IntraMuscular once PRN agitation   MEDICATIONS  (PRN):  diphenhydrAMINE 50 milliGRAM(s) Oral every 4 hours PRN agitation  diphenhydrAMINE Injectable 50 milliGRAM(s) IntraMuscular once PRN Agitation  haloperidol     Tablet 5 milliGRAM(s) Oral every 4 hours PRN agitation  haloperidol    Injectable 10 milliGRAM(s) IntraMuscular once PRN agitation  haloperidol    Injectable 7.5 milliGRAM(s) IntraMuscular once PRN psychotic agitation

## 2023-09-25 NOTE — BH INPATIENT PSYCHIATRY PROGRESS NOTE - CURRENT MEDICATION
MEDICATIONS  (STANDING):  benztropine 2 milliGRAM(s) Oral <User Schedule>  benztropine Injectable 2 milliGRAM(s) IntraMuscular at bedtime  cholecalciferol 1000 Unit(s) Oral <User Schedule>  cloZAPine 25 milliGRAM(s) Oral at bedtime  ferrous    sulfate Liquid 300 milliGRAM(s) Oral with breakfast  pantoprazole    Tablet 40 milliGRAM(s) Oral before breakfast  polyethylene glycol 3350 17 Gram(s) Oral at bedtime  valproic acid 500 milliGRAM(s) Oral <User Schedule>    MEDICATIONS  (PRN):  diphenhydrAMINE 50 milliGRAM(s) Oral every 4 hours PRN agitation  diphenhydrAMINE Injectable 50 milliGRAM(s) IntraMuscular once PRN Agitation  haloperidol     Tablet 5 milliGRAM(s) Oral every 4 hours PRN agitation  haloperidol    Injectable 7.5 milliGRAM(s) IntraMuscular once PRN psychotic agitation  haloperidol    Injectable 10 milliGRAM(s) IntraMuscular once PRN agitation   MEDICATIONS  (STANDING):  benztropine 2 milliGRAM(s) Oral <User Schedule>  benztropine Injectable 2 milliGRAM(s) IntraMuscular at bedtime  cholecalciferol 1000 Unit(s) Oral <User Schedule>  cloZAPine 25 milliGRAM(s) Oral at bedtime  ferrous    sulfate Liquid 300 milliGRAM(s) Oral with breakfast  pantoprazole    Tablet 40 milliGRAM(s) Oral before breakfast  polyethylene glycol 3350 17 Gram(s) Oral at bedtime  valproic acid 500 milliGRAM(s) Oral <User Schedule>    MEDICATIONS  (PRN):  diphenhydrAMINE 50 milliGRAM(s) Oral every 4 hours PRN agitation  diphenhydrAMINE Injectable 50 milliGRAM(s) IntraMuscular once PRN Agitation  haloperidol     Tablet 5 milliGRAM(s) Oral every 4 hours PRN agitation  haloperidol    Injectable 10 milliGRAM(s) IntraMuscular once PRN agitation  haloperidol    Injectable 7.5 milliGRAM(s) IntraMuscular once PRN psychotic agitation

## 2023-09-25 NOTE — BH INPATIENT PSYCHIATRY PROGRESS NOTE - NSBHASSESSSUMMFT_PSY_ALL_CORE
This is a 53 year old woman, currently living at [facility] on [place] [facility], PMH of GERD, anemia, PPH of schizophrenia, selective mutism, currently with [facility] ACT team, previously with [facility] which , with history of psychiatric hospitalization(s), without known history of self-injury or suicide attempts, with history of aggression in the setting on nonadherence with medication, who is brought in by staff at residence for increasing agitation and aggression in the setting of 2 months of medication nonadherence.     Today, patient continues to refuse to cooperate with interview and treatment. State application in process. s/p haldol dec 150mg 23. Ordered for Relprevv 405mg IM today (plan q4wks) will have patient observed for 3 hrs post injection.  Patient continues to require inpatient psychiatric hospitalization for stabilization and treatment.     Plan:  Legals:  	- continue with  involuntary admission   	- TOO obtained 8/15  Safety:  	- routine obs, bed block for aggression, currently ADLs are poor, slightly improved  Psychiatric:  	- c/w psychiatric medications: Zyprexa 10mg PO BID (IM if refuse PO, once Zyprexa Relprev is given today, will dc PO Olz and associated PRN IM),   	Valproic acid 250mg 2 caps BID, Cogentin 2mg BID.    	- c/w clozapine 25mg qhs  	- s/p loading dose of haldol dec 100mg , s/p haldol dec 150mg   Medical:   	-PPD ordered for Sat if patient gets it can be read on Monday   	- Nexium 40mg, Feosol 300mg daily, Vit D3 1000U, Senokot 8.6mg qhs  Dispo:  	- plan for state application vs returning to [facility]  	- AOT is under investigation,  in   	- no family involved in care     This is a 53 year old woman, currently living at [facility] on [place] grounds, PMH of GERD, anemia, PPH of schizophrenia, selective mutism, currently with [facility] ACT team, previously with [facility] which , with history of psychiatric hospitalization(s), without known history of self-injury or suicide attempts, with history of aggression in the setting on nonadherence with medication, who is brought in by staff at residence for increasing agitation and aggression in the setting of 2 months of medication nonadherence.     Today, patient continues to refuse to cooperate with interview and treatment. State application in process. s/p haldol dec 150mg 23 and Zyprexa Relprevv 405mg IM on . Patient continues to require inpatient psychiatric hospitalization for stabilization and treatment.     Plan:  Legals:  	- continue with  involuntary admission   	- TOO obtained 8/15  Safety:  	- routine obs, bed block for aggression, currently ADLs are poor, slightly improved  Psychiatric:  	- d/c  Zyprexa 10mg PO BID (Zyprexa BREWER administered on )  	- c/w valproic acid 250mg 2 caps BID,   	- decrease Cogentin to 2mg HS.    	- c/w clozapine 25mg qhs (patient continues to refuse)  	- s/p Zyprexa Replevv BREWER administered on  (next dose due on 10/20)  	- s/p loading dose of haldol dec 100mg , s/p haldol dec 150mg  (next dose due on )  Medical:   	-Will read PPD on Tuesday   	- Nexium 40mg, Feosol 300mg daily, Vit D3 1000U, Senokot 8.6mg qhs  Dispo:  	- plan for state application vs returning to [facility]  	- AOT is under investigation,  in   	- no family involved in care     This is a 53 year old woman, currently living at [facility], PMH of GERD, anemia, PPH of schizophrenia, selective mutism, currently with [facility] ACT team, previously with [facility] which , with history of psychiatric hospitalization(s), without known history of self-injury or suicide attempts, with history of aggression in the setting on nonadherence with medication, who is brought in by staff at residence for increasing agitation and aggression in the setting of 2 months of medication nonadherence.     Today, patient continues to refuse to cooperate with interview and treatment. State application in process. s/p haldol dec 150mg 23 and Zyprexa Relprevv 405mg IM on . Patient continues to require inpatient psychiatric hospitalization for stabilization and treatment.     Plan:  Legals:  	- continue with  involuntary admission   	- TOO obtained 8/15  Safety:  	- routine obs, bed block for aggression, currently ADLs are poor, slightly improved  Psychiatric:  	- d/c  Zyprexa 10mg PO BID (Zyprexa BREWER administered on )  	- c/w valproic acid 250mg 2 caps BID,   	- decrease Cogentin to 2mg HS.    	- c/w clozapine 25mg qhs (patient continues to refuse)  	- s/p Zyprexa Replevv BREWER administered on  (next dose due on 10/20)  	- s/p loading dose of haldol dec 100mg , s/p haldol dec 150mg  (next dose due on )  Medical:   	- PPD negative  	- Nexium 40mg, Feosol 300mg daily, Vit D3 1000U, Senokot 8.6mg qhs  Dispo:  	- plan for state application vs returning to [facility]  	- AOT is under investigation,  in   	- no family involved in care

## 2023-09-25 NOTE — BH INPATIENT PSYCHIATRY PROGRESS NOTE - NSTXDCOPNOGOALOTHER_PSY_ALL_CORE
Pt will be transferred to [place]
Pt will be transferred to [place]
Pt will be transferred to [place]
Pt will be transferred to [place]

## 2023-09-25 NOTE — BH INPATIENT PSYCHIATRY PROGRESS NOTE - NSBHFUPINTERVALHXFT_PSY_A_CORE
No acute overnight events. Patient did not receive or require PRN medications. Patient with good sleep anChart reviewed, case discussed in team. No acute events overnight, patient continues to refuse all PO meds, Pt. w/ bed block for aggression. Refusing PO clozapine. Continues to receive IM Zyprexa 10mg BID for refusing court ordered medications. To limit repeated IM administration, discussed starting zyprexa relprevv with patient today, however during entire interview patient just stared at writer without moving or speaking a word, however this is thought volitional as patient was earlier seen eating breakfast and speaking to peers from a distance. Pt slept well overnight.   Ordered for PPD Saturday for state falguni No acute overnight events. Patient did not receive or require PRN medications. Per sleep log, patient with good sleep. Continues to refuse PO medication, received Zyprexa IM medication, as per court order. Chart reviewed and case discussed in team. Remains with w/ bed block for aggression.     On approach, patient resting comfortably in bed, appears disheveled with half-eaten food on bed and stains on hospital gowns. Patient refusing to engage in treatment team. Refusing PO clozapine. Remains with selective mutism, responds to questions about current state and whether or not she had breakfast, refuses to answer additional interview questions. Received Zyprexa Relprevv on 9/22, no reported a/e from current medication regimen.   No acute overnight events. Patient did not receive or require PRN medications. Per sleep log, patient with good sleep. Continues to refuse PO medication. Chart reviewed and case discussed in team. Remains with w/ bed block for aggression.     On approach, patient resting comfortably in bed, appears disheveled with half-eaten food on bed and stains on hospital gowns. Patient refusing to engage in treatment team. Refusing PO clozapine. Remains with selective mutism, responds to questions about current state and whether or not she had breakfast, refuses to answer additional interview questions. Received Zyprexa Relprevv on 9/22, no reported a/e from current medication regimen.

## 2023-09-25 NOTE — BH INPATIENT PSYCHIATRY PROGRESS NOTE - MSE UNSTRUCTURED FT
Gen: The patient appears older than stated age, poor hygiene and grooming  Beh: uncooperative, intense stare  Motor: No PMR/PMA   Speech: selectively/volitionally mute  Mood: will not answer  Affect: irritable, hostile  Thought process: unable to assess  Thought content: does not answer questions about SI/HI.   Perception: suspected AH?  Neuro: awake, alert  Insight: poor   Judgment: poor  Impulse control: impaired Pt is 52 yo woman with poor grooming and hygiene, dressed in hospital gown. She is calm, but uncooperative with intense stare. No psychomotor abnormalities noted. Speech: selectively/volitionally mute. Mood: unable to assess. Affect: irritable, hostile. TP: unable to assess. TC: unable to assesss SIIP and HIIP. Perception: suspected AH, no RIS on exam. Insight is poor. Judgement is poor. Impulse control is intact at this time.

## 2023-09-26 RX ADMIN — PANTOPRAZOLE SODIUM 40 MILLIGRAM(S): 20 TABLET, DELAYED RELEASE ORAL at 09:16

## 2023-09-26 RX ADMIN — Medication 2 MILLIGRAM(S): at 20:03

## 2023-09-26 RX ADMIN — Medication 500 MILLIGRAM(S): at 09:17

## 2023-09-26 NOTE — BH INPATIENT PSYCHIATRY PROGRESS NOTE - NSBHATTESTTYPESTAFF_PSY_A_CORE
Resident

## 2023-09-26 NOTE — BH INPATIENT PSYCHIATRY PROGRESS NOTE - NSBHLEGALSTATUSNEW_PSY_ALL_CORE
9.27 (2PC)

## 2023-09-26 NOTE — BH INPATIENT PSYCHIATRY PROGRESS NOTE - PRN MEDS
MEDICATIONS  (PRN):  diphenhydrAMINE 50 milliGRAM(s) Oral every 4 hours PRN agitation  diphenhydrAMINE Injectable 50 milliGRAM(s) IntraMuscular once PRN Agitation  haloperidol     Tablet 5 milliGRAM(s) Oral every 4 hours PRN agitation  haloperidol    Injectable 7.5 milliGRAM(s) IntraMuscular once PRN psychotic agitation  haloperidol    Injectable 10 milliGRAM(s) IntraMuscular once PRN agitation

## 2023-09-26 NOTE — BH INPATIENT PSYCHIATRY PROGRESS NOTE - NSBHATTESTCOMMENTATTENDFT_PSY_A_CORE
PPD read and negative, likely to require state, no longer on PO olz after Relprevv. 
53 year old woman, currently living at [facility] on [place] grounds, PMH of GERD, anemia, PPH of schizophrenia, selective mutism, currently with [facility] ACT team, previously with [facility] which , with history of psychiatric hospitalization(s), without known history of self-injury or suicide attempts, with history of aggression in the setting on nonadherence with medication, who is brought in by staff at residence for increasing agitation and aggression in the setting of 2 months of medication nonadherence.      Update  Patient again refusing all medications and refused to talk with treatment team today and would only stare   Discussed plan to proceed with application for TOO   Converted from 939 to 927 for the purpose of TOO
53 year old woman, currently living at [facility] on [place] grounds, PMH of GERD, anemia, PPH of schizophrenia, selective mutism, currently with [facility] ACT team, previously with [facility] which , with history of psychiatric hospitalization(s), without known history of self-injury or suicide attempts, with history of aggression in the setting on nonadherence with medication, who is brought in by staff at residence for increasing agitation and aggression in the setting of 2 months of medication nonadherence.      Update  Patient as been hostile, irritable, not caring for her ADL's and refusing all medications  Refused to talk with treatment team today and would only stare and lisseth  Discussed plan to proceed with application for TOO 
53 year old woman, currently living at [facility] on [facility], PMH of GERD, anemia, PPH of schizophrenia, selective mutism, currently with [facility] ACT team, previously with [facility] which , with history of psychiatric hospitalization(s), without known history of self-injury or suicide attempts, with history of aggression in the setting on nonadherence with medication, who is brought in by staff at residence for increasing agitation and aggression in the setting of 2 months of medication nonadherence.     8/10 Update  Patient still refusing all medications and refusing today to talk with treatment team  Met with Inpatient Director and [facility]  today for application for TOO which was approved   Converted from 939 to 927 for the purpose of TOO 
53 year old woman, currently living at [facility] on [place] grounds, PMH of GERD, anemia, PPH of schizophrenia, selective mutism, currently with [facility] ACT team, previously with [facility] which , with history of psychiatric hospitalization(s), without known history of self-injury or suicide attempts, with history of aggression in the setting on nonadherence with medication, who is brought in by staff at residence for increasing agitation and aggression in the setting of 2 months of medication nonadherence.      Update  Patient again refusing all medications and today talking to self about "someone inside of me"   Still refused to talk with treatment team today   Met with Inpatient Director and [facility]  today for application for TOO which was approved   Converted from 939 to 927 for the purpose of TOO 
Agree with above.  Received Haldol short acting IM and BREWER Haldol dec today per court order.  Continues to be hostile and psychotic.  
Agree with above.  Agitated and aggressive while staff trying to give court ordered meds and clean the room as she has soiled linens with urine repeatedly.  Continue court ordered Haldol.  Still uncooperative with interview. 
Agree with above.  Continues to be hostile and disorganized in speech and behavior.  Continue Olanzapine IM, refusing clozapine and all PO meds.
Agree with above.  Continues to be mute and with very poor hygiene.  Trial of clozapine warranted but patient will likely refuse PO.  Continue IM Zyprexa. 
Agree with above.  Remains malodorous, naked in her room, hostile on approach.  When confronted with court order for medications after not appearing in court today, she denies there is such an order and says she will not take even IM medication.  
53 year old woman, currently living at [facility] on [place] grounds, PMH of GERD, anemia, PPH of schizophrenia, selective mutism, currently with [facility] ACT team, previously with [facility] which , with history of psychiatric hospitalization(s), without known history of self-injury or suicide attempts, with history of aggression in the setting on nonadherence with medication, who is brought in by staff at residence for increasing agitation and aggression in the setting of 2 months of medication nonadherence.    Update  Patient still refusing all medications and today became agitated and aggressive with staff when they attempted to change her linen on her bed after she urinated on her bed  Patient was verbally threatening and unable to respond to redirection and required IM Medication  Converted from 939 to 927 for the purpose of TOO , Admit meeting  with TOO approved. Court scheduled for 8/15
Patient seen, chart reviewed, case discussed with team.  I saw the patient with the resident, discussed case with resident and reviewed all sections of the note, made changes where appropriate and agree with it as written. Working towards Relprevv, PPD ordered for state. 
Agree with above.  Continues to be isolative in her room with extremely poor hygiene, urinating on herself, naked consistently.  IM Haldol given BID as she refuses court ordered PO meds.
Agree with above.  Mildly elevated alk phos.  Microcytic anemia on CBC.  Folate and b12 normal.  HIV neg. continues to be mute.  CK normal.  
Agree with above.  Mute today, uncooperative.  However, staff note patient is much more cooperative with helping her with hygiene.  Continue Haldol over patient's objection, given IM for patient's refusal to take PO daily including today.  
Haldol dec increased for tomorrow to 150mg, previously on 200mg qmonthly. Working on REMS documentation for RELPREVV to avoid repeated IM injections of olanzapine. 
Patient continues to refuse meds.  Smells strongly of urine.  isolative in her room and is hostile and threatening on approach.  She is observed talking to herself whenever she is approached but will not engage in interview.  Court for treatment over objection tomorrow. 
Agree with above.  Patient is mute on assessment today.  May need switch to Zyprexa given 2 weeks on Haldol with little response.  
Agree with above.  Remains delusional, hostile, thought disordered.  Still receiving IM Zyprexa per court order.  Pending state transfer. 
Agree with above.  Remains hostile and delusional.  Continue current regimen.  State referral ongoing.
Agree with above.  Will consider giving RelPRevv per court order to avoid daily Zyprexa IM injections.  Remains very psychotic with frequent responses to AH and continues to be very hostile.
Agree with above.  [place] responding to  saying "turn off" alone in her room.  Continues to be hostile and disorganized.  Continue Zyprexa IM per court order.  State pending.  
Patient seen by me, chart reviewed, and case discussed with treatment team.  Reviewed and agree with above progress note, assessment and plan; corrections and modification made where appropriate.  The patient continues to be paranoid, irritable, refusing to engage with the treatment team.  She has continued to require IM medications as per court order, refusing to take po.  ADLs remain very poor.  Will continue haldol trial.
Re-submitted Relprevv rems paperwork in anticipation of giving less frequent IMs given repeated PO refusals. 
Agree with above.  Requires daily IMs as she will not comply with PO court ordered meds.  Today mumbles to herself incomprehensibly when I enter the room and then will not speak.  Hygiene is extremely poor.  Eats and drinks appropriately and is observed eating an apple in bed.  Continue trial of Haldol for now.  
Patient seen, chart reviewed, case discussed with team.  I saw the patient with the resident, discussed case with resident and reviewed all sections of the note, made changes where appropriate and agree with it as written. To confirm if writer has active Relprevv REMS account in order to obviate need for repeated IM injections of olanzapine. 
Agree with above. Continues to be mute.  Little improvement on Haldol.  On Haldol dec.  Stop IM Haldol and give Zyprexa over her objection.  
PPD read and negative, likely to require state, no longer on PO olz after Relprevv.

## 2023-09-26 NOTE — BH INPATIENT PSYCHIATRY PROGRESS NOTE - CURRENT MEDICATION
MEDICATIONS  (STANDING):  benztropine 2 milliGRAM(s) Oral <User Schedule>  benztropine Injectable 2 milliGRAM(s) IntraMuscular at bedtime  cholecalciferol 1000 Unit(s) Oral <User Schedule>  cloZAPine 25 milliGRAM(s) Oral at bedtime  ferrous    sulfate Liquid 300 milliGRAM(s) Oral with breakfast  pantoprazole    Tablet 40 milliGRAM(s) Oral before breakfast  polyethylene glycol 3350 17 Gram(s) Oral at bedtime  valproic acid 500 milliGRAM(s) Oral <User Schedule>    MEDICATIONS  (PRN):  diphenhydrAMINE 50 milliGRAM(s) Oral every 4 hours PRN agitation  diphenhydrAMINE Injectable 50 milliGRAM(s) IntraMuscular once PRN Agitation  haloperidol     Tablet 5 milliGRAM(s) Oral every 4 hours PRN agitation  haloperidol    Injectable 7.5 milliGRAM(s) IntraMuscular once PRN psychotic agitation  haloperidol    Injectable 10 milliGRAM(s) IntraMuscular once PRN agitation

## 2023-09-26 NOTE — ED ADULT TRIAGE NOTE - NS ED NURSE BANDS TYPE
You came to the ED with left shoulder pain.  You were treated with Tylenol 650 mg once dose for your pain.  We also ordered EKG which is not notable to any acute ischemia changes.  We also did X ray of your left shoulder which showed chronic degenerative changes.  We did a bunch of labs to look out for any electrolyte disturbances but caused your pain and your cramps.  We encourage you to take gabapentin 300 mg at bedtime as it might help with the chronic left shoulder pain and bilateral lower extremity cramps.  I will also prescribe Tylenol 650 mg that can be taken thrice daily for your pain.  If you notice any change in the character of your pain or any new symptoms like weakness or tingling or numbness on your left upper extremity, please go to the ED.  If you ever experience shortness of breath or chest pain associated with your left shoulder pain, left arm pain, please go to ED as soon as possible.  I would highly encourage you to be compliant with intake of Eliquis (blood thinner) as you are already.  I will also prescribe you some vitamins which should with your bilateral cramps.   
Name band;

## 2023-09-26 NOTE — BH INPATIENT PSYCHIATRY PROGRESS NOTE - NSBHLEGALSTATUSDATE_PSY_ALL_CORE
08-Aug-2023 12:44

## 2023-09-26 NOTE — BH INPATIENT PSYCHIATRY PROGRESS NOTE - NSBHFUPINTERVALHXFT_PSY_A_CORE
No acute overnight events. Patient did not receive or require PRN medications. Per sleep log, patient with good sleep. Continues to refuse PO medication. Chart reviewed and case discussed in team. Remains with w/ bed block for aggression.     On approach, patient resting comfortably in bed, appears disheveled with half-eaten food on bed and stains on hospital gowns. Patient refusing to engage in treatment team. Refusing PO clozapine. Remains with selective mutism, responds to questions about current state and whether or not she had breakfast, refuses to answer additional interview questions. Received Zyprexa Relprevv on 9/22, no reported a/e from current medication regimen.   No acute overnight events. Patient did not receive or require PRN medications. Per sleep log, patient with good sleep. Continues to refuse PO medication. Chart reviewed and case discussed in team. Remains with w/ bed block for aggression.     On approach, patient resting comfortably in bed, appears disheveled and refusing to engage. Refusing PO clozapine. Remains with selective mutism, responds to questions about current state, refuses to answer additional interview questions, doesn't express insight of being in hospital, refuses to answer questions related to psychosis. Received Zyprexa Relprevv on 9/22, no reported a/e from current medication regimen.  No EPS.

## 2023-09-26 NOTE — BH INPATIENT PSYCHIATRY PROGRESS NOTE - MSE UNSTRUCTURED FT
Pt is 54 yo woman with poor grooming and hygiene, dressed in hospital gown. She is calm, but uncooperative with intense stare. No psychomotor abnormalities noted. Speech: selectively/volitionally mute. Mood: unable to assess. Affect: irritable, hostile. TP: unable to assess. TC: unable to assesss SIIP and HIIP. Perception: suspected AH, no RIS on exam. Insight is poor. Judgement is poor. Impulse control is intact at this time.       Pt is 54 yo woman with poor grooming and hygiene, dressed in hospital gown. She is calm, but uncooperative with intense stare. No psychomotor abnormalities noted. Speech: selectively/volitionally mute. Mood: unable to assess. Affect: irritable, hostile. TP: unable to assess. TC: unable to assess SIIP and HIIP. Perception: suspected AH, no RIS on exam. Insight is poor. Judgement is poor. Impulse control is intact at this time.

## 2023-09-26 NOTE — BH INPATIENT PSYCHIATRY PROGRESS NOTE - NSBHASSESSSUMMFT_PSY_ALL_CORE
This is a 53 year old woman, currently living at [facility] on [facility], PMH of GERD, anemia, PPH of schizophrenia, selective mutism, currently with [facility] ACT team, previously with [facility] which , with history of psychiatric hospitalization(s), without known history of self-injury or suicide attempts, with history of aggression in the setting on nonadherence with medication, who is brought in by staff at residence for increasing agitation and aggression in the setting of 2 months of medication nonadherence.     Today, patient continues to refuse to cooperate with interview and treatment. State application in process. s/p haldol dec 150mg 23 and Zyprexa Relprevv 405mg IM on . Patient continues to require inpatient psychiatric hospitalization for stabilization and treatment.     Plan:  Legals:  	- continue with  involuntary admission   	- TOO obtained 8/15  Safety:  	- routine obs, bed block for aggression, currently ADLs are poor, slightly improved  Psychiatric:  	- d/c  Zyprexa 10mg PO BID (Zyprexa BREWER administered on )  	- c/w valproic acid 250mg 2 caps BID,   	- decrease Cogentin to 2mg HS.    	- c/w clozapine 25mg qhs (patient continues to refuse)  	- s/p Zyprexa Replevv BREWER administered on  (next dose due on 10/20)  	- s/p loading dose of haldol dec 100mg , s/p haldol dec 150mg  (next dose due on )  Medical:   	- PPD negative  	- Nexium 40mg, Feosol 300mg daily, Vit D3 1000U, Senokot 8.6mg qhs  Dispo:  	- plan for state application vs returning to [facility]  	- AOT is under investigation,  in   	- no family involved in care

## 2023-09-26 NOTE — BH INPATIENT PSYCHIATRY PROGRESS NOTE - NSBHATTESTSTAFFAMEND_PSY_A_CORE
I have personally seen and examined this patient. I fully participated in the care of this patient. I have made amendments to the documentation where appropriate and otherwise agree with the history, physical exam, and plan as documented by the

## 2023-09-27 PROCEDURE — 99232 SBSQ HOSP IP/OBS MODERATE 35: CPT

## 2023-09-27 RX ADMIN — Medication 2 MILLIGRAM(S): at 21:01

## 2023-09-27 RX ADMIN — PANTOPRAZOLE SODIUM 40 MILLIGRAM(S): 20 TABLET, DELAYED RELEASE ORAL at 08:37

## 2023-09-27 RX ADMIN — Medication 500 MILLIGRAM(S): at 08:37

## 2023-09-27 RX ADMIN — Medication 1000 UNIT(S): at 08:37

## 2023-09-27 NOTE — BH INPATIENT PSYCHIATRY PROGRESS NOTE - PRN MEDS
MEDICATIONS  (PRN):  diphenhydrAMINE 50 milliGRAM(s) Oral every 4 hours PRN agitation  diphenhydrAMINE Injectable 50 milliGRAM(s) IntraMuscular once PRN Agitation  haloperidol     Tablet 5 milliGRAM(s) Oral every 4 hours PRN agitation  haloperidol    Injectable 10 milliGRAM(s) IntraMuscular once PRN agitation  haloperidol    Injectable 7.5 milliGRAM(s) IntraMuscular once PRN psychotic agitation   MEDICATIONS  (PRN):  diphenhydrAMINE 50 milliGRAM(s) Oral every 4 hours PRN agitation  diphenhydrAMINE Injectable 50 milliGRAM(s) IntraMuscular once PRN Agitation  haloperidol     Tablet 5 milliGRAM(s) Oral every 4 hours PRN agitation  haloperidol    Injectable 7.5 milliGRAM(s) IntraMuscular once PRN psychotic agitation

## 2023-09-27 NOTE — BH INPATIENT PSYCHIATRY PROGRESS NOTE - NSBHFUPINTERVALHXFT_PSY_A_CORE
No acute overnight events. Patient did not receive or require PRN medications. Per sleep log, patient with good sleep. Continues to refuse PO medication. Chart reviewed and case discussed in team. Remains with w/ bed block for aggression.     On approach, patient resting comfortably in bed, appears disheveled and refusing to engage. Refusing PO clozapine. Remains with selective mutism, responds to questions about current state, refuses to answer additional interview questions, doesn't express insight of being in hospital, refuses to answer questions related to psychosis. Received Zyprexa Relprevv on 9/22, no reported a/e from current medication regimen.  No EPS. No acute overnight events. Patient did not receive or require PRN medications.  Continues to refuse PO medication. Chart reviewed and case discussed in team. Remains with w/ bed block for aggression.     On approach, patient resting comfortably in bed, appears disheveled and refusing to engage. Refusing PO clozapine. Remains guarded on approach, responds to questions about current state, refuses to answer additional interview questions, doesn't express insight of being in hospital, refuses to answer questions related to psychosis. Received Zyprexa Relprevv on 9/22, no reported a/e from current medication regimen.  No EPS.  Self isolated and no somatic complaints noted.

## 2023-09-27 NOTE — BH INPATIENT PSYCHIATRY PROGRESS NOTE - MSE UNSTRUCTURED FT
Pt is 52 yo woman with poor grooming and hygiene, dressed in hospital gown. She is calm, but uncooperative with intense stare. No psychomotor abnormalities noted. Speech: selectively/volitionally mute. Mood: unable to assess. Affect: irritable, hostile. TP: unable to assess. TC: unable to assess SIIP and HIIP. Perception: suspected AH, no RIS on exam. Insight is poor. Judgement is poor. Impulse control is intact at this time.

## 2023-09-27 NOTE — BH INPATIENT PSYCHIATRY PROGRESS NOTE - CURRENT MEDICATION
MEDICATIONS  (STANDING):  benztropine 2 milliGRAM(s) Oral <User Schedule>  benztropine Injectable 2 milliGRAM(s) IntraMuscular at bedtime  cholecalciferol 1000 Unit(s) Oral <User Schedule>  cloZAPine 25 milliGRAM(s) Oral at bedtime  ferrous    sulfate Liquid 300 milliGRAM(s) Oral with breakfast  pantoprazole    Tablet 40 milliGRAM(s) Oral before breakfast  polyethylene glycol 3350 17 Gram(s) Oral at bedtime  valproic acid 500 milliGRAM(s) Oral <User Schedule>    MEDICATIONS  (PRN):  diphenhydrAMINE 50 milliGRAM(s) Oral every 4 hours PRN agitation  diphenhydrAMINE Injectable 50 milliGRAM(s) IntraMuscular once PRN Agitation  haloperidol     Tablet 5 milliGRAM(s) Oral every 4 hours PRN agitation  haloperidol    Injectable 10 milliGRAM(s) IntraMuscular once PRN agitation  haloperidol    Injectable 7.5 milliGRAM(s) IntraMuscular once PRN psychotic agitation   MEDICATIONS  (STANDING):  benztropine 2 milliGRAM(s) Oral <User Schedule>  cholecalciferol 1000 Unit(s) Oral <User Schedule>  cloZAPine 25 milliGRAM(s) Oral at bedtime  ferrous    sulfate Liquid 300 milliGRAM(s) Oral with breakfast  pantoprazole    Tablet 40 milliGRAM(s) Oral before breakfast  polyethylene glycol 3350 17 Gram(s) Oral at bedtime  valproic acid 500 milliGRAM(s) Oral <User Schedule>    MEDICATIONS  (PRN):  diphenhydrAMINE 50 milliGRAM(s) Oral every 4 hours PRN agitation  diphenhydrAMINE Injectable 50 milliGRAM(s) IntraMuscular once PRN Agitation  haloperidol     Tablet 5 milliGRAM(s) Oral every 4 hours PRN agitation  haloperidol    Injectable 7.5 milliGRAM(s) IntraMuscular once PRN psychotic agitation

## 2023-09-27 NOTE — BH INPATIENT PSYCHIATRY PROGRESS NOTE - NSBHATTESTBILLING_PSY_A_CORE
07377-[id number] OBS or IP - moderate complexity OR 35-49 mins 89839-Yyipsacayk OBS or IP - low complexity OR 25-34 mins

## 2023-09-28 PROCEDURE — 99232 SBSQ HOSP IP/OBS MODERATE 35: CPT

## 2023-09-28 RX ORDER — BENZTROPINE MESYLATE 1 MG
2 TABLET ORAL
Refills: 0 | Status: DISCONTINUED | OUTPATIENT
Start: 2023-09-28 | End: 2023-09-29

## 2023-09-28 RX ADMIN — PANTOPRAZOLE SODIUM 40 MILLIGRAM(S): 20 TABLET, DELAYED RELEASE ORAL at 09:43

## 2023-09-28 RX ADMIN — Medication 500 MILLIGRAM(S): at 09:43

## 2023-09-28 RX ADMIN — CLOZAPINE 25 MILLIGRAM(S): 150 TABLET, ORALLY DISINTEGRATING ORAL at 21:01

## 2023-09-28 RX ADMIN — Medication 500 MILLIGRAM(S): at 21:01

## 2023-09-28 RX ADMIN — Medication 1000 UNIT(S): at 09:43

## 2023-09-28 NOTE — BH INPATIENT PSYCHIATRY PROGRESS NOTE - PRN MEDS
MEDICATIONS  (PRN):  diphenhydrAMINE 50 milliGRAM(s) Oral every 4 hours PRN agitation  diphenhydrAMINE Injectable 50 milliGRAM(s) IntraMuscular once PRN Agitation  haloperidol     Tablet 5 milliGRAM(s) Oral every 4 hours PRN agitation  haloperidol    Injectable 7.5 milliGRAM(s) IntraMuscular once PRN psychotic agitation

## 2023-09-28 NOTE — BH INPATIENT PSYCHIATRY PROGRESS NOTE - NSBHFUPINTERVALHXFT_PSY_A_CORE
No acute overnight events. Patient did not receive or require PRN medications. Per sleep log, patient with good sleep. Continues to refuse PO medication. Chart reviewed and case discussed in team. Remains with w/ bed block for aggression.     On approach, patient resting comfortably in bed, appears disheveled but a bit more engaged, states name is "Juan Jose" and states that she left the "mental institution" but doesn't disclose where she thinks she is at this time. Refusing PO clozapine. Remains guarded but more verbal, responds to questions about current state, states delusional content that she makes this writer a doctor.  Pt doesn't express insight of being in hospital, refuses to answer questions related to psychosis but delusional content evident. Received Zyprexa Relprevv on 9/22, no reported a/e from current medication regimen.  No EPS.

## 2023-09-28 NOTE — BH INPATIENT PSYCHIATRY PROGRESS NOTE - CURRENT MEDICATION
MEDICATIONS  (STANDING):  benztropine 2 milliGRAM(s) Oral <User Schedule>  cholecalciferol 1000 Unit(s) Oral <User Schedule>  cloZAPine 25 milliGRAM(s) Oral at bedtime  ferrous    sulfate Liquid 300 milliGRAM(s) Oral with breakfast  pantoprazole    Tablet 40 milliGRAM(s) Oral before breakfast  polyethylene glycol 3350 17 Gram(s) Oral at bedtime  valproic acid 500 milliGRAM(s) Oral <User Schedule>    MEDICATIONS  (PRN):  diphenhydrAMINE 50 milliGRAM(s) Oral every 4 hours PRN agitation  diphenhydrAMINE Injectable 50 milliGRAM(s) IntraMuscular once PRN Agitation  haloperidol     Tablet 5 milliGRAM(s) Oral every 4 hours PRN agitation  haloperidol    Injectable 7.5 milliGRAM(s) IntraMuscular once PRN psychotic agitation

## 2023-09-28 NOTE — BH INPATIENT PSYCHIATRY PROGRESS NOTE - NSBHCHARTREVIEWINVESTIGATE_PSY_A_CORE FT
last ekg 9/5, QTC was 455ms. 

## 2023-09-28 NOTE — BH INPATIENT PSYCHIATRY PROGRESS NOTE - NSBHASSESSSUMMFT_PSY_ALL_CORE
This is a 53 year old woman, currently living at [facility] [place] [facility], PMH of GERD, anemia, PPH of schizophrenia, selective mutism, currently with [facility] ACT team, previously with [facility] which , with history of psychiatric hospitalization(s), without known history of self-injury or suicide attempts, with history of aggression in the setting on nonadherence with medication, who is brought in by staff at residence for increasing agitation and aggression in the setting of 2 months of medication nonadherence.     Today, patient continues to be acutely psychotic with delusions, guarded and only superficially cooperates with interview and treatment. State application in process. s/p haldol dec 150mg 23 and Zyprexa Relprevv 405mg IM on . Patient continues to require inpatient psychiatric hospitalization for stabilization and treatment.     Plan:  Legals:  	- continue with  involuntary admission   	- TOO obtained 8/15  Safety:  	- routine obs, bed block for aggression, currently ADLs are poor, slightly improved  Psychiatric:  	- d/c  Zyprexa 10mg PO BID (Zyprexa BREWER administered on )  	- c/w valproic acid 250mg 2 caps BID,   	- decrease Cogentin to 2mg daily for EPS prophylaxis.    	- c/w clozapine 25mg qhs (patient continues to refuse)  	- s/p Zyprexa Replevv BREWER administered on  (next dose due on 10/20)  	- s/p loading dose of haldol dec 100mg , s/p haldol dec 150mg   Medical:   	- PPD negative  	- Nexium 40mg, Feosol 300mg daily, Vit D3 1000U, Senokot 8.6mg qhs  Dispo:  	- plan for state application, pending review and subitted  	- AOT is under investigation,  in   	- no family involved in care

## 2023-09-29 ENCOUNTER — INPATIENT (INPATIENT)
Facility: HOSPITAL | Age: 53
LOS: 12 days | Discharge: PSYCHIATRIC OR SUBSTANCE USE FACILITY | End: 2023-10-12
Attending: INTERNAL MEDICINE | Admitting: INTERNAL MEDICINE
Payer: MEDICAID

## 2023-09-29 VITALS
OXYGEN SATURATION: 99 % | DIASTOLIC BLOOD PRESSURE: 64 MMHG | SYSTOLIC BLOOD PRESSURE: 102 MMHG | HEART RATE: 80 BPM | HEIGHT: 67 IN | TEMPERATURE: 98 F | RESPIRATION RATE: 16 BRPM

## 2023-09-29 DIAGNOSIS — D62 ACUTE POSTHEMORRHAGIC ANEMIA: ICD-10-CM

## 2023-09-29 LAB
A1C WITH ESTIMATED AVERAGE GLUCOSE RESULT: 5.3 % — SIGNIFICANT CHANGE UP (ref 4–5.6)
ALBUMIN SERPL ELPH-MCNC: 3.4 G/DL — SIGNIFICANT CHANGE UP (ref 3.3–5)
ALBUMIN SERPL ELPH-MCNC: 3.5 G/DL — SIGNIFICANT CHANGE UP (ref 3.3–5)
ALP SERPL-CCNC: 118 U/L — SIGNIFICANT CHANGE UP (ref 40–120)
ALP SERPL-CCNC: 119 U/L — SIGNIFICANT CHANGE UP (ref 40–120)
ALT FLD-CCNC: 40 U/L — HIGH (ref 4–33)
ALT FLD-CCNC: 40 U/L — HIGH (ref 4–33)
ANION GAP SERPL CALC-SCNC: 10 MMOL/L — SIGNIFICANT CHANGE UP (ref 7–14)
ANION GAP SERPL CALC-SCNC: 9 MMOL/L — SIGNIFICANT CHANGE UP (ref 7–14)
ANISOCYTOSIS BLD QL: SLIGHT — SIGNIFICANT CHANGE UP
APTT BLD: 22.4 SEC — LOW (ref 24.5–35.6)
AST SERPL-CCNC: 17 U/L — SIGNIFICANT CHANGE UP (ref 4–32)
AST SERPL-CCNC: 25 U/L — SIGNIFICANT CHANGE UP (ref 4–32)
BASOPHILS # BLD AUTO: 0.02 K/UL — SIGNIFICANT CHANGE UP (ref 0–0.2)
BASOPHILS # BLD AUTO: 0.03 K/UL — SIGNIFICANT CHANGE UP (ref 0–0.2)
BASOPHILS # BLD AUTO: 0.06 K/UL — SIGNIFICANT CHANGE UP (ref 0–0.2)
BASOPHILS NFR BLD AUTO: 0.3 % — SIGNIFICANT CHANGE UP (ref 0–2)
BASOPHILS NFR BLD AUTO: 0.5 % — SIGNIFICANT CHANGE UP (ref 0–2)
BASOPHILS NFR BLD AUTO: 0.9 % — SIGNIFICANT CHANGE UP (ref 0–2)
BILIRUB SERPL-MCNC: 0.2 MG/DL — SIGNIFICANT CHANGE UP (ref 0.2–1.2)
BILIRUB SERPL-MCNC: <0.2 MG/DL — SIGNIFICANT CHANGE UP (ref 0.2–1.2)
BLD GP AB SCN SERPL QL: NEGATIVE — SIGNIFICANT CHANGE UP
BLD GP AB SCN SERPL QL: NEGATIVE — SIGNIFICANT CHANGE UP
BUN SERPL-MCNC: 13 MG/DL — SIGNIFICANT CHANGE UP (ref 7–23)
BUN SERPL-MCNC: 13 MG/DL — SIGNIFICANT CHANGE UP (ref 7–23)
BURR CELLS BLD QL SMEAR: PRESENT — SIGNIFICANT CHANGE UP
CALCIUM SERPL-MCNC: 8.7 MG/DL — SIGNIFICANT CHANGE UP (ref 8.4–10.5)
CALCIUM SERPL-MCNC: 9 MG/DL — SIGNIFICANT CHANGE UP (ref 8.4–10.5)
CHLORIDE SERPL-SCNC: 102 MMOL/L — SIGNIFICANT CHANGE UP (ref 98–107)
CHLORIDE SERPL-SCNC: 109 MMOL/L — HIGH (ref 98–107)
CHOLEST SERPL-MCNC: 166 MG/DL — SIGNIFICANT CHANGE UP
CO2 SERPL-SCNC: 24 MMOL/L — SIGNIFICANT CHANGE UP (ref 22–31)
CO2 SERPL-SCNC: 27 MMOL/L — SIGNIFICANT CHANGE UP (ref 22–31)
CREAT SERPL-MCNC: 0.69 MG/DL — SIGNIFICANT CHANGE UP (ref 0.5–1.3)
CREAT SERPL-MCNC: 0.81 MG/DL — SIGNIFICANT CHANGE UP (ref 0.5–1.3)
EGFR: 103 ML/MIN/1.73M2 — SIGNIFICANT CHANGE UP
EGFR: 86 ML/MIN/1.73M2 — SIGNIFICANT CHANGE UP
EOSINOPHIL # BLD AUTO: 0.1 K/UL — SIGNIFICANT CHANGE UP (ref 0–0.5)
EOSINOPHIL # BLD AUTO: 0.13 K/UL — SIGNIFICANT CHANGE UP (ref 0–0.5)
EOSINOPHIL # BLD AUTO: 0.17 K/UL — SIGNIFICANT CHANGE UP (ref 0–0.5)
EOSINOPHIL NFR BLD AUTO: 1.7 % — SIGNIFICANT CHANGE UP (ref 0–6)
EOSINOPHIL NFR BLD AUTO: 1.9 % — SIGNIFICANT CHANGE UP (ref 0–6)
EOSINOPHIL NFR BLD AUTO: 2.6 % — SIGNIFICANT CHANGE UP (ref 0–6)
ESTIMATED AVERAGE GLUCOSE: 105 — SIGNIFICANT CHANGE UP
GIANT PLATELETS BLD QL SMEAR: PRESENT — SIGNIFICANT CHANGE UP
GLUCOSE SERPL-MCNC: 86 MG/DL — SIGNIFICANT CHANGE UP (ref 70–99)
GLUCOSE SERPL-MCNC: 99 MG/DL — SIGNIFICANT CHANGE UP (ref 70–99)
HCT VFR BLD CALC: 22.1 % — LOW (ref 34.5–45)
HCT VFR BLD CALC: 22.5 % — LOW (ref 34.5–45)
HCT VFR BLD CALC: 22.7 % — LOW (ref 34.5–45)
HDLC SERPL-MCNC: 60 MG/DL — SIGNIFICANT CHANGE UP
HGB BLD-MCNC: 6.3 G/DL — CRITICAL LOW (ref 11.5–15.5)
HGB BLD-MCNC: 6.4 G/DL — CRITICAL LOW (ref 11.5–15.5)
HGB BLD-MCNC: 6.6 G/DL — CRITICAL LOW (ref 11.5–15.5)
IANC: 3.44 K/UL — SIGNIFICANT CHANGE UP (ref 1.8–7.4)
IANC: 3.74 K/UL — SIGNIFICANT CHANGE UP (ref 1.8–7.4)
IANC: 3.88 K/UL — SIGNIFICANT CHANGE UP (ref 1.8–7.4)
IMM GRANULOCYTES NFR BLD AUTO: 0.3 % — SIGNIFICANT CHANGE UP (ref 0–0.9)
IMM GRANULOCYTES NFR BLD AUTO: 0.3 % — SIGNIFICANT CHANGE UP (ref 0–0.9)
INR BLD: 0.91 RATIO — SIGNIFICANT CHANGE UP (ref 0.85–1.18)
LIPID PNL WITH DIRECT LDL SERPL: 94 MG/DL — SIGNIFICANT CHANGE UP
LYMPHOCYTES # BLD AUTO: 1.52 K/UL — SIGNIFICANT CHANGE UP (ref 1–3.3)
LYMPHOCYTES # BLD AUTO: 1.72 K/UL — SIGNIFICANT CHANGE UP (ref 1–3.3)
LYMPHOCYTES # BLD AUTO: 2 K/UL — SIGNIFICANT CHANGE UP (ref 1–3.3)
LYMPHOCYTES # BLD AUTO: 23.5 % — SIGNIFICANT CHANGE UP (ref 13–44)
LYMPHOCYTES # BLD AUTO: 29.7 % — SIGNIFICANT CHANGE UP (ref 13–44)
LYMPHOCYTES # BLD AUTO: 29.7 % — SIGNIFICANT CHANGE UP (ref 13–44)
MANUAL SMEAR VERIFICATION: SIGNIFICANT CHANGE UP
MCHC RBC-ENTMCNC: 20.8 PG — LOW (ref 27–34)
MCHC RBC-ENTMCNC: 20.8 PG — LOW (ref 27–34)
MCHC RBC-ENTMCNC: 20.9 PG — LOW (ref 27–34)
MCHC RBC-ENTMCNC: 28.4 GM/DL — LOW (ref 32–36)
MCHC RBC-ENTMCNC: 28.5 GM/DL — LOW (ref 32–36)
MCHC RBC-ENTMCNC: 29.1 GM/DL — LOW (ref 32–36)
MCV RBC AUTO: 71.6 FL — LOW (ref 80–100)
MCV RBC AUTO: 73.1 FL — LOW (ref 80–100)
MCV RBC AUTO: 73.2 FL — LOW (ref 80–100)
MICROCYTES BLD QL: SLIGHT — SIGNIFICANT CHANGE UP
MONOCYTES # BLD AUTO: 0.23 K/UL — SIGNIFICANT CHANGE UP (ref 0–0.9)
MONOCYTES # BLD AUTO: 0.48 K/UL — SIGNIFICANT CHANGE UP (ref 0–0.9)
MONOCYTES # BLD AUTO: 0.68 K/UL — SIGNIFICANT CHANGE UP (ref 0–0.9)
MONOCYTES NFR BLD AUTO: 10.1 % — SIGNIFICANT CHANGE UP (ref 2–14)
MONOCYTES NFR BLD AUTO: 3.5 % — SIGNIFICANT CHANGE UP (ref 2–14)
MONOCYTES NFR BLD AUTO: 8.3 % — SIGNIFICANT CHANGE UP (ref 2–14)
NEUTROPHILS # BLD AUTO: 3.44 K/UL — SIGNIFICANT CHANGE UP (ref 1.8–7.4)
NEUTROPHILS # BLD AUTO: 3.88 K/UL — SIGNIFICANT CHANGE UP (ref 1.8–7.4)
NEUTROPHILS # BLD AUTO: 4.49 K/UL — SIGNIFICANT CHANGE UP (ref 1.8–7.4)
NEUTROPHILS NFR BLD AUTO: 57.7 % — SIGNIFICANT CHANGE UP (ref 43–77)
NEUTROPHILS NFR BLD AUTO: 59.5 % — SIGNIFICANT CHANGE UP (ref 43–77)
NEUTROPHILS NFR BLD AUTO: 69.5 % — SIGNIFICANT CHANGE UP (ref 43–77)
NON HDL CHOLESTEROL: 106 MG/DL — SIGNIFICANT CHANGE UP
NRBC # BLD: 0 /100 WBCS — SIGNIFICANT CHANGE UP (ref 0–0)
NRBC # BLD: 0 /100 WBCS — SIGNIFICANT CHANGE UP (ref 0–0)
NRBC # BLD: 3 /100 — HIGH (ref 0–0)
NRBC # FLD: 0.03 K/UL — HIGH (ref 0–0)
NRBC # FLD: 0.04 K/UL — HIGH (ref 0–0)
PLAT MORPH BLD: NORMAL — SIGNIFICANT CHANGE UP
PLATELET # BLD AUTO: 391 K/UL — SIGNIFICANT CHANGE UP (ref 150–400)
PLATELET # BLD AUTO: 414 K/UL — HIGH (ref 150–400)
PLATELET # BLD AUTO: 445 K/UL — HIGH (ref 150–400)
PLATELET COUNT - ESTIMATE: NORMAL — SIGNIFICANT CHANGE UP
POIKILOCYTOSIS BLD QL AUTO: SIGNIFICANT CHANGE UP
POLYCHROMASIA BLD QL SMEAR: SLIGHT — SIGNIFICANT CHANGE UP
POTASSIUM SERPL-MCNC: 4 MMOL/L — SIGNIFICANT CHANGE UP (ref 3.5–5.3)
POTASSIUM SERPL-MCNC: 4.3 MMOL/L — SIGNIFICANT CHANGE UP (ref 3.5–5.3)
POTASSIUM SERPL-SCNC: 4 MMOL/L — SIGNIFICANT CHANGE UP (ref 3.5–5.3)
POTASSIUM SERPL-SCNC: 4.3 MMOL/L — SIGNIFICANT CHANGE UP (ref 3.5–5.3)
PROT SERPL-MCNC: 6.8 G/DL — SIGNIFICANT CHANGE UP (ref 6–8.3)
PROT SERPL-MCNC: 7.1 G/DL — SIGNIFICANT CHANGE UP (ref 6–8.3)
PROTHROM AB SERPL-ACNC: 10.3 SEC — SIGNIFICANT CHANGE UP (ref 9.5–13)
RBC # BLD: 3.02 M/UL — LOW (ref 3.8–5.2)
RBC # BLD: 3.08 M/UL — LOW (ref 3.8–5.2)
RBC # BLD: 3.17 M/UL — LOW (ref 3.8–5.2)
RBC # FLD: 21.7 % — HIGH (ref 10.3–14.5)
RBC # FLD: 21.8 % — HIGH (ref 10.3–14.5)
RBC # FLD: 21.9 % — HIGH (ref 10.3–14.5)
RBC BLD AUTO: ABNORMAL
RH IG SCN BLD-IMP: POSITIVE — SIGNIFICANT CHANGE UP
RH IG SCN BLD-IMP: POSITIVE — SIGNIFICANT CHANGE UP
SODIUM SERPL-SCNC: 139 MMOL/L — SIGNIFICANT CHANGE UP (ref 135–145)
SODIUM SERPL-SCNC: 142 MMOL/L — SIGNIFICANT CHANGE UP (ref 135–145)
TARGETS BLD QL SMEAR: SLIGHT — SIGNIFICANT CHANGE UP
TRIGL SERPL-MCNC: 61 MG/DL — SIGNIFICANT CHANGE UP
VALPROATE SERPL-MCNC: 48.2 UG/ML — LOW (ref 50–100)
WBC # BLD: 5.79 K/UL — SIGNIFICANT CHANGE UP (ref 3.8–10.5)
WBC # BLD: 6.46 K/UL — SIGNIFICANT CHANGE UP (ref 3.8–10.5)
WBC # BLD: 6.73 K/UL — SIGNIFICANT CHANGE UP (ref 3.8–10.5)
WBC # FLD AUTO: 5.79 K/UL — SIGNIFICANT CHANGE UP (ref 3.8–10.5)
WBC # FLD AUTO: 6.46 K/UL — SIGNIFICANT CHANGE UP (ref 3.8–10.5)
WBC # FLD AUTO: 6.73 K/UL — SIGNIFICANT CHANGE UP (ref 3.8–10.5)

## 2023-09-29 PROCEDURE — 99291 CRITICAL CARE FIRST HOUR: CPT

## 2023-09-29 PROCEDURE — 99223 1ST HOSP IP/OBS HIGH 75: CPT

## 2023-09-29 PROCEDURE — 99238 HOSP IP/OBS DSCHRG MGMT 30/<: CPT

## 2023-09-29 PROCEDURE — 93010 ELECTROCARDIOGRAM REPORT: CPT

## 2023-09-29 RX ORDER — PANTOPRAZOLE SODIUM 20 MG/1
1 TABLET, DELAYED RELEASE ORAL
Qty: 0 | Refills: 0 | DISCHARGE
Start: 2023-09-29

## 2023-09-29 RX ORDER — CHOLECALCIFEROL (VITAMIN D3) 125 MCG
1 CAPSULE ORAL
Qty: 0 | Refills: 0 | DISCHARGE
Start: 2023-09-29

## 2023-09-29 RX ORDER — BENZTROPINE MESYLATE 1 MG
1 TABLET ORAL
Qty: 0 | Refills: 0 | DISCHARGE
Start: 2023-09-29

## 2023-09-29 RX ORDER — OLANZAPINE 15 MG/1
2 TABLET, FILM COATED ORAL
Qty: 0 | Refills: 0 | DISCHARGE

## 2023-09-29 RX ORDER — POLYETHYLENE GLYCOL 3350 17 G/17G
17 POWDER, FOR SOLUTION ORAL
Qty: 0 | Refills: 0 | DISCHARGE
Start: 2023-09-29

## 2023-09-29 RX ORDER — HALOPERIDOL DECANOATE 100 MG/ML
5 INJECTION INTRAMUSCULAR ONCE
Refills: 0 | Status: COMPLETED | OUTPATIENT
Start: 2023-09-29 | End: 2023-09-29

## 2023-09-29 RX ORDER — CLOZAPINE 150 MG/1
1 TABLET, ORALLY DISINTEGRATING ORAL
Qty: 0 | Refills: 0 | DISCHARGE
Start: 2023-09-29

## 2023-09-29 RX ORDER — PANTOPRAZOLE SODIUM 20 MG/1
40 TABLET, DELAYED RELEASE ORAL ONCE
Refills: 0 | Status: COMPLETED | OUTPATIENT
Start: 2023-09-29 | End: 2023-09-29

## 2023-09-29 RX ORDER — FERROUS SULFATE 325(65) MG
5 TABLET ORAL
Qty: 0 | Refills: 0 | DISCHARGE
Start: 2023-09-29

## 2023-09-29 RX ORDER — DIVALPROEX SODIUM 500 MG/1
2 TABLET, DELAYED RELEASE ORAL
Qty: 0 | Refills: 0 | DISCHARGE

## 2023-09-29 RX ORDER — VALPROIC ACID (AS SODIUM SALT) 250 MG/5ML
2 SOLUTION, ORAL ORAL
Qty: 0 | Refills: 0 | DISCHARGE
Start: 2023-09-29

## 2023-09-29 RX ORDER — HALOPERIDOL DECANOATE 100 MG/ML
125 INJECTION INTRAMUSCULAR
Qty: 0 | Refills: 0 | DISCHARGE

## 2023-09-29 RX ADMIN — PANTOPRAZOLE SODIUM 40 MILLIGRAM(S): 20 TABLET, DELAYED RELEASE ORAL at 09:09

## 2023-09-29 RX ADMIN — HALOPERIDOL DECANOATE 5 MILLIGRAM(S): 100 INJECTION INTRAMUSCULAR at 18:14

## 2023-09-29 RX ADMIN — Medication 2 MILLIGRAM(S): at 18:14

## 2023-09-29 RX ADMIN — Medication 1000 UNIT(S): at 09:09

## 2023-09-29 RX ADMIN — Medication 300 MILLIGRAM(S): at 09:08

## 2023-09-29 RX ADMIN — PANTOPRAZOLE SODIUM 40 MILLIGRAM(S): 20 TABLET, DELAYED RELEASE ORAL at 22:17

## 2023-09-29 RX ADMIN — Medication 500 MILLIGRAM(S): at 09:08

## 2023-09-29 RX ADMIN — HALOPERIDOL DECANOATE 5 MILLIGRAM(S): 100 INJECTION INTRAMUSCULAR at 19:33

## 2023-09-29 RX ADMIN — Medication 2 MILLIGRAM(S): at 09:09

## 2023-09-29 NOTE — BH INPATIENT PSYCHIATRY PROGRESS NOTE - NSTXDCOPNODATEEST_PSY_ALL_CORE
12-Sep-2023
04-Aug-2023
03-Aug-2023
19-Sep-2023
03-Aug-2023
04-Aug-2023
12-Sep-2023
22-Aug-2023
05-Sep-2023
03-Aug-2023
05-Sep-2023
03-Aug-2023
05-Sep-2023
03-Aug-2023
03-Aug-2023
04-Aug-2023
19-Sep-2023
03-Aug-2023
22-Aug-2023
04-Aug-2023
12-Sep-2023
05-Sep-2023
03-Aug-2023
03-Aug-2023
04-Aug-2023
03-Aug-2023
04-Aug-2023
03-Aug-2023
19-Sep-2023
22-Aug-2023
04-Aug-2023
03-Aug-2023
22-Aug-2023
22-Aug-2023
04-Aug-2023
12-Sep-2023
04-Aug-2023
05-Sep-2023
22-Aug-2023
03-Aug-2023
19-Sep-2023

## 2023-09-29 NOTE — ED ADULT TRIAGE NOTE - CHIEF COMPLAINT QUOTE
Pt presents from [facility] accompanied with staff member presents for low hemoglobin 6.3. Pt asymptomatic, denies chest pain, no shortness of breath, no blood in stool, denies hematuria, afebrile.

## 2023-09-29 NOTE — BH INPATIENT PSYCHIATRY DISCHARGE NOTE - DESCRIPTION
lives at [place], unemployed, in treatment with [facility] ACT team, previously on AOT but .

## 2023-09-29 NOTE — BH INPATIENT PSYCHIATRY PROGRESS NOTE - NSTXDISORGPROGRES_PSY_ALL_CORE
No Change

## 2023-09-29 NOTE — ED PROVIDER NOTE - NS ED ATTENDING STATEMENT MOD
[place] Discharge Instructions  Tom Rico is a 2 day old  infant, delivered at Gestational Age: 39w4d.Discharge Date: 2018   Feeding method: Natural Human Milk  Last time baby ate: Breast (18 0053)  Last wet diaper: 1 (18)  Last stool: 1 (18)      Immunizations/Screenings:         Bilirubin   TOTAL BILIRUBIN (mg/dL)   Date Value   2018       Screen done: Done   Immunizations:   Most Recent Immunizations   Administered Date(s) Administered   • Hep B, adolescent or pediatric 2018   Deferred Date(s) Deferred   • Hepatitis B Immune Globulin 2018      [place] Hearing Test Machine: Auditory Brainstem Response (Algo) (18)   Hearing Test Results: Pass R;Pass L (18)    Birth Weight: 8 lb 1.5 oz (3670 g)    Discharge weight: 7 lb 12.3 oz (3525 g)  -4%      Safe Sleep: Reduce your baby’s risk of SIDS (Sudden Infant Death Syndrome) by practicing Safe Sleep during naps and at night  · Always place your baby on his/her back in a safety approved crib, bassinet, or portable play area. DO NOT use a carseat, adult bed, swing, carrier, or similar products for everyday sleep.    · place your baby firm surface covered with a fitted sheet NEVER on a couch, pillow, quilt, or with fluffy materials  · No pillows, stuffed animals, toys, or crib bumpers.     Carseat Safety:   · It is recommended that children under the age of two should always be in a rear-facing seat that is installed in the back seat.   · Proper fit: Harness straps should lie flat and be snug when clipped.  The chest clip should be at armpit level. Adjust as your baby grows    Feeding your baby:  · Your baby should be fed on cue (wakes up, sucking on hands, turning head with mouth open, and/or then cries).  Your baby will eat every 2-3 hours day and night, or  8-12 times in 24 hours.  Your baby will let you know he/she is full when they detach off nipple (mother’s or a  bottle), suck less vigorously, or becomes sleepy and relaxed.   ·  Keep a record of your baby’s feedings and diapers just like you did in the hospital until the baby is seen by the pediatrician.    For breastfeeding tips please refer to pg. 32 of the breastfeeding section in your A New Beginning booklet given to you by your caregivers.    Tummy Time:  Allow your alert and awake baby to have 30-60 minutes of supervised tummy time each day.    Bathing your baby:  Babies have sensitive skin that can dry out easily. Wipe your baby’s face with just warm water. Wash his/her body with a soapy wash cloth until umbilical cord falls off and/or circumcision heals (about 1 week of age)then you can bathe the baby in a tub.     Umbilical Cord:   Keep umbilical cord dry. Do not apply any medications or alcohol to site.   The cord will fall off in about 1 week.       Jaundice: Yellowing of the skin or “jaundice” is common in newborns. The yellow appearance is most noticeable in eyes and skin and is caused by bilirubin.  Jaundice is normal but can become dangerous if the level of bilirubin is too high.  Your baby’s doctor will monitor your infant’s bilirubin level and treat it as necessary. You may have to bring your infant to the pediatrician for a blood test if bilirubin was high in the hospital. Call your pediatrician if your baby’s skin or eyes become yellow or your baby becomes lethargic (too sleepy).      Safety at Home:   Accidental infant falls can happen; the key to avoid a fall is prevention.    Remember as you are recovering the medications you may be taking may make you more tired.  Keep this in mind when holding your baby.  If you are feeling sleepy or plan on sleeping place your baby in a safe place such as their crib or bassinet.     Babies wiggle, move, and push against things with their feet.  Even these early movements can result in a fall.  Do not leave your baby alone on a changing table, bed, sofa, or chair.  If  you cannot hold your baby put them in a safe place such as their crib or playpen.    If you have other children please be cautious and assist your older children while they are holding baby.  If your child has a serious fall or does not act normally after a fall, call your doctor.    Safe Use of Your Baby Box   Your Baby Box is meant to be used as a Safe Sleep alternative. Please read and follow all warnings printed on your Baby Box. All above Safe Sleep rules apply. Never carry baby in the box. Your Baby Box should be placed on a sturdy, hard, flat surface (ideally, the floor). You can safely use your Baby Box until baby begins to roll from back to front, or weighs approximately 15 pounds unless otherwise directed by your pediatrician. Baby Box should be a temporary Safe Sleep solution. A crib or other permanent sleep space should still be arranged.     Call your Pediatrician:  · Fever 100 degrees F or above  · Forceful vomiting (not spitting up)  · Several feedings when infant does not suck  · Watery, runny stools (mucous, blood or foul odor)  · Infant injury (fall from bed or table, dropped or severely shaken)  · Constant crying  · Any unusual rash  · Yellow color of the eyes or skin  · Has less than 4 wet diapers in a 24 hr period in the first week of life, and less   · than 6 wet diapers in a 24 hr period after the baby is 7 days old  · No stool for 48 hours  · Redness, drainage or foul odor from the umbilical cord.    If you have additional questions about these discharge instructions, please call the [facility] Nurse's station at [phone]   I have personally provided the amount of critical care time documented below excluding time spent on separate procedures.

## 2023-09-29 NOTE — BH INPATIENT PSYCHIATRY DISCHARGE NOTE - OTHER PAST PSYCHIATRIC HISTORY (INCLUDE DETAILS REGARDING ONSET, COURSE OF ILLNESS, INPATIENT/OUTPATIENT TREATMENT)
History of the following diagnoses: schizophrenia, schizoaffective dx, bipolar dx, anxiety  Multiple hospitalizations including last hospital discharge April 10 to 2023 at [facility] 2023 to 3/22/2023 , 2023 to 2023, [facility] term from 2018 to 7/10/2019  No known suicide attempts or self injurious behaviors.   Has ACT team with [facility] and [facility]   Lives in [facility]

## 2023-09-29 NOTE — BH INPATIENT PSYCHIATRY PROGRESS NOTE - NSTXIMPULSGOAL_PSY_ALL_CORE
Will be able to describe/demonstrate alternative ways of managing his/her emotional state on the unit
Will be able to recognize 2+ triggers
Will identify 1 behavior to cope with impulsive urges
Will be able to demonstrate the ability to pause before acting out negatively
Will be able to describe/demonstrate alternative ways of managing his/her emotional state on the unit
Will identify 1 behavior to cope with impulsive urges
Will be able to demonstrate the ability to pause before acting out negatively
Will be able to demonstrate the ability to pause before acting out negatively
Will identify 1 behavior to cope with impulsive urges
Will be able to recognize 2+ triggers
Will identify 1 behavior to cope with impulsive urges
Will be able to recognize 2+ triggers
Will identify 1 negative consequence due to impulsivity
Will be able to demonstrate the ability to pause before acting out negatively
Will identify 1 behavior to cope with impulsive urges
Will identify 1 negative consequence due to impulsivity
Will identify 1 behavior to cope with impulsive urges
Will identify 1 negative consequence due to impulsivity
Will be able to demonstrate the ability to pause before acting out negatively
Will identify 1 negative consequence due to impulsivity
Will be able to demonstrate the ability to pause before acting out negatively
Will identify 1 behavior to cope with impulsive urges
Will identify 1 behavior to cope with impulsive urges
Will be able to demonstrate the ability to pause before acting out negatively
Will be able to recognize 2+ triggers
Will identify 1 negative consequence due to impulsivity
Will be able to recognize 2+ triggers
Will be able to demonstrate the ability to pause before acting out negatively
Will identify 1 behavior to cope with impulsive urges
Will be able to recognize 2+ triggers
Will identify 1 behavior to cope with impulsive urges
Will be able to describe/demonstrate alternative ways of managing his/her emotional state on the unit
Will identify 1 behavior to cope with impulsive urges
Will be able to recognize 2+ triggers
Will identify 1 negative consequence due to impulsivity
Will be able to describe/demonstrate alternative ways of managing his/her emotional state on the unit
Will be able to recognize 2+ triggers
Will identify 1 behavior to cope with impulsive urges
Will be able to demonstrate the ability to pause before acting out negatively

## 2023-09-29 NOTE — BH INPATIENT PSYCHIATRY PROGRESS NOTE - NSTREATMENTCERT_PSY_ALL_CORE
Medication history is complete. Medication list updated in Outpatient Medication Record (OMR). Medication history obtained from patient's outpatient pharmacy ([facility]). Tried to confirm further with family, however, line out of service and patient unable to recall list. Please call spectra [phone] if you have any questions.
.

## 2023-09-29 NOTE — BH INPATIENT PSYCHIATRY DISCHARGE NOTE - NSBHASSESSSUMMFT_PSY_ALL_CORE
This is a 53 year old woman, currently living at [facility] [place] [facility], PMH of GERD, anemia, PPH of schizophrenia, selective mutism, [facility] with [facility], previously with [facility] which , with history of psychiatric hospitalization(s), without known history of self-injury or suicide attempts, with history of aggression in the setting on nonadherence with medication, who is brought in by staff at residence for increasing agitation and aggression in the setting of 2 months of medication nonadherence.     Today, patient continues to be acutely psychotic with delusions, guarded and only superficially cooperates with interview and treatment. State application in process. s/p haldol dec 150mg 23 and Zyprexa Relprevv 405mg IM on . Patient continues to require inpatient psychiatric hospitalization for stabilization and treatment.  In light of anemia on lab work, hemoglobin less than 7, will require emergent ED evaluation, sent to [facility] via EMS.    Plan:  Legals:  	- continue with  involuntary admission   	- TOO obtained 8/15  Safety:  	- routine obs, bed block for aggression, currently ADLs are poor, slightly improved  Psychiatric:  	- d/c  Zyprexa 10mg PO BID (Zyprexa BREWER administered on )  	- c/w valproic acid 250mg 2 caps BID,   	- c/w Cogentin 2mg daily for EPS prophylaxis.    	- c/w clozapine 25mg qhs (patient continues to refuse)  	- s/p Zyprexa Replevv BREWER administered on  (next dose due on 10/20)  	- s/p loading dose of haldol dec 100mg , s/p haldol dec 150mg   Medical:   	- PPD negative  	- Nexium 40mg, Feosol 300mg daily, Vit D3 1000U, Senokot 8.6mg qhs  	- Labs reviewed as obtained on , anemia noted  Dispo:  	- plan for state application, pending review and submitted  	- AOT  in   	- no family involved in care  -send to [facility] ED today for medical evaluation of anemia

## 2023-09-29 NOTE — BH INPATIENT PSYCHIATRY PROGRESS NOTE - NSTXMEDICINTERMD_PSY_ALL_CORE
TOO/AOT
consider TOO/AOT
consider TOO/AOT
TOO/AOT
consider TOO/AOT
TOO/AOT
consider TOO/AOT
TOO/AOT
consider TOO/AOT
TOO/AOT
consider TOO/AOT
TOO/AOT

## 2023-09-29 NOTE — BH INPATIENT PSYCHIATRY DISCHARGE NOTE - NSDCCPCAREPLAN_GEN_ALL_CORE_FT
PRINCIPAL DISCHARGE DIAGNOSIS  Diagnosis: Schizoaffective disorder  Assessment and Plan of Treatment:       SECONDARY DISCHARGE DIAGNOSES  Diagnosis: Anemia  Assessment and Plan of Treatment:

## 2023-09-29 NOTE — ED ADULT NURSE NOTE - OBJECTIVE STATEMENT
A&OX4, awake, and alert. Patient is coming to ED with staff from [facility] in regards to low hgb. h/o anxiety, schizophrenia. Patient states hgb is 6.2, patient endorses lightheadedness and dizziness for 10 months now. Patient denies SOB, chest pain, weakness, blurred vision. patient states she is refusing treatment at this moment due to "you guys aren't my type of people". Patient educated on benefits and risk of refusing treatment at this time, MD Child made aware, will continue to monitor.

## 2023-09-29 NOTE — BH INPATIENT PSYCHIATRY PROGRESS NOTE - NSTXSLFCREPROGRES_PSY_ALL_CORE
No Change
Improving
No Change
Improving
No Change
Improving
No Change
Improving
No Change
Improving
Improving
No Change
No Change
Improving
No Change
Improving
No Change
Improving
No Change
Improving

## 2023-09-29 NOTE — BH INPATIENT PSYCHIATRY PROGRESS NOTE - NSTXMEDICGOAL_PSY_ALL_CORE
Be able to describe the benefit of medication/treatment
Be able to describe the benefit of medication/treatment
Take all medications as prescribed
Be able to describe the benefit of medication/treatment
Be able to describe the benefit of medication/treatment
Take all medications as prescribed
Take all medications as prescribed
Be able to describe the benefit of medication/treatment
Take all medications as prescribed
Be able to describe the benefit of medication/treatment
Be able to describe the benefit of medication/treatment
Take all medications as prescribed
Be able to describe the benefit of medication/treatment
Take all medications as prescribed

## 2023-09-29 NOTE — BH INPATIENT PSYCHIATRY PROGRESS NOTE - NSTXIMPULSDATEEST_PSY_ALL_CORE
09-Aug-2023
04-Aug-2023
26-Sep-2023
03-Aug-2023
04-Aug-2023
03-Aug-2023
09-Aug-2023
09-Aug-2023
03-Aug-2023
26-Sep-2023
03-Aug-2023
26-Sep-2023
03-Aug-2023
04-Aug-2023
09-Aug-2023
09-Aug-2023
03-Aug-2023
04-Aug-2023
03-Aug-2023
26-Sep-2023

## 2023-09-29 NOTE — BH INPATIENT PSYCHIATRY PROGRESS NOTE - NSTXPROBMEDIC_PSY_ALL_CORE
MEDICATION/TREATMENT NON-COMPLIANCE

## 2023-09-29 NOTE — BH INPATIENT PSYCHIATRY PROGRESS NOTE - NSTXDCOPNOPROGRES_PSY_ALL_CORE
No Change
Improving
No Change
Improving
No Change
Improving
No Change
Improving
No Change
No Change

## 2023-09-29 NOTE — ED PROVIDER NOTE - PROGRESS NOTE DETAILS
PA ALi: patient is not being cooperative with examination, refusing to answer questions, stating that she is the creater and she is my creater and we are her children. She is refusing blood work and transfusion however she is anemic to 6.3, consent for treatment was given to treat by Next of Kin and Legal guardian Lester Avery patient sister. ([phone]), Will sedate patient to get blood work and order PRBC, pts nephews number is [phone]. Mack Avery. Pt get labs and admit, will give haldol and ativan. RAYSA Dutta PGY-2: Hospitalist paged at this time for admission for anemia

## 2023-09-29 NOTE — ED PROVIDER NOTE - CLINICAL SUMMARY MEDICAL DECISION MAKING FREE TEXT BOX
54 y/o female pmhx anemia presenting with low hemoglobin of 6.3. Pt is from [facility] accompanied with staff member. Pt asymptomatic.   Plan: CBC, CMP, UA

## 2023-09-29 NOTE — BH INPATIENT PSYCHIATRY DISCHARGE NOTE - NSBHFUPINTERVALHXFT_PSY_A_CORE
No acute overnight events. Patient did not receive or require PRN medications. Per sleep log, patient with good sleep. Continues to refuse most PO medication. Chart reviewed and case discussed in team. Remains with w/ bed block for aggression.     On approach, patient resting comfortably in bed, appears disheveled but a bit more engaged, states name is "Juan Jose" and states that she doesn't need labwork, advised that medically necessary with TOO court order in place. Refusing PO clozapine. Remains guarded but more verbal, responds to questions about current state, states delusional content with grandiosity.  Pt doesn't express insight of being in hospital, refuses to answer questions related to psychosis but delusional content evident. Received Zyprexa Relprevv on 9/22, no reported a/e from current medication regimen.  No EPS.  Lab work obtained and EKG, noted low hemoglobin/hematocrit, less than 7, d/w [facility] hospitalist.  Pt with no somatic complaints, poor insight, hx of iron deficiency anemia, sent to ED for further medical workup, likely blood transfusion.

## 2023-09-29 NOTE — BH INPATIENT PSYCHIATRY PROGRESS NOTE - NSTXIMPULSDATETRGT_PSY_ALL_CORE
16-Aug-2023
13-Sep-2023
05-Sep-2023
16-Aug-2023
06-Sep-2023
24-Aug-2023
05-Sep-2023
13-Sep-2023
24-Aug-2023
24-Aug-2023
13-Sep-2023
16-Aug-2023
24-Aug-2023
13-Sep-2023
03-Oct-2023
13-Sep-2023
13-Sep-2023
06-Sep-2023
24-Aug-2023
06-Sep-2023
13-Sep-2023
24-Aug-2023
03-Oct-2023
13-Sep-2023
16-Aug-2023
05-Sep-2023
13-Sep-2023
16-Aug-2023
03-Oct-2023
13-Sep-2023
03-Oct-2023
05-Sep-2023
06-Sep-2023

## 2023-09-29 NOTE — BH INPATIENT PSYCHIATRY PROGRESS NOTE - NSBHFUPINTERVALHXFT_PSY_A_CORE
No acute overnight events. Patient did not receive or require PRN medications. Per sleep log, patient with good sleep. Continues to refuse PO medication. Chart reviewed and case discussed in team. Remains with w/ bed block for aggression.     On approach, patient resting comfortably in bed, appears disheveled but a bit more engaged, states name is "Juan Jose" and states that she left the "mental institution" but doesn't disclose where she thinks she is at this time. Refusing PO clozapine. Remains guarded but more verbal, responds to questions about current state, states delusional content that she makes this writer a doctor.  Pt doesn't express insight of being in hospital, refuses to answer questions related to psychosis but delusional content evident. Received Zyprexa Relprevv on 9/22, no reported a/e from current medication regimen.  No EPS. No acute overnight events. Patient did not receive or require PRN medications. Per sleep log, patient with good sleep. Continues to refuse most PO medication. Chart reviewed and case discussed in team. Remains with w/ bed block for aggression.     On approach, patient resting comfortably in bed, appears disheveled but a bit more engaged, states name is "Juan Jose" and states that she doesn't need labwork, advised that medically necessary with TOO court order in place. Refusing PO clozapine. Remains guarded but more verbal, responds to questions about current state, states delusional content with grandiosity.  Pt doesn't express insight of being in hospital, refuses to answer questions related to psychosis but delusional content evident. Received Zyprexa Relprevv on 9/22, no reported a/e from current medication regimen.  No EPS.  Lab work obtained and EKG, noted low hemoglobin/hematocrit, less than 7, d/w [facility] hospitalist.  Pt with no somatic complaints, poor insight, hx of iron deficiency anemia, sent to ED for further medical workup, likely blood transfusion.

## 2023-09-29 NOTE — CONSULT NOTE ADULT - TIME BILLING
Time spent on review of vital signs, labs, prior documentation. Patient interviewed and examined during this encounter. Plan of care was discussed with patient, and primary psych team. Encounter documented.

## 2023-09-29 NOTE — BH INPATIENT PSYCHIATRY PROGRESS NOTE - NSTXDCOPNOGOAL_PSY_ALL_CORE
Will agree to participate in appropriate outpatient care
Other...
Other...
Will agree to participate in appropriate outpatient care
Other...
Will agree to participate in appropriate outpatient care
Other...
Will agree to participate in appropriate outpatient care

## 2023-09-29 NOTE — BH INPATIENT PSYCHIATRY PROGRESS NOTE - NSTREATMENTCERTY_PSY_ALL_CORE

## 2023-09-29 NOTE — BH INPATIENT PSYCHIATRY PROGRESS NOTE - NSTXMEDICDATEEST_PSY_ALL_CORE
03-Aug-2023
03-Aug-2023
09-Aug-2023
03-Aug-2023
26-Sep-2023
03-Aug-2023
03-Aug-2023
09-Aug-2023
03-Aug-2023
09-Aug-2023
03-Aug-2023
03-Aug-2023
04-Aug-2023
26-Sep-2023
03-Aug-2023
09-Aug-2023
04-Aug-2023
03-Aug-2023
09-Aug-2023
03-Aug-2023
04-Aug-2023
03-Aug-2023
26-Sep-2023
03-Aug-2023
04-Aug-2023
03-Aug-2023
26-Sep-2023
03-Aug-2023
03-Aug-2023

## 2023-09-29 NOTE — BH INPATIENT PSYCHIATRY PROGRESS NOTE - NSTXSLFCREGOAL_PSY_ALL_CORE
Will attend 100% scheduled activities neatly groomed
Will attend 100% scheduled activities neatly groomed
Will clean and organize room daily
Will perform ADLs without assistance/prompts daily
Will perform ADLs without assistance/prompts daily
Will clean and organize room daily
Will attend 100% scheduled activities neatly groomed
Be able to demonstrate the ability to care for self in 2 areas such as feeding oneself and hygiene
Will clean and organize room daily
Will perform ADLs without assistance/prompts daily
Will clean and organize room daily
Be able to demonstrate the ability to care for self in 2 areas such as feeding oneself and hygiene
Will attend 100% scheduled activities neatly groomed
Be able to demonstrate the ability to care for self in 2 areas such as feeding oneself and hygiene
Will attend 100% scheduled activities neatly groomed
Will perform ADLs without assistance/prompts daily
Will perform ADLs without assistance/prompts daily
Will attend 100% scheduled activities neatly groomed
Will perform ADLs without assistance/prompts daily
Will perform ADLs without assistance/prompts daily
Will clean and organize room daily
Will clean and organize room daily
Be able to demonstrate the ability to care for self in 2 areas such as feeding oneself and hygiene
Will attend 100% scheduled activities neatly groomed
Will clean and organize room daily
Will attend 100% scheduled activities neatly groomed
Will attend 100% scheduled activities neatly groomed
Will clean and organize room daily
Will clean and organize room daily
Will perform ADLs without assistance/prompts daily
Will attend 100% scheduled activities neatly groomed
Will attend 100% scheduled activities neatly groomed
Be able to demonstrate the ability to care for self in 2 areas such as feeding oneself and hygiene
Will clean and organize room daily
Will clean and organize room daily
Will attend 100% scheduled activities neatly groomed
Will clean and organize room daily
Will attend 100% scheduled activities neatly groomed
Will attend 100% scheduled activities neatly groomed

## 2023-09-29 NOTE — BH INPATIENT PSYCHIATRY DISCHARGE NOTE - NSDCMRMEDTOKEN_GEN_ALL_CORE_FT
biotin 5 mg oral capsule: 1 cap(s) orally 3 times a day  Depakote 500 mg oral delayed release tablet: 2 tab(s) orally once a day (at bedtime)  haloperidol decanoate: 125 milligram(s) intramuscular every 4 weeks  ZyPREXA 10 mg oral tablet: 2 tab(s) orally once a day (at bedtime)   benztropine 2 mg oral tablet: 1 tab(s) orally once a day  cholecalciferol oral tablet: 1 tab(s) orally once a day 1000 unit(s) orally  cloZAPine 25 mg oral tablet: 1 tab(s) orally once a day (at bedtime)  ferrous sulfate 300 mg/5 mL (60 mg/5 mL elemental iron) oral liquid: 5 milliliter(s) orally once a day (before a meal)  pantoprazole 40 mg oral delayed release tablet: 1 tab(s) orally once a day  polyethylene glycol 3350 oral powder for reconstitution: 17 gram(s) orally once a day (at bedtime)  senna (sennosides) 8.6 mg oral tablet: 1 tab(s) orally once a day  valproic acid 250 mg oral capsule: 2 cap(s) orally 2 times a day

## 2023-09-29 NOTE — BH INPATIENT PSYCHIATRY PROGRESS NOTE - NSBHMSETHTPROC_PSY_A_CORE
unable to assess due to sedation/Disorganized/Illogical/Impaired reasoning
unable to assess due to sedation/Disorganized/Illogical/Impaired reasoning

## 2023-09-29 NOTE — ED PROVIDER NOTE - CRITICAL CARE ATTENDING CONTRIBUTION TO CARE
I have evaluated the patient and agree with the documentation and assessment as made by the PA. We have discussed plan of care and work up for the patient.  I performed all elements of critical care.     Brief HPI:  53-year-old female past medical history of anemia, schizophrenia, currently hospitalized at [facility] transferred for anemia.  Patient had routine labs drawn which showed hemoglobin of 6.4.  Patient denies any active symptoms.  Of note, she is circumferential and tangential in providing inappropriate answers.  Patient is declining medical testing and treatment.  Per [facility] transfer note, patient is acutely psychotic and not possessing decision-making capacity.    Vitals:   Reviewed    Exam:    GEN:  Non-toxic appearing, non-distressed, speaking full sentences, non-diaphoretic, Alert  HEENT:  NCAT, neck supple, EOMI, PERRLA, sclera anicteric, no conjunctival pallor or injection, no stridor, normal voice, no tonsillar exudate, uvula midline  CV:  regular rhythm and rate, s1/s2 audible, no murmurs, rubs or gallops, peripheral pulses 2+ and symmetric  PULM:  non-labored respirations, lungs clear to auscultation bilaterally, no wheezes, crackles or rales  ABD:  non distended, non-tender, no rebound, no guarding, negative Odom's sign, bowel sounds normal, no cvat  MSK:  no gross deformity, non-tender extremities and joints, range of motion grossly normal appearing, no extremity edema, extremities warm and well perfused   NEURO:  CN II-XII intact, motor 5/5 in upper and lower extremities bilaterally, sensation grossly intact in extremities and trunk  SKIN:  warm, dry, no rash or vesicles     A/P:  53-year-old female past medical history of anemia, schizophrenia, currently hospitalized at [facility] transferred for anemia.  Vital signs stable.  There does not appear to be any active bleeding on bed sheets, patient declining rectal exam.  Appears acutely psychotic, disoriented, but calm.  Does not appear to possess decision-making capacity regarding medical care given lack of insight and judgment.  To facilitate work-up and for safety of staff and patient, she was administered Haldol and Benadryl intramuscular.  Regarding packed red blood cell transfusion for anemia, JESSI Randle spoke with patient's legal guardian who consents to PRBC transfusion after discussion of risk and benefits.  Will send labs, likely transfuse, and admission.

## 2023-09-29 NOTE — BH INPATIENT PSYCHIATRY PROGRESS NOTE - NSTXDISORGGOAL_PSY_ALL_CORE
Will identify 2 coping skills that assist in organizing

## 2023-09-29 NOTE — BH INPATIENT PSYCHIATRY PROGRESS NOTE - NSTXSLFCREINTERMD_PSY_ALL_CORE
medications to target psychosis
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
## 2023-09-29 NOTE — BH INPATIENT PSYCHIATRY PROGRESS NOTE - NSTXMEDICPROGRES_PSY_ALL_CORE
No Change

## 2023-09-29 NOTE — BH INPATIENT PSYCHIATRY PROGRESS NOTE - NSTXVIOLNTDATETRGT_PSY_ALL_CORE
13-Sep-2023
13-Sep-2023
09-Aug-2023
16-Aug-2023
05-Sep-2023
09-Aug-2023
06-Sep-2023
13-Sep-2023
13-Sep-2023
05-Sep-2023
05-Sep-2023
06-Sep-2023
24-Aug-2023
24-Aug-2023
13-Sep-2023
24-Aug-2023
16-Aug-2023
06-Sep-2023
13-Sep-2023
16-Aug-2023
05-Sep-2023
09-Aug-2023
09-Aug-2023
03-Oct-2023
13-Sep-2023
24-Aug-2023
06-Sep-2023
03-Oct-2023
16-Aug-2023
16-Aug-2023
13-Sep-2023
13-Sep-2023
24-Aug-2023
03-Oct-2023
13-Sep-2023
24-Aug-2023
13-Sep-2023
03-Oct-2023

## 2023-09-29 NOTE — BH INPATIENT PSYCHIATRY PROGRESS NOTE - NSTXVIOLNTPROGRES_PSY_ALL_CORE
No Change
Improving
No Change
Improving
No Change
No Change
Improving
No Change
Improving
No Change
No Change
Improving
Improving
No Change
No Change
Improving
No Change
Improving

## 2023-09-29 NOTE — BH INPATIENT PSYCHIATRY PROGRESS NOTE - NSBHPSYCHOLCOGORIENT_PSY_A_CORE
unable to assess due to sedation/Not fully oriented...
unable to assess due to sedation/Not fully oriented...

## 2023-09-29 NOTE — BH INPATIENT PSYCHIATRY PROGRESS NOTE - NSTXMEDICDATETRGT_PSY_ALL_CORE
06-Sep-2023
13-Sep-2023
09-Aug-2023
09-Aug-2023
16-Aug-2023
03-Oct-2023
03-Oct-2023
13-Sep-2023
09-Aug-2023
09-Aug-2023
13-Sep-2023
13-Sep-2023
05-Sep-2023
03-Oct-2023
24-Aug-2023
13-Sep-2023
24-Aug-2023
24-Aug-2023
06-Sep-2023
16-Aug-2023
13-Sep-2023
13-Sep-2023
24-Aug-2023
13-Sep-2023
16-Aug-2023
06-Sep-2023
13-Sep-2023
24-Aug-2023
05-Sep-2023
13-Sep-2023
03-Oct-2023
05-Sep-2023
16-Aug-2023
05-Sep-2023
16-Aug-2023
24-Aug-2023
06-Sep-2023

## 2023-09-29 NOTE — BH INPATIENT PSYCHIATRY PROGRESS NOTE - NSTXPSYCHODATETRGT_PSY_ALL_CORE
05-Sep-2023
16-Aug-2023
16-Aug-2023
05-Sep-2023
03-Oct-2023
06-Sep-2023
03-Oct-2023
03-Oct-2023
05-Sep-2023
05-Sep-2023
06-Sep-2023
05-Sep-2023
05-Sep-2023
16-Aug-2023
05-Sep-2023
24-Aug-2023
05-Sep-2023
05-Sep-2023
16-Aug-2023
05-Sep-2023
05-Sep-2023
24-Aug-2023
05-Sep-2023
16-Aug-2023
05-Sep-2023
05-Sep-2023
06-Sep-2023
24-Aug-2023
05-Sep-2023
05-Sep-2023
24-Aug-2023
03-Oct-2023
06-Sep-2023
24-Aug-2023
24-Aug-2023

## 2023-09-29 NOTE — BH INPATIENT PSYCHIATRY PROGRESS NOTE - NSTXPROBSLFCRE_PSY_ALL_CORE
SELF-CARE DEFICIT

## 2023-09-29 NOTE — BH INPATIENT PSYCHIATRY PROGRESS NOTE - NSBHMETABOLICLABS_PSY_ALL_CORE
Metabolic screening could not be completed due to the patient's enduring unstable medical/psychological condition Labs within last 12 months

## 2023-09-29 NOTE — BH INPATIENT PSYCHIATRY PROGRESS NOTE - NSBHMSEAFFQUAL_PSY_A_CORE
unable to assess due to sedation/Irritable/Unable to assess
unable to assess due to sedation/Irritable/Unable to assess

## 2023-09-29 NOTE — BH INPATIENT PSYCHIATRY PROGRESS NOTE - NSTXVIOLNTDATEEST_PSY_ALL_CORE
03-Aug-2023
09-Aug-2023
04-Aug-2023
26-Sep-2023
03-Aug-2023
04-Aug-2023
03-Aug-2023
26-Sep-2023
03-Aug-2023
09-Aug-2023
03-Aug-2023
09-Aug-2023
03-Aug-2023
04-Aug-2023
03-Aug-2023
09-Aug-2023
03-Aug-2023
26-Sep-2023
03-Aug-2023
09-Aug-2023
03-Aug-2023
03-Aug-2023
04-Aug-2023
26-Sep-2023

## 2023-09-29 NOTE — BH INPATIENT PSYCHIATRY PROGRESS NOTE - MSE OPTIONS
Unstructured MSE
Structured MSE
Structured MSE

## 2023-09-29 NOTE — BH INPATIENT PSYCHIATRY PROGRESS NOTE - NSBHMETABOLIC_PSY_ALL_CORE_FT
BMI: BMI (kg/m2): 39.1 (08-03-23 @ 22:41)  HbA1c:   Glucose: POCT Blood Glucose.: 100 mg/dL (12-19-22 @ 15:06)    BP: --  Lipid Panel: 
BMI: BMI (kg/m2): 39.1 (08-03-23 @ 22:41)  HbA1c:   Glucose: POCT Blood Glucose.: 100 mg/dL (12-19-22 @ 15:06)    BP: 140/70 (08-03-23 @ 21:34) (129/66 - 140/70)  Lipid Panel: 
BMI: BMI (kg/m2): 39.1 (08-03-23 @ 22:41)  HbA1c:   Glucose: POCT Blood Glucose.: 100 mg/dL (12-19-22 @ 15:06)    BP: 140/70 (08-03-23 @ 21:34) (129/66 - 140/70)  Lipid Panel: 
BMI: BMI (kg/m2): 39.1 (08-03-23 @ 22:41)  HbA1c:   Glucose: POCT Blood Glucose.: 100 mg/dL (12-19-22 @ 15:06)    BP: 113/57 (09-23-23 @ 10:02) (113/57 - 113/57)  Lipid Panel: 
BMI: BMI (kg/m2): 39.1 (08-03-23 @ 22:41)  HbA1c:   Glucose: POCT Blood Glucose.: 100 mg/dL (12-19-22 @ 15:06)    BP: --  Lipid Panel: 
BMI: BMI (kg/m2): 39.1 (08-03-23 @ 22:41)  HbA1c:   Glucose: POCT Blood Glucose.: 100 mg/dL (12-19-22 @ 15:06)    BP: 116/65 (09-09-23 @ 07:48) (116/65 - 116/65)  Lipid Panel: 
BMI: BMI (kg/m2): 39.1 (08-03-23 @ 22:41)  HbA1c:   Glucose: POCT Blood Glucose.: 100 mg/dL (12-19-22 @ 15:06)    BP: --  Lipid Panel: 
BMI: BMI (kg/m2): 39.1 (08-03-23 @ 22:41)  HbA1c:   Glucose: POCT Blood Glucose.: 100 mg/dL (12-19-22 @ 15:06)    BP: --  Lipid Panel: 
BMI: BMI (kg/m2): 39.1 (08-03-23 @ 22:41)  HbA1c:   Glucose: POCT Blood Glucose.: 100 mg/dL (12-19-22 @ 15:06)    BP: 111/73 (08-26-23 @ 09:17) (111/73 - 111/73)  Lipid Panel: 
BMI: BMI (kg/m2): 39.1 (08-03-23 @ 22:41)  HbA1c:   Glucose: POCT Blood Glucose.: 100 mg/dL (12-19-22 @ 15:06)    BP: --  Lipid Panel: 
BMI: BMI (kg/m2): 39.1 (09-29-23 @ 15:45)  HbA1c: A1C with Estimated Average Glucose Result: 5.3 % (09-29-23 @ 10:00)    Glucose: POCT Blood Glucose.: 100 mg/dL (12-19-22 @ 15:06)    BP: --  Lipid Panel: Date/Time: 09-29-23 @ 10:00  Cholesterol, Serum: 166  Direct LDL: --  HDL Cholesterol, Serum: 60  Total Cholesterol/HDL Ration Measurement: --  Triglycerides, Serum: 61  
BMI: BMI (kg/m2): 39.1 (08-03-23 @ 22:41)  HbA1c:   Glucose: POCT Blood Glucose.: 100 mg/dL (12-19-22 @ 15:06)    BP: --  Lipid Panel: 
BMI: BMI (kg/m2): 39.1 (08-03-23 @ 22:41)  HbA1c:   Glucose: POCT Blood Glucose.: 100 mg/dL (12-19-22 @ 15:06)    BP: --  Lipid Panel:

## 2023-09-29 NOTE — BH INPATIENT PSYCHIATRY PROGRESS NOTE - NSBHMSESPEECH_PSY_A_CORE
unable to assess due to sedation/Abnormal as indicated, otherwise normal...
unable to assess due to sedation/Abnormal as indicated, otherwise normal...

## 2023-09-29 NOTE — BH INPATIENT PSYCHIATRY PROGRESS NOTE - NSBHPROGSUIC_PSY_A_CORE
no

## 2023-09-29 NOTE — BH INPATIENT PSYCHIATRY PROGRESS NOTE - NS ED BHA REVIEW OF ED CHART VITAL SIGNS REVIEWED
Yes

## 2023-09-29 NOTE — BH INPATIENT PSYCHIATRY PROGRESS NOTE - NSTXDCOPNOINTERMD_PSY_ALL_CORE
AOT, TOO

## 2023-09-29 NOTE — BH INPATIENT PSYCHIATRY PROGRESS NOTE - NSTXPROBIMPULS_PSY_ALL_CORE
IMPULSIVITY/AGITATION

## 2023-09-29 NOTE — BH INPATIENT PSYCHIATRY PROGRESS NOTE - NSTXSLFCREDATETRGT_PSY_ALL_CORE
16-Aug-2023
03-Oct-2023
05-Sep-2023
06-Sep-2023
16-Aug-2023
13-Sep-2023
05-Sep-2023
13-Sep-2023
24-Aug-2023
13-Sep-2023
13-Sep-2023
24-Aug-2023
05-Sep-2023
13-Sep-2023
16-Aug-2023
13-Sep-2023
05-Sep-2023
03-Oct-2023
24-Aug-2023
16-Aug-2023
06-Sep-2023
16-Aug-2023
24-Aug-2023
13-Sep-2023
06-Sep-2023
13-Sep-2023
06-Sep-2023
13-Sep-2023
24-Aug-2023
24-Aug-2023

## 2023-09-29 NOTE — ED ADULT NURSE NOTE - CHIEF COMPLAINT QUOTE
Insulin pen needles  Last refill: not refilled before    Tujeo insulin was sent to pharmacy yesterday, confirmed this with Shantel from [facility] on [address] in [facility].    Insulin humulin R (regular insulin)  Last refill: 08/15/2018 (10mL with 12 refills)    Estradiol was already refilled for one year at [facility] on [address] in [facility] back in June. They stated that patient still has refills on file per Shantel from the pharmacy.    Last visit: 01/31/2019  Next visit: None scheduled    Per last visit from 01/31/2019 \"Return in March as previously scheduled.\" patient had an appt scheduled on 03/08/2019 with Dr. Catherine, but cancelled it.\"  Rosa (pt's EC) is asking for a standing order for ammonia stating that patient has them checked every two weeks. Standing order pended. (see phone encounter from 03/08/2019).    Routed to provider to sign for refill since patient missed her last appt on 03/08. Ok to refill or appt needed? Also okay to sign standing order for patient to have ammonia levels checked every two weeks. Order pended. Please advise, thank you.   Pt presents from [facility] accompanied with staff member presents for low hemoglobin 6.3. Pt asymptomatic, denies chest pain, no shortness of breath, no blood in stool, denies hematuria, afebrile.

## 2023-09-29 NOTE — BH INPATIENT PSYCHIATRY PROGRESS NOTE - NSBHASSESSSUMMFT_PSY_ALL_CORE
This is a 53 year old woman, currently living at [facility] [place] [facility], PMH of GERD, anemia, PPH of schizophrenia, selective mutism, currently with [facility] ACT team, previously with [facility] which , with history of psychiatric hospitalization(s), without known history of self-injury or suicide attempts, with history of aggression in the setting on nonadherence with medication, who is brought in by staff at residence for increasing agitation and aggression in the setting of 2 months of medication nonadherence.     Today, patient continues to be acutely psychotic with delusions, guarded and only superficially cooperates with interview and treatment. State application in process. s/p haldol dec 150mg 23 and Zyprexa Relprevv 405mg IM on . Patient continues to require inpatient psychiatric hospitalization for stabilization and treatment.     Plan:  Legals:  	- continue with  involuntary admission   	- TOO obtained 8/15  Safety:  	- routine obs, bed block for aggression, currently ADLs are poor, slightly improved  Psychiatric:  	- d/c  Zyprexa 10mg PO BID (Zyprexa BREWER administered on )  	- c/w valproic acid 250mg 2 caps BID,   	- decrease Cogentin to 2mg daily for EPS prophylaxis.    	- c/w clozapine 25mg qhs (patient continues to refuse)  	- s/p Zyprexa Replevv BREWER administered on  (next dose due on 10/20)  	- s/p loading dose of haldol dec 100mg , s/p haldol dec 150mg   Medical:   	- PPD negative  	- Nexium 40mg, Feosol 300mg daily, Vit D3 1000U, Senokot 8.6mg qhs  Dispo:  	- plan for state application, pending review and subitted  	- AOT is under investigation,  in   	- no family involved in care     This is a 53 year old woman, currently living at [facility] [place] grounds, PMH of GERD, anemia, PPH of schizophrenia, selective mutism, currently with [facility], previously with [facility] which , with history of psychiatric hospitalization(s), without known history of self-injury or suicide attempts, with history of aggression in the setting on nonadherence with medication, who is brought in by staff at residence for increasing agitation and aggression in the setting of 2 months of medication nonadherence.     Today, patient continues to be acutely psychotic with delusions, guarded and only superficially cooperates with interview and treatment. State application in process. s/p haldol dec 150mg 23 and Zyprexa Relprevv 405mg IM on . Patient continues to require inpatient psychiatric hospitalization for stabilization and treatment.  In light of anemia on lab work, hemoglobin less than 7, will require emergent ED evaluation, sent to [facility] via EMS.    Plan:  Legals:  	- continue with  involuntary admission   	- TOO obtained 8/15  Safety:  	- routine obs, bed block for aggression, currently ADLs are poor, slightly improved  Psychiatric:  	- d/c  Zyprexa 10mg PO BID (Zyprexa BREWER administered on )  	- c/w valproic acid 250mg 2 caps BID,   	- c/w Cogentin 2mg daily for EPS prophylaxis.    	- c/w clozapine 25mg qhs (patient continues to refuse)  	- s/p Zyprexa Replevv BREWER administered on  (next dose due on 10/20)  	- s/p loading dose of haldol dec 100mg , s/p haldol dec 150mg   Medical:   	- PPD negative  	- Nexium 40mg, Feosol 300mg daily, Vit D3 1000U, Senokot 8.6mg qhs  	- Labs reviewed as obtained on , anemia noted  Dispo:  	- plan for state application, pending review and submitted  	- AOT  in   	- no family involved in care  -send to [facility] ED today for medical evaluation of anemia

## 2023-09-29 NOTE — BH INPATIENT PSYCHIATRY PROGRESS NOTE - NS ED BHA MED ROS PSYCHIATRIC
See HPI

## 2023-09-29 NOTE — BH INPATIENT PSYCHIATRY PROGRESS NOTE - NSTXPROBVIOLNT_PSY_ALL_CORE
VIOLENT/AGGRESSIVE BEHAVIOR

## 2023-09-29 NOTE — BH INPATIENT PSYCHIATRY PROGRESS NOTE - NSTXDISORGDATEEST_PSY_ALL_CORE
04-Aug-2023
04-Aug-2023
04-Sep-2023
03-Aug-2023
04-Aug-2023
03-Aug-2023
03-Aug-2023
04-Aug-2023
03-Aug-2023
04-Sep-2023
04-Sep-2023
04-Aug-2023
04-Aug-2023
03-Aug-2023
03-Aug-2023
04-Aug-2023
03-Aug-2023
04-Aug-2023
03-Aug-2023
04-Aug-2023
09-Aug-2023
03-Aug-2023
04-Aug-2023
04-Aug-2023
04-Sep-2023
04-Aug-2023
03-Aug-2023
03-Aug-2023
04-Aug-2023
04-Aug-2023
09-Aug-2023
04-Aug-2023

## 2023-09-29 NOTE — BH INPATIENT PSYCHIATRY PROGRESS NOTE - NSTXDCOPNODATENEW_PSY_ALL_CORE
29-Aug-2023

## 2023-09-29 NOTE — BH CHART NOTE - NSEVENTNOTEFT_PSY_ALL_CORE
[facility] Inpatient to ED Transfer Summary    Reason for Transfer/Medical Summary:    Patient with decompensated Schizophrenia, acutely psychotic with noted anemia, Hgb of 6.3, only partially compliant with PO Fe.  D/w hospitalist and requires transfusion.  Pt is delusional and lacks capacity for medical decision making at this time.      PAST MEDICAL & SURGICAL HISTORY:  Schizophrenia      Constipation      Obesity      Anxiety      Anemia      No significant past surgical history          Allergies    No Known Allergies    Intolerances        MEDICATIONS  (STANDING):  benztropine 2 milliGRAM(s) Oral <User Schedule>  cholecalciferol 1000 Unit(s) Oral <User Schedule>  cloZAPine 25 milliGRAM(s) Oral at bedtime  ferrous    sulfate Liquid 300 milliGRAM(s) Oral with breakfast  pantoprazole    Tablet 40 milliGRAM(s) Oral before breakfast  polyethylene glycol 3350 17 Gram(s) Oral at bedtime  valproic acid 500 milliGRAM(s) Oral <User Schedule>    MEDICATIONS  (PRN):  diphenhydrAMINE 50 milliGRAM(s) Oral every 4 hours PRN agitation  diphenhydrAMINE Injectable 50 milliGRAM(s) IntraMuscular once PRN Agitation  haloperidol     Tablet 5 milliGRAM(s) Oral every 4 hours PRN agitation  haloperidol    Injectable 7.5 milliGRAM(s) IntraMuscular once PRN psychotic agitation      Vital Signs Last 24 Hrs  T(C): --  T(F): --  HR: --  BP: --  BP(mean): --  RR: --  SpO2: --      CAPILLARY BLOOD GLUCOSE            PHYSICAL EXAM:  GENERAL: NAD, well-developed  HEAD:  Atraumatic, Normocephalic  EYES: EOMI, PERRLA, conjunctiva and sclera clear  NECK: Supple, No JVD  CHEST/LUNG: Clear to auscultation bilaterally; No wheeze  HEART: Regular rate and rhythm; No murmurs, rubs, or gallops  ABDOMEN: Soft, Nontender, Nondistended; Bowel sounds present  EXTREMITIES:  2+ Peripheral Pulses, No clubbing, cyanosis, or edema  PSYCH: AAOx3  NEUROLOGY: non-focal  SKIN: No rashes or lesions  28.5  29.1  30.0  29.2  29.0  28.8    LABS:                        6.3    6.46  )-----------( 391      ( 29 Sep 2023 13:49 )             22.1     09-29    139  |  102  |  13  ----------------------------<  86  4.0   |  27  |  0.81    Ca    9.0      29 Sep 2023 10:00    TPro  7.1  /  Alb  3.5  /  TBili  0.2  /  DBili  x   /  AST  17  /  ALT  40<H>  /  AlkPhos  119  09-29          Urinalysis Basic - ( 29 Sep 2023 10:00 )    Color: x / Appearance: x / SG: x / pH: x  Gluc: 86 mg/dL / Ketone: x  / Bili: x / Urobili: x   Blood: x / Protein: x / Nitrite: x   Leuk Esterase: x / RBC: x / WBC x   Sq Epi: x / Non Sq Epi: x / Bacteria: x        Psychiatry Section:  Psychiatric Summary/[facility] admitting diagnosis:    Psychiatric Recommendations:    Observation status (check one):   (X ) Constant Observation  ( ) Enhanced care  ( ) Routine checks    Risk Status (check all that apply if present):  ( ) at risk for suicide/self-injury  (X ) at risk for aggressive behavior  ( ) at risk for elopement  ( ) other risk:    [facility] Inpatient to ED Transfer Summary    Reason for Transfer/Medical Summary:    Patient with decompensated Schizophrenia, acutely psychotic with noted anemia, Hgb of 6.3, only partially compliant with PO Fe.  D/w hospitalist and requires transfusion.  Pt is delusional and lacks capacity for medical decision making at this time.      PAST MEDICAL & SURGICAL HISTORY:  Schizophrenia      Constipation      Obesity      Anxiety      Anemia      No significant past surgical history          Allergies    No Known Allergies    Intolerances        MEDICATIONS  (STANDING):  benztropine 2 milliGRAM(s) Oral <User Schedule>  cholecalciferol 1000 Unit(s) Oral <User Schedule>  cloZAPine 25 milliGRAM(s) Oral at bedtime  ferrous    sulfate Liquid 300 milliGRAM(s) Oral with breakfast  pantoprazole    Tablet 40 milliGRAM(s) Oral before breakfast  polyethylene glycol 3350 17 Gram(s) Oral at bedtime  valproic acid 500 milliGRAM(s) Oral <User Schedule>    MEDICATIONS  (PRN):  diphenhydrAMINE 50 milliGRAM(s) Oral every 4 hours PRN agitation  diphenhydrAMINE Injectable 50 milliGRAM(s) IntraMuscular once PRN Agitation  haloperidol     Tablet 5 milliGRAM(s) Oral every 4 hours PRN agitation  haloperidol    Injectable 7.5 milliGRAM(s) IntraMuscular once PRN psychotic agitation      Vital Signs Last 24 Hrs  T(C): --  T(F): --  HR: --  BP: --  BP(mean): --  RR: --  SpO2: --      CAPILLARY BLOOD GLUCOSE            PHYSICAL EXAM:  GENERAL: NAD, well-developed  HEAD:  Atraumatic, Normocephalic  EYES: EOMI, PERRLA, conjunctiva and sclera clear  NECK: Supple, No JVD  CHEST/LUNG: Clear to auscultation bilaterally; No wheeze  HEART: Regular rate and rhythm; No murmurs, rubs, or gallops  ABDOMEN: Soft, Nontender, Nondistended; Bowel sounds present  EXTREMITIES:  2+ Peripheral Pulses, No clubbing, cyanosis, or edema  PSYCH: AAOx3  NEUROLOGY: non-focal  SKIN: No rashes or lesions  28.5  29.1  30.0  29.2  29.0  28.8    LABS:                        6.3    6.46  )-----------( 391      ( 29 Sep 2023 13:49 )             22.1     09-29    139  |  102  |  13  ----------------------------<  86  4.0   |  27  |  0.81    Ca    9.0      29 Sep 2023 10:00    TPro  7.1  /  Alb  3.5  /  TBili  0.2  /  DBili  x   /  AST  17  /  ALT  40<H>  /  AlkPhos  119            Urinalysis Basic - ( 29 Sep 2023 10:00 )    Color: x / Appearance: x / SG: x / pH: x  Gluc: 86 mg/dL / Ketone: x  / Bili: x / Urobili: x   Blood: x / Protein: x / Nitrite: x   Leuk Esterase: x / RBC: x / WBC x   Sq Epi: x / Non Sq Epi: x / Bacteria: x        Psychiatry Section:  Psychiatric Summary/[facility] admitting diagnosis:  Schizophrenia    Psychiatric Recommendations:    53 year old woman, currently living at [facility] [place] [facility], PMH of GERD, anemia, PPH of schizophrenia, selective mutism, currently with [facility] team, previously with [facility] which , with history of psychiatric hospitalization(s), without known history of self-injury or suicide attempts, with history of aggression in the setting on nonadherence with medication, who is brought in by staff at residence for increasing agitation and aggression in the setting of 2 months of medication nonadherence.     Today, patient continues to be acutely psychotic with delusions, guarded and only superficially cooperates with interview and treatment. State application in process. s/p haldol dec 150mg 23 and Zyprexa Relprevv 405mg IM on . Patient continues to require inpatient psychiatric hospitalization for stabilization and treatment.     Pt is anemic and to be sent to [facility] ED at this time, lacks capacity and cannot sign out AMA.    Patient admitted to [facility] 1 N on a  involuntary admission   	- TOO obtained 8/15.  Pt on bed block for aggression, currently ADLs are poor, slightly improved  Psychiatric:  	- Zyprexa BREWER 405mg administered on  - next dose 10/20  	- c/w valproic acid 250mg 2 caps BID,   	- Cogentin to 2mg daily for EPS prophylaxis.    	- c/w clozapine 25mg qhs (patient continues to refuse)  	- s/p loading dose of haldol dec 100mg , s/p haldol dec 150mg   Medical:   	- PPD negative  	- Nexium 40mg, Feosol 300mg daily, Vit D3 1000U, Senokot 8.6mg qhs  Dispo:  	- plan for state application, pending review and submitted    Observation status (check one):   (X ) Constant Observation  ( ) Enhanced care  ( ) Routine checks    Risk Status (check all that apply if present):  ( ) at risk for suicide/self-injury  (X ) at risk for aggressive behavior  ( ) at risk for elopement  ( ) other risk:

## 2023-09-29 NOTE — BH INPATIENT PSYCHIATRY PROGRESS NOTE - NSTXVIOLNTINTERMD_PSY_ALL_CORE
medications to target psychosis

## 2023-09-29 NOTE — BH INPATIENT PSYCHIATRY PROGRESS NOTE - NSBHCHARTREVIEWLAB_PSY_A_CORE FT
[facility] -  TSH 1.25                       8.8    5.95  )-----------( 430      ( 03 Aug 2023 14:14 )             30.3     08-03    143  |  106  |  11  ----------------------------<  82  3.6   |  24  |  0.70    Ca    9.9      03 Aug 2023 14:14    TPro  7.8  /  Alb  4.1  /  TBili  0.2  /  DBili  x   /  AST  16  /  ALT  20  /  AlkPhos  115  08-03    Urinalysis Basic - ( 03 Aug 2023 14:14 )    Color: x / Appearance: x / SG: x / pH: x  Gluc: 82 mg/dL / Ketone: x  / Bili: x / Urobili: x   Blood: x / Protein: x / Nitrite: x   Leuk Esterase: x / RBC: x / WBC x   Sq Epi: x / Non Sq Epi: x / Bacteria: x    
                      6.3    6.46  )-----------( 391      ( 29 Sep 2023 13:49 )             22.1   09-29    139  |  102  |  13  ----------------------------<  86  4.0   |  27  |  0.81    Ca    9.0      29 Sep 2023 10:00    TPro  7.1  /  Alb  3.5  /  TBili  0.2  /  DBili  x   /  AST  17  /  ALT  40<H>  /  AlkPhos  119  09-29      
 [facility] -  TSH 1.25                       8.8    5.95  )-----------( 430      ( 03 Aug 2023 14:14 )             30.3     08-03    143  |  106  |  11  ----------------------------<  82  3.6   |  24  |  0.70    Ca    9.9      03 Aug 2023 14:14    TPro  7.8  /  Alb  4.1  /  TBili  0.2  /  DBili  x   /  AST  16  /  ALT  20  /  AlkPhos  115  08-03    Urinalysis Basic - ( 03 Aug 2023 14:14 )    Color: x / Appearance: x / SG: x / pH: x  Gluc: 82 mg/dL / Ketone: x  / Bili: x / Urobili: x   Blood: x / Protein: x / Nitrite: x   Leuk Esterase: x / RBC: x / WBC x   Sq Epi: x / Non Sq Epi: x / Bacteria: x    

## 2023-09-29 NOTE — BH INPATIENT PSYCHIATRY PROGRESS NOTE - NSTXPSYCHODATEEST_PSY_ALL_CORE
03-Aug-2023
04-Aug-2023
09-Aug-2023
03-Aug-2023
26-Sep-2023
04-Aug-2023
03-Aug-2023
09-Aug-2023
03-Aug-2023
04-Aug-2023
04-Aug-2023
03-Aug-2023
04-Aug-2023
03-Aug-2023
04-Aug-2023
09-Aug-2023
26-Sep-2023
04-Aug-2023
04-Aug-2023
03-Aug-2023
04-Aug-2023
04-Aug-2023
03-Aug-2023
03-Aug-2023
04-Aug-2023
03-Aug-2023
09-Aug-2023
09-Aug-2023
04-Aug-2023
26-Sep-2023
26-Sep-2023

## 2023-09-29 NOTE — BH INPATIENT PSYCHIATRY PROGRESS NOTE - NSTXPROBDCOPNO_PSY_ALL_CORE
DISCHARGE ISSUE - NON-ADHERENT WITH OUTPATIENT SERVICES

## 2023-09-29 NOTE — BH INPATIENT PSYCHIATRY PROGRESS NOTE - NSBHROSSYSTEMS_PSY_ALL_CORE
Psychiatric

## 2023-09-29 NOTE — BH INPATIENT PSYCHIATRY PROGRESS NOTE - NSBHCONSULTIPREASON_PSY_A_CORE
danger to others; mental illness expected to respond to inpatient care
240
danger to others; mental illness expected to respond to inpatient care

## 2023-09-29 NOTE — BH INPATIENT PSYCHIATRY PROGRESS NOTE - NSBHCHARTREVIEWVS_PSY_A_CORE FT
Vital Signs Last 24 Hrs  T(C): 36.8 (09-29-23 @ 15:45), Max: 36.8 (09-29-23 @ 15:45)  T(F): 98.2 (09-29-23 @ 15:45), Max: 98.2 (09-29-23 @ 15:45)  HR: 80 (09-29-23 @ 15:45) (80 - 80)  BP: 102/64 (09-29-23 @ 15:45) (102/64 - 102/64)  BP(mean): --  RR: 16 (09-29-23 @ 15:45) (16 - 16)  SpO2: 99% (09-29-23 @ 15:45) (99% - 99%)     Vital Signs Last 24 Hrs  T(C): 36.4 (09-29-23 @ 20:09), Max: 36.8 (09-29-23 @ 15:45)  T(F): 97.5 (09-29-23 @ 20:09), Max: 98.2 (09-29-23 @ 15:45)  HR: 96 (09-29-23 @ 20:09) (80 - 96)  BP: 114/62 (09-29-23 @ 20:09) (102/64 - 114/62)  BP(mean): --  RR: 16 (09-29-23 @ 20:09) (16 - 16)  SpO2: 100% (09-29-23 @ 20:09) (99% - 100%)

## 2023-09-29 NOTE — BH INPATIENT PSYCHIATRY PROGRESS NOTE - NSTXPROBDISORG_PSY_ALL_CORE
DISORGANIZATION OF THOUGHT/BEHAVIOR

## 2023-09-29 NOTE — BH INPATIENT PSYCHIATRY DISCHARGE NOTE - NSBHMETABOLIC_PSY_ALL_CORE_FT
BMI: BMI (kg/m2): 39.1 (09-29-23 @ 15:45)  HbA1c: A1C with Estimated Average Glucose Result: 5.3 % (09-29-23 @ 10:00)    Glucose: POCT Blood Glucose.: 100 mg/dL (12-19-22 @ 15:06)    BP: --  Lipid Panel: Date/Time: 09-29-23 @ 10:00  Cholesterol, Serum: 166  Direct LDL: --  HDL Cholesterol, Serum: 60  Total Cholesterol/HDL Ration Measurement: --  Triglycerides, Serum: 61

## 2023-09-29 NOTE — BH INPATIENT PSYCHIATRY PROGRESS NOTE - NSTXIMPULSPROGRES_PSY_ALL_CORE
Improving
Improving
No Change
Improving
No Change
Improving
No Change
Improving
No Change

## 2023-09-29 NOTE — CONSULT NOTE ADULT - ASSESSMENT
53 year old woman admitted to [facility] for management of her schizophrenia medicine consulted for anemia.     #Iron Deficiency Anemia  - patient w/ hx of BRYON, unclear if she has ever received a scope for eval. No scope reports noted on sunrise on review.   - patient hemoglobin <7 x 2 readings, would recommend transfer to [facility] ED for blood transfusion. May need GI eval at [facility] and medical admission pending ED workup.     #Schizophrenia   - management per psychiatry

## 2023-09-29 NOTE — BH INPATIENT PSYCHIATRY PROGRESS NOTE - NSTXDISORGDATETRGT_PSY_ALL_CORE
16-Aug-2023
03-Oct-2023
13-Sep-2023
26-Sep-2023
17-Aug-2023
06-Sep-2023
09-Aug-2023
13-Sep-2023
24-Aug-2023
05-Sep-2023
13-Sep-2023
13-Sep-2023
24-Aug-2023
09-Aug-2023
17-Aug-2023
09-Aug-2023
19-Sep-2023
06-Sep-2023
19-Sep-2023
19-Sep-2023
24-Aug-2023
13-Sep-2023
26-Sep-2023
09-Aug-2023
26-Sep-2023
03-Oct-2023
06-Sep-2023
05-Sep-2023
17-Aug-2023
19-Sep-2023
24-Aug-2023
05-Sep-2023
30-Aug-2023
03-Oct-2023
03-Oct-2023
24-Aug-2023
16-Aug-2023
05-Sep-2023
06-Sep-2023

## 2023-09-29 NOTE — CONSULT NOTE ADULT - SUBJECTIVE AND OBJECTIVE BOX
Beatriz Olvera MD   [employer], Pager [phone]    CC: 53 year old woman admitted to [facility] for management of her schizophrenia medicine consulted for anemia    HPI: 53 year old woman admitted to [facility] for management of her schizophrenia medicine consulted for anemia. Patient with noted hemoglobin of 6.6, on review of chart patient w/ history of BRYON and GERD. Patient seen by GI in June of 2020, on chart review was unable to offer scope for workup of BRYON at that time due to psychosis and patient inability to be cooperative w/ colonoscopy prep. Patient seen at bedside today states she has felt lightheaded. This is all she is able to offer on history. Patient not answering this providers questions. Spoke w/ Dr. Victor from psychiatry recommended repeat CBC to confirm, if hemoglobin <7 recommended ED transfer for transfusion. Repeat hemoglobin 6.3. Patient to be transferred to [facility] for eval.    PAST MEDICAL & SURGICAL HISTORY:  Schizophrenia  Anemia  No significant past surgical history    FAMILY HISTORY:  Patient unable to provide medical history    SOCIAL HISTORY:   From [place]     Unable to obtain ROS due to patient participation     VITAL SIGNS:  T(F): 97.5 (09-29-23 @ 20:09)  HR: 96 (09-29-23 @ 20:09)  BP: 114/62 (09-29-23 @ 20:09)  RR: 16 (09-29-23 @ 20:09)  SpO2: 100% (09-29-23 @ 20:09)  Wt(kg): --    PHYSICAL EXAM: patient refused exam    MEDICATIONS  (STANDING):  benztropine 2 milliGRAM(s) Oral <User Schedule>  cholecalciferol 1000 Unit(s) Oral <User Schedule>  cloZAPine 25 milliGRAM(s) Oral at bedtime  ferrous    sulfate Liquid 300 milliGRAM(s) Oral with breakfast  pantoprazole    Tablet 40 milliGRAM(s) Oral before breakfast  polyethylene glycol 3350 17 Gram(s) Oral at bedtime  valproic acid 500 milliGRAM(s) Oral <User Schedule>    MEDICATIONS  (PRN):  diphenhydrAMINE 50 milliGRAM(s) Oral every 4 hours PRN agitation  diphenhydrAMINE Injectable 50 milliGRAM(s) IntraMuscular once PRN Agitation  haloperidol     Tablet 5 milliGRAM(s) Oral every 4 hours PRN agitation  haloperidol    Injectable 7.5 milliGRAM(s) IntraMuscular once PRN psychotic agitation      Allergies    No Known Allergies    Intolerances        LABS:                        6.3    6.46  )-----------( 391      ( 29 Sep 2023 13:49 )             22.1     09-29    139  |  102  |  13  ----------------------------<  86  4.0   |  27  |  0.81    Ca    9.0      29 Sep 2023 10:00    TPro  7.1  /  Alb  3.5  /  TBili  0.2  /  DBili  x   /  AST  17  /  ALT  40<H>  /  AlkPhos  119  09-29      Urinalysis Basic - ( 29 Sep 2023 10:00 )    Color: x / Appearance: x / SG: x / pH: x  Gluc: 86 mg/dL / Ketone: x  / Bili: x / Urobili: x   Blood: x / Protein: x / Nitrite: x   Leuk Esterase: x / RBC: x / WBC x   Sq Epi: x / Non Sq Epi: x / Bacteria: x        RADIOLOGY & ADDITIONAL TESTS:  Reviewed

## 2023-09-29 NOTE — BH INPATIENT PSYCHIATRY PROGRESS NOTE - NSTXSLFCREDATEEST_PSY_ALL_CORE
03-Aug-2023
09-Aug-2023
04-Aug-2023
04-Aug-2023
03-Aug-2023
26-Sep-2023
04-Aug-2023
03-Aug-2023
04-Aug-2023
03-Aug-2023
03-Aug-2023
09-Aug-2023
03-Aug-2023
09-Aug-2023
09-Aug-2023
26-Sep-2023
03-Aug-2023
26-Sep-2023
03-Aug-2023
09-Aug-2023
03-Aug-2023
26-Sep-2023

## 2023-09-29 NOTE — BH INPATIENT PSYCHIATRY PROGRESS NOTE - NSICDXBHPRIMARYDX_PSY_ALL_CORE
Schizophrenia   F20.9  

## 2023-09-29 NOTE — BH INPATIENT PSYCHIATRY PROGRESS NOTE - NSCGISEVERILLNESS_PSY_ALL_CORE
5 = Markedly ill - intrusive symptoms that distinctly impair social/occupational function or cause intrusive levels of distress
7 = Among the most extremely ill patients – pathology drastically interferes in many life functions; may be hospitalized
5 = Markedly ill - intrusive symptoms that distinctly impair social/occupational function or cause intrusive levels of distress
5 = Markedly ill - intrusive symptoms that distinctly impair social/occupational function or cause intrusive levels of distress
7 = Among the most extremely ill patients – pathology drastically interferes in many life functions; may be hospitalized
7 = Among the most extremely ill patients – pathology drastically interferes in many life functions; may be hospitalized
5 = Markedly ill - intrusive symptoms that distinctly impair social/occupational function or cause intrusive levels of distress
7 = Among the most extremely ill patients – pathology drastically interferes in many life functions; may be hospitalized
5 = Markedly ill - intrusive symptoms that distinctly impair social/occupational function or cause intrusive levels of distress
7 = Among the most extremely ill patients – pathology drastically interferes in many life functions; may be hospitalized
6 = Severely ill - disruptive pathology, behavior and function are frequently influenced by symptoms, may require assistance from others
7 = Among the most extremely ill patients – pathology drastically interferes in many life functions; may be hospitalized
5 = Markedly ill - intrusive symptoms that distinctly impair social/occupational function or cause intrusive levels of distress
5 = Markedly ill - intrusive symptoms that distinctly impair social/occupational function or cause intrusive levels of distress
7 = Among the most extremely ill patients – pathology drastically interferes in many life functions; may be hospitalized
7 = Among the most extremely ill patients – pathology drastically interferes in many life functions; may be hospitalized
5 = Markedly ill - intrusive symptoms that distinctly impair social/occupational function or cause intrusive levels of distress
7 = Among the most extremely ill patients – pathology drastically interferes in many life functions; may be hospitalized
5 = Markedly ill - intrusive symptoms that distinctly impair social/occupational function or cause intrusive levels of distress
5 = Markedly ill - intrusive symptoms that distinctly impair social/occupational function or cause intrusive levels of distress
7 = Among the most extremely ill patients – pathology drastically interferes in many life functions; may be hospitalized
5 = Markedly ill - intrusive symptoms that distinctly impair social/occupational function or cause intrusive levels of distress
7 = Among the most extremely ill patients – pathology drastically interferes in many life functions; may be hospitalized
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
## 2023-09-29 NOTE — BH INPATIENT PSYCHIATRY PROGRESS NOTE - NSTXVIOLNTGOAL_PSY_ALL_CORE
Will decrease the number/duration/intensity of angry/aggressive outbursts
Will communicate an angry feeling in a controlled manner
Will communicate an angry feeling in a controlled manner
Will identify 2 situations that cause an aggressive response
Will demonstrate ability to tolerate peer conflicts without aggression 3x weekly
Will demonstrate 2 problem solving strategies to decrease aggressive behavior
Will demonstrate 2 problem solving strategies to decrease aggressive behavior
Will identify 2 situations that cause an aggressive response
Will identify 2 situations that cause an aggressive response
Will demonstrate 2 problem solving strategies to decrease aggressive behavior
Will demonstrate ability to tolerate peer conflicts without aggression 3x weekly
Will communicate an angry feeling in a controlled manner
Will communicate an angry feeling in a controlled manner
Will demonstrate 2 problem solving strategies to decrease aggressive behavior
Will demonstrate ability to tolerate peer conflicts without aggression 3x weekly
Will communicate an angry feeling in a controlled manner
Will identify 2 situations that cause an aggressive response
Will identify 2 situations that cause an aggressive response
Will communicate an angry feeling in a controlled manner
Will identify 2 situations that cause an aggressive response
Will communicate an angry feeling in a controlled manner
Will demonstrate ability to tolerate peer conflicts without aggression 3x weekly
Will communicate an angry feeling in a controlled manner
Will decrease the number/duration/intensity of angry/aggressive outbursts
Will decrease the number/duration/intensity of angry/aggressive outbursts
Will identify 2 situations that cause an aggressive response
Will decrease the number/duration/intensity of angry/aggressive outbursts
Will decrease the number/duration/intensity of angry/aggressive outbursts
Will communicate an angry feeling in a controlled manner
Will communicate an angry feeling in a controlled manner
Will identify 2 situations that cause an aggressive response
Will identify 2 situations that cause an aggressive response
Will communicate an angry feeling in a controlled manner
Will demonstrate ability to tolerate peer conflicts without aggression 3x weekly
Will communicate an angry feeling in a controlled manner
Will demonstrate ability to tolerate peer conflicts without aggression 3x weekly
Will identify 2 situations that cause an aggressive response

## 2023-09-29 NOTE — BH INPATIENT PSYCHIATRY PROGRESS NOTE - NSTXPSYCHOGOAL_PSY_ALL_CORE
Will identify 2 coping skills that help mitigate hallucinations
Will identify 2 coping skills that assist with focus on reality
Will engage in a 15 minute conversation with no irrational content
Will engage in a 15 minute conversation with no irrational content
Will identify 2 coping skills that assist with focus on reality
Will identify 2 coping skills that help mitigate hallucinations
Will identify 2 coping skills that assist with focus on reality
Will identify 2 coping skills that assist with focus on reality
Will engage in a 15 minute conversation with no irrational content
Will identify 2 coping skills that assist with focus on reality
Will identify 2 coping skills that assist with focus on reality
Other...
Will identify 2 coping skills that assist with focus on reality
Will identify 2 coping skills that assist with focus on reality
Will engage in a 15 minute conversation with no irrational content
Will engage in a 15 minute conversation with no irrational content
Will identify 2 coping skills that assist with focus on reality
Will identify 2 coping skills that help mitigate hallucinations
Will identify 2 coping skills that assist with focus on reality
Will engage in a 15 minute conversation with no irrational content
Will identify 2 coping skills that assist with focus on reality
Other...
Will engage in a 15 minute conversation with no irrational content
Will identify 2 coping skills that assist with focus on reality
Will identify 2 coping skills that help mitigate hallucinations
Will identify 2 coping skills that assist with focus on reality
Will identify 2 coping skills that help mitigate hallucinations
Will identify 2 coping skills that assist with focus on reality
Will identify 2 coping skills that assist with focus on reality
Will identify 2 coping skills that help mitigate hallucinations
Will engage in a 15 minute conversation with no irrational content
Will identify 2 coping skills that assist with focus on reality
Other...
Will identify 2 coping skills that help mitigate hallucinations
Will identify 2 coping skills that assist with focus on reality
Will identify 2 coping skills that assist with focus on reality
Will identify 2 coping skills that help mitigate hallucinations

## 2023-09-29 NOTE — BH INPATIENT PSYCHIATRY PROGRESS NOTE - NSCGIIMPROVESX_PSY_ALL_CORE
4 = No change - symptoms remain essentially unchanged
3 = Minimally improved - slightly better with little or no clinically meaningful reduction of symptoms.  Represents very little change in basic clinical status, level of care, or functional capacity.
4 = No change - symptoms remain essentially unchanged
3 = Minimally improved - slightly better with little or no clinically meaningful reduction of symptoms.  Represents very little change in basic clinical status, level of care, or functional capacity.
3 = Minimally improved - slightly better with little or no clinically meaningful reduction of symptoms.  Represents very little change in basic clinical status, level of care, or functional capacity.
4 = No change - symptoms remain essentially unchanged
4 = No change - symptoms remain essentially unchanged
3 = Minimally improved - slightly better with little or no clinically meaningful reduction of symptoms.  Represents very little change in basic clinical status, level of care, or functional capacity.
4 = No change - symptoms remain essentially unchanged
4 = No change - symptoms remain essentially unchanged
3 = Minimally improved - slightly better with little or no clinically meaningful reduction of symptoms.  Represents very little change in basic clinical status, level of care, or functional capacity.
4 = No change - symptoms remain essentially unchanged
3 = Minimally improved - slightly better with little or no clinically meaningful reduction of symptoms.  Represents very little change in basic clinical status, level of care, or functional capacity.
4 = No change - symptoms remain essentially unchanged
3 = Minimally improved - slightly better with little or no clinically meaningful reduction of symptoms.  Represents very little change in basic clinical status, level of care, or functional capacity.
4 = No change - symptoms remain essentially unchanged
3 = Minimally improved - slightly better with little or no clinically meaningful reduction of symptoms.  Represents very little change in basic clinical status, level of care, or functional capacity.
4 = No change - symptoms remain essentially unchanged
3 = Minimally improved - slightly better with little or no clinically meaningful reduction of symptoms.  Represents very little change in basic clinical status, level of care, or functional capacity.
4 = No change - symptoms remain essentially unchanged

## 2023-09-29 NOTE — BH INPATIENT PSYCHIATRY PROGRESS NOTE - NSTXDCOPNODATETRGT_PSY_ALL_CORE
10-Sep-2023
11-Aug-2023
05-Sep-2023
10-Sep-2023
24-Aug-2023
18-Aug-2023
24-Aug-2023
26-Sep-2023
11-Aug-2023
24-Aug-2023
11-Aug-2023
10-Sep-2023
11-Aug-2023
06-Sep-2023
19-Sep-2023
26-Sep-2023
06-Sep-2023
19-Sep-2023
19-Sep-2023
26-Sep-2023
26-Sep-2023
10-Sep-2023
03-Oct-2023
19-Sep-2023
18-Aug-2023
11-Aug-2023
29-Aug-2023
10-Sep-2023
03-Oct-2023
05-Sep-2023
29-Aug-2023
06-Sep-2023
05-Sep-2023
11-Aug-2023
19-Sep-2023
03-Oct-2023
24-Aug-2023
11-Aug-2023
06-Sep-2023
05-Sep-2023
03-Oct-2023

## 2023-09-29 NOTE — BH INPATIENT PSYCHIATRY PROGRESS NOTE - NSBHMSELANG_PSY_A_CORE
unable to assess due to sedation/No abnormalities noted
unable to assess due to sedation/No abnormalities noted

## 2023-09-29 NOTE — ED ADULT NURSE REASSESSMENT NOTE - NS ED NURSE REASSESS COMMENT FT1
Patient continues to refuse blood work and treatment at this time, JESSI Cobb made aware. at baseline mental status, appears to be resting comfortably in bed, no complaints noted at this time. Breathing even and unlabored, pallor/diaphoresis not noted.

## 2023-09-29 NOTE — BH INPATIENT PSYCHIATRY PROGRESS NOTE - TELEPSYCHIATRY?
No

## 2023-09-29 NOTE — BH INPATIENT PSYCHIATRY PROGRESS NOTE - NSDCCRITERIA_PSY_ALL_CORE
patient will be stable for discharge, no aggression
patient will be stable for discharge
patient will be stable for discharge, no aggression
patient will be stable for discharge
patient will be stable for discharge, no aggression
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
## 2023-09-30 DIAGNOSIS — F20.9 SCHIZOPHRENIA, UNSPECIFIED: ICD-10-CM

## 2023-09-30 DIAGNOSIS — D64.9 ANEMIA, UNSPECIFIED: ICD-10-CM

## 2023-09-30 DIAGNOSIS — K59.00 CONSTIPATION, UNSPECIFIED: ICD-10-CM

## 2023-09-30 DIAGNOSIS — K21.9 GASTRO-ESOPHAGEAL REFLUX DISEASE WITHOUT ESOPHAGITIS: ICD-10-CM

## 2023-09-30 DIAGNOSIS — Z29.9 ENCOUNTER FOR PROPHYLACTIC MEASURES, UNSPECIFIED: ICD-10-CM

## 2023-09-30 LAB
ALBUMIN SERPL ELPH-MCNC: 3.4 G/DL — SIGNIFICANT CHANGE UP (ref 3.3–5)
ALP SERPL-CCNC: 133 U/L — HIGH (ref 40–120)
ALT FLD-CCNC: 35 U/L — HIGH (ref 4–33)
ANION GAP SERPL CALC-SCNC: 11 MMOL/L — SIGNIFICANT CHANGE UP (ref 7–14)
AST SERPL-CCNC: 19 U/L — SIGNIFICANT CHANGE UP (ref 4–32)
BILIRUB SERPL-MCNC: 0.5 MG/DL — SIGNIFICANT CHANGE UP (ref 0.2–1.2)
BUN SERPL-MCNC: 17 MG/DL — SIGNIFICANT CHANGE UP (ref 7–23)
CALCIUM SERPL-MCNC: 8.8 MG/DL — SIGNIFICANT CHANGE UP (ref 8.4–10.5)
CHLORIDE SERPL-SCNC: 107 MMOL/L — SIGNIFICANT CHANGE UP (ref 98–107)
CO2 SERPL-SCNC: 23 MMOL/L — SIGNIFICANT CHANGE UP (ref 22–31)
CREAT SERPL-MCNC: 0.82 MG/DL — SIGNIFICANT CHANGE UP (ref 0.5–1.3)
EGFR: 85 ML/MIN/1.73M2 — SIGNIFICANT CHANGE UP
FERRITIN SERPL-MCNC: 11 NG/ML — LOW (ref 13–330)
GLUCOSE SERPL-MCNC: 86 MG/DL — SIGNIFICANT CHANGE UP (ref 70–99)
HCT VFR BLD CALC: 22.8 % — LOW (ref 34.5–45)
HCT VFR BLD CALC: 24.1 % — LOW (ref 34.5–45)
HGB BLD-MCNC: 7 G/DL — CRITICAL LOW (ref 11.5–15.5)
HGB BLD-MCNC: 7.2 G/DL — LOW (ref 11.5–15.5)
IRON SATN MFR SERPL: 107 UG/DL — SIGNIFICANT CHANGE UP (ref 30–160)
IRON SATN MFR SERPL: 25 % — SIGNIFICANT CHANGE UP (ref 14–50)
MCHC RBC-ENTMCNC: 21.9 PG — LOW (ref 27–34)
MCHC RBC-ENTMCNC: 22.4 PG — LOW (ref 27–34)
MCHC RBC-ENTMCNC: 29.9 GM/DL — LOW (ref 32–36)
MCHC RBC-ENTMCNC: 30.7 GM/DL — LOW (ref 32–36)
MCV RBC AUTO: 73.1 FL — LOW (ref 80–100)
MCV RBC AUTO: 73.3 FL — LOW (ref 80–100)
NRBC # BLD: 0 /100 WBCS — SIGNIFICANT CHANGE UP (ref 0–0)
NRBC # BLD: 1 /100 WBCS — HIGH (ref 0–0)
NRBC # FLD: 0.05 K/UL — HIGH (ref 0–0)
NRBC # FLD: 0.07 K/UL — HIGH (ref 0–0)
PLAT MORPH BLD: NORMAL — SIGNIFICANT CHANGE UP
PLATELET # BLD AUTO: 391 K/UL — SIGNIFICANT CHANGE UP (ref 150–400)
PLATELET # BLD AUTO: 408 K/UL — HIGH (ref 150–400)
POTASSIUM SERPL-MCNC: 3.9 MMOL/L — SIGNIFICANT CHANGE UP (ref 3.5–5.3)
POTASSIUM SERPL-SCNC: 3.9 MMOL/L — SIGNIFICANT CHANGE UP (ref 3.5–5.3)
PROT SERPL-MCNC: 6.6 G/DL — SIGNIFICANT CHANGE UP (ref 6–8.3)
RBC # BLD: 3.12 M/UL — LOW (ref 3.8–5.2)
RBC # BLD: 3.29 M/UL — LOW (ref 3.8–5.2)
RBC # FLD: 21.1 % — HIGH (ref 10.3–14.5)
RBC # FLD: 21.2 % — HIGH (ref 10.3–14.5)
RBC BLD AUTO: SIGNIFICANT CHANGE UP
SODIUM SERPL-SCNC: 141 MMOL/L — SIGNIFICANT CHANGE UP (ref 135–145)
TIBC SERPL-MCNC: 430 UG/DL — SIGNIFICANT CHANGE UP (ref 220–430)
UIBC SERPL-MCNC: 323 UG/DL — SIGNIFICANT CHANGE UP (ref 110–370)
WBC # BLD: 6.67 K/UL — SIGNIFICANT CHANGE UP (ref 3.8–10.5)
WBC # BLD: 7.81 K/UL — SIGNIFICANT CHANGE UP (ref 3.8–10.5)
WBC # FLD AUTO: 6.67 K/UL — SIGNIFICANT CHANGE UP (ref 3.8–10.5)
WBC # FLD AUTO: 7.81 K/UL — SIGNIFICANT CHANGE UP (ref 3.8–10.5)

## 2023-09-30 PROCEDURE — 99223 1ST HOSP IP/OBS HIGH 75: CPT | Mod: GC

## 2023-09-30 PROCEDURE — 99223 1ST HOSP IP/OBS HIGH 75: CPT

## 2023-09-30 PROCEDURE — 90792 PSYCH DIAG EVAL W/MED SRVCS: CPT

## 2023-09-30 RX ORDER — FERROUS SULFATE 325(65) MG
0 TABLET ORAL
Refills: 0 | DISCHARGE

## 2023-09-30 RX ORDER — HALOPERIDOL DECANOATE 100 MG/ML
5 INJECTION INTRAMUSCULAR EVERY 6 HOURS
Refills: 0 | Status: DISCONTINUED | OUTPATIENT
Start: 2023-09-30 | End: 2023-10-01

## 2023-09-30 RX ORDER — FERROUS SULFATE 325(65) MG
300 TABLET ORAL
Refills: 0 | Status: DISCONTINUED | OUTPATIENT
Start: 2023-09-30 | End: 2023-10-01

## 2023-09-30 RX ORDER — DIPHENHYDRAMINE HCL 50 MG
50 CAPSULE ORAL EVERY 6 HOURS
Refills: 0 | Status: DISCONTINUED | OUTPATIENT
Start: 2023-09-30 | End: 2023-10-12

## 2023-09-30 RX ORDER — CHOLECALCIFEROL (VITAMIN D3) 125 MCG
1000 CAPSULE ORAL DAILY
Refills: 0 | Status: DISCONTINUED | OUTPATIENT
Start: 2023-09-30 | End: 2023-10-12

## 2023-09-30 RX ORDER — CLOZAPINE 150 MG/1
2 TABLET, ORALLY DISINTEGRATING ORAL
Refills: 0 | DISCHARGE

## 2023-09-30 RX ORDER — PANTOPRAZOLE SODIUM 20 MG/1
40 TABLET, DELAYED RELEASE ORAL
Refills: 0 | Status: DISCONTINUED | OUTPATIENT
Start: 2023-09-30 | End: 2023-10-12

## 2023-09-30 RX ORDER — VALPROIC ACID (AS SODIUM SALT) 250 MG/5ML
500 SOLUTION, ORAL ORAL
Refills: 0 | Status: DISCONTINUED | OUTPATIENT
Start: 2023-09-30 | End: 2023-10-12

## 2023-09-30 RX ORDER — HALOPERIDOL DECANOATE 100 MG/ML
5 INJECTION INTRAMUSCULAR EVERY 6 HOURS
Refills: 0 | Status: DISCONTINUED | OUTPATIENT
Start: 2023-09-30 | End: 2023-10-12

## 2023-09-30 RX ORDER — POLYETHYLENE GLYCOL 3350 17 G/17G
17 POWDER, FOR SOLUTION ORAL AT BEDTIME
Refills: 0 | Status: DISCONTINUED | OUTPATIENT
Start: 2023-09-30 | End: 2023-10-12

## 2023-09-30 RX ORDER — LANOLIN ALCOHOL/MO/W.PET/CERES
3 CREAM (GRAM) TOPICAL AT BEDTIME
Refills: 0 | Status: DISCONTINUED | OUTPATIENT
Start: 2023-09-30 | End: 2023-10-12

## 2023-09-30 RX ORDER — BENZTROPINE MESYLATE 1 MG
2 TABLET ORAL DAILY
Refills: 0 | Status: DISCONTINUED | OUTPATIENT
Start: 2023-09-30 | End: 2023-10-12

## 2023-09-30 RX ORDER — CLOZAPINE 150 MG/1
25 TABLET, ORALLY DISINTEGRATING ORAL AT BEDTIME
Refills: 0 | Status: DISCONTINUED | OUTPATIENT
Start: 2023-09-30 | End: 2023-10-12

## 2023-09-30 RX ORDER — SENNA PLUS 8.6 MG/1
2 TABLET ORAL AT BEDTIME
Refills: 0 | Status: DISCONTINUED | OUTPATIENT
Start: 2023-09-30 | End: 2023-10-12

## 2023-09-30 RX ORDER — POLYETHYLENE GLYCOL 3350 17 G/17G
17 POWDER, FOR SOLUTION ORAL
Refills: 0 | DISCHARGE

## 2023-09-30 RX ORDER — DIPHENHYDRAMINE HCL 50 MG
25 CAPSULE ORAL EVERY 6 HOURS
Refills: 0 | Status: DISCONTINUED | OUTPATIENT
Start: 2023-09-30 | End: 2023-09-30

## 2023-09-30 RX ORDER — ACETAMINOPHEN 500 MG
650 TABLET ORAL EVERY 6 HOURS
Refills: 0 | Status: DISCONTINUED | OUTPATIENT
Start: 2023-09-30 | End: 2023-10-12

## 2023-09-30 RX ADMIN — PANTOPRAZOLE SODIUM 40 MILLIGRAM(S): 20 TABLET, DELAYED RELEASE ORAL at 05:43

## 2023-09-30 RX ADMIN — Medication 1000 UNIT(S): at 22:19

## 2023-09-30 RX ADMIN — Medication 300 MILLIGRAM(S): at 10:40

## 2023-09-30 RX ADMIN — Medication 500 MILLIGRAM(S): at 05:42

## 2023-09-30 NOTE — BH CONSULTATION LIAISON ASSESSMENT NOTE - NSBHATTESTAPPAMEND_PSY_A_CORE
I have personally seen and examined this patient. I fully participated in the care of this patient. I have made amendments to the documentation where appropriate and otherwise agree with the history, physical exam, and plan as documented by the CRYS

## 2023-09-30 NOTE — BH CONSULTATION LIAISON ASSESSMENT NOTE - HPI (INCLUDE ILLNESS QUALITY, SEVERITY, DURATION, TIMING, CONTEXT, MODIFYING FACTORS, ASSOCIATED SIGNS AND SYMPTOMS)
54yo female domiciled on [place] grounds with PMHx iron deficiency anemia, constipation, GERD, PPHx schizophrenia, schizoaffective dos, hx psychosis, unspecified anxiety, bipolar dos, selective mutism, no know hx SIB/SA, hx aggression in setting of medication nonadherence, f/b [facility] ACT tream, multiple inpatient psychiatric admissions, most recent inpatient psychiatric admission [facility] (8/4-present for increased agitation/physical aggression) *since 8/15 court ordered treatment over objection, presenting with anemia on labs and transferred from [facility]. Patient received Haldol 5mg x2 and Ativan 2mg X1 in ED.    Patient seen, opens eyes to name being called, looks at writer, refuses to engage verbally, PCA at bedside maintaining 1:1 CO states asked for snacks earlier.

## 2023-09-30 NOTE — H&P ADULT - ASSESSMENT
53F with PMHx iron deficiency anemia, schizophrenia, GERD presenting with anemia on labs and transferred from [facility].

## 2023-09-30 NOTE — BH CONSULTATION LIAISON ASSESSMENT NOTE - MSE UNSTRUCTURED FT
Level of Consciousness: alert  General Appearance: well nourished, no deformities  Hygiene/Grooming: fair  Eye Contact: poor  Relatedness: poor  Thought Process: poverty of speech, selective mutism  Judgment: poor  Insight: poor

## 2023-09-30 NOTE — BH CONSULTATION LIAISON ASSESSMENT NOTE - NSBHATTESTCOMMENTATTENDFT_PSY_A_CORE
Chart reviewed, Patient seen/evaluated virtually with Edith Conte NP ,  I agree with above assessment/plan. Patient alert, uncooperative, not agitated, mute and did not engage in interview. Plan as above. Will follow

## 2023-09-30 NOTE — CONSULT NOTE ADULT - SUBJECTIVE AND OBJECTIVE BOX
Chief Complaint:  Patient is a 53y old  Female who presents with a chief complaint of anemia (30 Sep 2023 00:14)      HPI:    Allergies:  penicillin (Other)      Home Medications:    Hospital Medications:  acetaminophen     Tablet .. 650 milliGRAM(s) Oral every 6 hours PRN  benztropine 2 milliGRAM(s) Oral daily  cholecalciferol 1000 Unit(s) Oral daily  cloZAPine 25 milliGRAM(s) Oral at bedtime  ferrous    sulfate Liquid 300 milliGRAM(s) Oral with breakfast  LORazepam   Injectable 2 milliGRAM(s) IntraMuscular once PRN  LORazepam   Injectable 2 milliGRAM(s) IV Push every 6 hours PRN  melatonin 3 milliGRAM(s) Oral at bedtime PRN  pantoprazole    Tablet 40 milliGRAM(s) Oral before breakfast  polyethylene glycol 3350 17 Gram(s) Oral at bedtime  senna 2 Tablet(s) Oral at bedtime  valproic acid 500 milliGRAM(s) Oral two times a day      PMHX/PSHX:  Schizophrenia    Constipation    Obesity    Anxiety    Anemia    No significant past surgical history        Family history:  Patient unable to provide medical history    Family history of psychosis        Social History:     ROS:     General:  No wt loss, fevers, chills, night sweats, fatigue,   Eyes:  Good vision, no reported pain  ENT:  No sore throat, pain, runny nose, dysphagia  CV:  No pain, palpitations, hypo/hypertension  Resp:  No dyspnea, cough, tachypnea, wheezing  GI:  See HPI  :  No pain, bleeding, incontinence, nocturia  Muscle:  No pain, weakness  Neuro:  No weakness, tingling, memory problems  Psych:  No fatigue, insomnia, mood problems, depression  Endocrine:  No polyuria, polydipsia, cold/heat intolerance  Heme:  No petechiae, ecchymosis, easy bruisability  Skin:  No rash, edema      PHYSICAL EXAM:     GENERAL:  Appears stated age, well-groomed, well-nourished, no distress  HEENT:  NC/AT,  conjunctivae clear and pink,  no JVD  CHEST:  Full & symmetric excursion, no increased effort, breath sounds clear  HEART:  Regular rhythm, S1, S2, no murmur/rub/S3/S4, no abdominal bruit, no edema  ABDOMEN:  Soft, non-tender, non-distended, normoactive bowel sounds,  no masses ,  EXTREMITIES:  no cyanosis,clubbing or edema  SKIN:  No rash/erythema/ecchymoses/petechiae/wounds/abscess/warm/dry  NEURO:  Alert, oriented    Vital Signs:  Vital Signs Last 24 Hrs  T(C): 36.8 (30 Sep 2023 10:01), Max: 36.9 (30 Sep 2023 05:38)  T(F): 98.2 (30 Sep 2023 10:01), Max: 98.4 (30 Sep 2023 05:38)  HR: 88 (30 Sep 2023 10:01) (79 - 96)  BP: 101/64 (30 Sep 2023 10:01) (101/52 - 117/84)  BP(mean): --  RR: 17 (30 Sep 2023 10:01) (16 - 20)  SpO2: 100% (30 Sep 2023 10:01) (98% - 100%)    Parameters below as of 30 Sep 2023 10:01  Patient On (Oxygen Delivery Method): room air      Daily Height in cm: 170.18 (29 Sep 2023 15:45)    Daily     LABS:                        7.0    7.81  )-----------( 408      ( 30 Sep 2023 05:55 )             22.8     09-30    141  |  107  |  17  ----------------------------<  86  3.9   |  23  |  0.82    Ca    8.8      30 Sep 2023 05:55    TPro  6.6  /  Alb  3.4  /  TBili  0.5  /  DBili  x   /  AST  19  /  ALT  35<H>  /  AlkPhos  133<H>  09-30    LIVER FUNCTIONS - ( 30 Sep 2023 05:55 )  Alb: 3.4 g/dL / Pro: 6.6 g/dL / ALK PHOS: 133 U/L / ALT: 35 U/L / AST: 19 U/L / GGT: x           PT/INR - ( 29 Sep 2023 20:29 )   PT: 10.3 sec;   INR: 0.91 ratio         PTT - ( 29 Sep 2023 20:29 )  PTT:22.4 sec  Urinalysis Basic - ( 30 Sep 2023 05:55 )    Color: x / Appearance: x / SG: x / pH: x  Gluc: 86 mg/dL / Ketone: x  / Bili: x / Urobili: x   Blood: x / Protein: x / Nitrite: x   Leuk Esterase: x / RBC: x / WBC x   Sq Epi: x / Non Sq Epi: x / Bacteria: x          Imaging:             Chief Complaint:  Patient is a 53y old  Female who presents with a chief complaint of anemia (30 Sep 2023 00:14)      HPI:  53F with PMHx iron deficiency anemia, schizophrenia, GERD presenting with anemia on labs and transferred from [facility]. Patient only able to provide partial hx. Reports has not had BM in 5 days, and when she did last have BM she felt a lot of anal discomfort/pain. She mentions the stool had some darker appearance, but says brown/green, maybe black seen. ED provider obtained consent from ?sister Lester Avery who identifies as the NOK and legal guardian for blood transfusion. Per primary team discussion Shannon does not believe the patient ever had an EGD/C-scope and this is the first time she has required a blood transfusion. Currently patient denies n/v/f/c. Patient refusing GREGORY today.      Allergies:  penicillin (Other)      Home Medications:    Hospital Medications:  acetaminophen     Tablet .. 650 milliGRAM(s) Oral every 6 hours PRN  benztropine 2 milliGRAM(s) Oral daily  cholecalciferol 1000 Unit(s) Oral daily  cloZAPine 25 milliGRAM(s) Oral at bedtime  ferrous    sulfate Liquid 300 milliGRAM(s) Oral with breakfast  LORazepam   Injectable 2 milliGRAM(s) IntraMuscular once PRN  LORazepam   Injectable 2 milliGRAM(s) IV Push every 6 hours PRN  melatonin 3 milliGRAM(s) Oral at bedtime PRN  pantoprazole    Tablet 40 milliGRAM(s) Oral before breakfast  polyethylene glycol 3350 17 Gram(s) Oral at bedtime  senna 2 Tablet(s) Oral at bedtime  valproic acid 500 milliGRAM(s) Oral two times a day      PMHX/PSHX:  Schizophrenia    Constipation    Obesity    Anxiety    Anemia    No significant past surgical history        Family history:  Patient unable to provide medical history    Family history of psychosis        Social History:   denies any Etoh, marijuana, IVDU, tobacco    ROS:   Unable to obtain  GI hx as above      PHYSICAL EXAM:     GENERAL:  NAD  HEENT:  NC/AT,  conjunctivae clear and pink,  no JVD  CHEST:  Full & symmetric excursion, no increased effort, breath sounds clear  HEART:  Regular rhythm, S1, S2, no murmur/rub/S3/S4, no abdominal bruit, no edema  ABDOMEN:  Soft, non-tender, non-distended, normoactive bowel sounds,  no masses  GREGORY - refuses exam 9/30  EXTREMITIES:  no cyanosis,clubbing or edema  SKIN:  No rash/erythema/ecchymoses/petechiae/wounds/abscess/warm/dry  NEURO:  Alert, oriented x1-2    Vital Signs:  Vital Signs Last 24 Hrs  T(C): 36.8 (30 Sep 2023 10:01), Max: 36.9 (30 Sep 2023 05:38)  T(F): 98.2 (30 Sep 2023 10:01), Max: 98.4 (30 Sep 2023 05:38)  HR: 88 (30 Sep 2023 10:01) (79 - 96)  BP: 101/64 (30 Sep 2023 10:01) (101/52 - 117/84)  BP(mean): --  RR: 17 (30 Sep 2023 10:01) (16 - 20)  SpO2: 100% (30 Sep 2023 10:01) (98% - 100%)    Parameters below as of 30 Sep 2023 10:01  Patient On (Oxygen Delivery Method): room air      Daily Height in cm: 170.18 (29 Sep 2023 15:45)    Daily     LABS:                        7.0    7.81  )-----------( 408      ( 30 Sep 2023 05:55 )             22.8     09-30    141  |  107  |  17  ----------------------------<  86  3.9   |  23  |  0.82    Ca    8.8      30 Sep 2023 05:55    TPro  6.6  /  Alb  3.4  /  TBili  0.5  /  DBili  x   /  AST  19  /  ALT  35<H>  /  AlkPhos  133<H>  09-30    LIVER FUNCTIONS - ( 30 Sep 2023 05:55 )  Alb: 3.4 g/dL / Pro: 6.6 g/dL / ALK PHOS: 133 U/L / ALT: 35 U/L / AST: 19 U/L / GGT: x           PT/INR - ( 29 Sep 2023 20:29 )   PT: 10.3 sec;   INR: 0.91 ratio         PTT - ( 29 Sep 2023 20:29 )  PTT:22.4 sec  Urinalysis Basic - ( 30 Sep 2023 05:55 )    Color: x / Appearance: x / SG: x / pH: x  Gluc: 86 mg/dL / Ketone: x  / Bili: x / Urobili: x   Blood: x / Protein: x / Nitrite: x   Leuk Esterase: x / RBC: x / WBC x   Sq Epi: x / Non Sq Epi: x / Bacteria: x          Imaging:

## 2023-09-30 NOTE — H&P ADULT - HISTORY OF PRESENT ILLNESS
53F with PMHx iron deficiency anemia, schizophrenia, GERD presenting with anemia on labs and transferred from [facility]. The patient is a limited historian 2/2 her psychosis and is not answering all questions. When asked about BRBPR she notes having an episode when wiping several days ago but none since and no melena. Denies hemoptysis, hematemesis or vaginal bleeding. She states there are "splits in her behind" regarding her bleeding. She does indicate she hears voices and says the voices are talking about cleaning herself up but then says this is a private conversation and cannot elaborate further. She then asked "do I look like a girl to you". She would not answer all medical questions due to her psychosis inhibiting her interaction. She denies having issues with anemia and could not repeat back risks of anemia and if she were to not get blood transfusion. She does not understand risks of worsening bleeding and death. She denies blood transfusion. ED provider obtained consent from sister Lester Avery who identifies as the NOK and legal guardian for blood transfusion. I spoke with sister as well and she confirms she consents for blood transfusion. She does not believe the patient ever had an EGD/C-scope and this is the first time she has required a blood transfusion. She is sometimes noncompliant with iron supplements. Denies hx of sickle cell disease or thalassemia and states she has had BRYON for a long time.

## 2023-09-30 NOTE — CONSULT NOTE ADULT - ASSESSMENT
53F with PMHx iron deficiency anemia, schizophrenia, GERD presenting with anemia on labs and transferred from [facility]. Patient only able to provide partial hx. Reports has not had BM in 5 days, and when she did last have BM she felt a lot of anal discomfort/pain. She mentions the stool had some darker appearance, but says brown/green, maybe black seen.     #Microcytic Anemia  *Hb 6.6 on admit, 6.3 jewel, with increase to 7.0 s/p 1u pRBC in ER  *Iron WNL  *BUN WNL  #Constipation  #Dark Stools    DDx: PUD vs colitis vs malignancy vs hemolysis    Recommendations:  -# Anemia  Patient presents with significant anemia in the absence of overt GI bleeding.  Differential diagnosis remains broad and includes malignancy, esophagitis, peptic ulcer disease, erosive gastropathy, varices, portal hypertensive gastropathy, GAVE, arteriovenous malformation [AVM], Desire-James tear, Dieulafoy lesion, Caesar lesions, diverticulosis with hemorrhage, angioectasias, hemorrhoids, or colitis.    Recommendations:  -trend vitals, CBC, BMP, PT/INR, and monitor for clinical signs of bleeding  -maintain active type and screen  -transfusion goal to maintain hemoglobin >/= 7.0 and platelets >/= 50  -please obtain CT A/P  -rule out other causes for anemia [consider iron studies, ferritin, vitamin B12, folate, or hemolysis workup with haptoglobin, LDH, reticulocytes, peripheral blood smear]  -avoid NSAIDs  -c/w bowel regimen  -c/w PPI qAM  -GREGORY if patient will allow  -patient will likely need multi-day prep for luminal evaluation  -can switch to clear liquid diet  -will order golytely prep to start tomorrow AM and pending stool clarity will make decision regarding scope timing        Mark Guerrero MD   Gastroenterology/Hepatology Fellow PGY-5  Available on [employer] Teams 7am - 5pm  [id number]    NON-URGENT CONSULTS:  Please email:  [email or web address]   OR  [email or web address]    After 5pm, please contact the on-call GI fellow. [phone]    AT NIGHT AND ON WEEKENDS:  Contact on-call GI fellow via answering service ([phone]) from 5pm-8am and on weekends/holidays

## 2023-09-30 NOTE — CONSULT NOTE ADULT - ATTENDING COMMENTS
As above  53F with schizophrenia here with anemia  Hgb 6.4, no overt GI bleeding, pt refused rectal exam  BUN within normal  iron studies not consistent with iron deficiency  Check INR, hemolysis labs, retic count  Obtain CT A/P  Will tentatively plan for endoscopic evaluation for GI source of blood loss on Monday with EGD/colon/ +/- pill cam  CLD Sunday, then prep Sunday night    Thank you for this interesting consult.  Please call the GI service with any questions or concerns.

## 2023-09-30 NOTE — BH CONSULTATION LIAISON ASSESSMENT NOTE - VIOLENCE RISK FACTORS:
Feeling of being under threat and being unable to control threat/Impulsivity/Lack of insight into violence risk/need for treatment/Noncompliance with treatment/Irritability

## 2023-09-30 NOTE — BH CONSULTATION LIAISON ASSESSMENT NOTE - NSBHCHARTREVIEWLAB_PSY_A_CORE FT
CBC Full  -  ( 30 Sep 2023 05:55 )  WBC Count : 7.81 K/uL  RBC Count : 3.12 M/uL  Hemoglobin : 7.0 g/dL  Hematocrit : 22.8 %  Platelet Count - Automated : 408 K/uL  Mean Cell Volume : 73.1 fL  Mean Cell Hemoglobin : 22.4 pg  Mean Cell Hemoglobin Concentration : 30.7 gm/dL  Auto Neutrophil # : x  Auto Lymphocyte # : x  Auto Monocyte # : x  Auto Eosinophil # : x  Auto Basophil # : x  Auto Neutrophil % : x  Auto Lymphocyte % : x  Auto Monocyte % : x  Auto Eosinophil % : x  Auto Basophil % : x  09-30    141  |  107  |  17  ----------------------------<  86  3.9   |  23  |  0.82    Ca    8.8      30 Sep 2023 05:55    TPro  6.6  /  Alb  3.4  /  TBili  0.5  /  DBili  x   /  AST  19  /  ALT  35<H>  /  AlkPhos  133<H>  09-30

## 2023-09-30 NOTE — BH CONSULTATION LIAISON ASSESSMENT NOTE - NSBHCHARTREVIEWVS_PSY_A_CORE FT
Vital Signs Last 24 Hrs  T(C): 36.8 (30 Sep 2023 10:01), Max: 36.9 (30 Sep 2023 05:38)  T(F): 98.2 (30 Sep 2023 10:01), Max: 98.4 (30 Sep 2023 05:38)  HR: 88 (30 Sep 2023 10:01) (79 - 96)  BP: 101/64 (30 Sep 2023 10:01) (101/52 - 117/84)  BP(mean): --  RR: 17 (30 Sep 2023 10:01) (16 - 20)  SpO2: 100% (30 Sep 2023 10:01) (98% - 100%)    Parameters below as of 30 Sep 2023 10:01  Patient On (Oxygen Delivery Method): room air

## 2023-09-30 NOTE — BH CONSULTATION LIAISON ASSESSMENT NOTE - NSSUICRSKFACTOR_PSY_ALL_CORE
Current and Past Psychiatric Diagnoses/Treatment Related Factors Current and Past Psychiatric Diagnoses/Treatment Related Factors/Activating Events/Stressors

## 2023-09-30 NOTE — H&P ADULT - NSHPLABSRESULTS_GEN_ALL_CORE
Personally reviewed labs:                        6.4    6.73  )-----------( 414      ( 29 Sep 2023 20:29 )             22.5     09-29-23 @ 20:29    142  |  109<H>  |  13             --------------------------< 99     4.3  |  24  | 0.69    eGFR AA: --  eGFR N-AA: --    Calcium: 8.7  Phosphorus: --  Magnesium: --    AST: 25    ALT: 40<H>  AlkPhos: 118  Protein: 6.8  Albumin: 3.4  TBili: <0.2  D-Bili: --  09-29-23 @ 10:00    139  |  102  |  13             --------------------------< 86     4.0  |  27  | 0.81    eGFR AA: --  eGFR N-AA: --    Calcium: 9.0  Phosphorus: --  Magnesium: --    AST: 17    ALT: 40<H>  AlkPhos: 119  Protein: 7.1  Albumin: 3.5  TBili: 0.2  D-Bili: --          RADIOLOGY & ADDITIONAL TESTS:    EKG my independent interpretation: NSR, no STD or VERONIKA, QTc 446ms

## 2023-09-30 NOTE — PROGRESS NOTE ADULT - ASSESSMENT
53F with PMHx iron deficiency anemia, schizophrenia, GERD presenting with anemia on labs and transferred from [facility].

## 2023-09-30 NOTE — H&P ADULT - PROBLEM SELECTOR PLAN 1
Unclear etiology. Known to have BRYON (microcytic) for many years. First time requiring transfusion. Has not had formal GI w/u in the past per sister. no scopes in EMR    -the patient does not have capacity to refuse blood transfusion. She is not able to repeat back risks of not receiving blood. She does not understand that she has anemia and the risks of this. She denies anemia issues at this time. The sister and NOK/legal guardian Lester Avery provided consent to ER providers for blood transfusion  -1u PRBC ordered, repeat CBC after transfusion  -transfuse for hgb <7  -FOBT  -rectal exam deferred as patient refusing and in hallway  -c/w PPI PO qd for now. No report of melena  -no report of BRBPR, melena in ED Unclear etiology. Known to have BRYON (microcytic) for many years. First time requiring transfusion. Has not had formal GI w/u in the past per sister. no scopes in EMR. DDx includes anal fissures, hemorrhoids vs malignancy  -GI consult emailed  -the patient does not have capacity to refuse blood transfusion. She is not able to repeat back risks of not receiving blood. She does not understand that she has anemia and the risks of this. She denies anemia issues at this time. The sister and NOK/legal guardian Lester Avery provided consent to ER providers for blood transfusion  -1u PRBC ordered, repeat CBC after transfusion  -transfuse for hgb <7  -FOBT  -rectal exam deferred as patient refusing and in hallway  -c/w PPI PO qd for now. No report of melena  -no report of BRBPR, melena in ED

## 2023-09-30 NOTE — BH CONSULTATION LIAISON ASSESSMENT NOTE - SUMMARY
52yo female domiciled on [place] grounds with PMHx iron deficiency anemia, constipation, GERD, PPHx schizophrenia, schizoaffective dos, hx psychosis, unspecified anxiety, bipolar dos, selective mutism, no know hx SIB/SA, hx aggression in setting of medication nonadherence, f/b [facility] ACT tream, multiple inpatient psychiatric admissions, most recent inpatient psychiatric admission [facility] (8/4-present for increased agitation/physical aggression) *since 8/15 court ordered treatment over objection, presenting with anemia on labs and transferred from [facility]. Patient received Haldol 5mg x2 and Ativan 2mg X1 in ED.    Patient seen, opens eyes to name being called, looks at writer, refuses to engage verbally, PCA at bedside maintaining 1:1 CO states asked for snacks earlier.    Discussed with primary team recommendations below based on transfer summary from [facility]:    PLAN:  -c/w 1:1 constant observation-aggression/agitation  -Labs: valproic level  -c/w current medication regiment   Valproic Acid 500mg bid   Cogentin 2mg daily   Clozapine 25mg hs  -Agitation: Haldol 5mg q4h prn + Benadryl 50mg q4h prn  -Monitor EKG qtc interval  -s/p Zyprexa Relprevv BREWER administered on 9/22 (next dose due 10/20)  -s/p loading dose of Haldol DEC 100mg 8/23, Haldol DEC 150mg 9/21  -Dispo: return to [facility] when medically stable  -CANNOT leave AMA  -*does NOT have the capacity to refuse emergent medical care including labs/investigations   52yo female domiciled on [place] grounds with PMHx iron deficiency anemia, constipation, GERD, PPHx schizophrenia, schizoaffective dos, hx psychosis, unspecified anxiety, bipolar dos, selective mutism, no know hx SIB/SA, hx aggression in setting of medication nonadherence, f/b [facility] ACT tream, multiple inpatient psychiatric admissions, most recent inpatient psychiatric admission [facility] (8/4-present for increased agitation/physical aggression) *since 8/15 court ordered treatment over objection, presenting with anemia on labs and transferred from [facility]. Patient received Haldol 5mg x2 and Ativan 2mg X1 in ED.    Patient seen, opens eyes to name being called, looks at writer, refuses to engage verbally, PCA at bedside maintaining 1:1 CO states asked for snacks earlier.    Discussed with primary team recommendations below based on transfer summary from [facility]:    PLAN:  -c/w 1:1 constant observation-aggression/agitation  -Labs: valproic level  -c/w current medication regiment   Valproic Acid 500mg bid   Cogentin 2mg daily   Clozapine 25mg hs  -Agitation: Haldol 5mg q4h prn + Benadryl 50mg q4h prn PO/IM/IV  -Monitor EKG qtc interval and hold antipsychotic if qtc>500  -s/p Zyprexa Relprevv BREWER administered on 9/22 (next dose due 10/20)  -s/p loading dose of Haldol DEC 100mg 8/23, Haldol DEC 150mg 9/21  -Dispo: return to [facility] when medically stable  -CANNOT leave AMA  -*does NOT have the capacity to refuse emergent medical care including labs/investigations

## 2023-09-30 NOTE — H&P ADULT - TIME BILLING
I had a face to face encounter with this patient. I spent 78 total minutes on the bedside interview and examination, coordination of care, counseling, chart review, order placement and documentation for this patient.

## 2023-09-30 NOTE — BH CONSULTATION LIAISON ASSESSMENT NOTE - CURRENT MEDICATION
MEDICATIONS  (STANDING):  benztropine 2 milliGRAM(s) Oral daily  cholecalciferol 1000 Unit(s) Oral daily  cloZAPine 25 milliGRAM(s) Oral at bedtime  ferrous    sulfate Liquid 300 milliGRAM(s) Oral with breakfast  pantoprazole    Tablet 40 milliGRAM(s) Oral before breakfast  polyethylene glycol 3350 17 Gram(s) Oral at bedtime  senna 2 Tablet(s) Oral at bedtime  valproic acid 500 milliGRAM(s) Oral two times a day    MEDICATIONS  (PRN):  acetaminophen     Tablet .. 650 milliGRAM(s) Oral every 6 hours PRN Temp greater or equal to 38C (100.4F), Mild Pain (1 - 3)  LORazepam   Injectable 2 milliGRAM(s) IntraMuscular once PRN Agitation  LORazepam   Injectable 2 milliGRAM(s) IV Push every 6 hours PRN Agitation  melatonin 3 milliGRAM(s) Oral at bedtime PRN Insomnia

## 2023-09-30 NOTE — PROGRESS NOTE ADULT - PROBLEM SELECTOR PLAN 5
Hold chemical DVT ppx in setting of anemia/possible bleeding. SCDs ordered  Regular diet  Contact: Lester Avery (sister/NOK/legal guardian) [phone]

## 2023-09-30 NOTE — H&P ADULT - PROBLEM SELECTOR PLAN 5
Hold chemical DVT ppx in setting of anemia/possible bleeding. SCDs ordered  Regular diet  Contact: Lester Avery (sister/NOK/legal guardian) [phone]

## 2023-09-30 NOTE — BH CONSULTATION LIAISON ASSESSMENT NOTE - OTHER PAST PSYCHIATRIC HISTORY (INCLUDE DETAILS REGARDING ONSET, COURSE OF ILLNESS, INPATIENT/OUTPATIENT TREATMENT)
see hpi Cephalexin Counseling: I counseled the patient regarding use of cephalexin as an antibiotic for prophylactic and/or therapeutic purposes. Cephalexin (commonly prescribed under brand name Keflex) is a cephalosporin antibiotic which is active against numerous classes of bacteria, including most skin bacteria. Side effects may include nausea, diarrhea, gastrointestinal upset, rash, hives, yeast infections, and in rare cases, hepatitis, kidney disease, seizures, fever, confusion, neurologic symptoms, and others. Patients with severe allergies to penicillin medications are cautioned that there is about a 10% incidence of cross-reactivity with cephalosporins. When possible, patients with penicillin allergies should use alternatives to cephalosporins for antibiotic therapy.

## 2023-09-30 NOTE — PROGRESS NOTE ADULT - PROBLEM SELECTOR PLAN 1
Unclear etiology. Known to have BRYON (microcytic) for many years. First time requiring transfusion. Has not had formal GI w/u in the past per sister. no scopes in EMR. DDx includes anal fissures, hemorrhoids vs malignancy  -GI consult emailed  -the patient does not have capacity to refuse blood transfusion. She is not able to repeat back risks of not receiving blood. She does not understand that she has anemia and the risks of this. She denies anemia issues at this time. The sister and NOK/legal guardian Lester Avery provided consent to ER providers for blood transfusion  -1u PRBC ordered, repeat CBC after transfusion  -transfuse for hgb <7  -FOBT  -c/w PPI PO qd for now. No report of melena  -no report of BRBPR, melena in ED  F/up CTAp as per GI  plan for EGD/ Colonoscopy possibly Monday

## 2023-09-30 NOTE — PROGRESS NOTE ADULT - SUBJECTIVE AND OBJECTIVE BOX
[facility] Division of Hospital Medicine  Brook Gunn MD  Hospitalist   Pager [phone]  Avaliable via MS teams     SUBJECTIVE / OVERNIGHT EVENTS: Patient seen and examined. Patient sleepy this morning, no acute complaints. Unclear if patient had any hematochezia or melena.     ADDITIONAL REVIEW OF SYSTEMS:    MEDICATIONS  (STANDING):  benztropine 2 milliGRAM(s) Oral daily  cholecalciferol 1000 Unit(s) Oral daily  cloZAPine 25 milliGRAM(s) Oral at bedtime  ferrous    sulfate Liquid 300 milliGRAM(s) Oral with breakfast  pantoprazole    Tablet 40 milliGRAM(s) Oral before breakfast  polyethylene glycol 3350 17 Gram(s) Oral at bedtime  senna 2 Tablet(s) Oral at bedtime  valproic acid 500 milliGRAM(s) Oral two times a day    MEDICATIONS  (PRN):  acetaminophen     Tablet .. 650 milliGRAM(s) Oral every 6 hours PRN Temp greater or equal to 38C (100.4F), Mild Pain (1 - 3)  LORazepam   Injectable 2 milliGRAM(s) IV Push every 6 hours PRN Agitation  LORazepam   Injectable 2 milliGRAM(s) IntraMuscular once PRN Agitation  melatonin 3 milliGRAM(s) Oral at bedtime PRN Insomnia      I&O's Summary      PHYSICAL EXAM:  Vital Signs Last 24 Hrs  T(C): 36.8 (30 Sep 2023 10:01), Max: 36.9 (30 Sep 2023 05:38)  T(F): 98.2 (30 Sep 2023 10:01), Max: 98.4 (30 Sep 2023 05:38)  HR: 88 (30 Sep 2023 10:01) (79 - 96)  BP: 101/64 (30 Sep 2023 10:01) (101/52 - 117/84)  BP(mean): --  RR: 17 (30 Sep 2023 10:01) (16 - 20)  SpO2: 100% (30 Sep 2023 10:01) (98% - 100%)    Parameters below as of 30 Sep 2023 10:01  Patient On (Oxygen Delivery Method): room air      CONSTITUTIONAL: NAD  EYES: PERRLA; conjunctiva and sclera clear  ENMT: Moist oral mucosa, no pharyngeal injection or exudates; normal dentition  NECK: Supple, no palpable masses; no thyromegaly  RESPIRATORY: Normal respiratory effort; lungs are clear to auscultation bilaterally  CARDIOVASCULAR: Regular rate and rhythm, normal S1 and S2, no murmur/rub/gallop; No lower extremity edema; Peripheral pulses are 2+ bilaterally  ABDOMEN: Nontender to palpation, normoactive bowel sounds, no rebound/guarding; No hepatosplenomegaly  MUSCULOSKELETAL:  Normal gait; no clubbing or cyanosis of digits; no joint swelling or tenderness to palpation  PSYCH: A+O to person, place, and time; affect appropriate  NEUROLOGY: CN 2-12 are intact and symmetric; no gross sensory deficits   SKIN: No rashes; no palpable lesions    LABS:                        7.0    7.81  )-----------( 408      ( 30 Sep 2023 05:55 )             22.8     09-30    141  |  107  |  17  ----------------------------<  86  3.9   |  23  |  0.82    Ca    8.8      30 Sep 2023 05:55    TPro  6.6  /  Alb  3.4  /  TBili  0.5  /  DBili  x   /  AST  19  /  ALT  35<H>  /  AlkPhos  133<H>  09-30    PT/INR - ( 29 Sep 2023 20:29 )   PT: 10.3 sec;   INR: 0.91 ratio         PTT - ( 29 Sep 2023 20:29 )  PTT:22.4 sec      Urinalysis Basic - ( 30 Sep 2023 05:55 )    Color: x / Appearance: x / SG: x / pH: x  Gluc: 86 mg/dL / Ketone: x  / Bili: x / Urobili: x   Blood: x / Protein: x / Nitrite: x   Leuk Esterase: x / RBC: x / WBC x   Sq Epi: x / Non Sq Epi: x / Bacteria: x        SARS-CoV-2: NotDetec (03 Aug 2023 14:14)

## 2023-09-30 NOTE — BH CONSULTATION LIAISON ASSESSMENT NOTE - RISK ASSESSMENT
Risk Factors: acute / chronic medical conditions, recent inpatient psychiatric admissions, medication nonadherence, agitation/aggression  Protective Factors: residential stability, positive therapeutic relationship, no access to firearms

## 2023-10-01 LAB
ALBUMIN SERPL ELPH-MCNC: 3.2 G/DL — LOW (ref 3.3–5)
ALP SERPL-CCNC: 113 U/L — SIGNIFICANT CHANGE UP (ref 40–120)
ALT FLD-CCNC: 26 U/L — SIGNIFICANT CHANGE UP (ref 4–33)
ANION GAP SERPL CALC-SCNC: 12 MMOL/L — SIGNIFICANT CHANGE UP (ref 7–14)
APTT BLD: 30.5 SEC — SIGNIFICANT CHANGE UP (ref 24.5–35.6)
AST SERPL-CCNC: 16 U/L — SIGNIFICANT CHANGE UP (ref 4–32)
BASOPHILS # BLD AUTO: 0.01 K/UL — SIGNIFICANT CHANGE UP (ref 0–0.2)
BASOPHILS NFR BLD AUTO: 0.1 % — SIGNIFICANT CHANGE UP (ref 0–2)
BILIRUB DIRECT SERPL-MCNC: <0.2 MG/DL — SIGNIFICANT CHANGE UP (ref 0–0.3)
BILIRUB INDIRECT FLD-MCNC: SIGNIFICANT CHANGE UP MG/DL (ref 0–1)
BILIRUB SERPL-MCNC: <0.2 MG/DL — SIGNIFICANT CHANGE UP (ref 0.2–1.2)
BILIRUB SERPL-MCNC: <0.2 MG/DL — SIGNIFICANT CHANGE UP (ref 0.2–1.2)
BUN SERPL-MCNC: 20 MG/DL — SIGNIFICANT CHANGE UP (ref 7–23)
CALCIUM SERPL-MCNC: 8.5 MG/DL — SIGNIFICANT CHANGE UP (ref 8.4–10.5)
CHLORIDE SERPL-SCNC: 106 MMOL/L — SIGNIFICANT CHANGE UP (ref 98–107)
CO2 SERPL-SCNC: 24 MMOL/L — SIGNIFICANT CHANGE UP (ref 22–31)
CREAT SERPL-MCNC: 0.79 MG/DL — SIGNIFICANT CHANGE UP (ref 0.5–1.3)
EGFR: 89 ML/MIN/1.73M2 — SIGNIFICANT CHANGE UP
EOSINOPHIL # BLD AUTO: 0.12 K/UL — SIGNIFICANT CHANGE UP (ref 0–0.5)
EOSINOPHIL NFR BLD AUTO: 1.7 % — SIGNIFICANT CHANGE UP (ref 0–6)
GLUCOSE SERPL-MCNC: 119 MG/DL — HIGH (ref 70–99)
HAPTOGLOB SERPL-MCNC: 135 MG/DL — SIGNIFICANT CHANGE UP (ref 34–200)
HCT VFR BLD CALC: 24 % — LOW (ref 34.5–45)
HGB BLD-MCNC: 6.9 G/DL — CRITICAL LOW (ref 11.5–15.5)
IANC: 4.67 K/UL — SIGNIFICANT CHANGE UP (ref 1.8–7.4)
IMM GRANULOCYTES NFR BLD AUTO: 0.4 % — SIGNIFICANT CHANGE UP (ref 0–0.9)
INR BLD: 0.98 RATIO — SIGNIFICANT CHANGE UP (ref 0.85–1.18)
LDH SERPL L TO P-CCNC: 134 U/L — LOW (ref 135–225)
LYMPHOCYTES # BLD AUTO: 1.77 K/UL — SIGNIFICANT CHANGE UP (ref 1–3.3)
LYMPHOCYTES # BLD AUTO: 24.8 % — SIGNIFICANT CHANGE UP (ref 13–44)
MAGNESIUM SERPL-MCNC: 1.8 MG/DL — SIGNIFICANT CHANGE UP (ref 1.6–2.6)
MCHC RBC-ENTMCNC: 21.6 PG — LOW (ref 27–34)
MCHC RBC-ENTMCNC: 28.8 GM/DL — LOW (ref 32–36)
MCV RBC AUTO: 75.2 FL — LOW (ref 80–100)
MONOCYTES # BLD AUTO: 0.54 K/UL — SIGNIFICANT CHANGE UP (ref 0–0.9)
MONOCYTES NFR BLD AUTO: 7.6 % — SIGNIFICANT CHANGE UP (ref 2–14)
NEUTROPHILS # BLD AUTO: 4.67 K/UL — SIGNIFICANT CHANGE UP (ref 1.8–7.4)
NEUTROPHILS NFR BLD AUTO: 65.4 % — SIGNIFICANT CHANGE UP (ref 43–77)
NRBC # BLD: 0 /100 WBCS — SIGNIFICANT CHANGE UP (ref 0–0)
NRBC # FLD: 0.05 K/UL — HIGH (ref 0–0)
PHOSPHATE SERPL-MCNC: 2.9 MG/DL — SIGNIFICANT CHANGE UP (ref 2.5–4.5)
PLATELET # BLD AUTO: 377 K/UL — SIGNIFICANT CHANGE UP (ref 150–400)
POTASSIUM SERPL-MCNC: 3.8 MMOL/L — SIGNIFICANT CHANGE UP (ref 3.5–5.3)
POTASSIUM SERPL-SCNC: 3.8 MMOL/L — SIGNIFICANT CHANGE UP (ref 3.5–5.3)
PROT SERPL-MCNC: 6.4 G/DL — SIGNIFICANT CHANGE UP (ref 6–8.3)
PROTHROM AB SERPL-ACNC: 11.1 SEC — SIGNIFICANT CHANGE UP (ref 9.5–13)
RBC # BLD: 3.17 M/UL — LOW (ref 3.8–5.2)
RBC # BLD: 3.17 M/UL — LOW (ref 3.8–5.2)
RBC # FLD: 22 % — HIGH (ref 10.3–14.5)
RETICS #: 101.1 K/UL — SIGNIFICANT CHANGE UP (ref 25–125)
RETICS/RBC NFR: 3.2 % — HIGH (ref 0.5–2.5)
SODIUM SERPL-SCNC: 142 MMOL/L — SIGNIFICANT CHANGE UP (ref 135–145)
WBC # BLD: 7.14 K/UL — SIGNIFICANT CHANGE UP (ref 3.8–10.5)
WBC # FLD AUTO: 7.14 K/UL — SIGNIFICANT CHANGE UP (ref 3.8–10.5)

## 2023-10-01 PROCEDURE — 99232 SBSQ HOSP IP/OBS MODERATE 35: CPT

## 2023-10-01 PROCEDURE — 74176 CT ABD & PELVIS W/O CONTRAST: CPT | Mod: 26

## 2023-10-01 RX ORDER — HALOPERIDOL DECANOATE 100 MG/ML
5 INJECTION INTRAMUSCULAR EVERY 6 HOURS
Refills: 0 | Status: DISCONTINUED | OUTPATIENT
Start: 2023-10-01 | End: 2023-10-12

## 2023-10-01 RX ORDER — FERROUS SULFATE 325(65) MG
300 TABLET ORAL
Refills: 0 | Status: DISCONTINUED | OUTPATIENT
Start: 2023-10-01 | End: 2023-10-12

## 2023-10-01 RX ADMIN — Medication 1000 UNIT(S): at 11:20

## 2023-10-01 NOTE — PROGRESS NOTE ADULT - SUBJECTIVE AND OBJECTIVE BOX
Medicine Progress Note  --------------------  Nafisa Trujillo M.D.  Internal Medicine/Emergency Medicine  PGY-1  --------------------      Patient: GEOFFREY KHOURY, MRN: [id number], : 1970  Admitted on 23 for Anemia due to acute blood loss      LANGUAGE - English   ------------------------------------------------------------------------------------------------------------  SUMMARY (from last progress note through 10-01-23 @ 11:05):   -  ------------------------------------------------------------------------------------------------------------  OVERNIGHT EVENTS  NAEON    SUBJECTIVE  -       ROS negative unless noted above.  ------------------------------------------------------------------------------------------------------------  OBJECTIVE:  Physical Exam         Vital Signs Last 24 Hrs  T(F): 97.3, Max: 98.4 (23 @ 16:48)  HR: 78 (70 - 100)  BP: 111/68 (91/54 - 123/81)  RR: 17 (16 - 18)  SpO2: 100% (99% - 100%)        DAILY MEASUREMENTS:  I&O's Summary    Daily Height in cm: 170.18 (30 Sep 2023 18:53)    Daily   Weight (kg): 102 (23 @ 18:53)  Orthostatic VS        LABS:                        7.2    6.67  )-----------( 391      ( 30 Sep 2023 20:50 )             24.1     Hgb Trend: 7.2<--, 7.0<--, 6.4<--, 6.3<--, 6.6<--      141  |  107  |  17  ----------------------------<  86  3.9   |  23  |  0.82    Ca    8.8      30 Sep 2023 05:55    TPro  6.6  /  Alb  3.4  /  TBili  0.5  /  DBili  x   /  AST  19  /  ALT  35<H>  /  AlkPhos  133<H>      PT/INR - ( 29 Sep 2023 20:29 )   PT: 10.3 sec;   INR: 0.91 ratio         PTT - ( 29 Sep 2023 20:29 )  PTT:22.4 sec  CAPILLARY BLOOD GLUCOSE            Urinalysis Basic - ( 30 Sep 2023 05:55 )    Color: x / Appearance: x / SG: x / pH: x  Gluc: 86 mg/dL / Ketone: x  / Bili: x / Urobili: x   Blood: x / Protein: x / Nitrite: x   Leuk Esterase: x / RBC: x / WBC x   Sq Epi: x / Non Sq Epi: x / Bacteria: x              RADIOLOGY & ADDITIONAL TESTS:  Results Reviewed:     Imaging Reviewed:  Electrocardiogram Reviewed:      ------------------------------------------------------------------------------------------------------------  MEDICATIONS  (STANDING):  benztropine 2 milliGRAM(s) Oral daily  cholecalciferol 1000 Unit(s) Oral daily  cloZAPine 25 milliGRAM(s) Oral at bedtime  ferrous    sulfate Liquid 300 milliGRAM(s) Oral with breakfast  pantoprazole    Tablet 40 milliGRAM(s) Oral before breakfast  polyethylene glycol 3350 17 Gram(s) Oral at bedtime  senna 2 Tablet(s) Oral at bedtime  valproic acid 500 milliGRAM(s) Oral two times a day    MEDICATIONS  (PRN):  acetaminophen     Tablet .. 650 milliGRAM(s) Oral every 6 hours PRN Temp greater or equal to 38C (100.4F), Mild Pain (1 - 3)  diphenhydrAMINE 50 milliGRAM(s) Oral every 6 hours PRN Threatening behavior  diphenhydrAMINE Injectable 50 milliGRAM(s) IntraMuscular every 6 hours PRN Threatening behavior  diphenhydrAMINE Injectable 50 milliGRAM(s) IV Push every 6 hours PRN Threatening behavior  haloperidol     Tablet 5 milliGRAM(s) Oral every 6 hours PRN agitation  haloperidol    Injectable 5 milliGRAM(s) IV Push every 6 hours PRN Agitation  haloperidol    Injectable 5 milliGRAM(s) IntraMuscular every 6 hours PRN Agitation  LORazepam   Injectable 2 milliGRAM(s) IntraMuscular once PRN Agitation  LORazepam   Injectable 2 milliGRAM(s) IV Push every 6 hours PRN Agitation  melatonin 3 milliGRAM(s) Oral at bedtime PRN Insomnia    ------------------------------------------------------------------------------------------------------------  COORDINATION OF CARE:  Care discussed with consultants/other providers and notes reviewed [Y]     Medicine Progress Note  --------------------  Nafisa Trujillo M.D.  Internal Medicine/Emergency Medicine  PGY-1  --------------------      Patient: GEOFFREY KHOURY, MRN: [id number], : 1970  Admitted on 23 for Anemia due to acute blood loss      LANGUAGE - English   ------------------------------------------------------------------------------------------------------------  SUMMARY (from last progress note through 10-01-23 @ 11:05):   -  ------------------------------------------------------------------------------------------------------------  OVERNIGHT EVENTS  SALO BONILLA  - pt was seen lying on bed, called by RN as pt refused meds. Pt frustrated that she is on clear liquid diet, states she wants a burger and fries. States she is not in hospital, but "I'm in my body". "no blood draws micheal I don't want ya'll mixing up my blood"       ROS negative unless noted above.  ------------------------------------------------------------------------------------------------------------  OBJECTIVE:  Physical Exam  Constitutional: NAD, awake and alert, well developed  EYES: EOMI, conjunctiva clear  ENT:  Normal Hearing, no tonsillar exudates   Neck: Soft and supple , no thyromegaly   Respiratory: Breath sounds are clear bilaterally, No wheezing, rales or rhonchi, no tachypnea, no accessory muscle use  Cardiovascular: S1 and S2, regular rate and rhythm, no Murmurs, gallops or rubs, no JVD, no leg edema  Gastrointestinal: Bowel Sounds present, soft, nontender, nondistended, no guarding, no rebound  Extremities: No cyanosis or clubbing; warm to touch  Vascular: 2+ peripheral pulses lower ex  Neurological: No focal deficits, CN II-XII intact bilaterally, sensation to light touch intact in all extremities.  Musculoskeletal: 5/5 strength b/l upper and lower extremities; no joint swelling.  Skin: No rashes, no ulcerations  Psych:  flat affect, delusional       Vital Signs Last 24 Hrs  T(F): 97.3, Max: 98.4 (23 @ 16:48)  HR: 78 (70 - 100)  BP: 111/68 (91/54 - 123/81)  RR: 17 (16 - 18)  SpO2: 100% (99% - 100%)        DAILY MEASUREMENTS:  I&O's Summary    Daily Height in cm: 170.18 (30 Sep 2023 18:53)    Daily   Weight (kg): 102 (23 @ 18:53)  Orthostatic VS        LABS:                        7.2    6.67  )-----------( 391      ( 30 Sep 2023 20:50 )             24.1     Hgb Trend: 7.2<--, 7.0<--, 6.4<--, 6.3<--, 6.6<--      141  |  107  |  17  ----------------------------<  86  3.9   |  23  |  0.82    Ca    8.8      30 Sep 2023 05:55    TPro  6.6  /  Alb  3.4  /  TBili  0.5  /  DBili  x   /  AST  19  /  ALT  35<H>  /  AlkPhos  133<H>      PT/INR - ( 29 Sep 2023 20:29 )   PT: 10.3 sec;   INR: 0.91 ratio         PTT - ( 29 Sep 2023 20:29 )  PTT:22.4 sec  CAPILLARY BLOOD GLUCOSE            Urinalysis Basic - ( 30 Sep 2023 05:55 )    Color: x / Appearance: x / SG: x / pH: x  Gluc: 86 mg/dL / Ketone: x  / Bili: x / Urobili: x   Blood: x / Protein: x / Nitrite: x   Leuk Esterase: x / RBC: x / WBC x   Sq Epi: x / Non Sq Epi: x / Bacteria: x              RADIOLOGY & ADDITIONAL TESTS:  Results Reviewed:     Imaging Reviewed:  Electrocardiogram Reviewed:      ------------------------------------------------------------------------------------------------------------  MEDICATIONS  (STANDING):  benztropine 2 milliGRAM(s) Oral daily  cholecalciferol 1000 Unit(s) Oral daily  cloZAPine 25 milliGRAM(s) Oral at bedtime  ferrous    sulfate Liquid 300 milliGRAM(s) Oral with breakfast  pantoprazole    Tablet 40 milliGRAM(s) Oral before breakfast  polyethylene glycol 3350 17 Gram(s) Oral at bedtime  senna 2 Tablet(s) Oral at bedtime  valproic acid 500 milliGRAM(s) Oral two times a day    MEDICATIONS  (PRN):  acetaminophen     Tablet .. 650 milliGRAM(s) Oral every 6 hours PRN Temp greater or equal to 38C (100.4F), Mild Pain (1 - 3)  diphenhydrAMINE 50 milliGRAM(s) Oral every 6 hours PRN Threatening behavior  diphenhydrAMINE Injectable 50 milliGRAM(s) IntraMuscular every 6 hours PRN Threatening behavior  diphenhydrAMINE Injectable 50 milliGRAM(s) IV Push every 6 hours PRN Threatening behavior  haloperidol     Tablet 5 milliGRAM(s) Oral every 6 hours PRN agitation  haloperidol    Injectable 5 milliGRAM(s) IV Push every 6 hours PRN Agitation  haloperidol    Injectable 5 milliGRAM(s) IntraMuscular every 6 hours PRN Agitation  LORazepam   Injectable 2 milliGRAM(s) IntraMuscular once PRN Agitation  LORazepam   Injectable 2 milliGRAM(s) IV Push every 6 hours PRN Agitation  melatonin 3 milliGRAM(s) Oral at bedtime PRN Insomnia    ------------------------------------------------------------------------------------------------------------  COORDINATION OF CARE:  Care discussed with consultants/other providers and notes reviewed [Y]

## 2023-10-01 NOTE — PROGRESS NOTE ADULT - ASSESSMENT
53F with PMHx BRYON, schizophrenia, GERD presenting with anemia on labs and transferred from [facility] for further medical workup.  53F with PMHx BRYON, schizophrenia, GERD presenting with anemia to 6.3 , transferred from [facility], now s/p 1u pRBC, pending CT A/P and EGD/Colonoscopy planned for Monday

## 2023-10-01 NOTE — PROVIDER CONTACT NOTE (CRITICAL VALUE NOTIFICATION) - RECOMMENDATIONS
APC Attestation    I personally saw and evaluated the patient. I discussed the management with the APC and reviewed the APC's note and agree with the documentation. I performed the substantive portion of the Physical Exam as documented by the APC.     Physical Exam  Vitals and nursing note reviewed.   Constitutional:       General: She is not in acute distress.     Appearance: She is well-developed. She is not ill-appearing.   HENT:      Head: Normocephalic and atraumatic.      Nose: Nose normal.   Eyes:      Conjunctiva/sclera: Conjunctivae normal.   Neck:      Trachea: Trachea normal.   Cardiovascular:      Rate and Rhythm: Normal rate and regular rhythm.      Pulses: Normal pulses.      Heart sounds: Normal heart sounds. No murmur heard.    No friction rub. No gallop.   Pulmonary:      Effort: Pulmonary effort is normal. No respiratory distress.      Breath sounds: Normal breath sounds. No stridor. No wheezing or rales.   Abdominal:      General: Bowel sounds are normal. There is no distension.      Palpations: Abdomen is soft. There is no mass.      Tenderness: There is no abdominal tenderness. There is no guarding or rebound.   Musculoskeletal:         General: No tenderness. Normal range of motion.      Cervical back: Normal range of motion and neck supple. No rigidity. Normal range of motion.      Comments: No Bilateral calf tenderness    Negative Bilateral Jessica's sign   Skin:     General: Skin is warm and dry.      Coloration: Skin is not pale.      Findings: No erythema or rash.   Neurological:      Mental Status: She is alert and oriented to person, place, and time.      GCS: GCS eye subscore is 4. GCS verbal subscore is 5. GCS motor subscore is 6.      Cranial Nerves: No cranial nerve deficit.      Sensory: No sensory deficit.   Psychiatric:         Speech: Speech normal.         Behavior: Behavior normal.           Results for orders placed or performed during the hospital encounter of 10/04/22   ECG    Result Value Ref Range    Systolic Blood Pressure 217     Diastolic Blood Pressure 93     Ventricular Rate EKG/Min (BPM) 98     Atrial Rate (BPM) 98     QRS-Interval (MSEC) 68     QT-Interval (MSEC) 344     QTc 439     R Axis (Degrees) 45     T Axis (Degrees) 32     REPORT TEXT       Sinus rhythm  with marked sinus arrhythmia  Otherwise normal ECG  No previous ECGs available  Confirmed by JOSE DAVID MELTON, VINNY THOMPSON ([id number]),  Anna Rincon ([id number]) on 10/4/2022 5:59:55 PM           ED Course as of 10/04/22 2206   Tue Oct 04, 2022   1903 XR CHEST AP OR PA - PORTABLE  I have personally reviewed this radiographic study and have found no acute or active disease, small left pleural effusion   [TR]      ED Course User Index  [TR] Vinny Melton MD     Vitals  Vitals:    10/04/22 1737 10/04/22 1745 10/04/22 1749 10/04/22 1800   BP:    (!) 167/69   BP Location:       Patient Position:       Pulse:    (!) 101   Resp:    20   Temp:       TempSrc:       SpO2: 95% 95% 95% 91%   Height:           ED Course  7:18  I checked the Pt who is reporting fever, nausea, SOB. She denies any abd pain, CP, and chills. The Pt is currently on Plavix.    MDM        I have reviewed the information recorded by the scribe for accuracy and agree with its contents.    ____________________________________________________________________    Anna Rincon acting as a scribe for Dr.Thomas Linh Melton  Dictation # [id number]  Scribe: Anna Melton MD  10/04/22 2206     Notify MD.

## 2023-10-01 NOTE — PROGRESS NOTE ADULT - PROBLEM SELECTOR PLAN 2
- C/W benztropine 2mg QD, valproic acid 500mg BID, clozapine 25mg QHS - C/W benztropine 2mg QD, valproic acid 500mg BID, clozapine 25mg QHS  - 1:1 for danger to self/others   - PRN haldol/benadryl/ativan  for agitation/aggression - C/W benztropine 2mg QD, valproic acid 500mg BID, clozapine 25mg QHS  - 1:1 for danger to self/others   - Agitation: Haldol 5mg q4h prn + Benadryl 50mg q4h prn PO/IM/IV  - Monitor EKG QTc interval and hold antipsychotic if qtc>500

## 2023-10-01 NOTE — PROGRESS NOTE ADULT - PROBLEM SELECTOR PLAN 5
DVT ppx: SCD iso anemia and possible GIB   Diet: Regular   Dispo: [facility]  Code: full DVT ppx: SCD iso anemia and possible GIB   Diet: CLD  Dispo: [facility]  Code: full DVT ppx: SCD iso anemia and possible GIB   Diet: CLD  Dispo: [facility]  Code: full    **Pt does NOT have the capacity to refuse emergent medical care including labs/investigations.

## 2023-10-01 NOTE — PROGRESS NOTE ADULT - PROBLEM SELECTOR PLAN 1
Unclear etiology. Known h/o BRYON (microcytic). First time requiring transfusion. Has not had formal GI w/u in the past per sister. no scopes in EMR. DDx includes anal fissures, hemorrhoids vs malignancy  Diagnostics:   [ ] CT A/P   [ ] EGD and colonoscopy planned Monday   [ ] FOBT    Therapeutics:   -s/p 1u pRBC, repeat Hgb   (pt does not have capacity to refuse blood transfusion. Unable to repeat back risks of not receiving blood. Does not understand risks of anemia. Denies anemia issues at this time. The sister and NOK/legal guardian Lester Avery provided consent to ER providers for blood transfusion)  -transfuse if hgb <7, active T&S    -c/w PPI PO qd for now. No report of melena  -no report of BRBPR, melena in ED  F/up CTAp as per GI  plan for EGD/ Colonoscopy possibly Monday Unclear etiology. Known h/o BRYON (microcytic). First time requiring transfusion. Has not had formal GI w/u in the past per sister. no scopes in EMR. DDx includes anal fissures, hemorrhoids vs malignancy  Diagnostics:   [x] Initial Hgb 6.3, MCV 73.2,   [ ] CT A/P   [ ] EGD and colonoscopy planned Monday   [ ] FOBT    Therapeutics:   -s/p 1u pRBC, repeat Hgb 7.2  (pt does not have capacity to refuse blood transfusion. Unable to repeat back risks of not receiving blood. Does not understand risks of anemia. Denies anemia issues at this time. The sister and NOK/legal guardian Lester Avery provided consent to ER providers for blood transfusion)  -transfuse if hgb <7, active T&S    -c/w PPI PO qd for now. No report of melena  -no report of BRBPR, melena in ED  F/up CTAp as per GI  plan for EGD/ Colonoscopy possibly Monday Unclear etiology. Known h/o BRYON (microcytic). First time requiring transfusion. Has not had formal GI w/u in the past per sister. no scopes in EMR. DDx includes anal fissures, hemorrhoids vs malignancy  Diagnostics:   [x] Initial Hgb 6.3, MCV 73.2  [x] Iron studies wnl (except ferritin 11 [L]), B12 & folate wnl from 8/2023  [x] TSH wnl   [ ] LDH, hapto, fractionated bili, retic   [ ] CT A/P   [ ] EGD and colonoscopy planned Monday   [ ] FOBT    Therapeutics:   -s/p 1u pRBC, repeat Hgb 7.2  (pt does not have capacity to refuse blood transfusion. Unable to repeat back risks of not receiving blood. Does not understand risks of anemia. Denies anemia issues at this time. The sister and NOK/legal guardian Lester Avery provided consent to ER providers for blood transfusion)  -transfuse if hgb <7, active T&S  -c/w PPI PO qd for now. No report of melena Unclear etiology. Known h/o BRYON (microcytic). First time requiring transfusion. Has not had formal GI w/u in the past per sister. no scopes in EMR. DDx includes anal fissures, hemorrhoids vs malignancy  Diagnostics:   [x] Initial Hgb 6.3, MCV 73.2  [ ] Peripheral blood smear   [x] Iron studies wnl (except ferritin 11 [L]), B12 & folate wnl from 8/2023  [x] TSH wnl   [ ] LDH, hapto, fractionated bili, retic   [ ] CT A/P   [ ] EGD and colonoscopy planned Monday   [ ] FOBT    Therapeutics:   -s/p 1u pRBC, repeat Hgb 7.2  (pt does not have capacity to refuse blood transfusion. Unable to repeat back risks of not receiving blood. Does not understand risks of anemia. Denies anemia issues at this time. The sister and NOK/legal guardian Lester Avery provided consent to ER providers for blood transfusion)  -transfuse if hgb <7, active T&S  -c/w PPI PO qd for now. No report of melena

## 2023-10-02 LAB
ANION GAP SERPL CALC-SCNC: 7 MMOL/L — SIGNIFICANT CHANGE UP (ref 7–14)
BLD GP AB SCN SERPL QL: NEGATIVE — SIGNIFICANT CHANGE UP
BUN SERPL-MCNC: 11 MG/DL — SIGNIFICANT CHANGE UP (ref 7–23)
CALCIUM SERPL-MCNC: 8.5 MG/DL — SIGNIFICANT CHANGE UP (ref 8.4–10.5)
CHLORIDE SERPL-SCNC: 108 MMOL/L — HIGH (ref 98–107)
CO2 SERPL-SCNC: 25 MMOL/L — SIGNIFICANT CHANGE UP (ref 22–31)
CREAT SERPL-MCNC: 0.68 MG/DL — SIGNIFICANT CHANGE UP (ref 0.5–1.3)
EGFR: 103 ML/MIN/1.73M2 — SIGNIFICANT CHANGE UP
GLUCOSE SERPL-MCNC: 81 MG/DL — SIGNIFICANT CHANGE UP (ref 70–99)
HCT VFR BLD CALC: 27.2 % — LOW (ref 34.5–45)
HEMOGLOBIN INTERPRETATION: SIGNIFICANT CHANGE UP
HGB A MFR BLD: 97.5 % — SIGNIFICANT CHANGE UP (ref 95–97.6)
HGB A2 MFR BLD: 2.1 % — LOW (ref 2.4–3.5)
HGB BLD-MCNC: 8.2 G/DL — LOW (ref 11.5–15.5)
HGB F MFR BLD: <1 % — SIGNIFICANT CHANGE UP (ref 0–1.5)
MCHC RBC-ENTMCNC: 22.7 PG — LOW (ref 27–34)
MCHC RBC-ENTMCNC: 30.1 GM/DL — LOW (ref 32–36)
MCV RBC AUTO: 75.1 FL — LOW (ref 80–100)
NRBC # BLD: 0 /100 WBCS — SIGNIFICANT CHANGE UP (ref 0–0)
NRBC # FLD: 0.04 K/UL — HIGH (ref 0–0)
PLATELET # BLD AUTO: 391 K/UL — SIGNIFICANT CHANGE UP (ref 150–400)
POTASSIUM SERPL-MCNC: 3.7 MMOL/L — SIGNIFICANT CHANGE UP (ref 3.5–5.3)
POTASSIUM SERPL-SCNC: 3.7 MMOL/L — SIGNIFICANT CHANGE UP (ref 3.5–5.3)
RBC # BLD: 3.62 M/UL — LOW (ref 3.8–5.2)
RBC # FLD: 21.3 % — HIGH (ref 10.3–14.5)
RH IG SCN BLD-IMP: POSITIVE — SIGNIFICANT CHANGE UP
SODIUM SERPL-SCNC: 140 MMOL/L — SIGNIFICANT CHANGE UP (ref 135–145)
WBC # BLD: 5.15 K/UL — SIGNIFICANT CHANGE UP (ref 3.8–10.5)
WBC # FLD AUTO: 5.15 K/UL — SIGNIFICANT CHANGE UP (ref 3.8–10.5)

## 2023-10-02 PROCEDURE — 99232 SBSQ HOSP IP/OBS MODERATE 35: CPT

## 2023-10-02 PROCEDURE — 99232 SBSQ HOSP IP/OBS MODERATE 35: CPT | Mod: GC

## 2023-10-02 RX ORDER — DIPHENHYDRAMINE HCL 50 MG
50 CAPSULE ORAL ONCE
Refills: 0 | Status: COMPLETED | OUTPATIENT
Start: 2023-10-02 | End: 2023-10-02

## 2023-10-02 RX ORDER — HALOPERIDOL DECANOATE 100 MG/ML
5 INJECTION INTRAMUSCULAR ONCE
Refills: 0 | Status: COMPLETED | OUTPATIENT
Start: 2023-10-02 | End: 2023-10-02

## 2023-10-02 RX ORDER — SOD SULF/SODIUM/NAHCO3/KCL/PEG
4000 SOLUTION, RECONSTITUTED, ORAL ORAL ONCE
Refills: 0 | Status: COMPLETED | OUTPATIENT
Start: 2023-10-02 | End: 2023-10-02

## 2023-10-02 RX ADMIN — HALOPERIDOL DECANOATE 5 MILLIGRAM(S): 100 INJECTION INTRAMUSCULAR at 16:30

## 2023-10-02 RX ADMIN — Medication 2 MILLIGRAM(S): at 16:30

## 2023-10-02 RX ADMIN — Medication 4000 MILLILITER(S): at 17:27

## 2023-10-02 RX ADMIN — Medication 50 MILLIGRAM(S): at 16:30

## 2023-10-02 NOTE — PROGRESS NOTE ADULT - PROBLEM SELECTOR PLAN 5
DVT ppx: SCD iso anemia and possible GIB   Diet: CLD  Dispo: [facility]  Code: full    **Pt does NOT have the capacity to refuse emergent medical care including labs/investigations.

## 2023-10-02 NOTE — BH CONSULTATION LIAISON PROGRESS NOTE - MSE UNSTRUCTURED FT
On exam, is awake but disengaged, keeps eyes closed. Verbal, but selective with many volitional elements to her presentation. Denies any hx of mental illness/needs/tx. Says she simply wants to go home. Disorganized in thought process, saying this environment might be dangerous but unsure. Denies AVH, SI, HI. Poor/no insight.

## 2023-10-02 NOTE — PROGRESS NOTE ADULT - PROBLEM SELECTOR PLAN 2
- C/W benztropine 2mg QD, valproic acid 500mg BID, clozapine 25mg QHS  - 1:1 for danger to self/others   - Agitation: Haldol 5mg q4h prn + Benadryl 50mg q4h prn PO/IM/IV  - Monitor EKG QTc interval and hold antipsychotic if qtc>500

## 2023-10-02 NOTE — PROGRESS NOTE ADULT - ASSESSMENT
53F with PMHx iron deficiency anemia, schizophrenia, GERD presenting with anemia on labs and transferred from [facility]. Patient only able to provide partial hx. Reports has not had BM in 5 days, and when she did last have BM she felt a lot of anal discomfort/pain. She mentions the stool had some darker appearance, but says brown/green, maybe black seen.     #Microcytic Anemia  *Iron WNL  *BUN WNL  #Constipation  #Dark Stools    occult GIB concern  DDx: PUD vs colitis vs malignancy vs hemolysis     Recommendations:  -please place NGT for Golytely 4L prep administration this evening if patient continues to refuse therapies/prep  -patient likely will require 2 days of prep iso of ongoing constipation  -would c/w clear liquid diet  -NOK Lowena agreeable as documented above  -trend vitals, CBC, BMP, PT/INR, and monitor for clinical signs of bleeding  -maintain active type and screen  -transfusion goal to maintain hemoglobin >/= 7.0 and platelets >/= 50  -avoid NSAIDs  -c/w PPI qAM  -GREGORY if patient will allow        Mark Guerrero MD   Gastroenterology/Hepatology Fellow PGY-5  Available on [employer] Teams 7am - 5pm  [email or web address]    NON-URGENT CONSULTS:  Please email:  [email or web address]   OR  [email or web address]    After 5pm, please contact the on-call GI fellow. [phone]    AT NIGHT AND ON WEEKENDS:  Contact on-call GI fellow via answering service ([phone]) from 5pm-8am and on weekends/holidays

## 2023-10-02 NOTE — PROGRESS NOTE ADULT - SUBJECTIVE AND OBJECTIVE BOX
Interval Events:   -pt s/p pRBC overnight Hb 6.9 --> 8.2  -patient refusing meds at night, unhappy with CLD    -discussion with Lester Avery (sister/NOK/legal guardian) [phone] @ 10AM this morning:  Made Lester aware that need for colon prep for colonoscopy eval, and that pt likely to refuse prep. Lester does not feel that she will be able to convince patient to take prep as patient is in an "agitated/heightened state" and that when she was visiting patient at [facility] patient would get agitated when discussing medications. We discussed the options of chemical restraint for NGT placement and colon prep administration which Lester is agreeable to. She wants to make sure things are done in a timely fashion and that nothing needs to be repeated to the best of our ability. She understands that patient is constipated and may require a prolonged prep, requiring possible further chemical restraint and/or physical restraint/close monitoring. She was updated about need for further transfusion requirement overnight. We also discussed possibility of not finding a cause of bleeding on luminal evaluation which she understands.    Hospital Medications:  acetaminophen     Tablet .. 650 milliGRAM(s) Oral every 6 hours PRN  benztropine 2 milliGRAM(s) Oral daily  cholecalciferol 1000 Unit(s) Oral daily  cloZAPine 25 milliGRAM(s) Oral at bedtime  diphenhydrAMINE 50 milliGRAM(s) Oral every 6 hours PRN  diphenhydrAMINE Injectable 50 milliGRAM(s) IV Push every 6 hours PRN  diphenhydrAMINE Injectable 50 milliGRAM(s) IntraMuscular every 6 hours PRN  ferrous    sulfate Liquid 300 milliGRAM(s) Oral <User Schedule>  haloperidol     Tablet 5 milliGRAM(s) Oral every 6 hours PRN  haloperidol    Injectable 5 milliGRAM(s) IV Push every 6 hours PRN  haloperidol    Injectable 5 milliGRAM(s) IntraMuscular every 6 hours PRN  LORazepam   Injectable 2 milliGRAM(s) IntraMuscular once PRN  LORazepam   Injectable 2 milliGRAM(s) IV Push every 6 hours PRN  melatonin 3 milliGRAM(s) Oral at bedtime PRN  pantoprazole    Tablet 40 milliGRAM(s) Oral before breakfast  polyethylene glycol 3350 17 Gram(s) Oral at bedtime  senna 2 Tablet(s) Oral at bedtime  valproic acid 500 milliGRAM(s) Oral two times a day      PHYSICAL EXAM:   Vital Signs:  Vital Signs Last 24 Hrs  T(C): 37 (02 Oct 2023 09:43), Max: 37 (01 Oct 2023 20:15)  T(F): 98.6 (02 Oct 2023 09:43), Max: 98.6 (01 Oct 2023 20:15)  HR: 64 (02 Oct 2023 09:43) (64 - 84)  BP: 100/62 (02 Oct 2023 09:43) (94/60 - 122/62)  BP(mean): --  RR: 16 (02 Oct 2023 09:43) (16 - 18)  SpO2: 98% (02 Oct 2023 09:43) (98% - 100%)    Parameters below as of 02 Oct 2023 09:43  Patient On (Oxygen Delivery Method): room air      Daily     Daily     GENERAL:  NAD  HEENT:  NC/AT  CHEST:  breathing room air comfortably  HEART:  Regular rhythm, S1, S2  ABDOMEN:  Soft, obese, non-tender, non-distended  GREGORY - refused exam 9/30  EXTREMITIES:  no LE edema  SKIN:  No rash  NEURO:  Alert, oriented x1-2                          8.2    5.15  )-----------( 391      ( 02 Oct 2023 05:50 )             27.2     10-02    140  |  108<H>  |  11  ----------------------------<  81  3.7   |  25  |  0.68    Ca    8.5      02 Oct 2023 05:50  Phos  2.9     10-01  Mg     1.80     10-01    TPro  6.4  /  Alb  3.2<L>  /  TBili  <0.2  /  DBili  <0.2  /  AST  16  /  ALT  26  /  AlkPhos  113  10-01    LIVER FUNCTIONS - ( 01 Oct 2023 13:50 )  Alb: 3.2 g/dL / Pro: 6.4 g/dL / ALK PHOS: 113 U/L / ALT: 26 U/L / AST: 16 U/L / GGT: x             Interval Diagnostic Studies:   < from: CT Abdomen and Pelvis No Cont (10.01.23 @ 14:09) >    ACC: [id number] EXAM:  CT ABDOMEN AND PELVIS   ORDERED BY: EDILIA JACOME     PROCEDURE DATE:  10/01/2023          INTERPRETATION:  CLINICAL INFORMATION: Anemia.    COMPARISON: None.    CONTRAST/COMPLICATIONS:  IV Contrast: NONE  Oral Contrast: NONE  Complications: None reported at time of study completion    PROCEDURE:  CT of the Abdomen and Pelvis was performed.  Sagittal and coronal reformats were performed.    FINDINGS:    Limited study without contrast.    LOWER CHEST: Mildly enlarged left axillary lymph node partially   visualized. Moderate to large sized hiatal hernia. Mild atelectasis lung   bases.    LIVER: 3.6 cm cyst segment 8.  BILE DUCTS: Normal caliber.  GALLBLADDER: Cholelithiasis.  SPLEEN: Within normal limits.  PANCREAS: Limited evaluation without intravenous contrast. No main   pancreatic duct dilatation.  ADRENALS: Within normal limits.  KIDNEYS/URETERS: 4 cm cyst right kidney. Subcentimeter hypodense lesion   lower pole left kidney, too small to characterize, possiblya small cyst.   No hydronephrosis or nephrolithiasis.    BLADDER: Within normal limits.  REPRODUCTIVE ORGANS: Fibroid uterus. No adnexal mass.    BOWEL: Hiatal hernia with herniation of the gastric fundus and upper   gastric body into the lower mediastinum. No bowel obstruction. Appendix   is normal.  PERITONEUM: No ascites.  VESSELS: Within normal limits.  RETROPERITONEUM/LYMPH NODES: No lymphadenopathy. No retroperitoneal   hematoma.  ABDOMINAL WALL: Diastasis of the recti muscles. Small fat-containing   umbilical hernia.  BONES: Mild degenerative changes.    IMPRESSION:  Limited study without contrast.    No retroperitoneal hematoma.    Moderate to large hiatal hernia.    Cholelithiasis without acute cholecystitis.    Fibroid uterus.        --- End of Report ---            YOBANY CUNHA MD; Attending Radiologist  This document has been electronically signed. Oct  1 2023  2:41PM    < end of copied text >

## 2023-10-02 NOTE — PROGRESS NOTE ADULT - PROBLEM SELECTOR PLAN 1
Unclear etiology. Known h/o BRYON (microcytic). First time requiring transfusion. Has not had formal GI w/u in the past per sister. no scopes in EMR. DDx includes anal fissures, hemorrhoids vs malignancy  Diagnostics:   [x] Initial Hgb 6.3, MCV 73.2, retic index 0.96 (hypoproliferative)  [x] Iron studies wnl (except ferritin 11 [L]), B12 & folate wnl from 8/2023  [x] TSH wnl   [x] LDH wnl, hapto wmn, fractionated bili wnl  [x] CT A/P: Moderate to large hiatal hernia. Cholelithiasis without acute cholecystitis. Fibroid uterus.  [ ] EGD and colonoscopy planned after clear stools     Therapeutics:   -s/p 2u pRBC, repeat Hgb   -transfuse if hgb <7, active T&S  -c/w PPI PO qd for now. No report of melena.  (pt does not have capacity to refuse blood transfusion. Unable to repeat back risks of not receiving blood. Does not understand risks of anemia. Denies anemia issues at this time. The sister and NOK/legal guardian Lester Avery provided consent to ER providers for blood transfusion) Unclear etiology. Known h/o BRYON (microcytic). First time requiring transfusion. Has not had formal GI w/u in the past per sister. no scopes in EMR. DDx includes anal fissures, hemorrhoids vs malignancy  Diagnostics:   [x] Initial Hgb 6.3, MCV 73.2, retic index 0.96 (hypoproliferative)  [x] Iron studies wnl (except ferritin 11 [L]), B12 & folate wnl from 8/2023  [x] TSH wnl   [x] LDH wnl, hapto wmn, fractionated bili wnl  [x] CT A/P: Moderate to large hiatal hernia. Cholelithiasis without acute cholecystitis. Fibroid uterus.  [ ] EGD and colonoscopy planned after clear stools     Therapeutics:   -s/p 2u pRBC, repeat Hgb 8.2  -transfuse if hgb <7, active T&S  -c/w PPI PO qd for now. No report of melena.  (pt does not have capacity to refuse blood transfusion. Unable to repeat back risks of not receiving blood. Does not understand risks of anemia. Denies anemia issues at this time. The sister and NOK/legal guardian Lester Avery provided consent to ER providers for blood transfusion)

## 2023-10-02 NOTE — PROGRESS NOTE ADULT - SUBJECTIVE AND OBJECTIVE BOX
Medicine Progress Note  --------------------  Nafisa Trujillo M.D.  Internal Medicine/Emergency Medicine  PGY-1  --------------------      Patient: GEOFFREY KHOURY, MRN: [id number], : 1970  Admitted on 23 for Anemia due to acute blood loss      LANGUAGE - English                ------------------------------------------------------------------------------------------------------------  SUMMARY (from last progress note through 10-02-23 @ 05:51):   -  ------------------------------------------------------------------------------------------------------------  OVERNIGHT EVENTS  NAEON    SUBJECTIVE  -       ROS negative unless noted above.  ------------------------------------------------------------------------------------------------------------  OBJECTIVE:  Physical Exam  Constitutional: NAD, awake and alert, well developed  EYES: EOMI, conjunctiva clear  ENT:  Normal Hearing, no tonsillar exudates   Neck: Soft and supple , no thyromegaly   Respiratory: Breath sounds are clear bilaterally, No wheezing, rales or rhonchi, no tachypnea, no accessory muscle use  Cardiovascular: S1 and S2, regular rate and rhythm, no Murmurs, gallops or rubs, no JVD, no leg edema  Gastrointestinal: Bowel Sounds present, soft, nontender, nondistended, no guarding, no rebound  Extremities: No cyanosis or clubbing; warm to touch  Vascular: 2+ peripheral pulses lower ex  Neurological: No focal deficits, CN II-XII intact bilaterally, sensation to light touch intact in all extremities.  Musculoskeletal: 5/5 strength b/l upper and lower extremities; no joint swelling.  Skin: No rashes, no ulcerations  Psych:  flat affect, delusional         Vital Signs Last 24 Hrs  T(F): 98.6, Max: 98.6 (10-01-23 @ 20:15)  HR: 65 (65 - 84)  BP: 94/60 (94/60 - 122/62)  RR: 18 (17 - 18)  SpO2: 98% (98% - 100%)        DAILY MEASUREMENTS:  I&O's Summary    Daily     Daily   Weight (kg): 102 (23 @ 18:53)  Orthostatic VS        LABS:                        6.9    7.14  )-----------( 377      ( 01 Oct 2023 13:50 )             24.0     Hgb Trend: 6.9<--, 7.2<--, 7.0<--, 6.4<--, 6.3<--  10-01    142  |  106  |  20  ----------------------------<  119<H>  3.8   |  24  |  0.79    Ca    8.5      01 Oct 2023 13:50  Phos  2.9     10-  Mg     1.80     10    TPro  6.4  /  Alb  3.2<L>  /  TBili  <0.2  /  DBili  <0.2  /  AST  16  /  ALT  26  /  AlkPhos  113  10    PT/INR - ( 01 Oct 2023 13:50 )   PT: 11.1 sec;   INR: 0.98 ratio         PTT - ( 01 Oct 2023 13:50 )  PTT:30.5 sec  CAPILLARY BLOOD GLUCOSE            Urinalysis Basic - ( 01 Oct 2023 13:50 )    Color: x / Appearance: x / SG: x / pH: x  Gluc: 119 mg/dL / Ketone: x  / Bili: x / Urobili: x   Blood: x / Protein: x / Nitrite: x   Leuk Esterase: x / RBC: x / WBC x   Sq Epi: x / Non Sq Epi: x / Bacteria: x              RADIOLOGY & ADDITIONAL TESTS:  Results Reviewed:     Imaging Reviewed:  Electrocardiogram Reviewed:      ------------------------------------------------------------------------------------------------------------  MEDICATIONS  (STANDING):  benztropine 2 milliGRAM(s) Oral daily  cholecalciferol 1000 Unit(s) Oral daily  cloZAPine 25 milliGRAM(s) Oral at bedtime  ferrous    sulfate Liquid 300 milliGRAM(s) Oral <User Schedule>  pantoprazole    Tablet 40 milliGRAM(s) Oral before breakfast  polyethylene glycol 3350 17 Gram(s) Oral at bedtime  senna 2 Tablet(s) Oral at bedtime  valproic acid 500 milliGRAM(s) Oral two times a day    MEDICATIONS  (PRN):  acetaminophen     Tablet .. 650 milliGRAM(s) Oral every 6 hours PRN Temp greater or equal to 38C (100.4F), Mild Pain (1 - 3)  diphenhydrAMINE 50 milliGRAM(s) Oral every 6 hours PRN Threatening behavior  diphenhydrAMINE Injectable 50 milliGRAM(s) IV Push every 6 hours PRN Threatening behavior  diphenhydrAMINE Injectable 50 milliGRAM(s) IntraMuscular every 6 hours PRN Threatening behavior  haloperidol     Tablet 5 milliGRAM(s) Oral every 6 hours PRN agitation  haloperidol    Injectable 5 milliGRAM(s) IntraMuscular every 6 hours PRN Agitation  haloperidol    Injectable 5 milliGRAM(s) IV Push every 6 hours PRN Agitation  LORazepam   Injectable 2 milliGRAM(s) IntraMuscular once PRN Agitation  LORazepam   Injectable 2 milliGRAM(s) IV Push every 6 hours PRN Agitation  melatonin 3 milliGRAM(s) Oral at bedtime PRN Insomnia    ------------------------------------------------------------------------------------------------------------  COORDINATION OF CARE:  Care discussed with consultants/other providers and notes reviewed [Y]

## 2023-10-02 NOTE — BH CONSULTATION LIAISON PROGRESS NOTE - NSBHASSESSMENTFT_PSY_ALL_CORE
54yo female domiciled on [place] grounds with PMHx iron deficiency anemia, constipation, GERD, PPHx schizophrenia, schizoaffective dos, hx psychosis, unspecified anxiety, bipolar dos, selective mutism, no know hx SIB/SA, hx aggression in setting of medication nonadherence, f/b [facility] ACT tream, multiple inpatient psychiatric admissions, most recent inpatient psychiatric admission [facility] (8/4-present for increased agitation/physical aggression) *since 8/15 court ordered treatment over objection, presenting with anemia on labs and transferred from [facility]. Patient received Haldol 5mg x2 and Ativan 2mg X1 in ED.    Patient seen, opens eyes to name being called, looks at writer, refuses to engage verbally, PCA at bedside maintaining 1:1 CO states asked for snacks earlier.    10/2: remains disorganized, paranoid, without insight. No SI/HI. No focal neuro deficits.    PLAN:  -c/w 1:1 constant observation-aggression/agitation  -Labs: valproic level  -c/w current medication regiment   Valproic Acid 500mg bid   Cogentin 2mg daily   Clozapine 25mg hs  -Agitation: Haldol 5mg q4h prn + Benadryl 50mg q4h prn PO/IM/IV  -Monitor EKG qtc interval and hold antipsychotic if qtc>500  -s/p Zyprexa Relprevv BREWER administered on 9/22 (next dose due 10/20)  -s/p loading dose of Haldol DEC 100mg 8/23, Haldol DEC 150mg 9/21  -Dispo: return to [facility] when medically stable; 2PC completed and in chart  -CANNOT leave AMA  -*does NOT have the capacity to refuse emergent medical care including labs/investigations

## 2023-10-02 NOTE — PROGRESS NOTE ADULT - ASSESSMENT
53F with PMHx BRYON, schizophrenia, GERD presenting with anemia to 6.3 , transferred from [facility], now s/p 1u pRBC, CT A/P unrevealing. EGD/Colonoscopy planned for once BM clear.

## 2023-10-03 PROCEDURE — 99231 SBSQ HOSP IP/OBS SF/LOW 25: CPT

## 2023-10-03 PROCEDURE — 99232 SBSQ HOSP IP/OBS MODERATE 35: CPT | Mod: GC

## 2023-10-03 RX ORDER — SOD SULF/SODIUM/NAHCO3/KCL/PEG
4000 SOLUTION, RECONSTITUTED, ORAL ORAL ONCE
Refills: 0 | Status: COMPLETED | OUTPATIENT
Start: 2023-10-03 | End: 2023-10-03

## 2023-10-03 RX ADMIN — Medication 1000 UNIT(S): at 12:19

## 2023-10-03 RX ADMIN — Medication 4000 MILLILITER(S): at 17:52

## 2023-10-03 NOTE — BH CONSULTATION LIAISON PROGRESS NOTE - NSBHASSESSMENTFT_PSY_ALL_CORE
52yo female domiciled on [place] grounds with PMHx iron deficiency anemia, constipation, GERD, PPHx schizophrenia, schizoaffective dos, hx psychosis, unspecified anxiety, bipolar dos, selective mutism, no know hx SIB/SA, hx aggression in setting of medication nonadherence, f/b [facility] ACT tream, multiple inpatient psychiatric admissions, most recent inpatient psychiatric admission [facility] (8/4-present for increased agitation/physical aggression) *since 8/15 court ordered treatment over objection, presenting with anemia on labs and transferred from [facility]. Patient received Haldol 5mg x2 and Ativan 2mg X1 in ED.    Patient seen, opens eyes to name being called, looks at writer, refuses to engage verbally, PCA at bedside maintaining 1:1 CO states asked for snacks earlier.    10/2: remains disorganized, paranoid, without insight. No SI/HI. No focal neuro deficits.  10/3: a&o, endorsing delusions, denies si/hi, denies ah/vh, poor med compliance    PLAN:  -c/w 1:1 constant observation-aggression/agitation  -Labs: valproic level  -c/w current medication regiment   Valproic Acid 500mg bid   Cogentin 2mg daily   Clozapine 25mg hs  -Agitation: Haldol 5mg q4h prn + Benadryl 50mg q4h prn PO/IM/IV  -Monitor EKG qtc interval and hold antipsychotic if qtc>500  -s/p Zyprexa Relprevv BREWER administered on 9/22 (next dose due 10/20)  -s/p loading dose of Haldol DEC 100mg 8/23, Haldol DEC 150mg 9/21  -Dispo: return to [facility] when medically stable; 2PC completed and in chart  -CANNOT leave AMA  -*does NOT have the capacity to refuse emergent medical care including labs/investigations

## 2023-10-03 NOTE — PROGRESS NOTE ADULT - ASSESSMENT
53F with PMHx BRYON, schizophrenia, GERD presenting with anemia to 6.3 , transferred from [facility], now s/p 1u pRBC, CT A/P unrevealing. EGD/Colonoscopy planned for once BM clear.

## 2023-10-03 NOTE — PROGRESS NOTE ADULT - PROBLEM SELECTOR PLAN 1
Unclear etiology. Known h/o BRYON (microcytic). First time requiring transfusion. Has not had formal GI w/u in the past per sister. no scopes in EMR. DDx includes anal fissures, hemorrhoids vs malignancy  Diagnostics:   [x] Initial Hgb 6.3, MCV 73.2, retic index 0.96 (hypoproliferative)  [x] Iron studies wnl (except ferritin 11 [L]), B12 & folate wnl from 8/2023  [x] TSH wnl   [x] LDH wnl, hapto wmn, fractionated bili wnl  [x] CT A/P: Moderate to large hiatal hernia. Cholelithiasis without acute cholecystitis. Fibroid uterus.  [ ] EGD and colonoscopy planned after clear stools       Therapeutics:   -s/p 2u pRBC, repeat Hgb 8.2  -transfuse if hgb <7, active T&S  -c/w PPI PO qd for now. No report of melena.  (pt does not have capacity to refuse blood transfusion. Unable to repeat back risks of not receiving blood. Does not understand risks of anemia. Denies anemia issues at this time. The sister and NOK/legal guardian Lester Avery provided consent to ER providers for blood transfusion)

## 2023-10-03 NOTE — PROGRESS NOTE ADULT - SUBJECTIVE AND OBJECTIVE BOX
Interval Events:   Pt consumed entire bowel prep per primary team however unfortunately has not yet had a BMw  Resting comfortably this AM     Hospital Medications:  acetaminophen     Tablet .. 650 milliGRAM(s) Oral every 6 hours PRN  benztropine 2 milliGRAM(s) Oral daily  cholecalciferol 1000 Unit(s) Oral daily  cloZAPine 25 milliGRAM(s) Oral at bedtime  diphenhydrAMINE 50 milliGRAM(s) Oral every 6 hours PRN  diphenhydrAMINE Injectable 50 milliGRAM(s) IntraMuscular every 6 hours PRN  diphenhydrAMINE Injectable 50 milliGRAM(s) IV Push every 6 hours PRN  ferrous    sulfate Liquid 300 milliGRAM(s) Oral <User Schedule>  haloperidol     Tablet 5 milliGRAM(s) Oral every 6 hours PRN  haloperidol    Injectable 5 milliGRAM(s) IntraMuscular every 6 hours PRN  haloperidol    Injectable 5 milliGRAM(s) IV Push every 6 hours PRN  LORazepam   Injectable 2 milliGRAM(s) IntraMuscular once PRN  LORazepam   Injectable 2 milliGRAM(s) IV Push every 6 hours PRN  melatonin 3 milliGRAM(s) Oral at bedtime PRN  pantoprazole    Tablet 40 milliGRAM(s) Oral before breakfast  polyethylene glycol 3350 17 Gram(s) Oral at bedtime  senna 2 Tablet(s) Oral at bedtime  valproic acid 500 milliGRAM(s) Oral two times a day    PHYSICAL EXAM:   Vital Signs:  Vital Signs Last 24 Hrs  T(C): 36.4 (03 Oct 2023 09:21), Max: 36.6 (02 Oct 2023 22:04)  T(F): 97.6 (03 Oct 2023 09:21), Max: 97.9 (02 Oct 2023 22:04)  HR: 60 (03 Oct 2023 09:21) (60 - 74)  BP: 124/86 (03 Oct 2023 09:21) (124/86 - 138/90)  BP(mean): --  RR: 18 (03 Oct 2023 09:21) (18 - 18)  SpO2: 99% (03 Oct 2023 09:21) (97% - 100%)    Parameters below as of 03 Oct 2023 09:21  Patient On (Oxygen Delivery Method): room air    Daily     GENERAL:  NAD, resting comfortably in bed  RESPIRATORY: Normal effort\  CV: HDS    LABS:                        8.2    5.15  )-----------( 391      ( 02 Oct 2023 05:50 )             27.2       10-02    140  |  108<H>  |  11  ----------------------------<  81  3.7   |  25  |  0.68    Ca    8.5      02 Oct 2023 05:50  Phos  2.9     10-01  Mg     1.80     10-01    TPro  6.4  /  Alb  3.2<L>  /  TBili  <0.2  /  DBili  <0.2  /  AST  16  /  ALT  26  /  AlkPhos  113  10-01    LIVER FUNCTIONS - ( 01 Oct 2023 13:50 )  Alb: 3.2 g/dL / Pro: 6.4 g/dL / ALK PHOS: 113 U/L / ALT: 26 U/L / AST: 16 U/L / GGT: x           PT/INR - ( 01 Oct 2023 13:50 )   PT: 11.1 sec;   INR: 0.98 ratio         PTT - ( 01 Oct 2023 13:50 )  PTT:30.5 sec  Urinalysis Basic - ( 02 Oct 2023 05:50 )    Color: x / Appearance: x / SG: x / pH: x  Gluc: 81 mg/dL / Ketone: x  / Bili: x / Urobili: x   Blood: x / Protein: x / Nitrite: x   Leuk Esterase: x / RBC: x / WBC x   Sq Epi: x / Non Sq Epi: x / Bacteria: x                              8.2    5.15  )-----------( 391      ( 02 Oct 2023 05:50 )             27.2                         6.9    7.14  )-----------( 377      ( 01 Oct 2023 13:50 )             24.0                         7.2    6.67  )-----------( 391      ( 30 Sep 2023 20:50 )             24.1      Interval Events:   Pt consumed entire bowel prep per primary team however unfortunately has not yet had a BM  Resting comfortably this AM     Hospital Medications:  acetaminophen     Tablet .. 650 milliGRAM(s) Oral every 6 hours PRN  benztropine 2 milliGRAM(s) Oral daily  cholecalciferol 1000 Unit(s) Oral daily  cloZAPine 25 milliGRAM(s) Oral at bedtime  diphenhydrAMINE 50 milliGRAM(s) Oral every 6 hours PRN  diphenhydrAMINE Injectable 50 milliGRAM(s) IntraMuscular every 6 hours PRN  diphenhydrAMINE Injectable 50 milliGRAM(s) IV Push every 6 hours PRN  ferrous    sulfate Liquid 300 milliGRAM(s) Oral <User Schedule>  haloperidol     Tablet 5 milliGRAM(s) Oral every 6 hours PRN  haloperidol    Injectable 5 milliGRAM(s) IntraMuscular every 6 hours PRN  haloperidol    Injectable 5 milliGRAM(s) IV Push every 6 hours PRN  LORazepam   Injectable 2 milliGRAM(s) IntraMuscular once PRN  LORazepam   Injectable 2 milliGRAM(s) IV Push every 6 hours PRN  melatonin 3 milliGRAM(s) Oral at bedtime PRN  pantoprazole    Tablet 40 milliGRAM(s) Oral before breakfast  polyethylene glycol 3350 17 Gram(s) Oral at bedtime  senna 2 Tablet(s) Oral at bedtime  valproic acid 500 milliGRAM(s) Oral two times a day    PHYSICAL EXAM:   Vital Signs:  Vital Signs Last 24 Hrs  T(C): 36.4 (03 Oct 2023 09:21), Max: 36.6 (02 Oct 2023 22:04)  T(F): 97.6 (03 Oct 2023 09:21), Max: 97.9 (02 Oct 2023 22:04)  HR: 60 (03 Oct 2023 09:21) (60 - 74)  BP: 124/86 (03 Oct 2023 09:21) (124/86 - 138/90)  BP(mean): --  RR: 18 (03 Oct 2023 09:21) (18 - 18)  SpO2: 99% (03 Oct 2023 09:21) (97% - 100%)    Parameters below as of 03 Oct 2023 09:21  Patient On (Oxygen Delivery Method): room air    Daily     GENERAL:  NAD, resting comfortably in bed  RESPIRATORY: Normal effort  CV: HDS  NEURO/PSYCH: tangential thought, awake    LABS:                        8.2    5.15  )-----------( 391      ( 02 Oct 2023 05:50 )             27.2       10-02    140  |  108<H>  |  11  ----------------------------<  81  3.7   |  25  |  0.68    Ca    8.5      02 Oct 2023 05:50  Phos  2.9     10-01  Mg     1.80     10-01    TPro  6.4  /  Alb  3.2<L>  /  TBili  <0.2  /  DBili  <0.2  /  AST  16  /  ALT  26  /  AlkPhos  113  10-01    LIVER FUNCTIONS - ( 01 Oct 2023 13:50 )  Alb: 3.2 g/dL / Pro: 6.4 g/dL / ALK PHOS: 113 U/L / ALT: 26 U/L / AST: 16 U/L / GGT: x           PT/INR - ( 01 Oct 2023 13:50 )   PT: 11.1 sec;   INR: 0.98 ratio         PTT - ( 01 Oct 2023 13:50 )  PTT:30.5 sec  Urinalysis Basic - ( 02 Oct 2023 05:50 )    Color: x / Appearance: x / SG: x / pH: x  Gluc: 81 mg/dL / Ketone: x  / Bili: x / Urobili: x   Blood: x / Protein: x / Nitrite: x   Leuk Esterase: x / RBC: x / WBC x   Sq Epi: x / Non Sq Epi: x / Bacteria: x                              8.2    5.15  )-----------( 391      ( 02 Oct 2023 05:50 )             27.2                         6.9    7.14  )-----------( 377      ( 01 Oct 2023 13:50 )             24.0                         7.2    6.67  )-----------( 391      ( 30 Sep 2023 20:50 )             24.1

## 2023-10-03 NOTE — PROGRESS NOTE ADULT - PROBLEM SELECTOR PLAN 5
DVT ppx: SCD iso anemia and possible GIB   Diet: CLD  Dispo: [facility]  Code: full    **Pt does NOT have the capacity to refuse emergent medical care including labs/investigations.

## 2023-10-03 NOTE — PROGRESS NOTE ADULT - SUBJECTIVE AND OBJECTIVE BOX
Medicine Progress Note  --------------------  Nafisa Trujillo M.D.  Internal Medicine/Emergency Medicine  PGY-1  --------------------      Patient: GEOFFREY KHOURY, MRN: [id number], : 1970  Admitted on 23 for Anemia due to acute blood loss      LANGUAGE - English                ------------------------------------------------------------------------------------------------------------  SUMMARY (from last progress note through 10-03-23 @ 06:17):   -  ------------------------------------------------------------------------------------------------------------  OVERNIGHT EVENTS  NAEON    SUBJECTIVE  -       ROS negative unless noted above.  ------------------------------------------------------------------------------------------------------------  OBJECTIVE:  Physical Exam  Constitutional: NAD, awake and alert, well developed  EYES: EOMI, conjunctiva clear  ENT:  Normal Hearing, no tonsillar exudates   Neck: Soft and supple , no thyromegaly   Respiratory: Breath sounds are clear bilaterally, No wheezing, rales or rhonchi, no tachypnea, no accessory muscle use  Cardiovascular: S1 and S2, regular rate and rhythm, no Murmurs, gallops or rubs, no JVD, no leg edema  Gastrointestinal: Bowel Sounds present, soft, nontender, nondistended, no guarding, no rebound  Extremities: No cyanosis or clubbing; warm to touch  Vascular: 2+ peripheral pulses lower ex  Neurological: No focal deficits, CN II-XII intact bilaterally, sensation to light touch intact in all extremities.  Musculoskeletal: 5/5 strength b/l upper and lower extremities; no joint swelling.  Skin: No rashes, no ulcerations  Psych:  flat affect, delusional     Vital Signs Last 24 Hrs  T(F): 97.4, Max: 98.6 (10-02-23 @ 09:43)  HR: 63 (63 - 74)  BP: 138/90 (100/62 - 138/90)  RR: 18 (16 - 18)  SpO2: 100% (97% - 100%)        DAILY MEASUREMENTS:  I&O's Summary    Daily     Daily   Weight (kg): 102 (23 @ 18:53)  Orthostatic VS        LABS:                        8.2    5.15  )-----------( 391      ( 02 Oct 2023 05:50 )             27.2     Hgb Trend: 8.2<--, 6.9<--, 7.2<--, 7.0<--, 6.4<--  10-02    140  |  108<H>  |  11  ----------------------------<  81  3.7   |  25  |  0.68    Ca    8.5      02 Oct 2023 05:50  Phos  2.9     10-  Mg     1.80     10-    TPro  6.4  /  Alb  3.2<L>  /  TBili  <0.2  /  DBili  <0.2  /  AST  16  /  ALT  26  /  AlkPhos  113  10-01    PT/INR - ( 01 Oct 2023 13:50 )   PT: 11.1 sec;   INR: 0.98 ratio         PTT - ( 01 Oct 2023 13:50 )  PTT:30.5 sec  CAPILLARY BLOOD GLUCOSE            Urinalysis Basic - ( 02 Oct 2023 05:50 )    Color: x / Appearance: x / SG: x / pH: x  Gluc: 81 mg/dL / Ketone: x  / Bili: x / Urobili: x   Blood: x / Protein: x / Nitrite: x   Leuk Esterase: x / RBC: x / WBC x   Sq Epi: x / Non Sq Epi: x / Bacteria: x              RADIOLOGY & ADDITIONAL TESTS:  Results Reviewed:     Imaging Reviewed:  Electrocardiogram Reviewed:      ------------------------------------------------------------------------------------------------------------  MEDICATIONS  (STANDING):  benztropine 2 milliGRAM(s) Oral daily  cholecalciferol 1000 Unit(s) Oral daily  cloZAPine 25 milliGRAM(s) Oral at bedtime  ferrous    sulfate Liquid 300 milliGRAM(s) Oral <User Schedule>  pantoprazole    Tablet 40 milliGRAM(s) Oral before breakfast  polyethylene glycol 3350 17 Gram(s) Oral at bedtime  senna 2 Tablet(s) Oral at bedtime  valproic acid 500 milliGRAM(s) Oral two times a day    MEDICATIONS  (PRN):  acetaminophen     Tablet .. 650 milliGRAM(s) Oral every 6 hours PRN Temp greater or equal to 38C (100.4F), Mild Pain (1 - 3)  diphenhydrAMINE 50 milliGRAM(s) Oral every 6 hours PRN Threatening behavior  diphenhydrAMINE Injectable 50 milliGRAM(s) IntraMuscular every 6 hours PRN Threatening behavior  diphenhydrAMINE Injectable 50 milliGRAM(s) IV Push every 6 hours PRN Threatening behavior  haloperidol     Tablet 5 milliGRAM(s) Oral every 6 hours PRN agitation  haloperidol    Injectable 5 milliGRAM(s) IV Push every 6 hours PRN Agitation  haloperidol    Injectable 5 milliGRAM(s) IntraMuscular every 6 hours PRN Agitation  LORazepam   Injectable 2 milliGRAM(s) IntraMuscular once PRN Agitation  LORazepam   Injectable 2 milliGRAM(s) IV Push every 6 hours PRN Agitation  melatonin 3 milliGRAM(s) Oral at bedtime PRN Insomnia    ------------------------------------------------------------------------------------------------------------  COORDINATION OF CARE:  Care discussed with consultants/other providers and notes reviewed [Y]     Medicine Progress Note  --------------------  Nafisa Trujillo M.D.  Internal Medicine/Emergency Medicine  PGY-1  --------------------      Patient: GEOFFREY KHOURY, MRN: [id number], : 1970  Admitted on 23 for Anemia due to acute blood loss      LANGUAGE - English                ------------------------------------------------------------------------------------------------------------  SUMMARY (from last progress note through 10-03-23 @ 06:17):   -  ------------------------------------------------------------------------------------------------------------  OVERNIGHT EVENTS  NAEON    SUBJECTIVE  - Pt seen this am sleeping in bed comfortably. RN reports pt finished drinking all 4L bowel prep around 12:30am, went to urinate twice but never had a BM.       ROS negative unless noted above.  ------------------------------------------------------------------------------------------------------------  OBJECTIVE:  Physical Exam  Constitutional: NAD, well developed  EYES: EOMI, conjunctiva clear  ENT:  Normal Hearing, no tonsillar exudates   Neck: Soft and supple , no thyromegaly   Respiratory: Breath sounds are clear bilaterally, No wheezing, rales or rhonchi, no tachypnea, no accessory muscle use  Cardiovascular: S1 and S2, regular rate and rhythm, no Murmurs, gallops or rubs, no JVD, no leg edema  Gastrointestinal: Bowel Sounds present, soft, nontender, nondistended, no guarding, no rebound  Extremities: No cyanosis or clubbing; warm to touch  Vascular: 2+ peripheral pulses lower ex  Neurological: No focal deficits, CN II-XII intact bilaterally, sensation to light touch intact in all extremities.  Musculoskeletal: 5/5 strength b/l upper and lower extremities; no joint swelling.  Skin: No rashes, no ulcerations  Psych:  flat affect, delusional     Vital Signs Last 24 Hrs  T(F): 97.4, Max: 98.6 (10-02-23 @ 09:43)  HR: 63 (63 - 74)  BP: 138/90 (100/62 - 138/90)  RR: 18 (16 - 18)  SpO2: 100% (97% - 100%)        DAILY MEASUREMENTS:  I&O's Summary    Daily     Daily   Weight (kg): 102 (23 @ 18:53)  Orthostatic VS        LABS:                        8.2    5.15  )-----------( 391      ( 02 Oct 2023 05:50 )             27.2     Hgb Trend: 8.2<--, 6.9<--, 7.2<--, 7.0<--, 6.4<--  10-02    140  |  108<H>  |  11  ----------------------------<  81  3.7   |  25  |  0.68    Ca    8.5      02 Oct 2023 05:50  Phos  2.9     10-  Mg     1.80     10-    TPro  6.4  /  Alb  3.2<L>  /  TBili  <0.2  /  DBili  <0.2  /  AST  16  /  ALT  26  /  AlkPhos  113  10-    PT/INR - ( 01 Oct 2023 13:50 )   PT: 11.1 sec;   INR: 0.98 ratio         PTT - ( 01 Oct 2023 13:50 )  PTT:30.5 sec  CAPILLARY BLOOD GLUCOSE            Urinalysis Basic - ( 02 Oct 2023 05:50 )    Color: x / Appearance: x / SG: x / pH: x  Gluc: 81 mg/dL / Ketone: x  / Bili: x / Urobili: x   Blood: x / Protein: x / Nitrite: x   Leuk Esterase: x / RBC: x / WBC x   Sq Epi: x / Non Sq Epi: x / Bacteria: x              RADIOLOGY & ADDITIONAL TESTS:  Results Reviewed:     Imaging Reviewed:  Electrocardiogram Reviewed:      ------------------------------------------------------------------------------------------------------------  MEDICATIONS  (STANDING):  benztropine 2 milliGRAM(s) Oral daily  cholecalciferol 1000 Unit(s) Oral daily  cloZAPine 25 milliGRAM(s) Oral at bedtime  ferrous    sulfate Liquid 300 milliGRAM(s) Oral <User Schedule>  pantoprazole    Tablet 40 milliGRAM(s) Oral before breakfast  polyethylene glycol 3350 17 Gram(s) Oral at bedtime  senna 2 Tablet(s) Oral at bedtime  valproic acid 500 milliGRAM(s) Oral two times a day    MEDICATIONS  (PRN):  acetaminophen     Tablet .. 650 milliGRAM(s) Oral every 6 hours PRN Temp greater or equal to 38C (100.4F), Mild Pain (1 - 3)  diphenhydrAMINE 50 milliGRAM(s) Oral every 6 hours PRN Threatening behavior  diphenhydrAMINE Injectable 50 milliGRAM(s) IntraMuscular every 6 hours PRN Threatening behavior  diphenhydrAMINE Injectable 50 milliGRAM(s) IV Push every 6 hours PRN Threatening behavior  haloperidol     Tablet 5 milliGRAM(s) Oral every 6 hours PRN agitation  haloperidol    Injectable 5 milliGRAM(s) IV Push every 6 hours PRN Agitation  haloperidol    Injectable 5 milliGRAM(s) IntraMuscular every 6 hours PRN Agitation  LORazepam   Injectable 2 milliGRAM(s) IntraMuscular once PRN Agitation  LORazepam   Injectable 2 milliGRAM(s) IV Push every 6 hours PRN Agitation  melatonin 3 milliGRAM(s) Oral at bedtime PRN Insomnia    ------------------------------------------------------------------------------------------------------------  COORDINATION OF CARE:  Care discussed with consultants/other providers and notes reviewed [Y]

## 2023-10-03 NOTE — PROGRESS NOTE ADULT - ASSESSMENT
53F with PMHx iron deficiency anemia, schizophrenia, GERD presenting with anemia on labs and transferred from [facility]. Patient only able to provide partial hx. Reports has not had BM in 5 days, and when she did last have BM she felt a lot of anal discomfort/pain. She mentions the stool had some darker appearance, but says brown/green, maybe black seen.     #Microcytic Anemia  *Iron WNL  *BUN WNL  #Constipation  #Dark Stools    occult GIB concern  DDx: PUD vs colitis vs malignancy vs hemolysis     Recommendations:  -Golytely 4L prep throughout the day today (Day 2)  -Track BM as we will require watery BM for colonoscopy  -Maintain on clear liquid diet  -NPO after midnight  -EGD/colonoscopy tomorrow (10/4) if adequately prepped   -trend vitals, CBC, and monitor for clinical signs of bleeding  -maintain active type and screen  -transfusion goal to maintain hemoglobin > 7 and platelets > 50  -avoid NSAIDs  -c/w PPI qAM    Juan Jose Bridges MD  Gastroenterology/Hepatology Fellow  Available on [employer] Teams 7am - 5pm    NON-URGENT CONSULTS:  Please email:  [email or web address]   OR  [email or web address]    After 5pm, please contact the on-call GI fellow. [phone]    AT NIGHT AND ON WEEKENDS:  Contact on-call GI fellow via answering service ([phone]) from 5pm-8am and on weekends/holidays

## 2023-10-04 ENCOUNTER — TRANSCRIPTION ENCOUNTER (OUTPATIENT)
Age: 53
End: 2023-10-04

## 2023-10-04 LAB
ANION GAP SERPL CALC-SCNC: 9 MMOL/L — SIGNIFICANT CHANGE UP (ref 7–14)
APTT BLD: 32.3 SEC — SIGNIFICANT CHANGE UP (ref 24.5–35.6)
BASOPHILS # BLD AUTO: 0.01 K/UL — SIGNIFICANT CHANGE UP (ref 0–0.2)
BASOPHILS NFR BLD AUTO: 0.2 % — SIGNIFICANT CHANGE UP (ref 0–2)
BLD GP AB SCN SERPL QL: NEGATIVE — SIGNIFICANT CHANGE UP
BUN SERPL-MCNC: 8 MG/DL — SIGNIFICANT CHANGE UP (ref 7–23)
CALCIUM SERPL-MCNC: 8.7 MG/DL — SIGNIFICANT CHANGE UP (ref 8.4–10.5)
CHLORIDE SERPL-SCNC: 106 MMOL/L — SIGNIFICANT CHANGE UP (ref 98–107)
CO2 SERPL-SCNC: 28 MMOL/L — SIGNIFICANT CHANGE UP (ref 22–31)
CREAT SERPL-MCNC: 0.81 MG/DL — SIGNIFICANT CHANGE UP (ref 0.5–1.3)
EGFR: 86 ML/MIN/1.73M2 — SIGNIFICANT CHANGE UP
EOSINOPHIL # BLD AUTO: 0.18 K/UL — SIGNIFICANT CHANGE UP (ref 0–0.5)
EOSINOPHIL NFR BLD AUTO: 3.2 % — SIGNIFICANT CHANGE UP (ref 0–6)
GLUCOSE SERPL-MCNC: 77 MG/DL — SIGNIFICANT CHANGE UP (ref 70–99)
HCG SERPL-ACNC: 2.4 MIU/ML — SIGNIFICANT CHANGE UP
HCT VFR BLD CALC: 29 % — LOW (ref 34.5–45)
HGB BLD-MCNC: 8.7 G/DL — LOW (ref 11.5–15.5)
IANC: 2.92 K/UL — SIGNIFICANT CHANGE UP (ref 1.8–7.4)
IMM GRANULOCYTES NFR BLD AUTO: 0.4 % — SIGNIFICANT CHANGE UP (ref 0–0.9)
INR BLD: 1.04 RATIO — SIGNIFICANT CHANGE UP (ref 0.85–1.18)
LYMPHOCYTES # BLD AUTO: 1.79 K/UL — SIGNIFICANT CHANGE UP (ref 1–3.3)
LYMPHOCYTES # BLD AUTO: 32.2 % — SIGNIFICANT CHANGE UP (ref 13–44)
MAGNESIUM SERPL-MCNC: 1.9 MG/DL — SIGNIFICANT CHANGE UP (ref 1.6–2.6)
MCHC RBC-ENTMCNC: 23 PG — LOW (ref 27–34)
MCHC RBC-ENTMCNC: 30 GM/DL — LOW (ref 32–36)
MCV RBC AUTO: 76.7 FL — LOW (ref 80–100)
MONOCYTES # BLD AUTO: 0.58 K/UL — SIGNIFICANT CHANGE UP (ref 0–0.9)
MONOCYTES NFR BLD AUTO: 10.4 % — SIGNIFICANT CHANGE UP (ref 2–14)
NEUTROPHILS # BLD AUTO: 2.98 K/UL — SIGNIFICANT CHANGE UP (ref 1.8–7.4)
NEUTROPHILS NFR BLD AUTO: 53.6 % — SIGNIFICANT CHANGE UP (ref 43–77)
NRBC # BLD: 0 /100 WBCS — SIGNIFICANT CHANGE UP (ref 0–0)
NRBC # FLD: 0 K/UL — SIGNIFICANT CHANGE UP (ref 0–0)
PHOSPHATE SERPL-MCNC: 3.4 MG/DL — SIGNIFICANT CHANGE UP (ref 2.5–4.5)
PLATELET # BLD AUTO: 356 K/UL — SIGNIFICANT CHANGE UP (ref 150–400)
POTASSIUM SERPL-MCNC: 3.8 MMOL/L — SIGNIFICANT CHANGE UP (ref 3.5–5.3)
POTASSIUM SERPL-SCNC: 3.8 MMOL/L — SIGNIFICANT CHANGE UP (ref 3.5–5.3)
PROTHROM AB SERPL-ACNC: 11.6 SEC — SIGNIFICANT CHANGE UP (ref 9.5–13)
RBC # BLD: 3.78 M/UL — LOW (ref 3.8–5.2)
RBC # BLD: 3.78 M/UL — LOW (ref 3.8–5.2)
RBC # FLD: 22.9 % — HIGH (ref 10.3–14.5)
RETICS #: 138 K/UL — HIGH (ref 25–125)
RETICS/RBC NFR: 3.7 % — HIGH (ref 0.5–2.5)
RH IG SCN BLD-IMP: POSITIVE — SIGNIFICANT CHANGE UP
SODIUM SERPL-SCNC: 143 MMOL/L — SIGNIFICANT CHANGE UP (ref 135–145)
WBC # BLD: 5.56 K/UL — SIGNIFICANT CHANGE UP (ref 3.8–10.5)
WBC # FLD AUTO: 5.56 K/UL — SIGNIFICANT CHANGE UP (ref 3.8–10.5)

## 2023-10-04 PROCEDURE — 99232 SBSQ HOSP IP/OBS MODERATE 35: CPT | Mod: GC

## 2023-10-04 PROCEDURE — 99232 SBSQ HOSP IP/OBS MODERATE 35: CPT

## 2023-10-04 PROCEDURE — 74018 RADEX ABDOMEN 1 VIEW: CPT | Mod: 26

## 2023-10-04 PROCEDURE — 71045 X-RAY EXAM CHEST 1 VIEW: CPT | Mod: 26

## 2023-10-04 RX ORDER — DIPHENHYDRAMINE HCL 50 MG
50 CAPSULE ORAL ONCE
Refills: 0 | Status: COMPLETED | OUTPATIENT
Start: 2023-10-04 | End: 2023-10-04

## 2023-10-04 RX ORDER — HALOPERIDOL DECANOATE 100 MG/ML
5 INJECTION INTRAMUSCULAR ONCE
Refills: 0 | Status: COMPLETED | OUTPATIENT
Start: 2023-10-04 | End: 2023-10-04

## 2023-10-04 RX ADMIN — Medication 300 MILLIGRAM(S): at 09:49

## 2023-10-04 RX ADMIN — Medication 2 MILLIGRAM(S): at 13:35

## 2023-10-04 RX ADMIN — HALOPERIDOL DECANOATE 5 MILLIGRAM(S): 100 INJECTION INTRAMUSCULAR at 13:34

## 2023-10-04 RX ADMIN — Medication 50 MILLIGRAM(S): at 13:34

## 2023-10-04 NOTE — BH CONSULTATION LIAISON PROGRESS NOTE - NSBHASSESSMENTFT_PSY_ALL_CORE
54 YO female domiciled on [place] grounds with PMHx iron deficiency anemia, constipation, GERD, PPHx schizophrenia, schizoaffective dos, hx psychosis, unspecified anxiety, bipolar dos, selective mutism, no know hx SIB/SA, hx aggression in setting of medication nonadherence, f/b [facility] ACT tream, multiple inpatient psychiatric admissions, most recent inpatient psychiatric admission [facility] (8/4-present for increased agitation/physical aggression) *since 8/15 court ordered treatment over objection, presenting with anemia on labs and transferred from [facility]. Patient received Haldol 5mg x2 and Ativan 2mg X1 in ED.  Patient seen, opens eyes to name being called, looks at writer, refuses to engage verbally, PCA at bedside maintaining 1:1 CO states asked for snacks earlier.    10/2: remains disorganized, paranoid, without insight. No SI/HI. No focal neuro deficits.  10/3: a&o, endorsing delusions, denies si/hi, denies ah/vh, poor med compliance    10/4: Patient selectively mute, uncooperative, refused to engage with the provider. Refusing oral medications, no PRN meds administered     PLAN:  -Observation deferred to primary team  -c/w current medication regimen   Valproic Acid 500 mg PO Q12. VPA level on 9/29 is 48.2    Cogentin 2 mg PO daily   Clozapine 25 mg PO QHS  -Agitation: Haldol 5mg q4h prn + Benadryl 50mg q4h prn PO/IM/IV  -Monitor EKG qtc interval and hold antipsychotic if qtc>500  -s/p Zyprexa Relprevv BREWER administered on 9/22 (next dose due 10/20)  -s/p loading dose of Haldol DEC 100mg 8/23, Haldol  mg 9/21  -Dispo: return to [facility] when medically stable; 2PC completed and in chart  -CANNOT leave AMA  -PATIENT does NOT have the capacity to refuse emergent medical care including labs/investigations

## 2023-10-04 NOTE — DISCHARGE NOTE PROVIDER - CARE PROVIDER_API CALL
Magaly Merrill  Psychiatry  [facility] - Consultation-Liaison Psychiatry, [address], [address] [place] [address]  Phone: [phone]  Fax: [phone]  Follow Up Time:

## 2023-10-04 NOTE — PROGRESS NOTE ADULT - PROBLEM SELECTOR PLAN 5
DVT ppx: SCD iso anemia and possible GIB   Diet: CLD  Dispo: [facility]  Code: full    **Pt does NOT have the capacity to refuse emergent medical care including labs/investigations.

## 2023-10-04 NOTE — PROGRESS NOTE ADULT - SUBJECTIVE AND OBJECTIVE BOX
Interval Events:   No acute events  Pt still without BM despite oral bowel prep  Pt w/o complaint. Abdomen soft/NT  No signs of overt bleeding   Hgb stable > 8 g/dL    Hospital Medications:  acetaminophen     Tablet .. 650 milliGRAM(s) Oral every 6 hours PRN  benztropine 2 milliGRAM(s) Oral daily  bisacodyl 10 milliGRAM(s) Oral once  cholecalciferol 1000 Unit(s) Oral daily  cloZAPine 25 milliGRAM(s) Oral at bedtime  diphenhydrAMINE 50 milliGRAM(s) Oral every 6 hours PRN  diphenhydrAMINE Injectable 50 milliGRAM(s) IV Push every 6 hours PRN  diphenhydrAMINE Injectable 50 milliGRAM(s) IntraMuscular every 6 hours PRN  ferrous    sulfate Liquid 300 milliGRAM(s) Oral <User Schedule>  haloperidol     Tablet 5 milliGRAM(s) Oral every 6 hours PRN  haloperidol    Injectable 5 milliGRAM(s) IntraMuscular every 6 hours PRN  haloperidol    Injectable 5 milliGRAM(s) IV Push every 6 hours PRN  LORazepam   Injectable 2 milliGRAM(s) IntraMuscular once PRN  LORazepam   Injectable 2 milliGRAM(s) IV Push every 6 hours PRN  melatonin 3 milliGRAM(s) Oral at bedtime PRN  pantoprazole    Tablet 40 milliGRAM(s) Oral before breakfast  polyethylene glycol 3350 17 Gram(s) Oral at bedtime  senna 2 Tablet(s) Oral at bedtime  valproic acid 500 milliGRAM(s) Oral two times a day    PHYSICAL EXAM:   Vital Signs:  Vital Signs Last 24 Hrs  T(C): 36.4 (04 Oct 2023 05:40), Max: 36.9 (03 Oct 2023 14:38)  T(F): 97.6 (04 Oct 2023 05:40), Max: 98.4 (03 Oct 2023 14:38)  HR: 65 (04 Oct 2023 09:51) (63 - 76)  BP: 128/80 (04 Oct 2023 09:51) (100/72 - 133/82)  BP(mean): --  RR: 18 (04 Oct 2023 09:51) (18 - 18)  SpO2: 98% (04 Oct 2023 09:51) (98% - 100%)    Parameters below as of 04 Oct 2023 09:51  Patient On (Oxygen Delivery Method): room air    Daily     Daily Weight in k.4 (04 Oct 2023 05:40)    GENERAL:  NAD, resting comfortably in bed  CHEST:  Normal Effort  HEART:  HDS  ABDOMEN:  Soft, non-tender, non-distended    LABS:                        8.7    5.56  )-----------( 356      ( 04 Oct 2023 06:14 )             29.0     Mean Cell Volume: 76.7 fL (10-04- @ 06:14)    10    143  |  106  |  8   ----------------------------<  77  3.8   |  28  |  0.81    Ca    8.7      04 Oct 2023 06:14  Phos  3.4     10-04  Mg     1.90     10-04        PT/INR - ( 04 Oct 2023 06:14 )   PT: 11.6 sec;   INR: 1.04 ratio         PTT - ( 04 Oct 2023 06:14 )  PTT:32.3 sec  Urinalysis Basic - ( 04 Oct 2023 06:14 )    Color: x / Appearance: x / SG: x / pH: x  Gluc: 77 mg/dL / Ketone: x  / Bili: x / Urobili: x   Blood: x / Protein: x / Nitrite: x   Leuk Esterase: x / RBC: x / WBC x   Sq Epi: x / Non Sq Epi: x / Bacteria: x                          8.7    5.56  )-----------( 356      ( 04 Oct 2023 06:14 )             29.0                         8.2    5.15  )-----------( 391      ( 02 Oct 2023 05:50 )             27.2                         6.9    7.14  )-----------( 377      ( 01 Oct 2023 13:50 )             24.0

## 2023-10-04 NOTE — PROGRESS NOTE ADULT - ASSESSMENT
53F with PMHx BRYON, schizophrenia, GERD presenting with anemia to 6.3 , transferred from [facility], now s/p 1u pRBC, CT A/P unrevealing. EGD/Colonoscopy planned for once BM clear.

## 2023-10-04 NOTE — PROGRESS NOTE ADULT - SUBJECTIVE AND OBJECTIVE BOX
Medicine Progress Note  --------------------  Nafisa Trujillo M.D.  Internal Medicine/Emergency Medicine  PGY-1  --------------------      Patient: GEOFFREY KHOURY, MRN: [id number], : 1970  Admitted on 23 for Anemia due to acute blood loss      LANGUAGE - English               ------------------------------------------------------------------------------------------------------------  SUMMARY (from last progress note through 10-04-23 @ 05:51):   -  ------------------------------------------------------------------------------------------------------------  OVERNIGHT EVENTS  NAEON    SUBJECTIVE  -       ROS negative unless noted above.  ------------------------------------------------------------------------------------------------------------  OBJECTIVE:  Physical Exam  Constitutional: NAD, well developed  EYES: EOMI, conjunctiva clear  ENT:  Normal Hearing, no tonsillar exudates   Neck: Soft and supple , no thyromegaly   Respiratory: Breath sounds are clear bilaterally, No wheezing, rales or rhonchi, no tachypnea, no accessory muscle use  Cardiovascular: S1 and S2, regular rate and rhythm, no Murmurs, gallops or rubs, no JVD, no leg edema  Gastrointestinal: Bowel Sounds present, soft, nontender, nondistended, no guarding, no rebound  Extremities: No cyanosis or clubbing; warm to touch  Vascular: 2+ peripheral pulses lower ex  Neurological: No focal deficits, CN II-XII intact bilaterally, sensation to light touch intact in all extremities.  Musculoskeletal: 5/5 strength b/l upper and lower extremities; no joint swelling.  Skin: No rashes, no ulcerations  Psych:  flat affect, delusional     Vital Signs Last 24 Hrs  T(F): 98.4, Max: 98.4 (10-03-23 @ 14:38)  HR: 76 (60 - 76)  BP: 104/66 (100/72 - 124/86)  RR: 18 (18 - 18)  SpO2: 100% (99% - 100%)        DAILY MEASUREMENTS:  I&O's Summary    Daily     Daily   Weight (kg): 102 (23 @ 18:53)  Orthostatic VS        LABS:    Hgb Trend: 8.2<--, 6.9<--, 7.2<--, 7.0<--, 6.4<--          CAPILLARY BLOOD GLUCOSE                      RADIOLOGY & ADDITIONAL TESTS:  Results Reviewed:     Imaging Reviewed:  Electrocardiogram Reviewed:      ------------------------------------------------------------------------------------------------------------  MEDICATIONS  (STANDING):  benztropine 2 milliGRAM(s) Oral daily  bisacodyl 10 milliGRAM(s) Oral once  cholecalciferol 1000 Unit(s) Oral daily  cloZAPine 25 milliGRAM(s) Oral at bedtime  ferrous    sulfate Liquid 300 milliGRAM(s) Oral <User Schedule>  pantoprazole    Tablet 40 milliGRAM(s) Oral before breakfast  polyethylene glycol 3350 17 Gram(s) Oral at bedtime  senna 2 Tablet(s) Oral at bedtime  valproic acid 500 milliGRAM(s) Oral two times a day    MEDICATIONS  (PRN):  acetaminophen     Tablet .. 650 milliGRAM(s) Oral every 6 hours PRN Temp greater or equal to 38C (100.4F), Mild Pain (1 - 3)  diphenhydrAMINE 50 milliGRAM(s) Oral every 6 hours PRN Threatening behavior  diphenhydrAMINE Injectable 50 milliGRAM(s) IV Push every 6 hours PRN Threatening behavior  diphenhydrAMINE Injectable 50 milliGRAM(s) IntraMuscular every 6 hours PRN Threatening behavior  haloperidol     Tablet 5 milliGRAM(s) Oral every 6 hours PRN agitation  haloperidol    Injectable 5 milliGRAM(s) IV Push every 6 hours PRN Agitation  haloperidol    Injectable 5 milliGRAM(s) IntraMuscular every 6 hours PRN Agitation  LORazepam   Injectable 2 milliGRAM(s) IntraMuscular once PRN Agitation  LORazepam   Injectable 2 milliGRAM(s) IV Push every 6 hours PRN Agitation  melatonin 3 milliGRAM(s) Oral at bedtime PRN Insomnia    ------------------------------------------------------------------------------------------------------------  COORDINATION OF CARE:  Care discussed with consultants/other providers and notes reviewed [Y]     Medicine Progress Note  --------------------  Nafisa Trujillo M.D.  Internal Medicine/Emergency Medicine  PGY-1  --------------------      Patient: GEOFFREY KHOURY, MRN: [id number], : 1970  Admitted on 23 for Anemia due to acute blood loss      LANGUAGE - English               ------------------------------------------------------------------------------------------------------------  SUMMARY (from last progress note through 10-04-23 @ 05:51):   -  ------------------------------------------------------------------------------------------------------------  OVERNIGHT EVENTS  Pt initially agreed to take bowel prep if given one Ensure. However did not finish. No BM since bowel prep started per nursing.      SUBJECTIVE  - Pt seen sleeping. Unwilling to converse, preferred to be addressed as Juan Jose (he/him).       ROS negative unless noted above.  ------------------------------------------------------------------------------------------------------------  OBJECTIVE:  Physical Exam  Constitutional: NAD, well developed  EYES: EOMI, conjunctiva clear  ENT:  Normal Hearing, no tonsillar exudates   Neck: Soft and supple , no thyromegaly   Respiratory: Breath sounds are clear bilaterally, No wheezing, rales or rhonchi, no tachypnea, no accessory muscle use  Cardiovascular: S1 and S2, regular rate and rhythm, no Murmurs, gallops or rubs, no JVD, no leg edema  Gastrointestinal: Bowel Sounds present, soft, nontender, nondistended, no guarding, no rebound  Extremities: No cyanosis or clubbing; warm to touch  Vascular: 2+ peripheral pulses lower ex  Neurological: No focal deficits, CN II-XII intact bilaterally, sensation to light touch intact in all extremities.  Musculoskeletal: 5/5 strength b/l upper and lower extremities; no joint swelling.  Skin: No rashes, no ulcerations  Psych:  flat affect, delusional     Vital Signs Last 24 Hrs  T(F): 98.4, Max: 98.4 (10-03-23 @ 14:38)  HR: 76 (60 - 76)  BP: 104/66 (100/72 - 124/86)  RR: 18 (18 - 18)  SpO2: 100% (99% - 100%)        DAILY MEASUREMENTS:  I&O's Summary    Daily     Daily   Weight (kg): 102 (23 @ 18:53)  Orthostatic VS        LABS:    Hgb Trend: 8.2<--, 6.9<--, 7.2<--, 7.0<--, 6.4<--          CAPILLARY BLOOD GLUCOSE                      RADIOLOGY & ADDITIONAL TESTS:  Results Reviewed:     Imaging Reviewed:  Electrocardiogram Reviewed:      ------------------------------------------------------------------------------------------------------------  MEDICATIONS  (STANDING):  benztropine 2 milliGRAM(s) Oral daily  bisacodyl 10 milliGRAM(s) Oral once  cholecalciferol 1000 Unit(s) Oral daily  cloZAPine 25 milliGRAM(s) Oral at bedtime  ferrous    sulfate Liquid 300 milliGRAM(s) Oral <User Schedule>  pantoprazole    Tablet 40 milliGRAM(s) Oral before breakfast  polyethylene glycol 3350 17 Gram(s) Oral at bedtime  senna 2 Tablet(s) Oral at bedtime  valproic acid 500 milliGRAM(s) Oral two times a day    MEDICATIONS  (PRN):  acetaminophen     Tablet .. 650 milliGRAM(s) Oral every 6 hours PRN Temp greater or equal to 38C (100.4F), Mild Pain (1 - 3)  diphenhydrAMINE 50 milliGRAM(s) Oral every 6 hours PRN Threatening behavior  diphenhydrAMINE Injectable 50 milliGRAM(s) IV Push every 6 hours PRN Threatening behavior  diphenhydrAMINE Injectable 50 milliGRAM(s) IntraMuscular every 6 hours PRN Threatening behavior  haloperidol     Tablet 5 milliGRAM(s) Oral every 6 hours PRN agitation  haloperidol    Injectable 5 milliGRAM(s) IV Push every 6 hours PRN Agitation  haloperidol    Injectable 5 milliGRAM(s) IntraMuscular every 6 hours PRN Agitation  LORazepam   Injectable 2 milliGRAM(s) IntraMuscular once PRN Agitation  LORazepam   Injectable 2 milliGRAM(s) IV Push every 6 hours PRN Agitation  melatonin 3 milliGRAM(s) Oral at bedtime PRN Insomnia    ------------------------------------------------------------------------------------------------------------  COORDINATION OF CARE:  Care discussed with consultants/other providers and notes reviewed [Y]

## 2023-10-04 NOTE — DISCHARGE NOTE PROVIDER - HOSPITAL COURSE
53F with PMHx BRYON, schizophrenia, GERD presenting with microcytic anemia to 6.3, transferred from [facility], now s/p 1u pRBC. Labs inconsistent with BRYON, RI<2 c/w BM hypoproliferation. CT A/P unrevealing except for moderate top large hiatal hernia. EGD/Colonoscopy planned for once BM clear after bowel prep. However pt has been refusing meds and bowel prep requiring NGT.     **Discharge to [facility] once medically cleared         IMPORTANT MEDICATION CHANGES:     START TAKING:    STOP TAKING:    CONTINUE TAKING:      IMPORTANT MEDICAL PROBLEMS REQUIRING FOLLOW UP:        DIAGNOSIS AT DISCHARGE: 53F with PMHx BRYON, schizophrenia, GERD presenting with microcytic anemia to 6.3, transferred from [facility], now s/p 1u pRBC. Labs inconsistent with BRYON, RI<2 c/w BM hypoproliferation. CT A/P unrevealing except for moderate top large hiatal hernia. EGD/Colonoscopy planned for once BM clear after bowel prep. However pt has been refusing meds and bowel prep requiring NGT ( Pt NOK/sister agreeable with endoscopic evaluation and NGT if needed for bowel prep).     **Discharge to [facility] once medically cleared         IMPORTANT MEDICATION CHANGES:     START TAKING:    STOP TAKING:    CONTINUE TAKING:      IMPORTANT MEDICAL PROBLEMS REQUIRING FOLLOW UP:        DIAGNOSIS AT DISCHARGE: 53F with PMHx BRYON, schizophrenia, GERD presenting with microcytic anemia to 6.3, transferred from [facility], now s/p 1u pRBC. Labs inconsistent with BRYON, RI<2 c/w BM hypoproliferation. CT A/P unrevealing except for moderate top large hiatal hernia. EGD/Colonoscopy planned for once BM clear after bowel prep. However pt has been refusing meds and bowel prep requiring NGT ( Pt NOK/sister agreeable with endoscopic evaluation and NGT if needed for bowel prep). Patient prepped With 8 L of bowel prep first orally and then via NG tube.  Patient also given smog enema for stool evacuation prior to a colonoscopy.  Colonoscopy and EGD performed on the fifth5 October without any finding of active bleeding.  Patient had 8 mm ascending colon polyp sampled and sent off as path specimen.  Patient's hemoglobin and hematocrit remained stable in mid 8 range for 2 days  Without necessity of recurrent transfusion.  GI source of anemia ruled out on current admission.  Patient should follow-up with hematologist/oncologist after discharge from [facility].  On day of discharge patient was deemed to be medically optimized for [facility] to [facility] for [facility].    **Discharge to [facility] once medically cleared         IMPORTANT MEDICATION CHANGES:     START TAKING:    STOP TAKING:    CONTINUE TAKING:      IMPORTANT MEDICAL PROBLEMS REQUIRING FOLLOW UP:        DIAGNOSIS AT DISCHARGE: 53F with PMHx BRYON, schizophrenia, GERD presenting with microcytic anemia to 6.3, transferred from [facility], now s/p 1u pRBC. Labs inconsistent with BRYON, RI<2 c/w BM hypoproliferation. CT A/P unrevealing except for moderate top large hiatal hernia. EGD/Colonoscopy planned for once BM clear after bowel prep. However pt has been refusing meds and bowel prep requiring NGT ( Pt NOK/sister agreeable with endoscopic evaluation and NGT if needed for bowel prep). Patient prepped With 8 L of bowel prep first orally and then via NG tube.  Patient also given smog enema for stool evacuation prior to a colonoscopy.  Colonoscopy and EGD performed on the fifth5 October without any finding of active bleeding.  Patient had 8 mm ascending colon polyp sampled and sent off as path specimen.  Patient's hemoglobin and hematocrit remained stable in mid 8 range for 2 days  Without necessity of recurrent transfusion.  GI source of anemia ruled out on current admission.  Patient should follow-up with hematologist/oncologist after discharge from [facility]. On day of discharge patient was deemed to be medically optimized for return to [facility] for further psychiatric management.    **Discharge to [facility] once medically cleared         IMPORTANT MEDICATION CHANGES:     START TAKING:    STOP TAKING:    CONTINUE TAKING:          IMPORTANT MEDICAL PROBLEMS REQUIRING FOLLOW UP:  Please follow up with hematology after discharge         DIAGNOSIS AT DISCHARGE:  Microcytic Anemia   Schizophrenia  GERD 53F with PMHx BRYON, schizophrenia, GERD presenting with microcytic anemia to 6.3, transferred from [facility], now s/p 1u pRBC. Labs inconsistent with BRYON, RI<2 c/w BM hypoproliferation. CT A/P unrevealing except for moderate top large hiatal hernia. EGD/Colonoscopy planned for once BM clear after bowel prep. However pt has been refusing meds and bowel prep requiring NGT ( Pt NOK/sister agreeable with endoscopic evaluation and NGT if needed for bowel prep). Patient prepped With 8 L of bowel prep first orally and then via NG tube.  Patient also given smog enema for stool evacuation prior to a colonoscopy.  Colonoscopy and EGD performed on the fifth5 October without any finding of active bleeding.  Patient had 8 mm ascending colon polyp sampled and sent off as path specimen.  Patient's hemoglobin and hematocrit remained stable in mid 8 range for 2 days  Without necessity of recurrent transfusion.  GI source of anemia ruled out on current admission.  Patient should follow-up with hematologist/oncologist after discharge from [facility]. On day of discharge patient was deemed to be medically optimized for return to [facility] for further psychiatric management.    Pt cleared to discharge to [facility] once medically cleared.         IMPORTANT MEDICATION CHANGES: none    START TAKING: none    STOP TAKING: none     IMPORTANT MEDICAL PROBLEMS REQUIRING FOLLOW UP:  Please follow up with hematology after discharge         DIAGNOSIS AT DISCHARGE:  Microcytic Anemia   Schizophrenia  GERD

## 2023-10-04 NOTE — DISCHARGE NOTE PROVIDER - NSFOLLOWUPCLINICS_GEN_ALL_ED_FT
[facility]  Hematology/Oncology  [address] [id number]  Phone: [phone]  Fax:   Follow Up Time: 2 weeks

## 2023-10-04 NOTE — DISCHARGE NOTE PROVIDER - NSDCCPCAREPLAN_GEN_ALL_CORE_FT
PRINCIPAL DISCHARGE DIAGNOSIS  Diagnosis: Anemia  Assessment and Plan of Treatment:      PRINCIPAL DISCHARGE DIAGNOSIS  Diagnosis: Anemia  Assessment and Plan of Treatment: How can you care for yourself at home?  Take your medicines exactly as prescribed. Call your doctor or nurse advice line if you think you are having a problem with your medicine.  If your doctor recommends iron pills, take them as directed:  Try to take the pills on an empty stomach about 1 hour before or 2 hours after meals. But you may need to take iron with food to avoid an upset stomach.  Do not take antacids or drink milk or caffeine drinks (such as coffee, tea, or cola) at the same time or within 2 hours of the time that you take your iron. They can make it hard for your body to absorb the iron.  Iron pills may cause stomach problems, such as heartburn, nausea, diarrhea, constipation, and cramps. Be sure to drink plenty of fluids, and include fruits, vegetables, and fibre in your diet each day. Iron pills often make your bowel movements dark or green.  	  Call 911 anytime you think you may need emergency care. For example, call if:  Shortness of breath.  Nausea or vomiting.  Pain, pressure, or a strange feeling in the back, neck, jaw, or upper belly or in one or both shoulders or arms.  Light-headedness or sudden weakness.  A fast or irregular heartbeat.  You have new or increased shortness of breath.  You are dizzy or light-headed, or you feel like you may faint.  Your fatigue and weakness continue or get worse.  You have any abnormal bleeding, such as:  Nosebleeds.  Vaginal bleeding that is different (heavier, more frequent, at a different time of the month) than what you are used to.  Bloody or black stools, or rectal bleeding.  Bloody or pink urine.

## 2023-10-04 NOTE — DISCHARGE NOTE PROVIDER - NSDCMRMEDTOKEN_GEN_ALL_CORE_FT
benztropine 2 mg oral tablet: 1 tab(s) orally once a day  cholecalciferol oral tablet: 1 tab(s) orally once a day 1000 unit(s) orally  cloZAPine 25 mg oral tablet: 1 tab(s) orally once a day (at bedtime)  ferrous sulfate 300 mg/5 mL (60 mg/5 mL elemental iron) oral liquid: 5 milliliter(s) orally once a day (before a meal)  pantoprazole 40 mg oral delayed release tablet: 1 tab(s) orally once a day  polyethylene glycol 3350 oral powder for reconstitution: 17 gram(s) orally once a day (at bedtime)  senna (sennosides) 8.6 mg oral tablet: 1 tab(s) orally once a day  valproic acid 250 mg oral capsule: 2 cap(s) orally 2 times a day   acetaminophen 325 mg oral tablet: 2 tab(s) orally every 6 hours As needed Temp greater or equal to 38C (100.4F), Mild Pain (1 - 3)  benztropine 2 mg oral tablet: 1 tab(s) orally once a day  bisacodyl 5 mg oral delayed release tablet: 2 tab(s) orally once  cholecalciferol oral tablet: 1000 unit(s) orally once a day  cloZAPine 25 mg oral tablet: 1 tab(s) orally once a day (at bedtime)  diphenhydrAMINE 50 mg oral capsule: 1 cap(s) orally every 6 hours As needed Threatening behavior  ferrous sulfate 300 mg/5 mL (60 mg/5 mL elemental iron) oral liquid: 5 milliliter(s) orally once a day  haloperidol 5 mg oral tablet: 1 tab(s) orally every 6 hours as needed for agitation  LORazepam 1 mg/mL-NaCl 0.9% intravenous solution: 2 milliliter(s) intravenous every 6 hours as needed for Agitation  melatonin 3 mg oral tablet: 1 tab(s) orally once a day (at bedtime) As needed Insomnia  pantoprazole 40 mg oral delayed release tablet: 1 tab(s) orally once a day  polyethylene glycol 3350 oral powder for reconstitution: 17 gram(s) orally once a day (at bedtime)  senna (sennosides) 8.6 mg oral tablet: 1 tab(s) orally once a day  valproic acid 250 mg oral capsule: 2 cap(s) orally 2 times a day

## 2023-10-04 NOTE — PROGRESS NOTE ADULT - ASSESSMENT
53F with PMHx iron deficiency anemia, schizophrenia, GERD presenting with anemia on labs and transferred from [facility]. Patient only able to provide partial hx. Reports has not had BM in 5 days, and when she did last have BM she felt a lot of anal discomfort/pain. She mentions the stool had some darker appearance, but says brown/green, maybe black seen.     #Microcytic Anemia  *Iron WNL  *BUN WNL  #Constipation  #Dark Stools    occult GIB concern  DDx: PUD vs colitis vs malignancy     Recommendations:  -Golytely 4L prep throughout the day today (Day 3) via NGT if needed  -Track stool output as pt will require clear BM for colonoscopy  -Maintain on clear liquid diet  -NPO after midnight  -EGD/colonoscopy tomorrow (10/5) if adequately prepped   -trend vitals, CBC, and monitor for clinical signs of bleeding  -maintain active type and screen  -transfusion goal to maintain hemoglobin > 7 and platelets > 50  -avoid NSAIDs  -c/w PPI qAM    Juan Jose Bridges MD  Gastroenterology/Hepatology Fellow  Available on [employer] Teams 7am - 5pm    NON-URGENT CONSULTS:  Please email:  [email or web address]   OR  [email or web address]    After 5pm, please contact the on-call GI fellow. [phone]    AT NIGHT AND ON WEEKENDS:  Contact on-call GI fellow via answering service ([phone]) from 5pm-8am and on weekends/holidays 53F with PMHx iron deficiency anemia, schizophrenia, GERD presenting with anemia on labs and transferred from [facility]. Patient only able to provide partial hx. Reports has not had BM in 5 days, and when she did last have BM she felt a lot of anal discomfort/pain. She mentions the stool had some darker appearance, but says brown/green, maybe black seen.     #Microcytic Anemia  *Iron WNL  *BUN WNL  #Constipation  #Dark Stools    occult GIB concern  DDx: PUD vs colitis vs malignancy     Recommendations:  -Golytely 4L prep throughout the day today (Day 3) via NGT if needed  - Enema today   - Obtain abdominal Xray to evaluate stool burden if pt remains without BM this afternoon  -Track stool output as pt will require clear BM for colonoscopy  -Maintain on clear liquid diet  -NPO after midnight  -EGD/colonoscopy tomorrow (10/5) if adequately prepped   -trend vitals, CBC, and monitor for clinical signs of bleeding  -maintain active type and screen  -transfusion goal to maintain hemoglobin > 7 and platelets > 50  -avoid NSAIDs  -c/w PPI qAM    Juan Jose Bridges MD  Gastroenterology/Hepatology Fellow  Available on Microsoft Teams 7am - 5pm    NON-URGENT CONSULTS:  Please email:  [email or web address]   OR  [email or web address]    After 5pm, please contact the on-call GI fellow. [phone]    AT NIGHT AND ON WEEKENDS:  Contact on-call GI fellow via answering service ([phone]) from 5pm-8am and on weekends/holidays 53F with PMHx iron deficiency anemia, schizophrenia, GERD presenting with anemia on labs and transferred from [facility]. Patient only able to provide partial hx. Reports has not had BM in 5 days, and when she did last have BM she felt a lot of anal discomfort/pain. She mentions the stool had some darker appearance, but says brown/green, maybe black seen.     #Microcytic Anemia  *Iron WNL  *BUN WNL  #Constipation  #Dark Stools    occult GIB concern  DDx: PUD vs colitis vs malignancy     Recommendations:  -Golytely 4L prep throughout the day today (Day 3) via NGT if needed  -Enema today   -Obtain abdominal Xray to evaluate stool burden if pt remains without BM this afternoon  -Track stool output as pt will require clear BM for colonoscopy  -Maintain on clear liquid diet  -NPO after midnight  -EGD/colonoscopy tomorrow (10/5) if adequately prepped   -trend vitals, CBC, and monitor for clinical signs of bleeding  -maintain active type and screen  -transfusion goal to maintain hemoglobin > 7 and platelets > 50  -avoid NSAIDs  -c/w PPI qAM    Juan Jose Bridges MD  Gastroenterology/Hepatology Fellow  Available on Microsoft Teams 7am - 5pm    NON-URGENT CONSULTS:  Please email:  [email or web address]   OR  [email or web address]    After 5pm, please contact the on-call GI fellow. [phone]    AT NIGHT AND ON WEEKENDS:  Contact on-call GI fellow via answering service ([phone]) from 5pm-8am and on weekends/holidays

## 2023-10-04 NOTE — DISCHARGE NOTE PROVIDER - NSDCCPTREATMENT_GEN_ALL_CORE_FT
PRINCIPAL PROCEDURE  Procedure: CT abdomen and pelvis wo contrast  Findings and Treatment: PROCEDURE DATE:  10/01/2023    INTERPRETATION:  CLINICAL INFORMATION: Anemia.  COMPARISON: None.  CONTRAST/COMPLICATIONS:  IV Contrast: NONE  Oral Contrast: NONE  Complications: None reported at time of study completion  PROCEDURE:  CT of the Abdomen and Pelvis was performed.  Sagittal and coronal reformats were performed.  FINDINGS:  Limited study without contrast.  LOWER CHEST: Mildly enlarged left axillary lymph node partially   visualized. Moderate to large sized hiatal hernia. Mild atelectasis lung   bases.  LIVER: 3.6 cm cyst segment 8.  BILE DUCTS: Normal caliber.  GALLBLADDER: Cholelithiasis.  SPLEEN: Within normal limits.  PANCREAS: Limited evaluation without intravenous contrast. No main   pancreatic duct dilatation.  ADRENALS: Within normal limits.  KIDNEYS/URETERS: 4 cm cyst right kidney. Subcentimeter hypodense lesion   lower pole left kidney, too small to characterize, possibly a small cyst.   No hydronephrosis or nephrolithiasis.  BLADDER: Within normal limits.  REPRODUCTIVE ORGANS: Fibroid uterus. No adnexal mass.  BOWEL: Hiatal hernia with herniation of the gastric fundus and upper   gastric body into the lower mediastinum. No bowel obstruction. Appendix   is normal.  PERITONEUM: No ascites.  VESSELS: Within normal limits.  RETROPERITONEUM/LYMPH NODES: No lymphadenopathy. No retroperitoneal   hematoma.  ABDOMINAL WALL: Diastasis of the recti muscles. Small fat-containing   umbilical hernia.  BONES: Mild degenerative changes.  IMPRESSION:  Limited study without contrast.  No retroperitoneal hematoma.  Moderate to large hiatal hernia.  Cholelithiasis without acute cholecystitis.  Fibroid uterus.  --- End of Report ---

## 2023-10-05 LAB
ANION GAP SERPL CALC-SCNC: 13 MMOL/L — SIGNIFICANT CHANGE UP (ref 7–14)
APTT BLD: 29.1 SEC — SIGNIFICANT CHANGE UP (ref 24.5–35.6)
BASOPHILS # BLD AUTO: 0.02 K/UL — SIGNIFICANT CHANGE UP (ref 0–0.2)
BASOPHILS NFR BLD AUTO: 0.4 % — SIGNIFICANT CHANGE UP (ref 0–2)
BLD GP AB SCN SERPL QL: NEGATIVE — SIGNIFICANT CHANGE UP
BUN SERPL-MCNC: 8 MG/DL — SIGNIFICANT CHANGE UP (ref 7–23)
CALCIUM SERPL-MCNC: 8.5 MG/DL — SIGNIFICANT CHANGE UP (ref 8.4–10.5)
CHLORIDE SERPL-SCNC: 108 MMOL/L — HIGH (ref 98–107)
CO2 SERPL-SCNC: 24 MMOL/L — SIGNIFICANT CHANGE UP (ref 22–31)
CREAT SERPL-MCNC: 0.78 MG/DL — SIGNIFICANT CHANGE UP (ref 0.5–1.3)
EGFR: 90 ML/MIN/1.73M2 — SIGNIFICANT CHANGE UP
EOSINOPHIL # BLD AUTO: 0.17 K/UL — SIGNIFICANT CHANGE UP (ref 0–0.5)
EOSINOPHIL NFR BLD AUTO: 3.3 % — SIGNIFICANT CHANGE UP (ref 0–6)
GLUCOSE SERPL-MCNC: 78 MG/DL — SIGNIFICANT CHANGE UP (ref 70–99)
HCT VFR BLD CALC: 29.1 % — LOW (ref 34.5–45)
HGB BLD-MCNC: 8.8 G/DL — LOW (ref 11.5–15.5)
IANC: 2.69 K/UL — SIGNIFICANT CHANGE UP (ref 1.8–7.4)
IMM GRANULOCYTES NFR BLD AUTO: 0.4 % — SIGNIFICANT CHANGE UP (ref 0–0.9)
INR BLD: 1.04 RATIO — SIGNIFICANT CHANGE UP (ref 0.85–1.18)
LYMPHOCYTES # BLD AUTO: 1.68 K/UL — SIGNIFICANT CHANGE UP (ref 1–3.3)
LYMPHOCYTES # BLD AUTO: 32.8 % — SIGNIFICANT CHANGE UP (ref 13–44)
MAGNESIUM SERPL-MCNC: 1.9 MG/DL — SIGNIFICANT CHANGE UP (ref 1.6–2.6)
MCHC RBC-ENTMCNC: 23 PG — LOW (ref 27–34)
MCHC RBC-ENTMCNC: 30.2 GM/DL — LOW (ref 32–36)
MCV RBC AUTO: 76.2 FL — LOW (ref 80–100)
MONOCYTES # BLD AUTO: 0.54 K/UL — SIGNIFICANT CHANGE UP (ref 0–0.9)
MONOCYTES NFR BLD AUTO: 10.5 % — SIGNIFICANT CHANGE UP (ref 2–14)
NEUTROPHILS # BLD AUTO: 2.69 K/UL — SIGNIFICANT CHANGE UP (ref 1.8–7.4)
NEUTROPHILS NFR BLD AUTO: 52.6 % — SIGNIFICANT CHANGE UP (ref 43–77)
NRBC # BLD: 0 /100 WBCS — SIGNIFICANT CHANGE UP (ref 0–0)
NRBC # FLD: 0.02 K/UL — HIGH (ref 0–0)
PHOSPHATE SERPL-MCNC: 3.5 MG/DL — SIGNIFICANT CHANGE UP (ref 2.5–4.5)
PLATELET # BLD AUTO: 347 K/UL — SIGNIFICANT CHANGE UP (ref 150–400)
POTASSIUM SERPL-MCNC: 3.8 MMOL/L — SIGNIFICANT CHANGE UP (ref 3.5–5.3)
POTASSIUM SERPL-SCNC: 3.8 MMOL/L — SIGNIFICANT CHANGE UP (ref 3.5–5.3)
PROTHROM AB SERPL-ACNC: 11.7 SEC — SIGNIFICANT CHANGE UP (ref 9.5–13)
RBC # BLD: 3.82 M/UL — SIGNIFICANT CHANGE UP (ref 3.8–5.2)
RBC # FLD: 23.6 % — HIGH (ref 10.3–14.5)
RH IG SCN BLD-IMP: POSITIVE — SIGNIFICANT CHANGE UP
SODIUM SERPL-SCNC: 145 MMOL/L — SIGNIFICANT CHANGE UP (ref 135–145)
WBC # BLD: 5.12 K/UL — SIGNIFICANT CHANGE UP (ref 3.8–10.5)
WBC # FLD AUTO: 5.12 K/UL — SIGNIFICANT CHANGE UP (ref 3.8–10.5)

## 2023-10-05 PROCEDURE — 88342 IMHCHEM/IMCYTCHM 1ST ANTB: CPT | Mod: 26

## 2023-10-05 PROCEDURE — 43239 EGD BIOPSY SINGLE/MULTIPLE: CPT

## 2023-10-05 PROCEDURE — 45385 COLONOSCOPY W/LESION REMOVAL: CPT

## 2023-10-05 PROCEDURE — 88305 TISSUE EXAM BY PATHOLOGIST: CPT | Mod: 26

## 2023-10-05 PROCEDURE — 45380 COLONOSCOPY AND BIOPSY: CPT | Mod: XS

## 2023-10-05 PROCEDURE — 99232 SBSQ HOSP IP/OBS MODERATE 35: CPT | Mod: GC

## 2023-10-05 NOTE — PROGRESS NOTE ADULT - ASSESSMENT
53F with PMHx BRYON, schizophrenia, GERD presenting with anemia to 6.3 , transferred from [facility], now s/p 1u pRBC, CT A/P unrevealing. EGD/Colonoscopy planned for today after 3D of bowel prep.

## 2023-10-05 NOTE — PROGRESS NOTE ADULT - SUBJECTIVE AND OBJECTIVE BOX
Medicine Progress Note  --------------------  Nafisa Trujillo M.D.  Internal Medicine/Emergency Medicine  PGY-1  --------------------      Patient: GEOFFREY KHOURY, MRN: [id number], : 1970  Admitted on 23 for Anemia due to acute blood loss      LANGUAGE - English               ------------------------------------------------------------------------------------------------------------  SUMMARY (from last progress note through 10-05-23 @ 05:44):   -  ------------------------------------------------------------------------------------------------------------  OVERNIGHT EVENTS  NAEON    SUBJECTIVE  -       ROS negative unless noted above.  ------------------------------------------------------------------------------------------------------------  OBJECTIVE:  Physical Exam  Constitutional: NAD, well developed  EYES: EOMI, conjunctiva clear  ENT:  Normal Hearing, no tonsillar exudates   Neck: Soft and supple , no thyromegaly   Respiratory: Breath sounds are clear bilaterally, No wheezing, rales or rhonchi, no tachypnea, no accessory muscle use  Cardiovascular: S1 and S2, regular rate and rhythm, no Murmurs, gallops or rubs, no JVD, no leg edema  Gastrointestinal: Bowel Sounds present, soft, nontender, nondistended, no guarding, no rebound  Extremities: No cyanosis or clubbing; warm to touch  Vascular: 2+ peripheral pulses lower ex  Neurological: No focal deficits, CN II-XII intact bilaterally, sensation to light touch intact in all extremities.  Musculoskeletal: 5/5 strength b/l upper and lower extremities; no joint swelling.  Skin: No rashes, no ulcerations  Psych:  flat affect, delusional     Vital Signs Last 24 Hrs  T(F): 97.5, Max: 98.4 (10-04-23 @ 13:59)  HR: 61 (61 - 84)  BP: 115/71 (115/71 - 132/77)  RR: 18 (17 - 18)  SpO2: 98% (98% - 99%)        DAILY MEASUREMENTS:  I&O's Summary    Daily     Daily   Weight (kg): 102 (23 @ 18:53)  Orthostatic VS        LABS:                        8.7    5.56  )-----------( 356      ( 04 Oct 2023 06:14 )             29.0     Hgb Trend: 8.7<--, 8.2<--, 6.9<--, 7.2<--, 7.0<--  10-04    143  |  106  |  8   ----------------------------<  77  3.8   |  28  |  0.81    Ca    8.7      04 Oct 2023 06:14  Phos  3.4     10-04  Mg     1.90     10-04      PT/INR - ( 04 Oct 2023 06:14 )   PT: 11.6 sec;   INR: 1.04 ratio         PTT - ( 04 Oct 2023 06:14 )  PTT:32.3 sec  CAPILLARY BLOOD GLUCOSE            Urinalysis Basic - ( 04 Oct 2023 06:14 )    Color: x / Appearance: x / SG: x / pH: x  Gluc: 77 mg/dL / Ketone: x  / Bili: x / Urobili: x   Blood: x / Protein: x / Nitrite: x   Leuk Esterase: x / RBC: x / WBC x   Sq Epi: x / Non Sq Epi: x / Bacteria: x              RADIOLOGY & ADDITIONAL TESTS:  Results Reviewed:     Imaging Reviewed:  Electrocardiogram Reviewed:      ------------------------------------------------------------------------------------------------------------  MEDICATIONS  (STANDING):  benztropine 2 milliGRAM(s) Oral daily  bisacodyl 10 milliGRAM(s) Oral once  cholecalciferol 1000 Unit(s) Oral daily  cloZAPine 25 milliGRAM(s) Oral at bedtime  ferrous    sulfate Liquid 300 milliGRAM(s) Oral <User Schedule>  pantoprazole    Tablet 40 milliGRAM(s) Oral before breakfast  polyethylene glycol 3350 17 Gram(s) Oral at bedtime  senna 2 Tablet(s) Oral at bedtime  valproic acid 500 milliGRAM(s) Oral two times a day    MEDICATIONS  (PRN):  acetaminophen     Tablet .. 650 milliGRAM(s) Oral every 6 hours PRN Temp greater or equal to 38C (100.4F), Mild Pain (1 - 3)  diphenhydrAMINE 50 milliGRAM(s) Oral every 6 hours PRN Threatening behavior  diphenhydrAMINE Injectable 50 milliGRAM(s) IntraMuscular every 6 hours PRN Threatening behavior  diphenhydrAMINE Injectable 50 milliGRAM(s) IV Push every 6 hours PRN Threatening behavior  haloperidol     Tablet 5 milliGRAM(s) Oral every 6 hours PRN agitation  haloperidol    Injectable 5 milliGRAM(s) IV Push every 6 hours PRN Agitation  haloperidol    Injectable 5 milliGRAM(s) IntraMuscular every 6 hours PRN Agitation  LORazepam   Injectable 2 milliGRAM(s) IntraMuscular once PRN Agitation  LORazepam   Injectable 2 milliGRAM(s) IV Push every 6 hours PRN Agitation  melatonin 3 milliGRAM(s) Oral at bedtime PRN Insomnia    ------------------------------------------------------------------------------------------------------------  COORDINATION OF CARE:  Care discussed with consultants/other providers and notes reviewed [Y]

## 2023-10-05 NOTE — PROGRESS NOTE ADULT - PROBLEM SELECTOR PLAN 5
DVT ppx: SCD iso anemia and possible GIB   Diet: CLD  Dispo: [facility]  Code: full    **Pt does NOT have the capacity to refuse emergent medical care including labs/investigations.

## 2023-10-05 NOTE — PRE PROCEDURE NOTE - PRE PROCEDURE EVALUATION
Attending Physician:                        Juan Jose Langston MD    Procedure: EGD / Colonoscopy    Indication for Procedure: anemia  ________________________________________________________  PAST MEDICAL & SURGICAL HISTORY:  Schizophrenia  Constipation  Obesity  Anxiety  Anemia    ALLERGIES:  penicillin (Other)    HOME MEDICATIONS:  benztropine 2 mg oral tablet: 1 tab(s) orally once a day  cholecalciferol oral tablet: 1 tab(s) orally once a day 1000 unit(s) orally  cloZAPine 25 mg oral tablet: 1 tab(s) orally once a day (at bedtime)  ferrous sulfate 300 mg/5 mL (60 mg/5 mL elemental iron) oral liquid: 5 milliliter(s) orally once a day (before a meal)  pantoprazole 40 mg oral delayed release tablet: 1 tab(s) orally once a day  polyethylene glycol 3350 oral powder for reconstitution: 17 gram(s) orally once a day (at bedtime)  senna (sennosides) 8.6 mg oral tablet: 1 tab(s) orally once a day  valproic acid 250 mg oral capsule: 2 cap(s) orally 2 times a day    AICD/PPM: [ ] yes   [ ] no    PERTINENT LAB DATA:                        8.8    5.12  )-----------( 347      ( 05 Oct 2023 06:29 )             29.1     10-05    145  |  108<H>  |  8   ----------------------------<  78  3.8   |  24  |  0.78    Ca    8.5      05 Oct 2023 06:29  Phos  3.5     10-05  Mg     1.90     10-05      PT/INR - ( 05 Oct 2023 06:29 )   PT: 11.7 sec;   INR: 1.04 ratio         PTT - ( 05 Oct 2023 06:29 )  PTT:29.1 sec        HCG Quantitative, Serum: 2.4 mIU/mL (10-04-23 @ 06:14)      PHYSICAL EXAMINATION:    Height (cm): 170.2  Weight (kg): 101.968  BMI (kg/m2): 35.2  BSA (m2): 2.13T(C): 36.1  HR: 70  BP: 114/84  RR: 12  SpO2: 100%    Constitutional: NAD    Respiratory: Normal effort  Cardiovascular: HDS  Gastrointestinal: soft, NT/ND  Extremities: No peripheral edema  Neurological: awake and alert     COMMENTS:    The patient is a suitable candidate for the planned procedure unless box checked [ ]  No, explain:

## 2023-10-05 NOTE — PROVIDER CONTACT NOTE (MEDICATION) - ACTION/TREATMENT ORDERED:
MD notified and made aware. No new interventions ordered at this time.
acp came to unit and spoke with pt, pt is continuing to refuse.
no new orders at this time
MD notified and made aware. No new interventions ordered at this time.

## 2023-10-05 NOTE — PROVIDER CONTACT NOTE (MEDICATION) - REASON
Patient refused cogentin
Patient refused to take valproic acid
pt refusing 2200 meds
pt refusing all 2200 meds and vitals

## 2023-10-05 NOTE — PROVIDER CONTACT NOTE (MEDICATION) - ASSESSMENT
pt is vitally stable. pt is still receiving golytely through the ngt
Patient assessed. No signs or symptoms of acute distress noted.
pt is alert and oriented times two. pt is confused and claims "the procedure has already been completed". pt also states he "doesn't take any medications, and doesn't need to start now".
Patient assessed. No signs or symptoms of acute distress noted.

## 2023-10-05 NOTE — PROVIDER CONTACT NOTE (MEDICATION) - BACKGROUND
Patient admitted for anemia.   PMH of schizophrenia, anxiety.
pt is 53 year old female admitted for anemia due to acute blood loss. pt has a history of anemia and anxiety, schizophrenia and constipation.
pt is a 53 year old female admitted for anemia due to acute blood loss. pt has a history of anemia, anxiety, and schizophrenia. pt goes by he/him and prefers to be called Peter.
Patient admitted for anemia.   PMH of schizophrenia, anxiety.

## 2023-10-05 NOTE — PROGRESS NOTE ADULT - PROBLEM SELECTOR PLAN 1
Unclear etiology. Known h/o BRYON (microcytic). First time requiring transfusion. Has not had formal GI w/u in the past per sister. no scopes in EMR. DDx includes anal fissures, hemorrhoids vs malignancy  Diagnostics:   [x] Initial Hgb 6.3, MCV 73.2, retic index 0.96 (hypoproliferative)  [x] Iron studies wnl (except ferritin 11 [L]), B12 & folate wnl from 8/2023  [x] TSH wnl   [x] LDH wnl, hapto wmn, fractionated bili wnl  [x] CT A/P: Moderate to large hiatal hernia. Cholelithiasis without acute cholecystitis. Fibroid uterus.  [ ] EGD and colonoscopy planned after clear stools (3d bowel prep + enema)      Therapeutics:   -s/p 2u pRBC, repeat Hgb 8.2  -transfuse if hgb <7, active T&S  -c/w PPI PO qd for now. No report of melena.  (pt does not have capacity to refuse blood transfusion. Unable to repeat back risks of not receiving blood. Does not understand risks of anemia. Denies anemia issues at this time. The sister and NOK/legal guardian Lester Avery provided consent to ER providers for blood transfusion)

## 2023-10-06 PROCEDURE — 99232 SBSQ HOSP IP/OBS MODERATE 35: CPT | Mod: GC

## 2023-10-06 PROCEDURE — 99232 SBSQ HOSP IP/OBS MODERATE 35: CPT

## 2023-10-06 RX ORDER — ACETAMINOPHEN 500 MG
2 TABLET ORAL
Qty: 0 | Refills: 0 | DISCHARGE
Start: 2023-10-06

## 2023-10-06 RX ORDER — VALPROIC ACID (AS SODIUM SALT) 250 MG/5ML
2 SOLUTION, ORAL ORAL
Qty: 0 | Refills: 0 | DISCHARGE
Start: 2023-10-06

## 2023-10-06 RX ORDER — BENZTROPINE MESYLATE 1 MG
1 TABLET ORAL
Qty: 0 | Refills: 0 | DISCHARGE
Start: 2023-10-06

## 2023-10-06 RX ORDER — HALOPERIDOL DECANOATE 100 MG/ML
1 INJECTION INTRAMUSCULAR
Qty: 0 | Refills: 0 | DISCHARGE
Start: 2023-10-06

## 2023-10-06 RX ORDER — CHOLECALCIFEROL (VITAMIN D3) 125 MCG
1000 CAPSULE ORAL
Qty: 0 | Refills: 0 | DISCHARGE
Start: 2023-10-06

## 2023-10-06 RX ORDER — DIPHENHYDRAMINE HCL 50 MG
1 CAPSULE ORAL
Qty: 0 | Refills: 0 | DISCHARGE
Start: 2023-10-06

## 2023-10-06 RX ORDER — FERROUS SULFATE 325(65) MG
5 TABLET ORAL
Qty: 0 | Refills: 0 | DISCHARGE
Start: 2023-10-06

## 2023-10-06 RX ORDER — LANOLIN ALCOHOL/MO/W.PET/CERES
1 CREAM (GRAM) TOPICAL
Qty: 0 | Refills: 0 | DISCHARGE
Start: 2023-10-06

## 2023-10-06 RX ORDER — BENZTROPINE MESYLATE 1 MG
1 TABLET ORAL
Refills: 0 | DISCHARGE

## 2023-10-06 RX ORDER — VALPROIC ACID (AS SODIUM SALT) 250 MG/5ML
2 SOLUTION, ORAL ORAL
Refills: 0 | DISCHARGE

## 2023-10-06 RX ORDER — CLOZAPINE 150 MG/1
1 TABLET, ORALLY DISINTEGRATING ORAL
Refills: 0 | DISCHARGE

## 2023-10-06 RX ORDER — CLOZAPINE 150 MG/1
1 TABLET, ORALLY DISINTEGRATING ORAL
Qty: 0 | Refills: 0 | DISCHARGE
Start: 2023-10-06

## 2023-10-06 RX ADMIN — Medication 300 MILLIGRAM(S): at 10:31

## 2023-10-06 NOTE — BH CONSULTATION LIAISON PROGRESS NOTE - NSBHATTESTCOMMENTATTENDFT_PSY_A_CORE
Chart reviewed, pt. seen, I agree with the above assessment/plan. Patient was sleeping on my exam and unable to engage in interview. Plan as above. Will follow
Chart reviewed, pt. seen/evaluated, I agree with above assessment/plan, pt. minimally engaged, remains disorganized, illogical, delusional. Poor insight. Plan as above, will follow

## 2023-10-06 NOTE — PROGRESS NOTE ADULT - PROBLEM SELECTOR PLAN 1
Unclear etiology. Known h/o BRYON (microcytic). First time requiring transfusion. Has not had formal GI w/u in the past per sister. no scopes in EMR. DDx includes anal fissures, hemorrhoids vs malignancy  Diagnostics:   [x] Initial Hgb 6.3, MCV 73.2, retic index 0.96 (hypoproliferative)  [x] Iron studies wnl (except ferritin 11 [L]), B12 & folate wnl from 8/2023  [x] TSH wnl   [x] LDH wnl, hapto wmn, fractionated bili wnl  [x] CT A/P: Moderate to large hiatal hernia. Cholelithiasis without acute cholecystitis. Fibroid uterus.  [x] EGD and colonoscopy: Large hiatal hernia. Diverticulosis in the sigmoid colon. One 8 mm polyp in the ascending colon.    Therapeutics:   -s/p 2u pRBC, repeat Hgb 8.2 stable  -transfuse if hgb <7, active T&S  -c/w PPI PO qd for now. No report of melena.  (pt does not have capacity to refuse blood transfusion. Unable to repeat back risks of not receiving blood. Does not understand risks of anemia. Denies anemia issues at this time. The sister and NOK/legal guardian Lester Avery provided consent to ER providers for blood transfusion)

## 2023-10-06 NOTE — DIETITIAN INITIAL EVALUATION ADULT - PROBLEM SELECTOR PLAN 1
Unclear etiology. Known to have BRYON (microcytic) for many years. First time requiring transfusion. Has not had formal GI w/u in the past per sister. no scopes in EMR. DDx includes anal fissures, hemorrhoids vs malignancy  -GI consult emailed  -the patient does not have capacity to refuse blood transfusion. She is not able to repeat back risks of not receiving blood. She does not understand that she has anemia and the risks of this. She denies anemia issues at this time. The sister and NOK/legal guardian Lester Avery provided consent to ER providers for blood transfusion  -1u PRBC ordered, repeat CBC after transfusion  -transfuse for hgb <7  -FOBT  -rectal exam deferred as patient refusing and in hallway  -c/w PPI PO qd for now. No report of melena  -no report of BRBPR, melena in ED

## 2023-10-06 NOTE — BH CONSULTATION LIAISON PROGRESS NOTE - NSBHASSESSMENTFT_PSY_ALL_CORE
52 YO female domiciled on [place] grounds with PMHx iron deficiency anemia, constipation, GERD, PPHx schizophrenia, schizoaffective dos, hx psychosis, unspecified anxiety, bipolar dos, selective mutism, no know hx SIB/SA, hx aggression in setting of medication nonadherence, f/b [facility] ACT tream, multiple inpatient psychiatric admissions, most recent inpatient psychiatric admission [facility] (8/4-present for increased agitation/physical aggression) *since 8/15 court ordered treatment over objection, presenting with anemia on labs and transferred from [facility]. Patient received Haldol 5mg x2 and Ativan 2mg X1 in ED.  Patient seen, opens eyes to name being called, looks at writer, refuses to engage verbally, PCA at bedside maintaining 1:1 CO states asked for snacks earlier.  10/2: remains disorganized, paranoid, without insight. No SI/HI. No focal neuro deficits.  10/3: a&o, endorsing delusions, denies si/hi, denies ah/vh, poor med compliance  10/4: Patient selectively mute, uncooperative, refused to engage with the provider. Refusing oral medications, no PRN meds administered     10/6/23: Patient slightly more cooperative, refusing oral medications, overall is disorganized, illogical, grandiose with poor insight into her mental illness.     PLAN:  -Observation deferred to primary team  -c/w current medication regimen   Valproic Acid 500 mg PO Q12. VPA level on 9/29 is 48.2   Cogentin 2 mg PO daily   Clozapine 25 mg PO QHS  -Agitation: Haldol 5mg q4h prn + Benadryl 50mg q4h prn PO/IM/IV  -Monitor EKG QTc interval and hold antipsychotic if QTc >500  -s/p Zyprexa Relprevv BREWER administered on 9/22 (next dose due 10/20)  -s/p loading dose of Haldol DEC 100mg 8/23, Haldol  mg 9/21  -Dispo: Return to [facility] when medically stable; 2PC completed and in chart  -CANNOT leave AMA  *PATIENT does NOT have the capacity to refuse emergent medical care including labs/investigations     54 YO female domiciled on [place] grounds with PMHx iron deficiency anemia, constipation, GERD, PPHx schizophrenia, schizoaffective dos, hx psychosis, unspecified anxiety, bipolar dos, selective mutism, no know hx SIB/SA, hx aggression in setting of medication nonadherence, f/b [facility] ACT tream, multiple inpatient psychiatric admissions, most recent inpatient psychiatric admission [facility] (8/4-present for increased agitation/physical aggression) *since 8/15 court ordered treatment over objection, presenting with anemia on labs and transferred from [facility]. Patient received Haldol 5mg x2 and Ativan 2mg X1 in ED.  Patient seen, opens eyes to name being called, looks at writer, refuses to engage verbally, PCA at bedside maintaining 1:1 CO states asked for snacks earlier.  10/2: remains disorganized, paranoid, without insight. No SI/HI. No focal neuro deficits.  10/3: a&o, endorsing delusions, denies si/hi, denies ah/vh, poor med compliance  10/4: Patient selectively mute, uncooperative, refused to engage with the provider. Refusing oral medications, no PRN meds administered     10/6/23: Patient slightly more cooperative, refusing oral medications, overall is disorganized, illogical, grandiose with poor insight into her mental illness.     PLAN:  -Observation deferred to primary team  -c/w current medication regimen   Valproic Acid 500 mg PO Q12. VPA level on 9/29 is 48.2 (may use sprinkle to improve compliance)   Cogentin 2 mg PO daily   Clozapine 25 mg PO QHS  -Agitation: Haldol 5mg q4h prn + Benadryl 50mg q4h prn PO/IM/IV  -Monitor EKG QTc interval and hold antipsychotic if QTc >500  -s/p Zyprexa Relprevv BREWER administered on 9/22 (next dose due 10/20)  -s/p loading dose of Haldol DEC 100mg 8/23, Haldol  mg 9/21  -Dispo: Return to [facility] when medically stable; 2PC completed and in chart  -CANNOT leave AMA  *PATIENT does NOT have the capacity to refuse emergent medical care including labs/investigations

## 2023-10-06 NOTE — DIETITIAN INITIAL EVALUATION ADULT - OTHER INFO
53 year old female with a PMH of BRYON, schizophrenia, GERD presenting with anemia to 6.3, transferred from [facility]. EGD/Colonoscopy showed diverticulosis and one polyp removed from ascending colon. Now medically cleared to return to [facility] per chart.    Patient seen during lunch and didn't consume much of tray per observation. Noted w/ some intakes 0-100% per RN flow sheet. No GI distress noted. Last bowel movement (10/5) per RN flow sheet. No chewing/swallowing difficulties. Unable to obtain UBW at this time. Per previous RD note (8/4) noted w/ weight 113.4 kg. ABW is 102 kg (10/5) and 102 kg (9/30) per chart indicating a -10.1% weight loss x 2 months, etiology unclear at this time. No edema or pressure injuries noted per RN flow sheet.

## 2023-10-06 NOTE — PROGRESS NOTE ADULT - ASSESSMENT
53F with PMHx BRYON, schizophrenia, GERD presenting with anemia to 6.3 , transferred from [facility], now s/p 1u pRBC, CT A/P unrevealing. EGD/Colonoscopy showed diverticulosis and one polyp removed from ascending colon. Now medically cleared to return to [facility].

## 2023-10-06 NOTE — DIETITIAN INITIAL EVALUATION ADULT - ORAL INTAKE PTA/DIET HISTORY
Patient seen for assessment. Patient asleep and didn't want to engage in interview at this time. Information obtained from chart review.

## 2023-10-06 NOTE — DIETITIAN INITIAL EVALUATION ADULT - PROBLEM SELECTOR PLAN 5
Hold chemical DVT ppx in setting of anemia/possible bleeding. SCDs ordered  Regular diet  Contact: Lester Avery (sister/NOK/legal guardian) [phone]

## 2023-10-06 NOTE — PROGRESS NOTE ADULT - PROBLEM SELECTOR PLAN 5
DVT ppx: SCD iso anemia and possible GIB   Diet: regular  Dispo: [facility]  Code: full    **Pt does NOT have the capacity to refuse emergent medical care including labs/investigations.

## 2023-10-06 NOTE — DIETITIAN INITIAL EVALUATION ADULT - PERTINENT MEDS FT
MEDICATIONS  (STANDING):  benztropine 2 milliGRAM(s) Oral daily  bisacodyl 10 milliGRAM(s) Oral once  cholecalciferol 1000 Unit(s) Oral daily  cloZAPine 25 milliGRAM(s) Oral at bedtime  ferrous    sulfate Liquid 300 milliGRAM(s) Oral <User Schedule>  pantoprazole    Tablet 40 milliGRAM(s) Oral before breakfast  polyethylene glycol 3350 17 Gram(s) Oral at bedtime  senna 2 Tablet(s) Oral at bedtime  valproic acid 500 milliGRAM(s) Oral two times a day    MEDICATIONS  (PRN):  acetaminophen     Tablet .. 650 milliGRAM(s) Oral every 6 hours PRN Temp greater or equal to 38C (100.4F), Mild Pain (1 - 3)  diphenhydrAMINE 50 milliGRAM(s) Oral every 6 hours PRN Threatening behavior  diphenhydrAMINE Injectable 50 milliGRAM(s) IV Push every 6 hours PRN Threatening behavior  diphenhydrAMINE Injectable 50 milliGRAM(s) IntraMuscular every 6 hours PRN Threatening behavior  haloperidol     Tablet 5 milliGRAM(s) Oral every 6 hours PRN agitation  haloperidol    Injectable 5 milliGRAM(s) IntraMuscular every 6 hours PRN Agitation  haloperidol    Injectable 5 milliGRAM(s) IV Push every 6 hours PRN Agitation  LORazepam   Injectable 2 milliGRAM(s) IntraMuscular once PRN Agitation  LORazepam   Injectable 2 milliGRAM(s) IV Push every 6 hours PRN Agitation  melatonin 3 milliGRAM(s) Oral at bedtime PRN Insomnia

## 2023-10-06 NOTE — DIETITIAN INITIAL EVALUATION ADULT - PERTINENT LABORATORY DATA
10-05    145  |  108<H>  |  8   ----------------------------<  78  3.8   |  24  |  0.78    Ca    8.5      05 Oct 2023 06:29  Phos  3.5     10-05  Mg     1.90     10-05    A1C with Estimated Average Glucose Result: 5.3 % (09-29-23 @ 10:00)

## 2023-10-06 NOTE — PROGRESS NOTE ADULT - SUBJECTIVE AND OBJECTIVE BOX
Medicine Progress Note  --------------------  Nafisa Trujillo M.D.  Internal Medicine/Emergency Medicine  PGY-1  --------------------      Patient: GEOFFREY KHOURY, MRN: [id number], : 1970  Admitted on 23 for Anemia due to acute blood loss      LANGUAGE - English                ------------------------------------------------------------------------------------------------------------  SUMMARY (from last progress note through 10-06-23 @ 05:40):   -  ------------------------------------------------------------------------------------------------------------  OVERNIGHT EVENTS  NAEON    SUBJECTIVE  -       ROS negative unless noted above.  ------------------------------------------------------------------------------------------------------------  OBJECTIVE:  Physical Exam  Constitutional: NAD, well developed  EYES: EOMI, conjunctiva clear  ENT:  Normal Hearing, no tonsillar exudates   Neck: Soft and supple , no thyromegaly   Respiratory: Breath sounds are clear bilaterally, No wheezing, rales or rhonchi, no tachypnea, no accessory muscle use  Cardiovascular: S1 and S2, regular rate and rhythm, no Murmurs, gallops or rubs, no JVD, no leg edema  Gastrointestinal: Bowel Sounds present, soft, nontender, nondistended, no guarding, no rebound  Extremities: No cyanosis or clubbing; warm to touch  Vascular: 2+ peripheral pulses lower ex  Neurological: No focal deficits, CN II-XII intact bilaterally, sensation to light touch intact in all extremities.  Musculoskeletal: 5/5 strength b/l upper and lower extremities; no joint swelling.  Skin: No rashes, no ulcerations  Psych:  flat affect, delusional     Vital Signs Last 24 Hrs  T(F): 97.7, Max: 97.9 (10-05-23 @ 21:08)  HR: 88 (70 - 95)  BP: 126/72 (96/64 - 131/76)  RR: 17 (12 - 20)  SpO2: 98% (98% - 100%)        DAILY MEASUREMENTS:  I&O's Summary    Daily Height in cm: 170.2 (05 Oct 2023 08:32)    Daily   Weight (kg): 101.968 (10-05-23 @ 08:32)  Orthostatic VS        LABS:                        8.8    5.12  )-----------( 347      ( 05 Oct 2023 06:29 )             29.1     Hgb Trend: 8.8<--, 8.7<--, 8.2<--, 6.9<--, 7.2<--  10-05    145  |  108<H>  |  8   ----------------------------<  78  3.8   |  24  |  0.78    Ca    8.5      05 Oct 2023 06:29  Phos  3.5     10-05  Mg     1.90     10-05      PT/INR - ( 05 Oct 2023 06:29 )   PT: 11.7 sec;   INR: 1.04 ratio         PTT - ( 05 Oct 2023 06:29 )  PTT:29.1 sec  CAPILLARY BLOOD GLUCOSE            Urinalysis Basic - ( 05 Oct 2023 06:29 )    Color: x / Appearance: x / SG: x / pH: x  Gluc: 78 mg/dL / Ketone: x  / Bili: x / Urobili: x   Blood: x / Protein: x / Nitrite: x   Leuk Esterase: x / RBC: x / WBC x   Sq Epi: x / Non Sq Epi: x / Bacteria: x              RADIOLOGY & ADDITIONAL TESTS:  Results Reviewed:     Imaging Reviewed:  Electrocardiogram Reviewed:      ------------------------------------------------------------------------------------------------------------  MEDICATIONS  (STANDING):  benztropine 2 milliGRAM(s) Oral daily  bisacodyl 10 milliGRAM(s) Oral once  cholecalciferol 1000 Unit(s) Oral daily  cloZAPine 25 milliGRAM(s) Oral at bedtime  ferrous    sulfate Liquid 300 milliGRAM(s) Oral <User Schedule>  pantoprazole    Tablet 40 milliGRAM(s) Oral before breakfast  polyethylene glycol 3350 17 Gram(s) Oral at bedtime  senna 2 Tablet(s) Oral at bedtime  valproic acid 500 milliGRAM(s) Oral two times a day    MEDICATIONS  (PRN):  acetaminophen     Tablet .. 650 milliGRAM(s) Oral every 6 hours PRN Temp greater or equal to 38C (100.4F), Mild Pain (1 - 3)  diphenhydrAMINE 50 milliGRAM(s) Oral every 6 hours PRN Threatening behavior  diphenhydrAMINE Injectable 50 milliGRAM(s) IV Push every 6 hours PRN Threatening behavior  diphenhydrAMINE Injectable 50 milliGRAM(s) IntraMuscular every 6 hours PRN Threatening behavior  haloperidol     Tablet 5 milliGRAM(s) Oral every 6 hours PRN agitation  haloperidol    Injectable 5 milliGRAM(s) IV Push every 6 hours PRN Agitation  haloperidol    Injectable 5 milliGRAM(s) IntraMuscular every 6 hours PRN Agitation  LORazepam   Injectable 2 milliGRAM(s) IntraMuscular once PRN Agitation  LORazepam   Injectable 2 milliGRAM(s) IV Push every 6 hours PRN Agitation  melatonin 3 milliGRAM(s) Oral at bedtime PRN Insomnia    ------------------------------------------------------------------------------------------------------------  COORDINATION OF CARE:  Care discussed with consultants/other providers and notes reviewed [Y]

## 2023-10-06 NOTE — DIETITIAN INITIAL EVALUATION ADULT - REASON
Unable to get consent. Unable to assess for overt signs of muscle/fat loss at this time as well, patient covered in blankets

## 2023-10-06 NOTE — DIETITIAN INITIAL EVALUATION ADULT - ADD RECOMMEND
Continue diet as ordered. Nutrition department will provide Orgain BID (440 kcal, 32 g pro). Monitor PO intake.

## 2023-10-06 NOTE — BH CONSULTATION LIAISON PROGRESS NOTE - NSBHPSYCHOLCOGABN_PSY_A_CORE
disoriented to time/disoriented to situation
disoriented to time/disoriented to place/disoriented to situation

## 2023-10-07 LAB — HALOPERIDOL SERPL-MCNC: 3.8 NG/ML — SIGNIFICANT CHANGE UP (ref 1–40)

## 2023-10-07 PROCEDURE — 99232 SBSQ HOSP IP/OBS MODERATE 35: CPT | Mod: GC

## 2023-10-07 NOTE — PROGRESS NOTE ADULT - SUBJECTIVE AND OBJECTIVE BOX
Progress Note    GEOFFREY KHOURY 53y (1970) Female [id number]  09-29-23 (8d)    Chief Complaint: Anemia due to acute blood loss        Subjective:  No acute events overnight. Patient seen and examined at bedside. No current complaints, however patient minimally cooperative with interview    Review of Systems:  As above       PAST MEDICAL & SURGICAL HISTORY:  Schizophrenia [F20.9]    Constipation [K59.00]    Obesity [E66.9]    Anxiety [F41.9]    Anemia [D64.9]    No significant past surgical history [111621434]      acetaminophen     Tablet .. 650 milliGRAM(s) Oral every 6 hours PRN  benztropine 2 milliGRAM(s) Oral daily  bisacodyl 10 milliGRAM(s) Oral once  cholecalciferol 1000 Unit(s) Oral daily  cloZAPine 25 milliGRAM(s) Oral at bedtime  diphenhydrAMINE 50 milliGRAM(s) Oral every 6 hours PRN  diphenhydrAMINE Injectable 50 milliGRAM(s) IV Push every 6 hours PRN  diphenhydrAMINE Injectable 50 milliGRAM(s) IntraMuscular every 6 hours PRN  ferrous    sulfate Liquid 300 milliGRAM(s) Oral <User Schedule>  haloperidol     Tablet 5 milliGRAM(s) Oral every 6 hours PRN  haloperidol    Injectable 5 milliGRAM(s) IntraMuscular every 6 hours PRN  haloperidol    Injectable 5 milliGRAM(s) IV Push every 6 hours PRN  LORazepam   Injectable 2 milliGRAM(s) IV Push every 6 hours PRN  LORazepam   Injectable 2 milliGRAM(s) IntraMuscular once PRN  melatonin 3 milliGRAM(s) Oral at bedtime PRN  pantoprazole    Tablet 40 milliGRAM(s) Oral before breakfast  polyethylene glycol 3350 17 Gram(s) Oral at bedtime  senna 2 Tablet(s) Oral at bedtime  valproic acid 500 milliGRAM(s) Oral two times a day    Objective:  T(C): 36.7 (10-07-23 @ 05:47), Max: 36.7 (10-07-23 @ 05:47)  HR: 61 (10-07-23 @ 05:47) (61 - 83)  BP: 120/73 (10-07-23 @ 05:47) (105/57 - 120/73)  RR: 18 (10-07-23 @ 05:47) (18 - 18)  SpO2: 100% (10-07-23 @ 05:47) (99% - 100%)    Physical exam:  Constitutional: NAD, well developed  EYES: EOMI, conjunctiva clear  ENT:  Normal Hearing, no tonsillar exudates   Neck: Soft and supple , no thyromegaly   Respiratory: Breath sounds are clear bilaterally, No wheezing, rales or rhonchi, no tachypnea, no accessory muscle use  Cardiovascular: S1 and S2, regular rate and rhythm, no Murmurs, gallops or rubs, no JVD, no leg edema  Gastrointestinal: Bowel Sounds present, soft, nontender, nondistended, no guarding, no rebound  Extremities: No cyanosis or clubbing; warm to touch  Vascular: 2+ peripheral pulses lower ex  Neurological: No focal deficits, CN II-XII intact bilaterally, sensation to light touch intact in all extremities.  Musculoskeletal: 5/5 strength b/l upper and lower extremities; no joint swelling.  Skin: No rashes, no ulcerations  Psych:  flat affect, delusional         CAPILLARY BLOOD GLUCOSE                    RVP:          Tox:             WBC Trend: 5.12<--, 5.56<--, 5.15<--    Hb Trend: 8.8<--, 8.7<--, 8.2<--, 6.9<--, 7.2<--        New imaging in last 24 hours:  Consult notes reviewed:

## 2023-10-07 NOTE — PROGRESS NOTE ADULT - ASSESSMENT
53F with PMHx BRYON, schizophrenia, GERD presenting with anemia to 6.3 , transferred from [facility], now s/p 1u pRBC, CT A/P unrevealing. EGD/Colonoscopy showed diverticulosis and one polyp removed from ascending colon. Now medically cleared to return to [facility].

## 2023-10-07 NOTE — PROGRESS NOTE ADULT - PROBLEM SELECTOR PLAN 5
DVT ppx: SCD iso anemia and possible GIB   Diet: regular  Dispo: [facility]  Code: full    **Pt does NOT have the capacity to refuse emergent medical care including labs/investigations.

## 2023-10-08 PROCEDURE — 99232 SBSQ HOSP IP/OBS MODERATE 35: CPT | Mod: GC

## 2023-10-08 NOTE — ED BEHAVIORAL HEALTH NOTE - BEHAVIORAL HEALTH NOTE
Pt for transfer to inpatient psych facility. SW called the following hospitals:     [facility]: As per RAMESH Baeza, no beds available at this time.     No updated Covid result in chart at this time.     Unit SW to follow up.

## 2023-10-08 NOTE — PROGRESS NOTE ADULT - SUBJECTIVE AND OBJECTIVE BOX
Medicine Progress Note  --------------------  Nafisa Trujillo M.D.  Internal Medicine/Emergency Medicine  PGY-1  --------------------      Patient: GEOFFREY KHOURY, MRN: [id number], : 1970  Admitted on 23 for Anemia due to acute blood loss      LANGUAGE - English                 ------------------------------------------------------------------------------------------------------------  SUMMARY (from last progress note through 10-08-23 @ 05:03):   -  ------------------------------------------------------------------------------------------------------------  OVERNIGHT EVENTS  NAEON    SUBJECTIVE  -       ROS negative unless noted above.  ------------------------------------------------------------------------------------------------------------  OBJECTIVE:  Physical Exam  Constitutional: NAD, well developed  EYES: EOMI, conjunctiva clear  ENT:  Normal Hearing, no tonsillar exudates   Neck: Soft and supple , no thyromegaly   Respiratory: Breath sounds are clear bilaterally, No wheezing, rales or rhonchi, no tachypnea, no accessory muscle use  Cardiovascular: S1 and S2, regular rate and rhythm, no Murmurs, gallops or rubs, no JVD, no leg edema  Gastrointestinal: Bowel Sounds present, soft, nontender, nondistended, no guarding, no rebound  Extremities: No cyanosis or clubbing; warm to touch  Vascular: 2+ peripheral pulses lower ex  Neurological: No focal deficits, CN II-XII intact bilaterally, sensation to light touch intact in all extremities.  Musculoskeletal: 5/5 strength b/l upper and lower extremities; no joint swelling.  Skin: No rashes, no ulcerations  Psych:  flat affect, delusional     Vital Signs Last 24 Hrs  T(F): 98, Max: 98 (10-07-23 @ 05:47)  HR: 61 (61 - 61)  BP: 120/73 (120/73 - 120/73)  RR: 18 (18 - 18)  SpO2: 100% (100% - 100%)        DAILY MEASUREMENTS:  I&O's Summary    Daily     Daily   Weight (kg): 101.968 (10-05-23 @ 08:32)  Orthostatic VS        LABS:    Hgb Trend: 8.8<--, 8.7<--, 8.2<--, 6.9<--          CAPILLARY BLOOD GLUCOSE                      RADIOLOGY & ADDITIONAL TESTS:  Results Reviewed:     Imaging Reviewed:  Electrocardiogram Reviewed:      ------------------------------------------------------------------------------------------------------------  MEDICATIONS  (STANDING):  benztropine 2 milliGRAM(s) Oral daily  bisacodyl 10 milliGRAM(s) Oral once  cholecalciferol 1000 Unit(s) Oral daily  cloZAPine 25 milliGRAM(s) Oral at bedtime  ferrous    sulfate Liquid 300 milliGRAM(s) Oral <User Schedule>  pantoprazole    Tablet 40 milliGRAM(s) Oral before breakfast  polyethylene glycol 3350 17 Gram(s) Oral at bedtime  senna 2 Tablet(s) Oral at bedtime  valproic acid 500 milliGRAM(s) Oral two times a day    MEDICATIONS  (PRN):  acetaminophen     Tablet .. 650 milliGRAM(s) Oral every 6 hours PRN Temp greater or equal to 38C (100.4F), Mild Pain (1 - 3)  diphenhydrAMINE 50 milliGRAM(s) Oral every 6 hours PRN Threatening behavior  diphenhydrAMINE Injectable 50 milliGRAM(s) IntraMuscular every 6 hours PRN Threatening behavior  diphenhydrAMINE Injectable 50 milliGRAM(s) IV Push every 6 hours PRN Threatening behavior  haloperidol     Tablet 5 milliGRAM(s) Oral every 6 hours PRN agitation  haloperidol    Injectable 5 milliGRAM(s) IV Push every 6 hours PRN Agitation  haloperidol    Injectable 5 milliGRAM(s) IntraMuscular every 6 hours PRN Agitation  LORazepam   Injectable 2 milliGRAM(s) IntraMuscular once PRN Agitation  melatonin 3 milliGRAM(s) Oral at bedtime PRN Insomnia    ------------------------------------------------------------------------------------------------------------  COORDINATION OF CARE:  Care discussed with consultants/other providers and notes reviewed [Y]     Medicine Progress Note  --------------------  Nafisa Trujillo M.D.  Internal Medicine/Emergency Medicine  PGY-1  --------------------      Patient: GEOFFREY KHOURY, MRN: [id number], : 1970  Admitted on 23 for Anemia due to acute blood loss      LANGUAGE - English                 ------------------------------------------------------------------------------------------------------------  SUMMARY (from last progress note through 10-08-23 @ 05:03):   -  ------------------------------------------------------------------------------------------------------------  OVERNIGHT EVENTS  NAEON    SUBJECTIVE  - Pt seen this am lying in bed comfortably. Calm. Denies dark or bloody stool. States stool is normal brown color.       ROS negative unless noted above.  ------------------------------------------------------------------------------------------------------------  OBJECTIVE:  Physical Exam  Constitutional: NAD, well developed  EYES: EOMI, conjunctiva clear  ENT:  Normal Hearing, no tonsillar exudates   Neck: Soft and supple , no thyromegaly   Respiratory: Breath sounds are clear bilaterally, No wheezing, rales or rhonchi, no tachypnea, no accessory muscle use  Cardiovascular: S1 and S2, regular rate and rhythm, no Murmurs, gallops or rubs, no JVD, no leg edema  Gastrointestinal: Bowel Sounds present, soft, nontender, nondistended, no guarding, no rebound  Extremities: No cyanosis or clubbing; warm to touch  Vascular: 2+ peripheral pulses lower ex  Neurological: No focal deficits, CN II-XII intact bilaterally, sensation to light touch intact in all extremities.  Musculoskeletal: 5/5 strength b/l upper and lower extremities; no joint swelling.  Skin: No rashes, no ulcerations  Psych:  flat affect, delusional     Vital Signs Last 24 Hrs  T(F): 98, Max: 98 (10-07-23 @ 05:47)  HR: 61 (61 - 61)  BP: 120/73 (120/73 - 120/73)  RR: 18 (18 - 18)  SpO2: 100% (100% - 100%)        DAILY MEASUREMENTS:  I&O's Summary    Daily     Daily   Weight (kg): 101.968 (10-05-23 @ 08:32)  Orthostatic VS        LABS:    Hgb Trend: 8.8<--, 8.7<--, 8.2<--, 6.9<--          CAPILLARY BLOOD GLUCOSE                      RADIOLOGY & ADDITIONAL TESTS:  Results Reviewed:     Imaging Reviewed:  Electrocardiogram Reviewed:      ------------------------------------------------------------------------------------------------------------  MEDICATIONS  (STANDING):  benztropine 2 milliGRAM(s) Oral daily  bisacodyl 10 milliGRAM(s) Oral once  cholecalciferol 1000 Unit(s) Oral daily  cloZAPine 25 milliGRAM(s) Oral at bedtime  ferrous    sulfate Liquid 300 milliGRAM(s) Oral <User Schedule>  pantoprazole    Tablet 40 milliGRAM(s) Oral before breakfast  polyethylene glycol 3350 17 Gram(s) Oral at bedtime  senna 2 Tablet(s) Oral at bedtime  valproic acid 500 milliGRAM(s) Oral two times a day    MEDICATIONS  (PRN):  acetaminophen     Tablet .. 650 milliGRAM(s) Oral every 6 hours PRN Temp greater or equal to 38C (100.4F), Mild Pain (1 - 3)  diphenhydrAMINE 50 milliGRAM(s) Oral every 6 hours PRN Threatening behavior  diphenhydrAMINE Injectable 50 milliGRAM(s) IntraMuscular every 6 hours PRN Threatening behavior  diphenhydrAMINE Injectable 50 milliGRAM(s) IV Push every 6 hours PRN Threatening behavior  haloperidol     Tablet 5 milliGRAM(s) Oral every 6 hours PRN agitation  haloperidol    Injectable 5 milliGRAM(s) IV Push every 6 hours PRN Agitation  haloperidol    Injectable 5 milliGRAM(s) IntraMuscular every 6 hours PRN Agitation  LORazepam   Injectable 2 milliGRAM(s) IntraMuscular once PRN Agitation  melatonin 3 milliGRAM(s) Oral at bedtime PRN Insomnia    ------------------------------------------------------------------------------------------------------------  COORDINATION OF CARE:  Care discussed with consultants/other providers and notes reviewed [Y]

## 2023-10-08 NOTE — PROGRESS NOTE ADULT - PROBLEM SELECTOR PLAN 5
DVT ppx: SCD iso anemia and possible GIB   Diet: regular  Dispo: [facility]  Code: full    **Pt does NOT have the capacity to refuse emergent medical care including labs/investigations.

## 2023-10-08 NOTE — PROGRESS NOTE ADULT - ASSESSMENT
53F with PMHx BRYON, schizophrenia, GERD presenting with anemia to 6.3 , transferred from [facility], now s/p 1u pRBC, CT A/P unrevealing. EGD/Colonoscopy showed diverticulosis and one polyp removed from ascending colon. Now medically cleared to return to [facility].

## 2023-10-08 NOTE — PROGRESS NOTE ADULT - PROBLEM SELECTOR PLAN 1
Unclear etiology. Known h/o BRYON (microcytic). First time requiring transfusion. Has not had formal GI w/u in the past per sister. no scopes in EMR. DDx includes anal fissures, hemorrhoids vs malignancy  Diagnostics:   [x] Initial Hgb 6.3, MCV 73.2, retic index 0.96 (hypoproliferative)  [x] Iron studies wnl (except ferritin 11 [L]), B12 & folate wnl from 8/2023  [x] TSH wnl   [x] LDH wnl, hapto wmn, fractionated bili wnl  [x] CT A/P: Moderate to large hiatal hernia. Cholelithiasis without acute cholecystitis. Fibroid uterus.  [x] EGD and colonoscopy: Large hiatal hernia. Diverticulosis in the sigmoid colon. One 8 mm polyp in the ascending colon.    Therapeutics:   -s/p 2u pRBC, repeat Hgb stable x 2 days  -transfuse if hgb <7, active T&S  -c/w PPI PO qd for now. No report of melena.  (pt does not have capacity to refuse blood transfusion. Unable to repeat back risks of not receiving blood. Does not understand risks of anemia. Denies anemia issues at this time. The sister and NOK/legal guardian Lester Avery provided consent to ER providers for blood transfusion)

## 2023-10-09 LAB
ANION GAP SERPL CALC-SCNC: 10 MMOL/L — SIGNIFICANT CHANGE UP (ref 7–14)
BASOPHILS # BLD AUTO: 0.04 K/UL — SIGNIFICANT CHANGE UP (ref 0–0.2)
BASOPHILS NFR BLD AUTO: 0.6 % — SIGNIFICANT CHANGE UP (ref 0–2)
BLD GP AB SCN SERPL QL: NEGATIVE — SIGNIFICANT CHANGE UP
BUN SERPL-MCNC: 14 MG/DL — SIGNIFICANT CHANGE UP (ref 7–23)
CALCIUM SERPL-MCNC: 8.8 MG/DL — SIGNIFICANT CHANGE UP (ref 8.4–10.5)
CHLORIDE SERPL-SCNC: 110 MMOL/L — HIGH (ref 98–107)
CO2 SERPL-SCNC: 24 MMOL/L — SIGNIFICANT CHANGE UP (ref 22–31)
CREAT SERPL-MCNC: 0.71 MG/DL — SIGNIFICANT CHANGE UP (ref 0.5–1.3)
EGFR: 101 ML/MIN/1.73M2 — SIGNIFICANT CHANGE UP
EOSINOPHIL # BLD AUTO: 0.14 K/UL — SIGNIFICANT CHANGE UP (ref 0–0.5)
EOSINOPHIL NFR BLD AUTO: 2.1 % — SIGNIFICANT CHANGE UP (ref 0–6)
GLUCOSE SERPL-MCNC: 89 MG/DL — SIGNIFICANT CHANGE UP (ref 70–99)
HCT VFR BLD CALC: 30.3 % — LOW (ref 34.5–45)
HGB BLD-MCNC: 9 G/DL — LOW (ref 11.5–15.5)
IANC: 3.85 K/UL — SIGNIFICANT CHANGE UP (ref 1.8–7.4)
IMM GRANULOCYTES NFR BLD AUTO: 0.6 % — SIGNIFICANT CHANGE UP (ref 0–0.9)
LYMPHOCYTES # BLD AUTO: 1.88 K/UL — SIGNIFICANT CHANGE UP (ref 1–3.3)
LYMPHOCYTES # BLD AUTO: 28.7 % — SIGNIFICANT CHANGE UP (ref 13–44)
MAGNESIUM SERPL-MCNC: 1.9 MG/DL — SIGNIFICANT CHANGE UP (ref 1.6–2.6)
MCHC RBC-ENTMCNC: 22.2 PG — LOW (ref 27–34)
MCHC RBC-ENTMCNC: 29.7 GM/DL — LOW (ref 32–36)
MCV RBC AUTO: 74.8 FL — LOW (ref 80–100)
MONOCYTES # BLD AUTO: 0.6 K/UL — SIGNIFICANT CHANGE UP (ref 0–0.9)
MONOCYTES NFR BLD AUTO: 9.2 % — SIGNIFICANT CHANGE UP (ref 2–14)
NEUTROPHILS # BLD AUTO: 3.85 K/UL — SIGNIFICANT CHANGE UP (ref 1.8–7.4)
NEUTROPHILS NFR BLD AUTO: 58.8 % — SIGNIFICANT CHANGE UP (ref 43–77)
NRBC # BLD: 0 /100 WBCS — SIGNIFICANT CHANGE UP (ref 0–0)
NRBC # FLD: 0 K/UL — SIGNIFICANT CHANGE UP (ref 0–0)
PHOSPHATE SERPL-MCNC: 3.4 MG/DL — SIGNIFICANT CHANGE UP (ref 2.5–4.5)
PLATELET # BLD AUTO: 367 K/UL — SIGNIFICANT CHANGE UP (ref 150–400)
POTASSIUM SERPL-MCNC: 3.9 MMOL/L — SIGNIFICANT CHANGE UP (ref 3.5–5.3)
POTASSIUM SERPL-SCNC: 3.9 MMOL/L — SIGNIFICANT CHANGE UP (ref 3.5–5.3)
RBC # BLD: 4.05 M/UL — SIGNIFICANT CHANGE UP (ref 3.8–5.2)
RBC # FLD: 22.8 % — HIGH (ref 10.3–14.5)
RH IG SCN BLD-IMP: POSITIVE — SIGNIFICANT CHANGE UP
SARS-COV-2 RNA SPEC QL NAA+PROBE: SIGNIFICANT CHANGE UP
SODIUM SERPL-SCNC: 144 MMOL/L — SIGNIFICANT CHANGE UP (ref 135–145)
SURGICAL PATHOLOGY STUDY: SIGNIFICANT CHANGE UP
WBC # BLD: 6.55 K/UL — SIGNIFICANT CHANGE UP (ref 3.8–10.5)
WBC # FLD AUTO: 6.55 K/UL — SIGNIFICANT CHANGE UP (ref 3.8–10.5)

## 2023-10-09 PROCEDURE — 99232 SBSQ HOSP IP/OBS MODERATE 35: CPT | Mod: GC

## 2023-10-09 PROCEDURE — 93010 ELECTROCARDIOGRAM REPORT: CPT

## 2023-10-09 RX ADMIN — Medication 1000 UNIT(S): at 11:07

## 2023-10-09 RX ADMIN — Medication 300 MILLIGRAM(S): at 11:07

## 2023-10-09 NOTE — PROGRESS NOTE ADULT - SUBJECTIVE AND OBJECTIVE BOX
INCOMPLETE NOTE    CHIEF COMPLAINT:    Interval Events:    REVIEW OF SYSTEMS:  CONSTITUTIONAL: No weakness, fevers or chills  EYES/ENT: No visual changes;  No vertigo or throat pain   NECK: No pain or stiffness  RESPIRATORY: No cough, wheezing, hemoptysis; No shortness of breath  CARDIOVASCULAR: No chest pain or palpitations  GASTROINTESTINAL: No abdominal or epigastric pain. No nausea, vomiting, or hematemesis; No diarrhea or constipation. No melena or hematochezia.  GENITOURINARY: No dysuria, frequency or hematuria  NEUROLOGICAL: No numbness or weakness  SKIN: No itching, burning, rashes, or lesions   All other review of systems is negative unless indicated above.    OBJECTIVE:  ICU Vital Signs Last 24 Hrs  T(C): --  T(F): --  HR: --  BP: --  BP(mean): --  ABP: --  ABP(mean): --  RR: --  SpO2: --          CAPILLARY BLOOD GLUCOSE          PHYSICAL EXAM:  General: WN/WD NAD  Neurology: A&Ox3, nonfocal, SCHMIDT x 4  Eyes: PERRLA/ EOMI, Gross vision intact  ENT/Neck: Neck supple, trachea midline, No JVD, Gross hearing intact  Respiratory: CTA B/L, No wheezing, rales, rhonchi  CV: RRR, +S1/S2, -S3/S4, no murmurs, rubs or gallops  Abdominal: Soft, NT, ND +BS, No HSM  MSK: 5/5 strength UE/LE bilaterally  Extremities: No edema, 2+ peripheral pulses  Skin: No Rashes, Hematoma, Ecchymosis  Incisions:   Tubes:    HOSPITAL MEDICATIONS:  MEDICATIONS  (STANDING):  benztropine 2 milliGRAM(s) Oral daily  bisacodyl 10 milliGRAM(s) Oral once  cholecalciferol 1000 Unit(s) Oral daily  cloZAPine 25 milliGRAM(s) Oral at bedtime  ferrous    sulfate Liquid 300 milliGRAM(s) Oral <User Schedule>  pantoprazole    Tablet 40 milliGRAM(s) Oral before breakfast  polyethylene glycol 3350 17 Gram(s) Oral at bedtime  senna 2 Tablet(s) Oral at bedtime  valproic acid 500 milliGRAM(s) Oral two times a day    MEDICATIONS  (PRN):  acetaminophen     Tablet .. 650 milliGRAM(s) Oral every 6 hours PRN Temp greater or equal to 38C (100.4F), Mild Pain (1 - 3)  diphenhydrAMINE 50 milliGRAM(s) Oral every 6 hours PRN Threatening behavior  diphenhydrAMINE Injectable 50 milliGRAM(s) IV Push every 6 hours PRN Threatening behavior  diphenhydrAMINE Injectable 50 milliGRAM(s) IntraMuscular every 6 hours PRN Threatening behavior  haloperidol     Tablet 5 milliGRAM(s) Oral every 6 hours PRN agitation  haloperidol    Injectable 5 milliGRAM(s) IV Push every 6 hours PRN Agitation  haloperidol    Injectable 5 milliGRAM(s) IntraMuscular every 6 hours PRN Agitation  LORazepam   Injectable 2 milliGRAM(s) IntraMuscular once PRN Agitation  melatonin 3 milliGRAM(s) Oral at bedtime PRN Insomnia      LABS:    Hgb Trend: 8.8<--, 8.7<--        Creatinine Trend: 0.78<--, 0.81<--, 0.68<--, 0.79<--, 0.82<--, 0.69<--            MICROBIOLOGY:       RADIOLOGY:  [ ] Reviewed by me

## 2023-10-09 NOTE — PROGRESS NOTE ADULT - PROBLEM SELECTOR PLAN 5
DVT ppx: SCD iso anemia and possible GIB   Diet: regular  Dispo: [facility]  Code: full    **Pt does NOT have the capacity to refuse emergent medical care including labs/investigations.

## 2023-10-09 NOTE — PROGRESS NOTE ADULT - ASSESSMENT
53F with PMHx BRYON, schizophrenia, GERD presenting with anemia to 6.3 , transferred from [facility], now s/p 1u pRBC, CT A/P unrevealing. EGD/Colonoscopy showed diverticulosis and one polyp removed from ascending colon. Now medically cleared to return to [facility].

## 2023-10-10 PROCEDURE — 99232 SBSQ HOSP IP/OBS MODERATE 35: CPT | Mod: GC

## 2023-10-10 NOTE — PROGRESS NOTE ADULT - SUBJECTIVE AND OBJECTIVE BOX
PROGRESS NOTE:   Authored by Dr. Eliza Maloney MD (PGY-1). Pager [facility] [phone] / [facility]     Patient is a 53y old  Female who presents with a chief complaint of anemia (09 Oct 2023 06:31)  - Pt resting in AM, refusing labs, agreeable to obtain in the evening       SUBJECTIVE / OVERNIGHT EVENTS:  No acute events overnight.     ADDITIONAL REVIEW OF SYSTEMS:  Patient denies fevers, chills, chest pain, shortness of breath, nausea, abdominal pain, diarrhea, constipation, dysuria, leg swelling, headache, light headedness.    MEDICATIONS  (STANDING):  benztropine 2 milliGRAM(s) Oral daily  bisacodyl 10 milliGRAM(s) Oral once  cholecalciferol 1000 Unit(s) Oral daily  cloZAPine 25 milliGRAM(s) Oral at bedtime  ferrous    sulfate Liquid 300 milliGRAM(s) Oral <User Schedule>  pantoprazole    Tablet 40 milliGRAM(s) Oral before breakfast  polyethylene glycol 3350 17 Gram(s) Oral at bedtime  senna 2 Tablet(s) Oral at bedtime  valproic acid 500 milliGRAM(s) Oral two times a day    MEDICATIONS  (PRN):  acetaminophen     Tablet .. 650 milliGRAM(s) Oral every 6 hours PRN Temp greater or equal to 38C (100.4F), Mild Pain (1 - 3)  diphenhydrAMINE 50 milliGRAM(s) Oral every 6 hours PRN Threatening behavior  diphenhydrAMINE Injectable 50 milliGRAM(s) IntraMuscular every 6 hours PRN Threatening behavior  diphenhydrAMINE Injectable 50 milliGRAM(s) IV Push every 6 hours PRN Threatening behavior  haloperidol     Tablet 5 milliGRAM(s) Oral every 6 hours PRN agitation  haloperidol    Injectable 5 milliGRAM(s) IV Push every 6 hours PRN Agitation  haloperidol    Injectable 5 milliGRAM(s) IntraMuscular every 6 hours PRN Agitation  LORazepam   Injectable 2 milliGRAM(s) IntraMuscular once PRN Agitation  melatonin 3 milliGRAM(s) Oral at bedtime PRN Insomnia      CAPILLARY BLOOD GLUCOSE        I&O's Summary      PHYSICAL EXAM:  Vital Signs Last 24 Hrs  T(C): 36.6 (09 Oct 2023 21:45), Max: 36.6 (09 Oct 2023 21:45)  T(F): 97.9 (09 Oct 2023 21:45), Max: 97.9 (09 Oct 2023 21:45)  HR: 60 (09 Oct 2023 21:45) (60 - 60)  BP: 107/60 (09 Oct 2023 21:45) (107/60 - 107/60)  BP(mean): --  RR: 18 (09 Oct 2023 21:45) (18 - 18)  SpO2: 100% (09 Oct 2023 21:45) (100% - 100%)    Parameters below as of 09 Oct 2023 21:45  Patient On (Oxygen Delivery Method): room air        CONSTITUTIONAL: NAD, tangential thought process  RESPIRATORY: Normal respiratory effort; lungs are clear to auscultation bilaterally  CARDIOVASCULAR: Regular rate and rhythm, normal S1 and S2, no murmur/rub/gallop; No lower extremity edema; Peripheral pulses are 2+ bilaterally  ABDOMEN: Nontender to palpation, normoactive bowel sounds, no rebound/guarding; No hepatosplenomegaly  MUSCLOSKELETAL: no clubbing or cyanosis of digits; no joint swelling or tenderness to palpation  PSYCH: A+O to person, place, and time; affect appropriate    LABS:                        9.0    6.55  )-----------( 367      ( 09 Oct 2023 06:43 )             30.3     10-09    144  |  110<H>  |  14  ----------------------------<  89  3.9   |  24  |  0.71    Ca    8.8      09 Oct 2023 06:43  Phos  3.4     10-09  Mg     1.90     10-09            Urinalysis Basic - ( 09 Oct 2023 06:43 )    Color: x / Appearance: x / SG: x / pH: x  Gluc: 89 mg/dL / Ketone: x  / Bili: x / Urobili: x   Blood: x / Protein: x / Nitrite: x   Leuk Esterase: x / RBC: x / WBC x   Sq Epi: x / Non Sq Epi: x / Bacteria: x          Tele Reviewed:    RADIOLOGY & ADDITIONAL TESTS:  Results Reviewed:   Imaging Personally Reviewed:  Electrocardiogram Personally Reviewed:

## 2023-10-10 NOTE — PROVIDER CONTACT NOTE (OTHER) - ASSESSMENT
electronic
Patient assessed. No signs or symptoms of acute distress noted.
refusing vitals and medications
BP 94/60. All other VSS. No signs or symptoms of acute distress noted.

## 2023-10-10 NOTE — PROVIDER CONTACT NOTE (OTHER) - ACTION/TREATMENT ORDERED:
Eliza aMloney MD made aware. attempted to educate patient on medication regimen/POC and need of vitals. pt resistant to nursing education. safety maintained. will continue to monitor
MD notified and made aware. No new interventions ordered at this time. Nursing care continued.
MD notified and made aware. No new interventions ordered at this time.

## 2023-10-10 NOTE — PROVIDER CONTACT NOTE (OTHER) - BACKGROUND
admitted for anemia due to acute blood loss
Patient admitted for anemia.   PMH of schizophrenia, anxiety.
Patient admitted for anemia.   PMH of schizophrenia, anxiety.

## 2023-10-10 NOTE — PROGRESS NOTE ADULT - ASSESSMENT
53F with PMHx BRYON, schizophrenia, GERD presenting with anemia to 6.3 , transferred from [facility], now s/p 1u pRBC, CT A/P unrevealing. EGD/Colonoscopy showed diverticulosis and one polyp removed from ascending colon. Now medically cleared to return to [facility].

## 2023-10-11 VITALS — WEIGHT: 221.56 LBS

## 2023-10-11 PROCEDURE — 99232 SBSQ HOSP IP/OBS MODERATE 35: CPT

## 2023-10-11 PROCEDURE — 99232 SBSQ HOSP IP/OBS MODERATE 35: CPT | Mod: GC

## 2023-10-11 NOTE — PROGRESS NOTE ADULT - PROBLEM SELECTOR PLAN 1
- C/W benztropine 2mg QD, valproic acid 500mg BID, clozapine 25mg QHS  - 1:1 for danger to self/others   - Agitation: Haldol 5mg q4h prn + Benadryl 50mg q4h prn PO/IM/IV  - Monitor EKG QTc interval and hold antipsychotic if qtc>500  - 10/11: Pt continues to refuse medications and lab work since 10/09, will discuss any changes with psych

## 2023-10-11 NOTE — PROGRESS NOTE ADULT - ASSESSMENT
53F with PMHx BRYON, schizophrenia, GERD presenting with anemia to 6.3 , transferred from [facility], now s/p 1u pRBC, CT A/P unrevealing. EGD/Colonoscopy showed diverticulosis and one polyp removed from ascending colon. Pt currently refusing vitals, labs and medications. Psych following. Otherwise, medically cleared to return to [facility].

## 2023-10-11 NOTE — PROGRESS NOTE ADULT - PROBLEM SELECTOR PLAN 5
DVT ppx: SCD iso anemia and possible GIB   Diet: regular  Dispo: [facility]  Code: full    **Pt does NOT have the capacity to refuse emergent medical care including labs/investigations.

## 2023-10-11 NOTE — PROGRESS NOTE ADULT - PROBLEM SELECTOR PLAN 2
Unclear etiology. Known h/o BRYON (microcytic). First time requiring transfusion. Has not had formal GI w/u in the past per sister. no scopes in EMR. DDx includes anal fissures, hemorrhoids vs malignancy  Diagnostics:   - Initial Hgb 6.3, MCV 73.2, retic index 0.96 (hypoproliferative)  - Iron studies wnl (except ferritin 11 [L]), B12 & folate wnl from 8/2023  - TSH wnl, LDH wnl, hapto wmn, fractionated bili wnl  - CT A/P: Moderate to large hiatal hernia. Cholelithiasis without acute cholecystitis. Fibroid uterus.  - EGD and colonoscopy: Large hiatal hernia. Diverticulosis in the sigmoid colon. One 8 mm polyp in the ascending colon.    Therapeutics:   -s/p 2u pRBC, repeat Hgb stable x 2 days  -transfuse if hgb <7, active T&S  -c/w PPI PO qd for now. No report of melena.  (pt does not have capacity to refuse blood transfusion. Unable to repeat back risks of not receiving blood. Does not understand risks of anemia. Denies anemia issues at this time. The sister and NOK/legal guardian Lester Avery provided consent to ER providers for blood transfusion)

## 2023-10-11 NOTE — BH CONSULTATION LIAISON PROGRESS NOTE - NSBHASSESSMENTFT_PSY_ALL_CORE
52 YO female domiciled on [facility] grounds with PMHx iron deficiency anemia, constipation, GERD, PPHx schizophrenia, schizoaffective dos, hx psychosis, unspecified anxiety, bipolar dos, selective mutism, no know hx SIB/SA, hx aggression in setting of medication nonadherence, f/b [facility] tream, multiple inpatient psychiatric admissions, most recent inpatient psychiatric admission [facility] (8/4-present for increased agitation/physical aggression) *since 8/15 court ordered treatment over objection, presenting with anemia on labs and transferred from [facility]. Patient received Haldol 5mg x2 and Ativan 2mg X1 in ED.  Patient seen, opens eyes to name being called, looks at writer, refuses to engage verbally, PCA at bedside maintaining 1:1 CO states asked for snacks earlier.  10/2: remains disorganized, paranoid, without insight. No SI/HI. No focal neuro deficits.  10/3: a&o, endorsing delusions, denies si/hi, denies ah/vh, poor med compliance  10/4: Patient selectively mute, uncooperative, refused to engage with the provider. Refusing oral medications, no PRN meds administered   10/6/23: Patient slightly more cooperative, refusing oral medications, overall is disorganized, illogical, grandiose with poor insight into her mental illness.   10/11: Patient uncooperative, selectively mute, refusing PO meds, poor insight.        PLAN:  -Observation deferred to primary team  -c/w current medication regimen   Valproic Acid 500 mg PO Q12. VPA level on 9/29 is 48.2 (may use sprinkle to improve compliance)   Cogentin 2 mg PO daily   Clozapine 25 mg PO QHS  -Agitation: Haldol 5mg q4h prn + Benadryl 50mg q4h prn PO/IM/IV  -Monitor EKG QTc interval and hold antipsychotic if QTc >500  -s/p Zyprexa Relprevv BREWER administered on 9/22 (next dose due 10/20)  -s/p loading dose of Haldol DEC 100mg 8/23, Haldol  mg 9/21  -Dispo: Return to [facility] when medically stable; 2PC completed and in chart  -CANNOT leave AMA  *PATIENT does NOT have the capacity to refuse emergent medical care including labs/investigations

## 2023-10-11 NOTE — PROGRESS NOTE ADULT - SUBJECTIVE AND OBJECTIVE BOX
PROGRESS NOTE:   Authored by Dr. Eliza Maloney MD ([id number]-1). Pager [facility] [phone] / [facility]     Patient is a 53y old  Female who presents with a chief complaint of anemia (10 Oct 2023 07:14)      SUBJECTIVE / OVERNIGHT EVENTS:  No acute events overnight.     ADDITIONAL REVIEW OF SYSTEMS:  Patient denies fevers, chills, chest pain, shortness of breath, nausea, abdominal pain, diarrhea, constipation, dysuria, leg swelling, headache, light headedness.    MEDICATIONS  (STANDING):  benztropine 2 milliGRAM(s) Oral daily  bisacodyl 10 milliGRAM(s) Oral once  cholecalciferol 1000 Unit(s) Oral daily  cloZAPine 25 milliGRAM(s) Oral at bedtime  ferrous    sulfate Liquid 300 milliGRAM(s) Oral <User Schedule>  pantoprazole    Tablet 40 milliGRAM(s) Oral before breakfast  polyethylene glycol 3350 17 Gram(s) Oral at bedtime  senna 2 Tablet(s) Oral at bedtime  valproic acid 500 milliGRAM(s) Oral two times a day    MEDICATIONS  (PRN):  acetaminophen     Tablet .. 650 milliGRAM(s) Oral every 6 hours PRN Temp greater or equal to 38C (100.4F), Mild Pain (1 - 3)  diphenhydrAMINE 50 milliGRAM(s) Oral every 6 hours PRN Threatening behavior  diphenhydrAMINE Injectable 50 milliGRAM(s) IntraMuscular every 6 hours PRN Threatening behavior  diphenhydrAMINE Injectable 50 milliGRAM(s) IV Push every 6 hours PRN Threatening behavior  haloperidol     Tablet 5 milliGRAM(s) Oral every 6 hours PRN agitation  haloperidol    Injectable 5 milliGRAM(s) IV Push every 6 hours PRN Agitation  haloperidol    Injectable 5 milliGRAM(s) IntraMuscular every 6 hours PRN Agitation  LORazepam   Injectable 2 milliGRAM(s) IntraMuscular once PRN Agitation  melatonin 3 milliGRAM(s) Oral at bedtime PRN Insomnia      CAPILLARY BLOOD GLUCOSE        I&O's Summary      PHYSICAL EXAM:  Vital Signs Last 24 Hrs  T(C): --  T(F): --  HR: --  BP: --  BP(mean): --  RR: --  SpO2: --        CONSTITUTIONAL: NAD, well-developed  RESPIRATORY: Normal respiratory effort; lungs are clear to auscultation bilaterally  CARDIOVASCULAR: Regular rate and rhythm, normal S1 and S2, no murmur/rub/gallop; No lower extremity edema; Peripheral pulses are 2+ bilaterally  ABDOMEN: Nontender to palpation, normoactive bowel sounds, no rebound/guarding; No hepatosplenomegaly  MUSCLOSKELETAL: no clubbing or cyanosis of digits; no joint swelling or tenderness to palpation  PSYCH: A+O to person, place, and time; affect appropriate    LABS:                      Tele Reviewed:    RADIOLOGY & ADDITIONAL TESTS:  Results Reviewed:   Imaging Personally Reviewed:  Electrocardiogram Personally Reviewed:

## 2023-10-12 ENCOUNTER — TRANSCRIPTION ENCOUNTER (OUTPATIENT)
Age: 53
End: 2023-10-12

## 2023-10-12 ENCOUNTER — INPATIENT (INPATIENT)
Facility: HOSPITAL | Age: 53
LOS: 138 days | Discharge: PSYCHIATRIC OR SUBSTANCE USE FACILITY | End: 2024-02-28
Attending: STUDENT IN AN ORGANIZED HEALTH CARE EDUCATION/TRAINING PROGRAM | Admitting: STUDENT IN AN ORGANIZED HEALTH CARE EDUCATION/TRAINING PROGRAM
Payer: MEDICAID

## 2023-10-12 VITALS — HEART RATE: 74 BPM | HEIGHT: 67 IN | SYSTOLIC BLOOD PRESSURE: 130 MMHG | DIASTOLIC BLOOD PRESSURE: 65 MMHG

## 2023-10-12 DIAGNOSIS — F33.3 MAJOR DEPRESSIVE DISORDER, RECURRENT, SEVERE WITH PSYCHOTIC SYMPTOMS: ICD-10-CM

## 2023-10-12 PROCEDURE — 99233 SBSQ HOSP IP/OBS HIGH 50: CPT

## 2023-10-12 PROCEDURE — 99239 HOSP IP/OBS DSCHRG MGMT >30: CPT

## 2023-10-12 RX ORDER — FERROUS SULFATE 325(65) MG
325 TABLET ORAL DAILY
Refills: 0 | Status: DISCONTINUED | OUTPATIENT
Start: 2023-10-12 | End: 2024-01-17

## 2023-10-12 RX ORDER — SENNA PLUS 8.6 MG/1
2 TABLET ORAL AT BEDTIME
Refills: 0 | Status: DISCONTINUED | OUTPATIENT
Start: 2023-10-12 | End: 2024-01-17

## 2023-10-12 RX ORDER — PANTOPRAZOLE SODIUM 20 MG/1
1 TABLET, DELAYED RELEASE ORAL
Refills: 0 | DISCHARGE

## 2023-10-12 RX ORDER — POLYETHYLENE GLYCOL 3350 17 G/17G
17 POWDER, FOR SOLUTION ORAL
Refills: 0 | Status: DISCONTINUED | OUTPATIENT
Start: 2023-10-12 | End: 2024-01-17

## 2023-10-12 RX ORDER — OLANZAPINE 15 MG/1
10 TABLET, FILM COATED ORAL ONCE
Refills: 0 | Status: DISCONTINUED | OUTPATIENT
Start: 2023-10-12 | End: 2023-10-13

## 2023-10-12 RX ORDER — INFLUENZA VIRUS VACCINE 15; 15; 15; 15 UG/.5ML; UG/.5ML; UG/.5ML; UG/.5ML
0.5 SUSPENSION INTRAMUSCULAR ONCE
Refills: 0 | Status: COMPLETED | OUTPATIENT
Start: 2023-10-12 | End: 2023-10-12

## 2023-10-12 RX ORDER — OLANZAPINE 15 MG/1
5 TABLET, FILM COATED ORAL EVERY 4 HOURS
Refills: 0 | Status: DISCONTINUED | OUTPATIENT
Start: 2023-10-12 | End: 2024-01-17

## 2023-10-12 RX ORDER — ACETAMINOPHEN 500 MG
650 TABLET ORAL EVERY 4 HOURS
Refills: 0 | Status: DISCONTINUED | OUTPATIENT
Start: 2023-10-12 | End: 2024-01-17

## 2023-10-12 RX ORDER — CLOZAPINE 150 MG/1
50 TABLET, ORALLY DISINTEGRATING ORAL AT BEDTIME
Refills: 0 | Status: DISCONTINUED | OUTPATIENT
Start: 2023-10-12 | End: 2023-10-13

## 2023-10-12 RX ORDER — CHOLECALCIFEROL (VITAMIN D3) 125 MCG
1000 CAPSULE ORAL AT BEDTIME
Refills: 0 | Status: DISCONTINUED | OUTPATIENT
Start: 2023-10-12 | End: 2024-01-17

## 2023-10-12 RX ORDER — VALPROIC ACID (AS SODIUM SALT) 250 MG/5ML
1000 SOLUTION, ORAL ORAL AT BEDTIME
Refills: 0 | Status: DISCONTINUED | OUTPATIENT
Start: 2023-10-12 | End: 2024-01-17

## 2023-10-12 RX ORDER — BENZTROPINE MESYLATE 1 MG
2 TABLET ORAL AT BEDTIME
Refills: 0 | Status: DISCONTINUED | OUTPATIENT
Start: 2023-10-12 | End: 2023-11-03

## 2023-10-12 RX ADMIN — Medication 1000 MILLIGRAM(S): at 21:35

## 2023-10-12 NOTE — PROGRESS NOTE ADULT - PROBLEM SELECTOR PROBLEM 2
Schizophrenia
Schizophrenia
Anemia
Schizophrenia
Anemia
Schizophrenia

## 2023-10-12 NOTE — PROGRESS NOTE ADULT - PROBLEM SELECTOR PROBLEM 4
Constipation

## 2023-10-12 NOTE — BH CONSULTATION LIAISON PROGRESS NOTE - NSBHPTASSESSDT_PSY_A_CORE
12-Oct-2023 13:57
03-Oct-2023 11:30
11-Oct-2023 12:57
02-Oct-2023 12:18
04-Oct-2023 11:33
06-Oct-2023 14:43

## 2023-10-12 NOTE — PROGRESS NOTE ADULT - PROBLEM SELECTOR PROBLEM 1
Schizophrenia
Anemia
Schizophrenia
Anemia

## 2023-10-12 NOTE — BH INPATIENT PSYCHIATRY ASSESSMENT NOTE - OTHER PAST PSYCHIATRIC HISTORY (INCLUDE DETAILS REGARDING ONSET, COURSE OF ILLNESS, INPATIENT/OUTPATIENT TREATMENT)
History of the following diagnoses: schizophrenia, schizoaffective dx, bipolar dx, anxiety  Multiple hospitalizations including last hospital discharge April 10 to 2023 at [facility] 2023 to 3/22/2023 , 2023 to 2023, [facility] term from 2018 to 7/10/2019  No known suicide attempts or self injurious behaviors.   Has ACT team with [facility] and [facility]   Lives in [facility] at [facility]

## 2023-10-12 NOTE — BH CONSULTATION LIAISON PROGRESS NOTE - NSBHASSESSMENTFT_PSY_ALL_CORE
52 YO female domiciled on [place] grounds with PMHx iron deficiency anemia, constipation, GERD, PPHx schizophrenia, schizoaffective dos, hx psychosis, unspecified anxiety, bipolar dos, selective mutism, no know hx SIB/SA, hx aggression in setting of medication nonadherence, f/b [facility] ACT tream, multiple inpatient psychiatric admissions, most recent inpatient psychiatric admission [facility] (8/4-present for increased agitation/physical aggression) *since 8/15 court ordered treatment over objection, presenting with anemia on labs and transferred from [facility]. Patient received Haldol 5mg x2 and Ativan 2mg X1 in ED.  Patient seen, opens eyes to name being called, looks at writer, refuses to engage verbally, PCA at bedside maintaining 1:1 CO states asked for snacks earlier.  10/2: remains disorganized, paranoid, without insight. No SI/HI. No focal neuro deficits.  10/3: a&o, endorsing delusions, denies si/hi, denies ah/vh, poor med compliance  10/4: Patient selectively mute, uncooperative, refused to engage with the provider. Refusing oral medications, no PRN meds administered   10/6/23: Patient slightly more cooperative, refusing oral medications, overall is disorganized, illogical, grandiose with poor insight into her mental illness.   10/11: Patient uncooperative, selectively mute, refusing PO meds, poor insight.    10/12: Patient disengaged, selectively mute, appears paranoid, refusing PO meds., poor insight.      PLAN:  -Observation deferred to primary team  -c/w current medication regimen,    Valproic Acid 500 mg PO Q12. VPA level on 9/29 is 48.2 (may use sprinkle to improve compliance)   Cogentin 2 mg PO daily   Clozapine 25 mg PO QHS  -Agitation: Haldol 5mg q4h prn + Benadryl 50mg q4h prn PO/IM/IV  -Monitor EKG QTc interval and hold antipsychotic if QTc >500  -s/p Zyprexa Relprevv BREWER administered on 9/22 (next dose due 10/20)  -s/p loading dose of Haldol DEC 100mg 8/23, Haldol  mg 9/21  -Dispo: needs an inpatient psychiatric admission for safety/stability/medication management, return to [facility] when medically stable; 2PC completed and in chart  -CANNOT leave AMA  *PATIENT does NOT have the capacity to refuse emergent medical care including labs/investigations

## 2023-10-12 NOTE — BH CONSULTATION LIAISON PROGRESS NOTE - NSBHMSEATTEN_PSY_A_CORE
1/25/2021         RE: Manjinder Mar  [address]  [place] [address]        Dear Colleague,    Thank you for referring your patient, Manjinder Mar, to the [facility] [place]. Please see a copy of my visit note below.    CHIEF COMPLAINT:  Consult (left knee pain, felt a lot of pressure when landing on his leg during a basketball gamee a week ago )       HISTORY OF PRESENT ILLNESS  Mr. Mar is a pleasant 11 year old male who presents to clinic today with left knee pain.  Manjinder Garcia was playing basketball when he landed directly onto his left leg, feeling an immediate pain in his knee region.  He does remember feeling or hearing a pop, he is unsure as this happened quickly.  His injury was 8 days ago.  He was seen at an urgent care facility where he had a reassuring x-ray.  He was given crutches and a brace.  He has continued to wear his brace and does not feel as if he can walk without the crutches.        Additional history: as documented    MEDICAL HISTORY  Patient Active Problem List   Diagnosis     Innocent heart murmur     Myopia of both eyes with astigmatism     Adolescent idiopathic scoliosis, unspecified spinal region       Current Outpatient Medications   Medication Sig Dispense Refill     ketorolac (ACULAR) 0.5 % ophthalmic solution PLACE 1 DROP INTO BOTH EYES FOUR TIMES DAILY AS NEEDED FOR ALLERGIC EYE SYMPTOMS       multivitamin with C and FA (ANIMAL SHAPES) CHEW chewable tablet CHEW AND SWALLOW ONE TABLET BY MOUTH EVERY DAY (Patient not taking: Reported on 1/20/2021) 100 tablet 0       No Known Allergies    Family History   Problem Relation Age of Onset     Hypertension Father      Cancer No family hx of      Diabetes No family hx of      Cerebrovascular Disease No family hx of      Thyroid Disease No family hx of      Glaucoma No family hx of      Macular Degeneration No family hx of        Additional medical/Social/Surgical histories 
reviewed in EPIC and updated as appropriate.        PHYSICAL EXAM  Wt 53.1 kg (117 lb)   BMI 18.19 kg/m      General  - normal appearance, in no obvious distress  HEENT  - conjunctivae not injected, moist mucous membranes  CV  - normal popliteal pulse  Pulm  - normal respiratory pattern, non-labored  Musculoskeletal - left knee  - stance: Gait assisted by crutches  - inspection: 1+ effusion, normal muscle tone, normal bone and joint alignment  - palpation: mildly warm, generalized tenderness medially, normal popliteal pulse  - ROM: flexion and extension limited secondary to pain and effusion  - strength: 4/5 in flexion, 4/5 in extension, painful  - neuro: no sensory or motor deficit  - special tests:  (+/-) Lachman, evaluation is difficult given guarding  (-) varus at 30 degrees flexion  (-) valgus at 30 degrees flexion  Neuro  - no sensory or motor deficit, grossly normal coordination, normal muscle tone  Skin  - no ecchymosis, erythema, warmth, or induration, no obvious rash  Psych  - interactive, appropriate, normal mood and affect           ASSESSMENT & PLAN  Manjinder Garcia is a 11 year old male who presents to clinic today with left knee injury.    His clinical picture and history do suggest an intra-articular injury, exam is difficult today given his guarding.    I am ordering an MRI of his left knee, I will get in touch with his mother with the results.  In the meantime he should continue to wear his brace and use his crutches, as needed.  He can also ice, as helpful.    It was a pleasure seeing Manjinder Garcia today.    Edmund Wheeler DO, [facility]  Primary Care Sports Medicine      This note was constructed using Dragon dictation software, please excuse any minor errors in spelling, grammar, or syntax.        Again, thank you for allowing me to participate in the care of your patient.        Sincerely,        Edmund Wheeler DO    
Unable to assess
Normal

## 2023-10-12 NOTE — BH INPATIENT PSYCHIATRY ASSESSMENT NOTE - NSBHATTESTBILLING_PSY_A_CORE
99222-Initial OBS or IP - moderate complexity OR 55-74 mins 99223-Initial OBS or IP - high complexity OR  mins

## 2023-10-12 NOTE — BH CONSULTATION LIAISON PROGRESS NOTE - NSICDXBHPRIMARYDX_PSY_ALL_CORE
Schizophrenia   F20.9  
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
## 2023-10-12 NOTE — BH CONSULTATION LIAISON PROGRESS NOTE - OTHER
Grandiose and paranoid ideations noted. 
seems to be selectively mute
seems to be selectively mute  At times talking to the staff

## 2023-10-12 NOTE — BH CONSULTATION LIAISON PROGRESS NOTE - NSBHADMITIPOBS_PSY_A_CORE
Routine observation
Constant observation
Constant observation
Routine observation

## 2023-10-12 NOTE — BH PATIENT PROFILE - NSDASAIRRITABILITY_PSY_ALL_CORE
Bactrim Counseling:  I discussed with the patient the risks of sulfa antibiotics including but not limited to GI upset, allergic reaction, drug rash, diarrhea, dizziness, photosensitivity, and yeast infections.  Rarely, more serious reactions can occur including but not limited to aplastic anemia, agranulocytosis, methemoglobinemia, blood dyscrasias, liver or kidney failure, lung infiltrates or desquamative/blistering drug rashes. No

## 2023-10-12 NOTE — BH CONSULTATION LIAISON PROGRESS NOTE - NSBHFUPINTERVALCCFT_PSY_A_CORE
Patient states, "I'm fine, my name is Juan Jose Fuentes."
F/u for psychosis/ medication management 
"I don't have mental illness"
F/u for psychosis/ medication management 

## 2023-10-12 NOTE — BH CONSULTATION LIAISON PROGRESS NOTE - NSBHCHARTREVIEWVS_PSY_A_CORE FT
Vital Signs Last 24 Hrs  T(C): 37 (02 Oct 2023 09:43), Max: 37 (01 Oct 2023 20:15)  T(F): 98.6 (02 Oct 2023 09:43), Max: 98.6 (01 Oct 2023 20:15)  HR: 64 (02 Oct 2023 09:43) (64 - 84)  BP: 100/62 (02 Oct 2023 09:43) (94/60 - 122/62)  BP(mean): --  RR: 16 (02 Oct 2023 09:43) (16 - 18)  SpO2: 98% (02 Oct 2023 09:43) (98% - 100%)    Parameters below as of 02 Oct 2023 09:43  Patient On (Oxygen Delivery Method): room air    
Vital Signs Last 24 Hrs  T(C): 36.9 (03 Oct 2023 14:38), Max: 36.9 (03 Oct 2023 14:38)  T(F): 98.4 (03 Oct 2023 14:38), Max: 98.4 (03 Oct 2023 14:38)  HR: 70 (03 Oct 2023 14:38) (60 - 74)  BP: 123/82 (03 Oct 2023 14:38) (123/82 - 138/90)  BP(mean): --  RR: 18 (03 Oct 2023 14:38) (18 - 18)  SpO2: 99% (03 Oct 2023 14:38) (97% - 100%)    Parameters below as of 03 Oct 2023 14:38  Patient On (Oxygen Delivery Method): room air    
Vital Signs Last 24 Hrs  T(C): --  T(F): --  HR: --  BP: --  BP(mean): --  RR: --  SpO2: --    
Vital Signs Last 24 Hrs  T(C): --  T(F): --  HR: --  BP: --  BP(mean): --  RR: --  SpO2: --    
Vital Signs Last 24 Hrs  T(C): 36.4 (04 Oct 2023 05:40), Max: 36.9 (03 Oct 2023 14:38)  T(F): 97.6 (04 Oct 2023 05:40), Max: 98.4 (03 Oct 2023 14:38)  HR: 65 (04 Oct 2023 09:51) (63 - 76)  BP: 128/80 (04 Oct 2023 09:51) (100/72 - 133/82)  BP(mean): --  RR: 18 (04 Oct 2023 09:51) (18 - 18)  SpO2: 98% (04 Oct 2023 09:51) (98% - 100%)    Parameters below as of 04 Oct 2023 09:51  Patient On (Oxygen Delivery Method): room air    
Vital Signs Last 24 Hrs  T(C): 36.6 (06 Oct 2023 05:00), Max: 36.6 (05 Oct 2023 21:08)  T(F): 97.8 (06 Oct 2023 05:00), Max: 97.9 (05 Oct 2023 21:08)  HR: 83 (06 Oct 2023 09:31) (75 - 95)  BP: 105/57 (06 Oct 2023 09:31) (96/64 - 131/76)  BP(mean): --  RR: 18 (06 Oct 2023 09:31) (17 - 20)  SpO2: 99% (06 Oct 2023 09:31) (98% - 100%)    Parameters below as of 06 Oct 2023 05:00  Patient On (Oxygen Delivery Method): room air

## 2023-10-12 NOTE — DISCHARGE NOTE NURSING/CASE MANAGEMENT/SOCIAL WORK - PATIENT PORTAL LINK FT
You can access the FollowMyHealth Patient Portal offered by [facility] by registering at the following website: [email or web address]. By joining [facility]’s FollowMyHealth portal, you will also be able to view your health information using other applications (apps) compatible with our system.

## 2023-10-12 NOTE — BH CONSULTATION LIAISON PROGRESS NOTE - NSBHFUPINTERVALHXFT_PSY_A_CORE
Chart reviewed. Refusing PO meds, no PRN medications given. Patient underwent endoscopy and colonoscopy yesterday.   Patient seen at the bedside, resting, refusing to engage with the provider. Made limited eye contact, saying that she feels "pretty well". Reported adequate sleep and fair appetite. 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 Continues to refuse oral medications, when asked, stated that she doesn't need to be on medications. Denied AH, VH, SI, HI. Reported being paranoid of staff, but refused to go into details. Overall is disorganized, illogical, grandiose with poor insight into her mental illness.   
Chart reviewed. Refusing PO meds, no prn's given, patient seen, a&o to month, confused to year and situation, calm, cooperative, pleasant, endorses delusion staff are treated her well to try and "get a piece of me," patient also states, "My blood is based on fire," denies suicidal/homicidal ideation, denies auditory/visual hallucinations.   
Chart reviewed. Refusing PO meds. On exam, is awake but disengaged, keeps eyes closed. Verbal, but selective with many volitional elements to her presentation. Denies any hx of mental illness/needs/tx. Says she simply wants to go home. Disorganized in thought process, saying this environment might be dangerous but unsure. Denies AVH, SI, HI. Poor/no insight.
Chart reviewed. Refusing PO meds, no PRN medications given.  Patient seen at the bedside, resting, refusing to engage with the provider, selectively mute. Just stated that she is here "to get an operation". Evaluation limited due to patient's uncooperativeness.   
Chart reviewed, case discussed in IDR, patient continues to refuse PO meds, no PRN medications given. Patient seen, disengaged, seems to be selectively mute, pt. nodded few times but overall uncooperative. Denies si and hi.       
Chart reviewed, case discussed in IDR, patient continues to refuse PO meds, no PRN medications given. Patient seen, continues to be selectively mute, nodded few times but overall uncooperative/disengaged. NO si and hi.

## 2023-10-12 NOTE — PROGRESS NOTE ADULT - PROVIDER SPECIALTY LIST ADULT
Gastroenterology
Internal Medicine
Hospitalist
Internal Medicine
Internal Medicine

## 2023-10-12 NOTE — PROGRESS NOTE ADULT - TIME BILLING
Reviewed lab data, radiology results, consultants' recommendations, documentation in [place], discussed with family, ACP, interdisciplinary staff and/or intervention were performed.
Reviewed lab data, radiology results, consultants' recommendations, documentation in [place], discussed with family, ACP, interdisciplinary staff and/or intervention were performed.
Review of laboratory data, radiology results, consultants' recommendations, documentation in [place], discussion with patient/advanced care providers and interdisciplinary staff (such as , social workers, etc). Interventions were performed as documented above.
Review of laboratory data, radiology results, consultants' recommendations, documentation in [place], discussion with patient/advanced care providers and interdisciplinary staff (such as , social workers, etc). Interventions were performed as documented above.
Review of laboratory data, radiology results, consultants' recommendations, documentation in [place], discussion with patient/advanced care providers and interdisciplinary staff (such as , social workers, etc). Interventions were performed as documented above.
Review of laboratory data, radiology results, consultants' recommendations, documentation in [place], discussion with patient/advanced care providers and interdisciplinary staff (such as , social workers, etc). Interventions were performed as documented above.

## 2023-10-12 NOTE — BH CONSULTATION LIAISON PROGRESS NOTE - NSBHCHARTREVIEWLAB_PSY_A_CORE FT
CBC Full  -  ( 02 Oct 2023 05:50 )  WBC Count : 5.15 K/uL  RBC Count : 3.62 M/uL  Hemoglobin : 8.2 g/dL  Hematocrit : 27.2 %  Platelet Count - Automated : 391 K/uL  Mean Cell Volume : 75.1 fL  Mean Cell Hemoglobin : 22.7 pg  Mean Cell Hemoglobin Concentration : 30.1 gm/dL  Auto Neutrophil # : x  Auto Lymphocyte # : x  Auto Monocyte # : x  Auto Eosinophil # : x  Auto Basophil # : x  Auto Neutrophil % : x  Auto Lymphocyte % : x  Auto Monocyte % : x  Auto Eosinophil % : x  Auto Basophil % : x  10-02    140  |  108<H>  |  11  ----------------------------<  81  3.7   |  25  |  0.68    Ca    8.5      02 Oct 2023 05:50    

## 2023-10-12 NOTE — BH INPATIENT PSYCHIATRY ASSESSMENT NOTE - NSBHCHARTREVIEWVS_PSY_A_CORE FT
Vital Signs Last 24 Hrs  T(C): --  T(F): --  HR: 74 (10-12-23 @ 17:39) (74 - 74)  BP: 130/65 (10-12-23 @ 17:39) (130/65 - 130/65)  BP(mean): --  RR: --  SpO2: --

## 2023-10-12 NOTE — BH PATIENT PROFILE - FALL HARM RISK - UNIVERSAL INTERVENTIONS
Bed in lowest position, wheels locked, appropriate side rails in place/Call bell, personal items and telephone in reach/Instruct patient to call for assistance before getting out of bed or chair/Non-slip footwear when patient is out of bed/[place] to call system/Physically safe environment - no spills, clutter or unnecessary equipment/Purposeful Proactive Rounding/Room/bathroom lighting operational, light cord in reach

## 2023-10-12 NOTE — BH INPATIENT PSYCHIATRY ASSESSMENT NOTE - NSBHASSESSSUMMFT_PSY_ALL_CORE
This is a 53 year old woman, currently living at [facility] on [place] grounds, PMH of GERD, anemia, PPH of schizophrenia, selective mutism, [facility], previously with [facility] which , with history of psychiatric hospitalization(s), without known history of self-injury or suicide attempts, with history of aggression in the setting on nonadherence with medication, who is brought in by staff at residence for increasing agitation and aggression in the setting of 2 months of medication nonadherence.     Patient presents as hostile, disorganized, threatening, malodorous. Consistent with chronic schizophrenia with worsening aggression and disorganization iso med nonadherence.     Plan:  - admit patient on emergency admission   - continue outpatient psychiatric medications: Haldol 10mg PO BID, Valproic acid 250mg 2 caps BID, Cogentin 2mg BID. BREWER Haldol 200mg due on 2023 which she has refused  - Medical: Nexium 40mg, Feosol 300mg daily, Vit D3 1000U, Senokot 8.6mg qhs  - routine obs appropriate at this time, bed block for disorganization  	- haldol/ativan/benadryl PO/IM PRN for agitation  - dispo pending clinical improvement  - will likely have to apply for TOO and AOT     This is a 53 year old woman, currently living at [facility] on [place] grounds, PMH of GERD, anemia, PPH of schizophrenia, selective mutism, [facility], previously with [facility] which , with history of psychiatric hospitalization(s), without known history of self-injury or suicide attempts, with history of aggression in the setting on nonadherence with medication, who is brought in by staff at residence for increasing agitation and aggression in the setting of 2 months of medication nonadherence. Patient presents as hostile, disorganized, threatening, malodorous. Consistent with chronic schizophrenia with worsening aggression and disorganization iso med nonadherence. Transferred to [facility] for pRBCs. Returns now to psych for continued tx for SCZ/psychosis.    PLAN  - admit patient on 2PC admit from MED via CL  - routine observation adequate as q15 in structured environment is adequate  - continue outpatient psychiatric medications:  Continue depakote 1000 mg as depakene  Increase clozapine 25 to 50 mg qhs  Continue dual BREWER regimen with relprevv and haldol DEC  Continued cogentin as 2 mg qhs but as PRN as no evident EPS  PRNs:  Zyprexa 10 IM and zydis 5 mg q4 PO PRN  - Medical:  Iron as  mg qd  Vit D3 1000U as qhs  Routine MED f/u s/p transfusion  Senokot 8.6mg qhs and miralax BID  NUT consult for obesity/weights and BMI calculation  - will likely have to apply for TOO and AOT  - dispo pending clinical improvement

## 2023-10-12 NOTE — PROGRESS NOTE ADULT - PROBLEM SELECTOR PROBLEM 3
GERD (gastroesophageal reflux disease)

## 2023-10-12 NOTE — BH INPATIENT PSYCHIATRY ASSESSMENT NOTE - NSBHMETABOLIC_PSY_ALL_CORE_FT
BMI: BMI (kg/m2): 35.2 (10-12-23 @ 17:39)  HbA1c: A1C with Estimated Average Glucose Result: 5.3 % (09-29-23 @ 10:00)    Glucose: POCT Blood Glucose.: 100 mg/dL (12-19-22 @ 15:06)    BP: 130/65 (10-12-23 @ 17:39) (130/65 - 130/65)  Lipid Panel: Date/Time: 09-29-23 @ 10:00  Cholesterol, Serum: 166  Direct LDL: --  HDL Cholesterol, Serum: 60  Total Cholesterol/HDL Ration Measurement: --  Triglycerides, Serum: 61

## 2023-10-12 NOTE — BH CONSULTATION LIAISON PROGRESS NOTE - NSBHATTESTTYPEVISIT_PSY_A_CORE
Attending Only
CRYS without on-site Attending supervision
Attending with Resident/Fellow/Student
Attending with Resident/Fellow/Student

## 2023-10-12 NOTE — BH CONSULTATION LIAISON PROGRESS NOTE - CURRENT MEDICATION
MEDICATIONS  (STANDING):  benztropine 2 milliGRAM(s) Oral daily  bisacodyl 10 milliGRAM(s) Oral once  cholecalciferol 1000 Unit(s) Oral daily  cloZAPine 25 milliGRAM(s) Oral at bedtime  ferrous    sulfate Liquid 300 milliGRAM(s) Oral <User Schedule>  pantoprazole    Tablet 40 milliGRAM(s) Oral before breakfast  polyethylene glycol 3350 17 Gram(s) Oral at bedtime  polyethylene glycol/electrolyte Solution. 4000 milliLiter(s) Oral once  senna 2 Tablet(s) Oral at bedtime  valproic acid 500 milliGRAM(s) Oral two times a day    MEDICATIONS  (PRN):  acetaminophen     Tablet .. 650 milliGRAM(s) Oral every 6 hours PRN Temp greater or equal to 38C (100.4F), Mild Pain (1 - 3)  diphenhydrAMINE 50 milliGRAM(s) Oral every 6 hours PRN Threatening behavior  diphenhydrAMINE Injectable 50 milliGRAM(s) IV Push every 6 hours PRN Threatening behavior  diphenhydrAMINE Injectable 50 milliGRAM(s) IntraMuscular every 6 hours PRN Threatening behavior  haloperidol     Tablet 5 milliGRAM(s) Oral every 6 hours PRN agitation  haloperidol    Injectable 5 milliGRAM(s) IV Push every 6 hours PRN Agitation  haloperidol    Injectable 5 milliGRAM(s) IntraMuscular every 6 hours PRN Agitation  LORazepam   Injectable 2 milliGRAM(s) IntraMuscular once PRN Agitation  LORazepam   Injectable 2 milliGRAM(s) IV Push every 6 hours PRN Agitation  melatonin 3 milliGRAM(s) Oral at bedtime PRN Insomnia  
MEDICATIONS  (STANDING):  benztropine 2 milliGRAM(s) Oral daily  bisacodyl 10 milliGRAM(s) Oral once  cholecalciferol 1000 Unit(s) Oral daily  cloZAPine 25 milliGRAM(s) Oral at bedtime  ferrous    sulfate Liquid 300 milliGRAM(s) Oral <User Schedule>  pantoprazole    Tablet 40 milliGRAM(s) Oral before breakfast  polyethylene glycol 3350 17 Gram(s) Oral at bedtime  senna 2 Tablet(s) Oral at bedtime  valproic acid 500 milliGRAM(s) Oral two times a day    MEDICATIONS  (PRN):  acetaminophen     Tablet .. 650 milliGRAM(s) Oral every 6 hours PRN Temp greater or equal to 38C (100.4F), Mild Pain (1 - 3)  diphenhydrAMINE 50 milliGRAM(s) Oral every 6 hours PRN Threatening behavior  diphenhydrAMINE Injectable 50 milliGRAM(s) IV Push every 6 hours PRN Threatening behavior  diphenhydrAMINE Injectable 50 milliGRAM(s) IntraMuscular every 6 hours PRN Threatening behavior  haloperidol     Tablet 5 milliGRAM(s) Oral every 6 hours PRN agitation  haloperidol    Injectable 5 milliGRAM(s) IV Push every 6 hours PRN Agitation  haloperidol    Injectable 5 milliGRAM(s) IntraMuscular every 6 hours PRN Agitation  LORazepam   Injectable 2 milliGRAM(s) IntraMuscular once PRN Agitation  melatonin 3 milliGRAM(s) Oral at bedtime PRN Insomnia  
MEDICATIONS  (STANDING):  benztropine 2 milliGRAM(s) Oral daily  cholecalciferol 1000 Unit(s) Oral daily  cloZAPine 25 milliGRAM(s) Oral at bedtime  ferrous    sulfate Liquid 300 milliGRAM(s) Oral <User Schedule>  pantoprazole    Tablet 40 milliGRAM(s) Oral before breakfast  polyethylene glycol 3350 17 Gram(s) Oral at bedtime  senna 2 Tablet(s) Oral at bedtime  valproic acid 500 milliGRAM(s) Oral two times a day    MEDICATIONS  (PRN):  acetaminophen     Tablet .. 650 milliGRAM(s) Oral every 6 hours PRN Temp greater or equal to 38C (100.4F), Mild Pain (1 - 3)  diphenhydrAMINE 50 milliGRAM(s) Oral every 6 hours PRN Threatening behavior  diphenhydrAMINE Injectable 50 milliGRAM(s) IntraMuscular every 6 hours PRN Threatening behavior  diphenhydrAMINE Injectable 50 milliGRAM(s) IV Push every 6 hours PRN Threatening behavior  haloperidol     Tablet 5 milliGRAM(s) Oral every 6 hours PRN agitation  haloperidol    Injectable 5 milliGRAM(s) IntraMuscular every 6 hours PRN Agitation  haloperidol    Injectable 5 milliGRAM(s) IV Push every 6 hours PRN Agitation  LORazepam   Injectable 2 milliGRAM(s) IntraMuscular once PRN Agitation  LORazepam   Injectable 2 milliGRAM(s) IV Push every 6 hours PRN Agitation  melatonin 3 milliGRAM(s) Oral at bedtime PRN Insomnia  
MEDICATIONS  (STANDING):  benztropine 2 milliGRAM(s) Oral daily  bisacodyl 10 milliGRAM(s) Oral once  cholecalciferol 1000 Unit(s) Oral daily  cloZAPine 25 milliGRAM(s) Oral at bedtime  ferrous    sulfate Liquid 300 milliGRAM(s) Oral <User Schedule>  pantoprazole    Tablet 40 milliGRAM(s) Oral before breakfast  polyethylene glycol 3350 17 Gram(s) Oral at bedtime  senna 2 Tablet(s) Oral at bedtime  valproic acid 500 milliGRAM(s) Oral two times a day    MEDICATIONS  (PRN):  acetaminophen     Tablet .. 650 milliGRAM(s) Oral every 6 hours PRN Temp greater or equal to 38C (100.4F), Mild Pain (1 - 3)  diphenhydrAMINE 50 milliGRAM(s) Oral every 6 hours PRN Threatening behavior  diphenhydrAMINE Injectable 50 milliGRAM(s) IV Push every 6 hours PRN Threatening behavior  diphenhydrAMINE Injectable 50 milliGRAM(s) IntraMuscular every 6 hours PRN Threatening behavior  haloperidol     Tablet 5 milliGRAM(s) Oral every 6 hours PRN agitation  haloperidol    Injectable 5 milliGRAM(s) IV Push every 6 hours PRN Agitation  haloperidol    Injectable 5 milliGRAM(s) IntraMuscular every 6 hours PRN Agitation  LORazepam   Injectable 2 milliGRAM(s) IntraMuscular once PRN Agitation  melatonin 3 milliGRAM(s) Oral at bedtime PRN Insomnia  
MEDICATIONS  (STANDING):  benztropine 2 milliGRAM(s) Oral daily  bisacodyl 10 milliGRAM(s) Oral once  cholecalciferol 1000 Unit(s) Oral daily  cloZAPine 25 milliGRAM(s) Oral at bedtime  ferrous    sulfate Liquid 300 milliGRAM(s) Oral <User Schedule>  pantoprazole    Tablet 40 milliGRAM(s) Oral before breakfast  polyethylene glycol 3350 17 Gram(s) Oral at bedtime  senna 2 Tablet(s) Oral at bedtime  valproic acid 500 milliGRAM(s) Oral two times a day    MEDICATIONS  (PRN):  acetaminophen     Tablet .. 650 milliGRAM(s) Oral every 6 hours PRN Temp greater or equal to 38C (100.4F), Mild Pain (1 - 3)  diphenhydrAMINE 50 milliGRAM(s) Oral every 6 hours PRN Threatening behavior  diphenhydrAMINE Injectable 50 milliGRAM(s) IntraMuscular every 6 hours PRN Threatening behavior  diphenhydrAMINE Injectable 50 milliGRAM(s) IV Push every 6 hours PRN Threatening behavior  haloperidol     Tablet 5 milliGRAM(s) Oral every 6 hours PRN agitation  haloperidol    Injectable 5 milliGRAM(s) IV Push every 6 hours PRN Agitation  haloperidol    Injectable 5 milliGRAM(s) IntraMuscular every 6 hours PRN Agitation  LORazepam   Injectable 2 milliGRAM(s) IntraMuscular once PRN Agitation  LORazepam   Injectable 2 milliGRAM(s) IV Push every 6 hours PRN Agitation  melatonin 3 milliGRAM(s) Oral at bedtime PRN Insomnia  
MEDICATIONS  (STANDING):  benztropine 2 milliGRAM(s) Oral daily  bisacodyl 10 milliGRAM(s) Oral once  cholecalciferol 1000 Unit(s) Oral daily  cloZAPine 25 milliGRAM(s) Oral at bedtime  ferrous    sulfate Liquid 300 milliGRAM(s) Oral <User Schedule>  pantoprazole    Tablet 40 milliGRAM(s) Oral before breakfast  polyethylene glycol 3350 17 Gram(s) Oral at bedtime  senna 2 Tablet(s) Oral at bedtime  valproic acid 500 milliGRAM(s) Oral two times a day    MEDICATIONS  (PRN):  acetaminophen     Tablet .. 650 milliGRAM(s) Oral every 6 hours PRN Temp greater or equal to 38C (100.4F), Mild Pain (1 - 3)  diphenhydrAMINE 50 milliGRAM(s) Oral every 6 hours PRN Threatening behavior  diphenhydrAMINE Injectable 50 milliGRAM(s) IntraMuscular every 6 hours PRN Threatening behavior  diphenhydrAMINE Injectable 50 milliGRAM(s) IV Push every 6 hours PRN Threatening behavior  haloperidol     Tablet 5 milliGRAM(s) Oral every 6 hours PRN agitation  haloperidol    Injectable 5 milliGRAM(s) IV Push every 6 hours PRN Agitation  haloperidol    Injectable 5 milliGRAM(s) IntraMuscular every 6 hours PRN Agitation  LORazepam   Injectable 2 milliGRAM(s) IntraMuscular once PRN Agitation  LORazepam   Injectable 2 milliGRAM(s) IV Push every 6 hours PRN Agitation  melatonin 3 milliGRAM(s) Oral at bedtime PRN Insomnia

## 2023-10-12 NOTE — BH INPATIENT PSYCHIATRY ASSESSMENT NOTE - CURRENT MEDICATION
MEDICATIONS  (STANDING):  influenza   Vaccine 0.5 milliLiter(s) IntraMuscular once    MEDICATIONS  (PRN):   MEDICATIONS  (STANDING):  bisacodyl 10 milliGRAM(s) Oral at bedtime  cholecalciferol 1000 Unit(s) Oral at bedtime  cloZAPine 50 milliGRAM(s) Oral at bedtime  ferrous    sulfate 325 milliGRAM(s) Oral daily  influenza   Vaccine 0.5 milliLiter(s) IntraMuscular once  polyethylene glycol 3350 17 Gram(s) Oral two times a day  senna 2 Tablet(s) Oral at bedtime  valproic  acid Syrup 1000 milliGRAM(s) Oral at bedtime    MEDICATIONS  (PRN):  acetaminophen     Tablet .. 650 milliGRAM(s) Oral every 4 hours PRN Mild Pain (1 - 3), Moderate Pain (4 - 6)  benztropine 2 milliGRAM(s) Oral at bedtime PRN EPS ppx  OLANZapine Disintegrating Tablet 5 milliGRAM(s) Oral every 4 hours PRN agitation  OLANZapine Injectable 10 milliGRAM(s) IntraMuscular once PRN severe agitation or aggression

## 2023-10-12 NOTE — BH CONSULTATION LIAISON PROGRESS NOTE - NSBHMSEPERCEPT_PSY_A_CORE
Unable to assess
Unable to assess
No abnormalities/Unable to assess
No abnormalities
No abnormalities/Unable to assess

## 2023-10-12 NOTE — BH CONSULTATION LIAISON PROGRESS NOTE - NSBHMSESPEECH_PSY_A_CORE
Abnormal as indicated, otherwise normal...
Non-verbal: unable to assess speech further
Abnormal as indicated, otherwise normal...
Abnormal as indicated, otherwise normal...

## 2023-10-12 NOTE — PROGRESS NOTE ADULT - PROBLEM SELECTOR PLAN 3
- C/W PPI QD
Resume PPI
- C/W PPI QD

## 2023-10-12 NOTE — PROGRESS NOTE ADULT - PROBLEM SELECTOR PLAN 5
DVT ppx: SCD iso anemia and possible GIB   Diet: regular  Dispo: [facility]  Code: full    **Pt does NOT have the capacity to refuse emergent medical care including labs/investigations.

## 2023-10-12 NOTE — DISCHARGE NOTE NURSING/CASE MANAGEMENT/SOCIAL WORK - NSDCPEFALRISK_GEN_ALL_CORE
For information on Fall & Injury Prevention, visit: [email or web address] OR  [email or web address] OR  https://www.cdc.gov/steadi/patient.html

## 2023-10-12 NOTE — PROGRESS NOTE ADULT - ASSESSMENT
53F with PMHx BRYON, schizophrenia, GERD presenting with anemia to 6.3 , transferred from [facility], now s/p 1u pRBC, CT A/P unrevealing. EGD/Colonoscopy showed diverticulosis and one polyp removed from ascending colon. Pt currently refusing vitals, labs and medications. Psych following. Otherwise, medically cleared to return to [facility]. COVID clearance, EKG wnl

## 2023-10-12 NOTE — PROGRESS NOTE ADULT - ATTENDING COMMENTS
# Microcytic anemia, low ferritin c/f iron deficiency   # Constipation  # Schizophrenia  # GERD  # Mod-large HH on imaging    --As noted above, patient's NOK/sister agreeable with endoscopic evaluation. GI team and sister concerned that patient may continue to refuse colonic prep given underlying psychiatric condition, but if needed, sister agrees to attempt at NGT for prep administration as noted above  --Anticipate may need 2 day prep, but will reassess after initial Golytely 4L  --Continue clear liquid diet  --If patient unable to tolerate/cooperate with prep and/or NGT placement, may need to discuss alternatives (e.g. limited eval with flex sig vs only upper endoscopic evaluation with understanding of risks of missed lesions)    Additional recommendations as above.
Drank prep overnight, but without any BM. No other new symptoms reported.     # Microcytic anemia, low ferritin c/f iron deficiency   # Constipation  # Schizophrenia  # GERD  # Mod-large HH on imaging    --As previously noted, patient's NOK/sister agreeable with endoscopic evaluation. Will continue with additional day of prep as no BM after 1d prep. If patient declines prep, sister amenable to placement of NGT for Golytely administration.   --Continue clear liquid diet, NPO at MN  --If patient unable to tolerate/cooperate with prep and/or NGT placement, may need to discuss alternatives (e.g. limited eval with flex sig vs only upper endoscopic evaluation with understanding of risks of missed lesions)    Additional recommendations as above.
Patient consumed partial prep overnight, but still without any reported bowel movements. Abdomen soft. No new complaints.     # Microcytic anemia, low ferritin c/f iron deficiency   # Constipation  # Schizophrenia  # GERD  # Mod-large HH on imaging    --As no BM in ~1 week, reasonable to obtain AXR to assess bowel gas pattern and stool burden (most recent imaging was recent CTAP a few days prior)  --Will plan for additional bowel prep today, would suggest NGT placement if patient has difficulty consuming additional prep  --Would suggest enema x 1 or 2 today given ongoing constipation and to assist with bowel cleanse  --As previously noted, patient's NOK/sister agreeable with endoscopic evaluation.  --Continue clear liquid diet, NPO at MN for anticipated endoscopic evaluation tomorrow    Additional recommendations as above.
52 y/o F with PMH BRYNO, schizophrenia, GERD who was inpatient at [facility] and found to have Hgb 6.3 and transferred to [facility] for medial evaluation. s/p 2 u prbc. s/p EGD/colonoscopy- hiatal hernia and diverticulosis with 8mm poly. bx duodenitis gastric bx minimally active gastritis neg for metaplasia and dysplasia. small bowel bx unremarkable ileal mucosa. colon tubular polyp. no futher overt GIB. medically stable for discharge. discharge 40 min
52 y/o F with PMH BRYON, schizophrenia, GERD who was inpatient at [facility] and found to have Hgb 6.3 and transferred to [facility] for medial evaluation. s/p 2 u prbc. s/p EGD/colonoscopy- haital hernia and diverticulosis with 8mm poly. f/u Bx. no futher overt GIB. Does not have capacity to refuse treatement. H/H stable. awaiting bed @ [facility].
52 y/o F with PMH BRYON, schizophrenia, GERD who was inpatient at [facility] and found to have Hgb 6.3 and transferred to [facility] for medial evaluation.     #Anemia, Hgb 6.3 - s/p 1U PRBC with improvement in Hgb. Now stable. VSS. GI consulted. Endoscopy and colonoscopy to be performed with an 8mm polyp and biopsy sent. Diverticulosis also seen. No active bleeding.     #Schizophrenia, psych following. Dispo back to [facility]. Cannot refuse medical care and does not have capacity to leave AMA/refuse medical care. F/u psych recs. Pending [facility] transfer today.     Remainder of plan as stated above. Plan discussed with HS.    >30 minutes of discharge planning time.
52 y/o F with PMH BRYON, schizophrenia, GERD who was inpatient at [facility] and found to have Hgb 6.3 and transferred to [facility] for medial evaluation.     #Anemia, Hgb 6.3 - s/p 1U PRBC with improvement in Hgb. Now stable. VSS. GI consulted. Endoscopy and colonoscopy to be performed today - pending results.     #Schizophrenia, psych following. Dispo back to [facility]. Cannot refuse medical care and does not have capacity to leave AMA/refuse medical care. F/u psych recs.     Remainder of plan as stated above. Plan discussed with HS.
52 y/o F with PMH BRYON, schizophrenia, GERD who was inpatient at [facility] and found to have Hgb 6.3 and transferred to [facility] for medial evaluation. s/p 2 u prbc. s/p EGD/colonoscopy- hiatal hernia and diverticulosis with 8mm poly. bx duodenitis gastric bx minimally active gastritis neg for metaplasia and dysplasia. small bowel bx unremarkable ileal mucosa. colon tubular polyp. no futher overt GIB. Does not have capacity to refuse treatement. H/H stable. refusing meds States "my body will heal itself and all the people living it will heal me" await bed @ [facility].
54 y/o F with PMH BRYON, schizophrenia, GERD who was inpatient at [facility] and found to have Hgb 6.3 and transferred to [facility] for medial evaluation.     #Anemia, Hgb 6.3 - s/p 1U PRBC with improvement in Hgb. Now stable. VSS. GI consulted. Pending endoscopy/colonoscopy. Pt did complete GoLytely prep without NGT but did not have a BM. Pt. has history of chronic constipation. Will need more prep. Refused overnight. Since no BM in 1 week - AXR ordered. May need to place NGT for prep. As per GI documentation, sister/NOK in agreement for evaluation. Continue to trend Hgb. Transfuse for Hgb <7. F/u GI recs     #Schizophrenia, psych following. Dispo back to [facility]. Cannot refuse medical care and does not have capacity to leave AMA/refuse medical care. F/u psych recs.     Remainder of plan as stated above. Plan discussed with HS.
54 y/o F with PMH BRYON, schizophrenia, GERD who was inpatient at [facility] and found to have Hgb 6.3 and transferred to [facility] for medial evaluation.     #Anemia, Hgb 6.3 - s/p 1U PRBC with improvement in Hgb. Now stable. VSS. GI consulted. Endoscopy and colonoscopy to be performed with an 8mm polyp and biopsy sent. Diverticulosis also seen. No active bleeding.     #Schizophrenia, psych following. Dispo back to [facility]. Cannot refuse medical care and does not have capacity to leave AMA/refuse medical care. F/u psych recs. Pending [facility] transfer.     Remainder of plan as stated above. Plan discussed with HS.
52 y/o F with PMH BRYON, schizophrenia, GERD who was inpatient at [facility] and found to have Hgb 6.3 and transferred to [facility] for medial evaluation. s/p 2 u prbc. s/p EGD/colonoscopy- hiatal hernia and diverticulosis with 8mm poly. bx duodenitis gastric bx minimally active gastritis neg for metaplasia and dysplasia. small bowel bx unremarkable ileal mucosa. colon tubular polyp. no futher overt GIB. medically stable for discharge.
54 y/o F with PMH BRYON, schizophrenia, GERD who was inpatient at [facility] and found to have Hgb 6.3 and transferred to [facility] for medial evaluation.     #Anemia, Hgb 6.3 - s/p 1U PRBC with improvement in Hgb. Now stable. VSS. GI consulted. Endoscopy and colonoscopy to be performed with an 8mm polyp and biopsy sent. Diverticulosis also seen. No active bleeding.     #Schizophrenia, psych following. Dispo back to [facility]. Cannot refuse medical care and does not have capacity to leave AMA/refuse medical care. F/u psych recs. Pending [facility] transfer.     Remainder of plan as stated above. Plan discussed with HS.
52 y/o F with PMH BRYON, schizophrenia, GERD who was inpatient at [facility] and found to have Hgb 6.3 and transferred to [facility] for medial evaluation.     #Anemia, Hgb 6.3 - s/p 1U PRBC with improvement in Hgb. Now stable. Pt disengaged on interview and refused exam. VSS. GI consulted. Pending endoscopy/colonoscopy. Pt. will likely have to prep with GoLytely via NGT. As per GI documentation, sister/NOK in agreement for evaluation. Continue to trend Hgb. Transfuse for Hgb <7. F/u GI recs     #Schizophrenia, psych following. Dispo back to [facility]. Cannot refuse medical care and does not have capacity to leave AMA/refuse medical care. F/u psych recs.     Remainder of plan as stated above. Plan discussed with HS.
54 y/o F with PMH BRYON, schizophrenia, GERD who was inpatient at [facility] and found to have Hgb 6.3 and transferred to [facility] for medial evaluation.     #Anemia, Hgb 6.3 - s/p 1U PRBC with improvement in Hgb. Now stable. Pt disengaged on interview and refused exam. VSS. GI consulted. Pending endoscopy/colonoscopy. Pt did complete GoLytely prep without NGT but did not have a BM. Pt. has history of chronic constipation. Likely will need more prep. As per GI documentation, sister/NOK in agreement for evaluation. Continue to trend Hgb. Transfuse for Hgb <7. F/u GI recs     #Schizophrenia, psych following. Dispo back to [facility]. Cannot refuse medical care and does not have capacity to leave AMA/refuse medical care. F/u psych recs.     Remainder of plan as stated above. Plan discussed with HS.
53 year old female with PMHx BRYON, schizophrenia, GERD presenting with anemia to 6.3 , transferred from [facility], now s/p 1u pRBC. patient confused, refusing blood draws, as per psych patient does not have capacity to refuse. pt seen by GI,  EGD/Colonoscopy planned for Monday, however patient likely give hard time with bowel prep. Monitor h/h, transfuse for hgb < 7, monitor for signs of bleeding.    Rest of the plan as above. Discussed with HS.

## 2023-10-12 NOTE — BH CONSULTATION LIAISON PROGRESS NOTE - NSBHATTESTBILLING_PSY_A_CORE
87656-Msievnauxv OBS or IP - moderate complexity OR 35-49 mins
14742-Rfiazhlaex OBS or IP - moderate complexity OR 35-49 mins
Billing in another system
18148-Ahiwenlczy OBS or IP - high complexity OR 50-79 mins
66207-Zarsrpesiu OBS or IP - moderate complexity OR 35-49 mins
37022-Xiqlbwzzgu OBS or IP - moderate complexity OR 35-49 mins

## 2023-10-12 NOTE — PROGRESS NOTE ADULT - ATTENDING SUPERVISION STATEMENT
Fellow
Resident

## 2023-10-12 NOTE — BH INPATIENT PSYCHIATRY ASSESSMENT NOTE - DESCRIPTION
lives at [place], unemployed, in treatment with [facility] ACT team, previously on AOT but .

## 2023-10-12 NOTE — PROGRESS NOTE ADULT - SUBJECTIVE AND OBJECTIVE BOX
PROGRESS NOTE:   Authored by Dr. Eliza Maloney MD ([id number]). Pager [facility] [phone] / [facility]     Patient is a 53y old  Female who presents with a chief complaint of anemia (11 Oct 2023 07:10)  - patient awake but not responding to questions/answers     SUBJECTIVE / OVERNIGHT EVENTS:  No acute events overnight.     ADDITIONAL REVIEW OF SYSTEMS:  ROS limited, pt not responding to answers     MEDICATIONS  (STANDING):  benztropine 2 milliGRAM(s) Oral daily  bisacodyl 10 milliGRAM(s) Oral once  cholecalciferol 1000 Unit(s) Oral daily  cloZAPine 25 milliGRAM(s) Oral at bedtime  ferrous    sulfate Liquid 300 milliGRAM(s) Oral <User Schedule>  pantoprazole    Tablet 40 milliGRAM(s) Oral before breakfast  polyethylene glycol 3350 17 Gram(s) Oral at bedtime  senna 2 Tablet(s) Oral at bedtime  valproic acid 500 milliGRAM(s) Oral two times a day    MEDICATIONS  (PRN):  acetaminophen     Tablet .. 650 milliGRAM(s) Oral every 6 hours PRN Temp greater or equal to 38C (100.4F), Mild Pain (1 - 3)  diphenhydrAMINE 50 milliGRAM(s) Oral every 6 hours PRN Threatening behavior  diphenhydrAMINE Injectable 50 milliGRAM(s) IV Push every 6 hours PRN Threatening behavior  diphenhydrAMINE Injectable 50 milliGRAM(s) IntraMuscular every 6 hours PRN Threatening behavior  haloperidol     Tablet 5 milliGRAM(s) Oral every 6 hours PRN agitation  haloperidol    Injectable 5 milliGRAM(s) IV Push every 6 hours PRN Agitation  haloperidol    Injectable 5 milliGRAM(s) IntraMuscular every 6 hours PRN Agitation  LORazepam   Injectable 2 milliGRAM(s) IntraMuscular once PRN Agitation  melatonin 3 milliGRAM(s) Oral at bedtime PRN Insomnia      CAPILLARY BLOOD GLUCOSE        I&O's Summary      PHYSICAL EXAM:  Vital Signs Last 24 Hrs  T(C): --  T(F): --  HR: --  BP: --  BP(mean): --  RR: --  SpO2: --        CONSTITUTIONAL: NAD,   RESPIRATORY: Normal respiratory effort; lungs are clear to auscultation bilaterally  CARDIOVASCULAR: Regular rate and rhythm, normal S1 and S2, no murmur/rub/gallop; No lower extremity edema; Peripheral pulses are 2+ bilaterally  ABDOMEN: Nontender to palpation, normoactive bowel sounds, no rebound/guarding; No hepatosplenomegaly  MUSCLOSKELETAL: no clubbing or cyanosis of digits; no joint swelling or tenderness to palpation  PSYCH: limited, not answering to questions    LABS:                      Tele Reviewed:    RADIOLOGY & ADDITIONAL TESTS:  Results Reviewed:   Imaging Personally Reviewed:  Electrocardiogram Personally Reviewed:

## 2023-10-12 NOTE — BH INPATIENT PSYCHIATRY ASSESSMENT NOTE - MSE UNSTRUCTURED FT
Gen: The patient appears older than stated age, poor hygiene and grooming, malodorous, dressed in hospital gown   Beh: Hostile, uncooperative  Motor: No PMR/PMA   Speech: Normal volume and rate, menacing tone.   Mood: "none of your business"  Affect: irritable, blunted  Thought process: disorganized, concrete, impaired reasoning  Thought content: delusional content about staff burning in flames, not in a hospital  Perception: Not observed responding to internal stimuli  Neuro: disoriented  Insight: poor   Judgment: poor  Impulse control: impaired Gen: The patient appears older than stated age, poor hygiene and grooming, malodorous, dressed in hospital gown   Beh: minimally compliant, more at pseudocompliant, lying in bed, does not sit up to speak with MD, faces wall and MD minimally, noncompliant with meds  Motor: No PMR/PMA   Speech: Normal volume and rate, menacing tone.   Mood: "I am ok... I will be ok and don't want any blood from another person... it is dirty."  Affect: mildly irritable, blunted with paucity of affect  Thought process: disorganized, concrete, impaired reasoning  Thought content: delusional content about staff burning in flames, not in a hospital, was in QH, ?paranoid themes  Perception: appears RIS/IP  Neuro: disoriented  Insight: poor   Judgment: poor  Impulse control: impaired  Reliability: notably poor

## 2023-10-12 NOTE — PROGRESS NOTE ADULT - PROBLEM SELECTOR PLAN 1
- C/W benztropine 2mg QD, valproic acid 500mg BID, clozapine 25mg QHS  - 1:1 for danger to self/others   - Agitation: Haldol 5mg q4h prn + Benadryl 50mg q4h prn PO/IM/IV  - Monitor EKG QTc interval and hold antipsychotic if qtc>500  - EKG 10/9 qtc <500 normal   - 10/12: Pt continues to refuse medications and lab work since 10/09, psych aware

## 2023-10-12 NOTE — BH PATIENT PROFILE - HOME MEDICATIONS
cholecalciferol oral tablet , 1000 unit(s) orally once a day  bisacodyl 5 mg oral delayed release tablet , 2 tab(s) orally once  ferrous sulfate 300 mg/5 mL (60 mg/5 mL elemental iron) oral liquid , 5 milliliter(s) orally once a day  melatonin 3 mg oral tablet , 1 tab(s) orally once a day (at bedtime) As needed Insomnia  LORazepam 1 mg/mL-NaCl 0.9% intravenous solution , 2 milliliter(s) intravenous every 6 hours as needed for Agitation  diphenhydrAMINE 50 mg oral capsule , 1 cap(s) orally every 6 hours As needed Threatening behavior  haloperidol 5 mg oral tablet , 1 tab(s) orally every 6 hours as needed for agitation  cloZAPine 25 mg oral tablet , 1 tab(s) orally once a day (at bedtime)  benztropine 2 mg oral tablet , 1 tab(s) orally once a day  valproic acid 250 mg oral capsule , 2 cap(s) orally 2 times a day  acetaminophen 325 mg oral tablet , 2 tab(s) orally every 6 hours As needed Temp greater or equal to 38C (100.4F), Mild Pain (1 - 3)  senna (sennosides) 8.6 mg oral tablet , 1 tab(s) orally once a day  pantoprazole 40 mg oral delayed release tablet , 1 tab(s) orally once a day  polyethylene glycol 3350 oral powder for reconstitution , 17 gram(s) orally once a day (at bedtime)

## 2023-10-12 NOTE — PROGRESS NOTE ADULT - PROBLEM SELECTOR PLAN 4
C/W joaquin bragg
Resume senna, miralax
C/W joaquin bragg

## 2023-10-13 PROCEDURE — 99223 1ST HOSP IP/OBS HIGH 75: CPT

## 2023-10-13 PROCEDURE — 99232 SBSQ HOSP IP/OBS MODERATE 35: CPT

## 2023-10-13 RX ORDER — CLOZAPINE 150 MG/1
25 TABLET, ORALLY DISINTEGRATING ORAL AT BEDTIME
Refills: 0 | Status: DISCONTINUED | OUTPATIENT
Start: 2023-10-13 | End: 2023-10-27

## 2023-10-13 RX ORDER — OLANZAPINE 15 MG/1
5 TABLET, FILM COATED ORAL ONCE
Refills: 0 | Status: DISCONTINUED | OUTPATIENT
Start: 2023-10-13 | End: 2024-01-17

## 2023-10-13 NOTE — CONSULT NOTE ADULT - CONSULT REASON
Asked by attending to see this 53 year old female with Schizophrenia now with exacerbation.  Pt with a hx of iron def anemia presented to ED with HG of 6.3.  Pt admitted and  transfused 2 u pRBCs  CT ABD/PELVIS NEG.  ENDOSCOPY/COLONOSCOPY POSITIVE FOR DIVERTICULOSIS AND ONE POLYP TUBULAR ADENOMA.  Source of loss of blood unknown.  +GERD, +Vit D def.  Pt has refused blood work since 10/09/2023. Asked by attending to see this 53 year old female with Schizophrenia now with exacerbation.  Pt with a hx of iron def anemia presented to ED with HG of 6.3.  Pt admitted and  transfused 2 u pRBCs  CT ABD/PELVIS NEG.  ENDOSCOPY/COLONOSCOPY POSITIVE FOR DIVERTICULOSIS AND ONE POLYP TUBULAR ADENOMA.  Source of loss of blood unknown.  +GERD, +Vit D def.  Pt has refused blood work since 10/09/2023.    PATIENT IS TRANSGENDER.  PT TRANSITIONING FROM FEMALE TO MALE NAMES MILIND

## 2023-10-13 NOTE — PSYCHIATRIC REHAB INITIAL EVALUATION - NSBHPRRECOMMEND_PSY_ALL_CORE
Writer attempted to meet with pt. in his room. Pt. was note receptive to writer. pt. looked up from his bed but when writer went to speak with him, he put his head down on the pillow and refused to acknowledge writer. Pt. just laid in bed and would not respond to writer prompting. All pt. information was taken from pt. charts. per charts, pt. is a 53 year old transgender male, who prefers He/Him pronouns. " Pt. is currently living in [facility] per [facility], with prior psychiatric history of schizoaffective disorder, schizophrenia, unspecified, anxiety, bipolar, selective mutism, currently with [facility] ACT team, previously with [facility] which , with history of psychiatric hospitalization(s), without known history of self-injury or suicide attempts, with history of aggression in the setting on nonadherence with medication". Pt. refused to meet with several members of the treatment team. Rakanr was unable to establish a collaborative treatment goal due to pt. selective mutism so writer established one for him.

## 2023-10-13 NOTE — BH INPATIENT PSYCHIATRY PROGRESS NOTE - NSBHASSESSSUMMFT_PSY_ALL_CORE
This is a 53 year old woman, currently living at [facility] [place] [facility], PMH of GERD, anemia, PPH of schizophrenia, selective mutism, currently with [facility] ACT team, previously with [facility] which , with history of psychiatric hospitalization(s), without known history of self-injury or suicide attempts, with history of aggression in the setting on nonadherence with medication, who is brought in by staff at residence for increasing agitation and aggression in the setting of 2 months of medication nonadherence. Patient presents as hostile, disorganized, threatening, malodorous. Consistent with chronic schizophrenia with worsening aggression and disorganization iso med nonadherence. Transferred to [facility] for pRBCs. Returns now to psych for continued tx for SCZ/psychosis.    PLAN  - admit patient on 2PC admit from MED via CL  - routine observation adequate as q15 in structured environment is adequate  - continue outpatient psychiatric medications:  Continue depakote 1000 mg as depakene  Increase clozapine 25 to 50 mg qhs  Continue dual BREWER regimen with relprevv and haldol DEC  Continued cogentin as 2 mg qhs but as PRN as no evident EPS  PRNs:  Zyprexa 10 IM and zydis 5 mg q4 PO PRN  - Medical:  Iron as  mg qd  Vit D3 1000U as qhs  Routine MED f/u s/p transfusion  Senokot 8.6mg qhs and miralax BID  NUT consult for obesity/weights and BMI calculation  - will likely have to apply for TOO and AOT  - dispo pending clinical improvement   Patient is a 53 year old adult (goes by "Juan Jose"), living at [facility], PMH of GERD, anemia, PPH of schizophrenia, selective mutism, currently with [facility] ACT team, previously with [facility] which , with history of multiple psychiatric hospitalizations, without known history of self-injury or suicide attempts, with history of aggression in the setting on nonadherence with medication, who was initially brought in by staff at residence for increasing agitation and aggression in the setting of 2 months of medication nonadherence. Patient was hospitalized from 8/3-23 on 1N where they were noted to be hostile, disorganized, threatening, malodorous and grossly psychotic. TOO was obtained, and patient eventually administered dual long-acting injectables (Haldol Decanoate and Zyprexa Relprevv), though had declined nearly all PO medications. Patient was transferred to [facility] where they were admitted due to anemia to 6 (medical workup largely unremarkable), patient stabilized and transferred back to [facility] for further management of psychosis.     Diagnostically, presentation consistent with exacerbation of schizophrenia.     On initial evaluation today by primary treatment team, patient is found calmly in bed. They do not accept that this writer is their psychiatrist and ask this writer to leave, eventually stop speaking altogether. Thus far has refused vitals and all PO medications with the exception of depakote. Will pursue re-instatement of TOO. State application submitted on 1N, and will need to be resubmitted.    Patient requires inpatient admission for containment, stabilization, medication management and aftercare planning.    Plan:  1. Brink: admitted involuntary , will likely need TOO  2. Observation: routine checks  3. Collateral: will obtain collateral from treaters/residence (see collateral from 1N admission)  4. Psychiatric  -Continue Zyprexa Replevv 405mg qMonth, last given , next due 10/20  -Continue Haldol Decanoate: 100mg on , 150mg on , next due 10/19 (previously on 200mg)  -Continue clozapine 25mg QHS (refusing)  -Continue depakote 1000mg QHS   	-Will order level for 10/16  -Continue benztropine 2mg PRN for EPS  5. Medical:  -Anemia: s/p medical admission at [facility] for anemia to Hgb 6, required 2u pRBCs, most recent Hgb 8.8-9. GI workup unremarkable with exception of diverticulosis and colonic polyp  	-Internist to see  	-Will order repeat CBC 10/16  	-Continue ferrous sulfate 325mg daily  -Bowel regimen: continue bisacodyl 10mg QHS, miralax 17g daily, senna 2 tablets QHS  -Continue Vitamin D3 1000units QHS  6. PRNs  -Olanzapine 5mg PO or IM for agitation  7. Therapy   -Individual, group and milieu therapy as appropriate   8. Disposition: state application submitted at 1N, will need to be resubmitted

## 2023-10-13 NOTE — DIETITIAN INITIAL EVALUATION ADULT - OTHER INFO
Patient is a 53 year old adult (goes by "Juan Jose"), living at [facility] on [place] grounds, PMH of GERD, anemia, PPH of schizophrenia, selective mutism, currently with [facility] ACT team, previously with [facility] which , with history of multiple psychiatric hospitalizations, without known history of self-injury or suicide attempts, with history of aggression in the setting on nonadherence with medication,who was initially brought in by staff at residence for increasing agitation and aggression in the setting of 2 months of medication nonadherence. Patient was hospitalized from 8/3-23 on 1N. Transferred to [facility] due to anemia to 6 (medical workup largely unremarkable), patient stabilized and transferred back to [facility] for further management of psychosis.   Encountered patient in her room who was lying in her bed. Patient stared at writer and would not engage/answer RD's questions. Staff states she did not eat breakfast. Patient on iron sulfate and Vitamin D.

## 2023-10-13 NOTE — BH INPATIENT PSYCHIATRY PROGRESS NOTE - NSBHCHARTREVIEWVS_PSY_A_CORE FT
Vital Signs Last 24 Hrs  T(C): --  T(F): --  HR: 74 (10-12-23 @ 17:39) (74 - 74)  BP: 130/65 (10-12-23 @ 17:39) (130/65 - 130/65)  BP(mean): --  RR: 16 (10-12-23 @ 21:30) (16 - 16)  SpO2: --    Orthostatic VS  10-12-23 @ 21:30  Lying BP: 115/52 HR: 72  Sitting BP: --/-- HR: --  Standing BP: --/-- HR: --  Site: --  Mode: --

## 2023-10-13 NOTE — CONSULT NOTE ADULT - ASSESSMENT
53 year old female with exacerbation of Schizophrenia with a hx of Iron Deficiency Anemia presented to [facility] ER with Hg of 6.3.  Pt admitted to medicine for GI w/u. CT of A/P neg  Endoscopy neg.  Colonoscopy positive for diverticulosis and one polyps tubular adenoma.  Pt transfused 2 u pRBCs.  Hg now 9.0.  Pt has refused lab work since 10/09/2023  1.  ACUTE ANEMIA:  NO SOURCE OF LOSS OF BLOOD FOUND.  2.  CHRONIC IRON DEF ANEMIA:  IRON REPLACEMENT.  3.  HIATAL HERNIA: PROTONIX  4.  CONSTIPATION:  SENNA MIRALAX  5.  VIT D DEF:  ON REPLACEMENT  6.  PSYCH: AS PER ATTENDING

## 2023-10-13 NOTE — BH SOCIAL WORK INITIAL PSYCHOSOCIAL EVALUATION - OTHER PAST PSYCHIATRIC HISTORY (INCLUDE DETAILS REGARDING ONSET, COURSE OF ILLNESS, INPATIENT/OUTPATIENT TREATMENT)
Per chart, patient is a 53 year-old  woman transgendered to man, currently living in [place]. Per PSYCKES, with prior psychiatric history of schizoaffective disorder, schizophrenia, unspecified, anxiety, bipolar, selective mutism, currently with [facility] ACT team, previously with [facility] which . Patient has history of psychiatric hospitalization(s), without known history of self-injury or suicide attempts, with history of aggression in the setting on nonadherence with medication, who presents for persistent aggressive behaviors.  On initial assessment on  as per chart, the patient presented as irritable, hostile, uncooperative. He is seen in her bed wrapped up in blankets and did not move nor respond to staff. Patient then stated that he is not in a hospital but refuses to answer where he thinks he is. Otherwise patient occasionally speaks nonsensically in an aggressive tone with intense eye contact.

## 2023-10-13 NOTE — BH INPATIENT PSYCHIATRY PROGRESS NOTE - NSBHFUPINTERVALHXFT_PSY_A_CORE
Refused vitals, refused all medications except VPA, no PRNs for agitation, no acute events.  Refused vitals, refused all medications except VPA, no PRNs for agitation, no acute events.     Patient found laying in bed, awake. Enter room and introduce myself as new psychiatrist. Patient states that this writer is not her psychiatrist and that she doesn't want to talk. Attempt to orient this patient to unit/treatment team and patient declines to speak.      Per assessment with [facility] yesterday:  On ML3, as per admit via [facility] attending: pt firmly believes she does not need, nor had a blood transfusion. Believes with no evidence that all will be ok medically. Appears oddly related, disorganized and IP/RIS possibly with internal dialog between multiple voices as lips noted to be moving often, and makes additional comments after she speaks, minimally answers questions put to pt by attending. Says she does not want blood of another, as "it is dirty... I do not want it." Then adds "I am fire and water...." Denies that she was tired from [place]. Firmly believes she was at  not [facility] earlier today prior to transfer. Explained MD is sure pt was at [facility] based on closeness and records. Appears rather guarded overall, presumed paranoid and frankly psychotic. 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 Encouraged compliance. Pt noted to have a dual BREWER regimen+clozapine, but pt denies any medications. Adequate behavioral control and CO not needed at present.

## 2023-10-13 NOTE — CONSULT NOTE ADULT - SUBJECTIVE AND OBJECTIVE BOX
HPI:   53 year old female with Schizophrenia now with exacerbation.  Pt with a hx of iron deficiency anemia now presented to [facility] with Hg of 6.3.  Pt admitted for GI w/u to medicine.  CT of abd/pelvis neg.  Endoscopy neg.  Colonoscopy +for diverticulosis and one polyp.  Pt transfused 2 u pRBCs.  Hg  9.0/  Pt has refused blood work since 10/09/2023.      +GERD, +Hiatal Hernia, +Vit D def.    PAST MEDICAL & SURGICAL HISTORY:  Schizophrenia      Constipation      Obesity      Anxiety      Anemia      No significant past surgical history          Review of Systems:   CONSTITUTIONAL: No fever, weight loss, or fatigue  EYES: No eye pain, visual disturbances, or discharge  ENMT:  No difficulty hearing, tinnitus, vertigo; No sinus or throat pain  NECK: No pain or stiffness  BREASTS: No pain, masses, or nipple discharge  RESPIRATORY: No cough, wheezing, chills or hemoptysis; No shortness of breath  CARDIOVASCULAR: No chest pain, palpitations, dizziness, or leg swelling  GASTROINTESTINAL: No abdominal or epigastric pain. No nausea, vomiting, or hematemesis; No diarrhea or constipation. No melena or hematochezia.  GENITOURINARY: No dysuria, frequency, hematuria, or incontinence  NEUROLOGICAL: No headaches, memory loss, loss of strength, numbness, or tremors  SKIN: No itching, burning, rashes, or lesions   LYMPH NODES: No enlarged glands  ENDOCRINE: No heat or cold intolerance; No hair loss  MUSCULOSKELETAL: No joint pain or swelling; No muscle, back, or extremity pain  HEME/LYMPH: No easy bruising, or bleeding gums  ALLERY AND IMMUNOLOGIC: No hives or eczema    Allergies    penicillin (Other)          Social History:  -CIGS,  -ETOH, -DRUGS    FAMILY HISTORY:  Patient unable to provide medical history    Family history of psychosis        MEDICATIONS  (STANDING):  bisacodyl 10 milliGRAM(s) Oral at bedtime  cholecalciferol 1000 Unit(s) Oral at bedtime  cloZAPine 25 milliGRAM(s) Oral at bedtime  ferrous    sulfate 325 milliGRAM(s) Oral daily  influenza   Vaccine 0.5 milliLiter(s) IntraMuscular once  polyethylene glycol 3350 17 Gram(s) Oral two times a day  senna 2 Tablet(s) Oral at bedtime  valproic  acid Syrup 1000 milliGRAM(s) Oral at bedtime    MEDICATIONS  (PRN):  acetaminophen     Tablet .. 650 milliGRAM(s) Oral every 4 hours PRN Mild Pain (1 - 3), Moderate Pain (4 - 6)  benztropine 2 milliGRAM(s) Oral at bedtime PRN EPS ppx  OLANZapine Disintegrating Tablet 5 milliGRAM(s) Oral every 4 hours PRN agitation  OLANZapine Injectable 5 milliGRAM(s) IntraMuscular once PRN Agitation    VS:  115/52 72     PHYSICAL EXAM:  GENERAL: NAD, well-developed  HEAD:  Atraumatic, Normocephalic  EYES: EOMI, conjunctiva and sclera clear  NECK: Supple, No JVD  CHEST/LUNG: Clear to auscultation bilaterally; No wheeze  HEART: Regular rate and rhythm; No murmurs, rubs, or gallops  ABDOMEN: Soft, Nontender, Nondistended; Bowel sounds present  EXTREMITIES:  No clubbing, cyanosis, or edema  NEUROLOGY: non-focal  SKIN: No rashes or lesions    LABS:  10/12/2023 COVID PCR NEG    10/09/2023  WBC 6.55  H/H  9.0/30.3 PLAST 367   K 3.9  CO2 24 GLUC 89 BUN 14 CR 0.71     10/01/2023  HCG NEG    2023  FERRITIN 11 CHOL 166    TRIG 61 HDL 60 LDL 94  HGAIC 5.3  2023  B12 696 FOLATE 6.4    2023  TSH  1.25    EK2023  ST NONSPECIFIC CHANGES, NO ACUTE CHANGES    RADIOLOGY & ADDITIONAL TESTS:  10/01/2023  CT ABD/PELVIS HIATAL HERNIA GALLSTONES FIBROID UTERUS    Consultant(s) Notes Reviewed:  NOTES REVIEWED    Care Discussed with Consultants/Other Providers:  ATTENDING AND STAFF   HPI:   53 year old female with Schizophrenia now with exacerbation.  Pt with a hx of iron deficiency anemia now presented to [facility] with Hg of 6.3.  Pt admitted for GI w/u to medicine.  CT of abd/pelvis neg.  Endoscopy neg.  Colonoscopy +for diverticulosis and one polyp.  Pt transfused 2 u pRBCs.  Hg  9.0/  Pt has refused blood work since 10/09/2023.      +GERD, +Hiatal Hernia, +Vit D def.    PAST MEDICAL & SURGICAL HISTORY:  Schizophrenia      Constipation      Obesity      Anxiety      Anemia      No significant past surgical history          Review of Systems:   CONSTITUTIONAL: No fever, weight loss, or fatigue  EYES: No eye pain, visual disturbances, or discharge  ENMT:  No difficulty hearing, tinnitus, vertigo; No sinus or throat pain  NECK: No pain or stiffness  BREASTS: No pain, masses, or nipple discharge  RESPIRATORY: No cough, wheezing, chills or hemoptysis; No shortness of breath  CARDIOVASCULAR: No chest pain, palpitations, dizziness, or leg swelling  GASTROINTESTINAL: No abdominal or epigastric pain. No nausea, vomiting, or hematemesis; No diarrhea or constipation. No melena or hematochezia.  GENITOURINARY: No dysuria, frequency, hematuria, or incontinence  NEUROLOGICAL: No headaches, memory loss, loss of strength, numbness, or tremors  SKIN: No itching, burning, rashes, or lesions   LYMPH NODES: No enlarged glands  ENDOCRINE: No heat or cold intolerance; No hair loss  MUSCULOSKELETAL: No joint pain or swelling; No muscle, back, or extremity pain  HEME/LYMPH: No easy bruising, or bleeding gums  ALLERY AND IMMUNOLOGIC: No hives or eczema    Allergies    penicillin (Other)          Social History:  -CIGS,  -ETOH, -DRUGS    FAMILY HISTORY:  Patient unable to provide medical history    Family history of psychosis        MEDICATIONS  (STANDING):  bisacodyl 10 milliGRAM(s) Oral at bedtime  cholecalciferol 1000 Unit(s) Oral at bedtime  cloZAPine 25 milliGRAM(s) Oral at bedtime  ferrous    sulfate 325 milliGRAM(s) Oral daily  influenza   Vaccine 0.5 milliLiter(s) IntraMuscular once  polyethylene glycol 3350 17 Gram(s) Oral two times a day  senna 2 Tablet(s) Oral at bedtime  valproic  acid Syrup 1000 milliGRAM(s) Oral at bedtime    MEDICATIONS  (PRN):  acetaminophen     Tablet .. 650 milliGRAM(s) Oral every 4 hours PRN Mild Pain (1 - 3), Moderate Pain (4 - 6)  benztropine 2 milliGRAM(s) Oral at bedtime PRN EPS ppx  OLANZapine Disintegrating Tablet 5 milliGRAM(s) Oral every 4 hours PRN agitation  OLANZapine Injectable 5 milliGRAM(s) IntraMuscular once PRN Agitation    VS:  115/52 72     PHYSICAL EXAM:  GENERAL: NAD, well-developed  HEAD:  Atraumatic, Normocephalic  EYES: EOMI, conjunctiva and sclera clear  EXTREMITIES:  No clubbing, cyanosis, or edema  NEUROLOGY: non-focal  SKIN: No rashes or lesions  PT REFUSED FURTHER EXAMINATION    LABS:  10/12/2023 COVID PCR NEG    10/09/2023  WBC 6.55  H/H  9.0/30.3 PLAST 367   K 3.9  CO2 24 GLUC 89 BUN 14 CR 0.71     10/01/2023  HCG NEG    2023  FERRITIN 11 CHOL 166    TRIG 61 HDL 60 LDL 94  HGAIC 5.3  2023  B12 696 FOLATE 6.4    2023  TSH  1.25    EK2023  ST NONSPECIFIC CHANGES, NO ACUTE CHANGES    RADIOLOGY & ADDITIONAL TESTS:  10/01/2023  CT ABD/PELVIS HIATAL HERNIA GALLSTONES FIBROID UTERUS    Consultant(s) Notes Reviewed:  NOTES REVIEWED    Care Discussed with Consultants/Other Providers:  ATTENDING AND STAFF

## 2023-10-13 NOTE — BH INPATIENT PSYCHIATRY PROGRESS NOTE - PRN MEDS
MEDICATIONS  (PRN):  acetaminophen     Tablet .. 650 milliGRAM(s) Oral every 4 hours PRN Mild Pain (1 - 3), Moderate Pain (4 - 6)  benztropine 2 milliGRAM(s) Oral at bedtime PRN EPS ppx  OLANZapine Disintegrating Tablet 5 milliGRAM(s) Oral every 4 hours PRN agitation  OLANZapine Injectable 10 milliGRAM(s) IntraMuscular once PRN severe agitation or aggression

## 2023-10-13 NOTE — BH INPATIENT PSYCHIATRY PROGRESS NOTE - MSE UNSTRUCTURED FT
Appearance: grooming-intact ***  Behavior: cooperative, no PMA/PMR ***  Speech: regular rate, rhythm and volume ***  Mood:   Affect: euthymic ***  Thought process: linear, logical, organized ***  Thought content: no delusions elicited ***  Perceptions: no AH, VH elicited ***  Insight:  Judgement:  Cognition: grossly intact ***  Gait: intact Appearance: laying in bed under blankets  Behavior: uncooperative, no PMA/PMR noted  Speech: regular rate, rhythm and volume though limited content  Mood: unable to assess  Affect: irritable  Thought process: unable to assess due to limited content  Thought content: unable to assess due to limited content  Perceptions: unable to assess  Insight: poor  Judgement: poor, refusing vitals, medications or speaking with staff  Cognition: unable to assess

## 2023-10-13 NOTE — BH INPATIENT PSYCHIATRY PROGRESS NOTE - CURRENT MEDICATION
MEDICATIONS  (STANDING):  bisacodyl 10 milliGRAM(s) Oral at bedtime  cholecalciferol 1000 Unit(s) Oral at bedtime  cloZAPine 50 milliGRAM(s) Oral at bedtime  ferrous    sulfate 325 milliGRAM(s) Oral daily  influenza   Vaccine 0.5 milliLiter(s) IntraMuscular once  polyethylene glycol 3350 17 Gram(s) Oral two times a day  senna 2 Tablet(s) Oral at bedtime  valproic  acid Syrup 1000 milliGRAM(s) Oral at bedtime    MEDICATIONS  (PRN):  acetaminophen     Tablet .. 650 milliGRAM(s) Oral every 4 hours PRN Mild Pain (1 - 3), Moderate Pain (4 - 6)  benztropine 2 milliGRAM(s) Oral at bedtime PRN EPS ppx  OLANZapine Disintegrating Tablet 5 milliGRAM(s) Oral every 4 hours PRN agitation  OLANZapine Injectable 10 milliGRAM(s) IntraMuscular once PRN severe agitation or aggression   MEDICATIONS  (STANDING):  bisacodyl 10 milliGRAM(s) Oral at bedtime  cholecalciferol 1000 Unit(s) Oral at bedtime  cloZAPine 25 milliGRAM(s) Oral at bedtime  ferrous    sulfate 325 milliGRAM(s) Oral daily  influenza   Vaccine 0.5 milliLiter(s) IntraMuscular once  polyethylene glycol 3350 17 Gram(s) Oral two times a day  senna 2 Tablet(s) Oral at bedtime  valproic  acid Syrup 1000 milliGRAM(s) Oral at bedtime    MEDICATIONS  (PRN):  acetaminophen     Tablet .. 650 milliGRAM(s) Oral every 4 hours PRN Mild Pain (1 - 3), Moderate Pain (4 - 6)  benztropine 2 milliGRAM(s) Oral at bedtime PRN EPS ppx  OLANZapine Disintegrating Tablet 5 milliGRAM(s) Oral every 4 hours PRN agitation  OLANZapine Injectable 10 milliGRAM(s) IntraMuscular once PRN severe agitation or aggression

## 2023-10-13 NOTE — BH INPATIENT PSYCHIATRY PROGRESS NOTE - NSBHCONSBHPROVDETAILS_PSY_A_CORE  FT
See collateral from initial admission See collateral from initial admission to 1N, reached out to NP Crilly on 10/13 (admission to ML3), left VM

## 2023-10-13 NOTE — DIETITIAN INITIAL EVALUATION ADULT - PERTINENT MEDS FT
Statement Selected
MEDICATIONS  (STANDING):  bisacodyl 10 milliGRAM(s) Oral at bedtime  cholecalciferol 1000 Unit(s) Oral at bedtime  cloZAPine 25 milliGRAM(s) Oral at bedtime  ferrous    sulfate 325 milliGRAM(s) Oral daily  influenza   Vaccine 0.5 milliLiter(s) IntraMuscular once  polyethylene glycol 3350 17 Gram(s) Oral two times a day  senna 2 Tablet(s) Oral at bedtime  valproic  acid Syrup 1000 milliGRAM(s) Oral at bedtime    MEDICATIONS  (PRN):  acetaminophen     Tablet .. 650 milliGRAM(s) Oral every 4 hours PRN Mild Pain (1 - 3), Moderate Pain (4 - 6)  benztropine 2 milliGRAM(s) Oral at bedtime PRN EPS ppx  OLANZapine Disintegrating Tablet 5 milliGRAM(s) Oral every 4 hours PRN agitation  OLANZapine Injectable 5 milliGRAM(s) IntraMuscular once PRN Agitation

## 2023-10-14 PROCEDURE — 99231 SBSQ HOSP IP/OBS SF/LOW 25: CPT

## 2023-10-14 NOTE — BH INPATIENT PSYCHIATRY PROGRESS NOTE - MSE UNSTRUCTURED FT
Appearance: laying in bed under blankets  Behavior: uncooperative, no PMA/PMR noted, refused interview  Speech: refused to speak  Mood: unable to assess  Affect: unable to assess  Thought process: unable to assess   Thought content: unable to assess   Perceptions: unable to assess  Insight: poor  Judgement: poor, refusing vitals, medications or speaking with staff  Cognition: unable to assess

## 2023-10-14 NOTE — BH INPATIENT PSYCHIATRY PROGRESS NOTE - NSBHASSESSSUMMFT_PSY_ALL_CORE
Patient is a 53 year old adult (goes by "Juan Jose"), living at [facility] on [place] grounds, PMH of GERD, anemia, PPH of schizophrenia, selective mutism, currently with [facility] ACT team, previously with [facility] which , with history of multiple psychiatric hospitalizations, without known history of self-injury or suicide attempts, with history of aggression in the setting on nonadherence with medication, who was initially brought in by staff at residence for increasing agitation and aggression in the setting of 2 months of medication nonadherence. Patient was hospitalized from 8/3-23 on 1N where they were noted to be hostile, disorganized, threatening, malodorous and grossly psychotic. TOO was obtained, and patient eventually administered dual long-acting injectables (Haldol Decanoate and Zyprexa Relprevv), though had declined nearly all PO medications. Patient was transferred to [facility] where they were admitted due to anemia to 6 (medical workup largely unremarkable), patient stabilized and transferred back to [facility] for further management of psychosis.   Diagnostically, presentation consistent with exacerbation of schizophrenia.   On initial evaluation today by primary treatment team, patient is found calmly in bed. They do not accept that this writer is their psychiatrist and ask this writer to leave, eventually stop speaking altogether. Thus far has refused vitals and all PO medications with the exception of depakote. Will pursue re-instatement of TOO. State application submitted on , and will need to be resubmitted.  Patient requires inpatient admission for containment, stabilization, medication management and aftercare planning.    Patient refused to engage in assessment.    Plan:  1. Brink: admitted involuntary , will likely need TOO  2. Observation: routine checks  3. Collateral: will obtain collateral from treaters/residence (see collateral from 1N admission)  4. Psychiatric  -Continue Zyprexa Replevv 405mg qMonth, last given , next due 10/20  -Continue Haldol Decanoate: 100mg on , 150mg on , next due 10/19 (previously on 200mg)  -Continue clozapine 25mg QHS (refusing)  -Continue depakote 1000mg QHS   	-Will order level for 10/16  -Continue benztropine 2mg PRN for EPS  5. Medical:  -Anemia: s/p medical admission at [facility] for anemia to Hgb 6, required 2u pRBCs, most recent Hgb 8.8-9. GI workup unremarkable with exception of diverticulosis and colonic polyp  	-Internist to see  	-Will order repeat CBC 10/16  	-Continue ferrous sulfate 325mg daily  -Bowel regimen: continue bisacodyl 10mg QHS, miralax 17g daily, senna 2 tablets QHS  -Continue Vitamin D3 1000units QHS  6. PRNs  -Olanzapine 5mg PO or IM for agitation  7. Therapy   -Individual, group and milieu therapy as appropriate   8. Disposition: state application submitted at , will need to be resubmitted

## 2023-10-14 NOTE — BH INPATIENT PSYCHIATRY PROGRESS NOTE - PRN MEDS
MEDICATIONS  (PRN):  acetaminophen     Tablet .. 650 milliGRAM(s) Oral every 4 hours PRN Mild Pain (1 - 3), Moderate Pain (4 - 6)  benztropine 2 milliGRAM(s) Oral at bedtime PRN EPS ppx  OLANZapine Disintegrating Tablet 5 milliGRAM(s) Oral every 4 hours PRN agitation  OLANZapine Injectable 5 milliGRAM(s) IntraMuscular once PRN Agitation

## 2023-10-14 NOTE — BH INPATIENT PSYCHIATRY PROGRESS NOTE - NSBHCONSBHPROVDETAILS_PSY_A_CORE  FT
See collateral from initial admission to 1N, reached out to NP Crilly on 10/13 (admission to ML3), left VM

## 2023-10-14 NOTE — BH INPATIENT PSYCHIATRY PROGRESS NOTE - NSBHFUPINTERVALHXFT_PSY_A_CORE
Chart reviewed and case discussed with treatment team. No events reported overnight. Sleep and appetite is reportedly poor. Patient refused to speak or engage in interview, he just stared at writer. Patient has been refusing medications.

## 2023-10-14 NOTE — BH INPATIENT PSYCHIATRY PROGRESS NOTE - CURRENT MEDICATION
MEDICATIONS  (STANDING):  bisacodyl 10 milliGRAM(s) Oral at bedtime  cholecalciferol 1000 Unit(s) Oral at bedtime  cloZAPine 25 milliGRAM(s) Oral at bedtime  ferrous    sulfate 325 milliGRAM(s) Oral daily  influenza   Vaccine 0.5 milliLiter(s) IntraMuscular once  polyethylene glycol 3350 17 Gram(s) Oral two times a day  senna 2 Tablet(s) Oral at bedtime  valproic  acid Syrup 1000 milliGRAM(s) Oral at bedtime    MEDICATIONS  (PRN):  acetaminophen     Tablet .. 650 milliGRAM(s) Oral every 4 hours PRN Mild Pain (1 - 3), Moderate Pain (4 - 6)  benztropine 2 milliGRAM(s) Oral at bedtime PRN EPS ppx  OLANZapine Disintegrating Tablet 5 milliGRAM(s) Oral every 4 hours PRN agitation  OLANZapine Injectable 5 milliGRAM(s) IntraMuscular once PRN Agitation

## 2023-10-14 NOTE — BH INPATIENT PSYCHIATRY PROGRESS NOTE - NSBHCHARTREVIEWVS_PSY_A_CORE FT
Vital Signs Last 24 Hrs  T(C): --  T(F): --  HR: --  BP: --  BP(mean): --  RR: --  SpO2: --    Orthostatic VS  10-12-23 @ 21:30  Lying BP: 115/52 HR: 72  Sitting BP: --/-- HR: --  Standing BP: --/-- HR: --  Site: --  Mode: --

## 2023-10-15 PROCEDURE — 99231 SBSQ HOSP IP/OBS SF/LOW 25: CPT

## 2023-10-15 NOTE — BH INPATIENT PSYCHIATRY PROGRESS NOTE - NSBHASSESSSUMMFT_PSY_ALL_CORE
Patient is a 53 year old adult (goes by "Juan Jose"), living at [facility] on [place] grounds, PMH of GERD, anemia, PPH of schizophrenia, selective mutism, currently with [facility] ACT team, previously with [facility] which , with history of multiple psychiatric hospitalizations, without known history of self-injury or suicide attempts, with history of aggression in the setting on nonadherence with medication, who was initially brought in by staff at residence for increasing agitation and aggression in the setting of 2 months of medication nonadherence. Patient was hospitalized from 8/3-23 on [facility] where they were noted to be hostile, disorganized, threatening, malodorous and grossly psychotic. TOO was obtained, and patient eventually administered dual long-acting injectables (Haldol Decanoate and Zyprexa Relprevv), though had declined nearly all PO medications. Patient was transferred to [facility] where they were admitted due to anemia to 6 (medical workup largely unremarkable), patient stabilized and transferred back to [facility] for further management of psychosis.   Diagnostically, presentation consistent with exacerbation of schizophrenia.   On initial evaluation today by primary treatment team, patient is found calmly in bed. They do not accept that this writer is their psychiatrist and ask this writer to leave, eventually stop speaking altogether. Thus far has refused vitals and all PO medications with the exception of depakote. Will pursue re-instatement of TOO. State application submitted on 1N, and will need to be resubmitted.  Patient requires inpatient admission for containment, stabilization, medication management and aftercare planning.    Patient refused to engage in assessment. He is only out for meals, malodorous.    Plan:  1. Brink: admitted involuntary , will likely need TOO  2. Observation: routine checks  3. Collateral: will obtain collateral from treaters/residence (see collateral from 1N admission)  4. Psychiatric  -Continue Zyprexa Replevv 405mg qMonth, last given , next due 10/20  -Continue Haldol Decanoate: 100mg on , 150mg on , next due 10/19 (previously on 200mg)  -Continue clozapine 25mg QHS (refusing)  -Continue depakote 1000mg QHS   	-Will order level for 10/16  -Continue benztropine 2mg PRN for EPS  5. Medical:  -Anemia: s/p medical admission at [facility] for anemia to Hgb 6, required 2u pRBCs, most recent Hgb 8.8-9. GI workup unremarkable with exception of diverticulosis and colonic polyp  	-Internist to see  	-Will order repeat CBC 10/16  	-Continue ferrous sulfate 325mg daily  -Bowel regimen: continue bisacodyl 10mg QHS, miralax 17g daily, senna 2 tablets QHS  -Continue Vitamin D3 1000units QHS  6. PRNs  -Olanzapine 5mg PO or IM for agitation  7. Therapy   -Individual, group and milieu therapy as appropriate   8. Disposition: state application submitted at , will need to be resubmitted

## 2023-10-15 NOTE — BH INPATIENT PSYCHIATRY PROGRESS NOTE - MSE UNSTRUCTURED FT
Appearance: laying in bed under blankets, malodorous  Behavior: uncooperative, no PMA/PMR noted, refused interview  Speech: refused to speak  Mood: unable to assess  Affect: unable to assess  Thought process: unable to assess   Thought content: unable to assess   Perceptions: unable to assess  Insight: poor  Judgement: poor, refusing vitals, medications or speaking with staff  Cognition: unable to assess

## 2023-10-16 LAB
APPEARANCE UR: CLEAR — SIGNIFICANT CHANGE UP
BACTERIA # UR AUTO: NEGATIVE /HPF — SIGNIFICANT CHANGE UP
BILIRUB UR-MCNC: NEGATIVE — SIGNIFICANT CHANGE UP
CAST: 0 /LPF — SIGNIFICANT CHANGE UP (ref 0–4)
COLOR SPEC: YELLOW — SIGNIFICANT CHANGE UP
DIFF PNL FLD: NEGATIVE — SIGNIFICANT CHANGE UP
GLUCOSE UR QL: NEGATIVE MG/DL — SIGNIFICANT CHANGE UP
KETONES UR-MCNC: NEGATIVE MG/DL — SIGNIFICANT CHANGE UP
LEUKOCYTE ESTERASE UR-ACNC: NEGATIVE — SIGNIFICANT CHANGE UP
NITRITE UR-MCNC: NEGATIVE — SIGNIFICANT CHANGE UP
PH UR: 7 — SIGNIFICANT CHANGE UP (ref 5–8)
PROT UR-MCNC: NEGATIVE MG/DL — SIGNIFICANT CHANGE UP
RBC CASTS # UR COMP ASSIST: 0 /HPF — SIGNIFICANT CHANGE UP (ref 0–4)
SP GR SPEC: 1.01 — SIGNIFICANT CHANGE UP (ref 1–1.03)
SQUAMOUS # UR AUTO: 0 /HPF — SIGNIFICANT CHANGE UP (ref 0–5)
UROBILINOGEN FLD QL: 0.2 MG/DL — SIGNIFICANT CHANGE UP (ref 0.2–1)
WBC UR QL: 0 /HPF — SIGNIFICANT CHANGE UP (ref 0–5)

## 2023-10-16 PROCEDURE — 99232 SBSQ HOSP IP/OBS MODERATE 35: CPT

## 2023-10-16 RX ORDER — HALOPERIDOL DECANOATE 100 MG/ML
200 INJECTION INTRAMUSCULAR ONCE
Refills: 0 | Status: COMPLETED | OUTPATIENT
Start: 2023-10-19 | End: 2023-10-19

## 2023-10-16 NOTE — BH INPATIENT PSYCHIATRY PROGRESS NOTE - NSBHFUPINTERVALHXFT_PSY_A_CORE
Refusing vitals, refusing medications, not showering and malodorous, room smells of urine, not speaking to staff.   Refusing vitals, refusing medications, refusing labs, not showering and malodorous, room smells of urine, not speaking to staff. Per sleep log, slept 8+ hours.    Patient seen laying in bed. Room malodorous and smells of urine. Re-introduce myself as treating psychiatrist, and patient states that this writer is not his psychiatrist. States they are not at a mental hospital, but decline to disclose where they are. Review recommendation for medications and MOO application. States that if this writer force him to take medications, this will make me sick, and also states about this writer, "you will lose your soul." Terminates interview and asks this writer to leave room.

## 2023-10-16 NOTE — BH INPATIENT PSYCHIATRY PROGRESS NOTE - MSE UNSTRUCTURED FT
Appearance: laying in bed under blankets, malodorous  Behavior: uncooperative, no PMA/PMR noted, refused interview  Speech: refused to speak  Mood: unable to assess  Affect: unable to assess  Thought process: unable to assess   Thought content: unable to assess   Perceptions: unable to assess  Insight: poor  Judgement: poor, refusing vitals, medications or speaking with staff  Cognition: unable to assess Appearance: laying in bed under blankets, malodorous, room smells of urine  Behavior: uncooperative, no PMA/PMR noted  Speech: normal rate and volume  Mood: unable to assess  Affect: irritable  Thought process: too little content to assess completely, though illogical  Thought content: too little content to assess  Perceptions: unable to assess  Insight: poor  Judgement: poor, refusing vitals, medications or engaging with staff  Cognition: unable to assess

## 2023-10-16 NOTE — BH INPATIENT PSYCHIATRY PROGRESS NOTE - NSBHMETABOLIC_PSY_ALL_CORE_FT
BMI: BMI (kg/m2): 35.2 (10-12-23 @ 17:39)  HbA1c: A1C with Estimated Average Glucose Result: 5.3 % (09-29-23 @ 10:00)    Glucose: POCT Blood Glucose.: 100 mg/dL (12-19-22 @ 15:06)    BP: --  Lipid Panel: Date/Time: 09-29-23 @ 10:00  Cholesterol, Serum: 166  Direct LDL: --  HDL Cholesterol, Serum: 60  Total Cholesterol/HDL Ration Measurement: --  Triglycerides, Serum: 61

## 2023-10-16 NOTE — BH INPATIENT PSYCHIATRY PROGRESS NOTE - NSBHASSESSSUMMFT_PSY_ALL_CORE
Patient is a 53 year old adult (goes by "Juan Jose"), living at [facility] on [place] grounds, PMH of GERD, anemia, PPH of schizophrenia, selective mutism, currently with [facility] ACT team, previously with [facility] which , with history of multiple psychiatric hospitalizations, without known history of self-injury or suicide attempts, with history of aggression in the setting on nonadherence with medication, who was initially brought in by staff at residence for increasing agitation and aggression in the setting of 2 months of medication nonadherence. Patient was hospitalized from 8/3-23 on 1[facility] where they were noted to be hostile, disorganized, threatening, malodorous and grossly psychotic. TOO was obtained, and patient eventually administered dual long-acting injectables (Haldol Decanoate and Zyprexa Relprevv), though had declined nearly all PO medications. Patient was transferred to [facility] where they were admitted due to anemia to 6 (medical workup largely unremarkable), patient stabilized and transferred back to [facility] for further management of psychosis.   Diagnostically, presentation consistent with exacerbation of schizophrenia.   On initial evaluation today by primary treatment team, patient is found calmly in bed. They do not accept that this writer is their psychiatrist and ask this writer to leave, eventually stop speaking altogether. Thus far has refused vitals and all PO medications with the exception of depakote. Will pursue re-instatement of TOO. State application submitted on 1N, and will need to be resubmitted.  Patient requires inpatient admission for containment, stabilization, medication management and aftercare planning.    Patient refused to engage in assessment. He is only out for meals, malodorous.    Plan:  1. Brink: admitted involuntary , will likely need TOO  2. Observation: routine checks  3. Collateral: will obtain collateral from treaters/residence (see collateral from 1N admission)  4. Psychiatric  -Continue Zyprexa Replevv 405mg qMonth, last given , next due 10/20  -Continue Haldol Decanoate: 100mg on , 150mg on , next due 10/19 (previously on 200mg)  -Continue clozapine 25mg QHS (refusing)  -Continue depakote 1000mg QHS   	-Will order level for 10/16  -Continue benztropine 2mg PRN for EPS  5. Medical:  -Anemia: s/p medical admission at [facility] for anemia to Hgb 6, required 2u pRBCs, most recent Hgb 8.8-9. GI workup unremarkable with exception of diverticulosis and colonic polyp  	-Internist to see  	-Will order repeat CBC 10/16  	-Continue ferrous sulfate 325mg daily  -Bowel regimen: continue bisacodyl 10mg QHS, miralax 17g daily, senna 2 tablets QHS  -Continue Vitamin D3 1000units QHS  6. PRNs  -Olanzapine 5mg PO or IM for agitation  7. Therapy   -Individual, group and milieu therapy as appropriate   8. Disposition: state application submitted at , will need to be resubmitted Patient is a 53 year old adult (goes by "Juan Jose"), living at [facility] on [place] grounds, PMH of GERD, anemia, PPH of schizophrenia, selective mutism, currently with [facility] ACT team, previously with [facility] which , with history of multiple psychiatric hospitalizations, without known history of self-injury or suicide attempts, with history of aggression in the setting on nonadherence with medication, who was initially brought in by staff at residence for increasing agitation and aggression in the setting of 2 months of medication nonadherence. Patient was hospitalized from 8/3-23 on 1N where they were noted to be hostile, disorganized, threatening, malodorous and grossly psychotic. TOO was obtained, and patient eventually administered dual long-acting injectables (Haldol Decanoate and Zyprexa Relprevv), though had declined nearly all PO medications. Patient was transferred to [facility] where they were admitted due to anemia to 6 (medical workup largely unremarkable), patient stabilized and transferred back to [facility] for further management of psychosis.     Diagnostically, presentation consistent with exacerbation of schizophrenia.     Patient continues to have very limited engagement with all staff, refusing vitals, refusing labs and refusing medications. Self care is very poor and room is malodorous and smells of urine. When patient does speak, he is irritable and illogical. Will submit application for TOO.    Patient requires inpatient admission due to very poor self-care and for containment, stabilization, medication management and aftercare planning.    Plan:  1. Brink: admitted involuntary , will submit application for TOO  2. Observation: routine checks  3. Collateral: will obtain collateral from treaters/residence (left  for [facility] psychiatrist)  4. Psychiatric  -Continue Zyprexa Replevv 405mg qMonth, last given , next due 10/20  -Continue Haldol Decanoate: 100mg on , 150mg on , next due 10/19 (previously on 200mg)  -Continue clozapine 25mg QHS (refusing)  -Continue depakote 1000mg QHS (refusing)  -Continue benztropine 2mg PRN for EPS  5. Medical:  -Anemia: s/p medical admission at [facility] for anemia to Hgb 6, required 2u pRBCs, most recent Hgb 8.8-9. GI workup unremarkable with exception of diverticulosis and colonic polyp  	-Internist to see  	-Repeat CBC 10/16 (refused)  	-Continue ferrous sulfate 325mg daily  -Bowel regimen: continue bisacodyl 10mg QHS, miralax 17g daily, senna 2 tablets QHS (refusing)  -Continue Vitamin D3 1000units QHS (refusing)  6. PRNs  -Olanzapine 5mg PO or IM for agitation  7. Therapy   -Individual, group and milieu therapy as appropriate   8. Disposition: state application submitted at , will need to be resubmitted

## 2023-10-16 NOTE — BH INPATIENT PSYCHIATRY PROGRESS NOTE - NSBHATTESTBILLING_PSY_A_CORE
13612-Wezmkibllz OBS or IP - low complexity OR 25-34 mins 98168-Qyawtqyuub OBS or IP - moderate complexity OR 35-49 mins

## 2023-10-17 PROCEDURE — 99232 SBSQ HOSP IP/OBS MODERATE 35: CPT

## 2023-10-17 NOTE — BH INPATIENT PSYCHIATRY PROGRESS NOTE - MSE UNSTRUCTURED FT
Appearance: laying in bed under blankets, malodorous, room smells of urine  Behavior: uncooperative, no PMA/PMR noted  Speech: normal rate and volume  Mood: unable to assess  Affect: irritable  Thought process: too little content to assess completely, though illogical  Thought content: too little content to assess  Perceptions: unable to assess  Insight: poor  Judgement: poor, refusing vitals, medications or engaging with staff  Cognition: unable to assess Appearance: laying in bed under blankets, malodorous, room smells of urine  Behavior: uncooperative, no PMA/PMR noted  Speech: unable to assess  Mood: unable to assess  Affect: irritable  Thought process: too little content to assess   Thought content: too little content to assess  Perceptions: unable to assess  Insight: poor  Judgement: poor, refusing vitals, medications or engaging with staff  Cognition: unable to assess

## 2023-10-17 NOTE — BH INPATIENT PSYCHIATRY PROGRESS NOTE - NSBHFUPINTERVALHXFT_PSY_A_CORE
Refusing vitals, refusing medications, no PRNs for agitation, observed talking to self Refusing vitals, refusing medications, no PRNs for agitation, observed talking to self    Patient found resting in room, which is malodorous and smells of urine. He is mumbling under her breath, not comprehensible, though does at some point ask for the door to be closed. Does not answer any questions asked despite multiple attempts at engagement.

## 2023-10-17 NOTE — BH INPATIENT PSYCHIATRY PROGRESS NOTE - NSBHASSESSSUMMFT_PSY_ALL_CORE
Patient is a 53 year old adult (goes by "Juan Jose"), living at [facility] on [place] grounds, PMH of GERD, anemia, PPH of schizophrenia, selective mutism, currently with [facility] ACT team, previously with [facility] which , with history of multiple psychiatric hospitalizations, without known history of self-injury or suicide attempts, with history of aggression in the setting on nonadherence with medication, who was initially brought in by staff at residence for increasing agitation and aggression in the setting of 2 months of medication nonadherence. Patient was hospitalized from 8/3-23 on 1N where they were noted to be hostile, disorganized, threatening, malodorous and grossly psychotic. TOO was obtained, and patient eventually administered dual long-acting injectables (Haldol Decanoate and Zyprexa Relprevv), though had declined nearly all PO medications. Patient was transferred to [facility] where they were admitted due to anemia to 6 (medical workup largely unremarkable), patient stabilized and transferred back to [facility] for further management of psychosis.     Diagnostically, presentation consistent with exacerbation of schizophrenia.     Patient continues to have very limited engagement with all staff, refusing vitals, refusing labs and refusing medications. Self care is very poor and room is malodorous and smells of urine. When patient does speak, he is irritable and illogical. Will submit application for TOO.    Patient requires inpatient admission due to very poor self-care and for containment, stabilization, medication management and aftercare planning.    Plan:  1. Brink: admitted involuntary , will submit application for TOO  2. Observation: routine checks  3. Collateral: will obtain collateral from treaters/residence (left  for [facility] psychiatrist)  4. Psychiatric  -Continue Zyprexa Replevv 405mg qMonth, last given , next due 10/20  -Continue Haldol Decanoate: 100mg on , 150mg on , next due 10/19 (previously on 200mg)  -Continue clozapine 25mg QHS (refusing)  -Continue depakote 1000mg QHS (refusing)  -Continue benztropine 2mg PRN for EPS  5. Medical:  -Anemia: s/p medical admission at [facility] for anemia to Hgb 6, required 2u pRBCs, most recent Hgb 8.8-9. GI workup unremarkable with exception of diverticulosis and colonic polyp  	-Internist to see  	-Repeat CBC 10/16 (refused)  	-Continue ferrous sulfate 325mg daily  -Bowel regimen: continue bisacodyl 10mg QHS, miralax 17g daily, senna 2 tablets QHS (refusing)  -Continue Vitamin D3 1000units QHS (refusing)  6. PRNs  -Olanzapine 5mg PO or IM for agitation  7. Therapy   -Individual, group and milieu therapy as appropriate   8. Disposition: state application submitted at , will need to be resubmitted Patient is a 53 year old adult (goes by "Juan Jose"), living at [facility] on [place] grounds, PMH of GERD, anemia, PPH of schizophrenia, selective mutism, currently with [facility] ACT team, previously with [facility] which , with history of multiple psychiatric hospitalizations, without known history of self-injury or suicide attempts, with history of aggression in the setting on nonadherence with medication, who was initially brought in by staff at residence for increasing agitation and aggression in the setting of 2 months of medication nonadherence. Patient was hospitalized from 8/3-23 on 1N where they were noted to be hostile, disorganized, threatening, malodorous and grossly psychotic. TOO was obtained, and patient eventually administered dual long-acting injectables (Haldol Decanoate and Zyprexa Relprevv), though had declined nearly all PO medications. Patient was transferred to [facility] where they were admitted due to anemia to 6 (medical workup largely unremarkable), patient stabilized and transferred back to [facility] for further management of psychosis.     Diagnostically, presentation consistent with exacerbation of schizophrenia.     Patient continues to have very limited engagement with staff and treatment team, refusing vitals and refusing all medications. Self care continues to be  very poor and room is malodorous and smells of urine, and non-responsive to prompts to complete ADLs. Almost no incomprehensible speech. TOO application submitted, though will try to administer BREWER due this week voluntarily.    Patient requires inpatient admission due to very poor self-care and for containment, stabilization, medication management and aftercare planning.    Plan:  1. Brink: admitted involuntary , will submit application for TOO  2. Observation: routine checks  3. Collateral: will obtain collateral from treaters/residence (left  for [facility] psychiatrist)  4. Psychiatric  -Continue Zyprexa Replevv 405mg qMonth, last given , next due 10/20  -Continue Haldol Decanoate: 100mg on , 150mg on , next due 10/19 (previously on 200mg)  -Continue clozapine 25mg QHS (refusing)  -Continue depakote 1000mg QHS (refusing)  -Continue benztropine 2mg PRN for EPS  5. Medical:  -Anemia: s/p medical admission at [facility] for anemia to Hgb 6, required 2u pRBCs, most recent Hgb 8.8-9. GI workup unremarkable with exception of diverticulosis and colonic polyp  	-Repeat CBC 10/16 (refused)  	-Continue ferrous sulfate 325mg daily (refusing)  -Bowel regimen: continue bisacodyl 10mg QHS (refusing), miralax 17g daily (refusing), senna 2 tablets QHS (refusing)  -Continue Vitamin D3 1000units QHS (refusing)  6. PRNs  -Olanzapine 5mg PO or IM for agitation  7. Therapy   -Individual, group and milieu therapy as appropriate   8. Disposition: state application submitted at , will need to be resubmitted

## 2023-10-18 PROCEDURE — 99232 SBSQ HOSP IP/OBS MODERATE 35: CPT

## 2023-10-18 NOTE — BH INPATIENT PSYCHIATRY PROGRESS NOTE - MSE UNSTRUCTURED FT
Appearance: laying in bed under blankets, malodorous, room smells of urine  Behavior: uncooperative, no PMA/PMR noted  Speech: unable to assess  Mood: unable to assess  Affect: irritable  Thought process: too little content to assess   Thought content: too little content to assess  Perceptions: unable to assess  Insight: poor  Judgement: poor, refusing vitals, medications or engaging with staff  Cognition: unable to assess Appearance: laying in bed under blankets, malodorous, room smells of urine  Behavior: uncooperative, no PMA/PMR noted  Speech: regular rate, volume and rhythm   Mood: unable to assess  Affect: irritable  Thought process: too little content to assess   Thought content: likely paranoid but difficult to assess due to limited content  Perceptions: unable to assess, though has been observed talking to staff  Insight: very poor  Judgement: very poor, refusing vitals, medications or engaging with staff, very poor ADLs  Cognition: unable to assess

## 2023-10-18 NOTE — BH INPATIENT PSYCHIATRY PROGRESS NOTE - NSBHASSESSSUMMFT_PSY_ALL_CORE
Patient is a 53 year old adult (goes by "Juan Jose"), living at [facility] on [place] grounds, PMH of GERD, anemia, PPH of schizophrenia, selective mutism, currently with [facility] ACT team, previously with [facility] which , with history of multiple psychiatric hospitalizations, without known history of self-injury or suicide attempts, with history of aggression in the setting on nonadherence with medication, who was initially brought in by staff at residence for increasing agitation and aggression in the setting of 2 months of medication nonadherence. Patient was hospitalized from 8/3-23 on 1N where they were noted to be hostile, disorganized, threatening, malodorous and grossly psychotic. TOO was obtained, and patient eventually administered dual long-acting injectables (Haldol Decanoate and Zyprexa Relprevv), though had declined nearly all PO medications. Patient was transferred to [facility] where they were admitted due to anemia to 6 (medical workup largely unremarkable), patient stabilized and transferred back to [facility] for further management of psychosis.     Diagnostically, presentation consistent with exacerbation of schizophrenia.     Patient continues to have very limited engagement with staff and treatment team, refusing vitals and refusing all medications. Self care continues to be  very poor and room is malodorous and smells of urine, and non-responsive to prompts to complete ADLs. Almost no incomprehensible speech. TOO application submitted, though will try to administer BREWER due this week voluntarily.    Patient requires inpatient admission due to very poor self-care and for containment, stabilization, medication management and aftercare planning.    Plan:  1. Brink: admitted involuntary , will submit application for TOO  2. Observation: routine checks  3. Collateral: will obtain collateral from treaters/residence (left  for ACT psychiatrist)  4. Psychiatric  -Continue Zyprexa Replevv 405mg qMonth, last given , next due 10/20  -Continue Haldol Decanoate: 100mg on , 150mg on , next due 10/19 (previously on 200mg)  -Continue clozapine 25mg QHS (refusing)  -Continue depakote 1000mg QHS (refusing)  -Continue benztropine 2mg PRN for EPS  5. Medical:  -Anemia: s/p medical admission at [facility] for anemia to Hgb 6, required 2u pRBCs, most recent Hgb 8.8-9. GI workup unremarkable with exception of diverticulosis and colonic polyp  	-Repeat CBC 10/16 (refused)  	-Continue ferrous sulfate 325mg daily (refusing)  -Bowel regimen: continue bisacodyl 10mg QHS (refusing), miralax 17g daily (refusing), senna 2 tablets QHS (refusing)  -Continue Vitamin D3 1000units QHS (refusing)  6. PRNs  -Olanzapine 5mg PO or IM for agitation  7. Therapy   -Individual, group and milieu therapy as appropriate   8. Disposition: state application submitted at , will need to be resubmitted Patient is a 53 year old adult (goes by "Juan Jose"), living at [facility] on [place] grounds, PMH of GERD, anemia, PPH of schizophrenia, selective mutism, currently with [facility] ACT team, previously with [facility] which , with history of multiple psychiatric hospitalizations, without known history of self-injury or suicide attempts, with history of aggression in the setting on nonadherence with medication, who was initially brought in by staff at residence for increasing agitation and aggression in the setting of 2 months of medication nonadherence. Patient was hospitalized from 8/3-23 on 1N where they were noted to be hostile, disorganized, threatening, malodorous and grossly psychotic. TOO was obtained, and patient eventually administered dual long-acting injectables (Haldol Decanoate and Zyprexa Relprevv), though had declined nearly all PO medications. Patient was transferred to [facility] where they were admitted due to anemia to 6 (medical workup largely unremarkable), patient stabilized and transferred back to [facility] for further management of psychosis.     Diagnostically, presentation consistent with exacerbation of schizophrenia.     Patient continues to have very limited engagement with staff and treatment team, refusing vitals and refusing all medications. Self care continues to be very poor and room is malodorous and smells of urine, and non-responsive to prompts to complete ADLs. Spoke to provider today to the extent of asking to leave room, and made vaguely threatening statement about what would happen if this writer did not exit room. Continues to state this writer is not his psychiatrist. Likely paranoid, though difficult to fully assess due to very limited engagement.     Patient requires inpatient admission due to very poor self-care and for containment, stabilization, medication management and aftercare planning.    Plan:  1. Brink: admitted involuntary , TOO hearing today  2. Observation: routine checks  3. Collateral: will obtain collateral from treaters/residence (left  for ACT psychiatrist)  4. Psychiatric  -Continue Zyprexa Replevv 405mg qMonth, last given , next due 10/20  -Continue Haldol Decanoate: 100mg on , 150mg on , next due 10/19, will try to give Haldol 200mg voluntarily (previously on 200mg)  -Continue clozapine 25mg QHS (refusing)  -Continue depakote 1000mg QHS (refusing)  -Continue benztropine 2mg PRN for EPS  5. Medical:  -Anemia: s/p medical admission at [facility] for anemia to Hgb 6, required 2u pRBCs, most recent Hgb 8.8-9. GI workup unremarkable with exception of diverticulosis and colonic polyp  	-Repeat CBC 10/16 (refused, will need repeat with TOO order)  	-Continue ferrous sulfate 325mg daily (refusing)  -Bowel regimen: continue bisacodyl 10mg QHS (refusing), miralax 17g daily (refusing), senna 2 tablets QHS (refusing)  -Continue Vitamin D3 1000units QHS (refusing)  6. PRNs  -Olanzapine 5mg PO or IM for agitation  7. Therapy   -Individual, group and milieu therapy as appropriate   8. Disposition: state application submitted at , will need to be resubmitted

## 2023-10-18 NOTE — CHART NOTE - NSCHARTNOTEFT_GEN_A_CORE
MEDICAL DIRECTOR OF INPATIENT PSYCHIATRY NOTE:    I have evaluated the patient’s need for medication over her objection in the presence of the [facility]  (Anselmo Harmon), the risks and benefits of medication, and her ability to make a reasoned decision.      Briefly, the pt is a a 53 year old adult (goes by "Juan Jose"), living at [facility] on [place] grounds, PMH of GERD, anemia, PPH of schizophrenia, selective mutism, currently with [facility] ACT team, previously with [facility] which , with history of multiple psychiatric hospitalizations, without known history of self-injury or suicide attempts, with history of aggression in the setting on nonadherence with medication, who was initially brought in by staff at residence for increasing agitation and aggression in the setting of 2 months of medication nonadherence. Patient was hospitalized from 8/3-23 on  where they were noted to be hostile, disorganized, threatening, malodorous and grossly psychotic. TOO was obtained, and patient eventually administered dual long-acting injectables (Haldol Decanoate and Zyprexa Relprevv), though had declined nearly all PO medications. Patient was transferred to [facility] where they were admitted due to anemia to 6 (medical workup largely unremarkable), patient stabilized and transferred back to [facility] for further management of psychosis.  Patient continues to refuse oral medications since being transferred back.       Patient is seen at bedside. She is laying in bed under blankets, malodorous with very poor adls.  She is uncooperative, no PMA/PMR note.  She does not speak with the writer.  She is  irritable.  Paranoia suspected.  She is disorganized.  Insight and judgement are poor as she does not know the extent of her psychiatric problems.      It is my opinion that this patient currently experiences psychotic symptoms that are severely impacting her safety and ability to care for herself, and would likely benefit from antipsychotic medications.  Furthermore, it is my opinion that she lacks capacity to refuse antipsychotic therapy.  I have informed the patient of my decision and that unless she withdraws her objection to taking medications, we will make application to court for authorization to treat her over her objection. I have notified the patient and [facility] of this decision by letter.

## 2023-10-18 NOTE — BH INPATIENT PSYCHIATRY PROGRESS NOTE - NSBHFUPINTERVALHXFT_PSY_A_CORE
Refusing vitals, refusing medications, no PRNs for agitation, per chart review, patient urinated all over floor, not showering. Refusing vitals, refusing medications, no PRNs for agitation, per chart review, patient urinated all over floor, not showering.    Patient found in dark room, laying in bed awake. He asks this psychiatrist repeatedly to leave, and states that if this writer does not leave that "you may not live," though later states that this writer probably will live. When asked where the patient is, the patient states "where I belong," but declines to speak further.
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
## 2023-10-19 PROCEDURE — 99232 SBSQ HOSP IP/OBS MODERATE 35: CPT

## 2023-10-19 NOTE — BH INPATIENT PSYCHIATRY PROGRESS NOTE - CURRENT MEDICATION
MEDICATIONS  (STANDING):  bisacodyl 10 milliGRAM(s) Oral at bedtime  cholecalciferol 1000 Unit(s) Oral at bedtime  cloZAPine 25 milliGRAM(s) Oral at bedtime  ferrous    sulfate 325 milliGRAM(s) Oral daily  haloperidol decanoate Injectable, Long Acting 200 milliGRAM(s) IntraMuscular once  influenza   Vaccine 0.5 milliLiter(s) IntraMuscular once  polyethylene glycol 3350 17 Gram(s) Oral two times a day  senna 2 Tablet(s) Oral at bedtime  valproic  acid Syrup 1000 milliGRAM(s) Oral at bedtime    MEDICATIONS  (PRN):  acetaminophen     Tablet .. 650 milliGRAM(s) Oral every 4 hours PRN Mild Pain (1 - 3), Moderate Pain (4 - 6)  benztropine 2 milliGRAM(s) Oral at bedtime PRN EPS ppx  OLANZapine Disintegrating Tablet 5 milliGRAM(s) Oral every 4 hours PRN agitation  OLANZapine Injectable 5 milliGRAM(s) IntraMuscular once PRN Agitation

## 2023-10-19 NOTE — BH INPATIENT PSYCHIATRY PROGRESS NOTE - NSBHASSESSSUMMFT_PSY_ALL_CORE
Patient is a 53 year old adult (goes by "Juan Jose"), living at [facility] on [place] grounds, PMH of GERD, anemia, PPH of schizophrenia, selective mutism, currently with [facility] ACT team, previously with [facility] which , with history of multiple psychiatric hospitalizations, without known history of self-injury or suicide attempts, with history of aggression in the setting on nonadherence with medication, who was initially brought in by staff at residence for increasing agitation and aggression in the setting of 2 months of medication nonadherence. Patient was hospitalized from 8/3-23 on 1N where they were noted to be hostile, disorganized, threatening, malodorous and grossly psychotic. TOO was obtained, and patient eventually administered dual long-acting injectables (Haldol Decanoate and Zyprexa Relprevv), though had declined nearly all PO medications. Patient was transferred to [facility] where they were admitted due to anemia to 6 (medical workup largely unremarkable), patient stabilized and transferred back to [facility] for further management of psychosis.     Diagnostically, presentation consistent with exacerbation of schizophrenia.     Patient continues to have very limited engagement with staff and treatment team, refusing vitals and refusing all medications. Self care continues to be very poor and room and patient is malodorous and smells of urine, and non-responsive to prompts to complete ADLs. Again, is unwilling to speak to team psychiatrist and repeatedly asks to be left alone without providing other content. TOO court pending.    Patient requires inpatient admission due to very poor self-care and for containment, stabilization, medication management and aftercare planning.    Plan:  1. Brink: admitted involuntary , TOO court pending  2. Observation: routine checks  3. Collateral: will obtain collateral from treaters/residence (left  for ACT psychiatrist)  4. Psychiatric  -Continue Zyprexa Replevv 405mg qMonth, last given , next due 10/20  -Continue Haldol Decanoate: 100mg on , 150mg on , next due 10/19, will try to give Haldol 200mg voluntarily today (previously on 200mg)  -Continue clozapine 25mg QHS (refusing)  -Continue depakote 1000mg QHS (refusing)  -Continue benztropine 2mg PRN for EPS  5. Medical:  -Anemia: s/p medical admission at [facility] for anemia to Hgb 6, required 2u pRBCs, most recent Hgb 8.8-9. GI workup unremarkable with exception of diverticulosis and colonic polyp  	-Repeat CBC 10/16 (refused, will need repeat with TOO order)  	-Continue ferrous sulfate 325mg daily (refusing)  -Bowel regimen: continue bisacodyl 10mg QHS (refusing), miralax 17g daily (refusing), senna 2 tablets QHS (refusing)  -Continue Vitamin D3 1000units QHS (refusing)  6. PRNs  -Olanzapine 5mg PO or IM for agitation  7. Therapy   -Individual, group and milieu therapy as appropriate   8. Disposition: state application prepared on 1N, will need to be resubmitted, hearing next week

## 2023-10-19 NOTE — BH INPATIENT PSYCHIATRY PROGRESS NOTE - MSE UNSTRUCTURED FT
Appearance: sitting in chair in hallway, malodorous, pt and room smells of urine  Behavior: uncooperative, no PMA/PMR noted  Speech: regular rate, volume and rhythm   Mood: unable to assess  Affect: irritable  Thought process: too little content to assess   Thought content: likely paranoid but difficult to assess due to limited content  Perceptions: unable to assess, though has been observed talking to staff  Insight: very poor  Judgement: very poor, refusing vitals, medications or engaging with staff, very poor ADLs  Cognition: unable to assess

## 2023-10-20 PROCEDURE — 99231 SBSQ HOSP IP/OBS SF/LOW 25: CPT

## 2023-10-20 NOTE — BH INPATIENT PSYCHIATRY PROGRESS NOTE - NSBHASSESSSUMMFT_PSY_ALL_CORE
Patient is a 53 year old adult (goes by "Juan Jose"), living at [facility] on [place] grounds, PMH of GERD, anemia, PPH of schizophrenia, selective mutism, currently with [facility] ACT team, previously with [facility] which , with history of multiple psychiatric hospitalizations, without known history of self-injury or suicide attempts, with history of aggression in the setting on nonadherence with medication, who was initially brought in by staff at residence for increasing agitation and aggression in the setting of 2 months of medication nonadherence. Patient was hospitalized from 8/3-23 on 1N where they were noted to be hostile, disorganized, threatening, malodorous and grossly psychotic. TOO was obtained, and patient eventually administered dual long-acting injectables (Haldol Decanoate and Zyprexa Relprevv), though had declined nearly all PO medications. Patient was transferred to [facility] where they were admitted due to anemia to 6 (medical workup largely unremarkable), patient stabilized and transferred back to [facility] for further management of psychosis.     Diagnostically, presentation consistent with exacerbation of schizophrenia.     Patient continues to have very limited engagement with staff and treatment team, refusing vitals and refusing all medications. Self care continues to be very poor and room and patient is malodorous and smells of urine, and non-responsive to prompts to complete ADLs. Again, is unwilling to speak to team psychiatrist and repeatedly asks to be left alone without providing other content. TOO court pending.    Patient requires inpatient admission due to very poor self-care and for containment, stabilization, medication management and aftercare planning.    Plan:  1. Brink: admitted involuntary , TOO court pending  2. Observation: routine checks  3. Collateral: will obtain collateral from treaters/residence (left  for ACT psychiatrist)  4. Psychiatric  -Continue Zyprexa Replevv 405mg qMonth, last given , next due 10/20  -Continue Haldol Decanoate: 100mg on , 150mg on , next due 10/19, will try to give Haldol 200mg voluntarily today (previously on 200mg)  -Continue clozapine 25mg QHS (refusing)  -Continue depakote 1000mg QHS (refusing)  -Continue benztropine 2mg PRN for EPS  5. Medical:  -Anemia: s/p medical admission at [facility] for anemia to Hgb 6, required 2u pRBCs, most recent Hgb 8.8-9. GI workup unremarkable with exception of diverticulosis and colonic polyp  	-Repeat CBC 10/16 (refused, will need repeat with TOO order)  	-Continue ferrous sulfate 325mg daily (refusing)  -Bowel regimen: continue bisacodyl 10mg QHS (refusing), miralax 17g daily (refusing), senna 2 tablets QHS (refusing)  -Continue Vitamin D3 1000units QHS (refusing)  6. PRNs  -Olanzapine 5mg PO or IM for agitation  7. Therapy   -Individual, group and milieu therapy as appropriate   8. Disposition: state application prepared on 1N, will need to be resubmitted, hearing next week Patient is a 53 year old adult (goes by "Juan Jose"), living at [facility] on [place] grounds, PMH of GERD, anemia, PPH of schizophrenia, selective mutism, currently with [facility] ACT team, previously with [facility] which , with history of multiple psychiatric hospitalizations, without known history of self-injury or suicide attempts, with history of aggression in the setting on nonadherence with medication, who was initially brought in by staff at residence for increasing agitation and aggression in the setting of 2 months of medication nonadherence. Patient was hospitalized from 8/3-23 on 1N where they were noted to be hostile, disorganized, threatening, malodorous and grossly psychotic. TOO was obtained, and patient eventually administered dual long-acting injectables (Haldol Decanoate and Zyprexa Relprevv), though had declined nearly all PO medications. Patient was transferred to [facility] where they were admitted due to anemia to 6 (medical workup largely unremarkable), patient stabilized and transferred back to [facility] for further management of psychosis.     Diagnostically, presentation consistent with exacerbation of schizophrenia.     Patient continues to have limited engagement with staff and treatment team, refusing vitals and refusing all medications, including BREWER due 10/19. Self care continues to be very poor, and patient non-responsive to prompts to complete ADLs. Patient more verbal, calm and less irritable today, willing to give short answers to this writer's questions though overall is guarded and declines to answer most questions, and states he is not in psychiatric hospital. Observed talking to self by staff. Court for TOO next week.    Patient requires inpatient admission due to very poor self-care and for containment, stabilization, medication management and aftercare planning.    Plan:  1. Brink: admitted involuntary , TOO court pending  2. Observation: routine checks  3. Collateral: will obtain collateral from treaters/residence (left VM for ACT psychiatrist)  4. Psychiatric  -Continue Zyprexa Replevv 405mg qMonth, last given , next due 10/20 (will defer order until TOO hearing given declined Haldol Dec)  -Continue Haldol Decanoate: 100mg on , 150mg on , next due 10/19, tried to give Haldol 200mg on 10/19 (previous dose), declined  -Continue clozapine 25mg QHS (refusing)  -Continue depakote 1000mg QHS (refusing)  -Continue benztropine 2mg PRN for EPS  5. Medical:  -Anemia: s/p medical admission at [facility] for anemia to Hgb 6, required 2u pRBCs, most recent Hgb 8.8-9. GI workup unremarkable with exception of diverticulosis and colonic polyp  	-Repeat CBC 10/16 (refused, will need repeat with TOO order)  	-Continue ferrous sulfate 325mg daily (refusing)  -Bowel regimen: continue bisacodyl 10mg QHS (refusing), miralax 17g daily (refusing), senna 2 tablets QHS (refusing)  -Continue Vitamin D3 1000units QHS (refusing)  6. PRNs  -Olanzapine 5mg PO or IM for agitation  7. Therapy   -Individual, group and milieu therapy as appropriate   8. Disposition: state application prepared on 1N, will need to be resubmitted, hearing next week

## 2023-10-20 NOTE — BH INPATIENT PSYCHIATRY PROGRESS NOTE - NSBHFUPINTERVALHXFT_PSY_A_CORE
Refusing vitals, refusing medications, no acute events, observed talking to self Refusing vitals, refusing medications, no acute events, observed talking to self.    Patient seen laying room, which was recently cleaned and non-malodorous. Patient is polite and respectful, but states he does not want to speak to this provider. Continues to state this writer is not his psychiatrist and that he is not in a psychiatric hospital. Declines to answer questions around paranoia, safety, SI/HI. Unwilling to engage in conversation around medications.

## 2023-10-20 NOTE — BH INPATIENT PSYCHIATRY PROGRESS NOTE - MSE UNSTRUCTURED FT
Appearance: sitting in chair in hallway, malodorous, pt and room smells of urine  Behavior: uncooperative, no PMA/PMR noted  Speech: regular rate, volume and rhythm   Mood: unable to assess  Affect: irritable  Thought process: too little content to assess   Thought content: likely paranoid but difficult to assess due to limited content  Perceptions: unable to assess, though has been observed talking to staff  Insight: very poor  Judgement: very poor, refusing vitals, medications or engaging with staff, very poor ADLs  Cognition: unable to assess Appearance: laying in bed awake, room cleaned and non-malodorous  Behavior: more cooperative today though very guarded, no PMA/PMR noted  Speech: regular rate, volume and rhythm   Mood: unable to assess  Affect: neutral, calm  Thought process: too little content to assess, though organized in short statements  Thought content: likely paranoid but difficult to assess due to limited content  Perceptions: observed talking to staff by staff  Insight: very poor  Judgement: very poor, refusing vitals, medications or engaging with staff, very poor ADLs  Cognition: unable to assess

## 2023-10-20 NOTE — BH INPATIENT PSYCHIATRY PROGRESS NOTE - NSBHATTESTBILLING_PSY_A_CORE
34256-Lcpxaucelz OBS or IP - moderate complexity OR 35-49 mins 59244-Kossgqfynm OBS or IP - low complexity OR 25-34 mins

## 2023-10-23 PROCEDURE — 99232 SBSQ HOSP IP/OBS MODERATE 35: CPT

## 2023-10-23 NOTE — BH INPATIENT PSYCHIATRY PROGRESS NOTE - NSBHCONSBHPROVDETAILS_PSY_A_CORE  FT
See collateral from initial admission to 1N, reached out to NP Crilly on 10/13 (admission to [facility]), left VM Spoke to NP Crilly (see HPI 10/23)

## 2023-10-23 NOTE — BH INPATIENT PSYCHIATRY PROGRESS NOTE - NSBHASSESSSUMMFT_PSY_ALL_CORE
Patient is a 53 year old adult (goes by "Juan Jose"), living at [facility] on [place] grounds, PMH of GERD, anemia, PPH of schizophrenia, selective mutism, currently with [facility] ACT team, previously with [facility] which , with history of multiple psychiatric hospitalizations, without known history of self-injury or suicide attempts, with history of aggression in the setting on nonadherence with medication, who was initially brought in by staff at residence for increasing agitation and aggression in the setting of 2 months of medication nonadherence. Patient was hospitalized from 8/3-23 on 1N where they were noted to be hostile, disorganized, threatening, malodorous and grossly psychotic. TOO was obtained, and patient eventually administered dual long-acting injectables (Haldol Decanoate and Zyprexa Relprevv), though had declined nearly all PO medications. Patient was transferred to [facility] where they were admitted due to anemia to 6 (medical workup largely unremarkable), patient stabilized and transferred back to [facility] for further management of psychosis.     Diagnostically, presentation consistent with exacerbation of schizophrenia.     Patient continues to have limited engagement with staff and treatment team, refusing vitals and refusing all medications, including BREWER due 10/19. Self care continues to be very poor, and patient non-responsive to prompts to complete ADLs. Patient more verbal, calm and less irritable today, willing to give short answers to this writer's questions though overall is guarded and declines to answer most questions, and states he is not in psychiatric hospital. Observed talking to self by staff. Court for TOO next week.    Patient requires inpatient admission due to very poor self-care and for containment, stabilization, medication management and aftercare planning.    Plan:  1. Brink: admitted involuntary , TOO court pending  2. Observation: routine checks  3. Collateral: will obtain collateral from treaters/residence (left VM for ACT psychiatrist)  4. Psychiatric  -Continue Zyprexa Replevv 405mg qMonth, last given , next due 10/20 (will defer order until TOO hearing given declined Haldol Dec)  -Continue Haldol Decanoate: 100mg on , 150mg on , next due 10/19, tried to give Haldol 200mg on 10/19 (previous dose), declined  -Continue clozapine 25mg QHS (refusing)  -Continue depakote 1000mg QHS (refusing)  -Continue benztropine 2mg PRN for EPS  5. Medical:  -Anemia: s/p medical admission at [facility] for anemia to Hgb 6, required 2u pRBCs, most recent Hgb 8.8-9. GI workup unremarkable with exception of diverticulosis and colonic polyp  	-Repeat CBC 10/16 (refused, will need repeat with TOO order)  	-Continue ferrous sulfate 325mg daily (refusing)  -Bowel regimen: continue bisacodyl 10mg QHS (refusing), miralax 17g daily (refusing), senna 2 tablets QHS (refusing)  -Continue Vitamin D3 1000units QHS (refusing)  6. PRNs  -Olanzapine 5mg PO or IM for agitation  7. Therapy   -Individual, group and milieu therapy as appropriate   8. Disposition: state application prepared on 1N, will need to be resubmitted, hearing next week Patient is a 53 year old adult (goes by "Juan Jose"), living at [facility] on [place] grounds, PMH of GERD, anemia, PPH of schizophrenia, selective mutism, currently with [facility] ACT team, previously with [facility] which , with history of multiple psychiatric hospitalizations, without known history of self-injury or suicide attempts, with history of aggression in the setting on nonadherence with medication, who was initially brought in by staff at residence for increasing agitation and aggression in the setting of 2 months of medication nonadherence. Patient was hospitalized from 8/3-23 on 1N where they were noted to be hostile, disorganized, threatening, malodorous and grossly psychotic. TOO was obtained, and patient eventually administered dual long-acting injectables (Haldol Decanoate and Zyprexa Relprevv), though had declined nearly all PO medications. Patient was transferred to [facility] where they were admitted due to anemia to 6 (medical workup largely unremarkable), patient stabilized and transferred back to [facility] for further management of psychosis.     Diagnostically, presentation consistent with exacerbation of schizophrenia.     Patient continues to have limited engagement with staff and treatment team, refusing vitals and refusing all medications, including BREWER due 10/19. Self care continues to be very poor, and patient non-responsive to prompts to complete ADLs. Patient non-verbal with treating psychiatrist today, but with intense stare. Room remains quite malodorous. TOO court tomorrow.    Patient requires inpatient admission due to very poor self-care and for containment, stabilization, medication management and aftercare planning.    Plan:  1. Brink: admitted involuntary , TOO court 10/24  2. Observation: routine checks  3. Collateral: obtained collateral from ACT psychiatrist  4. Psychiatric  -Continue Haldol Decanoate: 100mg on , 150mg on , tried to give Haldol 200mg on 10/19 (previous dose) and declined, will plan to give 200mg after TOO order  -Had received Zyprexa Replevv 405mg qMonth, last given , next due 10/20, though no clear benefit above Haldol Decanoate  -Continue clozapine 25mg QHS (refusing)  -Continue depakote 1000mg QHS (refusing)  -Continue benztropine 2mg PRN for EPS  5. Medical:  -Anemia: s/p medical admission at [facility] for anemia to Hgb 6, required 2u pRBCs, most recent Hgb 8.8-9. GI workup unremarkable with exception of diverticulosis and colonic polyp  	-Repeat CBC 10/16 (refused, will need repeat with TOO order)  	-Continue ferrous sulfate 325mg daily (refusing)  -Bowel regimen: continue bisacodyl 10mg QHS (refusing), miralax 17g daily (refusing), senna 2 tablets QHS (refusing)  -Continue Vitamin D3 1000units QHS (refusing)  6. PRNs  -Olanzapine 5mg PO or IM for agitation  7. Therapy   -Individual, group and milieu therapy as appropriate   8. Disposition: state application prepared on 1N, will need to be submitted, hearing 10/25

## 2023-10-23 NOTE — BH INPATIENT PSYCHIATRY PROGRESS NOTE - NSBHFUPINTERVALHXFT_PSY_A_CORE
Refusing vitals, refusing all PO medications, no PRNs for agitation, minimally interactive with staff over weekend. Refusing vitals, refusing all PO medications, no PRNs for agitation, minimally interactive with staff over weekend.    Patient found laying in bed, awake. Room is malodorous. Patient stares at this writer, but does not speak. MOO hearing tomorrow explained.    Spoke to NP Crilly: states that they barely know the patient, as the patient has been hospitalized almost the entire time on the ACT team. Notes that the patient was discharged against their recommendation from last hospitalization, on IM Haldol and IM cogentin regimen, switched to oral and then BREWER but patient refused. Chronically delusional, doesn't get better. Becomes aggressive. Can't do well in the community. Has failed [facility]. Strongly feels patient needs state.

## 2023-10-23 NOTE — BH INPATIENT PSYCHIATRY PROGRESS NOTE - MSE UNSTRUCTURED FT
Appearance: laying in bed awake, room cleaned and non-malodorous  Behavior: more cooperative today though very guarded, no PMA/PMR noted  Speech: regular rate, volume and rhythm   Mood: unable to assess  Affect: neutral, calm  Thought process: too little content to assess, though organized in short statements  Thought content: likely paranoid but difficult to assess due to limited content  Perceptions: observed talking to staff by staff  Insight: very poor  Judgement: very poor, refusing vitals, medications or engaging with staff, very poor ADLs  Cognition: unable to assess Appearance: laying in bed awake, room malodorous  Behavior: uncooperative, no PMA/PMR noted  Speech: no speech   Mood: unable to assess  Affect: neutral, calm  Thought process: too little content to assess  Thought content: too little content to assess  Perceptions: has been observed talking to staff by staff  Insight: very poor  Judgement: very poor, refusing vitals, medications or engaging with staff, very poor ADLs  Cognition: unable to assess

## 2023-10-23 NOTE — BH INPATIENT PSYCHIATRY PROGRESS NOTE - NSBHATTESTBILLING_PSY_A_CORE
82981-[id number] OBS or IP - low complexity OR 25-34 mins 58390-Gpaoetgrkx OBS or IP - moderate complexity OR 35-49 mins

## 2023-10-24 PROCEDURE — 99232 SBSQ HOSP IP/OBS MODERATE 35: CPT

## 2023-10-24 NOTE — BH INPATIENT PSYCHIATRY PROGRESS NOTE - CURRENT MEDICATION
MEDICATIONS  (STANDING):  bisacodyl 10 milliGRAM(s) Oral at bedtime  cholecalciferol 1000 Unit(s) Oral at bedtime  cloZAPine 25 milliGRAM(s) Oral at bedtime  ferrous    sulfate 325 milliGRAM(s) Oral daily  influenza   Vaccine 0.5 milliLiter(s) IntraMuscular once  polyethylene glycol 3350 17 Gram(s) Oral two times a day  senna 2 Tablet(s) Oral at bedtime  valproic  acid Syrup 1000 milliGRAM(s) Oral at bedtime    MEDICATIONS  (PRN):  acetaminophen     Tablet .. 650 milliGRAM(s) Oral every 4 hours PRN Mild Pain (1 - 3), Moderate Pain (4 - 6)  benztropine 2 milliGRAM(s) Oral at bedtime PRN EPS ppx  OLANZapine Disintegrating Tablet 5 milliGRAM(s) Oral every 4 hours PRN agitation  OLANZapine Injectable 5 milliGRAM(s) IntraMuscular once PRN Agitation   MEDICATIONS  (STANDING):  bisacodyl 10 milliGRAM(s) Oral at bedtime  cholecalciferol 1000 Unit(s) Oral at bedtime  cloZAPine 25 milliGRAM(s) Oral at bedtime  ferrous    sulfate 325 milliGRAM(s) Oral daily  polyethylene glycol 3350 17 Gram(s) Oral two times a day  senna 2 Tablet(s) Oral at bedtime  valproic  acid Syrup 1000 milliGRAM(s) Oral at bedtime    MEDICATIONS  (PRN):  acetaminophen     Tablet .. 650 milliGRAM(s) Oral every 4 hours PRN Mild Pain (1 - 3), Moderate Pain (4 - 6)  benztropine 2 milliGRAM(s) Oral at bedtime PRN EPS ppx  OLANZapine Disintegrating Tablet 5 milliGRAM(s) Oral every 4 hours PRN agitation  OLANZapine Injectable 5 milliGRAM(s) IntraMuscular once PRN Agitation

## 2023-10-24 NOTE — BH INPATIENT PSYCHIATRY PROGRESS NOTE - NSBHFUPINTERVALHXFT_PSY_A_CORE
Refusing vitals, refusing PO medications, no acute events overnight. Refusing vitals, refusing PO medications, no acute events overnight. Patient seen in hallway, waiting for breakfast. On approach and greeting, patient says "drops dead" to this writer, but doesn't engage further verbally. Patient malodorous.

## 2023-10-24 NOTE — BH INPATIENT PSYCHIATRY PROGRESS NOTE - MSE UNSTRUCTURED FT
Appearance: laying in bed awake, room malodorous  Behavior: uncooperative, no PMA/PMR noted  Speech: no speech   Mood: unable to assess  Affect: neutral, calm  Thought process: too little content to assess  Thought content: too little content to assess  Perceptions: has been observed talking to staff by staff  Insight: very poor  Judgement: very poor, refusing vitals, medications or engaging with staff, very poor ADLs  Cognition: unable to assess Appearance: in hallway waiting for breakfast, malodorous  Behavior: uncooperative, no PMA/PMR noted  Speech: too limited content to assess  Mood: unable to assess  Affect: irritable  Thought process: too little content to assess  Thought content: tells psychiatrist to "drop dead"  Perceptions: has been observed talking to staff by staff  Insight: very poor  Judgement: very poor, refusing vitals, medications or engaging with staff, very poor ADLs  Cognition: unable to assess

## 2023-10-24 NOTE — BH INPATIENT PSYCHIATRY PROGRESS NOTE - NSBHASSESSSUMMFT_PSY_ALL_CORE
Patient is a 53 year old adult (goes by "Juan Jose"), living at [facility] on [place] grounds, PMH of GERD, anemia, PPH of schizophrenia, selective mutism, currently with [facility] ACT team, previously with [facility] which , with history of multiple psychiatric hospitalizations, without known history of self-injury or suicide attempts, with history of aggression in the setting on nonadherence with medication, who was initially brought in by staff at residence for increasing agitation and aggression in the setting of 2 months of medication nonadherence. Patient was hospitalized from 8/3-23 on 1N where they were noted to be hostile, disorganized, threatening, malodorous and grossly psychotic. TOO was obtained, and patient eventually administered dual long-acting injectables (Haldol Decanoate and Zyprexa Relprevv), though had declined nearly all PO medications. Patient was transferred to [facility] where they were admitted due to anemia to 6 (medical workup largely unremarkable), patient stabilized and transferred back to [facility] for further management of psychosis.     Diagnostically, presentation consistent with exacerbation of schizophrenia.     Patient continues to have limited engagement with staff and treatment team, refusing vitals and refusing all medications, including BREWER due 10/19. Self care continues to be very poor, and patient non-responsive to prompts to complete ADLs. Patient non-verbal with treating psychiatrist today, but with intense stare. Room remains quite malodorous. TOO court tomorrow.    Patient requires inpatient admission due to very poor self-care and for containment, stabilization, medication management and aftercare planning.    Plan:  1. Brink: admitted involuntary , TOO court 10/24  2. Observation: routine checks  3. Collateral: obtained collateral from ACT psychiatrist  4. Psychiatric  -Continue Haldol Decanoate: 100mg on , 150mg on , tried to give Haldol 200mg on 10/19 (previous dose) and declined, will plan to give 200mg after TOO order  -Had received Zyprexa Replevv 405mg qMonth, last given , next due 10/20, though no clear benefit above Haldol Decanoate  -Continue clozapine 25mg QHS (refusing)  -Continue depakote 1000mg QHS (refusing)  -Continue benztropine 2mg PRN for EPS  5. Medical:  -Anemia: s/p medical admission at [facility] for anemia to Hgb 6, required 2u pRBCs, most recent Hgb 8.8-9. GI workup unremarkable with exception of diverticulosis and colonic polyp  	-Repeat CBC 10/16 (refused, will need repeat with TOO order)  	-Continue ferrous sulfate 325mg daily (refusing)  -Bowel regimen: continue bisacodyl 10mg QHS (refusing), miralax 17g daily (refusing), senna 2 tablets QHS (refusing)  -Continue Vitamin D3 1000units QHS (refusing)  6. PRNs  -Olanzapine 5mg PO or IM for agitation  7. Therapy   -Individual, group and milieu therapy as appropriate   8. Disposition: state application prepared on 1N, will need to be submitted, hearing 10/25 Patient is a 53 year old adult (goes by "Juan Jose"), living at [facility] on [place] grounds, PMH of GERD, anemia, PPH of schizophrenia, selective mutism, currently with [facility] ACT team, previously with [facility] which , with history of multiple psychiatric hospitalizations, without known history of self-injury or suicide attempts, with history of aggression in the setting on nonadherence with medication, who was initially brought in by staff at residence for increasing agitation and aggression in the setting of 2 months of medication nonadherence. Patient was hospitalized from 8/3-23 on  where they were noted to be hostile, disorganized, threatening, malodorous and grossly psychotic. TOO was obtained, and patient eventually administered dual long-acting injectables (Haldol Decanoate and Zyprexa Relprevv), though had declined nearly all PO medications. Patient was transferred to [facility] where they were admitted due to anemia to 6 (medical workup largely unremarkable), patient stabilized and transferred back to [facility] for further management of psychosis.     Diagnostically, presentation consistent with exacerbation of schizophrenia.     Patient continues to have limited engagement with staff and treatment team, refusing vitals and refusing all medications, including BREWER due 10/19. Self care continues to be very poor, and patient remains quite malodorous. Today, tells treating psychiatrist to "drop dead" but doesn't engage further verbally. TOO court hearing today.    Patient requires inpatient admission due to very poor self-care and for containment, stabilization, medication management and aftercare planning.    Plan:  1. Brink: admitted involuntary , TOO court 10/24  2. Observation: routine checks  3. Collateral: obtained collateral from ACT psychiatrist  4. Psychiatric  -Continue Haldol Decanoate: 100mg on , 150mg on , tried to give Haldol 200mg on 10/19 (previous dose) and declined, will plan to give 200mg after TOO order  -Had received Zyprexa Replevv 405mg qMonth, last given , next due 10/20, though no clear benefit above Haldol Decanoate  -Continue clozapine 25mg QHS (refusing)  -Continue depakote 1000mg QHS (refusing)  -Continue benztropine 2mg PRN for EPS  5. Medical:  -Anemia: s/p medical admission at [facility] for anemia to Hgb 6, required 2u pRBCs, most recent Hgb 8.8-9. GI workup unremarkable with exception of diverticulosis and colonic polyp  	-Repeat CBC 10/16 (refused, will need repeat with TOO order)  	-Continue ferrous sulfate 325mg daily (refusing)  -Bowel regimen: continue bisacodyl 10mg QHS (refusing), miralax 17g daily (refusing), senna 2 tablets QHS (refusing)  -Continue Vitamin D3 1000units QHS (refusing)  6. PRNs  -Olanzapine 5mg PO or IM for agitation  7. Therapy   -Individual, group and milieu therapy as appropriate   8. Disposition: state application prepared on 1N, will need to be submitted, hearing 10/25

## 2023-10-25 LAB
ANION GAP SERPL CALC-SCNC: 12 MMOL/L — SIGNIFICANT CHANGE UP (ref 7–14)
ANION GAP SERPL CALC-SCNC: 12 MMOL/L — SIGNIFICANT CHANGE UP (ref 7–14)
BLD GP AB SCN SERPL QL: NEGATIVE — SIGNIFICANT CHANGE UP
BLD GP AB SCN SERPL QL: NEGATIVE — SIGNIFICANT CHANGE UP
BUN SERPL-MCNC: 16 MG/DL — SIGNIFICANT CHANGE UP (ref 7–23)
BUN SERPL-MCNC: 16 MG/DL — SIGNIFICANT CHANGE UP (ref 7–23)
CALCIUM SERPL-MCNC: 9 MG/DL — SIGNIFICANT CHANGE UP (ref 8.4–10.5)
CALCIUM SERPL-MCNC: 9 MG/DL — SIGNIFICANT CHANGE UP (ref 8.4–10.5)
CHLORIDE SERPL-SCNC: 109 MMOL/L — HIGH (ref 98–107)
CHLORIDE SERPL-SCNC: 109 MMOL/L — HIGH (ref 98–107)
CO2 SERPL-SCNC: 22 MMOL/L — SIGNIFICANT CHANGE UP (ref 22–31)
CO2 SERPL-SCNC: 22 MMOL/L — SIGNIFICANT CHANGE UP (ref 22–31)
CREAT SERPL-MCNC: 0.72 MG/DL — SIGNIFICANT CHANGE UP (ref 0.5–1.3)
CREAT SERPL-MCNC: 0.72 MG/DL — SIGNIFICANT CHANGE UP (ref 0.5–1.3)
EGFR: 99 ML/MIN/1.73M2 — SIGNIFICANT CHANGE UP
EGFR: 99 ML/MIN/1.73M2 — SIGNIFICANT CHANGE UP
GLUCOSE SERPL-MCNC: 77 MG/DL — SIGNIFICANT CHANGE UP (ref 70–99)
GLUCOSE SERPL-MCNC: 77 MG/DL — SIGNIFICANT CHANGE UP (ref 70–99)
HCT VFR BLD CALC: 30.1 % — LOW (ref 34.5–45)
HCT VFR BLD CALC: 30.1 % — LOW (ref 34.5–45)
HGB BLD-MCNC: 9.1 G/DL — LOW (ref 11.5–15.5)
HGB BLD-MCNC: 9.1 G/DL — LOW (ref 11.5–15.5)
MCHC RBC-ENTMCNC: 22.5 PG — LOW (ref 27–34)
MCHC RBC-ENTMCNC: 22.5 PG — LOW (ref 27–34)
MCHC RBC-ENTMCNC: 30.2 GM/DL — LOW (ref 32–36)
MCHC RBC-ENTMCNC: 30.2 GM/DL — LOW (ref 32–36)
MCV RBC AUTO: 74.5 FL — LOW (ref 80–100)
MCV RBC AUTO: 74.5 FL — LOW (ref 80–100)
NRBC # BLD: 0 /100 WBCS — SIGNIFICANT CHANGE UP (ref 0–0)
NRBC # BLD: 0 /100 WBCS — SIGNIFICANT CHANGE UP (ref 0–0)
NRBC # FLD: 0 K/UL — SIGNIFICANT CHANGE UP (ref 0–0)
NRBC # FLD: 0 K/UL — SIGNIFICANT CHANGE UP (ref 0–0)
PLATELET # BLD AUTO: 385 K/UL — SIGNIFICANT CHANGE UP (ref 150–400)
PLATELET # BLD AUTO: 385 K/UL — SIGNIFICANT CHANGE UP (ref 150–400)
POTASSIUM SERPL-MCNC: 4 MMOL/L — SIGNIFICANT CHANGE UP (ref 3.5–5.3)
POTASSIUM SERPL-MCNC: 4 MMOL/L — SIGNIFICANT CHANGE UP (ref 3.5–5.3)
POTASSIUM SERPL-SCNC: 4 MMOL/L — SIGNIFICANT CHANGE UP (ref 3.5–5.3)
POTASSIUM SERPL-SCNC: 4 MMOL/L — SIGNIFICANT CHANGE UP (ref 3.5–5.3)
RBC # BLD: 4.04 M/UL — SIGNIFICANT CHANGE UP (ref 3.8–5.2)
RBC # BLD: 4.04 M/UL — SIGNIFICANT CHANGE UP (ref 3.8–5.2)
RBC # FLD: 23.9 % — HIGH (ref 10.3–14.5)
RBC # FLD: 23.9 % — HIGH (ref 10.3–14.5)
RH IG SCN BLD-IMP: POSITIVE — SIGNIFICANT CHANGE UP
RH IG SCN BLD-IMP: POSITIVE — SIGNIFICANT CHANGE UP
SODIUM SERPL-SCNC: 143 MMOL/L — SIGNIFICANT CHANGE UP (ref 135–145)
SODIUM SERPL-SCNC: 143 MMOL/L — SIGNIFICANT CHANGE UP (ref 135–145)
WBC # BLD: 6 K/UL — SIGNIFICANT CHANGE UP (ref 3.8–10.5)
WBC # BLD: 6 K/UL — SIGNIFICANT CHANGE UP (ref 3.8–10.5)
WBC # FLD AUTO: 6 K/UL — SIGNIFICANT CHANGE UP (ref 3.8–10.5)
WBC # FLD AUTO: 6 K/UL — SIGNIFICANT CHANGE UP (ref 3.8–10.5)

## 2023-10-25 PROCEDURE — 99232 SBSQ HOSP IP/OBS MODERATE 35: CPT

## 2023-10-25 RX ORDER — HALOPERIDOL DECANOATE 100 MG/ML
200 INJECTION INTRAMUSCULAR ONCE
Refills: 0 | Status: COMPLETED | OUTPATIENT
Start: 2023-10-25 | End: 2023-10-25

## 2023-10-25 RX ORDER — OLANZAPINE 15 MG/1
5 TABLET, FILM COATED ORAL ONCE
Refills: 0 | Status: DISCONTINUED | OUTPATIENT
Start: 2023-10-25 | End: 2023-10-27

## 2023-10-25 RX ADMIN — HALOPERIDOL DECANOATE 200 MILLIGRAM(S): 100 INJECTION INTRAMUSCULAR at 11:38

## 2023-10-25 NOTE — BH INPATIENT PSYCHIATRY PROGRESS NOTE - NSBHASSESSSUMMFT_PSY_ALL_CORE
Patient is a 53 year old adult (goes by "Juan Jose"), living at [facility] on [place] grounds, PMH of GERD, anemia, PPH of schizophrenia, selective mutism, currently with [facility] ACT team, previously with [facility] which , with history of multiple psychiatric hospitalizations, without known history of self-injury or suicide attempts, with history of aggression in the setting on nonadherence with medication, who was initially brought in by staff at residence for increasing agitation and aggression in the setting of 2 months of medication nonadherence. Patient was hospitalized from 8/3-23 on  where they were noted to be hostile, disorganized, threatening, malodorous and grossly psychotic. TOO was obtained, and patient eventually administered dual long-acting injectables (Haldol Decanoate and Zyprexa Relprevv), though had declined nearly all PO medications. Patient was transferred to [facility] where they were admitted due to anemia to 6 (medical workup largely unremarkable), patient stabilized and transferred back to [facility] for further management of psychosis.     Diagnostically, presentation consistent with exacerbation of schizophrenia.     Patient continues to have limited engagement with staff and treatment team, refusing vitals and refusing all medications, including BREWER due 10/19. Self care continues to be very poor, and patient remains quite malodorous. Today, tells treating psychiatrist to "drop dead" but doesn't engage further verbally. TOO court hearing today.    Patient requires inpatient admission due to very poor self-care and for containment, stabilization, medication management and aftercare planning.    Plan:  1. Brink: admitted involuntary , TOO court 10/24  2. Observation: routine checks  3. Collateral: obtained collateral from ACT psychiatrist  4. Psychiatric  -Continue Haldol Decanoate: 100mg on , 150mg on , tried to give Haldol 200mg on 10/19 (previous dose) and declined, will plan to give 200mg after TOO order  -Had received Zyprexa Replevv 405mg qMonth, last given , next due 10/20, though no clear benefit above Haldol Decanoate  -Continue clozapine 25mg QHS (refusing)  -Continue depakote 1000mg QHS (refusing)  -Continue benztropine 2mg PRN for EPS  5. Medical:  -Anemia: s/p medical admission at [facility] for anemia to Hgb 6, required 2u pRBCs, most recent Hgb 8.8-9. GI workup unremarkable with exception of diverticulosis and colonic polyp  	-Repeat CBC 10/16 (refused, will need repeat with TOO order)  	-Continue ferrous sulfate 325mg daily (refusing)  -Bowel regimen: continue bisacodyl 10mg QHS (refusing), miralax 17g daily (refusing), senna 2 tablets QHS (refusing)  -Continue Vitamin D3 1000units QHS (refusing)  6. PRNs  -Olanzapine 5mg PO or IM for agitation  7. Therapy   -Individual, group and milieu therapy as appropriate   8. Disposition: state application prepared on 1N, will need to be submitted, hearing 10/25 Patient is a 53 year old adult (goes by "Juan Jose"), living at [facility] on [place] grounds, PMH of GERD, anemia, PPH of schizophrenia, selective mutism, currently with [facility] ACT team, previously with [facility] which , with history of multiple psychiatric hospitalizations, without known history of self-injury or suicide attempts, with history of aggression in the setting on nonadherence with medication, who was initially brought in by staff at residence for increasing agitation and aggression in the setting of 2 months of medication nonadherence. Patient was hospitalized from 8/3-23 on 1N where they were noted to be hostile, disorganized, threatening, malodorous and grossly psychotic. TOO was obtained, and patient eventually administered dual long-acting injectables (Haldol Decanoate and Zyprexa Relprevv), though had declined nearly all PO medications. Patient was transferred to [facility] where they were admitted due to anemia to 6 (medical workup largely unremarkable), patient stabilized and transferred back to [facility] for further management of psychosis.     Diagnostically, presentation consistent with exacerbation of schizophrenia.     Patient continues to be irritable, dismissive and unwilling to engage with staff. Today, tells psychiatrist to "get out" and refuses to engage further in interview. Continues to be malodorous with no observable ADLs. TOO order obtained 10/24, will obtain labs today and give Haldol Deconate (missed from last week). Transfer to [facility] [place] hearing 10/25.    Patient requires inpatient admission due to very poor self-care and for containment, stabilization, medication management and aftercare planning.    Plan:  1. Brink: admitted involuntary , TOO obtained 10/24  2. Observation: routine checks  3. Collateral: obtained collateral from ACT psychiatrist  4. Psychiatric  -Continue Haldol Decanoate: 100mg on , 150mg on , tried to give Haldol 200mg on 10/19 (previous dose) and declined --> given 200mg IM 10/24 with TOO order  -Had received Zyprexa Replevv 405mg qMonth (given )--though no clear benefit above Haldol Decanoate, so will defer at this time  -Continue clozapine 25mg QHS (refusing)  -Continue depakote 1000mg QHS (refusing)  -Continue benztropine 2mg PRN for EPS  5. Medical:  -Anemia: s/p medical admission at [facility] for anemia to Hgb 6, required 2u pRBCs, most recent Hgb 8.8-9. GI workup unremarkable with exception of diverticulosis and colonic polyp  	-Repeat CBC 10/24 pending  	-Continue ferrous sulfate 325mg daily (refusing)  -Bowel regimen: continue bisacodyl 10mg QHS (refusing), miralax 17g daily (refusing), senna 2 tablets QHS (refusing)  -Continue Vitamin D3 1000units QHS (refusing)  6. PRNs  -Olanzapine 5mg PO or IM for agitation  7. Therapy   -Individual, group and milieu therapy as appropriate   8. Disposition: state hearing 10/25, will begin application process

## 2023-10-25 NOTE — BH INPATIENT PSYCHIATRY PROGRESS NOTE - CURRENT MEDICATION
MEDICATIONS  (STANDING):  bisacodyl 10 milliGRAM(s) Oral at bedtime  cholecalciferol 1000 Unit(s) Oral at bedtime  cloZAPine 25 milliGRAM(s) Oral at bedtime  ferrous    sulfate 325 milliGRAM(s) Oral daily  polyethylene glycol 3350 17 Gram(s) Oral two times a day  senna 2 Tablet(s) Oral at bedtime  valproic  acid Syrup 1000 milliGRAM(s) Oral at bedtime    MEDICATIONS  (PRN):  acetaminophen     Tablet .. 650 milliGRAM(s) Oral every 4 hours PRN Mild Pain (1 - 3), Moderate Pain (4 - 6)  benztropine 2 milliGRAM(s) Oral at bedtime PRN EPS ppx  OLANZapine Disintegrating Tablet 5 milliGRAM(s) Oral every 4 hours PRN agitation  OLANZapine Injectable 5 milliGRAM(s) IntraMuscular once PRN Agitation   MEDICATIONS  (STANDING):  bisacodyl 10 milliGRAM(s) Oral at bedtime  cholecalciferol 1000 Unit(s) Oral at bedtime  cloZAPine 25 milliGRAM(s) Oral at bedtime  ferrous    sulfate 325 milliGRAM(s) Oral daily  polyethylene glycol 3350 17 Gram(s) Oral two times a day  senna 2 Tablet(s) Oral at bedtime  valproic  acid Syrup 1000 milliGRAM(s) Oral at bedtime    MEDICATIONS  (PRN):  acetaminophen     Tablet .. 650 milliGRAM(s) Oral every 4 hours PRN Mild Pain (1 - 3), Moderate Pain (4 - 6)  benztropine 2 milliGRAM(s) Oral at bedtime PRN EPS ppx  OLANZapine Disintegrating Tablet 5 milliGRAM(s) Oral every 4 hours PRN agitation  OLANZapine Injectable 5 milliGRAM(s) IntraMuscular once PRN Agitation  OLANZapine Injectable 5 milliGRAM(s) IntraMuscular once PRN Agitation

## 2023-10-25 NOTE — BH INPATIENT PSYCHIATRY PROGRESS NOTE - NSBHFUPINTERVALHXFT_PSY_A_CORE
Refusing vitals, refusing PO medications, observed talking to self Refusing vitals, refusing PO medications, observed talking to self.    Attempted to see patient today in room, along with MHLS and medical director for transfer to [facility]. Patient's room is malodorous. Patient screams "GET OUT" as we walk in and refuses to engage in interview. After leaving room, patient is overheard talking to himself.

## 2023-10-25 NOTE — CHART NOTE - NSCHARTNOTEFT_GEN_A_CORE
MEDICAL DIRECTOR OF INPATIENT PSYCHIATRY NOTE:     An appeal process was conducted today to assess this patient's potential transfer to a state hospital in the presence of the [facility]  (Anselmo Harmon), the patient, and the attending physician, Dr. Deleon.  On approach, patient started to scream at writer,  and attending "to get out" and refused to participate in the transfer hearing.         Briefly, the pt is a a 53 year old adult (goes by "Juan Jose"), living at [facility] on [place] grounds, PMH of GERD, anemia, PPH of schizophrenia, selective mutism, currently with [facility] ACT team, previously with [facility] which , with history of multiple psychiatric hospitalizations, without known history of self-injury or suicide attempts, with history of aggression in the setting on nonadherence with medication, who was initially brought in by staff at residence for increasing agitation and aggression in the setting of 2 months of medication nonadherence. Patient was hospitalized from 8/3-23 on  where they were noted to be hostile, disorganized, threatening, malodorous and grossly psychotic. TOO was obtained, and patient eventually administered dual long-acting injectables (Haldol Decanoate and Zyprexa Relprevv), though had declined nearly all PO medications. Patient was transferred to [facility] where they were admitted due to anemia to 6 (medical workup largely unremarkable), patient stabilized and transferred back to [facility] for further management of psychosis.  Patient refused oral medications since being transferred back and went to court for medications over objection which was granted.    On examination, Patient is seen at bedside. She is laying in bed under blankets, malodorous with very poor adls.  She is uncooperative, no PMA/PMR note.  She does not speak with the writer.  She is  irritable.  Paranoia suspected.  She is disorganized.  Insight and judgement are poor as she does not know the extent of her psychiatric problems.      Based on my review of the medical record and my interview with the patient today, I concur with the treatment team's recommendation that this patient requires additional hospitalization for stabilization and that a transfer to a state facility is indicated.

## 2023-10-25 NOTE — BH INPATIENT PSYCHIATRY PROGRESS NOTE - MSE UNSTRUCTURED FT
Appearance: in hallway waiting for breakfast, malodorous  Behavior: uncooperative, no PMA/PMR noted  Speech: too limited content to assess  Mood: unable to assess  Affect: irritable  Thought process: too little content to assess  Thought content: tells psychiatrist to "drop dead"  Perceptions: has been observed talking to staff by staff  Insight: very poor  Judgement: very poor, refusing vitals, medications or engaging with staff, very poor ADLs  Cognition: unable to assess Appearance: in bed under blankets, room malodorous  Behavior: uncooperative, no PMA/PMR noted  Speech: loud  Mood: unable to assess  Affect: irritable  Thought process: too little content to assess  Thought content: too little content to assess, just screams "GET OUT"  Perceptions: observed talking to self  Insight: very poor  Judgement: very poor, refusing vitals, medications or engaging with staff, very poor ADLs  Cognition: unable to assess

## 2023-10-25 NOTE — BH INPATIENT PSYCHIATRY PROGRESS NOTE - PRN MEDS
MEDICATIONS  (PRN):  acetaminophen     Tablet .. 650 milliGRAM(s) Oral every 4 hours PRN Mild Pain (1 - 3), Moderate Pain (4 - 6)  benztropine 2 milliGRAM(s) Oral at bedtime PRN EPS ppx  OLANZapine Disintegrating Tablet 5 milliGRAM(s) Oral every 4 hours PRN agitation  OLANZapine Injectable 5 milliGRAM(s) IntraMuscular once PRN Agitation   MEDICATIONS  (PRN):  acetaminophen     Tablet .. 650 milliGRAM(s) Oral every 4 hours PRN Mild Pain (1 - 3), Moderate Pain (4 - 6)  benztropine 2 milliGRAM(s) Oral at bedtime PRN EPS ppx  OLANZapine Disintegrating Tablet 5 milliGRAM(s) Oral every 4 hours PRN agitation  OLANZapine Injectable 5 milliGRAM(s) IntraMuscular once PRN Agitation  OLANZapine Injectable 5 milliGRAM(s) IntraMuscular once PRN Agitation

## 2023-10-26 PROCEDURE — 99232 SBSQ HOSP IP/OBS MODERATE 35: CPT

## 2023-10-26 RX ORDER — TUBERCULIN PURIFIED PROTEIN DERIVATIVE 5 [IU]/.1ML
5 INJECTION, SOLUTION INTRADERMAL ONCE
Refills: 0 | Status: COMPLETED | OUTPATIENT
Start: 2023-10-26 | End: 2023-10-30

## 2023-10-26 NOTE — BH INPATIENT PSYCHIATRY PROGRESS NOTE - NSBHFUPINTERVALHXFT_PSY_A_CORE
Refusing vitals, refusing PO medications, given Haldol Decanoate, observed talking to self.  Refusing vitals, refusing PO medications, given Haldol Decanoate, observed talking to self.     Attempted to see patient today for interview. Patient found laying in bed, awake. Stared at writer and refused to answer any questions or engage verbally at all.

## 2023-10-26 NOTE — BH INPATIENT PSYCHIATRY PROGRESS NOTE - PRN MEDS
MEDICATIONS  (PRN):  acetaminophen     Tablet .. 650 milliGRAM(s) Oral every 4 hours PRN Mild Pain (1 - 3), Moderate Pain (4 - 6)  benztropine 2 milliGRAM(s) Oral at bedtime PRN EPS ppx  OLANZapine Disintegrating Tablet 5 milliGRAM(s) Oral every 4 hours PRN agitation  OLANZapine Injectable 5 milliGRAM(s) IntraMuscular once PRN Agitation  OLANZapine Injectable 5 milliGRAM(s) IntraMuscular once PRN Agitation

## 2023-10-26 NOTE — BH INPATIENT PSYCHIATRY PROGRESS NOTE - MSE UNSTRUCTURED FT
Appearance: in bed under blankets, room malodorous  Behavior: uncooperative, no PMA/PMR noted  Speech: loud  Mood: unable to assess  Affect: irritable  Thought process: too little content to assess  Thought content: too little content to assess, just screams "GET OUT"  Perceptions: observed talking to self  Insight: very poor  Judgement: very poor, refusing vitals, medications or engaging with staff, very poor ADLs  Cognition: unable to assess Appearance: in bed, room malodorous  Behavior: uncooperative, mistrustful, no PMA/PMR noted  Speech: no speech  Mood: unable to assess  Affect: irritable  Thought process: too little content to assess  Thought content: too little content to assess  Perceptions: has been observed talking to self  Insight: very poor  Judgement: very poor, refusing vitals, refusing PO medications, very poor ADLs  Cognition: unable to assess

## 2023-10-26 NOTE — BH INPATIENT PSYCHIATRY PROGRESS NOTE - CURRENT MEDICATION
MEDICATIONS  (STANDING):  bisacodyl 10 milliGRAM(s) Oral at bedtime  cholecalciferol 1000 Unit(s) Oral at bedtime  cloZAPine 25 milliGRAM(s) Oral at bedtime  ferrous    sulfate 325 milliGRAM(s) Oral daily  polyethylene glycol 3350 17 Gram(s) Oral two times a day  senna 2 Tablet(s) Oral at bedtime  valproic  acid Syrup 1000 milliGRAM(s) Oral at bedtime    MEDICATIONS  (PRN):  acetaminophen     Tablet .. 650 milliGRAM(s) Oral every 4 hours PRN Mild Pain (1 - 3), Moderate Pain (4 - 6)  benztropine 2 milliGRAM(s) Oral at bedtime PRN EPS ppx  OLANZapine Disintegrating Tablet 5 milliGRAM(s) Oral every 4 hours PRN agitation  OLANZapine Injectable 5 milliGRAM(s) IntraMuscular once PRN Agitation  OLANZapine Injectable 5 milliGRAM(s) IntraMuscular once PRN Agitation

## 2023-10-26 NOTE — BH INPATIENT PSYCHIATRY PROGRESS NOTE - NSBHASSESSSUMMFT_PSY_ALL_CORE
Patient is a 53 year old adult (goes by "Juan Jose"), living at [facility] on [place] grounds, PMH of GERD, anemia, PPH of schizophrenia, selective mutism, currently with [facility] ACT team, previously with [facility] which , with history of multiple psychiatric hospitalizations, without known history of self-injury or suicide attempts, with history of aggression in the setting on nonadherence with medication, who was initially brought in by staff at residence for increasing agitation and aggression in the setting of 2 months of medication nonadherence. Patient was hospitalized from 8/3-23 on 1N where they were noted to be hostile, disorganized, threatening, malodorous and grossly psychotic. TOO was obtained, and patient eventually administered dual long-acting injectables (Haldol Decanoate and Zyprexa Relprevv), though had declined nearly all PO medications. Patient was transferred to [facility] where they were admitted due to anemia to 6 (medical workup largely unremarkable), patient stabilized and transferred back to [facility] for further management of psychosis.     Diagnostically, presentation consistent with exacerbation of schizophrenia.     Patient continues to be irritable, dismissive and unwilling to engage with staff. Today, tells psychiatrist to "get out" and refuses to engage further in interview. Continues to be malodorous with no observable ADLs. TOO order obtained 10/24, will obtain labs today and give Haldol Deconate (missed from last week). Transfer to [facility] hearing 10/25.    Patient requires inpatient admission due to very poor self-care and for containment, stabilization, medication management and aftercare planning.    Plan:  1. Brink: admitted involuntary , TOO obtained 10/24  2. Observation: routine checks  3. Collateral: obtained collateral from ACT psychiatrist  4. Psychiatric  -Continue Haldol Decanoate: 100mg on , 150mg on , tried to give Haldol 200mg on 10/19 (previous dose) and declined --> given 200mg IM 10/24 with TOO order  -Had received Zyprexa Replevv 405mg qMonth (given )--though no clear benefit above Haldol Decanoate, so will defer at this time  -Continue clozapine 25mg QHS (refusing)  -Continue depakote 1000mg QHS (refusing)  -Continue benztropine 2mg PRN for EPS  5. Medical:  -Anemia: s/p medical admission at [facility] for anemia to Hgb 6, required 2u pRBCs, most recent Hgb 8.8-9. GI workup unremarkable with exception of diverticulosis and colonic polyp  	-Repeat CBC 10/24 pending  	-Continue ferrous sulfate 325mg daily (refusing)  -Bowel regimen: continue bisacodyl 10mg QHS (refusing), miralax 17g daily (refusing), senna 2 tablets QHS (refusing)  -Continue Vitamin D3 1000units QHS (refusing)  6. PRNs  -Olanzapine 5mg PO or IM for agitation  7. Therapy   -Individual, group and milieu therapy as appropriate   8. Disposition: state hearing 10/25, will begin application process Patient is a 53 year old adult (goes by "Juan Jose"), living at [facility] on [place] grounds, PMH of GERD, anemia, PPH of schizophrenia, selective mutism, currently with [facility] ACT team, previously with [facility] which , with history of multiple psychiatric hospitalizations, without known history of self-injury or suicide attempts, with history of aggression in the setting on nonadherence with medication, who was initially brought in by staff at residence for increasing agitation and aggression in the setting of 2 months of medication nonadherence. Patient was hospitalized from 8/3-23 on 1N where they were noted to be hostile, disorganized, threatening, malodorous and grossly psychotic. TOO was obtained, and patient eventually administered dual long-acting injectables (Haldol Decanoate and Zyprexa Relprevv), though had declined nearly all PO medications. Patient was transferred to [facility] where they were admitted due to anemia to 6 (medical workup largely unremarkable), patient stabilized and transferred back to [facility] for further management of psychosis.     Diagnostically, presentation consistent with exacerbation of schizophrenia.     Patient continues to be irritable, dismissive and unwilling to engage with staff. Continues to be malodorous with no observable ADLs. Patient entirely mute with treating psychiatrist today. TOO order obtained 10/24, labs reviewed (no changed from baseline anemia), and given Haldol decanoate. Will prepare state application.    Of note, no court ordered IM backups ordered for clozapine as prior to transfer to [facility] (while on 1N with TOO), patient continued to refuse PO and was never deterred by IM backups. At this time, no clear benefit to adding a second/non-clozapine antipsychotic    Patient requires inpatient admission due to very poor self-care and for containment, stabilization, medication management and aftercare planning.    Plan:  1. Brink: admitted involuntary , TOO obtained 10/24  2. Observation: routine checks  3. Collateral: obtained collateral from [facility] psychiatrist  4. Psychiatric  -Continue Haldol Decanoate 200mg IM q4 weeks, last given 10/25, next due   -Had received Zyprexa Replevv 405mg qMonth (given )--though no observable benefit above Haldol Decanoate, so will defer at this time  -Continue clozapine 25mg QHS (refusing)  -Continue depakote 1000mg QHS (refusing)  -Continue benztropine 2mg PRN for EPS  5. Medical:  -Anemia: s/p medical admission at [facility] for anemia to Hgb 6, required 2u pRBCs, most recent Hgb 8.8-9. GI workup unremarkable with exception of diverticulosis and colonic polyp  	-Repeat CBC 10/24 pending  	-Continue ferrous sulfate 325mg daily (refusing)  -Bowel regimen: continue bisacodyl 10mg QHS (refusing), miralax 17g daily (refusing), senna 2 tablets QHS (refusing)  -Continue Vitamin D3 1000units QHS (refusing)  6. PRNs  -Olanzapine 5mg PO or IM for agitation  7. Therapy   -Individual, group and milieu therapy as appropriate   8. Disposition: state hearing 10/25, will begin application process

## 2023-10-26 NOTE — BH INPATIENT PSYCHIATRY PROGRESS NOTE - NSBHCHARTREVIEWVS_PSY_A_CORE FT
Vital Signs Last 24 Hrs  T(C): --  T(F): --  HR: --  BP: --  BP(mean): --  RR: --  SpO2: --     Vital Signs Last 24 Hrs  T(C): 36.4 (10-26-23 @ 08:25), Max: 36.4 (10-26-23 @ 08:25)  T(F): 97.6 (10-26-23 @ 08:25), Max: 97.6 (10-26-23 @ 08:25)  HR: --  BP: --  BP(mean): --  RR: 16 (10-26-23 @ 08:25) (16 - 16)  SpO2: --    Orthostatic VS  10-26-23 @ 08:25  Lying BP: --/-- HR: --  Sitting BP: 109/61 HR: 66  Standing BP: --/-- HR: --  Site: --  Mode: --

## 2023-10-27 LAB
BASOPHILS # BLD AUTO: 0.01 K/UL — SIGNIFICANT CHANGE UP (ref 0–0.2)
BASOPHILS # BLD AUTO: 0.01 K/UL — SIGNIFICANT CHANGE UP (ref 0–0.2)
BASOPHILS NFR BLD AUTO: 0.1 % — SIGNIFICANT CHANGE UP (ref 0–2)
BASOPHILS NFR BLD AUTO: 0.1 % — SIGNIFICANT CHANGE UP (ref 0–2)
CK SERPL-CCNC: 31 U/L — SIGNIFICANT CHANGE UP (ref 25–170)
CK SERPL-CCNC: 31 U/L — SIGNIFICANT CHANGE UP (ref 25–170)
EOSINOPHIL # BLD AUTO: 0.11 K/UL — SIGNIFICANT CHANGE UP (ref 0–0.5)
EOSINOPHIL # BLD AUTO: 0.11 K/UL — SIGNIFICANT CHANGE UP (ref 0–0.5)
EOSINOPHIL NFR BLD AUTO: 1.5 % — SIGNIFICANT CHANGE UP (ref 0–6)
EOSINOPHIL NFR BLD AUTO: 1.5 % — SIGNIFICANT CHANGE UP (ref 0–6)
HCT VFR BLD CALC: 30.9 % — LOW (ref 34.5–45)
HCT VFR BLD CALC: 30.9 % — LOW (ref 34.5–45)
HGB BLD-MCNC: 9.2 G/DL — LOW (ref 11.5–15.5)
HGB BLD-MCNC: 9.2 G/DL — LOW (ref 11.5–15.5)
IANC: 4.1 K/UL — SIGNIFICANT CHANGE UP (ref 1.8–7.4)
IANC: 4.1 K/UL — SIGNIFICANT CHANGE UP (ref 1.8–7.4)
IMM GRANULOCYTES NFR BLD AUTO: 0.3 % — SIGNIFICANT CHANGE UP (ref 0–0.9)
IMM GRANULOCYTES NFR BLD AUTO: 0.3 % — SIGNIFICANT CHANGE UP (ref 0–0.9)
LYMPHOCYTES # BLD AUTO: 2.44 K/UL — SIGNIFICANT CHANGE UP (ref 1–3.3)
LYMPHOCYTES # BLD AUTO: 2.44 K/UL — SIGNIFICANT CHANGE UP (ref 1–3.3)
LYMPHOCYTES # BLD AUTO: 33.2 % — SIGNIFICANT CHANGE UP (ref 13–44)
LYMPHOCYTES # BLD AUTO: 33.2 % — SIGNIFICANT CHANGE UP (ref 13–44)
MCHC RBC-ENTMCNC: 22.7 PG — LOW (ref 27–34)
MCHC RBC-ENTMCNC: 22.7 PG — LOW (ref 27–34)
MCHC RBC-ENTMCNC: 29.8 GM/DL — LOW (ref 32–36)
MCHC RBC-ENTMCNC: 29.8 GM/DL — LOW (ref 32–36)
MCV RBC AUTO: 76.3 FL — LOW (ref 80–100)
MCV RBC AUTO: 76.3 FL — LOW (ref 80–100)
MONOCYTES # BLD AUTO: 0.67 K/UL — SIGNIFICANT CHANGE UP (ref 0–0.9)
MONOCYTES # BLD AUTO: 0.67 K/UL — SIGNIFICANT CHANGE UP (ref 0–0.9)
MONOCYTES NFR BLD AUTO: 9.1 % — SIGNIFICANT CHANGE UP (ref 2–14)
MONOCYTES NFR BLD AUTO: 9.1 % — SIGNIFICANT CHANGE UP (ref 2–14)
NEUTROPHILS # BLD AUTO: 4.1 K/UL — SIGNIFICANT CHANGE UP (ref 1.8–7.4)
NEUTROPHILS # BLD AUTO: 4.1 K/UL — SIGNIFICANT CHANGE UP (ref 1.8–7.4)
NEUTROPHILS NFR BLD AUTO: 55.8 % — SIGNIFICANT CHANGE UP (ref 43–77)
NEUTROPHILS NFR BLD AUTO: 55.8 % — SIGNIFICANT CHANGE UP (ref 43–77)
NRBC # BLD: 0 /100 WBCS — SIGNIFICANT CHANGE UP (ref 0–0)
NRBC # BLD: 0 /100 WBCS — SIGNIFICANT CHANGE UP (ref 0–0)
NRBC # FLD: 0 K/UL — SIGNIFICANT CHANGE UP (ref 0–0)
NRBC # FLD: 0 K/UL — SIGNIFICANT CHANGE UP (ref 0–0)
PLATELET # BLD AUTO: 386 K/UL — SIGNIFICANT CHANGE UP (ref 150–400)
PLATELET # BLD AUTO: 386 K/UL — SIGNIFICANT CHANGE UP (ref 150–400)
RBC # BLD: 4.05 M/UL — SIGNIFICANT CHANGE UP (ref 3.8–5.2)
RBC # BLD: 4.05 M/UL — SIGNIFICANT CHANGE UP (ref 3.8–5.2)
RBC # FLD: 24.1 % — HIGH (ref 10.3–14.5)
RBC # FLD: 24.1 % — HIGH (ref 10.3–14.5)
TSH SERPL-MCNC: 1.35 UIU/ML — SIGNIFICANT CHANGE UP (ref 0.27–4.2)
TSH SERPL-MCNC: 1.35 UIU/ML — SIGNIFICANT CHANGE UP (ref 0.27–4.2)
WBC # BLD: 7.35 K/UL — SIGNIFICANT CHANGE UP (ref 3.8–10.5)
WBC # BLD: 7.35 K/UL — SIGNIFICANT CHANGE UP (ref 3.8–10.5)
WBC # FLD AUTO: 7.35 K/UL — SIGNIFICANT CHANGE UP (ref 3.8–10.5)
WBC # FLD AUTO: 7.35 K/UL — SIGNIFICANT CHANGE UP (ref 3.8–10.5)

## 2023-10-27 PROCEDURE — 99232 SBSQ HOSP IP/OBS MODERATE 35: CPT

## 2023-10-27 PROCEDURE — 93010 ELECTROCARDIOGRAM REPORT: CPT

## 2023-10-27 RX ORDER — CLOZAPINE 150 MG/1
25 TABLET, ORALLY DISINTEGRATING ORAL AT BEDTIME
Refills: 0 | Status: DISCONTINUED | OUTPATIENT
Start: 2023-10-27 | End: 2023-10-30

## 2023-10-27 RX ORDER — OLANZAPINE 15 MG/1
5 TABLET, FILM COATED ORAL AT BEDTIME
Refills: 0 | Status: DISCONTINUED | OUTPATIENT
Start: 2023-10-27 | End: 2023-10-30

## 2023-10-27 RX ADMIN — OLANZAPINE 5 MILLIGRAM(S): 15 TABLET, FILM COATED ORAL at 21:11

## 2023-10-27 NOTE — BH INPATIENT PSYCHIATRY PROGRESS NOTE - NSBHCHARTREVIEWVS_PSY_A_CORE FT
Vital Signs Last 24 Hrs  T(C): --  T(F): --  HR: --  BP: 115/71 (10-27-23 @ 08:23) (115/71 - 115/71)  BP(mean): 80 (10-27-23 @ 08:23) (80 - 80)  RR: --  SpO2: --    Orthostatic VS  10-26-23 @ 08:25  Lying BP: --/-- HR: --  Sitting BP: 109/61 HR: 66  Standing BP: --/-- HR: --  Site: --  Mode: --

## 2023-10-27 NOTE — BH INPATIENT PSYCHIATRY PROGRESS NOTE - NSBHFUPINTERVALHXFT_PSY_A_CORE
VSS, refusing PO medications, no acute events overnight. Slept 2 hours per sleep log.   Patient seen in day room, eating breakfast. He is noted to be talking to himself even before approach by this writer. Continues to talk to self even as this writer tries to engage in interview. Difficult to understand but can make out "evil all around" and "I killed them all."

## 2023-10-27 NOTE — BH INPATIENT PSYCHIATRY PROGRESS NOTE - NSBHASSESSSUMMFT_PSY_ALL_CORE
Patient is a 53 year old adult (goes by "Juan Jose"), living at [facility] on [place] grounds, PMH of GERD, anemia, PPH of schizophrenia, selective mutism, currently with [facility] ACT team, previously with [facility] which , with history of multiple psychiatric hospitalizations, without known history of self-injury or suicide attempts, with history of aggression in the setting on nonadherence with medication, who was initially brought in by staff at residence for increasing agitation and aggression in the setting of 2 months of medication nonadherence. Patient was hospitalized from 8/3-23 on  where they were noted to be hostile, disorganized, threatening, malodorous and grossly psychotic. TOO was obtained, and patient eventually administered dual long-acting injectables (Haldol Decanoate and Zyprexa Relprevv), though had declined nearly all PO medications. Patient was transferred to [facility] where they were admitted due to anemia to 6 (medical workup largely unremarkable), patient stabilized and transferred back to [facility] for further management of psychosis.     Diagnostically, presentation consistent with exacerbation of schizophrenia.     Patient continues to be irritable, dismissive and unwilling to engage with staff. Continues to be malodorous with no observable ADLs. On assessment today, patient is monologuing to self in a completely incomprehensible manner, unable to redirect towards any form of interview. TOO order obtained 10/24, given Haldol Decanoate. Refusing clozapine-- will add Zyprexa 5mg IM backup to promote sleep (very poor) and reduce verbal aggression. Preparing state application.     Patient requires inpatient admission due to very poor self-care and for containment, stabilization, medication management and aftercare planning.    Plan:  1. Brink: admitted involuntary , TOO obtained 10/24  2. Observation: routine checks  3. Collateral: obtained collateral from [facility] psychiatrist  4. Psychiatric  -Continue Haldol Decanoate 200mg IM q4 weeks, last given 10/25, next due   	-Had received Zyprexa Replevv 405mg qMonth (given )--though no observable benefit above Haldol Decanoate, so will defer at this time  -Continue clozapine 25mg QHS (refusing), Zyprexa 5mg IM backup (to promote sleep and reduce verbal aggression)  -Continue depakote 1000mg QHS (refusing)  -Continue benztropine 2mg PRN for EPS  5. Medical:  -Anemia: s/p medical admission at [facility] for anemia to Hgb 6, required 2u pRBCs, most recent Hgb 8.8-9. GI workup unremarkable with exception of diverticulosis and colonic polyp  	-Repeat CBC 10/24 pending  	-Continue ferrous sulfate 325mg daily (refusing)  -Bowel regimen: continue bisacodyl 10mg QHS (refusing), miralax 17g daily (refusing), senna 2 tablets QHS (refusing)  -Continue Vitamin D3 1000units QHS (refusing)  6. PRNs  -Olanzapine 5mg PO or IM for agitation  7. Therapy   -Individual, group and milieu therapy as appropriate   8. Disposition: state hearing 10/25, will begin application process

## 2023-10-27 NOTE — BH INPATIENT PSYCHIATRY PROGRESS NOTE - MSE UNSTRUCTURED FT
Appearance: eating breakfast, malodorous  Behavior: uncooperative, mistrustful, no PMA/PMR noted  Speech: uninterpretable, normal volume  Mood: unable to assess  Affect: neutral, blunted  Thought process: severely disorganized to word salad  Thought content: "evil all around" "I killed them all"  Perceptions: has been observed talking to self  Insight: very poor  Judgement: very poor, refusing PO medications, very poor ADLs  Cognition: unable to assess

## 2023-10-27 NOTE — BH INPATIENT PSYCHIATRY PROGRESS NOTE - NSBHMETABOLIC_PSY_ALL_CORE_FT
BMI: BMI (kg/m2): 35.2 (10-12-23 @ 17:39)  HbA1c: A1C with Estimated Average Glucose Result: 5.3 % (09-29-23 @ 10:00)    Glucose: POCT Blood Glucose.: 100 mg/dL (12-19-22 @ 15:06)    BP: 115/71 (10-27-23 @ 08:23) (115/71 - 115/71)  Lipid Panel: Date/Time: 09-29-23 @ 10:00  Cholesterol, Serum: 166  Direct LDL: --  HDL Cholesterol, Serum: 60  Total Cholesterol/HDL Ration Measurement: --  Triglycerides, Serum: 61

## 2023-10-27 NOTE — BH INPATIENT PSYCHIATRY PROGRESS NOTE - PRN MEDS
MEDICATIONS  (PRN):  acetaminophen     Tablet .. 650 milliGRAM(s) Oral every 4 hours PRN Mild Pain (1 - 3), Moderate Pain (4 - 6)  benztropine 2 milliGRAM(s) Oral at bedtime PRN EPS ppx  OLANZapine Disintegrating Tablet 5 milliGRAM(s) Oral every 4 hours PRN agitation  OLANZapine Injectable 5 milliGRAM(s) IntraMuscular at bedtime PRN If refuses court ordered clozapine  OLANZapine Injectable 5 milliGRAM(s) IntraMuscular once PRN Agitation

## 2023-10-27 NOTE — BH INPATIENT PSYCHIATRY PROGRESS NOTE - CURRENT MEDICATION
MEDICATIONS  (STANDING):  bisacodyl 10 milliGRAM(s) Oral at bedtime  cholecalciferol 1000 Unit(s) Oral at bedtime  cloZAPine 25 milliGRAM(s) Oral at bedtime  ferrous    sulfate 325 milliGRAM(s) Oral daily  polyethylene glycol 3350 17 Gram(s) Oral two times a day  PPD  5 Tuberculin Unit(s) Injectable 5 Unit(s) IntraDermal once  senna 2 Tablet(s) Oral at bedtime  valproic  acid Syrup 1000 milliGRAM(s) Oral at bedtime    MEDICATIONS  (PRN):  acetaminophen     Tablet .. 650 milliGRAM(s) Oral every 4 hours PRN Mild Pain (1 - 3), Moderate Pain (4 - 6)  benztropine 2 milliGRAM(s) Oral at bedtime PRN EPS ppx  OLANZapine Disintegrating Tablet 5 milliGRAM(s) Oral every 4 hours PRN agitation  OLANZapine Injectable 5 milliGRAM(s) IntraMuscular at bedtime PRN If refuses court ordered clozapine  OLANZapine Injectable 5 milliGRAM(s) IntraMuscular once PRN Agitation

## 2023-10-28 RX ADMIN — OLANZAPINE 5 MILLIGRAM(S): 15 TABLET, FILM COATED ORAL at 21:38

## 2023-10-28 NOTE — BH CHART NOTE - NSEVENTNOTEFT_PSY_ALL_CORE
Interval History:  MARIO called for evaluation of abnormal movements. Per RN, patient was . Pt denies chest pain, SOB, or edema. Denies headache, visual changes, confusion, or n/v.       T(C): 36.3 (10-28-23 @ 09:04), Max: 36.3 (10-28-23 @ 09:04)  HR: --  BP: --  RR: 18 (10-28-23 @ 09:04) (18 - 18)  SpO2: --    Physical Exam:  Gen: Patient sitting on hospital bed, NAD   HEENT: NC/AT,  EOMI.    Resp: CTA b/l. No wheezes, ronchi, or crackles.   CV: Radial pulses 2+ b/l, RRR, no murmurs.   Abd: soft, NTND, no guarding or rigidity. NABS.    Ext: ROM intact, no clubbing, edema, or cyanosis.   Neuro: awake, alert, grossly oriented.     Assessment:  MARIO called for [incident]. Pt clinically stable with exam otherwise unremarkable.     Plan:  1. no further medical intervention necessary at this time.   2. will continue to monitor routinely.   3. d/w RN staff    Interval History:  MARIO called for evaluation of abnormal movements. Per RN, patient was moving slowly, tremulous, and not getting out of bed. MOO was recently granted by  and pt received IM 5 mg Zyprexa per court order yesterday evening. On exam, patient is talking to themselves and asks this writer and RN for "a banana cream pie and a fruit salad." Patient refuses to answer any of this writer's questions but does exhibit spontaneous movement of all four extremities.       T(C): 36.3 (10-28-23 @ 09:04), Max: 36.3 (10-28-23 @ 09:04)  HR: --  BP: --  RR: 18 (10-28-23 @ 09:04) (18 - 18)  SpO2: --    Physical Exam:  Gen: Patient sitting on hospital bed, NAD   HEENT: NC/AT,  EOMI. Intermittent L-sided facial tremor  Resp: CTA b/l. No wheezes, rhonchi, or crackles.   CV: RRR, no murmurs.   Abd: deferred   Ext: ROM intact, no clubbing, edema, or cyanosis. Pt holds certain body positions for long periods of time but does move all extremities spontaneously. No resting tremor or rigidity noted.  Neuro: awake, alert, grossly oriented.     Assessment:  MARIO called for evaluation of abnormal movements. Pt does exhibit an intermittent facial tremor and odd posturing. However, patient moves all limbs spontaneously without increased effort or difficulty. Pt offered PO cogentin but did not respond to request. Pt clinically stable with exam otherwise unremarkable. Unable to perform full catatonia exam due to patient compliance. Vitally stable. Per previous documentation from primary team and RN, etiology may be behavioral in origin.    Plan:  1. no further medical intervention necessary at this time.   2. will continue to monitor routinely. Please call MARIO if abnormal movements worsen or symptoms change. Encourage movement and interaction throughout the day as tolerated.  3. d/w RN staff    Interval History:  MARIO called for evaluation of abnormal movements. Per RN, patient was moving slowly, tremulous, and not getting out of bed. MOO was recently granted by  and pt received IM 5 mg Zyprexa per court order yesterday evening. On exam, patient is talking to themselves and asks this writer and RN for "a banana cream pie and a fruit salad." Patient refuses to answer any of this writer's questions but does exhibit spontaneous movement of all four extremities.       T(C): 36.3 (10-28-23 @ 09:04), Max: 36.3 (10-28-23 @ 09:04)  HR: --  BP: --  RR: 18 (10-28-23 @ 09:04) (18 - 18)  SpO2: --    Physical Exam:  Gen: Patient sitting on hospital bed, NAD   HEENT: NC/AT,  EOMI. Intermittent L-sided facial tremor  Resp: CTA b/l. No wheezes, rhonchi, or crackles.   CV: RRR, no murmurs.   Abd: deferred   Ext: ROM intact, no clubbing, edema, or cyanosis. Pt holds certain body positions for long periods of time but does move all extremities spontaneously. No resting tremor or rigidity noted.  Neuro: awake, alert, grossly oriented.     Assessment:  MARIO called for evaluation of abnormal movements. Pt does exhibit an intermittent facial tremor and odd posturing. However, patient moves all limbs spontaneously without increased effort or difficulty. Pt offered PO cogentin but did not respond to request. Pt clinically stable with exam otherwise unremarkable. Unable to perform full catatonia exam due to patient compliance. Vitally stable. Per previous documentation from primary team and RN, etiology may be behavioral in origin.    Plan:  1. no further medical intervention necessary at this time.   2. will continue to monitor. Please call MARIO if vital signs change, abnormal movements worsen, or symptoms change. Encourage movement and interaction throughout the day as tolerated.  3. d/w RN staff

## 2023-10-29 RX ORDER — OLANZAPINE 15 MG/1
5 TABLET, FILM COATED ORAL ONCE
Refills: 0 | Status: DISCONTINUED | OUTPATIENT
Start: 2023-10-29 | End: 2023-10-29

## 2023-10-29 RX ADMIN — OLANZAPINE 5 MILLIGRAM(S): 15 TABLET, FILM COATED ORAL at 21:05

## 2023-10-30 PROCEDURE — 99232 SBSQ HOSP IP/OBS MODERATE 35: CPT

## 2023-10-30 RX ORDER — CLOZAPINE 150 MG/1
25 TABLET, ORALLY DISINTEGRATING ORAL AT BEDTIME
Refills: 0 | Status: DISCONTINUED | OUTPATIENT
Start: 2023-10-30 | End: 2023-12-05

## 2023-10-30 RX ADMIN — TUBERCULIN PURIFIED PROTEIN DERIVATIVE 5 UNIT(S): 5 INJECTION, SOLUTION INTRADERMAL at 11:01

## 2023-10-30 NOTE — BH INPATIENT PSYCHIATRY PROGRESS NOTE - NSBHFUPINTERVALHXFT_PSY_A_CORE
Intermittently refusing vitals but VSS when taken, refusing all PO medications --> receiving Zyprexa 5mg IM court-ordered backup, seen by MARIO for tremulousness (declined Cogentin), no other acute events     Intermittently refusing vitals but VSS when taken, refusing all PO medications --> receiving Zyprexa 5mg IM court-ordered backup, seen by MARIO for tremulousness (declined Cogentin), no other acute events. Slept 2-3 hours/night over weekend.    Patient found on chair in hallway, talking to himself. Approach writer and immediately yells "get away, get away" and that he will "turn you off". Bilateral hand tremor noted.

## 2023-10-30 NOTE — BH INPATIENT PSYCHIATRY PROGRESS NOTE - CURRENT MEDICATION
MEDICATIONS  (STANDING):  bisacodyl 10 milliGRAM(s) Oral at bedtime  cholecalciferol 1000 Unit(s) Oral at bedtime  cloZAPine 25 milliGRAM(s) Oral at bedtime  ferrous    sulfate 325 milliGRAM(s) Oral daily  polyethylene glycol 3350 17 Gram(s) Oral two times a day  PPD  5 Tuberculin Unit(s) Injectable 5 Unit(s) IntraDermal once  senna 2 Tablet(s) Oral at bedtime  valproic  acid Syrup 1000 milliGRAM(s) Oral at bedtime    MEDICATIONS  (PRN):  acetaminophen     Tablet .. 650 milliGRAM(s) Oral every 4 hours PRN Mild Pain (1 - 3), Moderate Pain (4 - 6)  benztropine 2 milliGRAM(s) Oral at bedtime PRN EPS ppx  OLANZapine Disintegrating Tablet 5 milliGRAM(s) Oral every 4 hours PRN agitation  OLANZapine Injectable 5 milliGRAM(s) IntraMuscular once PRN Agitation  OLANZapine Injectable 5 milliGRAM(s) IntraMuscular at bedtime PRN If refuses court ordered clozapine   MEDICATIONS  (STANDING):  bisacodyl 10 milliGRAM(s) Oral at bedtime  cholecalciferol 1000 Unit(s) Oral at bedtime  cloZAPine 25 milliGRAM(s) Oral at bedtime  ferrous    sulfate 325 milliGRAM(s) Oral daily  polyethylene glycol 3350 17 Gram(s) Oral two times a day  senna 2 Tablet(s) Oral at bedtime  valproic  acid Syrup 1000 milliGRAM(s) Oral at bedtime    MEDICATIONS  (PRN):  acetaminophen     Tablet .. 650 milliGRAM(s) Oral every 4 hours PRN Mild Pain (1 - 3), Moderate Pain (4 - 6)  benztropine 2 milliGRAM(s) Oral at bedtime PRN EPS ppx  OLANZapine Disintegrating Tablet 5 milliGRAM(s) Oral every 4 hours PRN agitation  OLANZapine Injectable 5 milliGRAM(s) IntraMuscular once PRN Agitation

## 2023-10-30 NOTE — BH INPATIENT PSYCHIATRY PROGRESS NOTE - MSE UNSTRUCTURED FT
Appearance: eating breakfast, malodorous  Behavior: uncooperative, mistrustful, no PMA/PMR noted  Speech: uninterpretable, normal volume  Mood: unable to assess  Affect: neutral, blunted  Thought process: severely disorganized to word salad  Thought content: "evil all around" "I killed them all"  Perceptions: has been observed talking to self  Insight: very poor  Judgement: very poor, refusing PO medications, very poor ADLs  Cognition: unable to assess Appearance: sitting in hallway, malodorous  Behavior: uncooperative, mistrustful, no PMA/PMR noted  Speech: loud  Mood: unable to assess  Affect: irritable  Thought process: quite disorganized  Thought content: "I'm going to turn you off" "get away"  Perceptions: observed talking to self  Insight: very poor  Judgement: very poor, refusing vitals, refusing PO medications, very poor ADLs  Cognition: unable to assess

## 2023-10-30 NOTE — BH INPATIENT PSYCHIATRY PROGRESS NOTE - PRN MEDS
MEDICATIONS  (PRN):  acetaminophen     Tablet .. 650 milliGRAM(s) Oral every 4 hours PRN Mild Pain (1 - 3), Moderate Pain (4 - 6)  benztropine 2 milliGRAM(s) Oral at bedtime PRN EPS ppx  OLANZapine Disintegrating Tablet 5 milliGRAM(s) Oral every 4 hours PRN agitation  OLANZapine Injectable 5 milliGRAM(s) IntraMuscular once PRN Agitation  OLANZapine Injectable 5 milliGRAM(s) IntraMuscular at bedtime PRN If refuses court ordered clozapine   MEDICATIONS  (PRN):  acetaminophen     Tablet .. 650 milliGRAM(s) Oral every 4 hours PRN Mild Pain (1 - 3), Moderate Pain (4 - 6)  benztropine 2 milliGRAM(s) Oral at bedtime PRN EPS ppx  OLANZapine Disintegrating Tablet 5 milliGRAM(s) Oral every 4 hours PRN agitation  OLANZapine Injectable 5 milliGRAM(s) IntraMuscular once PRN Agitation

## 2023-10-30 NOTE — BH INPATIENT PSYCHIATRY PROGRESS NOTE - NSBHASSESSSUMMFT_PSY_ALL_CORE
Patient is a 53 year old adult (goes by "Juan Jose"), living at [facility] on [place] grounds, PMH of GERD, anemia, PPH of schizophrenia, selective mutism, currently with [facility] ACT team, previously with [facility] which , with history of multiple psychiatric hospitalizations, without known history of self-injury or suicide attempts, with history of aggression in the setting on nonadherence with medication, who was initially brought in by staff at residence for increasing agitation and aggression in the setting of 2 months of medication nonadherence. Patient was hospitalized from 8/3-23 on  where they were noted to be hostile, disorganized, threatening, malodorous and grossly psychotic. TOO was obtained, and patient eventually administered dual long-acting injectables (Haldol Decanoate and Zyprexa Relprevv), though had declined nearly all PO medications. Patient was transferred to [facility] where they were admitted due to anemia to 6 (medical workup largely unremarkable), patient stabilized and transferred back to [facility] for further management of psychosis.     Diagnostically, presentation consistent with exacerbation of schizophrenia.     Patient continues to be irritable, dismissive and unwilling to engage with staff. Continues to be malodorous with no observable ADLs. On assessment today, patient is monologuing to self in a completely incomprehensible manner, unable to redirect towards any form of interview. TOO order obtained 10/24, given Haldol Decanoate. Refusing clozapine-- will add Zyprexa 5mg IM backup to promote sleep (very poor) and reduce verbal aggression. Preparing state application.     Patient requires inpatient admission due to very poor self-care and for containment, stabilization, medication management and aftercare planning.    Plan:  1. Brink: admitted involuntary , TOO obtained 10/24  2. Observation: routine checks  3. Collateral: obtained collateral from [facility] psychiatrist  4. Psychiatric  -Continue Haldol Decanoate 200mg IM q4 weeks, last given 10/25, next due   	-Had received Zyprexa Replevv 405mg qMonth (given )--though no observable benefit above Haldol Decanoate, so will defer at this time  -Continue clozapine 25mg QHS (refusing), Zyprexa 5mg IM backup (to promote sleep and reduce verbal aggression)  -Continue depakote 1000mg QHS (refusing)  -Continue benztropine 2mg PRN for EPS  5. Medical:  -Anemia: s/p medical admission at [facility] for anemia to Hgb 6, required 2u pRBCs, most recent Hgb 8.8-9. GI workup unremarkable with exception of diverticulosis and colonic polyp  	-Repeat CBC 10/24 pending  	-Continue ferrous sulfate 325mg daily (refusing)  -Bowel regimen: continue bisacodyl 10mg QHS (refusing), miralax 17g daily (refusing), senna 2 tablets QHS (refusing)  -Continue Vitamin D3 1000units QHS (refusing)  6. PRNs  -Olanzapine 5mg PO or IM for agitation  7. Therapy   -Individual, group and milieu therapy as appropriate   8. Disposition: state hearing 10/25, will begin application process Patient is a 53 year old adult (goes by "Juan Jose"), living at [facility] on [place] grounds, PMH of GERD, anemia, PPH of schizophrenia, selective mutism, currently with [facility] ACT team, previously with [facility] which , with history of multiple psychiatric hospitalizations, without known history of self-injury or suicide attempts, with history of aggression in the setting on nonadherence with medication, who was initially brought in by staff at residence for increasing agitation and aggression in the setting of 2 months of medication nonadherence. Patient was hospitalized from 8/3-23 on  where they were noted to be hostile, disorganized, threatening, malodorous and grossly psychotic. TOO was obtained, and patient eventually administered dual long-acting injectables (Haldol Decanoate and Zyprexa Relprevv), though had declined nearly all PO medications. Patient was transferred to [facility] where they were admitted due to anemia to 6 (medical workup largely unremarkable), patient stabilized and transferred back to [facility] for further management of psychosis.     Diagnostically, presentation consistent with exacerbation of schizophrenia.     Patient continues to be irritable, dismissive and unwilling to engage with staff. Continues to be malodorous with no observable ADLs. On assessment today, patient is observed talking to self. He is able to briefly engage with this writer to make a vaguely threatening statement and asking this writer to go away. TOO order obtained 10/24, given Haldol Decanoate. Refusing clozapine-- olanzapine IM backup added for sleep/aggression/psychosis, but patient noted to be more tremulous so will d/c for now.    Patient requires inpatient admission due to very poor self-care and for containment, stabilization, medication management and aftercare planning.    Plan:  1. Brink: admitted involuntary , TOO obtained 10/24  2. Observation: routine checks  3. Collateral: obtained collateral from [facility] psychiatrist  4. Psychiatric  -Continue Haldol Decanoate 200mg IM q4 weeks, last given 10/25, next due   	-Had received Zyprexa Replevv 405mg qMonth (given )--though no observable benefit above Haldol Decanoate, so will defer at this time  -Continue clozapine 25mg QHS (refusing), d/c olanzapine IM backup order d/t tremulousness   -Continue depakote 1000mg QHS (refusing)  -Continue benztropine 2mg PRN for EPS  5. Medical:  -Anemia: s/p medical admission at [facility] for anemia to Hgb 6, required 2u pRBCs, most recent Hgb 8.8-9. GI workup unremarkable with exception of diverticulosis and colonic polyp  	-Repeat CBC with stable anemia (Hgb 9.2)  	-Continue ferrous sulfate 325mg daily (refusing)  -Bowel regimen: continue bisacodyl 10mg QHS (refusing), miralax 17g daily (refusing), senna 2 tablets QHS (refusing)  -Continue Vitamin D3 1000units QHS (refusing)  6. PRNs  -Olanzapine 5mg PO or IM for agitation  7. Therapy   -Individual, group and milieu therapy as appropriate   8. Disposition: state hearing 10/25, will begin application process

## 2023-10-31 PROCEDURE — 99232 SBSQ HOSP IP/OBS MODERATE 35: CPT

## 2023-10-31 NOTE — BH INPATIENT PSYCHIATRY PROGRESS NOTE - NSBHFUPINTERVALHXFT_PSY_A_CORE
Refusing vitals, refusing PO medications, no PRNs for agitation, no acute events. Per nursing notes, observed talking to self and responding to internal stimuli. Refusing vitals, refusing PO medications, no PRNs for agitation, no acute events. Per nursing notes, observed talking to self and responding to internal stimuli. Slept about 3 hours total per sleep log.    Patient found in room, which is malodorous. He is rambling incoherently, though at one point states "burn up." Staring at this provider, and then states "GET OUT."

## 2023-10-31 NOTE — BH INPATIENT PSYCHIATRY PROGRESS NOTE - NSBHCHARTREVIEWVS_PSY_A_CORE FT
Vital Signs Last 24 Hrs  T(C): --  T(F): --  HR: --  BP: --  BP(mean): --  RR: --  SpO2: --    Orthostatic VS  10-29-23 @ 20:58  Lying BP: --/-- HR: --  Sitting BP: 147/90 HR: 95  Standing BP: 140/88 HR: 95  Site: --  Mode: --

## 2023-10-31 NOTE — BH INPATIENT PSYCHIATRY PROGRESS NOTE - CURRENT MEDICATION
MEDICATIONS  (STANDING):  bisacodyl 10 milliGRAM(s) Oral at bedtime  cholecalciferol 1000 Unit(s) Oral at bedtime  cloZAPine 25 milliGRAM(s) Oral at bedtime  ferrous    sulfate 325 milliGRAM(s) Oral daily  polyethylene glycol 3350 17 Gram(s) Oral two times a day  senna 2 Tablet(s) Oral at bedtime  valproic  acid Syrup 1000 milliGRAM(s) Oral at bedtime    MEDICATIONS  (PRN):  acetaminophen     Tablet .. 650 milliGRAM(s) Oral every 4 hours PRN Mild Pain (1 - 3), Moderate Pain (4 - 6)  benztropine 2 milliGRAM(s) Oral at bedtime PRN EPS ppx  OLANZapine Disintegrating Tablet 5 milliGRAM(s) Oral every 4 hours PRN agitation  OLANZapine Injectable 5 milliGRAM(s) IntraMuscular once PRN Agitation

## 2023-10-31 NOTE — BH INPATIENT PSYCHIATRY PROGRESS NOTE - NSBHASSESSSUMMFT_PSY_ALL_CORE
Patient is a 53 year old adult (goes by "Juan Jose"), living at [facility] on [place] grounds, PMH of GERD, anemia, PPH of schizophrenia, selective mutism, currently with [facility] ACT team, previously with [facility] which , with history of multiple psychiatric hospitalizations, without known history of self-injury or suicide attempts, with history of aggression in the setting on nonadherence with medication, who was initially brought in by staff at residence for increasing agitation and aggression in the setting of 2 months of medication nonadherence. Patient was hospitalized from 8/3-23 on  where they were noted to be hostile, disorganized, threatening, malodorous and grossly psychotic. TOO was obtained, and patient eventually administered dual long-acting injectables (Haldol Decanoate and Zyprexa Relprevv), though had declined nearly all PO medications. Patient was transferred to [facility] where they were admitted due to anemia to 6 (medical workup largely unremarkable), patient stabilized and transferred back to [facility] for further management of psychosis.     Diagnostically, presentation consistent with exacerbation of schizophrenia.     Patient continues to be irritable, dismissive and unwilling to engage with staff. Continues to be malodorous with no observable ADLs. On assessment today, patient is observed talking to self. He is able to briefly engage with this writer to make a vaguely threatening statement and asking this writer to go away. TOO order obtained 10/24, given Haldol Decanoate. Refusing clozapine-- olanzapine IM backup added for sleep/aggression/psychosis, but patient noted to be more tremulous so will d/c for now.    Patient requires inpatient admission due to very poor self-care and for containment, stabilization, medication management and aftercare planning.    Plan:  1. Brink: admitted involuntary , TOO obtained 10/24  2. Observation: routine checks  3. Collateral: obtained collateral from [facility] psychiatrist  4. Psychiatric  -Continue Haldol Decanoate 200mg IM q4 weeks, last given 10/25, next due   	-Had received Zyprexa Replevv 405mg qMonth (given )--though no observable benefit above Haldol Decanoate, so will defer at this time  -Continue clozapine 25mg QHS (refusing), d/c olanzapine IM backup order d/t tremulousness   -Continue depakote 1000mg QHS (refusing)  -Continue benztropine 2mg PRN for EPS  5. Medical:  -Anemia: s/p medical admission at [facility] for anemia to Hgb 6, required 2u pRBCs, most recent Hgb 8.8-9. GI workup unremarkable with exception of diverticulosis and colonic polyp  	-Repeat CBC with stable anemia (Hgb 9.2)  	-Continue ferrous sulfate 325mg daily (refusing)  -Bowel regimen: continue bisacodyl 10mg QHS (refusing), miralax 17g daily (refusing), senna 2 tablets QHS (refusing)  -Continue Vitamin D3 1000units QHS (refusing)  6. PRNs  -Olanzapine 5mg PO or IM for agitation  7. Therapy   -Individual, group and milieu therapy as appropriate   8. Disposition: state hearing 10/25, will begin application process Patient is a 53 year old adult (goes by "Juan Jose"), living at [facility] on [place] grounds, PMH of GERD, anemia, PPH of schizophrenia, selective mutism, currently with [facility] ACT team, previously with [facility] which , with history of multiple psychiatric hospitalizations, without known history of self-injury or suicide attempts, with history of aggression in the setting on nonadherence with medication, who was initially brought in by staff at residence for increasing agitation and aggression in the setting of 2 months of medication nonadherence. Patient was hospitalized from 8/3-23 on 1N where they were noted to be hostile, disorganized, threatening, malodorous and grossly psychotic. TOO was obtained, and patient eventually administered dual long-acting injectables (Haldol Decanoate and Zyprexa Relprevv), though had declined nearly all PO medications. Patient was transferred to [facility] where they were admitted due to anemia to 6 (medical workup largely unremarkable), patient stabilized and transferred back to [facility] for further management of psychosis.     Diagnostically, presentation consistent with exacerbation of schizophrenia.     Patient continues to be irritable, dismissive and unwilling to engage with staff. Continues to be malodorous with no observable ADLs. Shows no clinical improvement. Found today rambling incoherently, and then making more vaguely threatening statements before asking this writer to leave. Refusing clozapine-- olanzapine IM backup added for sleep/aggression/psychosis, but patient noted to be more tremulous so will defer for now.    Patient requires inpatient admission due to very poor self-care and for containment, stabilization, medication management and aftercare planning.    Plan:  1. Brink: admitted involuntary , TOO obtained 10/24  2. Observation: routine checks  3. Collateral: obtained collateral from [facility] psychiatrist  4. Psychiatric  -Continue Haldol Decanoate 200mg IM q4 weeks, last given 10/25, next due   	-Had received Zyprexa Replevv 405mg qMonth (given )--though no observable benefit above Haldol Decanoate, so will defer at this time  -Continue clozapine 25mg QHS (refusing), d/c'ed olanzapine IM backup order d/t tremulousness   -Continue depakote 1000mg QHS (refusing)  -Continue benztropine 2mg PRN for EPS  5. Medical:  -Anemia: s/p medical admission at [facility] for anemia to Hgb 6, required 2u pRBCs, most recent Hgb 8.8-9. GI workup unremarkable with exception of diverticulosis and colonic polyp  	-Repeat CBC with stable anemia (Hgb 9.2)  	-Continue ferrous sulfate 325mg daily (refusing)  -Bowel regimen: continue bisacodyl 10mg QHS (refusing), miralax 17g daily (refusing), senna 2 tablets QHS (refusing)  -Continue Vitamin D3 1000units QHS (refusing)  6. PRNs  -Olanzapine 5mg PO or IM for agitation  7. Therapy   -Individual, group and milieu therapy as appropriate   8. Disposition: state hearing 10/25, will begin application process

## 2023-10-31 NOTE — BH INPATIENT PSYCHIATRY PROGRESS NOTE - MSE UNSTRUCTURED FT
Appearance: sitting in hallway, malodorous  Behavior: uncooperative, mistrustful, no PMA/PMR noted  Speech: loud  Mood: unable to assess  Affect: irritable  Thought process: quite disorganized  Thought content: "I'm going to turn you off" "get away"  Perceptions: observed talking to self  Insight: very poor  Judgement: very poor, refusing vitals, refusing PO medications, very poor ADLs  Cognition: unable to assess Appearance: laying in bed, malodorous  Behavior: uncooperative, mistrustful, no PMA/PMR noted  Speech: loud  Mood: unable to assess  Affect: irritable  Thought process: severely disorganized  Thought content: "burn up" "GET OUT"  Perceptions: observed talking to self  Insight: very poor  Judgement: very poor, refusing vitals, refusing PO medications, very poor ADLs  Cognition: unable to assess

## 2023-11-01 PROCEDURE — 99231 SBSQ HOSP IP/OBS SF/LOW 25: CPT

## 2023-11-01 RX ADMIN — TUBERCULIN PURIFIED PROTEIN DERIVATIVE 5 UNIT(S): 5 INJECTION, SOLUTION INTRADERMAL at 15:47

## 2023-11-01 NOTE — BH INPATIENT PSYCHIATRY PROGRESS NOTE - NSBHATTESTBILLING_PSY_A_CORE
73038-Fysxofezhr OBS or IP - moderate complexity OR 35-49 mins 93704-Oevymjmxja OBS or IP - low complexity OR 25-34 mins

## 2023-11-01 NOTE — BH INPATIENT PSYCHIATRY PROGRESS NOTE - NSBHFUPINTERVALHXFT_PSY_A_CORE
Refusing vitals, refusing PO medications, no PRNs for agitation, no acute events overnight. Refusing vitals, refusing PO medications, no PRNs for agitation, no acute events overnight.    Patient seen laying in bed in room, which is intensely malodorous, with burger on chair. Patient muttering incoherently, but able to make out her saying "she who is blind, doesn't see." Later noted to smack a mental health worker.  Refusing vitals, refusing PO medications, no PRNs for agitation, no acute events overnight.    Patient seen laying in bed in room, which is intensely malodorous, with burger on chair. Patient muttering incoherently, but able to make out her saying "she who is blind, doesn't see." Later noted to posture towards a mental health worker.

## 2023-11-01 NOTE — BH INPATIENT PSYCHIATRY PROGRESS NOTE - MSE UNSTRUCTURED FT
Appearance: laying in bed, malodorous  Behavior: uncooperative, mistrustful, no PMA/PMR noted  Speech: loud  Mood: unable to assess  Affect: irritable  Thought process: severely disorganized  Thought content: "burn up" "GET OUT"  Perceptions: observed talking to self  Insight: very poor  Judgement: very poor, refusing vitals, refusing PO medications, very poor ADLs  Cognition: unable to assess Appearance: laying in bed, very malodorous  Behavior: uncooperative, mistrustful, no PMA/PMR noted  Speech: soft volume, muttering  Mood: unable to assess  Affect: irritable  Thought process: severely disorganized  Thought content: unable to assess, incomprehensible  Perceptions: observed talking to self  Insight: very poor  Judgement: very poor, refusing vitals, refusing PO medications, very poor ADLs  Cognition: unable to assess

## 2023-11-01 NOTE — BH INPATIENT PSYCHIATRY PROGRESS NOTE - NSBHASSESSSUMMFT_PSY_ALL_CORE
Patient is a 53 year old adult (goes by "Juan Jose"), living at [facility] on [place] grounds, PMH of GERD, anemia, PPH of schizophrenia, selective mutism, currently with [facility] ACT team, previously with [facility] which , with history of multiple psychiatric hospitalizations, without known history of self-injury or suicide attempts, with history of aggression in the setting on nonadherence with medication, who was initially brought in by staff at residence for increasing agitation and aggression in the setting of 2 months of medication nonadherence. Patient was hospitalized from 8/3-23 on 1N where they were noted to be hostile, disorganized, threatening, malodorous and grossly psychotic. TOO was obtained, and patient eventually administered dual long-acting injectables (Haldol Decanoate and Zyprexa Relprevv), though had declined nearly all PO medications. Patient was transferred to [facility] where they were admitted due to anemia to 6 (medical workup largely unremarkable), patient stabilized and transferred back to [facility] for further management of psychosis.     Diagnostically, presentation consistent with exacerbation of schizophrenia.     Patient continues to be irritable, dismissive and unwilling to engage with staff. Continues to be malodorous with no observable ADLs. Shows no clinical improvement. Found today rambling incoherently, and then making more vaguely threatening statements before asking this writer to leave. Refusing clozapine-- olanzapine IM backup added for sleep/aggression/psychosis, but patient noted to be more tremulous so will defer for now.    Patient requires inpatient admission due to very poor self-care and for containment, stabilization, medication management and aftercare planning.    Plan:  1. Brink: admitted involuntary , TOO obtained 10/24  2. Observation: routine checks  3. Collateral: obtained collateral from [facility] psychiatrist  4. Psychiatric  -Continue Haldol Decanoate 200mg IM q4 weeks, last given 10/25, next due   	-Had received Zyprexa Replevv 405mg qMonth (given )--though no observable benefit above Haldol Decanoate, so will defer at this time  -Continue clozapine 25mg QHS (refusing), d/c'ed olanzapine IM backup order d/t tremulousness   -Continue depakote 1000mg QHS (refusing)  -Continue benztropine 2mg PRN for EPS  5. Medical:  -Anemia: s/p medical admission at [facility] for anemia to Hgb 6, required 2u pRBCs, most recent Hgb 8.8-9. GI workup unremarkable with exception of diverticulosis and colonic polyp  	-Repeat CBC with stable anemia (Hgb 9.2)  	-Continue ferrous sulfate 325mg daily (refusing)  -Bowel regimen: continue bisacodyl 10mg QHS (refusing), miralax 17g daily (refusing), senna 2 tablets QHS (refusing)  -Continue Vitamin D3 1000units QHS (refusing)  6. PRNs  -Olanzapine 5mg PO or IM for agitation  7. Therapy   -Individual, group and milieu therapy as appropriate   8. Disposition: state hearing 10/25, will begin application process Patient is a 53 year old adult (goes by "Juan Jose"), living at [facility] on [place] grounds, PMH of GERD, anemia, PPH of schizophrenia, selective mutism, currently with [facility] ACT team, previously with [facility] which , with history of multiple psychiatric hospitalizations, without known history of self-injury or suicide attempts, with history of aggression in the setting on nonadherence with medication, who was initially brought in by staff at residence for increasing agitation and aggression in the setting of 2 months of medication nonadherence. Patient was hospitalized from 8/3-23 on 1N where they were noted to be hostile, disorganized, threatening, malodorous and grossly psychotic. TOO was obtained, and patient eventually administered dual long-acting injectables (Haldol Decanoate and Zyprexa Relprevv), though had declined nearly all PO medications. Patient was transferred to [facility] where they were admitted due to anemia to 6 (medical workup largely unremarkable), patient stabilized and transferred back to [facility] for further management of psychosis.     Diagnostically, presentation consistent with exacerbation of schizophrenia.     Patient continues to be irritable, dismissive and unwilling to engage with staff. Continues to be very malodorous with no observable ADLs. Shows no clinical improvement. Smacked staff member today. Continues to be rambling incoherently. Patient has had very poor sleep. Tried Zyprexa IM backup to clozapine, but EPS appeared to worsen so stopped, will hold off for now but may try to re-introduce something for sleep.    Patient requires inpatient admission due to very poor self-care and for containment, stabilization, medication management and aftercare planning.    Plan:  1. Brink: admitted involuntary , TOO obtained 10/24  2. Observation: routine checks  3. Collateral: obtained collateral from [facility] psychiatrist  4. Psychiatric  -Continue Haldol Decanoate 200mg IM q4 weeks, last given 10/25, next due   	-Had received Zyprexa Replevv 405mg qMonth (given )--though no observable benefit above Haldol Decanoate, so will defer at this time  -Continue clozapine 25mg QHS (refusing), d/c'ed olanzapine IM backup order d/t tremulousness   -Continue depakote 1000mg QHS (refusing)  -Continue benztropine 2mg PRN for EPS  5. Medical:  -Anemia: s/p medical admission at [facility] for anemia to Hgb 6, required 2u pRBCs, most recent Hgb 8.8-9. GI workup unremarkable with exception of diverticulosis and colonic polyp  	-Repeat CBC with stable anemia (Hgb 9.2)  	-Continue ferrous sulfate 325mg daily (refusing)  -Bowel regimen: continue bisacodyl 10mg QHS (refusing), miralax 17g daily (refusing), senna 2 tablets QHS (refusing)  -Continue Vitamin D3 1000units QHS (refusing)  6. PRNs  -Olanzapine 5mg PO or IM for agitation  7. Therapy   -Individual, group and milieu therapy as appropriate   8. Disposition: state hearing 10/25, will begin application process Patient is a 53 year old adult (goes by "Juan Jose"), living at [facility] on [place] grounds, PMH of GERD, anemia, PPH of schizophrenia, selective mutism, currently with [facility] ACT team, previously with [facility] which , with history of multiple psychiatric hospitalizations, without known history of self-injury or suicide attempts, with history of aggression in the setting on nonadherence with medication, who was initially brought in by staff at residence for increasing agitation and aggression in the setting of 2 months of medication nonadherence. Patient was hospitalized from 8/3-23 on 1N where they were noted to be hostile, disorganized, threatening, malodorous and grossly psychotic. TOO was obtained, and patient eventually administered dual long-acting injectables (Haldol Decanoate and Zyprexa Relprevv), though had declined nearly all PO medications. Patient was transferred to [facility] where they were admitted due to anemia to 6 (medical workup largely unremarkable), patient stabilized and transferred back to [facility] for further management of psychosis.     Diagnostically, presentation consistent with exacerbation of schizophrenia.     Patient continues to be irritable, dismissive and unwilling to engage with staff. Continues to be very malodorous with no observable ADLs. Shows no clinical improvement. Postured towards staff member today. Continues to be rambling incoherently. Patient has had very poor sleep. Tried Zyprexa IM backup to clozapine, but EPS appeared to worsen so stopped, will hold off for now but may try to re-introduce something for sleep.    Patient requires inpatient admission due to very poor self-care and for containment, stabilization, medication management and aftercare planning.    Plan:  1. Brink: admitted involuntary , TOO obtained 10/24  2. Observation: routine checks  3. Collateral: obtained collateral from [facility] psychiatrist  4. Psychiatric  -Continue Haldol Decanoate 200mg IM q4 weeks, last given 10/25, next due   	-Had received Zyprexa Replevv 405mg qMonth (given )--though no observable benefit above Haldol Decanoate, so will defer at this time  -Continue clozapine 25mg QHS (refusing), d/c'ed olanzapine IM backup order d/t tremulousness   -Continue depakote 1000mg QHS (refusing)  -Continue benztropine 2mg PRN for EPS  5. Medical:  -Anemia: s/p medical admission at [facility] for anemia to Hgb 6, required 2u pRBCs, most recent Hgb 8.8-9. GI workup unremarkable with exception of diverticulosis and colonic polyp  	-Repeat CBC with stable anemia (Hgb 9.2)  	-Continue ferrous sulfate 325mg daily (refusing)  -Bowel regimen: continue bisacodyl 10mg QHS (refusing), miralax 17g daily (refusing), senna 2 tablets QHS (refusing)  -Continue Vitamin D3 1000units QHS (refusing)  6. PRNs  -Olanzapine 5mg PO or IM for agitation  7. Therapy   -Individual, group and milieu therapy as appropriate   8. Disposition: state hearing 10/25, will begin application process

## 2023-11-02 PROCEDURE — 99231 SBSQ HOSP IP/OBS SF/LOW 25: CPT

## 2023-11-02 NOTE — BH INPATIENT PSYCHIATRY PROGRESS NOTE - MSE UNSTRUCTURED FT
Appearance: laying in bed, very malodorous  Behavior: uncooperative, mistrustful, no PMA/PMR noted  Speech: soft volume, muttering  Mood: unable to assess  Affect: irritable  Thought process: severely disorganized  Thought content: unable to assess, incomprehensible  Perceptions: observed talking to self  Insight: very poor  Judgement: very poor, refusing vitals, refusing PO medications, very poor ADLs  Cognition: unable to assess Appearance: laying in bed, very malodorous  Behavior: uncooperative, mistrustful, no PMA/PMR noted  Speech: no speech  Mood: unable to assess  Affect: neutral  Thought process: unable to assess  Thought content: unable to assess  Perceptions: observed talking to self  Insight: very poor  Judgement: very poor, refusing vitals, refusing PO medications, very poor ADLs  Cognition: unable to assess

## 2023-11-02 NOTE — BH INPATIENT PSYCHIATRY PROGRESS NOTE - NSBHFUPINTERVALHXFT_PSY_A_CORE
Refusing vitals, refusing PO medications, no PRNs for agitation, no acute events overnight.     Refusing vitals, refusing PO medications, no PRNs for agitation, no acute events overnight. Slept 4-5 hours overnight per log, sleeping during the day.    Patient found in room sleeping. Attempted to engage with patient and he did not respond.

## 2023-11-02 NOTE — BH INPATIENT PSYCHIATRY PROGRESS NOTE - NSBHASSESSSUMMFT_PSY_ALL_CORE
Patient is a 53 year old adult (goes by "Juan Jose"), living at [facility] on [place] grounds, PMH of GERD, anemia, PPH of schizophrenia, selective mutism, currently with [facility] ACT team, previously with [facility] which , with history of multiple psychiatric hospitalizations, without known history of self-injury or suicide attempts, with history of aggression in the setting on nonadherence with medication, who was initially brought in by staff at residence for increasing agitation and aggression in the setting of 2 months of medication nonadherence. Patient was hospitalized from 8/3-23 on 1N where they were noted to be hostile, disorganized, threatening, malodorous and grossly psychotic. TOO was obtained, and patient eventually administered dual long-acting injectables (Haldol Decanoate and Zyprexa Relprevv), though had declined nearly all PO medications. Patient was transferred to [facility] where they were admitted due to anemia to 6 (medical workup largely unremarkable), patient stabilized and transferred back to [facility] for further management of psychosis.     Diagnostically, presentation consistent with exacerbation of schizophrenia.     Patient continues to be irritable, dismissive and unwilling to engage with staff. Continues to be very malodorous with no observable ADLs. Shows no clinical improvement. Postured towards staff member today. Continues to be rambling incoherently. Patient has had very poor sleep. Tried Zyprexa IM backup to clozapine, but EPS appeared to worsen so stopped, will hold off for now but may try to re-introduce something for sleep.    Patient requires inpatient admission due to very poor self-care and for containment, stabilization, medication management and aftercare planning.    Plan:  1. Brink: admitted involuntary , TOO obtained 10/24  2. Observation: routine checks  3. Collateral: obtained collateral from [facility] psychiatrist  4. Psychiatric  -Continue Haldol Decanoate 200mg IM q4 weeks, last given 10/25, next due   	-Had received Zyprexa Replevv 405mg qMonth (given )--though no observable benefit above Haldol Decanoate, so will defer at this time  -Continue clozapine 25mg QHS (refusing), d/c'ed olanzapine IM backup order d/t tremulousness   -Continue depakote 1000mg QHS (refusing)  -Continue benztropine 2mg PRN for EPS  5. Medical:  -Anemia: s/p medical admission at [facility] for anemia to Hgb 6, required 2u pRBCs, most recent Hgb 8.8-9. GI workup unremarkable with exception of diverticulosis and colonic polyp  	-Repeat CBC with stable anemia (Hgb 9.2)  	-Continue ferrous sulfate 325mg daily (refusing)  -Bowel regimen: continue bisacodyl 10mg QHS (refusing), miralax 17g daily (refusing), senna 2 tablets QHS (refusing)  -Continue Vitamin D3 1000units QHS (refusing)  6. PRNs  -Olanzapine 5mg PO or IM for agitation  7. Therapy   -Individual, group and milieu therapy as appropriate   8. Disposition: state hearing 10/25, will begin application process Patient is a 53 year old adult (goes by "Juan Jose"), living at [facility] on [place] grounds, PMH of GERD, anemia, PPH of schizophrenia, selective mutism, currently with [facility] ACT team, previously with [facility] which , with history of multiple psychiatric hospitalizations, without known history of self-injury or suicide attempts, with history of aggression in the setting on nonadherence with medication, who was initially brought in by staff at residence for increasing agitation and aggression in the setting of 2 months of medication nonadherence. Patient was hospitalized from 8/3-23 on 1N where they were noted to be hostile, disorganized, threatening, malodorous and grossly psychotic. TOO was obtained, and patient eventually administered dual long-acting injectables (Haldol Decanoate and Zyprexa Relprevv), though had declined nearly all PO medications. Patient was transferred to [facility] where they were admitted due to anemia to 6 (medical workup largely unremarkable), patient stabilized and transferred back to [facility] for further management of psychosis.     Diagnostically, presentation consistent with exacerbation of schizophrenia.     Patient continues to be irritable, dismissive and unwilling to engage with staff. Continues to be very malodorous with no observable ADLs. Shows no clinical improvement. Postured towards staff member yesterday. Today, found sleeping and unwilling to engage verbally. Will continue to monitor sleep and EPS closely, if needed, may require IM medications for side effects.     Patient requires inpatient admission due to very poor self-care and for containment, stabilization, medication management and aftercare planning.    Plan:  1. Brink: admitted involuntary , TOO obtained 10/24  2. Observation: routine checks  3. Collateral: obtained collateral from [facility] psychiatrist  4. Psychiatric  -Continue Haldol Decanoate 200mg IM q4 weeks, last given 10/25, next due   	-Had received Zyprexa Replevv 405mg qMonth (given )--though no observable benefit above Haldol Decanoate, so will defer at this time  -Continue clozapine 25mg QHS (refusing), d/c'ed olanzapine IM backup order d/t tremulousness   -Continue depakote 1000mg QHS (refusing)  -Continue benztropine 2mg PRN for EPS (not taking)  5. Medical:  -Anemia: s/p medical admission at [facility] for anemia to Hgb 6, required 2u pRBCs, most recent Hgb 8.8-9. GI workup unremarkable with exception of diverticulosis and colonic polyp  	-Repeat CBC with stable anemia (Hgb 9.2)  	-Continue ferrous sulfate 325mg daily (refusing)  -Bowel regimen: continue bisacodyl 10mg QHS (refusing), miralax 17g daily (refusing), senna 2 tablets QHS (refusing)  -Continue Vitamin D3 1000units QHS (refusing)  6. PRNs  -Olanzapine 5mg PO or IM for agitation  7. Therapy   -Individual, group and milieu therapy as appropriate   8. Disposition: state hearing 10/25, working on application

## 2023-11-03 PROCEDURE — 99231 SBSQ HOSP IP/OBS SF/LOW 25: CPT

## 2023-11-03 RX ORDER — BENZTROPINE MESYLATE 1 MG
1 TABLET ORAL
Refills: 0 | Status: DISCONTINUED | OUTPATIENT
Start: 2023-11-03 | End: 2024-01-17

## 2023-11-03 NOTE — BH INPATIENT PSYCHIATRY PROGRESS NOTE - NSBHFUPINTERVALHXFT_PSY_A_CORE
Refusing vitals, refusing PO medications, no PRNs for agitation, no acute events overnight.          Refusing vitals, refusing PO medications, no PRNs for agitation, no acute events overnight.     Patient found in bedroom. Patient is talking incoherently to self. Noted to have upper extremity tremor and facial twitching. Patient totally non-responsive to questions by this psychiatrist.        Refusing vitals, refusing PO medications, no PRNs for agitation, no acute events overnight. Slept 4am onwards.    Patient found in bedroom. Patient is talking incoherently to self. Noted to have upper extremity tremor and facial twitching. Patient totally non-responsive to questions by this psychiatrist.

## 2023-11-03 NOTE — BH INPATIENT PSYCHIATRY PROGRESS NOTE - NSBHASSESSSUMMFT_PSY_ALL_CORE
Patient is a 53 year old adult (goes by "Juan Jose"), living at [facility] on [place] grounds, PMH of GERD, anemia, PPH of schizophrenia, selective mutism, currently with [facility] ACT team, previously with [facility] which , with history of multiple psychiatric hospitalizations, without known history of self-injury or suicide attempts, with history of aggression in the setting on nonadherence with medication, who was initially brought in by staff at residence for increasing agitation and aggression in the setting of 2 months of medication nonadherence. Patient was hospitalized from 8/3-23 on 1N where they were noted to be hostile, disorganized, threatening, malodorous and grossly psychotic. TOO was obtained, and patient eventually administered dual long-acting injectables (Haldol Decanoate and Zyprexa Relprevv), though had declined nearly all PO medications. Patient was transferred to [facility] where they were admitted due to anemia to 6 (medical workup largely unremarkable), patient stabilized and transferred back to [facility] for further management of psychosis.     Diagnostically, presentation consistent with exacerbation of schizophrenia.     Patient continues to be irritable, dismissive and unwilling to engage with staff. Continues to be very malodorous with no observable ADLs. Shows no clinical improvement. Postured towards staff member yesterday. Today, found sleeping and unwilling to engage verbally. Will continue to monitor sleep and EPS closely, if needed, may require IM medications for side effects.     Patient requires inpatient admission due to very poor self-care and for containment, stabilization, medication management and aftercare planning.    Plan:  1. Brink: admitted involuntary , TOO obtained 10/24  2. Observation: routine checks  3. Collateral: obtained collateral from [facility] psychiatrist  4. Psychiatric  -Continue Haldol Decanoate 200mg IM q4 weeks, last given 10/25, next due   	-Had received Zyprexa Replevv 405mg qMonth (given )--though no observable benefit above Haldol Decanoate, so will defer at this time  -Continue clozapine 25mg QHS (refusing), d/c'ed olanzapine IM backup order d/t tremulousness   -Continue depakote 1000mg QHS (refusing)  -Continue benztropine 2mg PRN for EPS (not taking)  5. Medical:  -Anemia: s/p medical admission at [facility] for anemia to Hgb 6, required 2u pRBCs, most recent Hgb 8.8-9. GI workup unremarkable with exception of diverticulosis and colonic polyp  	-Repeat CBC with stable anemia (Hgb 9.2)  	-Continue ferrous sulfate 325mg daily (refusing)  -Bowel regimen: continue bisacodyl 10mg QHS (refusing), miralax 17g daily (refusing), senna 2 tablets QHS (refusing)  -Continue Vitamin D3 1000units QHS (refusing)  6. PRNs  -Olanzapine 5mg PO or IM for agitation  7. Therapy   -Individual, group and milieu therapy as appropriate   8. Disposition: state hearing 10/25, working on application Patient is a 53 year old adult (goes by "Jua nJose"), living at [facility] on [place] grounds, PMH of GERD, anemia, PPH of schizophrenia, selective mutism, currently with [facility] ACT team, previously with [facility] which , with history of multiple psychiatric hospitalizations, without known history of self-injury or suicide attempts, with history of aggression in the setting on nonadherence with medication, who was initially brought in by staff at residence for increasing agitation and aggression in the setting of 2 months of medication nonadherence. Patient was hospitalized from 8/3-23 on 1N where they were noted to be hostile, disorganized, threatening, malodorous and grossly psychotic. TOO was obtained, and patient eventually administered dual long-acting injectables (Haldol Decanoate and Zyprexa Relprevv), though had declined nearly all PO medications. Patient was transferred to [facility] where they were admitted due to anemia to 6 (medical workup largely unremarkable), patient stabilized and transferred back to [facility] for further management of psychosis.     Diagnostically, presentation consistent with exacerbation of schizophrenia.     Patient continues to be irritable, dismissive and unwilling to engage with staff. Continues to be very malodorous with no observable ADLs. Shows no clinical improvement. Patient has been noted to have facial twitching/upper extremity tremor concerning from EPS. Unable to engage in any conversation around symptoms. As patient is completely unwilling to take PO and does not express distress from likely parkinsonism, will continue to monitor but defer using IM anti-EPS medications at this time, as this would likely mean daily IM medications.    Patient requires inpatient admission due to very poor self-care and for containment, stabilization, medication management and aftercare planning.    Plan:  1. Brink: admitted involuntary , TOO obtained 10/24  2. Observation: routine checks  3. Collateral: obtained collateral from [facility] psychiatrist  4. Psychiatric  -Continue Haldol Decanoate 200mg IM q4 weeks, last given 10/25, next due   	-Had received Zyprexa Replevv 405mg qMonth (given )--though no observable benefit above Haldol Decanoate, so will defer at this time  -Continue clozapine 25mg QHS (refusing), d/c'ed olanzapine IM backup order d/t tremulousness   -Continue depakote 1000mg QHS (refusing)  -Continue benztropine 2mg for EPS (not taking)  5. Medical:  -Anemia: s/p medical admission at [facility] for anemia to Hgb 6, required 2u pRBCs, most recent Hgb 8.8-9. GI workup unremarkable with exception of diverticulosis and colonic polyp  	-Repeat CBC with stable anemia (Hgb 9.2)  	-Continue ferrous sulfate 325mg daily (refusing)  -Bowel regimen: continue bisacodyl 10mg QHS (refusing), miralax 17g daily (refusing), senna 2 tablets QHS (refusing)  -Continue Vitamin D3 1000units QHS (refusing)  6. PRNs  -Olanzapine 5mg PO or IM for agitation  7. Therapy   -Individual, group and milieu therapy as appropriate   8. Disposition: state hearing 10/25, working on application Patient is a 53 year old adult (goes by "Juan Jose"), living at [facility] on [place] grounds, PMH of GERD, anemia, PPH of schizophrenia, selective mutism, currently with [facility] ACT team, previously with [facility] which , with history of multiple psychiatric hospitalizations, without known history of self-injury or suicide attempts, with history of aggression in the setting on nonadherence with medication, who was initially brought in by staff at residence for increasing agitation and aggression in the setting of 2 months of medication nonadherence. Patient was hospitalized from 8/3-23 on 1N where they were noted to be hostile, disorganized, threatening, malodorous and grossly psychotic. TOO was obtained, and patient eventually administered dual long-acting injectables (Haldol Decanoate and Zyprexa Relprevv), though had declined nearly all PO medications. Patient was transferred to [facility] where they were admitted due to anemia to 6 (medical workup largely unremarkable), patient stabilized and transferred back to [facility] for further management of psychosis.     Diagnostically, presentation consistent with exacerbation of schizophrenia.     Patient continues to be irritable, dismissive and unwilling to engage with staff. Continues to be very malodorous with no observable ADLs. Shows no clinical improvement. Patient has been noted to have facial twitching/upper extremity tremor concerning from EPS. Unable to engage in any conversation around symptoms. As patient is completely unwilling to take PO and does not express distress from likely parkinsonism, will continue to monitor but defer using IM anti-EPS medications at this time, as this would likely mean daily IM medications.    Patient requires inpatient admission due to very poor self-care and for containment, stabilization, medication management and aftercare planning.    Plan:  1. Brink: admitted involuntary , TOO obtained 10/24  2. Observation: routine checks  3. Collateral: obtained collateral from [facility] psychiatrist  4. Psychiatric  -Continue Haldol Decanoate 200mg IM q4 weeks, last given 10/25, next due   	-Had received Zyprexa Replevv 405mg qMonth (given )--though no observable benefit above Haldol Decanoate, so will defer at this time  -Continue clozapine 25mg QHS (refusing), d/c'ed olanzapine IM backup order d/t tremulousness   -Continue depakote 1000mg QHS (refusing)  -Continue benztropine 1mg BID for EPS   5. Medical:  -Anemia: s/p medical admission at [facility] for anemia to Hgb 6, required 2u pRBCs, most recent Hgb 8.8-9. GI workup unremarkable with exception of diverticulosis and colonic polyp  	-Repeat CBC with stable anemia (Hgb 9.2)  	-Continue ferrous sulfate 325mg daily (refusing)  -Bowel regimen: continue bisacodyl 10mg QHS (refusing), miralax 17g daily (refusing), senna 2 tablets QHS (refusing)  -Continue Vitamin D3 1000units QHS (refusing)  6. PRNs  -Olanzapine 5mg PO or IM for agitation  7. Therapy   -Individual, group and milieu therapy as appropriate   8. Disposition: state hearing 10/25, working on application

## 2023-11-03 NOTE — BH INPATIENT PSYCHIATRY PROGRESS NOTE - MSE UNSTRUCTURED FT
Appearance: laying in bed, very malodorous  Behavior: uncooperative, mistrustful, no PMA/PMR noted  Speech: no speech  Mood: unable to assess  Affect: neutral  Thought process: unable to assess  Thought content: unable to assess  Perceptions: observed talking to self  Insight: very poor  Judgement: very poor, refusing vitals, refusing PO medications, very poor ADLs  Cognition: unable to assess Appearance: laying in bed, very malodorous  Behavior: uncooperative, mistrustful, no PMA/PMR noted  Speech: muttering incoherently to self  Mood: unable to assess  Affect: neutral  Thought process: severely disorganized  Thought content: incoherent speech  Perceptions: observed talking to self  Insight: very poor  Judgement: very poor, refusing vitals, refusing PO medications, very poor ADLs  Cognition: unable to assess

## 2023-11-03 NOTE — BH INPATIENT PSYCHIATRY PROGRESS NOTE - PRN MEDS
MEDICATIONS  (PRN):  acetaminophen     Tablet .. 650 milliGRAM(s) Oral every 4 hours PRN Mild Pain (1 - 3), Moderate Pain (4 - 6)  benztropine 2 milliGRAM(s) Oral at bedtime PRN EPS ppx  OLANZapine Disintegrating Tablet 5 milliGRAM(s) Oral every 4 hours PRN agitation  OLANZapine Injectable 5 milliGRAM(s) IntraMuscular once PRN Agitation   MEDICATIONS  (PRN):  acetaminophen     Tablet .. 650 milliGRAM(s) Oral every 4 hours PRN Mild Pain (1 - 3), Moderate Pain (4 - 6)  OLANZapine Disintegrating Tablet 5 milliGRAM(s) Oral every 4 hours PRN agitation  OLANZapine Injectable 5 milliGRAM(s) IntraMuscular once PRN Agitation

## 2023-11-03 NOTE — BH PATIENT CHARACTERISTICS SURVEY - NSPCSLIVINGSITUA_PSY_ALL_CORE
[facility], LICENSED programs, community residence (child or adult), crisis residence, family based treatment, family care, teaching family home, apartment treatment, congregate treatment, apartment support, congregate support, community residence – [facility]O

## 2023-11-03 NOTE — BH INPATIENT PSYCHIATRY PROGRESS NOTE - CURRENT MEDICATION
MEDICATIONS  (STANDING):  bisacodyl 10 milliGRAM(s) Oral at bedtime  cholecalciferol 1000 Unit(s) Oral at bedtime  cloZAPine 25 milliGRAM(s) Oral at bedtime  ferrous    sulfate 325 milliGRAM(s) Oral daily  polyethylene glycol 3350 17 Gram(s) Oral two times a day  senna 2 Tablet(s) Oral at bedtime  valproic  acid Syrup 1000 milliGRAM(s) Oral at bedtime    MEDICATIONS  (PRN):  acetaminophen     Tablet .. 650 milliGRAM(s) Oral every 4 hours PRN Mild Pain (1 - 3), Moderate Pain (4 - 6)  benztropine 2 milliGRAM(s) Oral at bedtime PRN EPS ppx  OLANZapine Disintegrating Tablet 5 milliGRAM(s) Oral every 4 hours PRN agitation  OLANZapine Injectable 5 milliGRAM(s) IntraMuscular once PRN Agitation   MEDICATIONS  (STANDING):  benztropine 1 milliGRAM(s) Oral two times a day  bisacodyl 10 milliGRAM(s) Oral at bedtime  cholecalciferol 1000 Unit(s) Oral at bedtime  cloZAPine 25 milliGRAM(s) Oral at bedtime  ferrous    sulfate 325 milliGRAM(s) Oral daily  polyethylene glycol 3350 17 Gram(s) Oral two times a day  senna 2 Tablet(s) Oral at bedtime  valproic  acid Syrup 1000 milliGRAM(s) Oral at bedtime    MEDICATIONS  (PRN):  acetaminophen     Tablet .. 650 milliGRAM(s) Oral every 4 hours PRN Mild Pain (1 - 3), Moderate Pain (4 - 6)  OLANZapine Disintegrating Tablet 5 milliGRAM(s) Oral every 4 hours PRN agitation  OLANZapine Injectable 5 milliGRAM(s) IntraMuscular once PRN Agitation

## 2023-11-03 NOTE — BH INPATIENT PSYCHIATRY PROGRESS NOTE - NSBHCHARTREVIEWVS_PSY_A_CORE FT
Vital Signs Last 24 Hrs  T(C): --  T(F): --  HR: --  BP: --  BP(mean): --  RR: --  SpO2: --     Vital Signs Last 24 Hrs  T(C): 36.6 (11-03-23 @ 08:56), Max: 36.6 (11-03-23 @ 08:56)  T(F): 97.9 (11-03-23 @ 08:56), Max: 97.9 (11-03-23 @ 08:56)  HR: --  BP: --  BP(mean): --  RR: --  SpO2: --    Orthostatic VS  11-03-23 @ 08:56  Lying BP: --/-- HR: --  Sitting BP: 108/66 HR: 63  Standing BP: 108/68 HR: 74  Site: --  Mode: --

## 2023-11-06 PROCEDURE — 99231 SBSQ HOSP IP/OBS SF/LOW 25: CPT

## 2023-11-06 NOTE — BH INPATIENT PSYCHIATRY PROGRESS NOTE - CURRENT MEDICATION
MEDICATIONS  (STANDING):  benztropine 1 milliGRAM(s) Oral two times a day  bisacodyl 10 milliGRAM(s) Oral at bedtime  cholecalciferol 1000 Unit(s) Oral at bedtime  cloZAPine 25 milliGRAM(s) Oral at bedtime  ferrous    sulfate 325 milliGRAM(s) Oral daily  polyethylene glycol 3350 17 Gram(s) Oral two times a day  senna 2 Tablet(s) Oral at bedtime  valproic  acid Syrup 1000 milliGRAM(s) Oral at bedtime    MEDICATIONS  (PRN):  acetaminophen     Tablet .. 650 milliGRAM(s) Oral every 4 hours PRN Mild Pain (1 - 3), Moderate Pain (4 - 6)  OLANZapine Disintegrating Tablet 5 milliGRAM(s) Oral every 4 hours PRN agitation  OLANZapine Injectable 5 milliGRAM(s) IntraMuscular once PRN Agitation

## 2023-11-06 NOTE — BH INPATIENT PSYCHIATRY PROGRESS NOTE - NSBHFUPINTERVALHXFT_PSY_A_CORE
Refusing vitals, refusing PO medications, no PRNs for agitation, no acute events over weekend. Refusing vitals over the weekend but obtained today with team support and VSS, refusing PO medications, no PRNs for agitation, no acute events over weekend. Slept 6 hours on Friday, and 2 hours each on Saturday and Sunday.    Patient seen earlier on unit, eating breakfast and muttering to himself. Attempted to see later on intentional rounding, but patient sleeping and did not awaken due to prior lack of engagement in interview and poor sleep.

## 2023-11-06 NOTE — BH INPATIENT PSYCHIATRY PROGRESS NOTE - MSE UNSTRUCTURED FT
Appearance: laying in bed, very malodorous  Behavior: uncooperative, mistrustful, no PMA/PMR noted  Speech: muttering incoherently to self  Mood: unable to assess  Affect: neutral  Thought process: severely disorganized  Thought content: incoherent speech  Perceptions: observed talking to self  Insight: very poor  Judgement: very poor, refusing vitals, refusing PO medications, very poor ADLs  Cognition: unable to assess Appearance: laying in bed asleep, very malodorous  Behavior: uncooperative, mistrustful, no PMA/PMR noted  Speech: muttering incoherently to self earlier in the day  Mood: unable to assess  Affect: neutral  Thought process: severely disorganized  Thought content: incoherent speech  Perceptions: observed talking to self  Insight: very poor  Judgement: very poor, refusing vitals, refusing PO medications, very poor ADLs  Cognition: unable to assess

## 2023-11-06 NOTE — BH INPATIENT PSYCHIATRY PROGRESS NOTE - PRN MEDS
MEDICATIONS  (PRN):  acetaminophen     Tablet .. 650 milliGRAM(s) Oral every 4 hours PRN Mild Pain (1 - 3), Moderate Pain (4 - 6)  OLANZapine Disintegrating Tablet 5 milliGRAM(s) Oral every 4 hours PRN agitation  OLANZapine Injectable 5 milliGRAM(s) IntraMuscular once PRN Agitation

## 2023-11-06 NOTE — BH INPATIENT PSYCHIATRY PROGRESS NOTE - NSBHCHARTREVIEWVS_PSY_A_CORE FT
Vital Signs Last 24 Hrs  T(C): --  T(F): --  HR: --  BP: --  BP(mean): --  RR: --  SpO2: --     Vital Signs Last 24 Hrs  T(C): 36.3 (11-06-23 @ 12:30), Max: 36.3 (11-06-23 @ 12:30)  T(F): 97.4 (11-06-23 @ 12:30), Max: 97.4 (11-06-23 @ 12:30)  HR: 92 (11-06-23 @ 12:30) (92 - 92)  BP: 115/69 (11-06-23 @ 12:30) (115/69 - 115/69)  BP(mean): --  RR: --  SpO2: --

## 2023-11-06 NOTE — BH INPATIENT PSYCHIATRY PROGRESS NOTE - NSBHMETABOLIC_PSY_ALL_CORE_FT
BMI: BMI (kg/m2): 35.2 (10-12-23 @ 17:39)  HbA1c: A1C with Estimated Average Glucose Result: 5.3 % (09-29-23 @ 10:00)    Glucose: POCT Blood Glucose.: 100 mg/dL (12-19-22 @ 15:06)    BP: --  Lipid Panel: Date/Time: 09-29-23 @ 10:00  Cholesterol, Serum: 166  Direct LDL: --  HDL Cholesterol, Serum: 60  Total Cholesterol/HDL Ration Measurement: --  Triglycerides, Serum: 61   BMI: BMI (kg/m2): 35.2 (10-12-23 @ 17:39)  HbA1c: A1C with Estimated Average Glucose Result: 5.3 % (09-29-23 @ 10:00)    Glucose: POCT Blood Glucose.: 100 mg/dL (12-19-22 @ 15:06)    BP: 115/69 (11-06-23 @ 12:30) (115/69 - 115/69)  Lipid Panel: Date/Time: 09-29-23 @ 10:00  Cholesterol, Serum: 166  Direct LDL: --  HDL Cholesterol, Serum: 60  Total Cholesterol/HDL Ration Measurement: --  Triglycerides, Serum: 61

## 2023-11-06 NOTE — BH INPATIENT PSYCHIATRY PROGRESS NOTE - NSBHASSESSSUMMFT_PSY_ALL_CORE
Patient is a 53 year old adult (goes by "Juan Jose"), living at [facility] on [place] grounds, PMH of GERD, anemia, PPH of schizophrenia, selective mutism, currently with [facility] ACT team, previously with [facility] which , with history of multiple psychiatric hospitalizations, without known history of self-injury or suicide attempts, with history of aggression in the setting on nonadherence with medication, who was initially brought in by staff at residence for increasing agitation and aggression in the setting of 2 months of medication nonadherence. Patient was hospitalized from 8/3-23 on 1N where they were noted to be hostile, disorganized, threatening, malodorous and grossly psychotic. TOO was obtained, and patient eventually administered dual long-acting injectables (Haldol Decanoate and Zyprexa Relprevv), though had declined nearly all PO medications. Patient was transferred to [facility] where they were admitted due to anemia to 6 (medical workup largely unremarkable), patient stabilized and transferred back to [facility] for further management of psychosis.     Diagnostically, presentation consistent with exacerbation of schizophrenia.     Patient continues to be irritable, dismissive and unwilling to engage with staff. Continues to be very malodorous with no observable ADLs. Shows no clinical improvement. Patient has been noted to have facial twitching/upper extremity tremor concerning from EPS. Unable to engage in any conversation around symptoms. As patient is completely unwilling to take PO and does not express distress from likely parkinsonism, will continue to monitor but defer using IM anti-EPS medications at this time, as this would likely mean daily IM medications.    Patient requires inpatient admission due to very poor self-care and for containment, stabilization, medication management and aftercare planning.    Plan:  1. Brink: admitted involuntary , TOO obtained 10/24  2. Observation: routine checks  3. Collateral: obtained collateral from [facility] psychiatrist  4. Psychiatric  -Continue Haldol Decanoate 200mg IM q4 weeks, last given 10/25, next due   	-Had received Zyprexa Replevv 405mg qMonth (given )--though no observable benefit above Haldol Decanoate, so will defer at this time  -Continue clozapine 25mg QHS (refusing), d/c'ed olanzapine IM backup order d/t tremulousness   -Continue depakote 1000mg QHS (refusing)  -Continue benztropine 1mg BID for EPS   5. Medical:  -Anemia: s/p medical admission at [facility] for anemia to Hgb 6, required 2u pRBCs, most recent Hgb 8.8-9. GI workup unremarkable with exception of diverticulosis and colonic polyp  	-Repeat CBC with stable anemia (Hgb 9.2)  	-Continue ferrous sulfate 325mg daily (refusing)  -Bowel regimen: continue bisacodyl 10mg QHS (refusing), miralax 17g daily (refusing), senna 2 tablets QHS (refusing)  -Continue Vitamin D3 1000units QHS (refusing)  6. PRNs  -Olanzapine 5mg PO or IM for agitation  7. Therapy   -Individual, group and milieu therapy as appropriate   8. Disposition: state hearing 10/25, working on application Patient is a 53 year old adult (goes by "Juan Jose"), living at [facility] on [place] grounds, PMH of GERD, anemia, PPH of schizophrenia, selective mutism, currently with [facility] ACT team, previously with [facility] which , with history of multiple psychiatric hospitalizations, without known history of self-injury or suicide attempts, with history of aggression in the setting on nonadherence with medication, who was initially brought in by staff at residence for increasing agitation and aggression in the setting of 2 months of medication nonadherence. Patient was hospitalized from 8/3-23 on  where they were noted to be hostile, disorganized, threatening, malodorous and grossly psychotic. TOO was obtained, and patient eventually administered dual long-acting injectables (Haldol Decanoate and Zyprexa Relprevv), though had declined nearly all PO medications. Patient was transferred to [facility] where they were admitted due to anemia to 6 (medical workup largely unremarkable), patient stabilized and transferred back to [facility] for further management of psychosis.     Diagnostically, presentation consistent with exacerbation of schizophrenia.     Patient continues to be irritable, dismissive and unwilling to engage with staff. Continues to be very malodorous with no observable ADLs. Shows no clinical improvement. Sleep remains erratic though not entirely absent, and appears to sleep at times during the day. State application being finalized for submission.    Patient requires inpatient admission due to very poor self-care and for containment, stabilization, medication management and aftercare planning.    Plan:  1. Brink: admitted involuntary , TOO obtained 10/24  2. Observation: routine checks  3. Collateral: obtained collateral from [facility] psychiatrist  4. Psychiatric  -Continue Haldol Decanoate 200mg IM q4 weeks, last given 10/25, next due   	-Had received Zyprexa Replevv 405mg qMonth (given )--though no observable benefit above Haldol Decanoate, so will defer at this time  -Continue clozapine 25mg QHS (refusing), d/c'ed olanzapine IM backup order d/t tremulousness   -Continue depakote 1000mg QHS (refusing)  -Continue benztropine 1mg BID for EPS   5. Medical:  -Anemia: s/p medical admission at [facility] for anemia to Hgb 6, required 2u pRBCs, most recent Hgb 8.8-9. GI workup unremarkable with exception of diverticulosis and colonic polyp  	-Repeat CBC with stable anemia (Hgb 9.2)  	-Continue ferrous sulfate 325mg daily (refusing)  -Bowel regimen: continue bisacodyl 10mg QHS (refusing), miralax 17g daily (refusing), senna 2 tablets QHS (refusing)  -Continue Vitamin D3 1000units QHS (refusing)  6. PRNs  -Olanzapine 5mg PO or IM for agitation  7. Therapy   -Individual, group and milieu therapy as appropriate   8. Disposition: state hearing 10/25, working on application

## 2023-11-07 PROCEDURE — 99231 SBSQ HOSP IP/OBS SF/LOW 25: CPT

## 2023-11-07 NOTE — BH INPATIENT PSYCHIATRY PROGRESS NOTE - MSE UNSTRUCTURED FT
Appearance: laying in bed asleep, very malodorous  Behavior: uncooperative, mistrustful, no PMA/PMR noted  Speech: muttering incoherently to self earlier in the day  Mood: unable to assess  Affect: neutral  Thought process: severely disorganized  Thought content: incoherent speech  Perceptions: observed talking to self  Insight: very poor  Judgement: very poor, refusing vitals, refusing PO medications, very poor ADLs  Cognition: unable to assess Appearance: sitting up in bed, very malodorous  Behavior: uncooperative, mistrustful appearing, no PMA/PMR noted  Speech: muttering to self and largely incoherent  Mood: unable to assess  Affect: neutral  Thought process: severely disorganized, can only make out brief statements that have no clear meaning  Thought content: incoherent speech  Perceptions: observed talking to self  Insight: very poor  Judgement: very poor, refusing vitals, refusing PO medications, very poor ADLs  Cognition: unable to assess

## 2023-11-07 NOTE — BH INPATIENT PSYCHIATRY PROGRESS NOTE - NSBHCHARTREVIEWVS_PSY_A_CORE FT
Vital Signs Last 24 Hrs  T(C): 36.3 (11-06-23 @ 12:30), Max: 36.3 (11-06-23 @ 12:30)  T(F): 97.4 (11-06-23 @ 12:30), Max: 97.4 (11-06-23 @ 12:30)  HR: 92 (11-06-23 @ 12:30) (92 - 92)  BP: 115/69 (11-06-23 @ 12:30) (115/69 - 115/69)  BP(mean): --  RR: --  SpO2: --     Vital Signs Last 24 Hrs  T(C): --  T(F): --  HR: --  BP: --  BP(mean): --  RR: --  SpO2: --

## 2023-11-07 NOTE — BH INPATIENT PSYCHIATRY PROGRESS NOTE - NSBHMETABOLIC_PSY_ALL_CORE_FT
BMI: BMI (kg/m2): 35.2 (10-12-23 @ 17:39)  HbA1c: A1C with Estimated Average Glucose Result: 5.3 % (09-29-23 @ 10:00)    Glucose: POCT Blood Glucose.: 100 mg/dL (12-19-22 @ 15:06)    BP: 115/69 (11-06-23 @ 12:30) (115/69 - 115/69)  Lipid Panel: Date/Time: 09-29-23 @ 10:00  Cholesterol, Serum: 166  Direct LDL: --  HDL Cholesterol, Serum: 60  Total Cholesterol/HDL Ration Measurement: --  Triglycerides, Serum: 61

## 2023-11-07 NOTE — BH INPATIENT PSYCHIATRY PROGRESS NOTE - NSBHASSESSSUMMFT_PSY_ALL_CORE
Patient is a 53 year old adult (goes by "Juan Jose"), living at [facility] on [place] grounds, PMH of GERD, anemia, PPH of schizophrenia, selective mutism, currently with [facility] ACT team, previously with [facility] which , with history of multiple psychiatric hospitalizations, without known history of self-injury or suicide attempts, with history of aggression in the setting on nonadherence with medication, who was initially brought in by staff at residence for increasing agitation and aggression in the setting of 2 months of medication nonadherence. Patient was hospitalized from 8/3-23 on  where they were noted to be hostile, disorganized, threatening, malodorous and grossly psychotic. TOO was obtained, and patient eventually administered dual long-acting injectables (Haldol Decanoate and Zyprexa Relprevv), though had declined nearly all PO medications. Patient was transferred to [facility] where they were admitted due to anemia to 6 (medical workup largely unremarkable), patient stabilized and transferred back to [facility] for further management of psychosis.     Diagnostically, presentation consistent with exacerbation of schizophrenia.     Patient continues to be irritable, dismissive and unwilling to engage with staff. Continues to be very malodorous with no observable ADLs. Shows no clinical improvement. Sleep remains erratic though not entirely absent, and appears to sleep at times during the day. State application being finalized for submission.    Patient requires inpatient admission due to very poor self-care and for containment, stabilization, medication management and aftercare planning.    Plan:  1. Brink: admitted involuntary , TOO obtained 10/24  2. Observation: routine checks  3. Collateral: obtained collateral from [facility] psychiatrist  4. Psychiatric  -Continue Haldol Decanoate 200mg IM q4 weeks, last given 10/25, next due   	-Had received Zyprexa Replevv 405mg qMonth (given )--though no observable benefit above Haldol Decanoate, so will defer at this time  -Continue clozapine 25mg QHS (refusing), d/c'ed olanzapine IM backup order d/t tremulousness   -Continue depakote 1000mg QHS (refusing)  -Continue benztropine 1mg BID for EPS   5. Medical:  -Anemia: s/p medical admission at [facility] for anemia to Hgb 6, required 2u pRBCs, most recent Hgb 8.8-9. GI workup unremarkable with exception of diverticulosis and colonic polyp  	-Repeat CBC with stable anemia (Hgb 9.2)  	-Continue ferrous sulfate 325mg daily (refusing)  -Bowel regimen: continue bisacodyl 10mg QHS (refusing), miralax 17g daily (refusing), senna 2 tablets QHS (refusing)  -Continue Vitamin D3 1000units QHS (refusing)  6. PRNs  -Olanzapine 5mg PO or IM for agitation  7. Therapy   -Individual, group and milieu therapy as appropriate   8. Disposition: state hearing 10/25, working on application Patient is a 53 year old adult (goes by "Juan Jose"), living at [facility] on [place] grounds, PMH of GERD, anemia, PPH of schizophrenia, selective mutism, currently with [facility] ACT team, previously with [facility] which , with history of multiple psychiatric hospitalizations, without known history of self-injury or suicide attempts, with history of aggression in the setting on nonadherence with medication, who was initially brought in by staff at residence for increasing agitation and aggression in the setting of 2 months of medication nonadherence. Patient was hospitalized from 8/3-23 on  where they were noted to be hostile, disorganized, threatening, malodorous and grossly psychotic. TOO was obtained, and patient eventually administered dual long-acting injectables (Haldol Decanoate and Zyprexa Relprevv), though had declined nearly all PO medications. Patient was transferred to [facility] where they were admitted due to anemia to 6 (medical workup largely unremarkable), patient stabilized and transferred back to [facility] for further management of psychosis.     Diagnostically, presentation consistent with exacerbation of schizophrenia.     Patient has shown no overall clinical improvement since admission. Continues to be very malodorous with no observable ADLs. Spends most of day isolative and talking to self in severely disorganized and largely incomprehensible fashion. No recent physical aggression. Sleep slightly improved.    Patient requires inpatient admission due to very poor self-care and for containment, stabilization, medication management and aftercare planning.    Plan:  1. Brink: admitted involuntary , TOO obtained 10/24  2. Observation: routine checks  3. Collateral: obtained collateral from [facility] psychiatrist  4. Psychiatric  -Continue Haldol Decanoate 200mg IM q4 weeks, last given 10/25, next due   	-Had received Zyprexa Replevv 405mg qMonth (given )--though no observable benefit above Haldol Decanoate, so will defer at this time  -Continue clozapine 25mg QHS (refusing), d/c'ed olanzapine IM backup order d/t tremulousness   -Continue depakote 1000mg QHS (refusing)  -Continue benztropine 1mg BID for EPS   5. Medical:  -Anemia: s/p medical admission at [facility] for anemia to Hgb 6, required 2u pRBCs, most recent Hgb 8.8-9. GI workup unremarkable with exception of diverticulosis and colonic polyp  	-Repeat CBC with stable anemia (Hgb 9.2)  	-Continue ferrous sulfate 325mg daily (refusing)  -Bowel regimen: continue bisacodyl 10mg QHS (refusing), miralax 17g daily (refusing), senna 2 tablets QHS (refusing)  -Continue Vitamin D3 1000units QHS (refusing)  6. PRNs  -Olanzapine 5mg PO or IM for agitation  7. Therapy   -Individual, group and milieu therapy as appropriate   8. Disposition: state hearing 10/25, working on application

## 2023-11-07 NOTE — BH INPATIENT PSYCHIATRY PROGRESS NOTE - NSBHFUPINTERVALHXFT_PSY_A_CORE
Refusing vitals, refusing PO medications, no PRNs for agitation, no acute events.  Refusing vitals, refusing PO medications, no PRNs for agitation, no acute events. Slept 6 hours per log.    Patient found sitting up in bed in room. He is talking to himself, unable to be directed towards any form of conversation or exchange. Some incomprehensible speech, and otherwise stating "turn her off" "I erased her." Room is very malodorous.

## 2023-11-08 PROCEDURE — 99231 SBSQ HOSP IP/OBS SF/LOW 25: CPT

## 2023-11-08 NOTE — BH INPATIENT PSYCHIATRY PROGRESS NOTE - NSBHASSESSSUMMFT_PSY_ALL_CORE
Patient is a 53 year old adult (goes by "Juan Jose"), living at [facility] on [place] grounds, PMH of GERD, anemia, PPH of schizophrenia, selective mutism, currently with [facility] ACT team, previously with [facility] which , with history of multiple psychiatric hospitalizations, without known history of self-injury or suicide attempts, with history of aggression in the setting on nonadherence with medication, who was initially brought in by staff at residence for increasing agitation and aggression in the setting of 2 months of medication nonadherence. Patient was hospitalized from 8/3-23 on  where they were noted to be hostile, disorganized, threatening, malodorous and grossly psychotic. TOO was obtained, and patient eventually administered dual long-acting injectables (Haldol Decanoate and Zyprexa Relprevv), though had declined nearly all PO medications. Patient was transferred to [facility] where they were admitted due to anemia to 6 (medical workup largely unremarkable), patient stabilized and transferred back to [facility] for further management of psychosis.     Diagnostically, presentation consistent with exacerbation of schizophrenia.     Patient has shown no overall clinical improvement since admission. Continues to be very malodorous with no observable ADLs. Spends most of day isolative and talking to self in severely disorganized and largely incomprehensible fashion. No recent physical aggression. Sleep slightly improved.    Patient requires inpatient admission due to very poor self-care and for containment, stabilization, medication management and aftercare planning.    Plan:  1. Brink: admitted involuntary , TOO obtained 10/24  2. Observation: routine checks  3. Collateral: obtained collateral from [facility] psychiatrist  4. Psychiatric  -Continue Haldol Decanoate 200mg IM q4 weeks, last given 10/25, next due   	-Had received Zyprexa Replevv 405mg qMonth (given )--though no observable benefit above Haldol Decanoate, so will defer at this time  -Continue clozapine 25mg QHS (refusing), d/c'ed olanzapine IM backup order d/t tremulousness   -Continue depakote 1000mg QHS (refusing)  -Continue benztropine 1mg BID for EPS   5. Medical:  -Anemia: s/p medical admission at [facility] for anemia to Hgb 6, required 2u pRBCs, most recent Hgb 8.8-9. GI workup unremarkable with exception of diverticulosis and colonic polyp  	-Repeat CBC with stable anemia (Hgb 9.2)  	-Continue ferrous sulfate 325mg daily (refusing)  -Bowel regimen: continue bisacodyl 10mg QHS (refusing), miralax 17g daily (refusing), senna 2 tablets QHS (refusing)  -Continue Vitamin D3 1000units QHS (refusing)  6. PRNs  -Olanzapine 5mg PO or IM for agitation  7. Therapy   -Individual, group and milieu therapy as appropriate   8. Disposition: state hearing 10/25, working on application Patient is a 53 year old adult (goes by "Juan Jose"), living at [facility] on [place] grounds, PMH of GERD, anemia, PPH of schizophrenia, selective mutism, currently with [facility] ACT team, previously with [facility] which , with history of multiple psychiatric hospitalizations, without known history of self-injury or suicide attempts, with history of aggression in the setting on nonadherence with medication, who was initially brought in by staff at residence for increasing agitation and aggression in the setting of 2 months of medication nonadherence. Patient was hospitalized from 8/3-23 on  where they were noted to be hostile, disorganized, threatening, malodorous and grossly psychotic. TOO was obtained, and patient eventually administered dual long-acting injectables (Haldol Decanoate and Zyprexa Relprevv), though had declined nearly all PO medications. Patient was transferred to [facility] where they were admitted due to anemia to 6 (medical workup largely unremarkable), patient stabilized and transferred back to [facility] for further management of psychosis.     Diagnostically, presentation consistent with exacerbation of schizophrenia.     Patient has shown no overall clinical improvement since admission. Continues to be very malodorous with no observable ADLs. Spends most of day isolative and talking to self in severely disorganized and largely incomprehensible fashion. No recent physical aggression. Sleep slightly improved recently. No comprehensible speech today.    Patient requires inpatient admission due to very poor self-care and for containment, stabilization, medication management and aftercare planning.    Plan:  1. Brink: admitted involuntary , TOO obtained 10/24  2. Observation: routine checks  3. Collateral: obtained collateral from [facility] psychiatrist  4. Psychiatric  -Continue Haldol Decanoate 200mg IM q4 weeks, last given 10/25, next due   	-Had received Zyprexa Replevv 405mg qMonth (given )--though no observable benefit above Haldol Decanoate, so will defer at this time  -Continue clozapine 25mg QHS (refusing), d/c'ed olanzapine IM backup order d/t tremulousness   -Continue depakote 1000mg QHS (refusing)  -Continue benztropine 1mg BID for EPS   5. Medical:  -Anemia: s/p medical admission at [facility] for anemia to Hgb 6, required 2u pRBCs, most recent Hgb 8.8-9. GI workup unremarkable with exception of diverticulosis and colonic polyp  	-Repeat CBC with stable anemia (Hgb 9.2)  	-Continue ferrous sulfate 325mg daily (refusing)  -Bowel regimen: continue bisacodyl 10mg QHS (refusing), miralax 17g daily (refusing), senna 2 tablets QHS (refusing)  -Continue Vitamin D3 1000units QHS (refusing)  6. PRNs  -Olanzapine 5mg PO or IM for agitation  7. Therapy   -Individual, group and milieu therapy as appropriate   8. Disposition: state application submitted

## 2023-11-08 NOTE — BH INPATIENT PSYCHIATRY PROGRESS NOTE - MSE UNSTRUCTURED FT
Appearance: sitting up in bed, very malodorous  Behavior: uncooperative, mistrustful appearing, no PMA/PMR noted  Speech: muttering to self and largely incoherent  Mood: unable to assess  Affect: neutral  Thought process: severely disorganized, can only make out brief statements that have no clear meaning  Thought content: incoherent speech  Perceptions: observed talking to self  Insight: very poor  Judgement: very poor, refusing vitals, refusing PO medications, very poor ADLs  Cognition: unable to assess Appearance: sitting in hallway, very malodorous  Behavior: uncooperative, mistrustful appearing, no PMA/PMR noted  Speech: muttering to self incoherently  Mood: unable to assess  Affect: neutral  Thought process: severely disorganized, can only make out brief statements that have no clear meaning  Thought content: incoherent speech  Perceptions: observed talking to self  Insight: very poor  Judgement: very poor, refusing vitals, refusing PO medications, very poor ADLs  Cognition: unable to assess

## 2023-11-08 NOTE — BH INPATIENT PSYCHIATRY PROGRESS NOTE - NSBHFUPINTERVALHXFT_PSY_A_CORE
Refusing vitals, refusing PO medications, no PRNs for agitation, no acute events. Refusing vitals, refusing PO medications, no PRNs for agitation, no acute events. Slept ~6 hours per sleep log.    Patient found sitting in hallway, talking to himself. Very malodorous. Patient continues to talk to self incomprehensibly, and cannot be redirected to interview.

## 2023-11-09 PROCEDURE — 99231 SBSQ HOSP IP/OBS SF/LOW 25: CPT

## 2023-11-09 NOTE — BH INPATIENT PSYCHIATRY PROGRESS NOTE - NSBHASSESSSUMMFT_PSY_ALL_CORE
Patient is a 53 year old adult (goes by "Juan Jose"), living at [facility] on [place] grounds, PMH of GERD, anemia, PPH of schizophrenia, selective mutism, currently with [facility] ACT team, previously with [facility] which , with history of multiple psychiatric hospitalizations, without known history of self-injury or suicide attempts, with history of aggression in the setting on nonadherence with medication, who was initially brought in by staff at residence for increasing agitation and aggression in the setting of 2 months of medication nonadherence. Patient was hospitalized from 8/3-23 on  where they were noted to be hostile, disorganized, threatening, malodorous and grossly psychotic. TOO was obtained, and patient eventually administered dual long-acting injectables (Haldol Decanoate and Zyprexa Relprevv), though had declined nearly all PO medications. Patient was transferred to [facility] where they were admitted due to anemia to 6 (medical workup largely unremarkable), patient stabilized and transferred back to [facility] for further management of psychosis.     Diagnostically, presentation consistent with exacerbation of schizophrenia.     Patient has shown no overall clinical improvement since admission. Continues to be very malodorous with no observable ADLs. Spends most of day isolative and talking to self in severely disorganized and largely incomprehensible fashion. No recent physical aggression. Sleep slightly improved recently. No comprehensible speech today.    Patient requires inpatient admission due to very poor self-care and for containment, stabilization, medication management and aftercare planning.    Plan:  1. Brink: admitted involuntary , TOO obtained 10/24  2. Observation: routine checks  3. Collateral: obtained collateral from [facility] psychiatrist  4. Psychiatric  -Continue Haldol Decanoate 200mg IM q4 weeks, last given 10/25, next due   	-Had received Zyprexa Replevv 405mg qMonth (given )--though no observable benefit above Haldol Decanoate, so will defer at this time  -Continue clozapine 25mg QHS (refusing), d/c'ed olanzapine IM backup order d/t tremulousness   -Continue depakote 1000mg QHS (refusing)  -Continue benztropine 1mg BID for EPS   5. Medical:  -Anemia: s/p medical admission at [facility] for anemia to Hgb 6, required 2u pRBCs, most recent Hgb 8.8-9. GI workup unremarkable with exception of diverticulosis and colonic polyp  	-Repeat CBC with stable anemia (Hgb 9.2)  	-Continue ferrous sulfate 325mg daily (refusing)  -Bowel regimen: continue bisacodyl 10mg QHS (refusing), miralax 17g daily (refusing), senna 2 tablets QHS (refusing)  -Continue Vitamin D3 1000units QHS (refusing)  6. PRNs  -Olanzapine 5mg PO or IM for agitation  7. Therapy   -Individual, group and milieu therapy as appropriate   8. Disposition: state application submitted  Patient is a 53 year old adult (goes by "Juan Jose"), living at [facility] on [place] grounds, PMH of GERD, anemia, PPH of schizophrenia, selective mutism, currently with [facility] ACT team, previously with [facility] which , with history of multiple psychiatric hospitalizations, without known history of self-injury or suicide attempts, with history of aggression in the setting on nonadherence with medication, who was initially brought in by staff at residence for increasing agitation and aggression in the setting of 2 months of medication nonadherence. Patient was hospitalized from 8/3-23 on 1N where they were noted to be hostile, disorganized, threatening, malodorous and grossly psychotic. TOO was obtained, and patient eventually administered dual long-acting injectables (Haldol Decanoate and Zyprexa Relprevv), though had declined nearly all PO medications. Patient was transferred to [facility] where they were admitted due to anemia to 6 (medical workup largely unremarkable), patient stabilized and transferred back to [facility] for further management of psychosis.     Diagnostically, presentation consistent with exacerbation of schizophrenia.     Patient has shown no overall clinical improvement since admission. Continues to be very malodorous with no observable ADLs. Spends most of day isolative and talking to self in severely disorganized and largely incomprehensible fashion. No recent physical aggression. Sleep improved recently. Today, stared mistrustfully at team MD and SW without producing any speech.     Patient requires inpatient admission due to very poor self-care and for containment, stabilization, medication management and aftercare planning.    Plan:  1. Brink: admitted involuntary , TOO obtained 10/24  2. Observation: routine checks  3. Collateral: obtained collateral from [facility] psychiatrist  4. Psychiatric  -Continue Haldol Decanoate 200mg IM q4 weeks, last given 10/25, next due   	-Had received Zyprexa Replevv 405mg qMonth (given )--though no observable benefit above Haldol Decanoate, so will defer at this time  -Continue clozapine 25mg QHS (refusing), d/c'ed olanzapine IM backup order d/t tremulousness   -Continue depakote 1000mg QHS (refusing)  -Continue benztropine 1mg BID for EPS   5. Medical:  -Anemia: s/p medical admission at [facility] for anemia to Hgb 6, required 2u pRBCs, most recent Hgb 8.8-9. GI workup unremarkable with exception of diverticulosis and colonic polyp  	-Repeat CBC with stable anemia (Hgb 9.2)  	-Continue ferrous sulfate 325mg daily (refusing)  -Bowel regimen: continue bisacodyl 10mg QHS (refusing), miralax 17g daily (refusing), senna 2 tablets QHS (refusing)  -Continue Vitamin D3 1000units QHS (refusing)  6. PRNs  -Olanzapine 5mg PO or IM for agitation  7. Therapy   -Individual, group and milieu therapy as appropriate   8. Disposition: state application submitted

## 2023-11-09 NOTE — BH INPATIENT PSYCHIATRY PROGRESS NOTE - NSBHCHARTREVIEWVS_PSY_A_CORE FT
Vital Signs Last 24 Hrs  T(C): --  T(F): --  HR: 70 (11-09-23 @ 08:17) (70 - 70)  BP: 109/64 (11-09-23 @ 08:17) (109/64 - 109/64)  BP(mean): --  RR: --  SpO2: --

## 2023-11-09 NOTE — BH INPATIENT PSYCHIATRY PROGRESS NOTE - MSE UNSTRUCTURED FT
Appearance: sitting in hallway, very malodorous  Behavior: uncooperative, mistrustful appearing, no PMA/PMR noted  Speech: muttering to self incoherently  Mood: unable to assess  Affect: neutral  Thought process: severely disorganized, can only make out brief statements that have no clear meaning  Thought content: incoherent speech  Perceptions: observed talking to self  Insight: very poor  Judgement: very poor, refusing vitals, refusing PO medications, very poor ADLs  Cognition: unable to assess Appearance: laying in bed, very malodorous  Behavior: uncooperative, mistrustful appearing, no PMA/PMR noted  Speech: no speech  Mood: unable to assess  Affect: neutral  Thought process: severely disorganized, can only make out brief statements that have no clear meaning  Thought content: incoherent speech when does speak  Perceptions: observed talking to self  Insight: very poor  Judgement: very poor, refusing vitals, refusing PO medications, very poor ADLs  Cognition: unable to assess

## 2023-11-09 NOTE — BH INPATIENT PSYCHIATRY PROGRESS NOTE - NSBHLEGALSTATUSCHANGE_PSY_ALL_CORE
Ochsner [facility] as a transfer from [facility] s/p tPA Meghann Zuleta PA-C  Neurocritical Care  [facility]     Yes...

## 2023-11-09 NOTE — BH INPATIENT PSYCHIATRY PROGRESS NOTE - NSBHMETABOLIC_PSY_ALL_CORE_FT
BMI: BMI (kg/m2): 35.2 (10-12-23 @ 17:39)  HbA1c: A1C with Estimated Average Glucose Result: 5.3 % (09-29-23 @ 10:00)    Glucose: POCT Blood Glucose.: 100 mg/dL (12-19-22 @ 15:06)    BP: 109/64 (11-09-23 @ 08:17) (109/64 - 115/69)  Lipid Panel: Date/Time: 09-29-23 @ 10:00  Cholesterol, Serum: 166  Direct LDL: --  HDL Cholesterol, Serum: 60  Total Cholesterol/HDL Ration Measurement: --  Triglycerides, Serum: 61   BMI: BMI (kg/m2): 35.2 (10-12-23 @ 17:39)  HbA1c: A1C with Estimated Average Glucose Result: 5.3 % (09-29-23 @ 10:00)    Glucose: POCT Blood Glucose.: 100 mg/dL (12-19-22 @ 15:06)    BP: 109/64 (11-09-23 @ 08:17) (109/64 - 109/64)  Lipid Panel: Date/Time: 09-29-23 @ 10:00  Cholesterol, Serum: 166  Direct LDL: --  HDL Cholesterol, Serum: 60  Total Cholesterol/HDL Ration Measurement: --  Triglycerides, Serum: 61

## 2023-11-09 NOTE — BH INPATIENT PSYCHIATRY PROGRESS NOTE - NSBHFUPINTERVALHXFT_PSY_A_CORE
Refusing vitals, refusing PO medications, no PRNs for agitation, no acute events. Showered yesterday. VSS, refusing PO medications, no PRNs for agitation, no acute events. Showered yesterday. VSS, refusing PO medications, no PRNs for agitation, no acute events. Showered yesterday. Slept 5+ hours last night.    Patient seen laying in bed, awake. Facial twitching observed. Room very malodorous. Greeted by team MD and PETE. Stares at team members and doesn't speak.

## 2023-11-10 PROCEDURE — 99231 SBSQ HOSP IP/OBS SF/LOW 25: CPT

## 2023-11-10 RX ORDER — BACITRACIN ZINC 500 UNIT/G
1 OINTMENT IN PACKET (EA) TOPICAL ONCE
Refills: 0 | Status: COMPLETED | OUTPATIENT
Start: 2023-11-10 | End: 2023-11-10

## 2023-11-10 RX ADMIN — Medication 1 APPLICATION(S): at 08:57

## 2023-11-10 NOTE — BH INPATIENT PSYCHIATRY PROGRESS NOTE - NSBHFUPINTERVALHXFT_PSY_A_CORE
Refusing vitals, refusing PO, no acute events Refusing vitals, refusing PO, no acute events overnight. Staff able to get patient to shower with significant encouragement. Slept 5 hours per sleep log.    Patient seen in room, eating breakfast. Calm and quietly muttering to self. Does not engage in any questions posed by MD.

## 2023-11-10 NOTE — BH INPATIENT PSYCHIATRY PROGRESS NOTE - NSBHASSESSSUMMFT_PSY_ALL_CORE
Patient is a 53 year old adult (goes by "Juan Jose"), living at [facility] on [place] grounds, PMH of GERD, anemia, PPH of schizophrenia, selective mutism, currently with [facility] ACT team, previously with [facility] which , with history of multiple psychiatric hospitalizations, without known history of self-injury or suicide attempts, with history of aggression in the setting on nonadherence with medication, who was initially brought in by staff at residence for increasing agitation and aggression in the setting of 2 months of medication nonadherence. Patient was hospitalized from 8/3-23 on [facility] where they were noted to be hostile, disorganized, threatening, malodorous and grossly psychotic. TOO was obtained, and patient eventually administered dual long-acting injectables (Haldol Decanoate and Zyprexa Relprevv), though had declined nearly all PO medications. Patient was transferred to [facility] where they were admitted due to anemia to 6 (medical workup largely unremarkable), patient stabilized and transferred back to [facility] for further management of psychosis.     Diagnostically, presentation consistent with exacerbation of schizophrenia.     Patient has shown no overall clinical improvement since admission. Continues to be very malodorous with no observable ADLs. Spends most of day isolative and talking to self in severely disorganized and largely incomprehensible fashion. No recent physical aggression. Sleep improved recently. Today, stared mistrustfully at team MD and SW without producing any speech.     Patient requires inpatient admission due to very poor self-care and for containment, stabilization, medication management and aftercare planning.    Plan:  1. Brink: admitted involuntary , TOO obtained 10/24  2. Observation: routine checks  3. Collateral: obtained collateral from [facility] psychiatrist  4. Psychiatric  -Continue Haldol Decanoate 200mg IM q4 weeks, last given 10/25, next due   	-Had received Zyprexa Replevv 405mg qMonth (given )--though no observable benefit above Haldol Decanoate, so will defer at this time  -Continue clozapine 25mg QHS (refusing), d/c'ed olanzapine IM backup order d/t tremulousness   -Continue depakote 1000mg QHS (refusing)  -Continue benztropine 1mg BID for EPS   5. Medical:  -Anemia: s/p medical admission at [facility] for anemia to Hgb 6, required 2u pRBCs, most recent Hgb 8.8-9. GI workup unremarkable with exception of diverticulosis and colonic polyp  	-Repeat CBC with stable anemia (Hgb 9.2)  	-Continue ferrous sulfate 325mg daily (refusing)  -Bowel regimen: continue bisacodyl 10mg QHS (refusing), miralax 17g daily (refusing), senna 2 tablets QHS (refusing)  -Continue Vitamin D3 1000units QHS (refusing)  6. PRNs  -Olanzapine 5mg PO or IM for agitation  7. Therapy   -Individual, group and milieu therapy as appropriate   8. Disposition: state application submitted  Patient is a 53 year old adult (goes by "Juan Jose"), living at [facility] [place] [facility], PMH of GERD, anemia, PPH of schizophrenia, selective mutism, currently with [facility] ACT team, previously with [facility] which , with history of multiple psychiatric hospitalizations, without known history of self-injury or suicide attempts, with history of aggression in the setting on nonadherence with medication, who was initially brought in by staff at residence for increasing agitation and aggression in the setting of 2 months of medication nonadherence. Patient was hospitalized from 8/3-23 on [facility] where they were noted to be hostile, disorganized, threatening, malodorous and grossly psychotic. TOO was obtained, and patient eventually administered dual long-acting injectables (Haldol Decanoate and Zyprexa Relprevv), though had declined nearly all PO medications. Patient was transferred to [facility] where they were admitted due to anemia to 6 (medical workup largely unremarkable), patient stabilized and transferred back to [facility] for further management of psychosis.     Diagnostically, presentation consistent with exacerbation of schizophrenia.     Patient has shown no overall clinical improvement since admission. Continues to be very malodorous, though showered with significant encouragement/assistance from staff. Spends most of day isolative and talking to self in severely disorganized and largely incomprehensible fashion. No recent physical aggression recently, though can appear mistrustful and make vaguely threatening remarks. Sleep improved recently.     Patient requires inpatient admission due to very poor self-care and for containment, stabilization, medication management and aftercare planning.    Plan:  1. Brink: admitted involuntary , TOO obtained 10/24  2. Observation: routine checks  3. Collateral: obtained collateral from [facility] psychiatrist  4. Psychiatric  -Continue Haldol Decanoate 200mg IM q4 weeks, last given 10/25, next due   	-Had received Zyprexa Replevv 405mg qMonth (given )--though no observable benefit above Haldol Decanoate, so will defer at this time  -Continue clozapine 25mg QHS (refusing), d/c'ed olanzapine IM backup order d/t tremulousness   -Continue depakote 1000mg QHS (refusing)  -Continue benztropine 1mg BID for EPS   5. Medical:  -Anemia: s/p medical admission at [facility] for anemia to Hgb 6, required 2u pRBCs, most recent Hgb 8.8-9. GI workup unremarkable with exception of diverticulosis and colonic polyp  	-Repeat CBC with stable anemia (Hgb 9.2)  	-Continue ferrous sulfate 325mg daily (refusing)  -Bowel regimen: continue bisacodyl 10mg QHS (refusing), miralax 17g daily (refusing), senna 2 tablets QHS (refusing)  -Continue Vitamin D3 1000units QHS (refusing)  6. PRNs  -Olanzapine 5mg PO or IM for agitation  7. Therapy   -Individual, group and milieu therapy as appropriate   8. Disposition: state application submitted

## 2023-11-10 NOTE — BH INPATIENT PSYCHIATRY PROGRESS NOTE - NSBHMETABOLIC_PSY_ALL_CORE_FT
BMI: BMI (kg/m2): 35.2 (10-12-23 @ 17:39)  HbA1c: A1C with Estimated Average Glucose Result: 5.3 % (09-29-23 @ 10:00)    Glucose: POCT Blood Glucose.: 100 mg/dL (12-19-22 @ 15:06)    BP: 109/64 (11-09-23 @ 08:17) (109/64 - 109/64)  Lipid Panel: Date/Time: 09-29-23 @ 10:00  Cholesterol, Serum: 166  Direct LDL: --  HDL Cholesterol, Serum: 60  Total Cholesterol/HDL Ration Measurement: --  Triglycerides, Serum: 61

## 2023-11-10 NOTE — BH INPATIENT PSYCHIATRY PROGRESS NOTE - MSE UNSTRUCTURED FT
Appearance: laying in bed, very malodorous  Behavior: uncooperative, mistrustful appearing, no PMA/PMR noted  Speech: no speech  Mood: unable to assess  Affect: neutral  Thought process: severely disorganized, can only make out brief statements that have no clear meaning  Thought content: incoherent speech when does speak  Perceptions: observed talking to self  Insight: very poor  Judgement: very poor, refusing vitals, refusing PO medications, very poor ADLs  Cognition: unable to assess Appearance: sitting up in bed eating breakfast, malodorous  Behavior: uncooperative, somewhat mistrustful appearing, no PMA/PMR noted  Speech: softly muttering to self  Mood: unable to assess  Affect: neutral  Thought process: severely disorganized, no identifiable speech today  Thought content: incoherent speech when does speak  Perceptions: observed talking to self  Insight: very poor  Judgement: very poor, refusing vitals, refusing PO medications, very poor ADLs  Cognition: unable to assess

## 2023-11-13 PROCEDURE — 99231 SBSQ HOSP IP/OBS SF/LOW 25: CPT

## 2023-11-13 NOTE — BH INPATIENT PSYCHIATRY PROGRESS NOTE - NSBHCHARTREVIEWVS_PSY_A_CORE FT
Vital Signs Last 24 Hrs  T(C): 36.6 (11-12-23 @ 09:58), Max: 36.6 (11-12-23 @ 09:58)  T(F): 97.8 (11-12-23 @ 09:58), Max: 97.8 (11-12-23 @ 09:58)  HR: --  BP: --  BP(mean): --  RR: --  SpO2: --     Vital Signs Last 24 Hrs  T(C): --  T(F): --  HR: --  BP: --  BP(mean): --  RR: --  SpO2: --

## 2023-11-13 NOTE — BH INPATIENT PSYCHIATRY PROGRESS NOTE - NSBHASSESSSUMMFT_PSY_ALL_CORE
Patient is a 53 year old adult (goes by "Juan Jose"), living at [facility] on [place] grounds, PMH of GERD, anemia, PPH of schizophrenia, selective mutism, currently with [facility] ACT team, previously with [facility] which , with history of multiple psychiatric hospitalizations, without known history of self-injury or suicide attempts, with history of aggression in the setting on nonadherence with medication, who was initially brought in by staff at residence for increasing agitation and aggression in the setting of 2 months of medication nonadherence. Patient was hospitalized from 8/3-23 on 1[facility] where they were noted to be hostile, disorganized, threatening, malodorous and grossly psychotic. TOO was obtained, and patient eventually administered dual long-acting injectables (Haldol Decanoate and Zyprexa Relprevv), though had declined nearly all PO medications. Patient was transferred to [facility] where they were admitted due to anemia to 6 (medical workup largely unremarkable), patient stabilized and transferred back to [facility] for further management of psychosis.     Diagnostically, presentation consistent with exacerbation of schizophrenia.     Patient has shown no overall clinical improvement since admission. Continues to be very malodorous, though showered with significant encouragement/assistance from staff. Spends most of day isolative and talking to self in severely disorganized and largely incomprehensible fashion. No recent physical aggression recently, though can appear mistrustful and make vaguely threatening remarks. Sleep improved recently.     Patient requires inpatient admission due to very poor self-care and for containment, stabilization, medication management and aftercare planning.    Plan:  1. Brink: admitted involuntary , TOO obtained 10/24  2. Observation: routine checks  3. Collateral: obtained collateral from [facility] psychiatrist  4. Psychiatric  -Continue Haldol Decanoate 200mg IM q4 weeks, last given 10/25, next due   	-Had received Zyprexa Replevv 405mg qMonth (given )--though no observable benefit above Haldol Decanoate, so will defer at this time  -Continue clozapine 25mg QHS (refusing), d/c'ed olanzapine IM backup order d/t tremulousness   -Continue depakote 1000mg QHS (refusing)  -Continue benztropine 1mg BID for EPS   5. Medical:  -Anemia: s/p medical admission at [facility] for anemia to Hgb 6, required 2u pRBCs, most recent Hgb 8.8-9. GI workup unremarkable with exception of diverticulosis and colonic polyp  	-Repeat CBC with stable anemia (Hgb 9.2)  	-Continue ferrous sulfate 325mg daily (refusing)  -Bowel regimen: continue bisacodyl 10mg QHS (refusing), miralax 17g daily (refusing), senna 2 tablets QHS (refusing)  -Continue Vitamin D3 1000units QHS (refusing)  6. PRNs  -Olanzapine 5mg PO or IM for agitation  7. Therapy   -Individual, group and milieu therapy as appropriate   8. Disposition: state application submitted  Patient is a 53 year old adult (goes by "Juan Jose"), living at [facility] on [place] grounds, PMH of GERD, anemia, PPH of schizophrenia, selective mutism, currently with [facility] ACT team, previously with [facility] which , with history of multiple psychiatric hospitalizations, without known history of self-injury or suicide attempts, with history of aggression in the setting on nonadherence with medication, who was initially brought in by staff at residence for increasing agitation and aggression in the setting of 2 months of medication nonadherence. Patient was hospitalized from 8/3-23 on  where they were noted to be hostile, disorganized, threatening, malodorous and grossly psychotic. TOO was obtained, and patient eventually administered dual long-acting injectables (Haldol Decanoate and Zyprexa Relprevv), though had declined nearly all PO medications. Patient was transferred to [facility] where they were admitted due to anemia to 6 (medical workup largely unremarkable), patient stabilized and transferred back to [facility] for further management of psychosis.     Diagnostically, presentation consistent with exacerbation of schizophrenia.     Patient has shown no overall clinical improvement since admission. Continues to be very malodorous, though showered last week with significant encouragement/assistance from staff. Spends most of day isolative and talking to self in severely disorganized and largely incomprehensible fashion. No recent physical aggression, though can appear mistrustful and make vaguely threatening remarks. Sleep improved recently.     Patient requires inpatient admission due to very poor self-care and for containment, stabilization, medication management and aftercare planning.    Plan:  1. Brink: admitted involuntary , TOO obtained 10/24  2. Observation: routine checks  3. Collateral: obtained collateral from [facility] psychiatrist  4. Psychiatric  -Continue Haldol Decanoate 200mg IM q4 weeks, last given 10/25, next due   	-Had received Zyprexa Replevv 405mg qMonth (given )--though no observable benefit above Haldol Decanoate, so will defer at this time  -Continue clozapine 25mg QHS (refusing), d/c'ed olanzapine IM backup order d/t tremulousness   -Continue depakote 1000mg QHS (refusing)  -Continue benztropine 1mg BID for EPS   5. Medical:  -Anemia: s/p medical admission at [facility] for anemia to Hgb 6, required 2u pRBCs, most recent Hgb 8.8-9. GI workup unremarkable with exception of diverticulosis and colonic polyp  	-Repeat CBC with stable anemia (Hgb 9.2)  	-Continue ferrous sulfate 325mg daily (refusing)  -Bowel regimen: continue bisacodyl 10mg QHS (refusing), miralax 17g daily (refusing), senna 2 tablets QHS (refusing)  -Continue Vitamin D3 1000units QHS (refusing)  6. PRNs  -Olanzapine 5mg PO or IM for agitation  7. Therapy   -Individual, group and milieu therapy as appropriate   8. Disposition: state application submitted

## 2023-11-13 NOTE — BH INPATIENT PSYCHIATRY PROGRESS NOTE - MSE UNSTRUCTURED FT
Appearance: sitting up in bed eating breakfast, malodorous  Behavior: uncooperative, somewhat mistrustful appearing, no PMA/PMR noted  Speech: softly muttering to self  Mood: unable to assess  Affect: neutral  Thought process: severely disorganized, no identifiable speech today  Thought content: incoherent speech when does speak  Perceptions: observed talking to self  Insight: very poor  Judgement: very poor, refusing vitals, refusing PO medications, very poor ADLs  Cognition: unable to assess Appearance: laying in bed, malodorous  Behavior: uncooperative, mistrustful appearing, UE tremor noted  Speech: normal rate and normal volume  Mood: unable to assess  Affect: neutral  Thought process: severely disorganized, can only comprehend short statements  Thought content: some vaguely violent content though very hard to make sense of  Perceptions: observed talking to self  Insight: very poor  Judgement: very poor, refusing vitals, refusing PO medications, very poor ADLs  Cognition: unable to assess

## 2023-11-14 PROCEDURE — 99231 SBSQ HOSP IP/OBS SF/LOW 25: CPT

## 2023-11-14 NOTE — BH INPATIENT PSYCHIATRY PROGRESS NOTE - MSE UNSTRUCTURED FT
Appearance: laying in bed, malodorous  Behavior: uncooperative, mistrustful appearing, UE tremor noted  Speech: normal rate and normal volume  Mood: unable to assess  Affect: neutral  Thought process: severely disorganized, can only comprehend short statements  Thought content: some vaguely violent content though very hard to make sense of  Perceptions: observed talking to self  Insight: very poor  Judgement: very poor, refusing vitals, refusing PO medications, very poor ADLs  Cognition: unable to assess Appearance: laying in bed, malodorous  Behavior: uncooperative, mistrustful appearing, UE tremor noted  Speech: soft muttering  Mood: unable to assess  Affect: neutral  Thought process: severely disorganized, no comprehensible speech today  Thought content: no comprehensible speech today  Perceptions: observed talking to self  Insight: very poor  Judgement: very poor, refusing vitals, refusing PO medications, very poor ADLs  Cognition: unable to assess

## 2023-11-14 NOTE — BH INPATIENT PSYCHIATRY PROGRESS NOTE - NSBHFUPINTERVALHXFT_PSY_A_CORE
Refusing vitals, refusing PO medications, no acute events over weekend.      Refusing vitals, refusing PO medications, no acute events over weekend. Slept 2 hours overnight per sleep log.    Patient seen laying in bed. Room is very malodorous. Muttering very softly to self, incomprehensible. No response to questions.

## 2023-11-14 NOTE — BH INPATIENT PSYCHIATRY PROGRESS NOTE - NSBHCHARTREVIEWVS_PSY_A_CORE FT
Vital Signs Last 24 Hrs  T(C): --  T(F): --  HR: 104 (11-13-23 @ 20:00) (104 - 104)  BP: 118/80 (11-13-23 @ 20:00) (118/80 - 118/80)  BP(mean): --  RR: --  SpO2: --

## 2023-11-14 NOTE — BH INPATIENT PSYCHIATRY PROGRESS NOTE - CURRENT MEDICATION
Viral syndrome MEDICATIONS  (STANDING):  benztropine 1 milliGRAM(s) Oral two times a day  bisacodyl 10 milliGRAM(s) Oral at bedtime  cholecalciferol 1000 Unit(s) Oral at bedtime  cloZAPine 25 milliGRAM(s) Oral at bedtime  ferrous    sulfate 325 milliGRAM(s) Oral daily  polyethylene glycol 3350 17 Gram(s) Oral two times a day  senna 2 Tablet(s) Oral at bedtime  valproic  acid Syrup 1000 milliGRAM(s) Oral at bedtime    MEDICATIONS  (PRN):  acetaminophen     Tablet .. 650 milliGRAM(s) Oral every 4 hours PRN Mild Pain (1 - 3), Moderate Pain (4 - 6)  OLANZapine Disintegrating Tablet 5 milliGRAM(s) Oral every 4 hours PRN agitation  OLANZapine Injectable 5 milliGRAM(s) IntraMuscular once PRN Agitation

## 2023-11-14 NOTE — BH INPATIENT PSYCHIATRY PROGRESS NOTE - NSBHMETABOLIC_PSY_ALL_CORE_FT
BMI: BMI (kg/m2): 35.2 (10-12-23 @ 17:39)  HbA1c: A1C with Estimated Average Glucose Result: 5.3 % (09-29-23 @ 10:00)    Glucose: POCT Blood Glucose.: 100 mg/dL (12-19-22 @ 15:06)    BP: 118/80 (11-13-23 @ 20:00) (118/80 - 118/80)  Lipid Panel: Date/Time: 09-29-23 @ 10:00  Cholesterol, Serum: 166  Direct LDL: --  HDL Cholesterol, Serum: 60  Total Cholesterol/HDL Ration Measurement: --  Triglycerides, Serum: 61

## 2023-11-14 NOTE — BH INPATIENT PSYCHIATRY PROGRESS NOTE - NSBHASSESSSUMMFT_PSY_ALL_CORE
Patient is a 53 year old adult (goes by "Juan Jose"), living at [facility] on [place] grounds, PMH of GERD, anemia, PPH of schizophrenia, selective mutism, currently with [facility] ACT team, previously with [facility] which , with history of multiple psychiatric hospitalizations, without known history of self-injury or suicide attempts, with history of aggression in the setting on nonadherence with medication, who was initially brought in by staff at residence for increasing agitation and aggression in the setting of 2 months of medication nonadherence. Patient was hospitalized from 8/3-23 on  where they were noted to be hostile, disorganized, threatening, malodorous and grossly psychotic. TOO was obtained, and patient eventually administered dual long-acting injectables (Haldol Decanoate and Zyprexa Relprevv), though had declined nearly all PO medications. Patient was transferred to [facility] where they were admitted due to anemia to 6 (medical workup largely unremarkable), patient stabilized and transferred back to [facility] for further management of psychosis.     Diagnostically, presentation consistent with exacerbation of schizophrenia.     Patient has shown no overall clinical improvement since admission. Continues to be very malodorous, though showered last week with significant encouragement/assistance from staff. Spends most of day isolative and talking to self in severely disorganized and largely incomprehensible fashion. No recent physical aggression, though can appear mistrustful and make vaguely threatening remarks. Sleep improved recently.     Patient requires inpatient admission due to very poor self-care and for containment, stabilization, medication management and aftercare planning.    Plan:  1. Brink: admitted involuntary , TOO obtained 10/24  2. Observation: routine checks  3. [facility]: [facility]  4. Psychiatric  -Continue Haldol Decanoate 200mg IM q4 weeks, last given 10/25, next due   	-Had received Zyprexa Replevv 405mg qMonth (given )--though no observable benefit above Haldol Decanoate, so will defer at this time  -Continue clozapine 25mg QHS (refusing), d/c'ed olanzapine IM backup order d/t tremulousness   -Continue depakote 1000mg QHS (refusing)  -Continue benztropine 1mg BID for EPS   5. Medical:  -Anemia: s/p medical admission at [facility] for anemia to Hgb 6, required 2u pRBCs, most recent Hgb 8.8-9. GI workup unremarkable with exception of diverticulosis and colonic polyp  	-Repeat CBC with stable anemia (Hgb 9.2)  	-Continue ferrous sulfate 325mg daily (refusing)  -Bowel regimen: continue bisacodyl 10mg QHS (refusing), miralax 17g daily (refusing), senna 2 tablets QHS (refusing)  -Continue Vitamin D3 1000units QHS (refusing)  6. PRNs  -Olanzapine 5mg PO or IM for agitation  7. Therapy   -Individual, group and milieu therapy as appropriate   8. Disposition: state application submitted  Patient is a 53 year old adult (goes by "Juan Jose"), living at [facility] on [place] grounds, PMH of GERD, anemia, PPH of schizophrenia, selective mutism, currently with [facility] ACT team, previously with [facility] which , with history of multiple psychiatric hospitalizations, without known history of self-injury or suicide attempts, with history of aggression in the setting on nonadherence with medication, who was initially brought in by staff at residence for increasing agitation and aggression in the setting of 2 months of medication nonadherence. Patient was hospitalized from 8/3-23 on 1N where they were noted to be hostile, disorganized, threatening, malodorous and grossly psychotic. TOO was obtained, and patient eventually administered dual long-acting injectables (Haldol Decanoate and Zyprexa Relprevv), though had declined nearly all PO medications. Patient was transferred to [facility] where they were admitted due to anemia to 6 (medical workup largely unremarkable), patient stabilized and transferred back to [facility] for further management of psychosis.     Diagnostically, presentation consistent with exacerbation of schizophrenia.     Patient has shown no overall clinical improvement since admission. Continues to be very malodorous, though showered yesterday with significant encouragement/assistance from staff. Spends most of day isolative and talking to self in severely disorganized and largely incomprehensible fashion. No recent physical aggression, though can appear mistrustful and make vaguely threatening remarks. Sleep improved recently, though poor last night.    Patient requires inpatient admission due to very poor self-care and for containment, stabilization, medication management and aftercare planning.    Plan:  1. Brink: admitted involuntary , TOO obtained 10/24  2. Observation: routine checks  3. Collateral: obtained collateral from [facility] psychiatrist  4. Psychiatric  -Continue Haldol Decanoate 200mg IM q4 weeks, last given 10/25, next due   	-Had received Zyprexa Replevv 405mg qMonth (given )--though no observable benefit above Haldol Decanoate, so will defer at this time  -Continue clozapine 25mg QHS (refusing), d/c'ed olanzapine IM backup order d/t tremulousness   -Continue depakote 1000mg QHS (refusing)  -Continue benztropine 1mg BID for EPS   5. Medical:  -Anemia: s/p medical admission at [facility] for anemia to Hgb 6, required 2u pRBCs, most recent Hgb 8.8-9. GI workup unremarkable with exception of diverticulosis and colonic polyp  	-Repeat CBC with stable anemia (Hgb 9.2)  	-Continue ferrous sulfate 325mg daily (refusing)  -Bowel regimen: continue bisacodyl 10mg QHS (refusing), miralax 17g daily (refusing), senna 2 tablets QHS (refusing)  -Continue Vitamin D3 1000units QHS (refusing)  6. PRNs  -Olanzapine 5mg PO or IM for agitation  7. Therapy   -Individual, group and milieu therapy as appropriate   8. Disposition: state application submitted

## 2023-11-15 PROCEDURE — 99231 SBSQ HOSP IP/OBS SF/LOW 25: CPT

## 2023-11-15 NOTE — BH INPATIENT PSYCHIATRY PROGRESS NOTE - NSBHASSESSSUMMFT_PSY_ALL_CORE
Patient is a 53 year old adult (goes by "Juan Jose"), living at [facility] on [place] grounds, PMH of GERD, anemia, PPH of schizophrenia, selective mutism, currently with [facility] ACT team, previously with [facility] which , with history of multiple psychiatric hospitalizations, without known history of self-injury or suicide attempts, with history of aggression in the setting on nonadherence with medication, who was initially brought in by staff at residence for increasing agitation and aggression in the setting of 2 months of medication nonadherence. Patient was hospitalized from 8/3-23 on 1N where they were noted to be hostile, disorganized, threatening, malodorous and grossly psychotic. TOO was obtained, and patient eventually administered dual long-acting injectables (Haldol Decanoate and Zyprexa Relprevv), though had declined nearly all PO medications. Patient was transferred to [facility] where they were admitted due to anemia to 6 (medical workup largely unremarkable), patient stabilized and transferred back to [facility] for further management of psychosis.     Diagnostically, presentation consistent with exacerbation of schizophrenia.     Patient has shown no overall clinical improvement since admission. Continues to be very malodorous, though showered yesterday with significant encouragement/assistance from staff. Spends most of day isolative and talking to self in severely disorganized and largely incomprehensible fashion. No recent physical aggression, though can appear mistrustful and make vaguely threatening remarks. Sleep improved recently, though poor last night.    Patient requires inpatient admission due to very poor self-care and for containment, stabilization, medication management and aftercare planning.    Plan:  1. Brink: admitted involuntary , TOO obtained 10/24  2. Observation: routine checks  3. Collateral: obtained collateral from [facility] psychiatrist  4. Psychiatric  -Continue Haldol Decanoate 200mg IM q4 weeks, last given 10/25, next due   	-Had received Zyprexa Replevv 405mg qMonth (given )--though no observable benefit above Haldol Decanoate, so will defer at this time  -Continue clozapine 25mg QHS (refusing), d/c'ed olanzapine IM backup order d/t tremulousness   -Continue depakote 1000mg QHS (refusing)  -Continue benztropine 1mg BID for EPS   5. Medical:  -Anemia: s/p medical admission at [facility] for anemia to Hgb 6, required 2u pRBCs, most recent Hgb 8.8-9. GI workup unremarkable with exception of diverticulosis and colonic polyp  	-Repeat CBC with stable anemia (Hgb 9.2)  	-Continue ferrous sulfate 325mg daily (refusing)  -Bowel regimen: continue bisacodyl 10mg QHS (refusing), miralax 17g daily (refusing), senna 2 tablets QHS (refusing)  -Continue Vitamin D3 1000units QHS (refusing)  6. PRNs  -Olanzapine 5mg PO or IM for agitation  7. Therapy   -Individual, group and milieu therapy as appropriate   8. Disposition: state application submitted  Patient is a 53 year old adult (goes by "Juan Jose"), living at [facility] on [place] grounds, PMH of GERD, anemia, PPH of schizophrenia, selective mutism, currently with [facility] ACT team, previously with [facility] which , with history of multiple psychiatric hospitalizations, without known history of self-injury or suicide attempts, with history of aggression in the setting on nonadherence with medication, who was initially brought in by staff at residence for increasing agitation and aggression in the setting of 2 months of medication nonadherence. Patient was hospitalized from 8/3-23 on 1N where they were noted to be hostile, disorganized, threatening, malodorous and grossly psychotic. TOO was obtained, and patient eventually administered dual long-acting injectables (Haldol Decanoate and Zyprexa Relprevv), though had declined nearly all PO medications. Patient was transferred to [facility] where they were admitted due to anemia to 6 (medical workup largely unremarkable), patient stabilized and transferred back to [facility] for further management of psychosis.     Diagnostically, presentation consistent with exacerbation of schizophrenia.     Patient has shown overall very little clinical improvement since admission. Spends most of day isolative and talking to self in severely disorganized and largely incomprehensible fashion. Has showered recently with significant staff encouragement. No recent physical aggression, though can appear mistrustful and make vaguely threatening remarks. Sleep improved recently. Still declining all PO medications, on high dose haloperidol BREWER.    Patient requires inpatient admission due to very poor self-care and for containment, stabilization, medication management and aftercare planning.    Plan:  1. Brink: admitted involuntary , TOO obtained 10/24  2. Observation: routine checks  3. Collateral: obtained collateral from [facility] psychiatrist  4. Psychiatric  -Continue Haldol Decanoate 200mg IM q4 weeks, last given 10/25, next due   	-Had received Zyprexa Replevv 405mg qMonth (given )--though no observable benefit above Haldol Decanoate, so will defer at this time  -Continue clozapine 25mg QHS (refusing), d/c'ed olanzapine IM backup order d/t tremulousness   -Continue depakote 1000mg QHS (refusing)  -Continue benztropine 1mg BID for EPS (refusing)  5. Medical:  -Anemia: s/p medical admission at [facility] for anemia to Hgb 6, required 2u pRBCs, most recent Hgb 8.8-9. GI workup unremarkable with exception of diverticulosis and colonic polyp  	-Repeat CBC with stable anemia (Hgb 9.2)  	-Continue ferrous sulfate 325mg daily (refusing)  -Bowel regimen: continue bisacodyl 10mg QHS (refusing), miralax 17g daily (refusing), senna 2 tablets QHS (refusing)  -Continue Vitamin D3 1000units QHS (refusing)  6. PRNs  -Olanzapine 5mg PO or IM for agitation  7. Therapy   -Individual, group and milieu therapy as appropriate   8. Disposition: state application submitted

## 2023-11-15 NOTE — BH INPATIENT PSYCHIATRY PROGRESS NOTE - NSBHCHARTREVIEWVS_PSY_A_CORE FT
Vital Signs Last 24 Hrs  T(C): --  T(F): --  HR: --  BP: --  BP(mean): --  RR: --  SpO2: --    Orthostatic VS  11-15-23 @ 06:51  Lying BP: --/-- HR: --  Sitting BP: 147/85 HR: 87  Standing BP: 141/74 HR: 95  Site: upper left arm  Mode: electronic

## 2023-11-15 NOTE — BH INPATIENT PSYCHIATRY PROGRESS NOTE - NSBHFUPINTERVALHXFT_PSY_A_CORE
VSS, refusing PO medications, no acute events over weekend.      VSS, refusing PO medications, no acute events over weekend. Slept 5.5 hours per sleep log.    Patient seen laying in bed, after having taken a shower and having room cleaned (with staff encouragement). Stares blankly at this provider stating "if you don't kill her off I will" and "burn her up."

## 2023-11-15 NOTE — BH INPATIENT PSYCHIATRY PROGRESS NOTE - MSE UNSTRUCTURED FT
Appearance: laying in bed, malodorous  Behavior: uncooperative, mistrustful appearing, UE tremor noted  Speech: soft muttering  Mood: unable to assess  Affect: neutral  Thought process: severely disorganized, no comprehensible speech today  Thought content: no comprehensible speech today  Perceptions: observed talking to self  Insight: very poor  Judgement: very poor, refusing vitals, refusing PO medications, very poor ADLs  Cognition: unable to assess Appearance: laying in bed, less malodorous  Behavior: uncooperative, mistrustful appearing, UE tremor noted  Speech: talking to self with normal rate and volume  Mood: unable to assess  Affect: neutral  Thought process: severely disorganized, making statements completely unrelated to current situation  Thought content: vaguely violent/threatening statements such as "if you don't kill her off, I will" and "burn her up"  Perceptions: observed talking to self  Insight: very poor  Judgement: very poor, refusing vitals, refusing PO medications, very poor ADLs  Cognition: unable to assess

## 2023-11-16 PROCEDURE — 99231 SBSQ HOSP IP/OBS SF/LOW 25: CPT

## 2023-11-16 NOTE — BH INPATIENT PSYCHIATRY PROGRESS NOTE - NSBHASSESSSUMMFT_PSY_ALL_CORE
Patient is a 53 year old adult (goes by "Juan Jose"), living at [facility] on [place] grounds, PMH of GERD, anemia, PPH of schizophrenia, selective mutism, currently with [facility] ACT team, previously with [facility] which , with history of multiple psychiatric hospitalizations, without known history of self-injury or suicide attempts, with history of aggression in the setting on nonadherence with medication, who was initially brought in by staff at residence for increasing agitation and aggression in the setting of 2 months of medication nonadherence. Patient was hospitalized from 8/3-23 on 1N where they were noted to be hostile, disorganized, threatening, malodorous and grossly psychotic. TOO was obtained, and patient eventually administered dual long-acting injectables (Haldol Decanoate and Zyprexa Relprevv), though had declined nearly all PO medications. Patient was transferred to [facility] where they were admitted due to anemia to 6 (medical workup largely unremarkable), patient stabilized and transferred back to [facility] for further management of psychosis.     Diagnostically, presentation consistent with exacerbation of schizophrenia.     Patient has shown overall very little clinical improvement since admission. Spends most of day isolative and talking to self in severely disorganized and largely incomprehensible fashion. Has showered recently with significant staff encouragement. No recent physical aggression, though can appear mistrustful and make vaguely threatening remarks. Sleep improved recently. Still declining all PO medications, on high dose haloperidol BREWER.    Patient requires inpatient admission due to very poor self-care and for containment, stabilization, medication management and aftercare planning.    Plan:  1. Brink: admitted involuntary , TOO obtained 10/24  2. Observation: routine checks  3. Collateral: obtained collateral from [facility] psychiatrist  4. Psychiatric  -Continue Haldol Decanoate 200mg IM q4 weeks, last given 10/25, next due   	-Had received Zyprexa Replevv 405mg qMonth (given )--though no observable benefit above Haldol Decanoate, so will defer at this time  -Continue clozapine 25mg QHS (refusing), d/c'ed olanzapine IM backup order d/t tremulousness   -Continue depakote 1000mg QHS (refusing)  -Continue benztropine 1mg BID for EPS (refusing)  5. Medical:  -Anemia: s/p medical admission at [facility] for anemia to Hgb 6, required 2u pRBCs, most recent Hgb 8.8-9. GI workup unremarkable with exception of diverticulosis and colonic polyp  	-Repeat CBC with stable anemia (Hgb 9.2)  	-Continue ferrous sulfate 325mg daily (refusing)  -Bowel regimen: continue bisacodyl 10mg QHS (refusing), miralax 17g daily (refusing), senna 2 tablets QHS (refusing)  -Continue Vitamin D3 1000units QHS (refusing)  6. PRNs  -Olanzapine 5mg PO or IM for agitation  7. Therapy   -Individual, group and milieu therapy as appropriate   8. Disposition: state application submitted  Patient is a 53 year old adult (goes by "Juan Jose"), living at [facility] on [place] grounds, PMH of GERD, anemia, PPH of schizophrenia, selective mutism, currently with [facility] ACT team, previously with [facility] which , with history of multiple psychiatric hospitalizations, without known history of self-injury or suicide attempts, with history of aggression in the setting on nonadherence with medication, who was initially brought in by staff at residence for increasing agitation and aggression in the setting of 2 months of medication nonadherence. Patient was hospitalized from 8/3-23 on 1N where they were noted to be hostile, disorganized, threatening, malodorous and grossly psychotic. TOO was obtained, and patient eventually administered dual long-acting injectables (Haldol Decanoate and Zyprexa Relprevv), though had declined nearly all PO medications. Patient was transferred to [facility] where they were admitted due to anemia to 6 (medical workup largely unremarkable), patient stabilized and transferred back to [facility] for further management of psychosis.     Diagnostically, presentation consistent with exacerbation of schizophrenia.     Patient has shown overall very little clinical improvement since admission. Spends most of day isolative and talking to self in severely disorganized and largely incomprehensible fashion. Has showered recently with significant staff encouragement. No recent physical aggression, though can appear mistrustful and make vaguely threatening remarks. Today, stated he was going to "beat" this writer, and asked medical student to "turn her off." Sleep improved recently. Still declining all PO medications, on high dose haloperidol BREWER.    Patient requires inpatient admission due to very poor self-care and for containment, stabilization, medication management and aftercare planning.    Plan:  1. Brink: admitted involuntary , TOO obtained 10/24  2. Observation: routine checks  3. Collateral: obtained collateral from [facility] psychiatrist  4. Psychiatric  -Continue Haldol Decanoate 200mg IM q4 weeks, last given 10/25, next due   	-Had received Zyprexa Replevv 405mg qMonth (given )--though no observable benefit above Haldol Decanoate, so will defer at this time  -Continue clozapine 25mg QHS (refusing), d/c'ed olanzapine IM backup order d/t tremulousness   -Continue depakote 1000mg QHS (refusing)  -Continue benztropine 1mg BID for EPS (refusing)  5. Medical:  -Anemia: s/p medical admission at [facility] for anemia to Hgb 6, required 2u pRBCs, most recent Hgb 8.8-9. GI workup unremarkable with exception of diverticulosis and colonic polyp  	-Repeat CBC with stable anemia (Hgb 9.2)  	-Continue ferrous sulfate 325mg daily (refusing)  -Bowel regimen: continue bisacodyl 10mg QHS (refusing), miralax 17g daily (refusing), senna 2 tablets QHS (refusing)  -Continue Vitamin D3 1000units QHS (refusing)  6. PRNs  -Olanzapine 5mg PO or IM for agitation  7. Therapy   -Individual, group and milieu therapy as appropriate   8. Disposition: state application submitted

## 2023-11-16 NOTE — BH INPATIENT PSYCHIATRY PROGRESS NOTE - MSE UNSTRUCTURED FT
Appearance: laying in bed, less malodorous  Behavior: uncooperative, mistrustful appearing, UE tremor noted  Speech: talking to self with normal rate and volume  Mood: unable to assess  Affect: neutral  Thought process: severely disorganized, making statements completely unrelated to current situation  Thought content: vaguely violent/threatening statements such as "if you don't kill her off, I will" and "burn her up"  Perceptions: observed talking to self  Insight: very poor  Judgement: very poor, refusing vitals, refusing PO medications, very poor ADLs  Cognition: unable to assess Appearance: laying in bed, less malodorous  Behavior: uncooperative, mistrustful appearing, UE tremor noted  Speech: loud  Mood: unable to assess  Affect: irritable, tense  Thought process: severely disorganized  Thought content: vaguely violent/threatening statements such as "turn her off" "I'm going to beat you" and "GET OUT"  Perceptions: observed talking to self  Insight: very poor  Judgement: very poor, refusing vitals intermittently, refusing PO medications, very poor ADLs  Cognition: unable to assess

## 2023-11-16 NOTE — BH INPATIENT PSYCHIATRY PROGRESS NOTE - NSBHFUPINTERVALHXFT_PSY_A_CORE
VSS, refusing PO medications, no acute events over weekend.      VSS, refusing PO medications, no acute events over weekend. Slept 6 hours per sleep log.    Patient seen laying in bed. Stares at this writer, and then looks at accompanying medical student and states "turn her off" and states that he is going to "beat" this writer. Eventually screams GET OUT. Later during staff encouragement of shower, patient able to respond to basic questions in a reality based manner.

## 2023-11-16 NOTE — BH INPATIENT PSYCHIATRY PROGRESS NOTE - NSBHCHARTREVIEWVS_PSY_A_CORE FT
Vital Signs Last 24 Hrs  T(C): 36.7 (11-15-23 @ 19:15), Max: 36.7 (11-15-23 @ 19:15)  T(F): 98 (11-15-23 @ 19:15), Max: 98 (11-15-23 @ 19:15)  HR: --  BP: --  BP(mean): --  RR: --  SpO2: --    Orthostatic VS  11-15-23 @ 19:15  Lying BP: --/-- HR: --  Sitting BP: 99/51 HR: 69  Standing BP: 99/50 HR: 74  Site: --  Mode: --  Orthostatic VS  11-15-23 @ 06:51  Lying BP: --/-- HR: --  Sitting BP: 147/85 HR: 87  Standing BP: 141/74 HR: 95  Site: upper left arm  Mode: electronic

## 2023-11-16 NOTE — CHART NOTE - NSCHARTNOTEFT_GEN_A_CORE
Nutrition f/u note:    Patient continues treatment for psychosis.    Diet : Regular      Patient reports [ ] nausea  [ ] vomiting [ ] diarrhea [ ] constipation  [ ]chewing problems [ ] swallowing issues  [ ] other:     PO intake:  < 50% [ ] 50-75% [ ]   % [x ]  other :    Source for PO intake [ ] Patient [ ] family [x ] chart [ ] staff [ ] other       Encountered patient in her room who was lying in bed. Patient would not engage in conversation with RD. As per staff, patient is eating well. No reported GI distress. On iron sulfate and vitamin D as ordered.      Current Weight: 11/4 225#, 8/3 250# (per HIE) ? accuracy      Pertinent Medications: MEDICATIONS  (STANDING):  benztropine 1 milliGRAM(s) Oral two times a day  bisacodyl 10 milliGRAM(s) Oral at bedtime  cholecalciferol 1000 Unit(s) Oral at bedtime  cloZAPine 25 milliGRAM(s) Oral at bedtime  ferrous    sulfate 325 milliGRAM(s) Oral daily  polyethylene glycol 3350 17 Gram(s) Oral two times a day  senna 2 Tablet(s) Oral at bedtime  valproic  acid Syrup 1000 milliGRAM(s) Oral at bedtime    MEDICATIONS  (PRN):  acetaminophen     Tablet .. 650 milliGRAM(s) Oral every 4 hours PRN Mild Pain (1 - 3), Moderate Pain (4 - 6)  OLANZapine Disintegrating Tablet 5 milliGRAM(s) Oral every 4 hours PRN agitation  OLANZapine Injectable 5 milliGRAM(s) IntraMuscular once PRN Agitation    Pertinent Labs:  10/27 Hgb 9.2 Hct 30.9 Mcv 76.3 Mch 22.7 Mchc 29.8- patient on iron sulfate      Skin: intact    Estimated Needs:   [x ] no change since previous assessment  [ ] recalculated:       Previous Nutrition Diagnosis:     [ ] Inadequate Energy Intake [ ]Inadequate Oral Intake [ ] Excessive Energy Intake     [ ] Underweight [ ] Increased Nutrient Needs [x ] Overweight/Obesity     [ ] Altered GI Function [ ] Unintended Weight Loss [ ] Food & Nutrition Related Knowledge Deficit [ ] Malnutrition          Nutrition Diagnosis is [ x] ongoing  [ ] resolved [ ] not applicable         Recommend/Plan:    [ x] Maintain present diet    [ x] Iron sulfate and Vitamin D as ordered    [ x] Encourage healthy food choices and portion control    [ ] Other:        Monitoring and Evaluation:     [x ] PO intake [ ] Tolerance to diet prescription [ x] weights [x ] follow up per protocol    Alisson Elias MS RDN

## 2023-11-17 PROCEDURE — 99231 SBSQ HOSP IP/OBS SF/LOW 25: CPT

## 2023-11-17 NOTE — BH INPATIENT PSYCHIATRY PROGRESS NOTE - NSBHFUPINTERVALHXFT_PSY_A_CORE
Refusing vitals, refusing PO medications, showered yesterday with staff encouragement     Refusing vitals, refusing PO medications, showered yesterday with staff encouragement.    Patient seen laying in bed. Approached by medical student. Patient stating "get him out" "why would you bring Satan in here" and "turn them off."

## 2023-11-17 NOTE — BH INPATIENT PSYCHIATRY PROGRESS NOTE - NSBHASSESSSUMMFT_PSY_ALL_CORE
Patient is a 53 year old adult (goes by "Juan Jose"), living at [facility] on [place] grounds, PMH of GERD, anemia, PPH of schizophrenia, selective mutism, currently with [facility] ACT team, previously with [facility] which , with history of multiple psychiatric hospitalizations, without known history of self-injury or suicide attempts, with history of aggression in the setting on nonadherence with medication, who was initially brought in by staff at residence for increasing agitation and aggression in the setting of 2 months of medication nonadherence. Patient was hospitalized from 8/3-23 on 1[facility] where they were noted to be hostile, disorganized, threatening, malodorous and grossly psychotic. TOO was obtained, and patient eventually administered dual long-acting injectables (Haldol Decanoate and Zyprexa Relprevv), though had declined nearly all PO medications. Patient was transferred to [facility] where they were admitted due to anemia to 6 (medical workup largely unremarkable), patient stabilized and transferred back to [facility] for further management of psychosis.     Diagnostically, presentation consistent with exacerbation of schizophrenia.     Patient has shown overall very little clinical improvement since admission. Spends most of day isolative and talking to self in severely disorganized and largely incomprehensible fashion. Has showered recently with significant staff encouragement. No recent physical aggression, though can appear mistrustful and make vaguely threatening remarks. Today, stated he was going to "beat" this writer, and asked medical student to "turn her off." Sleep improved recently. Still declining all PO medications, on high dose haloperidol BREWER.    Patient requires inpatient admission due to very poor self-care and for containment, stabilization, medication management and aftercare planning.    Plan:  1. Brink: admitted involuntary , TOO obtained 10/24  2. Observation: routine checks  3. Collateral: obtained collateral from Located [facility] psychiatrist  4. Psychiatric  -Continue Haldol Decanoate 200mg IM q4 weeks, last given 10/25, next due   	-Had received Zyprexa Replevv 405mg qMonth (given )--though no observable benefit above Haldol Decanoate, so will defer at this time  -Continue clozapine 25mg QHS (refusing), d/c'ed olanzapine IM backup order d/t tremulousness   -Continue depakote 1000mg QHS (refusing)  -Continue benztropine 1mg BID for EPS (refusing)  5. Medical:  -Anemia: s/p medical admission at [facility] for anemia to Hgb 6, required 2u pRBCs, most recent Hgb 8.8-9. GI workup unremarkable with exception of diverticulosis and colonic polyp  	-Repeat CBC with stable anemia (Hgb 9.2)  	-Continue ferrous sulfate 325mg daily (refusing)  -Bowel regimen: continue bisacodyl 10mg QHS (refusing), miralax 17g daily (refusing), senna 2 tablets QHS (refusing)  -Continue Vitamin D3 1000units QHS (refusing)  6. PRNs  -Olanzapine 5mg PO or IM for agitation  7. Therapy   -Individual, group and milieu therapy as appropriate   8. Disposition: state application submitted  Patient is a 53 year old adult (goes by "Juan Jose"), living at [facility] on [place] grounds, PMH of GERD, anemia, PPH of schizophrenia, selective mutism, currently with [facility] ACT team, previously with [facility] which , with history of multiple psychiatric hospitalizations, without known history of self-injury or suicide attempts, with history of aggression in the setting on nonadherence with medication, who was initially brought in by staff at residence for increasing agitation and aggression in the setting of 2 months of medication nonadherence. Patient was hospitalized from 8/3-23 on [facility] where they were noted to be hostile, disorganized, threatening, malodorous and grossly psychotic. TOO was obtained, and patient eventually administered dual long-acting injectables (Haldol Decanoate and Zyprexa Relprevv), though had declined nearly all PO medications. Patient was transferred to [facility] where they were admitted due to anemia to 6 (medical workup largely unremarkable), patient stabilized and transferred back to [facility] for further management of psychosis.     Diagnostically, presentation consistent with exacerbation of schizophrenia.     Patient has shown overall very little clinical improvement since admission. Spends most of day isolative and talking to self in severely disorganized and largely incomprehensible fashion, with only very brief moments of reality based exchange with staff. Has showered recently with significant staff encouragement. No recent physical aggression, though can appear mistrustful and make vaguely threatening remarks. Sleep erratic, and very poor last night. Still declining all PO medications, on high dose haloperidol BREWER.    Patient requires inpatient admission due to very poor self-care and for containment, stabilization, medication management and aftercare planning.    Plan:  1. Brink: admitted involuntary , TOO obtained 10/24  2. Observation: routine checks  3. Collateral: obtained collateral from [facility] psychiatrist  4. Psychiatric  -Continue Haldol Decanoate 200mg IM q4 weeks, last given 10/25, next due   	-Had received Zyprexa Replevv 405mg qMonth (given )--though no observable benefit above Haldol Decanoate, so will defer at this time  -Continue clozapine 25mg QHS (refusing), d/c'ed olanzapine IM backup order d/t tremulousness   -Continue depakote 1000mg QHS (refusing)  -Continue benztropine 1mg BID for EPS (refusing)  5. Medical:  -Anemia: s/p medical admission at [facility] for anemia to Hgb 6, required 2u pRBCs, most recent Hgb 8.8-9. GI workup unremarkable with exception of diverticulosis and colonic polyp  	-Repeat CBC with stable anemia (Hgb 9.2)  	-Continue ferrous sulfate 325mg daily (refusing)  -Bowel regimen: continue bisacodyl 10mg QHS (refusing), miralax 17g daily (refusing), senna 2 tablets QHS (refusing)  -Continue Vitamin D3 1000units QHS (refusing)  6. PRNs  -Olanzapine 5mg PO or IM for agitation  7. Therapy   -Individual, group and milieu therapy as appropriate   8. Disposition: state application submitted

## 2023-11-17 NOTE — BH INPATIENT PSYCHIATRY PROGRESS NOTE - MSE UNSTRUCTURED FT
Appearance: laying in bed, less malodorous  Behavior: uncooperative, mistrustful appearing, UE tremor noted  Speech: loud  Mood: unable to assess  Affect: irritable, tense  Thought process: severely disorganized  Thought content: vaguely violent/threatening statements such as "turn her off" "I'm going to beat you" and "GET OUT"  Perceptions: observed talking to self  Insight: very poor  Judgement: very poor, refusing vitals intermittently, refusing PO medications, very poor ADLs  Cognition: unable to assess Appearance: laying in bed, less malodorous  Behavior: uncooperative, mistrustful appearing  Speech: loud  Mood: unable to assess  Affect: irritable, tense  Thought process: severely disorganized  Thought content: vaguely violent/threatening statements such as "turn her off" "get him out"  Perceptions: observed talking to self  Insight: very poor  Judgement: very poor, refusing vitals intermittently, refusing PO medications, very poor ADLs  Cognition: unable to assess

## 2023-11-17 NOTE — BH INPATIENT PSYCHIATRY PROGRESS NOTE - NSBHCHARTREVIEWVS_PSY_A_CORE FT
Vital Signs Last 24 Hrs  T(C): --  T(F): --  HR: --  BP: --  BP(mean): --  RR: --  SpO2: --    Orthostatic VS  11-15-23 @ 19:15  Lying BP: --/-- HR: --  Sitting BP: 99/51 HR: 69  Standing BP: 99/50 HR: 74  Site: --  Mode: --

## 2023-11-20 PROCEDURE — 99231 SBSQ HOSP IP/OBS SF/LOW 25: CPT

## 2023-11-20 NOTE — BH INPATIENT PSYCHIATRY PROGRESS NOTE - NSBHMETABOLIC_PSY_ALL_CORE_FT
BMI: BMI (kg/m2): 35.2 (10-12-23 @ 17:39)  HbA1c: A1C with Estimated Average Glucose Result: 5.3 % (09-29-23 @ 10:00)    Glucose: POCT Blood Glucose.: 100 mg/dL (12-19-22 @ 15:06)    BP: 109/77 (11-20-23 @ 10:05) (109/77 - 109/77)  Lipid Panel: Date/Time: 09-29-23 @ 10:00  Cholesterol, Serum: 166  Direct LDL: --  HDL Cholesterol, Serum: 60  Total Cholesterol/HDL Ration Measurement: --  Triglycerides, Serum: 61

## 2023-11-20 NOTE — BH INPATIENT PSYCHIATRY PROGRESS NOTE - NSBHASSESSSUMMFT_PSY_ALL_CORE
Patient is a 53 year old adult (goes by "Juan Jose"), living at [facility] on [place] grounds, PMH of GERD, anemia, PPH of schizophrenia, selective mutism, currently with [facility] ACT team, previously with [facility] which , with history of multiple psychiatric hospitalizations, without known history of self-injury or suicide attempts, with history of aggression in the setting on nonadherence with medication, who was initially brought in by staff at residence for increasing agitation and aggression in the setting of 2 months of medication nonadherence. Patient was hospitalized from 8/3-23 on 1N where they were noted to be hostile, disorganized, threatening, malodorous and grossly psychotic. TOO was obtained, and patient eventually administered dual long-acting injectables (Haldol Decanoate and Zyprexa Relprevv), though had declined nearly all PO medications. Patient was transferred to [facility] where they were admitted due to anemia to 6 (medical workup largely unremarkable), patient stabilized and transferred back to [facility] for further management of psychosis.     Diagnostically, presentation consistent with exacerbation of schizophrenia.     Patient has shown overall very little clinical improvement since admission. Spends most of day isolative and talking to self in severely disorganized and largely incomprehensible fashion, with only very brief moments of reality based exchange with staff or peers. Has showered recently with significant staff encouragement. No recent physical aggression, though can appear mistrustful and make vaguely threatening remarks. Sleep erratic, and very poor last night. Still declining all PO medications, on high dose haloperidol BREWER.    Today, patient with what seems to be mechanical fall in shower. Vitals stable, no injuries apparent or reported. PT consult. Per [facility] review, further questions regarding anemia, will discuss with hospitalist tomorrow. Will obtain labs for tomorrow.    Patient requires inpatient admission due to very poor self-care and for containment, stabilization, medication management and aftercare planning.    Plan:  1. Brink: admitted involuntary , TOO obtained 10/24  2. Observation: routine checks  3. Collateral: obtained collateral from [facility] psychiatrist  4. Psychiatric  -Continue Haldol Decanoate 200mg IM q4 weeks, last given 10/25, next due   	-Had received Zyprexa Replevv 405mg qMonth (given )--though no observable benefit above Haldol Decanoate, so will defer at this time  -Continue clozapine 25mg QHS (refusing), d/c'ed olanzapine IM backup order d/t tremulousness   -Continue depakote 1000mg QHS (refusing)  -Continue benztropine 1mg BID for EPS (refusing)  5. Medical:  -Anemia: s/p medical admission at [facility] for anemia to Hgb 6, required 2u pRBCs, most recent Hgb 8.8-9. GI workup unremarkable with exception of diverticulosis and colonic polyp  	-Repeat CBC with stable anemia (Hgb 9.2)  	-Continue ferrous sulfate 325mg daily (refusing)  	-Discuss with hospitalist tomorrow  -Repeat labs for [facility] review tomorrow  -FALL on : likely mechanical, no sustained injuries. PT consult ordered.  -Bowel regimen: continue bisacodyl 10mg QHS (refusing), miralax 17g daily (refusing), senna 2 tablets QHS (refusing)  -Continue Vitamin D3 1000units QHS (refusing)  6. PRNs  -Olanzapine 5mg PO or IM for agitation  7. Therapy   -Individual, group and milieu therapy as appropriate   8. Disposition: state application submitted

## 2023-11-20 NOTE — BH INPATIENT PSYCHIATRY PROGRESS NOTE - NSBHFUPINTERVALHXFT_PSY_A_CORE
Refusing vitals and PO medications over weekend, no acute events over weekend. Today, slipped in the shower, hit buttocks, not head strike no complaints following --> /77, HR 89. Slept 2 hours last night.    Patient seen looking for lunch tray. Makes comment to peer about lunch tray. When attempting to interview patient, looks at this writer and states loudly "kill her off."

## 2023-11-20 NOTE — BH INPATIENT PSYCHIATRY PROGRESS NOTE - MSE UNSTRUCTURED FT
Appearance: walking around hallway wearing hospital attire  Behavior: uncooperative, mistrustful appearing  Speech: loud  Mood: unable to assess  Affect: irritable, tense  Thought process: severely disorganized  Thought content: vaguely violent/threatening statements such as "kill her off" today  Perceptions: observed talking to self  Insight: very poor  Judgement: very poor, refusing vitals intermittently, refusing PO medications, very poor ADLs  Cognition: unable to assess

## 2023-11-20 NOTE — BH INPATIENT PSYCHIATRY PROGRESS NOTE - NSBHCHARTREVIEWVS_PSY_A_CORE FT
Vital Signs Last 24 Hrs  T(C): --  T(F): --  HR: 89 (11-20-23 @ 10:05) (89 - 89)  BP: 109/77 (11-20-23 @ 10:05) (109/77 - 109/77)  BP(mean): --  RR: 17 (11-20-23 @ 10:05) (17 - 17)  SpO2: --

## 2023-11-21 DIAGNOSIS — D64.9 ANEMIA, UNSPECIFIED: ICD-10-CM

## 2023-11-21 PROCEDURE — 99231 SBSQ HOSP IP/OBS SF/LOW 25: CPT

## 2023-11-21 RX ORDER — HALOPERIDOL DECANOATE 100 MG/ML
200 INJECTION INTRAMUSCULAR ONCE
Refills: 0 | Status: COMPLETED | OUTPATIENT
Start: 2023-11-22 | End: 2023-11-22

## 2023-11-21 RX ORDER — TUBERCULIN PURIFIED PROTEIN DERIVATIVE 5 [IU]/.1ML
5 INJECTION, SOLUTION INTRADERMAL ONCE
Refills: 0 | Status: COMPLETED | OUTPATIENT
Start: 2023-11-21 | End: 2023-11-22

## 2023-11-21 NOTE — BH INPATIENT PSYCHIATRY PROGRESS NOTE - NSBHCHARTREVIEWVS_PSY_A_CORE FT
Vital Signs Last 24 Hrs  T(C): --  T(F): --  HR: 89 (11-20-23 @ 10:05) (89 - 89)  BP: 109/77 (11-20-23 @ 10:05) (109/77 - 109/77)  BP(mean): --  RR: 17 (11-20-23 @ 10:05) (17 - 17)  SpO2: --     Vital Signs Last 24 Hrs  T(C): --  T(F): --  HR: --  BP: --  BP(mean): --  RR: --  SpO2: --

## 2023-11-21 NOTE — BH INPATIENT PSYCHIATRY PROGRESS NOTE - NSBHMETABOLIC_PSY_ALL_CORE_FT
BMI: BMI (kg/m2): 35.2 (10-12-23 @ 17:39)  HbA1c: A1C with Estimated Average Glucose Result: 5.3 % (09-29-23 @ 10:00)    Glucose: POCT Blood Glucose.: 100 mg/dL (12-19-22 @ 15:06)    BP: 109/77 (11-20-23 @ 10:05) (109/77 - 109/77)Vital Signs Last 24 Hrs  T(C): --  T(F): --  HR: 89 (11-20-23 @ 10:05) (89 - 89)  BP: 109/77 (11-20-23 @ 10:05) (109/77 - 109/77)  BP(mean): --  RR: 17 (11-20-23 @ 10:05) (17 - 17)  SpO2: --      Lipid Panel: Date/Time: 09-29-23 @ 10:00  Cholesterol, Serum: 166  LDL Cholesterol Calculated: 94  HDL Cholesterol, Serum: 60  Total Cholesterol/HDL Ration Measurement: --  Triglycerides, Serum: 61   BMI: BMI (kg/m2): 35.2 (10-12-23 @ 17:39)  HbA1c: A1C with Estimated Average Glucose Result: 5.3 % (09-29-23 @ 10:00)    Glucose: POCT Blood Glucose.: 100 mg/dL (12-19-22 @ 15:06)    BP: 109/77 (11-20-23 @ 10:05) (109/77 - 109/77)Vital Signs Last 24 Hrs  T(C): --  T(F): --  HR: --  BP: --  BP(mean): --  RR: --  SpO2: --      Lipid Panel: Date/Time: 09-29-23 @ 10:00  Cholesterol, Serum: 166  LDL Cholesterol Calculated: 94  HDL Cholesterol, Serum: 60  Total Cholesterol/HDL Ration Measurement: --  Triglycerides, Serum: 61

## 2023-11-21 NOTE — BH INPATIENT PSYCHIATRY PROGRESS NOTE - NSBHFUPINTERVALHXFT_PSY_A_CORE
Refusing vitals, refusing PO medications, no acute events.  Refusing vitals, refusing PO medications, no acute events. Slept 6 hours overnight.    Patient seen awake, laying in bed. Internally pre-occupied, talking to self, incoherent thought does at one point state "burn them all up."

## 2023-11-21 NOTE — BH INPATIENT PSYCHIATRY PROGRESS NOTE - NSBHASSESSSUMMFT_PSY_ALL_CORE
Patient is a 53 year old adult (goes by "Juan Jose"), living at [facility] on [place] grounds, PMH of GERD, anemia, PPH of schizophrenia, selective mutism, currently with [facility] ACT team, previously with [facility] which , with history of multiple psychiatric hospitalizations, without known history of self-injury or suicide attempts, with history of aggression in the setting on nonadherence with medication, who was initially brought in by staff at residence for increasing agitation and aggression in the setting of 2 months of medication nonadherence. Patient was hospitalized from 8/3-23 on 1N where they were noted to be hostile, disorganized, threatening, malodorous and grossly psychotic. TOO was obtained, and patient eventually administered dual long-acting injectables (Haldol Decanoate and Zyprexa Relprevv), though had declined nearly all PO medications. Patient was transferred to [facility] where they were admitted due to anemia to 6 (medical workup largely unremarkable), patient stabilized and transferred back to [facility] for further management of psychosis.     Diagnostically, presentation consistent with exacerbation of schizophrenia.     Patient has shown overall very little clinical improvement since admission. Spends most of day isolative and talking to self in severely disorganized and largely incomprehensible fashion, with only very brief moments of reality based exchange with staff or peers. Has showered recently with significant staff encouragement. No recent physical aggression, though can appear mistrustful and make vaguely threatening remarks. Sleep erratic, and very poor last night. Still declining all PO medications, on high dose haloperidol BREWER.    Today, patient with what seems to be mechanical fall in shower. Vitals stable, no injuries apparent or reported. PT consult. Per [facility] review, further questions regarding anemia, will discuss with hospitalist tomorrow. Will obtain labs for tomorrow.    Patient requires inpatient admission due to very poor self-care and for containment, stabilization, medication management and aftercare planning.    Plan:  1. Brink: admitted involuntary , TOO obtained 10/24  2. Observation: routine checks  3. Collateral: obtained collateral from [facility] psychiatrist  4. Psychiatric  -Continue Haldol Decanoate 200mg IM q4 weeks, last given 10/25, next due   	-Had received Zyprexa Replevv 405mg qMonth (given )--though no observable benefit above Haldol Decanoate, so will defer at this time  -Continue clozapine 25mg QHS (refusing), d/c'ed olanzapine IM backup order d/t tremulousness   -Continue depakote 1000mg QHS (refusing)  -Continue benztropine 1mg BID for EPS (refusing)  5. Medical:  -Anemia: s/p medical admission at [facility] for anemia to Hgb 6, required 2u pRBCs, most recent Hgb 8.8-9. GI workup unremarkable with exception of diverticulosis and colonic polyp  	-Repeat CBC with stable anemia (Hgb 9.2)  	-Continue ferrous sulfate 325mg daily (refusing)  	-Discuss with hospitalist tomorrow  -Repeat labs for [facility] review tomorrow  -FALL on : likely mechanical, no sustained injuries. PT consult ordered.  -Bowel regimen: continue bisacodyl 10mg QHS (refusing), miralax 17g daily (refusing), senna 2 tablets QHS (refusing)  -Continue Vitamin D3 1000units QHS (refusing)  6. PRNs  -Olanzapine 5mg PO or IM for agitation  7. Therapy   -Individual, group and milieu therapy as appropriate   8. Disposition: state application submitted  Patient is a 53 year old adult (goes by "Juan Jose"), living at [facility] on [place] grounds, PMH of GERD, anemia, PPH of schizophrenia, selective mutism, currently with [facility] ACT team, previously with [facility] which , with history of multiple psychiatric hospitalizations, without known history of self-injury or suicide attempts, with history of aggression in the setting on nonadherence with medication, who was initially brought in by staff at residence for increasing agitation and aggression in the setting of 2 months of medication nonadherence. Patient was hospitalized from 8/3-23 on 1N where they were noted to be hostile, disorganized, threatening, malodorous and grossly psychotic. TOO was obtained, and patient eventually administered dual long-acting injectables (Haldol Decanoate and Zyprexa Relprevv), though had declined nearly all PO medications. Patient was transferred to [facility] where they were admitted due to anemia to 6 (medical workup largely unremarkable), patient stabilized and transferred back to [facility] for further management of psychosis.     Diagnostically, presentation consistent with exacerbation of schizophrenia.     Patient has shown overall very little clinical improvement since admission. Spends most of day isolative and talking to self in severely disorganized and largely incomprehensible fashion, with only very brief moments of reality based exchange with staff or peers. Has showered recently with significant staff encouragement. No recent physical aggression, though can appear mistrustful and make vaguely threatening remarks. Sleep erratic, better last night. Still declining all PO medications, on high dose haloperidol BREWER.    Per [facility] review, further questions regarding anemia, reached out to internist to review, awaiting response. New labs ordered.    Patient requires inpatient admission due to very poor self-care and for containment, stabilization, medication management and aftercare planning.    Plan:  1. Brink: admitted involuntary , TOO obtained 10/24  2. Observation: routine checks  3. Collateral: obtained collateral from [facility] psychiatrist  4. Psychiatric  -Continue Haldol Decanoate 200mg IM q4 weeks, last given 10/25, next due   	-Had received Zyprexa Replevv 405mg qMonth (given )--though no observable benefit above Haldol Decanoate, so will defer at this time  -Continue clozapine 25mg QHS (refusing), d/c'ed olanzapine IM backup order d/t tremulousness   -Continue depakote 1000mg QHS (refusing)  -Continue benztropine 1mg BID for EPS (refusing)  5. Medical:  -Anemia: s/p medical admission at [facility] for anemia to Hgb 6, required 2u pRBCs, most recent Hgb 8.8-9. GI workup unremarkable with exception of diverticulosis and colonic polyp  	-Repeat CBC with stable anemia (Hgb 9.2)  	-Continue ferrous sulfate 325mg daily (refusing)  -Reached out to internist to review [facility]'s concerns, awaiting response  -Repeat labs for [facility] review   -FALL on : likely mechanical, no sustained injuries. PT consult ordered.  -Bowel regimen: continue bisacodyl 10mg QHS (refusing), miralax 17g daily (refusing), senna 2 tablets QHS (refusing)  -Continue Vitamin D3 1000units QHS (refusing)  6. PRNs  -Olanzapine 5mg PO or IM for agitation  7. Therapy   -Individual, group and milieu therapy as appropriate   8. Disposition: state application submitted

## 2023-11-21 NOTE — BH INPATIENT PSYCHIATRY PROGRESS NOTE - MSE UNSTRUCTURED FT
Appearance: walking around hallway wearing hospital attire  Behavior: uncooperative, mistrustful appearing  Speech: loud  Mood: unable to assess  Affect: irritable, tense  Thought process: severely disorganized  Thought content: vaguely violent/threatening statements such as "kill her off" today  Perceptions: observed talking to self  Insight: very poor  Judgement: very poor, refusing vitals intermittently, refusing PO medications, very poor ADLs  Cognition: unable to assess Appearance: laying in bed, hospital attire  Behavior: uncooperative, mistrustful appearing  Speech: normal rate, normal volume though incoherent  Mood: unable to assess  Affect: irritable  Thought process: severely disorganized, largely incoherent  Thought content: vaguely violent/threatening statements such as "burn them all up" today  Perceptions: observed talking to self  Insight: very poor  Judgement: very poor, refusing vitals intermittently, refusing PO medications, very poor ADLs  Cognition: unable to assess

## 2023-11-22 LAB
ALBUMIN SERPL ELPH-MCNC: 3.8 G/DL — SIGNIFICANT CHANGE UP (ref 3.3–5)
ALBUMIN SERPL ELPH-MCNC: 3.8 G/DL — SIGNIFICANT CHANGE UP (ref 3.3–5)
ALP SERPL-CCNC: 123 U/L — HIGH (ref 40–120)
ALP SERPL-CCNC: 123 U/L — HIGH (ref 40–120)
ALT FLD-CCNC: 10 U/L — SIGNIFICANT CHANGE UP (ref 4–33)
ALT FLD-CCNC: 10 U/L — SIGNIFICANT CHANGE UP (ref 4–33)
ANION GAP SERPL CALC-SCNC: 10 MMOL/L — SIGNIFICANT CHANGE UP (ref 7–14)
ANION GAP SERPL CALC-SCNC: 10 MMOL/L — SIGNIFICANT CHANGE UP (ref 7–14)
AST SERPL-CCNC: 10 U/L — SIGNIFICANT CHANGE UP (ref 4–32)
AST SERPL-CCNC: 10 U/L — SIGNIFICANT CHANGE UP (ref 4–32)
BASOPHILS # BLD AUTO: 0.03 K/UL — SIGNIFICANT CHANGE UP (ref 0–0.2)
BASOPHILS # BLD AUTO: 0.03 K/UL — SIGNIFICANT CHANGE UP (ref 0–0.2)
BASOPHILS NFR BLD AUTO: 0.6 % — SIGNIFICANT CHANGE UP (ref 0–2)
BASOPHILS NFR BLD AUTO: 0.6 % — SIGNIFICANT CHANGE UP (ref 0–2)
BILIRUB SERPL-MCNC: 0.2 MG/DL — SIGNIFICANT CHANGE UP (ref 0.2–1.2)
BILIRUB SERPL-MCNC: 0.2 MG/DL — SIGNIFICANT CHANGE UP (ref 0.2–1.2)
BUN SERPL-MCNC: 17 MG/DL — SIGNIFICANT CHANGE UP (ref 7–23)
BUN SERPL-MCNC: 17 MG/DL — SIGNIFICANT CHANGE UP (ref 7–23)
CALCIUM SERPL-MCNC: 9.1 MG/DL — SIGNIFICANT CHANGE UP (ref 8.4–10.5)
CALCIUM SERPL-MCNC: 9.1 MG/DL — SIGNIFICANT CHANGE UP (ref 8.4–10.5)
CHLORIDE SERPL-SCNC: 108 MMOL/L — HIGH (ref 98–107)
CHLORIDE SERPL-SCNC: 108 MMOL/L — HIGH (ref 98–107)
CK SERPL-CCNC: 38 U/L — SIGNIFICANT CHANGE UP (ref 25–170)
CK SERPL-CCNC: 38 U/L — SIGNIFICANT CHANGE UP (ref 25–170)
CO2 SERPL-SCNC: 24 MMOL/L — SIGNIFICANT CHANGE UP (ref 22–31)
CO2 SERPL-SCNC: 24 MMOL/L — SIGNIFICANT CHANGE UP (ref 22–31)
CREAT SERPL-MCNC: 0.76 MG/DL — SIGNIFICANT CHANGE UP (ref 0.5–1.3)
CREAT SERPL-MCNC: 0.76 MG/DL — SIGNIFICANT CHANGE UP (ref 0.5–1.3)
EGFR: 93 ML/MIN/1.73M2 — SIGNIFICANT CHANGE UP
EGFR: 93 ML/MIN/1.73M2 — SIGNIFICANT CHANGE UP
EOSINOPHIL # BLD AUTO: 0.07 K/UL — SIGNIFICANT CHANGE UP (ref 0–0.5)
EOSINOPHIL # BLD AUTO: 0.07 K/UL — SIGNIFICANT CHANGE UP (ref 0–0.5)
EOSINOPHIL NFR BLD AUTO: 1.3 % — SIGNIFICANT CHANGE UP (ref 0–6)
EOSINOPHIL NFR BLD AUTO: 1.3 % — SIGNIFICANT CHANGE UP (ref 0–6)
FERRITIN SERPL-MCNC: 11 NG/ML — LOW (ref 13–330)
FERRITIN SERPL-MCNC: 11 NG/ML — LOW (ref 13–330)
FOLATE SERPL-MCNC: 11.3 NG/ML — SIGNIFICANT CHANGE UP (ref 3.1–17.5)
FOLATE SERPL-MCNC: 11.3 NG/ML — SIGNIFICANT CHANGE UP (ref 3.1–17.5)
GLUCOSE SERPL-MCNC: 102 MG/DL — HIGH (ref 70–99)
GLUCOSE SERPL-MCNC: 102 MG/DL — HIGH (ref 70–99)
HCT VFR BLD CALC: 30.2 % — LOW (ref 34.5–45)
HCT VFR BLD CALC: 30.2 % — LOW (ref 34.5–45)
HEMOGLOBIN INTERPRETATION: SIGNIFICANT CHANGE UP
HEMOGLOBIN INTERPRETATION: SIGNIFICANT CHANGE UP
HGB A MFR BLD: 97.5 % — SIGNIFICANT CHANGE UP (ref 95–97.6)
HGB A MFR BLD: 97.5 % — SIGNIFICANT CHANGE UP (ref 95–97.6)
HGB A2 MFR BLD: 2.1 % — LOW (ref 2.4–3.5)
HGB A2 MFR BLD: 2.1 % — LOW (ref 2.4–3.5)
HGB BLD-MCNC: 9 G/DL — LOW (ref 11.5–15.5)
HGB BLD-MCNC: 9 G/DL — LOW (ref 11.5–15.5)
HGB F MFR BLD: <1 % — SIGNIFICANT CHANGE UP (ref 0–1.5)
HGB F MFR BLD: <1 % — SIGNIFICANT CHANGE UP (ref 0–1.5)
IANC: 2.71 K/UL — SIGNIFICANT CHANGE UP (ref 1.8–7.4)
IANC: 2.71 K/UL — SIGNIFICANT CHANGE UP (ref 1.8–7.4)
IMM GRANULOCYTES NFR BLD AUTO: 0.2 % — SIGNIFICANT CHANGE UP (ref 0–0.9)
IMM GRANULOCYTES NFR BLD AUTO: 0.2 % — SIGNIFICANT CHANGE UP (ref 0–0.9)
IRON SATN MFR SERPL: 32 UG/DL — SIGNIFICANT CHANGE UP (ref 30–160)
IRON SATN MFR SERPL: 32 UG/DL — SIGNIFICANT CHANGE UP (ref 30–160)
LYMPHOCYTES # BLD AUTO: 1.87 K/UL — SIGNIFICANT CHANGE UP (ref 1–3.3)
LYMPHOCYTES # BLD AUTO: 1.87 K/UL — SIGNIFICANT CHANGE UP (ref 1–3.3)
LYMPHOCYTES # BLD AUTO: 35.6 % — SIGNIFICANT CHANGE UP (ref 13–44)
LYMPHOCYTES # BLD AUTO: 35.6 % — SIGNIFICANT CHANGE UP (ref 13–44)
MCHC RBC-ENTMCNC: 22.7 PG — LOW (ref 27–34)
MCHC RBC-ENTMCNC: 22.7 PG — LOW (ref 27–34)
MCHC RBC-ENTMCNC: 29.8 GM/DL — LOW (ref 32–36)
MCHC RBC-ENTMCNC: 29.8 GM/DL — LOW (ref 32–36)
MCV RBC AUTO: 76.3 FL — LOW (ref 80–100)
MCV RBC AUTO: 76.3 FL — LOW (ref 80–100)
MONOCYTES # BLD AUTO: 0.56 K/UL — SIGNIFICANT CHANGE UP (ref 0–0.9)
MONOCYTES # BLD AUTO: 0.56 K/UL — SIGNIFICANT CHANGE UP (ref 0–0.9)
MONOCYTES NFR BLD AUTO: 10.7 % — SIGNIFICANT CHANGE UP (ref 2–14)
MONOCYTES NFR BLD AUTO: 10.7 % — SIGNIFICANT CHANGE UP (ref 2–14)
NEUTROPHILS # BLD AUTO: 2.71 K/UL — SIGNIFICANT CHANGE UP (ref 1.8–7.4)
NEUTROPHILS # BLD AUTO: 2.71 K/UL — SIGNIFICANT CHANGE UP (ref 1.8–7.4)
NEUTROPHILS NFR BLD AUTO: 51.6 % — SIGNIFICANT CHANGE UP (ref 43–77)
NEUTROPHILS NFR BLD AUTO: 51.6 % — SIGNIFICANT CHANGE UP (ref 43–77)
NRBC # BLD: 0 /100 WBCS — SIGNIFICANT CHANGE UP (ref 0–0)
NRBC # BLD: 0 /100 WBCS — SIGNIFICANT CHANGE UP (ref 0–0)
NRBC # FLD: 0 K/UL — SIGNIFICANT CHANGE UP (ref 0–0)
NRBC # FLD: 0 K/UL — SIGNIFICANT CHANGE UP (ref 0–0)
PLATELET # BLD AUTO: 399 K/UL — SIGNIFICANT CHANGE UP (ref 150–400)
PLATELET # BLD AUTO: 399 K/UL — SIGNIFICANT CHANGE UP (ref 150–400)
POTASSIUM SERPL-MCNC: 3.4 MMOL/L — LOW (ref 3.5–5.3)
POTASSIUM SERPL-MCNC: 3.4 MMOL/L — LOW (ref 3.5–5.3)
POTASSIUM SERPL-SCNC: 3.4 MMOL/L — LOW (ref 3.5–5.3)
POTASSIUM SERPL-SCNC: 3.4 MMOL/L — LOW (ref 3.5–5.3)
PROT SERPL-MCNC: 7 G/DL — SIGNIFICANT CHANGE UP (ref 6–8.3)
PROT SERPL-MCNC: 7 G/DL — SIGNIFICANT CHANGE UP (ref 6–8.3)
RBC # BLD: 3.96 M/UL — SIGNIFICANT CHANGE UP (ref 3.8–5.2)
RBC # BLD: 3.96 M/UL — SIGNIFICANT CHANGE UP (ref 3.8–5.2)
RBC # FLD: 21.6 % — HIGH (ref 10.3–14.5)
RBC # FLD: 21.6 % — HIGH (ref 10.3–14.5)
SODIUM SERPL-SCNC: 142 MMOL/L — SIGNIFICANT CHANGE UP (ref 135–145)
SODIUM SERPL-SCNC: 142 MMOL/L — SIGNIFICANT CHANGE UP (ref 135–145)
TSH SERPL-MCNC: 1.23 UIU/ML — SIGNIFICANT CHANGE UP (ref 0.27–4.2)
TSH SERPL-MCNC: 1.23 UIU/ML — SIGNIFICANT CHANGE UP (ref 0.27–4.2)
VIT B12 SERPL-MCNC: 692 PG/ML — SIGNIFICANT CHANGE UP (ref 200–900)
VIT B12 SERPL-MCNC: 692 PG/ML — SIGNIFICANT CHANGE UP (ref 200–900)
WBC # BLD: 5.25 K/UL — SIGNIFICANT CHANGE UP (ref 3.8–10.5)
WBC # BLD: 5.25 K/UL — SIGNIFICANT CHANGE UP (ref 3.8–10.5)
WBC # FLD AUTO: 5.25 K/UL — SIGNIFICANT CHANGE UP (ref 3.8–10.5)
WBC # FLD AUTO: 5.25 K/UL — SIGNIFICANT CHANGE UP (ref 3.8–10.5)

## 2023-11-22 PROCEDURE — 99232 SBSQ HOSP IP/OBS MODERATE 35: CPT

## 2023-11-22 RX ORDER — POTASSIUM CHLORIDE 20 MEQ
40 PACKET (EA) ORAL ONCE
Refills: 0 | Status: COMPLETED | OUTPATIENT
Start: 2023-11-22 | End: 2023-11-22

## 2023-11-22 RX ADMIN — TUBERCULIN PURIFIED PROTEIN DERIVATIVE 5 UNIT(S): 5 INJECTION, SOLUTION INTRADERMAL at 09:29

## 2023-11-22 RX ADMIN — HALOPERIDOL DECANOATE 200 MILLIGRAM(S): 100 INJECTION INTRAMUSCULAR at 09:28

## 2023-11-22 NOTE — BH INPATIENT PSYCHIATRY PROGRESS NOTE - CURRENT MEDICATION
MEDICATIONS  (STANDING):  benztropine 1 milliGRAM(s) Oral two times a day  bisacodyl 10 milliGRAM(s) Oral at bedtime  cholecalciferol 1000 Unit(s) Oral at bedtime  cloZAPine 25 milliGRAM(s) Oral at bedtime  ferrous    sulfate 325 milliGRAM(s) Oral daily  haloperidol decanoate Injectable, Long Acting 200 milliGRAM(s) IntraMuscular once  polyethylene glycol 3350 17 Gram(s) Oral two times a day  PPD  5 Tuberculin Unit(s) Injectable 5 Unit(s) IntraDermal once  senna 2 Tablet(s) Oral at bedtime  valproic  acid Syrup 1000 milliGRAM(s) Oral at bedtime    MEDICATIONS  (PRN):  acetaminophen     Tablet .. 650 milliGRAM(s) Oral every 4 hours PRN Mild Pain (1 - 3), Moderate Pain (4 - 6)  OLANZapine Disintegrating Tablet 5 milliGRAM(s) Oral every 4 hours PRN agitation  OLANZapine Injectable 5 milliGRAM(s) IntraMuscular once PRN Agitation   MEDICATIONS  (STANDING):  benztropine 1 milliGRAM(s) Oral two times a day  bisacodyl 10 milliGRAM(s) Oral at bedtime  cholecalciferol 1000 Unit(s) Oral at bedtime  cloZAPine 25 milliGRAM(s) Oral at bedtime  ferrous    sulfate 325 milliGRAM(s) Oral daily  polyethylene glycol 3350 17 Gram(s) Oral two times a day  potassium chloride    Tablet ER 40 milliEquivalent(s) Oral once  senna 2 Tablet(s) Oral at bedtime  valproic  acid Syrup 1000 milliGRAM(s) Oral at bedtime    MEDICATIONS  (PRN):  acetaminophen     Tablet .. 650 milliGRAM(s) Oral every 4 hours PRN Mild Pain (1 - 3), Moderate Pain (4 - 6)  OLANZapine Disintegrating Tablet 5 milliGRAM(s) Oral every 4 hours PRN agitation  OLANZapine Injectable 5 milliGRAM(s) IntraMuscular once PRN Agitation

## 2023-11-22 NOTE — BH INPATIENT PSYCHIATRY PROGRESS NOTE - NSBHASSESSSUMMFT_PSY_ALL_CORE
Patient is a 53 year old adult (goes by "Juan Jose"), living at [facility] on [place] grounds, PMH of GERD, anemia, PPH of schizophrenia, selective mutism, currently with [facility] ACT team, previously with [facility] which , with history of multiple psychiatric hospitalizations, without known history of self-injury or suicide attempts, with history of aggression in the setting on nonadherence with medication, who was initially brought in by staff at residence for increasing agitation and aggression in the setting of 2 months of medication nonadherence. Patient was hospitalized from 8/3-23 on 1N where they were noted to be hostile, disorganized, threatening, malodorous and grossly psychotic. TOO was obtained, and patient eventually administered dual long-acting injectables (Haldol Decanoate and Zyprexa Relprevv), though had declined nearly all PO medications. Patient was transferred to [facility] where they were admitted due to anemia to 6 (medical workup largely unremarkable), patient stabilized and transferred back to [facility] for further management of psychosis.     Diagnostically, presentation consistent with exacerbation of schizophrenia.     Patient has shown overall very little clinical improvement since admission. Spends most of day isolative and talking to self in severely disorganized and largely incomprehensible fashion, with only very brief moments of reality based exchange with staff or peers. Has showered recently with significant staff encouragement. No recent physical aggression, though can appear mistrustful and make vaguely threatening remarks. Sleep erratic, better last night. Still declining all PO medications, on high dose haloperidol BREWER.    Per [facility] review, further questions regarding anemia, reached out to internist to review, awaiting response. New labs ordered.    Patient requires inpatient admission due to very poor self-care and for containment, stabilization, medication management and aftercare planning.    Plan:  1. Brink: admitted involuntary , TOO obtained 10/24  2. Observation: routine checks  3. Collateral: obtained collateral from [facility] psychiatrist  4. Psychiatric  -Continue Haldol Decanoate 200mg IM q4 weeks, last given 10/25, next due   	-Had received Zyprexa Replevv 405mg qMonth (given )--though no observable benefit above Haldol Decanoate, so will defer at this time  -Continue clozapine 25mg QHS (refusing), d/c'ed olanzapine IM backup order d/t tremulousness   -Continue depakote 1000mg QHS (refusing)  -Continue benztropine 1mg BID for EPS (refusing)  5. Medical:  -Anemia: s/p medical admission at [facility] for anemia to Hgb 6, required 2u pRBCs, most recent Hgb 8.8-9. GI workup unremarkable with exception of diverticulosis and colonic polyp  	-Repeat CBC with stable anemia (Hgb 9.2)  	-Continue ferrous sulfate 325mg daily (refusing)  -Reached out to internist to review [facility]'s concerns, awaiting response  -Repeat labs for [facility] review   -FALL on : likely mechanical, no sustained injuries. PT consult ordered.  -Bowel regimen: continue bisacodyl 10mg QHS (refusing), miralax 17g daily (refusing), senna 2 tablets QHS (refusing)  -Continue Vitamin D3 1000units QHS (refusing)  6. PRNs  -Olanzapine 5mg PO or IM for agitation  7. Therapy   -Individual, group and milieu therapy as appropriate   8. Disposition: state application submitted  Patient is a 53 year old adult (goes by "Juan Jose"), living at [facility] on [place] grounds, PMH of GERD, anemia, PPH of schizophrenia, selective mutism, currently with [facility] ACT team, previously with [facility] which , with history of multiple psychiatric hospitalizations, without known history of self-injury or suicide attempts, with history of aggression in the setting on nonadherence with medication, who was initially brought in by staff at residence for increasing agitation and aggression in the setting of 2 months of medication nonadherence. Patient was hospitalized from 8/3-23 on 1N where they were noted to be hostile, disorganized, threatening, malodorous and grossly psychotic. TOO was obtained, and patient eventually administered dual long-acting injectables (Haldol Decanoate and Zyprexa Relprevv), though had declined nearly all PO medications. Patient was transferred to [facility] where they were admitted due to anemia to 6 (medical workup largely unremarkable), patient stabilized and transferred back to [facility] for further management of psychosis.     Diagnostically, presentation consistent with exacerbation of schizophrenia.     Patient has shown overall very little clinical improvement since admission. Spends most of day isolative and talking to self in severely disorganized and largely incomprehensible fashion, with only very brief moments of reality based exchange with staff or peers. Has showered recently with significant staff encouragement. No recent physical aggression, though can appear mistrustful and make vaguely threatening remarks. Sleep erratic, poor last night. Still declining all PO medications, on high dose haloperidol BREWER, received today.    Per [facility] review, further questions regarding anemia and medical stability, reached out to internist to review. Repeat labs ordered, some resulted and overall consistent with known chronic anemia (Hgb 9, ferritin 11, similar to prior). Mild hypokalemia (K 3.4), ordered repletion today. Hospitalist to evaluate on Friday to further address [facility] concerns.    Patient requires inpatient admission due to very poor self-care and for containment, stabilization, medication management and aftercare planning.    Plan:  1. Brink: admitted involuntary , TOO obtained 10/24  2. Observation: routine checks  3. Collateral: obtained collateral from [facility] psychiatrist  4. Psychiatric  -Continue Haldol Decanoate 200mg IM q4 weeks, last given , next due   	-Had received Zyprexa Replevv 405mg qMonth (given )--though no observable benefit above Haldol Decanoate, so will defer at this time  -Continue clozapine 25mg QHS (refusing), d/c'ed olanzapine IM backup order d/t tremulousness   -Continue depakote 1000mg QHS (refusing)  -Continue benztropine 1mg BID for EPS (refusing)  5. Medical:  -Anemia: s/p medical admission at [facility] for anemia to Hgb 6, required 2u pRBCs, most recent Hgb 8.8-9. GI workup unremarkable with exception of diverticulosis and colonic polyp  	-Repeat CBCs with stable anemia (Hgb low 9s)  	-Continue ferrous sulfate 325mg daily (refusing)  -Reached out to internist to review [facility]'s concerns, plans to see Friday once all labs returned  -FALL on : likely mechanical, no sustained injuries. PT consult ordered.  -Bowel regimen: continue bisacodyl 10mg QHS (refusing), miralax 17g daily (refusing), senna 2 tablets QHS (refusing)  -Continue Vitamin D3 1000units QHS (refusing)  6. PRNs  -Olanzapine 5mg PO or IM for agitation  7. Therapy   -Individual, group and milieu therapy as appropriate   8. Disposition: state application submitted

## 2023-11-22 NOTE — BH INPATIENT PSYCHIATRY PROGRESS NOTE - NSBHFUPINTERVALHXFT_PSY_A_CORE
Refusing vitals, refusing PO medications, no acute events.  Refusing vitals, refusing PO medications, no acute events. Slept 3 hours overnight per sleep log.    Patient found laying in bed. Talking to self, stating "burn her up" and "thank you for turning her off." Malodorous.

## 2023-11-22 NOTE — BH INPATIENT PSYCHIATRY PROGRESS NOTE - NSBHATTESTBILLING_PSY_A_CORE
08304-Miyrwkheob OBS or IP - low complexity OR 25-34 mins 96631-Qknurdvaeg OBS or IP - moderate complexity OR 35-49 mins

## 2023-11-22 NOTE — BH INPATIENT PSYCHIATRY PROGRESS NOTE - MSE UNSTRUCTURED FT
Appearance: laying in bed, hospital attire  Behavior: uncooperative, mistrustful appearing  Speech: normal rate, normal volume though incoherent  Mood: unable to assess  Affect: irritable  Thought process: severely disorganized, largely incoherent  Thought content: vaguely violent/threatening statements such as "burn them all up" today  Perceptions: observed talking to self  Insight: very poor  Judgement: very poor, refusing vitals intermittently, refusing PO medications, very poor ADLs  Cognition: unable to assess Appearance: laying in bed, wearing hospital attire  Behavior: uncooperative, mistrustful appearing  Speech: normal rate, normal volume though incoherent  Mood: unable to assess  Affect: irritable  Thought process: severely disorganized, largely incoherent  Thought content: vaguely violent/threatening statements such as "burn her up"   Perceptions: observed talking to self  Insight: very poor  Judgement: very poor, refusing vitals intermittently, refusing PO medications, very poor ADLs  Cognition: unable to assess

## 2023-11-22 NOTE — BH INPATIENT PSYCHIATRY PROGRESS NOTE - NSBHMETABOLIC_PSY_ALL_CORE_FT
BMI: BMI (kg/m2): 35.2 (10-12-23 @ 17:39)  HbA1c: A1C with Estimated Average Glucose Result: 5.3 % (09-29-23 @ 10:00)    Glucose: POCT Blood Glucose.: 100 mg/dL (12-19-22 @ 15:06)    BP: 109/77 (11-20-23 @ 10:05) (109/77 - 109/77)Vital Signs Last 24 Hrs  T(C): --  T(F): --  HR: --  BP: --  BP(mean): --  RR: --  SpO2: --      Lipid Panel: Date/Time: 09-29-23 @ 10:00  Cholesterol, Serum: 166  LDL Cholesterol Calculated: 94  HDL Cholesterol, Serum: 60  Total Cholesterol/HDL Ration Measurement: --  Triglycerides, Serum: 61

## 2023-11-24 PROCEDURE — 99233 SBSQ HOSP IP/OBS HIGH 50: CPT

## 2023-11-24 PROCEDURE — 99232 SBSQ HOSP IP/OBS MODERATE 35: CPT

## 2023-11-24 RX ADMIN — TUBERCULIN PURIFIED PROTEIN DERIVATIVE 5 UNIT(S): 5 INJECTION, SOLUTION INTRADERMAL at 14:18

## 2023-11-24 NOTE — BH INPATIENT PSYCHIATRY PROGRESS NOTE - NSBHASSESSSUMMFT_PSY_ALL_CORE
Patient is a 53 year old adult (goes by "Juan Jose"), living at [facility] on [place] grounds, PMH of GERD, anemia, PPH of schizophrenia, selective mutism, currently with [facility] ACT team, previously with [facility] which , with history of multiple psychiatric hospitalizations, without known history of self-injury or suicide attempts, with history of aggression in the setting on nonadherence with medication, who was initially brought in by staff at residence for increasing agitation and aggression in the setting of 2 months of medication nonadherence. Patient was hospitalized from 8/3-23 on 1N where they were noted to be hostile, disorganized, threatening, malodorous and grossly psychotic. TOO was obtained, and patient eventually administered dual long-acting injectables (Haldol Decanoate and Zyprexa Relprevv), though had declined nearly all PO medications. Patient was transferred to [facility] where they were admitted due to anemia to 6 (medical workup largely unremarkable), patient stabilized and transferred back to [facility] for further management of psychosis.     Diagnostically, presentation consistent with exacerbation of schizophrenia.     Patient has shown overall very little clinical improvement since admission. Spends most of day isolative and talking to self in severely disorganized and largely incomprehensible fashion, with only very brief moments of reality based exchange with staff or peers. Has showered recently with significant staff encouragement. No recent physical aggression, though can appear mistrustful and make vaguely threatening remarks. Sleep erratic, poor last night. Still declining all PO medications, on high dose haloperidol BREWER, received today.    Per [facility] review, further questions regarding anemia and medical stability, reached out to internist to review. Repeat labs ordered, some resulted and overall consistent with known chronic anemia (Hgb 9, ferritin 11, similar to prior). Mild hypokalemia (K 3.4), ordered repletion today. Hospitalist to evaluate on Friday to further address [facility] concerns.    Patient requires inpatient admission due to very poor self-care and for containment, stabilization, medication management and aftercare planning.    Plan:  1. Brink: admitted involuntary , TOO obtained 10/24  2. Observation: routine checks  3. Collateral: obtained collateral from [facility] psychiatrist  4. Psychiatric  -Continue Haldol Decanoate 200mg IM q4 weeks, last given , next due   	-Had received Zyprexa Replevv 405mg qMonth (given )--though no observable benefit above Haldol Decanoate, so will defer at this time  -Continue clozapine 25mg QHS (refusing), d/c'ed olanzapine IM backup order d/t tremulousness   -Continue depakote 1000mg QHS (refusing)  -Continue benztropine 1mg BID for EPS (refusing)  5. Medical:  -Anemia: s/p medical admission at [facility] for anemia to Hgb 6, required 2u pRBCs, most recent Hgb 8.8-9. GI workup unremarkable with exception of diverticulosis and colonic polyp  	-Repeat CBCs with stable anemia (Hgb low 9s)  	-Continue ferrous sulfate 325mg daily (refusing)  -Reached out to internist to review [facility]'s concerns, plans to see Friday once all labs returned  -FALL on : likely mechanical, no sustained injuries. PT consult ordered.  -Bowel regimen: continue bisacodyl 10mg QHS (refusing), miralax 17g daily (refusing), senna 2 tablets QHS (refusing)  -Continue Vitamin D3 1000units QHS (refusing)  6. PRNs  -Olanzapine 5mg PO or IM for agitation  7. Therapy   -Individual, group and milieu therapy as appropriate   8. Disposition: state application submitted

## 2023-11-24 NOTE — CONSULT NOTE ADULT - CONSULT REASON
Asked by attending to see this 53 year old female with Schizophrenia now with exacerbation.  Paper work being prepared for transfer to [facility].  [facility] has several questions regarding patient's medical status.  Pt has a long hx of Iron Deficiency Anemia.  Pt originally presented with HG of 6.3.  Pt admitted to [facility] and seen by GI.  Pt s/p endoscopy 10/05/2023:  Grade IV hiatal hernia, normal stomach and normal duodenum.  Colonoscopy positive for a few diverticulum sigmoid colon and 8 mm polyp asc colon tubular adenoma.  Biopsies path:  stain neg for H. Pylori. Duod Bx mild mod chronic duodenitis, Gastric Bx chronic and mild gastritis, neg for metaplasia and dysplasia.  CT of AB/P W/O C:  HIATAL HERNIA, FIBROID UTERUS, SMALL FAT CONTAINING UMBILICAL HERNIA AND CHOLELITHIASIS.   Pt with low ferritin 11 and low Iron 32 but refusing iron supplements.   Pt at [facility] s/p transfusion with 2 u pRBCs.  Pt's H/H remains stable with last one 9.0/30.2.

## 2023-11-24 NOTE — BH INPATIENT PSYCHIATRY PROGRESS NOTE - NSBHFUPINTERVALHXFT_PSY_A_CORE
Patient is little changed.  Patient continues to refuse vitals, is refusing PO medications, no acute events overnight. Remains isolative.  Patient found laying in bed. Talking to self.  Malodorous.  Refused to engage in interview, yelling, "get out!"

## 2023-11-24 NOTE — CONSULT NOTE ADULT - ASSESSMENT
53 year old female with Schizophrenia now with exacerbation and requires transfer to [facility] for ongoing treatment.  Pt has a hx of chronic Iron Def Anemia with  exacerbation prior to admission with HG 6.0.  Pt admitted to [facility] and transfussed 2 u pRBCs. Pt evaluated by GI s/p endoscopy, colonoscopy, CT ABD/P.  No clear etiology to drop in H/H except for low Iron and Ferritin.  Pt not complaint with iron replacement.  Pt's H/H has now remained stable for over one month at H/H  approximately 9/30.  1.  CHRONIC IRON DEF ANEMIA:  ENCOURAGE PT TO TAKE DAILY IRON REPLACEMENT.  HG ELECTROPHOSES DONE AND C/W SERUM IRON DEF AND OR ALPHA THALASSEMIA TRAIT OR ANEMIA OF CHRONIC DISEASE.   PT'S IRON LEVEL 32 AND FERRITIN 11 BOTH LOW AND REQUIRE IRON REPLACEMENT.  2.  PPD:  NEGATIVE  3.  VIT D DEF:  REPLACEMENT  4  PSYCH: AS PER ATTENDING 53 year old female with Schizophrenia now with exacerbation and requires transfer to [facility] for ongoing treatment.  Pt has a hx of chronic Iron Def Anemia with  exacerbation prior to admission with HG 6.0.  Pt admitted to [facility] and transfussed 2 u pRBCs. Pt evaluated by GI s/p endoscopy, colonoscopy, CT ABD/P.  No clear etiology to drop in H/H except for low Iron and Ferritin.  Pt not complaint with iron replacement.  Pt's H/H has now remained stable for over one month at H/H  approximately 9/30.  1.  CHRONIC IRON DEF ANEMIA:  ENCOURAGE PT TO TAKE DAILY IRON REPLACEMENT.  PT S/P GI W/U WITH ENDOSCOPY, COLONOSCOPY, CT OF ABD/P ESSENTIALLY ACTIVE GI BLEED RULED OUT.  NO NEEDS FOR CAPSULE ENDOSCOPE AS PER GI.  HG ELECTROPHOSES DONE AND C/W SERUM IRON DEF AND OR ALPHA THALASSEMIA TRAIT OR ANEMIA OF CHRONIC DISEASE.   PT'S IRON LEVEL 32 AND FERRITIN 11 BOTH LOW AND REQUIRE IRON REPLACEMENT.  PT IS NOT COMPLIANT WITH IRON REPLACEMENT.  GI W/U NEGATIVE.  PT HAS A LONG HX OF CHRONIC IRON DEF ANEMIA UNTREATED WITH PATIENT REFUSING IRON REPLACEMENT.  MOST LIKELY DROP IN HG DUE TO LONG HX OF REFUSING IRON REPLACEMENT.  PT NOW S/P 2 U PRBCs and H/H HAS REMAINED STABLE FOR OVER PAST MONTH.  NO PRESENT ACTIVE GI BLEED PRESENT.   ETIOLOGY OF ANEMIA MOST LIKELY DUE TO CHRONIC REFUSAL TO SUPPLEMENT DIET WITH IRON REPLACEMENT.  ENCOURAGE PT TO COMPLY TO IRON SUPPLEMENTS.  2.  GYN:  PT IS TRANSITIONING FROM FEMALE TO MALE.  PT REFUSES TO ACKNOWLEDGE PRIOR FEMALE HISTORY.   PT STATES SHE HAS NO VAGINAL BLEEDING AND NO HEAVY MENSTRUAL BLEEDING SINCE SHE IS A MALE.  PT DENIES SHE IS IN MENOPAUSE BECAUSE SHE IS A MALE.  PT REFUSES GYN CONSULT/EXAMINATION.  3.  PPD:  NEGATIVE READ TODAY 11/24/2023.  4.  VIT D DEF:  REPLACEMENT  5.  AMBULATION:  PT AMBULATES WITHOUT GAIT DISTURBANCE.  NO FALLS REPORTED.  6  PSYCH: AS PER ATTENDING  7. NO MEDICAL CONTRAINDICATIONS FOR PATIENT TO BE TRANSFERRED FOR ADDITIONAL PSYCHIATRIC CARE AT [facility].  PT IS MEDICALLY CLEARED FOR TRANSFER FROM [facility] TO [facility].

## 2023-11-24 NOTE — BH INPATIENT PSYCHIATRY PROGRESS NOTE - MSE UNSTRUCTURED FT
Appearance: laying in bed, wearing hospital attire  Behavior: uncooperative, mistrustful appearing  Speech: normal rate, normal volume though incoherent  Mood: unable to assess  Affect: irritable  Thought process: severely disorganized, largely incoherent  Thought content: vaguely violent/threatening statements  Perceptions: observed talking to self  Insight: very poor  Judgement: very poor, refusing vitals intermittently, refusing PO medications, very poor ADLs  Cognition: unable to assess

## 2023-11-24 NOTE — BH INPATIENT PSYCHIATRY PROGRESS NOTE - NSBHMETABOLIC_PSY_ALL_CORE_FT
BMI: BMI (kg/m2): 35.2 (10-12-23 @ 17:39)  HbA1c: A1C with Estimated Average Glucose Result: 5.3 % (09-29-23 @ 10:00)    Glucose: POCT Blood Glucose.: 100 mg/dL (12-19-22 @ 15:06)    BP: --Vital Signs Last 24 Hrs  T(C): --  T(F): --  HR: --  BP: --  BP(mean): --  RR: --  SpO2: --      Lipid Panel: Date/Time: 09-29-23 @ 10:00  Cholesterol, Serum: 166  LDL Cholesterol Calculated: 94  HDL Cholesterol, Serum: 60  Total Cholesterol/HDL Ration Measurement: --  Triglycerides, Serum: 61

## 2023-11-24 NOTE — CONSULT NOTE ADULT - SUBJECTIVE AND OBJECTIVE BOX
HPI:   53 year old female with Schizophrenia now with exacerbation with a long hx of chronic iron deficiency anemia admitted to [facility] with a HG of 6.0, transfussed 2 u pRBCs, s/p essentially negative GI w/u.    Pt now being prepared for transfer to [facility].    Pt with a low iron 32 and feritin 11  but not compliant with iron replacement.    HG ELECTROPHORESIS:  NORMAL CAPILLARY HG ELECTROPHORESIS PATTERN WITH MICROCYTOSIS AND REDUCED HG A2 S/C SERUM IRON DEF AND OR ALPHA THALASSEMIA KACIE ANEMIA OR CHRONIC DISEASE.    MOST RECENT LABWORK 2023 WBC 5.25 H/H 9.0/13.2 PLAT 399  IRON 32 () FERRITIN 11 ()  PT NOT COMPLIANT WITH REPLACEMENT.   B12 691 FOLATE 11.3    PAST MEDICAL & SURGICAL HISTORY:  Schizophrenia      Constipation      Obesity      Anxiety      Anemia      No significant past surgical history          Review of Systems:   CONSTITUTIONAL: No fever, weight loss, or fatigue  EYES: No eye pain, visual disturbances, or discharge  ENMT:  No difficulty hearing, tinnitus, vertigo; No sinus or throat pain  NECK: No pain or stiffness  BREASTS: No pain, masses, or nipple discharge  RESPIRATORY: No cough, wheezing, chills or hemoptysis; No shortness of breath  CARDIOVASCULAR: No chest pain, palpitations, dizziness, or leg swelling  GASTROINTESTINAL: No abdominal or epigastric pain. No nausea, vomiting, or hematemesis; No diarrhea or constipation. No melena or hematochezia.  GENITOURINARY: No dysuria, frequency, hematuria, or incontinence  NEUROLOGICAL: No headaches, memory loss, loss of strength, numbness, or tremors  SKIN: No itching, burning, rashes, or lesions   LYMPH NODES: No enlarged glands  ENDOCRINE: No heat or cold intolerance; No hair loss  MUSCULOSKELETAL: No joint pain or swelling; No muscle, back, or extremity pain  PSYCHIATRIC: No depression, anxiety, mood swings, or difficulty sleeping  HEME/LYMPH: No easy bruising, or bleeding gums  ALLERY AND IMMUNOLOGIC: No hives or eczema    Allergies    penicillin (Other)          Social History: -CIGS, -ETOH, -DRUGS    FAMILY HISTORY:  Patient unable to provide medical history    Family history of psychosis        MEDICATIONS  (STANDING):  benztropine 1 milliGRAM(s) Oral two times a day  bisacodyl 10 milliGRAM(s) Oral at bedtime  cholecalciferol 1000 Unit(s) Oral at bedtime  cloZAPine 25 milliGRAM(s) Oral at bedtime  ferrous    sulfate 325 milliGRAM(s) Oral daily  polyethylene glycol 3350 17 Gram(s) Oral two times a day  senna 2 Tablet(s) Oral at bedtime  valproic  acid Syrup 1000 milliGRAM(s) Oral at bedtime    MEDICATIONS  (PRN):  acetaminophen     Tablet .. 650 milliGRAM(s) Oral every 4 hours PRN Mild Pain (1 - 3), Moderate Pain (4 - 6)  OLANZapine Disintegrating Tablet 5 milliGRAM(s) Oral every 4 hours PRN agitation  OLANZapine Injectable 5 milliGRAM(s) IntraMuscular once PRN Agitation    VS:  2023  PT REFUSES VS YESTERDAY AND TODAY  109/77 89    PHYSICAL EXAM:  GENERAL: NAD, well-developed  HEAD:  Atraumatic, Normocephalic  EYES: EOMI,  conjunctiva and sclera clear  NECK: Supple, No JVD  CHEST/LUNG: Clear to auscultation bilaterally; No wheeze  HEART: Regular rate and rhythm; No murmurs, rubs, or gallops  ABDOMEN: Soft, Nontender, Nondistended; Bowel sounds present  EXTREMITIES:   No clubbing, cyanosis, or edema  NEUROLOGY: non-focal  SKIN: No rashes or lesions    LABS:  2023  WBC 5.25 H/H  9.0/39.9 PLAT 399  IRON 32 ()  FERRITIN 11 ()  B12 692 FOLATE 11.3    EK2023  ST NONSPECIFIC CHANGES.  NO ACUTE CHANGES    RADIOLOGY & ADDITIONAL TESTS:  10/01/2023  CT ABD/PELVIS:  HIATAL HERNIA GALLSTONES FIBROID UTERUS    Consultant(s) Notes Reviewed:  NOTES REVIEWED    Care Discussed with Consultants/Other Providers:  ATTENDING AND STAFF

## 2023-11-27 PROCEDURE — 99231 SBSQ HOSP IP/OBS SF/LOW 25: CPT

## 2023-11-27 NOTE — BH INPATIENT PSYCHIATRY PROGRESS NOTE - NSBHATTESTBILLING_PSY_A_CORE
12015-Pucupmmiar OBS or IP - moderate complexity OR 35-49 mins 32864-Cmfnftmaqj OBS or IP - low complexity OR 25-34 mins

## 2023-11-27 NOTE — BH INPATIENT PSYCHIATRY PROGRESS NOTE - NSBHASSESSSUMMFT_PSY_ALL_CORE
Patient is a 53 year old adult (goes by "Juan Jose"), living at [facility] on [place] grounds, PMH of GERD, anemia, PPH of schizophrenia, selective mutism, currently with [facility] ACT team, previously with [facility] which , with history of multiple psychiatric hospitalizations, without known history of self-injury or suicide attempts, with history of aggression in the setting on nonadherence with medication, who was initially brought in by staff at residence for increasing agitation and aggression in the setting of 2 months of medication nonadherence. Patient was hospitalized from 8/3-23 on [facility] where they were noted to be hostile, disorganized, threatening, malodorous and grossly psychotic. TOO was obtained, and patient eventually administered dual long-acting injectables (Haldol Decanoate and Zyprexa Relprevv), though had declined nearly all PO medications. Patient was transferred to [facility] where they were admitted due to anemia to 6 (medical workup largely unremarkable), patient stabilized and transferred back to [facility] for further management of psychosis.     Diagnostically, presentation consistent with exacerbation of schizophrenia.     Patient has shown overall very little clinical improvement since admission. Spends most of day isolative and talking to self in severely disorganized and largely incomprehensible fashion, with only very brief moments of reality based exchange with staff or peers. Has showered recently with significant staff encouragement. No recent physical aggression, though can appear mistrustful and make vaguely threatening remarks. Sleep erratic, poor last night. Still declining all PO medications, on high dose haloperidol BREWER, received today.    Per [facility] review, further questions regarding anemia and medical stability, reached out to internist to review. Repeat labs ordered, some resulted and overall consistent with known chronic anemia (Hgb 9, ferritin 11, similar to prior). Mild hypokalemia (K 3.4), ordered repletion today. Hospitalist to evaluate on Friday to further address [facility] concerns.    Patient requires inpatient admission due to very poor self-care and for containment, stabilization, medication management and aftercare planning.    Plan:  1. Brink: admitted involuntary , TOO obtained 10/24  2. Observation: routine checks  3. Collateral: obtained collateral from [facility] psychiatrist  4. Psychiatric  -Continue Haldol Decanoate 200mg IM q4 weeks, last given , next due   	-Had received Zyprexa Replevv 405mg qMonth (given )--though no observable benefit above Haldol Decanoate, so will defer at this time  -Continue clozapine 25mg QHS (refusing), d/c'ed olanzapine IM backup order d/t tremulousness   -Continue depakote 1000mg QHS (refusing)  -Continue benztropine 1mg BID for EPS (refusing)  5. Medical:  -Anemia: s/p medical admission at [facility] for anemia to Hgb 6, required 2u pRBCs, most recent Hgb 8.8-9. GI workup unremarkable with exception of diverticulosis and colonic polyp  	-Repeat CBCs with stable anemia (Hgb low 9s)  	-Continue ferrous sulfate 325mg daily (refusing)  -Reached out to internist to review [facility]'s concerns, plans to see Friday once all labs returned  -FALL on : likely mechanical, no sustained injuries. PT consult ordered.  -Bowel regimen: continue bisacodyl 10mg QHS (refusing), miralax 17g daily (refusing), senna 2 tablets QHS (refusing)  -Continue Vitamin D3 1000units QHS (refusing)  6. PRNs  -Olanzapine 5mg PO or IM for agitation  7. Therapy   -Individual, group and milieu therapy as appropriate   8. Disposition: state application submitted  Patient is a 53 year old adult (goes by "Juan Jose"), living at [facility] on [place] grounds, PMH of GERD, anemia, PPH of schizophrenia, selective mutism, currently with [facility] ACT team, previously with [facility] which , with history of multiple psychiatric hospitalizations, without known history of self-injury or suicide attempts, with history of aggression in the setting on nonadherence with medication, who was initially brought in by staff at residence for increasing agitation and aggression in the setting of 2 months of medication nonadherence. Patient was hospitalized from 8/3-23 on [facility] where they were noted to be hostile, disorganized, threatening, malodorous and grossly psychotic. TOO was obtained, and patient eventually administered dual long-acting injectables (Haldol Decanoate and Zyprexa Relprevv), though had declined nearly all PO medications. Patient was transferred to [facility] where they were admitted due to anemia to 6 (medical workup largely unremarkable), patient stabilized and transferred back to [facility] for further management of psychosis.     Diagnostically, presentation consistent with exacerbation of schizophrenia.     Patient has shown overall very little clinical improvement since admission. Spends most of day isolative and talking to self in severely disorganized and largely incomprehensible fashion, with only very brief moments of reality based exchange with staff or peers. Has showered recently with significant staff encouragement. No recent physical aggression, though can appear mistrustful and make vaguely threatening remarks. Sleep erratic, often just a few hours. Still declining all PO medications, on high dose haloperidol BREWER, received today.    Per [facility] review, further questions regarding anemia and medical stability, reached out to internist to review. Repeat labs ordered consistent with known chronic anemia (Hgb 9, ferritin 11, similar to prior). Hospitalist evaluated on , will review with [facility].    Patient requires inpatient admission due to very poor self-care and for containment, stabilization, medication management and aftercare planning.    Plan:  1. Brink: admitted involuntary , TOO obtained 10/24  2. Observation: routine checks  3. Collateral: obtained collateral from [facility] psychiatrist  4. Psychiatric  -Continue Haldol Decanoate 200mg IM q4 weeks, last given , next due   	-Had received Zyprexa Replevv 405mg qMonth (given )--though no observable benefit above Haldol Decanoate, so will defer at this time  -Continue clozapine 25mg QHS (refusing), d/c'ed olanzapine IM backup order d/t tremulousness   -Continue depakote 1000mg QHS (refusing)  -Continue benztropine 1mg BID for EPS (refusing)  5. Medical:  -Anemia: s/p medical admission at [facility] for anemia to Hgb 6, required 2u pRBCs, most recent Hgb 8.8-9. GI workup unremarkable with exception of diverticulosis and colonic polyp  	-Repeat CBCs with stable anemia (Hgb low 9s)  	-Continue ferrous sulfate 325mg daily (refusing)  -Internist evaluated on , reviewed concerns from [facility], no further medical workup recommended, will discuss with [facility]  -Fall on : likely mechanical, no sustained injuries. PT consult  -Bowel regimen: continue bisacodyl 10mg QHS (refusing), miralax 17g daily (refusing), senna 2 tablets QHS (refusing)  -Continue Vitamin D3 1000units QHS (refusing)  6. PRNs  -Olanzapine 5mg PO or IM for agitation  7. Therapy   -Individual, group and milieu therapy as appropriate   8. Disposition: state application submitted

## 2023-11-27 NOTE — BH INPATIENT PSYCHIATRY PROGRESS NOTE - MSE UNSTRUCTURED FT
Appearance: laying in bed, wearing hospital attire  Behavior: uncooperative, mistrustful appearing  Speech: normal rate, normal volume though incoherent  Mood: unable to assess  Affect: irritable  Thought process: severely disorganized, largely incoherent  Thought content: vaguely violent/threatening statements  Perceptions: observed talking to self  Insight: very poor  Judgement: very poor, refusing vitals intermittently, refusing PO medications, very poor ADLs  Cognition: unable to assess Appearance: walking in hallway, wearing hospital attire  Behavior: uncooperative, mistrustful appearing  Speech: normal rate, normal volume though incoherent  Mood: unable to assess  Affect: irritable  Thought process: severely disorganized, largely incoherent  Thought content: vaguely violent/threatening statements, "turn her off"  Perceptions: observed talking to self  Insight: very poor  Judgement: very poor, refusing vitals intermittently, refusing PO medications, very poor ADLs  Cognition: unable to assess

## 2023-11-27 NOTE — BH INPATIENT PSYCHIATRY PROGRESS NOTE - NSBHFUPINTERVALHXFT_PSY_A_CORE
Refusing vitals, refusing PO medications, no acute events over weekend Refusing vitals, refusing PO medications, no acute events over weekend. Slept 3 hours last night per log.    Patient seen pouring himself soda in hallway. Talking to self incoherent. States "turn her off" when this provider attempts to engage. Cannot be re-oriented towards interview.

## 2023-11-28 PROCEDURE — 99231 SBSQ HOSP IP/OBS SF/LOW 25: CPT

## 2023-11-28 NOTE — BH INPATIENT PSYCHIATRY PROGRESS NOTE - NSBHFUPINTERVALHXFT_PSY_A_CORE
Refusing vitals, refusing PO medications, no acute events  Refusing vitals, refusing PO medications, no acute events. Slept ~6 hours per sleep log.    Patient found laying in bed, under covers. Room is malodorous. Does not answer questions or speak to this writer.

## 2023-11-28 NOTE — BH INPATIENT PSYCHIATRY PROGRESS NOTE - NSBHASSESSSUMMFT_PSY_ALL_CORE
Patient is a 53 year old adult (goes by "Juan Jose"), living at [facility] on [place] grounds, PMH of GERD, anemia, PPH of schizophrenia, selective mutism, currently with [facility] ACT team, previously with [facility] which , with history of multiple psychiatric hospitalizations, without known history of self-injury or suicide attempts, with history of aggression in the setting on nonadherence with medication, who was initially brought in by staff at residence for increasing agitation and aggression in the setting of 2 months of medication nonadherence. Patient was hospitalized from 8/3-23 on 1N where they were noted to be hostile, disorganized, threatening, malodorous and grossly psychotic. TOO was obtained, and patient eventually administered dual long-acting injectables (Haldol Decanoate and Zyprexa Relprevv), though had declined nearly all PO medications. Patient was transferred to [facility] where they were admitted due to anemia to 6 (medical workup largely unremarkable), patient stabilized and transferred back to [facility] for further management of psychosis.     Diagnostically, presentation consistent with exacerbation of schizophrenia.     Patient has shown overall very little clinical improvement since admission. Spends most of day isolative and talking to self in severely disorganized and largely incomprehensible fashion, with only very brief moments of reality based exchange with staff or peers. Has showered recently with significant staff encouragement. No recent physical aggression, though can appear mistrustful and make vaguely threatening remarks. Sleep erratic, often just a few hours. Still declining all PO medications, on high dose haloperidol BREWER, received today.    Per [facility] review, further questions regarding anemia and medical stability, reached out to internist to review. Repeat labs ordered consistent with known chronic anemia (Hgb 9, ferritin 11, similar to prior). Hospitalist evaluated on , will review with [facility].    Patient requires inpatient admission due to very poor self-care and for containment, stabilization, medication management and aftercare planning.    Plan:  1. Brink: admitted involuntary , TOO obtained 10/24  2. Observation: routine checks  3. Collateral: obtained collateral from [facility] psychiatrist  4. Psychiatric  -Continue Haldol Decanoate 200mg IM q4 weeks, last given , next due   	-Had received Zyprexa Replevv 405mg qMonth (given )--though no observable benefit above Haldol Decanoate, so will defer at this time  -Continue clozapine 25mg QHS (refusing), d/c'ed olanzapine IM backup order d/t tremulousness   -Continue depakote 1000mg QHS (refusing)  -Continue benztropine 1mg BID for EPS (refusing)  5. Medical:  -Anemia: s/p medical admission at [facility] for anemia to Hgb 6, required 2u pRBCs, most recent Hgb 8.8-9. GI workup unremarkable with exception of diverticulosis and colonic polyp  	-Repeat CBCs with stable anemia (Hgb low 9s)  	-Continue ferrous sulfate 325mg daily (refusing)  -Internist evaluated on , reviewed concerns from [facility], no further medical workup recommended, will discuss with [facility]  -Fall on : likely mechanical, no sustained injuries. PT consult  -Bowel regimen: continue bisacodyl 10mg QHS (refusing), miralax 17g daily (refusing), senna 2 tablets QHS (refusing)  -Continue Vitamin D3 1000units QHS (refusing)  6. PRNs  -Olanzapine 5mg PO or IM for agitation  7. Therapy   -Individual, group and milieu therapy as appropriate   8. Disposition: state application submitted  Patient is a 53 year old adult (goes by "Juan Jose"), living at [facility] on [place] grounds, PMH of GERD, anemia, PPH of schizophrenia, selective mutism, currently with [facility] ACT team, previously with [facility] which , with history of multiple psychiatric hospitalizations, without known history of self-injury or suicide attempts, with history of aggression in the setting on nonadherence with medication, who was initially brought in by staff at residence for increasing agitation and aggression in the setting of 2 months of medication nonadherence. Patient was hospitalized from 8/3-23 on 1N where they were noted to be hostile, disorganized, threatening, malodorous and grossly psychotic. TOO was obtained, and patient eventually administered dual long-acting injectables (Haldol Decanoate and Zyprexa Relprevv), though had declined nearly all PO medications. Patient was transferred to [facility] where they were admitted due to anemia to 6 (medical workup largely unremarkable), patient stabilized and transferred back to [facility] for further management of psychosis.     Diagnostically, presentation consistent with exacerbation of schizophrenia.     Patient has shown overall very little clinical improvement since admission. Spends most of day isolative and talking to self in severely disorganized and largely incomprehensible fashion, with only very brief moments of reality based exchange with staff or peers. Has showered recently with significant staff encouragement, though remains very malodorous. No recent physical aggression, though can appear mistrustful and make vaguely threatening remarks. Sleep erratic, often just a few hours, better last night. Still declining all PO medications, on high dose haloperidol BREWER.    Patient requires inpatient admission due to very poor self-care and for containment, stabilization, medication management and aftercare planning.    Plan:  1. Brink: admitted involuntary , TOO obtained 10/24  2. Observation: routine checks  3. Collateral: obtained collateral from [facility] psychiatrist  4. Psychiatric  -Continue Haldol Decanoate 200mg IM q4 weeks, last given , next due   	-Had received Zyprexa Replevv 405mg qMonth (given )--though no observable benefit above Haldol Decanoate, so will defer at this time  -Continue clozapine 25mg QHS (refusing), d/c'ed olanzapine IM backup order d/t tremulousness   -Continue depakote 1000mg QHS (refusing)  -Continue benztropine 1mg BID for EPS (refusing)  5. Medical:  -Anemia: s/p medical admission at [facility] for anemia to Hgb 6, required 2u pRBCs, most recent Hgb 8.8-9. GI workup unremarkable with exception of diverticulosis and colonic polyp  	-Repeat CBCs with stable anemia (Hgb low 9s)  	-Continue ferrous sulfate 325mg daily (refusing)  -Internist evaluated on , reviewed concerns from [facility], no further medical workup recommended, will discuss with [facility]  -Fall on : likely mechanical, no sustained injuries. PT consult  -Bowel regimen: continue bisacodyl 10mg QHS (refusing), miralax 17g daily (refusing), senna 2 tablets QHS (refusing)  -Continue Vitamin D3 1000units QHS (refusing)  6. PRNs  -Olanzapine 5mg PO or IM for agitation  7. Therapy   -Individual, group and milieu therapy as appropriate   8. Disposition: state application submitted

## 2023-11-28 NOTE — BH INPATIENT PSYCHIATRY PROGRESS NOTE - MSE UNSTRUCTURED FT
Appearance: walking in hallway, wearing hospital attire  Behavior: uncooperative, mistrustful appearing  Speech: normal rate, normal volume though incoherent  Mood: unable to assess  Affect: irritable  Thought process: severely disorganized, largely incoherent  Thought content: vaguely violent/threatening statements, "turn her off"  Perceptions: observed talking to self  Insight: very poor  Judgement: very poor, refusing vitals intermittently, refusing PO medications, very poor ADLs  Cognition: unable to assess Appearance: laying in bed under blanket  Behavior: uncooperative, mistrustful appearing  Speech: no speech  Mood: unable to assess  Affect: unable to assess  Thought process: severely disorganized when speaking, though no speech today  Thought content: vaguely violent/threatening statements, though no speech today  Perceptions: observed talking to self  Insight: very poor  Judgement: very poor, refusing vitals intermittently, refusing PO medications, very poor ADLs  Cognition: unable to assess

## 2023-11-29 PROCEDURE — 99231 SBSQ HOSP IP/OBS SF/LOW 25: CPT

## 2023-11-29 NOTE — BH INPATIENT PSYCHIATRY PROGRESS NOTE - NSBHASSESSSUMMFT_PSY_ALL_CORE
Patient is a 53 year old adult (goes by "Juan Jose"), living at [facility] on [place] grounds, PMH of GERD, anemia, PPH of schizophrenia, selective mutism, currently with [facility] ACT team, previously with [facility] which , with history of multiple psychiatric hospitalizations, without known history of self-injury or suicide attempts, with history of aggression in the setting on nonadherence with medication, who was initially brought in by staff at residence for increasing agitation and aggression in the setting of 2 months of medication nonadherence. Patient was hospitalized from 8/3-23 on 1N where they were noted to be hostile, disorganized, threatening, malodorous and grossly psychotic. TOO was obtained, and patient eventually administered dual long-acting injectables (Haldol Decanoate and Zyprexa Relprevv), though had declined nearly all PO medications. Patient was transferred to [facility] where they were admitted due to anemia to 6 (medical workup largely unremarkable), patient stabilized and transferred back to [facility] for further management of psychosis.     Diagnostically, presentation consistent with exacerbation of schizophrenia.     Patient has shown overall very little clinical improvement since admission. Spends most of day isolative and talking to self in severely disorganized and largely incomprehensible fashion, with only very brief moments of reality based exchange with staff or peers. Has showered recently with significant staff encouragement, though remains very malodorous. No recent physical aggression, though can appear mistrustful and make vaguely threatening remarks. Sleep erratic, often just a few hours, better last night. Still declining all PO medications, on high dose haloperidol BREWER.    Patient requires inpatient admission due to very poor self-care and for containment, stabilization, medication management and aftercare planning.    Plan:  1. Brink: admitted involuntary , TOO obtained 10/24  2. Observation: routine checks  3. Collateral: obtained collateral from [facility] psychiatrist  4. Psychiatric  -Continue Haldol Decanoate 200mg IM q4 weeks, last given , next due   	-Had received Zyprexa Replevv 405mg qMonth (given )--though no observable benefit above Haldol Decanoate, so will defer at this time  -Continue clozapine 25mg QHS (refusing), d/c'ed olanzapine IM backup order d/t tremulousness   -Continue depakote 1000mg QHS (refusing)  -Continue benztropine 1mg BID for EPS (refusing)  5. Medical:  -Anemia: s/p medical admission at [facility] for anemia to Hgb 6, required 2u pRBCs, most recent Hgb 8.8-9. GI workup unremarkable with exception of diverticulosis and colonic polyp  	-Repeat CBCs with stable anemia (Hgb low 9s)  	-Continue ferrous sulfate 325mg daily (refusing)  -Internist evaluated on , reviewed concerns from [facility], no further medical workup recommended, will discuss with [facility]  -Fall on : likely mechanical, no sustained injuries. PT consult  -Bowel regimen: continue bisacodyl 10mg QHS (refusing), miralax 17g daily (refusing), senna 2 tablets QHS (refusing)  -Continue Vitamin D3 1000units QHS (refusing)  6. PRNs  -Olanzapine 5mg PO or IM for agitation  7. Therapy   -Individual, group and milieu therapy as appropriate   8. Disposition: state application submitted  Patient is a 53 year old adult (goes by "Juan Jose"), living at [facility] on [place] grounds, PMH of GERD, anemia, PPH of schizophrenia, selective mutism, currently with [facility] ACT team, previously with [facility] which , with history of multiple psychiatric hospitalizations, without known history of self-injury or suicide attempts, with history of aggression in the setting on nonadherence with medication, who was initially brought in by staff at residence for increasing agitation and aggression in the setting of 2 months of medication nonadherence. Patient was hospitalized from 8/3-23 on  where they were noted to be hostile, disorganized, threatening, malodorous and grossly psychotic. TOO was obtained, and patient eventually administered dual long-acting injectables (Haldol Decanoate and Zyprexa Relprevv), though had declined nearly all PO medications. Patient was transferred to [facility] where they were admitted due to anemia to 6 (medical workup largely unremarkable), patient stabilized and transferred back to [facility] for further management of psychosis.     Diagnostically, presentation consistent with exacerbation of schizophrenia.     Patient has shown overall very little clinical improvement since admission. Spends most of day isolative and talking to self in severely disorganized and largely incomprehensible fashion, with only very brief moments of reality based exchange with staff or peers. Has showered recently with significant staff encouragement, though remains very malodorous. No recent physical aggression, though can appear mistrustful and make vaguely threatening remarks. Sleep erratic, often just a few hours. Still declining all PO medications, on high dose haloperidol BREWER.    Patient requires inpatient admission due to very poor self-care and for containment, stabilization, medication management and aftercare planning.    Plan:  1. Brink: admitted involuntary , TOO obtained 10/24  2. Observation: routine checks  3. Collateral: obtained collateral from [facility] psychiatrist  4. Psychiatric  -Continue Haldol Decanoate 200mg IM q4 weeks, last given , next due   	-Had received Zyprexa Replevv 405mg qMonth (given )--though no observable benefit above Haldol Decanoate, so will defer at this time  -Continue clozapine 25mg QHS (refusing), d/c'ed olanzapine IM backup order d/t tremulousness   -Continue depakote 1000mg QHS (refusing)  -Continue benztropine 1mg BID for EPS (refusing)  5. Medical:  -Anemia: s/p medical admission at [facility] for anemia to Hgb 6, required 2u pRBCs, most recent Hgb 8.8-9. GI workup unremarkable with exception of diverticulosis and colonic polyp  	-Repeat CBCs with stable anemia (Hgb low 9s)  	-Continue ferrous sulfate 325mg daily (refusing)  -Internist evaluated on , reviewed concerns from [facility], no further medical workup recommended, responses sent to [facility]  -Fall on : likely mechanical, no sustained injuries. PT consult  -Bowel regimen: continue bisacodyl 10mg QHS (refusing), miralax 17g daily (refusing), senna 2 tablets QHS (refusing)  -Continue Vitamin D3 1000units QHS (refusing)  6. PRNs  -Olanzapine 5mg PO or IM for agitation  7. Therapy   -Individual, group and milieu therapy as appropriate   8. Disposition: state application submitted

## 2023-11-29 NOTE — BH INPATIENT PSYCHIATRY PROGRESS NOTE - NSBHFUPINTERVALHXFT_PSY_A_CORE
Refusing vitals, refusing PO medications, no acute events. Patient declining PT consult. Refusing vitals, refusing PO medications, no acute events. Patient declining PT consult. Slept 3-4 hours last night.    Patient found laying in bed awake. Muttering incomprehensibly, though able to make out patient saying "shoot her now, all of them." Room is malodorous.

## 2023-11-29 NOTE — BH INPATIENT PSYCHIATRY PROGRESS NOTE - MSE UNSTRUCTURED FT
Appearance: laying in bed under blanket  Behavior: uncooperative, mistrustful appearing  Speech: no speech  Mood: unable to assess  Affect: unable to assess  Thought process: severely disorganized when speaking, though no speech today  Thought content: vaguely violent/threatening statements, though no speech today  Perceptions: observed talking to self  Insight: very poor  Judgement: very poor, refusing vitals intermittently, refusing PO medications, very poor ADLs  Cognition: unable to assess Appearance: laying in bed under blanket, wearing hospital attire  Behavior: uncooperative, mistrustful appearing  Speech: muttering to self incoherently  Mood: unable to assess  Affect: irritable  Thought process: severely disorganized   Thought content: vaguely violent/threatening statements, "shoot her now"  Perceptions: observed talking to self  Insight: very poor  Judgement: very poor, refusing vitals intermittently, refusing PO medications, very poor ADLs  Cognition: unable to assess

## 2023-11-30 PROCEDURE — 99231 SBSQ HOSP IP/OBS SF/LOW 25: CPT

## 2023-11-30 NOTE — BH INPATIENT PSYCHIATRY PROGRESS NOTE - NSBHASSESSSUMMFT_PSY_ALL_CORE
Patient is a 53 year old adult (goes by "Juan Jose"), living at [facility] on [place] grounds, PMH of GERD, anemia, PPH of schizophrenia, selective mutism, currently with [facility] ACT team, previously with [facility] which , with history of multiple psychiatric hospitalizations, without known history of self-injury or suicide attempts, with history of aggression in the setting on nonadherence with medication, who was initially brought in by staff at residence for increasing agitation and aggression in the setting of 2 months of medication nonadherence. Patient was hospitalized from 8/3-23 on  where they were noted to be hostile, disorganized, threatening, malodorous and grossly psychotic. TOO was obtained, and patient eventually administered dual long-acting injectables (Haldol Decanoate and Zyprexa Relprevv), though had declined nearly all PO medications. Patient was transferred to [facility] where they were admitted due to anemia to 6 (medical workup largely unremarkable), patient stabilized and transferred back to [facility] for further management of psychosis.     Diagnostically, presentation consistent with exacerbation of schizophrenia.     Patient has shown overall very little clinical improvement since admission. Spends most of day isolative and talking to self in severely disorganized and largely incomprehensible fashion, with only very brief moments of reality based exchange with staff or peers. Has showered recently with significant staff encouragement, though remains very malodorous. No recent physical aggression, though can appear mistrustful and make vaguely threatening remarks. Sleep erratic, often just a few hours. Still declining all PO medications, on high dose haloperidol BREWER.    Patient requires inpatient admission due to very poor self-care and for containment, stabilization, medication management and aftercare planning.    Plan:  1. Brink: admitted involuntary , TOO obtained 10/24  2. Observation: routine checks  3. Collateral: obtained collateral from [facility] psychiatrist  4. Psychiatric  -Continue Haldol Decanoate 200mg IM q4 weeks, last given , next due   	-Had received Zyprexa Replevv 405mg qMonth (given )--though no observable benefit above Haldol Decanoate, so will defer at this time  -Continue clozapine 25mg QHS (refusing), d/c'ed olanzapine IM backup order d/t tremulousness   -Continue depakote 1000mg QHS (refusing)  -Continue benztropine 1mg BID for EPS (refusing)  5. Medical:  -Anemia: s/p medical admission at [facility] for anemia to Hgb 6, required 2u pRBCs, most recent Hgb 8.8-9. GI workup unremarkable with exception of diverticulosis and colonic polyp  	-Repeat CBCs with stable anemia (Hgb low 9s)  	-Continue ferrous sulfate 325mg daily (refusing)  -Internist evaluated on , reviewed concerns from [facility], no further medical workup recommended, responses sent to [facility]  -Fall on : likely mechanical, no sustained injuries. PT consult  -Bowel regimen: continue bisacodyl 10mg QHS (refusing), miralax 17g daily (refusing), senna 2 tablets QHS (refusing)  -Continue Vitamin D3 1000units QHS (refusing)  6. PRNs  -Olanzapine 5mg PO or IM for agitation  7. Therapy   -Individual, group and milieu therapy as appropriate   8. Disposition: state application submitted  Patient is a 53 year old adult (goes by "Juan Jose"), living at [facility] on [place] grounds, PMH of GERD, anemia, PPH of schizophrenia, selective mutism, currently with [facility] ACT team, previously with [facility] which , with history of multiple psychiatric hospitalizations, without known history of self-injury or suicide attempts, with history of aggression in the setting on nonadherence with medication, who was initially brought in by staff at residence for increasing agitation and aggression in the setting of 2 months of medication nonadherence. Patient was hospitalized from 8/3-23 on 1N where they were noted to be hostile, disorganized, threatening, malodorous and grossly psychotic. TOO was obtained, and patient eventually administered dual long-acting injectables (Haldol Decanoate and Zyprexa Relprevv), though had declined nearly all PO medications. Patient was transferred to [facility] where they were admitted due to anemia to 6 (medical workup largely unremarkable), patient stabilized and transferred back to [facility] for further management of psychosis.     Diagnostically, presentation consistent with exacerbation of schizophrenia.     Patient has shown overall very little clinical improvement since admission. Spends most of day isolative and talking to self in severely disorganized and largely incomprehensible fashion, with only very brief moments of reality based exchange with staff or peers. Has showered recently with significant staff encouragement, though remains very malodorous. No recent physical aggression, though can appear mistrustful and make vaguely threatening remarks. Sleep erratic, often just a few hours, better last night. Still declining all PO medications, on high dose haloperidol BREWER.    Patient requires inpatient admission due to very poor self-care and for containment, stabilization, medication management and aftercare planning.    Plan:  1. Brink: admitted involuntary , TOO obtained 10/24  2. Observation: routine checks  3. Collateral: obtained collateral from [facility] psychiatrist  4. Psychiatric  -Continue Haldol Decanoate 200mg IM q4 weeks, last given , next due   	-Had received Zyprexa Replevv 405mg qMonth (given )--though no observable benefit above Haldol Decanoate, so will defer at this time  -Continue clozapine 25mg QHS (refusing), d/c'ed olanzapine IM backup order d/t tremulousness   -Continue depakote 1000mg QHS (refusing)  -Continue benztropine 1mg BID for EPS (refusing)  5. Medical:  -Anemia: s/p medical admission at [facility] for anemia to Hgb 6, required 2u pRBCs, most recent Hgb 8.8-9. GI workup unremarkable with exception of diverticulosis and colonic polyp  	-Repeat CBCs with stable anemia (Hgb low 9s)  	-Continue ferrous sulfate 325mg daily (refusing)  -Internist evaluated on , reviewed concerns from [facility], no further medical workup recommended, responses sent to [facility]  -Fall on : likely mechanical, no sustained injuries. PT consult  -Bowel regimen: continue bisacodyl 10mg QHS (refusing), miralax 17g daily (refusing), senna 2 tablets QHS (refusing)  -Continue Vitamin D3 1000units QHS (refusing)  6. PRNs  -Olanzapine 5mg PO or IM for agitation  7. Therapy   -Individual, group and milieu therapy as appropriate   8. Disposition: state application submitted

## 2023-11-30 NOTE — BH INPATIENT PSYCHIATRY PROGRESS NOTE - NSBHFUPINTERVALHXFT_PSY_A_CORE
Refusing vitals, refusing PO medications, no acute events.    Refusing vitals, refusing PO medications, no acute events. Slept 5-6 hours overnight.    Patient found sitting in dayroom, eating multiple packets of Equal form her hand. Muttering to herself incoherently, malodorous.

## 2023-11-30 NOTE — BH INPATIENT PSYCHIATRY PROGRESS NOTE - MSE UNSTRUCTURED FT
Appearance: laying in bed under blanket, wearing hospital attire  Behavior: uncooperative, mistrustful appearing  Speech: muttering to self incoherently  Mood: unable to assess  Affect: irritable  Thought process: severely disorganized   Thought content: vaguely violent/threatening statements, "shoot her now"  Perceptions: observed talking to self  Insight: very poor  Judgement: very poor, refusing vitals intermittently, refusing PO medications, very poor ADLs  Cognition: unable to assess Appearance: laying in bed under blanket, wearing hospital attire  Behavior: uncooperative, mistrustful appearing  Speech: muttering to self incoherently  Mood: unable to assess  Affect: irritable  Thought process: severely disorganized   Thought content: vaguely violent/threatening statements  Perceptions: observed talking to self  Insight: very poor  Judgement: very poor, refusing vitals intermittently, refusing PO medications, very poor ADLs  Cognition: unable to assess

## 2023-12-01 PROCEDURE — 99231 SBSQ HOSP IP/OBS SF/LOW 25: CPT

## 2023-12-01 NOTE — BH INPATIENT PSYCHIATRY PROGRESS NOTE - NSBHASSESSSUMMFT_PSY_ALL_CORE
Patient is a 53 year old adult (goes by "Juan Jose"), living at [facility] on [place] grounds, PMH of GERD, anemia, PPH of schizophrenia, selective mutism, currently with [facility] ACT team, previously with [facility] which , with history of multiple psychiatric hospitalizations, without known history of self-injury or suicide attempts, with history of aggression in the setting on nonadherence with medication, who was initially brought in by staff at residence for increasing agitation and aggression in the setting of 2 months of medication nonadherence. Patient was hospitalized from 8/3-23 on 1N where they were noted to be hostile, disorganized, threatening, malodorous and grossly psychotic. TOO was obtained, and patient eventually administered dual long-acting injectables (Haldol Decanoate and Zyprexa Relprevv), though had declined nearly all PO medications. Patient was transferred to [facility] where they were admitted due to anemia to 6 (medical workup largely unremarkable), patient stabilized and transferred back to [facility] for further management of psychosis.     Diagnostically, presentation consistent with exacerbation of schizophrenia.     Patient has shown overall very little clinical improvement since admission. Spends most of day isolative and talking to self in severely disorganized and largely incomprehensible fashion, with only very brief moments of reality based exchange with staff or peers. Has showered recently with significant staff encouragement, though remains very malodorous. No recent physical aggression, though can appear mistrustful and make vaguely threatening remarks. Sleep erratic, often just a few hours, better last night. Still declining all PO medications, on high dose haloperidol BREWER.    Patient requires inpatient admission due to very poor self-care and for containment, stabilization, medication management and aftercare planning.    Plan:  1. Brink: admitted involuntary , TOO obtained 10/24  2. Observation: routine checks  3. Collateral: obtained collateral from [facility] psychiatrist  4. Psychiatric  -Continue Haldol Decanoate 200mg IM q4 weeks, last given , next due   	-Had received Zyprexa Replevv 405mg qMonth (given )--though no observable benefit above Haldol Decanoate, so will defer at this time  -Continue clozapine 25mg QHS (refusing), d/c'ed olanzapine IM backup order d/t tremulousness   -Continue depakote 1000mg QHS (refusing)  -Continue benztropine 1mg BID for EPS (refusing)  5. Medical:  -Anemia: s/p medical admission at [facility] for anemia to Hgb 6, required 2u pRBCs, most recent Hgb 8.8-9. GI workup unremarkable with exception of diverticulosis and colonic polyp  	-Repeat CBCs with stable anemia (Hgb low 9s)  	-Continue ferrous sulfate 325mg daily (refusing)  -Internist evaluated on , reviewed concerns from [facility], no further medical workup recommended, responses sent to [facility]  -Fall on : likely mechanical, no sustained injuries. PT consult  -Bowel regimen: continue bisacodyl 10mg QHS (refusing), miralax 17g daily (refusing), senna 2 tablets QHS (refusing)  -Continue Vitamin D3 1000units QHS (refusing)  6. PRNs  -Olanzapine 5mg PO or IM for agitation  7. Therapy   -Individual, group and milieu therapy as appropriate   8. Disposition: state application submitted  Patient is a 53 year old adult (goes by "Juan Jose"), living at [facility] on [place] grounds, PMH of GERD, anemia, PPH of schizophrenia, selective mutism, currently with [facility] ACT team, previously with [facility] which , with history of multiple psychiatric hospitalizations, without known history of self-injury or suicide attempts, with history of aggression in the setting on nonadherence with medication, who was initially brought in by staff at residence for increasing agitation and aggression in the setting of 2 months of medication nonadherence. Patient was hospitalized from 8/3-23 on  where they were noted to be hostile, disorganized, threatening, malodorous and grossly psychotic. TOO was obtained, and patient eventually administered dual long-acting injectables (Haldol Decanoate and Zyprexa Relprevv), though had declined nearly all PO medications. Patient was transferred to [facility] where they were admitted due to anemia to 6 (medical workup largely unremarkable), patient stabilized and transferred back to [facility] for further management of psychosis.     Diagnostically, presentation consistent with exacerbation of schizophrenia.     Patient has shown overall very little clinical improvement since admission. Spends most of day isolative and talking to self in severely disorganized and largely incomprehensible fashion, with only very brief moments of reality based exchange with staff or peers. Has showered recently with significant staff encouragement, though remains very malodorous. No recent physical aggression, though can appear mistrustful and make vaguely threatening remarks. Sleep erratic, often just a few hours. Still declining all PO medications, on high dose haloperidol BREWER.    Patient requires inpatient admission due to very poor self-care and for containment, stabilization, medication management and aftercare planning.    Plan:  1. Brink: admitted involuntary , TOO obtained 10/24  2. Observation: routine checks  3. Collateral: obtained collateral from [facility] psychiatrist  4. Psychiatric  -Continue Haldol Decanoate 200mg IM q4 weeks, last given , next due   	-Had received Zyprexa Replevv 405mg qMonth (given )--though no observable benefit above Haldol Decanoate, so will defer at this time  -Continue clozapine 25mg QHS (refusing), d/c'ed olanzapine IM backup order d/t tremulousness   -Continue depakote 1000mg QHS (refusing)  -Continue benztropine 1mg BID for EPS (refusing)  5. Medical:  -Anemia: s/p medical admission at [facility] for anemia to Hgb 6, required 2u pRBCs, most recent Hgb 8.8-9. GI workup unremarkable with exception of diverticulosis and colonic polyp  	-Repeat CBCs with stable anemia (Hgb low 9s)  	-Continue ferrous sulfate 325mg daily (refusing)  -Internist evaluated on , reviewed concerns from [facility], no further medical workup recommended, responses sent to [facility]  -Fall on : likely mechanical, no sustained injuries. PT consult  -Bowel regimen: continue bisacodyl 10mg QHS (refusing), miralax 17g daily (refusing), senna 2 tablets QHS (refusing)  -Continue Vitamin D3 1000units QHS (refusing)  6. PRNs  -Olanzapine 5mg PO or IM for agitation  7. Therapy   -Individual, group and milieu therapy as appropriate   8. Disposition: state application submitted

## 2023-12-01 NOTE — BH INPATIENT PSYCHIATRY PROGRESS NOTE - NSBHFUPINTERVALHXFT_PSY_A_CORE
Refusing vitals, refusing PO medications, no acute events.      Refusing vitals, refusing PO medications, no acute events. Slept 4 hours per sleep log.    Patient found laying in bed awake. Patient does not speak to this writer, despite multiple attempts at interview. Malodorous.

## 2023-12-01 NOTE — BH INPATIENT PSYCHIATRY PROGRESS NOTE - MSE UNSTRUCTURED FT
Appearance: laying in bed under blanket, wearing hospital attire  Behavior: uncooperative, mistrustful appearing  Speech: muttering to self incoherently  Mood: unable to assess  Affect: irritable  Thought process: severely disorganized   Thought content: vaguely violent/threatening statements  Perceptions: observed talking to self  Insight: very poor  Judgement: very poor, refusing vitals intermittently, refusing PO medications, very poor ADLs  Cognition: unable to assess Appearance: laying in bed under blanket, wearing hospital attire  Behavior: uncooperative, mistrustful appearing  Speech: no speech today  Mood: unable to assess  Affect: irritable  Thought process: severely disorganized when does speak, though none today  Thought content: vaguely violent/threatening statements, though none today  Perceptions: observed talking to self  Insight: very poor  Judgement: very poor, refusing vitals intermittently, refusing PO medications, very poor ADLs  Cognition: unable to assess

## 2023-12-04 LAB
SARS-COV-2 RNA SPEC QL NAA+PROBE: SIGNIFICANT CHANGE UP
SARS-COV-2 RNA SPEC QL NAA+PROBE: SIGNIFICANT CHANGE UP

## 2023-12-04 PROCEDURE — 99232 SBSQ HOSP IP/OBS MODERATE 35: CPT

## 2023-12-04 NOTE — BH INPATIENT PSYCHIATRY PROGRESS NOTE - NSBHASSESSSUMMFT_PSY_ALL_CORE
Patient is a 53 year old adult (goes by "Juan Jose"), living at [facility] on [place] grounds, PMH of GERD, anemia, PPH of schizophrenia, selective mutism, currently with [facility] ACT team, previously with [facility] which , with history of multiple psychiatric hospitalizations, without known history of self-injury or suicide attempts, with history of aggression in the setting on nonadherence with medication, who was initially brought in by staff at residence for increasing agitation and aggression in the setting of 2 months of medication nonadherence. Patient was hospitalized from 8/3-23 on 1N where they were noted to be hostile, disorganized, threatening, malodorous and grossly psychotic. TOO was obtained, and patient eventually administered dual long-acting injectables (Haldol Decanoate and Zyprexa Relprevv), though had declined nearly all PO medications. Patient was transferred to [facility] where they were admitted due to anemia to 6 (medical workup largely unremarkable), patient stabilized and transferred back to [facility] for further management of psychosis.     Diagnostically, presentation consistent with exacerbation of schizophrenia.     Patient has shown overall very little clinical improvement since admission. Spends most of day isolative and talking to self in severely disorganized and largely incomprehensible fashion, with only very brief moments of reality based exchange with staff or peers. Has showered recently with significant staff encouragement, though remains very malodorous. No recent physical aggression, though can appear mistrustful and make vaguely threatening remarks. Sleep erratic, often just a few hours. Still declining all PO medications, on high dose haloperidol BREWER.    : remains psychotic, not able to participate in treatment.     Patient requires inpatient admission due to very poor self-care and for containment, stabilization, medication management and aftercare planning.    Plan:  1. Brink: admitted involuntary , TOO obtained 10/24  2. Observation: routine checks  3. Collateral: obtained collateral from [facility] psychiatrist  4. Psychiatric  -Continue Haldol Decanoate 200mg IM q4 weeks, last given , next due   	-Had received Zyprexa Replevv 405mg qMonth (given )--though no observable benefit above Haldol Decanoate, so will defer at this time  -Continue clozapine 25mg QHS (refusing), d/c'ed olanzapine IM backup order d/t tremulousness   -Continue depakote 1000mg QHS (refusing)  -Continue benztropine 1mg BID for EPS (refusing)  5. Medical:  -Anemia: s/p medical admission at [facility] for anemia to Hgb 6, required 2u pRBCs, most recent Hgb 8.8-9. GI workup unremarkable with exception of diverticulosis and colonic polyp  	-Repeat CBCs with stable anemia (Hgb low 9s)  	-Continue ferrous sulfate 325mg daily (refusing)  -Internist evaluated on , reviewed concerns from [facility], no further medical workup recommended, responses sent to [facility]  -Fall on : likely mechanical, no sustained injuries. PT consult  -Bowel regimen: continue bisacodyl 10mg QHS (refusing), miralax 17g daily (refusing), senna 2 tablets QHS (refusing)  -Continue Vitamin D3 1000units QHS (refusing)  6. PRNs  -Olanzapine 5mg PO or IM for agitation  7. Therapy   -Individual, group and milieu therapy as appropriate   8. Disposition: state application submitted  Patient is a 53 year old adult (goes by "Juan Jose"), living at [facility] on [place] grounds, PMH of GERD, anemia, PPH of schizophrenia, selective mutism, currently with [facility] ACT team, previously with [facility] which , with history of multiple psychiatric hospitalizations, without known history of self-injury or suicide attempts, with history of aggression in the setting on nonadherence with medication, who was initially brought in by staff at residence for increasing agitation and aggression in the setting of 2 months of medication nonadherence. Patient was hospitalized from 8/3-23 on 1N where they were noted to be hostile, disorganized, threatening, malodorous and grossly psychotic. TOO was obtained, and patient eventually administered dual long-acting injectables (Haldol Decanoate and Zyprexa Relprevv), though had declined nearly all PO medications. Patient was transferred to [facility] where they were admitted due to anemia to 6 (medical workup largely unremarkable), patient stabilized and transferred back to [facility] for further management of psychosis.     Diagnostically, presentation consistent with exacerbation of schizophrenia.     Patient has shown overall very little clinical improvement since admission. Spends most of day isolative and talking to self in severely disorganized and largely incomprehensible fashion, with only very brief moments of reality based exchange with staff or peers. Has showered recently with significant staff encouragement, though remains very malodorous. No recent physical aggression, though can appear mistrustful and make vaguely threatening remarks. Sleep erratic, often just a few hours. Still declining all PO medications, on high dose haloperidol BREWER.    : remains psychotic, not able to participate in treatment.     Patient requires inpatient admission due to very poor self-care and for containment, stabilization, medication management and aftercare planning.    Plan:  1. Brink: admitted involuntary , TOO obtained 10/24  2. Observation: routine checks  3. Collateral: obtained collateral from [facility] psychiatrist  4. Psychiatric  -Continue Haldol Decanoate 200mg IM q4 weeks, last given , next due   	-Had received Zyprexa Replevv 405mg qMonth (given )--though no observable benefit above Haldol Decanoate, so will defer at this time  -Continue clozapine 25mg QHS (refusing), d/c'ed olanzapine IM backup order d/t tremulousness   -Continue depakote 1000mg QHS (refusing)  -Continue benztropine 1mg BID for EPS (refusing)  5. Medical:  -Anemia: s/p medical admission at [facility] for anemia to Hgb 6, required 2u pRBCs, most recent Hgb 8.8-9. GI workup unremarkable with exception of diverticulosis and colonic polyp  	-Repeat CBCs with stable anemia (Hgb low 9s)  	-Continue ferrous sulfate 325mg daily (refusing)  -Internist evaluated on , reviewed concerns from [facility], no further medical workup recommended, responses sent to [facility]  -Fall on : likely mechanical, no sustained injuries. PT consult  -Bowel regimen: continue bisacodyl 10mg QHS (refusing), miralax 17g daily (refusing), senna 2 tablets QHS (refusing)  -Continue Vitamin D3 1000units QHS (refusing)  6. PRNs  -Olanzapine 5mg PO or IM for agitation  7. Therapy   -Individual, group and milieu therapy as appropriate   8. Disposition: state application submitted

## 2023-12-04 NOTE — BH INPATIENT PSYCHIATRY PROGRESS NOTE - NSBHFUPINTERVALHXFT_PSY_A_CORE
f/up schizophrenia, care discussed w/ tx team, SERGE. Patient opened eyes and writer explained who writer was and encouraged patient to participate in discussion. Patient remained mute and returned back to sleep. Resistant to get up.

## 2023-12-04 NOTE — BH INPATIENT PSYCHIATRY PROGRESS NOTE - MSE UNSTRUCTURED FT
Appearance: laying in bed under blanket, wearing hospital attire  Behavior: uncooperative, suspicious  Speech: minimal  Mood: unable to assess  Affect: irritable  Thought process: severely disorganized when does speak, though none today  Thought content: vaguely violent/threatening statements, though none today, not observed harming self or others.   Perceptions: observed talking to self  Insight: very poor  Judgement: very poor, refusing vitals intermittently, refusing PO medications, very poor ADLs  Cognition: unable to assess

## 2023-12-04 NOTE — BH INPATIENT PSYCHIATRY PROGRESS NOTE - NSBHATTESTBILLING_PSY_A_CORE
57028-Evzaijaxcv OBS or IP - moderate complexity OR 35-49 mins 95165-Xzlfxgzcnh OBS or IP - moderate complexity OR 35-49 mins

## 2023-12-05 PROCEDURE — 99232 SBSQ HOSP IP/OBS MODERATE 35: CPT

## 2023-12-05 NOTE — BH INPATIENT PSYCHIATRY PROGRESS NOTE - NSBHATTESTBILLING_PSY_A_CORE
89614-Spdbravtdh OBS or IP - moderate complexity OR 35-49 mins [id number]-Zexnsvkwya OBS or IP - moderate complexity OR 35-49 mins

## 2023-12-05 NOTE — BH INPATIENT PSYCHIATRY PROGRESS NOTE - NSBHFUPINTERVALHXFT_PSY_A_CORE
f/up schizophrenia, care discussed w/ tx team, refused vitals,  Patient observed in the dining room this AM, grabbing breakfast. Observed house keeping cleaning up her mattress and patient was irritable, mumbling, refused to meet with writer, was verbalizing illogical statements. Patient was encouraged to shower yesterday which she did. Continues to refuse all standing oral medications.

## 2023-12-05 NOTE — BH INPATIENT PSYCHIATRY PROGRESS NOTE - NSBHASSESSSUMMFT_PSY_ALL_CORE
Patient is a 53 year old adult (goes by "Juan Jose"), living at [facility] on [place], PMH of GERD, anemia, PPH of schizophrenia, selective mutism, currently with [facility] ACT team, previously with [facility] which , with history of multiple psychiatric hospitalizations, without known history of self-injury or suicide attempts, with history of aggression in the setting on nonadherence with medication, who was initially brought in by staff at residence for increasing agitation and aggression in the setting of 2 months of medication nonadherence. Patient was hospitalized from 8/3-23 on  where they were noted to be hostile, disorganized, threatening, malodorous and grossly psychotic. TOO was obtained, and patient eventually administered dual long-acting injectables (Haldol Decanoate and Zyprexa Relprevv), though had declined nearly all PO medications. Patient was transferred to [facility] where they were admitted due to anemia to 6 (medical workup largely unremarkable), patient stabilized and transferred back to [facility] for further management of psychosis.     Diagnostically, presentation consistent with exacerbation of schizophrenia.     Patient has shown overall very little clinical improvement since admission. Spends most of day isolative and talking to self in severely disorganized and largely incomprehensible fashion, with only very brief moments of reality based exchange with staff or peers. Has showered recently with significant staff encouragement, though remains very malodorous. No recent physical aggression, though can appear mistrustful and make vaguely threatening remarks. Sleep erratic, often just a few hours. Still declining all PO medications, on high dose haloperidol BREWER.    : remains psychotic, not able to participate in treatment.   : tp disorganized, internally preoccupied, mumbling to self.     Patient requires inpatient admission due to very poor self-care and for containment, stabilization, medication management and aftercare planning.    Plan:  1. Brink: admitted involuntary , TOO obtained 10/24  2. Observation: routine checks  3. Collateral: obtained collateral from [facility] psychiatrist  4. Psychiatric  -Continue Haldol Decanoate 200mg IM q4 weeks, last given , next due   	-Had received Zyprexa Replevv 405mg qMonth (given )--though no observable benefit above Haldol Decanoate, so will defer at this time  -Continue clozapine 25mg QHS (refusing), d/c'ed olanzapine IM backup order d/t tremulousness   -Continue depakote 1000mg QHS (refusing)  -Continue benztropine 1mg BID for EPS (refusing)  5. Medical:  -Anemia: s/p medical admission at [facility] for anemia to Hgb 6, required 2u pRBCs, most recent Hgb 8.8-9. GI workup unremarkable with exception of diverticulosis and colonic polyp  	-Repeat CBCs with stable anemia (Hgb low 9s)  	-Continue ferrous sulfate 325mg daily (refusing)  -Internist evaluated on , reviewed concerns from [facility], no further medical workup recommended, responses sent to [facility]  -Fall on : likely mechanical, no sustained injuries. PT consult  -Bowel regimen: continue bisacodyl 10mg QHS (refusing), miralax 17g daily (refusing), senna 2 tablets QHS (refusing)  -Continue Vitamin D3 1000units QHS (refusing)  6. PRNs  -Olanzapine 5mg PO or IM for agitation  7. Therapy   -Individual, group and milieu therapy as appropriate   8. Disposition: state application submitted  Patient is a 53 year old adult (goes by "Juan Jose"), living at [facility] on [place] grounds, PMH of GERD, anemia, PPH of schizophrenia, selective mutism, currently with [facility] ACT team, previously with [facility] which , with history of multiple psychiatric hospitalizations, without known history of self-injury or suicide attempts, with history of aggression in the setting on nonadherence with medication, who was initially brought in by staff at residence for increasing agitation and aggression in the setting of 2 months of medication nonadherence. Patient was hospitalized from 8/3-23 on  where they were noted to be hostile, disorganized, threatening, malodorous and grossly psychotic. TOO was obtained, and patient eventually administered dual long-acting injectables (Haldol Decanoate and Zyprexa Relprevv), though had declined nearly all PO medications. Patient was transferred to [facility] where they were admitted due to anemia to 6 (medical workup largely unremarkable), patient stabilized and transferred back to [facility] for further management of psychosis.     Diagnostically, presentation consistent with exacerbation of schizophrenia.     Patient has shown overall very little clinical improvement since admission. Spends most of day isolative and talking to self in severely disorganized and largely incomprehensible fashion, with only very brief moments of reality based exchange with staff or peers. Has showered recently with significant staff encouragement, though remains very malodorous. No recent physical aggression, though can appear mistrustful and make vaguely threatening remarks. Sleep erratic, often just a few hours. Still declining all PO medications, on high dose haloperidol BREWER.    : remains psychotic, not able to participate in treatment.   : tp disorganized, internally preoccupied, mumbling to self.     Patient requires inpatient admission due to very poor self-care and for containment, stabilization, medication management and aftercare planning.    Plan:  1. Brink: admitted involuntary , TOO obtained 10/24  2. Observation: routine checks  3. Collateral: obtained collateral from [facility] psychiatrist  4. Psychiatric  -Continue Haldol Decanoate 200mg IM q4 weeks, last given , next due   	-Had received Zyprexa Replevv 405mg qMonth (given )--though no observable benefit above Haldol Decanoate, so will defer at this time  -Continue clozapine 25mg QHS (refusing), d/c'ed olanzapine IM backup order d/t tremulousness   -Continue depakote 1000mg QHS (refusing)  -Continue benztropine 1mg BID for EPS (refusing)  5. Medical:  -Anemia: s/p medical admission at [facility] for anemia to Hgb 6, required 2u pRBCs, most recent Hgb 8.8-9. GI workup unremarkable with exception of diverticulosis and colonic polyp  	-Repeat CBCs with stable anemia (Hgb low 9s)  	-Continue ferrous sulfate 325mg daily (refusing)  -Internist evaluated on , reviewed concerns from [facility], no further medical workup recommended, responses sent to [facility]  -Fall on : likely mechanical, no sustained injuries. PT consult  -Bowel regimen: continue bisacodyl 10mg QHS (refusing), miralax 17g daily (refusing), senna 2 tablets QHS (refusing)  -Continue Vitamin D3 1000units QHS (refusing)  6. PRNs  -Olanzapine 5mg PO or IM for agitation  7. Therapy   -Individual, group and milieu therapy as appropriate   8. Disposition: state application submitted

## 2023-12-05 NOTE — BH INPATIENT PSYCHIATRY PROGRESS NOTE - MSE UNSTRUCTURED FT
Appearance: laying in bed under blanket, wearing hospital attire  Behavior: uncooperative, suspicious  Speech: minimal  Mood: unable to assess  Affect: irritable  Thought process: severely disorganized when does speak, illogical  Thought content: vaguely violent/threatening statements, though none today, not observed harming self or others.   Perceptions: observed talking to self  Insight: very poor  Judgement: very poor, refusing vitals intermittently, refusing PO medications, very poor ADLs  Cognition: unable to assess

## 2023-12-06 PROCEDURE — 99232 SBSQ HOSP IP/OBS MODERATE 35: CPT

## 2023-12-06 NOTE — CHART NOTE - NSCHARTNOTEFT_GEN_A_CORE
Medicine called re: iron deficiency anemia and uterine fibroids.      #Iron deficiency anemia  --W/o evidence of overt bleeding at this time   --EGD w/ large hiatal hernia. Normal stomach/ duodenum , s/p biopsies w/ mild -mod chronic duodenitis /mod gastritis   --Colonoscopy w/ diverticulosis  --Lower suspicion for iron malabsorption as pt not compliant w/ supplementation. Unsure how much iron rich foods pt is consuming even w/ BMI > 35  --Would benefit from PO iron supplementation, please encourage adherence  --No indication for IV iron at this time given stable Hgb  --Monitor CBCs    #Uterine Fibroid  --CT A/P w/ possible fibroid uterus apx 3cm and 2cm   --Would benefit from GYN follow up though per chart review pt refusing. Can be done outpatient if pt agreeable  --No indication for hematology evaluation      Please call or page w/ questions or concerns

## 2023-12-06 NOTE — BH INPATIENT PSYCHIATRY PROGRESS NOTE - NSBHFUPINTERVALHXFT_PSY_A_CORE
f/up schizophrenia, care discussed w/ tx team, refused vitals,  Patient observed in the dining room this AM eating breakfast. Patient was mumbling to self, internally preoccupied. Continues to refuse all standing oral medications. Spoke to hospitalist Dr. Clay [facility] Patient observed outside of his room with steady gait. Patient is too disorganized for interview.  f/up schizophrenia, care discussed w/ tx team, refused vitals,  Patient observed in the dining room this AM eating breakfast. Patient was mumbling to self, internally preoccupied. Continues to refuse all standing oral medications. Sp[facility] to hospitalist Dr. Clay [facility] [place] Patient observed outside of his room with steady gait. Patient is too disorganized for interview.

## 2023-12-06 NOTE — BH INPATIENT PSYCHIATRY PROGRESS NOTE - NSBHATTESTBILLING_PSY_A_CORE
68037-Ymeshupono OBS or IP - moderate complexity OR 35-49 mins 93381-Alpnfofxfe OBS or IP - moderate complexity OR 35-49 mins

## 2023-12-06 NOTE — BH INPATIENT PSYCHIATRY PROGRESS NOTE - CURRENT MEDICATION
MEDICATIONS  (STANDING):  benztropine 1 milliGRAM(s) Oral two times a day  bisacodyl 10 milliGRAM(s) Oral at bedtime  cholecalciferol 1000 Unit(s) Oral at bedtime  ferrous    sulfate 325 milliGRAM(s) Oral daily  polyethylene glycol 3350 17 Gram(s) Oral two times a day  senna 2 Tablet(s) Oral at bedtime  valproic  acid Syrup 1000 milliGRAM(s) Oral at bedtime    MEDICATIONS  (PRN):  acetaminophen     Tablet .. 650 milliGRAM(s) Oral every 4 hours PRN Mild Pain (1 - 3), Moderate Pain (4 - 6)  OLANZapine Disintegrating Tablet 5 milliGRAM(s) Oral every 4 hours PRN agitation  OLANZapine Injectable 5 milliGRAM(s) IntraMuscular once PRN Agitation

## 2023-12-06 NOTE — BH INPATIENT PSYCHIATRY PROGRESS NOTE - NSBHMETABOLIC_PSY_ALL_CORE_FT
BMI: BMI (kg/m2): 35.2 (12-06-23 @ 08:57)  HbA1c: A1C with Estimated Average Glucose Result: 5.3 % (09-29-23 @ 10:00)    Glucose: POCT Blood Glucose.: 100 mg/dL (12-19-22 @ 15:06)    BP: --Vital Signs Last 24 Hrs  T(C): --  T(F): --  HR: --  BP: --  BP(mean): --  RR: --  SpO2: --      Lipid Panel: Date/Time: 09-29-23 @ 10:00  Cholesterol, Serum: 166  LDL Cholesterol Calculated: 94  HDL Cholesterol, Serum: 60  Total Cholesterol/HDL Ration Measurement: --  Triglycerides, Serum: 61

## 2023-12-06 NOTE — BH INPATIENT PSYCHIATRY PROGRESS NOTE - NSBHASSESSSUMMFT_PSY_ALL_CORE
Patient is a 53 year old adult (goes by "Juan Jose"), living at [facility] on [place] grounds, PMH of GERD, anemia, PPH of schizophrenia, selective mutism, currently with [facility] ACT team, previously with [facility] which , with history of multiple psychiatric hospitalizations, without known history of self-injury or suicide attempts, with history of aggression in the setting on nonadherence with medication, who was initially brought in by staff at residence for increasing agitation and aggression in the setting of 2 months of medication nonadherence. Patient was hospitalized from 8/3-23 on  where they were noted to be hostile, disorganized, threatening, malodorous and grossly psychotic. TOO was obtained, and patient eventually administered dual long-acting injectables (Haldol Decanoate and Zyprexa Relprevv), though had declined nearly all PO medications. Patient was transferred to [facility] where they were admitted due to anemia to 6 (medical workup largely unremarkable), patient stabilized and transferred back to [facility] for further management of psychosis.     Diagnostically, presentation consistent with exacerbation of schizophrenia.     Patient has shown overall very little clinical improvement since admission. Spends most of day isolative and talking to self in severely disorganized and largely incomprehensible fashion, with only very brief moments of reality based exchange with staff or peers. Has showered recently with significant staff encouragement, though remains very malodorous. No recent physical aggression, though can appear mistrustful and make vaguely threatening remarks. Sleep erratic, often just a few hours. Still declining all PO medications, on high dose haloperidol BREWER.    on assessment, patient remains disorganized, internally preoccupied, mumbling to self. Patient was redirected to take care of hygiene, has been observed to be eating. see hospitalist note.     Patient requires inpatient admission due to very poor self-care and for containment, stabilization, medication management and aftercare planning.    Plan:  1. Brink: admitted involuntary , TOO obtained 10/24  2. Observation: routine checks  3. Collateral: obtained collateral from [facility] psychiatrist  4. Psychiatric  -Continue Haldol Decanoate 200mg IM q4 weeks, last given , next due   	-Had received Zyprexa Replevv 405mg qMonth (given )--though no observable benefit above Haldol Decanoate, so will defer at this time  -Continue clozapine 25mg QHS (refusing), d/c'ed olanzapine IM backup order d/t tremulousness   -Continue depakote 1000mg QHS (refusing)  -Continue benztropine 1mg BID for EPS (refusing)  5. Medical:  -Anemia: s/p medical admission at [facility] for anemia to Hgb 6, required 2u pRBCs, most recent Hgb 8.8-9. GI workup unremarkable with exception of diverticulosis and colonic polyp  	-Repeat CBCs with stable anemia (Hgb low 9s)  	-Continue ferrous sulfate 325mg daily (refusing)  -Internist evaluated on , reviewed concerns from [facility], no further medical workup recommended, responses sent to [facility]  -Fall on : likely mechanical, no sustained injuries. PT consult  -Bowel regimen: continue bisacodyl 10mg QHS (refusing), miralax 17g daily (refusing), senna 2 tablets QHS (refusing)  -Continue Vitamin D3 1000units QHS (refusing)  6. PRNs  -Olanzapine 5mg PO or IM for agitation  7. Therapy   -Individual, group and milieu therapy as appropriate   8. Disposition: state application submitted  Patient is a 53 year old adult (goes by "Juan Jose"), living at [facility] on [place] [facility], PMH of GERD, anemia, PPH of schizophrenia, selective mutism, currently with [facility] ACT team, previously with [facility] which , with history of multiple psychiatric hospitalizations, without known history of self-injury or suicide attempts, with history of aggression in the setting on nonadherence with medication, who was initially brought in by staff at residence for increasing agitation and aggression in the setting of 2 months of medication nonadherence. Patient was hospitalized from 8/3-23 on  where they were noted to be hostile, disorganized, threatening, malodorous and grossly psychotic. TOO was obtained, and patient eventually administered dual long-acting injectables (Haldol Decanoate and Zyprexa Relprevv), though had declined nearly all PO medications. Patient was transferred to [facility] where they were admitted due to anemia to 6 (medical workup largely unremarkable), patient stabilized and transferred back to [facility] for further management of psychosis.     Diagnostically, presentation consistent with exacerbation of schizophrenia.     Patient has shown overall very little clinical improvement since admission. Spends most of day isolative and talking to self in severely disorganized and largely incomprehensible fashion, with only very brief moments of reality based exchange with staff or peers. Has showered recently with significant staff encouragement, though remains very malodorous. No recent physical aggression, though can appear mistrustful and make vaguely threatening remarks. Sleep erratic, often just a few hours. Still declining all PO medications, on high dose haloperidol BREWER.    on assessment, patient remains disorganized, internally preoccupied, mumbling to self. Patient was redirected to take care of hygiene, has been observed to be eating. see hospitalist note.     Patient requires inpatient admission due to very poor self-care and for containment, stabilization, medication management and aftercare planning.    Plan:  1. Brink: admitted involuntary , TOO obtained 10/24  2. Observation: routine checks  3. Collateral: obtained collateral from [facility] psychiatrist  4. Psychiatric  -Continue Haldol Decanoate 200mg IM q4 weeks, last given , next due   	-Had received Zyprexa Replevv 405mg qMonth (given )--though no observable benefit above Haldol Decanoate, so will defer at this time  -Continue clozapine 25mg QHS (refusing), d/c'ed olanzapine IM backup order d/t tremulousness   -Continue depakote 1000mg QHS (refusing)  -Continue benztropine 1mg BID for EPS (refusing)  5. Medical:  -Anemia: s/p medical admission at [facility] for anemia to Hgb 6, required 2u pRBCs, most recent Hgb 8.8-9. GI workup unremarkable with exception of diverticulosis and colonic polyp  	-Repeat CBCs with stable anemia (Hgb low 9s)  	-Continue ferrous sulfate 325mg daily (refusing)  -Internist evaluated on , reviewed concerns from [facility], no further medical workup recommended, responses sent to [facility]  -Fall on : likely mechanical, no sustained injuries. PT consult  -Bowel regimen: continue bisacodyl 10mg QHS (refusing), miralax 17g daily (refusing), senna 2 tablets QHS (refusing)  -Continue Vitamin D3 1000units QHS (refusing)  6. PRNs  -Olanzapine 5mg PO or IM for agitation  7. Therapy   -Individual, group and milieu therapy as appropriate   8. Disposition: state application submitted

## 2023-12-07 PROCEDURE — 99232 SBSQ HOSP IP/OBS MODERATE 35: CPT

## 2023-12-07 NOTE — BH INPATIENT PSYCHIATRY PROGRESS NOTE - NSBHATTESTBILLING_PSY_A_CORE
78237-Eiktltmhzo OBS or IP - moderate complexity OR 35-49 mins [id number]-Hfhutzcnri OBS or IP - moderate complexity OR 35-49 mins

## 2023-12-07 NOTE — BH INPATIENT PSYCHIATRY PROGRESS NOTE - NSBHASSESSSUMMFT_PSY_ALL_CORE
Patient is a 53 year old adult (goes by "Juan Jose"), living at [facility] on [place] grounds, PMH of GERD, anemia, PPH of schizophrenia, selective mutism, currently with [facility] ACT team, previously with [facility] which , with history of multiple psychiatric hospitalizations, without known history of self-injury or suicide attempts, with history of aggression in the setting on nonadherence with medication, who was initially brought in by staff at residence for increasing agitation and aggression in the setting of 2 months of medication nonadherence. Patient was hospitalized from 8/3-23 on  where they were noted to be hostile, disorganized, threatening, malodorous and grossly psychotic. TOO was obtained, and patient eventually administered dual long-acting injectables (Haldol Decanoate and Zyprexa Relprevv), though had declined nearly all PO medications. Patient was transferred to [facility] where they were admitted due to anemia to 6 (medical workup largely unremarkable), patient stabilized and transferred back to [facility] for further management of psychosis.     Diagnostically, presentation consistent with exacerbation of schizophrenia.     Patient has shown overall very little clinical improvement since admission. Spends most of day isolative and talking to self in severely disorganized and largely incomprehensible fashion, with only very brief moments of reality based exchange with staff or peers. Has showered recently with significant staff encouragement, though remains very malodorous. No recent physical aggression, though can appear mistrustful and make vaguely threatening remarks. Sleep erratic, often just a few hours. Still declining all PO medications, on high dose haloperidol BREWER.    on assessment, patient remains disorganized, internally preoccupied, mumbling to self. Patient was redirected to take care of hygiene, has been observed to be eating. see hospitalist note.     Patient requires inpatient admission due to very poor self-care and for containment, stabilization, medication management and aftercare planning.    Plan:  1. Brink: admitted involuntary , TOO obtained 10/24  2. Observation: routine checks  3. Collateral: obtained collateral from [facility] psychiatrist  4. Psychiatric  -Continue Haldol Decanoate 200mg IM q4 weeks, last given , next due   	-Had received Zyprexa Replevv 405mg qMonth (given )--though no observable benefit above Haldol Decanoate, so will defer at this time  -Continue clozapine 25mg QHS (refusing), d/c'ed olanzapine IM backup order d/t tremulousness   -Continue depakote 1000mg QHS (refusing)  -Continue benztropine 1mg BID for EPS (refusing)  5. Medical:  -Anemia: s/p medical admission at [facility] for anemia to Hgb 6, required 2u pRBCs, most recent Hgb 8.8-9. GI workup unremarkable with exception of diverticulosis and colonic polyp  	-Repeat CBCs with stable anemia (Hgb low 9s)  	-Continue ferrous sulfate 325mg daily (refusing)  -Internist evaluated on , reviewed concerns from [facility], no further medical workup recommended, responses sent to [facility]  -Fall on : likely mechanical, no sustained injuries. PT consult  -Bowel regimen: continue bisacodyl 10mg QHS (refusing), miralax 17g daily (refusing), senna 2 tablets QHS (refusing)  -Continue Vitamin D3 1000units QHS (refusing)  6. PRNs  -Olanzapine 5mg PO or IM for agitation  7. Therapy   -Individual, group and milieu therapy as appropriate   8. Disposition: state application submitted  Patient is a 53 year old adult (goes by "Juan Jose"), living at [facility] on [place] grounds, PMH of GERD, anemia, PPH of schizophrenia, selective mutism, currently with [facility] ACT team, previously with [facility] which , with history of multiple psychiatric hospitalizations, without known history of self-injury or suicide attempts, with history of aggression in the setting on nonadherence with medication, who was initially brought in by staff at residence for increasing agitation and aggression in the setting of 2 months of medication nonadherence. Patient was hospitalized from 8/3-23 on  where they were noted to be hostile, disorganized, threatening, malodorous and grossly psychotic. TOO was obtained, and patient eventually administered dual long-acting injectables (Haldol Decanoate and Zyprexa Relprevv), though had declined nearly all PO medications. Patient was transferred to [facility] where they were admitted due to anemia to 6 (medical workup largely unremarkable), patient stabilized and transferred back to [facility] for further management of psychosis.     Diagnostically, presentation consistent with exacerbation of schizophrenia.     Patient has shown overall very little clinical improvement since admission. Spends most of day isolative and talking to self in severely disorganized and largely incomprehensible fashion, with only very brief moments of reality based exchange with staff or peers. Has showered recently with significant staff encouragement, though remains very malodorous. No recent physical aggression, though can appear mistrustful and make vaguely threatening remarks. Sleep erratic, often just a few hours. Still declining all PO medications, on high dose haloperidol BREWER.    on assessment, patient remains disorganized, internally preoccupied, mumbling to self. Patient was redirected to take care of hygiene, has been observed to be eating. see hospitalist note.     Patient requires inpatient admission due to very poor self-care and for containment, stabilization, medication management and aftercare planning.    Plan:  1. Brink: admitted involuntary , TOO obtained 10/24  2. Observation: routine checks  3. Collateral: obtained collateral from [facility] psychiatrist  4. Psychiatric  -Continue Haldol Decanoate 200mg IM q4 weeks, last given , next due   	-Had received Zyprexa Replevv 405mg qMonth (given )--though no observable benefit above Haldol Decanoate, so will defer at this time  -Continue clozapine 25mg QHS (refusing), d/c'ed olanzapine IM backup order d/t tremulousness   -Continue depakote 1000mg QHS (refusing)  -Continue benztropine 1mg BID for EPS (refusing)  5. Medical:  -Anemia: s/p medical admission at [facility] for anemia to Hgb 6, required 2u pRBCs, most recent Hgb 8.8-9. GI workup unremarkable with exception of diverticulosis and colonic polyp  	-Repeat CBCs with stable anemia (Hgb low 9s)  	-Continue ferrous sulfate 325mg daily (refusing)  -Internist evaluated on , reviewed concerns from [facility], no further medical workup recommended, responses sent to [facility]  -Fall on : likely mechanical, no sustained injuries. PT consult  -Bowel regimen: continue bisacodyl 10mg QHS (refusing), miralax 17g daily (refusing), senna 2 tablets QHS (refusing)  -Continue Vitamin D3 1000units QHS (refusing)  6. PRNs  -Olanzapine 5mg PO or IM for agitation  7. Therapy   -Individual, group and milieu therapy as appropriate   8. Disposition: state application submitted

## 2023-12-07 NOTE — BH INPATIENT PSYCHIATRY PROGRESS NOTE - NSBHFUPINTERVALHXFT_PSY_A_CORE
f/up schizophrenia, care discussed w/ tx team, refused vitals and labs this AM.   Patient observed coming out into the unit at intervals. Patient was mumbling to self, internally preoccupied. Continues to refuse all standing oral medications. Patient observed outside of his room with steady gait. Patient remains disorganized in behaviors and thoughts.

## 2023-12-08 LAB
ALBUMIN SERPL ELPH-MCNC: 3.6 G/DL — SIGNIFICANT CHANGE UP (ref 3.3–5)
ALBUMIN SERPL ELPH-MCNC: 3.6 G/DL — SIGNIFICANT CHANGE UP (ref 3.3–5)
ALP SERPL-CCNC: 112 U/L — SIGNIFICANT CHANGE UP (ref 40–120)
ALP SERPL-CCNC: 112 U/L — SIGNIFICANT CHANGE UP (ref 40–120)
ALT FLD-CCNC: 10 U/L — SIGNIFICANT CHANGE UP (ref 4–33)
ALT FLD-CCNC: 10 U/L — SIGNIFICANT CHANGE UP (ref 4–33)
ANION GAP SERPL CALC-SCNC: 10 MMOL/L — SIGNIFICANT CHANGE UP (ref 7–14)
ANION GAP SERPL CALC-SCNC: 10 MMOL/L — SIGNIFICANT CHANGE UP (ref 7–14)
AST SERPL-CCNC: 12 U/L — SIGNIFICANT CHANGE UP (ref 4–32)
AST SERPL-CCNC: 12 U/L — SIGNIFICANT CHANGE UP (ref 4–32)
BASOPHILS # BLD AUTO: 0.02 K/UL — SIGNIFICANT CHANGE UP (ref 0–0.2)
BASOPHILS # BLD AUTO: 0.02 K/UL — SIGNIFICANT CHANGE UP (ref 0–0.2)
BASOPHILS NFR BLD AUTO: 0.4 % — SIGNIFICANT CHANGE UP (ref 0–2)
BASOPHILS NFR BLD AUTO: 0.4 % — SIGNIFICANT CHANGE UP (ref 0–2)
BILIRUB SERPL-MCNC: <0.2 MG/DL — SIGNIFICANT CHANGE UP (ref 0.2–1.2)
BILIRUB SERPL-MCNC: <0.2 MG/DL — SIGNIFICANT CHANGE UP (ref 0.2–1.2)
BUN SERPL-MCNC: 17 MG/DL — SIGNIFICANT CHANGE UP (ref 7–23)
BUN SERPL-MCNC: 17 MG/DL — SIGNIFICANT CHANGE UP (ref 7–23)
CALCIUM SERPL-MCNC: 9.3 MG/DL — SIGNIFICANT CHANGE UP (ref 8.4–10.5)
CALCIUM SERPL-MCNC: 9.3 MG/DL — SIGNIFICANT CHANGE UP (ref 8.4–10.5)
CHLORIDE SERPL-SCNC: 108 MMOL/L — HIGH (ref 98–107)
CHLORIDE SERPL-SCNC: 108 MMOL/L — HIGH (ref 98–107)
CO2 SERPL-SCNC: 25 MMOL/L — SIGNIFICANT CHANGE UP (ref 22–31)
CO2 SERPL-SCNC: 25 MMOL/L — SIGNIFICANT CHANGE UP (ref 22–31)
CREAT SERPL-MCNC: 0.75 MG/DL — SIGNIFICANT CHANGE UP (ref 0.5–1.3)
CREAT SERPL-MCNC: 0.75 MG/DL — SIGNIFICANT CHANGE UP (ref 0.5–1.3)
EGFR: 95 ML/MIN/1.73M2 — SIGNIFICANT CHANGE UP
EGFR: 95 ML/MIN/1.73M2 — SIGNIFICANT CHANGE UP
EOSINOPHIL # BLD AUTO: 0.07 K/UL — SIGNIFICANT CHANGE UP (ref 0–0.5)
EOSINOPHIL # BLD AUTO: 0.07 K/UL — SIGNIFICANT CHANGE UP (ref 0–0.5)
EOSINOPHIL NFR BLD AUTO: 1.4 % — SIGNIFICANT CHANGE UP (ref 0–6)
EOSINOPHIL NFR BLD AUTO: 1.4 % — SIGNIFICANT CHANGE UP (ref 0–6)
GLUCOSE SERPL-MCNC: 86 MG/DL — SIGNIFICANT CHANGE UP (ref 70–99)
GLUCOSE SERPL-MCNC: 86 MG/DL — SIGNIFICANT CHANGE UP (ref 70–99)
HCT VFR BLD CALC: 28.8 % — LOW (ref 34.5–45)
HCT VFR BLD CALC: 28.8 % — LOW (ref 34.5–45)
HGB BLD-MCNC: 8.6 G/DL — LOW (ref 11.5–15.5)
HGB BLD-MCNC: 8.6 G/DL — LOW (ref 11.5–15.5)
IANC: 3.03 K/UL — SIGNIFICANT CHANGE UP (ref 1.8–7.4)
IANC: 3.03 K/UL — SIGNIFICANT CHANGE UP (ref 1.8–7.4)
IMM GRANULOCYTES NFR BLD AUTO: 0.2 % — SIGNIFICANT CHANGE UP (ref 0–0.9)
IMM GRANULOCYTES NFR BLD AUTO: 0.2 % — SIGNIFICANT CHANGE UP (ref 0–0.9)
LYMPHOCYTES # BLD AUTO: 1.4 K/UL — SIGNIFICANT CHANGE UP (ref 1–3.3)
LYMPHOCYTES # BLD AUTO: 1.4 K/UL — SIGNIFICANT CHANGE UP (ref 1–3.3)
LYMPHOCYTES # BLD AUTO: 27.3 % — SIGNIFICANT CHANGE UP (ref 13–44)
LYMPHOCYTES # BLD AUTO: 27.3 % — SIGNIFICANT CHANGE UP (ref 13–44)
MCHC RBC-ENTMCNC: 22.4 PG — LOW (ref 27–34)
MCHC RBC-ENTMCNC: 22.4 PG — LOW (ref 27–34)
MCHC RBC-ENTMCNC: 29.9 GM/DL — LOW (ref 32–36)
MCHC RBC-ENTMCNC: 29.9 GM/DL — LOW (ref 32–36)
MCV RBC AUTO: 75 FL — LOW (ref 80–100)
MCV RBC AUTO: 75 FL — LOW (ref 80–100)
MONOCYTES # BLD AUTO: 0.6 K/UL — SIGNIFICANT CHANGE UP (ref 0–0.9)
MONOCYTES # BLD AUTO: 0.6 K/UL — SIGNIFICANT CHANGE UP (ref 0–0.9)
MONOCYTES NFR BLD AUTO: 11.7 % — SIGNIFICANT CHANGE UP (ref 2–14)
MONOCYTES NFR BLD AUTO: 11.7 % — SIGNIFICANT CHANGE UP (ref 2–14)
NEUTROPHILS # BLD AUTO: 3.03 K/UL — SIGNIFICANT CHANGE UP (ref 1.8–7.4)
NEUTROPHILS # BLD AUTO: 3.03 K/UL — SIGNIFICANT CHANGE UP (ref 1.8–7.4)
NEUTROPHILS NFR BLD AUTO: 59 % — SIGNIFICANT CHANGE UP (ref 43–77)
NEUTROPHILS NFR BLD AUTO: 59 % — SIGNIFICANT CHANGE UP (ref 43–77)
NRBC # BLD: 0 /100 WBCS — SIGNIFICANT CHANGE UP (ref 0–0)
NRBC # BLD: 0 /100 WBCS — SIGNIFICANT CHANGE UP (ref 0–0)
NRBC # FLD: 0 K/UL — SIGNIFICANT CHANGE UP (ref 0–0)
NRBC # FLD: 0 K/UL — SIGNIFICANT CHANGE UP (ref 0–0)
PLATELET # BLD AUTO: 363 K/UL — SIGNIFICANT CHANGE UP (ref 150–400)
PLATELET # BLD AUTO: 363 K/UL — SIGNIFICANT CHANGE UP (ref 150–400)
POTASSIUM SERPL-MCNC: 4 MMOL/L — SIGNIFICANT CHANGE UP (ref 3.5–5.3)
POTASSIUM SERPL-MCNC: 4 MMOL/L — SIGNIFICANT CHANGE UP (ref 3.5–5.3)
POTASSIUM SERPL-SCNC: 4 MMOL/L — SIGNIFICANT CHANGE UP (ref 3.5–5.3)
POTASSIUM SERPL-SCNC: 4 MMOL/L — SIGNIFICANT CHANGE UP (ref 3.5–5.3)
PROT SERPL-MCNC: 6.8 G/DL — SIGNIFICANT CHANGE UP (ref 6–8.3)
PROT SERPL-MCNC: 6.8 G/DL — SIGNIFICANT CHANGE UP (ref 6–8.3)
RBC # BLD: 3.84 M/UL — SIGNIFICANT CHANGE UP (ref 3.8–5.2)
RBC # BLD: 3.84 M/UL — SIGNIFICANT CHANGE UP (ref 3.8–5.2)
RBC # FLD: 21 % — HIGH (ref 10.3–14.5)
RBC # FLD: 21 % — HIGH (ref 10.3–14.5)
SARS-COV-2 RNA SPEC QL NAA+PROBE: SIGNIFICANT CHANGE UP
SARS-COV-2 RNA SPEC QL NAA+PROBE: SIGNIFICANT CHANGE UP
SODIUM SERPL-SCNC: 143 MMOL/L — SIGNIFICANT CHANGE UP (ref 135–145)
SODIUM SERPL-SCNC: 143 MMOL/L — SIGNIFICANT CHANGE UP (ref 135–145)
WBC # BLD: 5.13 K/UL — SIGNIFICANT CHANGE UP (ref 3.8–10.5)
WBC # BLD: 5.13 K/UL — SIGNIFICANT CHANGE UP (ref 3.8–10.5)
WBC # FLD AUTO: 5.13 K/UL — SIGNIFICANT CHANGE UP (ref 3.8–10.5)
WBC # FLD AUTO: 5.13 K/UL — SIGNIFICANT CHANGE UP (ref 3.8–10.5)

## 2023-12-08 PROCEDURE — 99232 SBSQ HOSP IP/OBS MODERATE 35: CPT

## 2023-12-08 NOTE — BH INPATIENT PSYCHIATRY PROGRESS NOTE - NSBHATTESTBILLING_PSY_A_CORE
51414-Erpefkpjyk OBS or IP - moderate complexity OR 35-49 mins 96915-Qtcjcuwllm OBS or IP - moderate complexity OR 35-49 mins

## 2023-12-08 NOTE — BH INPATIENT PSYCHIATRY PROGRESS NOTE - NSBHASSESSSUMMFT_PSY_ALL_CORE
Patient is a 53 year old adult (goes by "Juan Jose"), living at [facility] on [place] grounds, PMH of GERD, anemia, PPH of schizophrenia, selective mutism, currently with [facility] ACT team, previously with [facility] which , with history of multiple psychiatric hospitalizations, without known history of self-injury or suicide attempts, with history of aggression in the setting on nonadherence with medication, who was initially brought in by staff at residence for increasing agitation and aggression in the setting of 2 months of medication nonadherence. Patient was hospitalized from 8/3-23 on  where they were noted to be hostile, disorganized, threatening, malodorous and grossly psychotic. TOO was obtained, and patient eventually administered dual long-acting injectables (Haldol Decanoate and Zyprexa Relprevv), though had declined nearly all PO medications. Patient was transferred to [facility] where they were admitted due to anemia to 6 (medical workup largely unremarkable), patient stabilized and transferred back to [facility] for further management of psychosis.     Diagnostically, presentation consistent with exacerbation of schizophrenia.     Patient has shown overall very little clinical improvement since admission. Spends most of day isolative and talking to self in severely disorganized and largely incomprehensible fashion, with only very brief moments of reality based exchange with staff or peers. Has showered recently with significant staff encouragement, though remains very malodorous. No recent physical aggression, though can appear mistrustful and make vaguely threatening remarks. Sleep erratic, often just a few hours. Still declining all PO medications, on high dose haloperidol BREWER.    on assessment, patient remains disorganized, internally preoccupied, mumbling to self. Patient was redirected to take care of hygiene, has been observed to be eating. see hospitalist note. lab results pending    Patient requires inpatient admission due to very poor self-care and for containment, stabilization, medication management and aftercare planning.    Plan:  1. Brink: admitted involuntary , TOO obtained 10/24  2. Observation: routine checks  3. Collateral: obtained collateral from [facility] psychiatrist  4. Psychiatric  -Continue Haldol Decanoate 200mg IM q4 weeks, last given , next due   	-Had received Zyprexa Replevv 405mg qMonth (given )--though no observable benefit above Haldol Decanoate, so will defer at this time  -Continue clozapine 25mg QHS (refusing), d/c'ed olanzapine IM backup order d/t tremulousness   -Continue depakote 1000mg QHS (refusing)  -Continue benztropine 1mg BID for EPS (refusing)  5. Medical:  -Anemia: s/p medical admission at [facility] for anemia to Hgb 6, required 2u pRBCs, most recent Hgb 8.8-9. GI workup unremarkable with exception of diverticulosis and colonic polyp  	-Repeat CBCs with stable anemia (Hgb low 9s)  	-Continue ferrous sulfate 325mg daily (refusing)  -Internist evaluated on , reviewed concerns from [facility], no further medical workup recommended, responses sent to [facility]  -Fall on : likely mechanical, no sustained injuries. PT consult  -Bowel regimen: continue bisacodyl 10mg QHS (refusing), miralax 17g daily (refusing), senna 2 tablets QHS (refusing)  -Continue Vitamin D3 1000units QHS (refusing)  6. PRNs  -Olanzapine 5mg PO or IM for agitation  7. Therapy   -Individual, group and milieu therapy as appropriate   8. Disposition: state application submitted  Patient is a 53 year old adult (goes by "Juan Jose"), living at [facility] on [place] grounds, PMH of GERD, anemia, PPH of schizophrenia, selective mutism, currently with [facility] ACT team, previously with [facility] which , with history of multiple psychiatric hospitalizations, without known history of self-injury or suicide attempts, with history of aggression in the setting on nonadherence with medication, who was initially brought in by staff at residence for increasing agitation and aggression in the setting of 2 months of medication nonadherence. Patient was hospitalized from 8/3-23 on  where they were noted to be hostile, disorganized, threatening, malodorous and grossly psychotic. TOO was obtained, and patient eventually administered dual long-acting injectables (Haldol Decanoate and Zyprexa Relprevv), though had declined nearly all PO medications. Patient was transferred to [facility] where they were admitted due to anemia to 6 (medical workup largely unremarkable), patient stabilized and transferred back to [facility] for further management of psychosis.     Diagnostically, presentation consistent with exacerbation of schizophrenia.     Patient has shown overall very little clinical improvement since admission. Spends most of day isolative and talking to self in severely disorganized and largely incomprehensible fashion, with only very brief moments of reality based exchange with staff or peers. Has showered recently with significant staff encouragement, though remains very malodorous. No recent physical aggression, though can appear mistrustful and make vaguely threatening remarks. Sleep erratic, often just a few hours. Still declining all PO medications, on high dose haloperidol BREWER.    on assessment, patient remains disorganized, internally preoccupied, mumbling to self. Patient was redirected to take care of hygiene, has been observed to be eating. see hospitalist note. lab results pending    Patient requires inpatient admission due to very poor self-care and for containment, stabilization, medication management and aftercare planning.    Plan:  1. Brink: admitted involuntary , TOO obtained 10/24  2. Observation: routine checks  3. Collateral: obtained collateral from [facility] psychiatrist  4. Psychiatric  -Continue Haldol Decanoate 200mg IM q4 weeks, last given , next due   	-Had received Zyprexa Replevv 405mg qMonth (given )--though no observable benefit above Haldol Decanoate, so will defer at this time  -Continue clozapine 25mg QHS (refusing), d/c'ed olanzapine IM backup order d/t tremulousness   -Continue depakote 1000mg QHS (refusing)  -Continue benztropine 1mg BID for EPS (refusing)  5. Medical:  -Anemia: s/p medical admission at [facility] for anemia to Hgb 6, required 2u pRBCs, most recent Hgb 8.8-9. GI workup unremarkable with exception of diverticulosis and colonic polyp  	-Repeat CBCs with stable anemia (Hgb low 9s)  	-Continue ferrous sulfate 325mg daily (refusing)  -Internist evaluated on , reviewed concerns from [facility], no further medical workup recommended, responses sent to [facility]  -Fall on : likely mechanical, no sustained injuries. PT consult  -Bowel regimen: continue bisacodyl 10mg QHS (refusing), miralax 17g daily (refusing), senna 2 tablets QHS (refusing)  -Continue Vitamin D3 1000units QHS (refusing)  6. PRNs  -Olanzapine 5mg PO or IM for agitation  7. Therapy   -Individual, group and milieu therapy as appropriate   8. Disposition: state application submitted

## 2023-12-08 NOTE — BH INPATIENT PSYCHIATRY PROGRESS NOTE - NSBHFUPINTERVALHXFT_PSY_A_CORE
f/up schizophrenia, care discussed w/ tx team, refused vitals. Patient agreed to the labs with encouragement and support from staff. During the lab paulina, patient fixated on select staff, telling them "you need to die".   Patient observed coming out into the unit this AM, ate breakfast and joined community meeting.  Patient was mumbling to self, internally preoccupied. Continues to refuse all standing oral medications. Patient remains disorganized.  f/up schizophrenia, care discussed w/ tx team, refused vitals. Patient agreed to the labs with encouragement and support from staff. During the lab paulina, patient fixated on select staff, telling them "you need to die".   Patient observed coming out into the unit this AM, ate breakfast and joined community meeting.  Patient was mumbling to self, internally preoccupied. Continues to refuse all standing oral medications. Patient remains disorganized.   lab work returned: h/h at 8.6/28.8--Dr. Clay made and recommended encouraging patient to take her iron supplements.

## 2023-12-12 PROCEDURE — 99231 SBSQ HOSP IP/OBS SF/LOW 25: CPT

## 2023-12-12 NOTE — BH INPATIENT PSYCHIATRY PROGRESS NOTE - NSBHCHARTREVIEWVS_PSY_A_CORE FT
Vital Signs Last 24 Hrs  T(C): --  T(F): --  HR: --  BP: --  BP(mean): --  RR: --  SpO2: --    Orthostatic VS  12-11-23 @ 12:45  Lying BP: 100/63 HR: 80  Sitting BP: --/-- HR: --  Standing BP: --/-- HR: --  Site: --  Mode: --

## 2023-12-12 NOTE — BH INPATIENT PSYCHIATRY PROGRESS NOTE - NSBHMETABOLIC_PSY_ALL_CORE_FT
BMI: BMI (kg/m2): 35.2 (12-06-23 @ 08:57)  HbA1c: A1C with Estimated Average Glucose Result: 5.3 % (09-29-23 @ 10:00)    Glucose: POCT Blood Glucose.: 100 mg/dL (12-19-22 @ 15:06)    BP: --Vital Signs Last 24 Hrs  T(C): --  T(F): --  HR: --  BP: --  BP(mean): --  RR: --  SpO2: --    Orthostatic VS  12-11-23 @ 12:45  Lying BP: 100/63 HR: 80  Sitting BP: --/-- HR: --  Standing BP: --/-- HR: --  Site: --  Mode: --    Lipid Panel: Date/Time: 09-29-23 @ 10:00  Cholesterol, Serum: 166  LDL Cholesterol Calculated: 94  HDL Cholesterol, Serum: 60  Total Cholesterol/HDL Ration Measurement: --  Triglycerides, Serum: 61

## 2023-12-12 NOTE — BH INPATIENT PSYCHIATRY PROGRESS NOTE - MSE UNSTRUCTURED FT
Appearance: laying in bed under blanket, wearing hospital attire  Behavior: uncooperative, suspicious  Speech: minimal  Mood: unable to assess  Affect: irritable  Thought process: severely disorganized when does speak, illogical  Thought content: vaguely violent/threatening statements, though none today, not observed harming self or others.   Perceptions: observed talking to self  Insight: very poor  Judgement: very poor, refusing vitals intermittently, refusing PO medications, very poor ADLs  Cognition: unable to assess Appearance: laying in bed under blanket, wearing hospital attire  Behavior: uncooperative, suspicious  Speech: no speech today  Mood: unable to assess  Affect: irritable  Thought process: severely disorganized when does speak, illogical  Thought content: vaguely violent/threatening statements, though none today, not observed harming self or others.   Perceptions: observed talking to self  Insight: very poor  Judgement: very poor, refusing vitals intermittently, refusing PO medications, very poor ADLs  Cognition: unable to assess

## 2023-12-12 NOTE — BH INPATIENT PSYCHIATRY PROGRESS NOTE - NSBHATTESTBILLING_PSY_A_CORE
28823-Esllkfytvl OBS or IP - moderate complexity OR 35-49 mins 62330-Sjniqchygn OBS or IP - moderate complexity OR 35-49 mins 06368-Nvpqrpufhz OBS or IP - low complexity OR 25-34 mins 86657-Dftpherzng OBS or IP - low complexity OR 25-34 mins

## 2023-12-12 NOTE — BH INPATIENT PSYCHIATRY PROGRESS NOTE - NSBHLEGALSTATUSCHANGE_PSY_ALL_CORE
Pt's OP therapist returned call to KAIDEN. KAIDEN was able to speak directly with pt's OP Therapist Clifford Flores.  He confirmed that pt is able to receive psychiatric services through [facility] as long as she calls and makes an appt.  Clifford will share this info with the pt.  Clifford discussed his concerns about pt's suicidality and ensuring that she is safe. [facility] informed Clifford that pt is denying any SI at this time but is having difficulty at times managing her emotions and has resorted on Sunday to biting her hand without any kadie or wound. KAIDEN discussed the course of care for PHP tx and then step down recommendation to IOP tx. Clifford is supportive of this plan. He plans to continue to work with pt on Saturdays while she is in PHP tx.    Yes...

## 2023-12-12 NOTE — BH INPATIENT PSYCHIATRY PROGRESS NOTE - NSBHASSESSSUMMFT_PSY_ALL_CORE
Patient is a 53 year old adult (goes by "Juan Jose"), living at [facility] [place] [facility], PMH of GERD, anemia, PPH of schizophrenia, selective mutism, currently with [facility] ACT team, previously with [facility] which , with history of multiple psychiatric hospitalizations, without known history of self-injury or suicide attempts, with history of aggression in the setting on nonadherence with medication, who was initially brought in by staff at residence for increasing agitation and aggression in the setting of 2 months of medication nonadherence. Patient was hospitalized from 8/3-23 on  where they were noted to be hostile, disorganized, threatening, malodorous and grossly psychotic. TOO was obtained, and patient eventually administered dual long-acting injectables (Haldol Decanoate and Zyprexa Relprevv), though had declined nearly all PO medications. Patient was transferred to [facility] where they were admitted due to anemia to 6 (medical workup largely unremarkable), patient stabilized and transferred back to [facility] for further management of psychosis.     Diagnostically, presentation consistent with exacerbation of schizophrenia.     Patient has shown overall very little clinical improvement since admission. Spends most of day isolative and talking to self in severely disorganized and largely incomprehensible fashion, with only very brief moments of reality based exchange with staff or peers. Has showered recently with significant staff encouragement, though remains very malodorous. No recent physical aggression, though can appear mistrustful and make vaguely threatening remarks. Sleep erratic, often just a few hours. Still declining all PO medications, on high dose haloperidol BREWER.    Patient requires inpatient admission due to very poor self-care and for containment, stabilization, medication management and aftercare planning.    Plan:  1. Brink: admitted involuntary , TOO obtained 10/24  2. Observation: routine checks  3. [facility]  4. Psychiatric  -Continue Haldol Decanoate 200mg IM q4 weeks, last given , next due   	-Had received Zyprexa Replevv 405mg qMonth (given )--though no observable benefit above Haldol Decanoate, so will defer at this time  -Continue clozapine 25mg QHS (refusing), d/c'ed olanzapine IM backup order d/t tremulousness   -Continue depakote 1000mg QHS (refusing)  -Continue benztropine 1mg BID for EPS (refusing)  5. Medical:  -Anemia:   	-s/p medical admission at [facility] for anemia to Hgb 6, required 2u pRBCs, most recent Hgb 8.8-9. GI workup unremarkable with exception of diverticulosis and colonic polyp  	-Repeat CBCs with anemia 8.6-low 9s, per internist, encourage iron supplementation  	-Continue ferrous sulfate 325mg daily (refusing)  -Internist evaluated x2 in response to questions asked by [facility], responses sent to [facility]  -Fall on : likely mechanical, no sustained injuries. PT consult  -Bowel regimen: continue bisacodyl 10mg QHS (refusing), miralax 17g daily (refusing), senna 2 tablets QHS (refusing)  -Continue Vitamin D3 1000units QHS (refusing)  6. PRNs  -Olanzapine 5mg PO or IM for agitation  7. Therapy   -Individual, group and milieu therapy as appropriate   8. Disposition: state application submitted  Patient is a 53 year old adult (goes by "Juan Jose"), living at [facility] on [place] [facility], PMH of GERD, anemia, PPH of schizophrenia, selective mutism, currently with [facility] ACT team, previously with [facility] which , with history of multiple psychiatric hospitalizations, without known history of self-injury or suicide attempts, with history of aggression in the setting on nonadherence with medication, who was initially brought in by staff at residence for increasing agitation and aggression in the setting of 2 months of medication nonadherence. Patient was hospitalized from 8/3-23 on  where they were noted to be hostile, disorganized, threatening, malodorous and grossly psychotic. TOO was obtained, and patient eventually administered dual long-acting injectables (Haldol Decanoate and Zyprexa Relprevv), though had declined nearly all PO medications. Patient was transferred to [facility] where they were admitted due to anemia to 6 (medical workup largely unremarkable), patient stabilized and transferred back to [facility] for further management of psychosis.     Diagnostically, presentation consistent with exacerbation of schizophrenia.     Patient has shown overall very little clinical improvement since admission. Spends most of day isolative and talking to self in severely disorganized and largely incomprehensible fashion, with only very brief moments of reality based exchange with staff or peers. Has showered recently with significant staff encouragement, though remains very malodorous. No recent physical aggression, though can appear mistrustful and make vaguely threatening remarks. Sleep erratic, often just a few hours. Still declining all PO medications, on high dose haloperidol BREWER.    Patient requires inpatient admission due to very poor self-care and for containment, stabilization, medication management and aftercare planning.    Plan:  1. Brink: admitted involuntary , TOO obtained 10/24  2. Observation: routine checks  3. Collateral: obtained collateral from [facility] psychiatrist  4. Psychiatric  -Continue Haldol Decanoate 200mg IM q4 weeks, last given , next due   	-Had received Zyprexa Replevv 405mg qMonth (given )--though no observable benefit above Haldol Decanoate, so will defer at this time  -Continue clozapine 25mg QHS (refusing), d/c'ed olanzapine IM backup order d/t tremulousness   -Continue depakote 1000mg QHS (refusing)  -Continue benztropine 1mg BID for EPS (refusing)  5. Medical:  -Anemia:   	-s/p medical admission at [facility] for anemia to Hgb 6, required 2u pRBCs, most recent Hgb 8.8-9. GI workup unremarkable with exception of diverticulosis and colonic polyp  	-Repeat CBCs with anemia 8.6-low 9s, per internist, encourage iron supplementation  	-Continue ferrous sulfate 325mg daily (refusing)  -Internist evaluated x2 in response to questions asked by [facility], responses sent to [facility]  -Fall on : likely mechanical, no sustained injuries. PT consult  -Bowel regimen: continue bisacodyl 10mg QHS (refusing), miralax 17g daily (refusing), senna 2 tablets QHS (refusing)  -Continue Vitamin D3 1000units QHS (refusing)  6. PRNs  -Olanzapine 5mg PO or IM for agitation  7. Therapy   -Individual, group and milieu therapy as appropriate   8. Disposition: state application submitted  Patient is a 53 year old adult (goes by "Juan Jose"), living at [facility] on [place] grounds, PMH of GERD, anemia, PPH of schizophrenia, selective mutism, currently with [facility] ACT team, previously with [facility] which , with history of multiple psychiatric hospitalizations, without known history of self-injury or suicide attempts, with history of aggression in the setting on nonadherence with medication, who was initially brought in by staff at residence for increasing agitation and aggression in the setting of 2 months of medication nonadherence. Patient was hospitalized from 8/3-23 on  where they were noted to be hostile, disorganized, threatening, malodorous and grossly psychotic. TOO was obtained, and patient eventually administered dual long-acting injectables (Haldol Decanoate and Zyprexa Relprevv), though had declined nearly all PO medications. Patient was transferred to [facility] where they were admitted due to anemia to 6 (medical workup largely unremarkable), patient stabilized and transferred back to [facility] for further management of psychosis.     Diagnostically, presentation consistent with exacerbation of schizophrenia.     Patient has shown overall very little clinical improvement since admission. Spends most of day isolative and talking to self in severely disorganized and largely incomprehensible fashion, with only very brief moments of reality based exchange with staff or peers. Has showered recently with significant staff encouragement, though remains very malodorous. No recent physical aggression, though can appear mistrustful and make vaguely threatening remarks. Sleep erratic, often just a few hours, but was adequate last night. Still declining all PO medications, on high dose haloperidol BREWER.     Spoke to internist regarding persistently low hemoglobin -- no indication for repeat CBC at this time.    Patient requires inpatient admission due to very poor self-care and for containment, stabilization, medication management and aftercare planning.    Plan:  1. Brink: admitted involuntary , TOO obtained 10/24  2. Observation: routine checks  3. Collateral: obtained collateral from [facility] psychiatrist  4. Psychiatric  -Continue Haldol Decanoate 200mg IM q4 weeks, last given , next due   	-Had received Zyprexa Replevv 405mg qMonth (given )--though no observable benefit above Haldol Decanoate, so will defer at this time  -Continue clozapine 25mg QHS (refusing), d/c'ed olanzapine IM backup order d/t tremulousness   -Continue depakote 1000mg QHS (refusing)  -Continue benztropine 1mg BID for EPS (refusing)  5. Medical:  -Anemia:   	-s/p medical admission at [facility] for anemia to Hgb 6, required 2u pRBCs, most recent Hgb 8.8-9. GI workup unremarkable with exception of diverticulosis and colonic polyp  	-Repeat CBCs with anemia 8.6-low 9s, per internist, encourage iron supplementation  	-Continue ferrous sulfate 325mg daily (refusing)  -Internist evaluated x2 in response to questions asked by [facility], responses sent to [facility]  -Fall on : likely mechanical, no sustained injuries. PT consult  -Bowel regimen: continue bisacodyl 10mg QHS (refusing), miralax 17g daily (refusing), senna 2 tablets QHS (refusing)  -Continue Vitamin D3 1000units QHS (refusing)  6. PRNs  -Olanzapine 5mg PO or IM for agitation  7. Therapy   -Individual, group and milieu therapy as appropriate   8. Disposition: state application submitted  Patient is a 53 year old adult (goes by "Juan Jose"), living at [facility] on [place] grounds, PMH of GERD, anemia, PPH of schizophrenia, selective mutism, currently with [facility] ACT team, previously with [facility] which , with history of multiple psychiatric hospitalizations, without known history of self-injury or suicide attempts, with history of aggression in the setting on nonadherence with medication, who was initially brought in by staff at residence for increasing agitation and aggression in the setting of 2 months of medication nonadherence. Patient was hospitalized from 8/3-23 on  where they were noted to be hostile, disorganized, threatening, malodorous and grossly psychotic. TOO was obtained, and patient eventually administered dual long-acting injectables (Haldol Decanoate and Zyprexa Relprevv), though had declined nearly all PO medications. Patient was transferred to [facility] where they were admitted due to anemia to 6 (medical workup largely unremarkable), patient stabilized and transferred back to [facility] for further management of psychosis.     Diagnostically, presentation consistent with exacerbation of schizophrenia.     Patient has shown overall very little clinical improvement since admission. Spends most of day isolative and talking to self in severely disorganized and largely incomprehensible fashion, with only very brief moments of reality based exchange with staff or peers. Has showered recently with significant staff encouragement, though remains very malodorous. No recent physical aggression, though can appear mistrustful and make vaguely threatening remarks. Sleep erratic, often just a few hours, but was adequate last night. Still declining all PO medications, on high dose haloperidol BREWER.     Spoke to internist regarding persistently low hemoglobin -- no indication for repeat CBC at this time.    Patient requires inpatient admission due to very poor self-care and for containment, stabilization, medication management and aftercare planning.    Plan:  1. Brink: admitted involuntary , TOO obtained 10/24  2. Observation: routine checks  3. Collateral: obtained collateral from [facility] psychiatrist  4. Psychiatric  -Continue Haldol Decanoate 200mg IM q4 weeks, last given , next due   	-Had received Zyprexa Replevv 405mg qMonth (given )--though no observable benefit above Haldol Decanoate, so will defer at this time  -Continue clozapine 25mg QHS (refusing), d/c'ed olanzapine IM backup order d/t tremulousness   -Continue depakote 1000mg QHS (refusing)  -Continue benztropine 1mg BID for EPS (refusing)  5. Medical:  -Anemia:   	-s/p medical admission at [facility] for anemia to Hgb 6, required 2u pRBCs, most recent Hgb 8.8-9. GI workup unremarkable with exception of diverticulosis and colonic polyp  	-Repeat CBCs with anemia 8.6-low 9s, per internist, encourage iron supplementation  	-Continue ferrous sulfate 325mg daily (refusing)  -Internist evaluated x2 in response to questions asked by [facility], responses sent to [facility]  -Fall on : likely mechanical, no sustained injuries. PT consult  -Bowel regimen: continue bisacodyl 10mg QHS (refusing), miralax 17g daily (refusing), senna 2 tablets QHS (refusing)  -Continue Vitamin D3 1000units QHS (refusing)  6. PRNs  -Olanzapine 5mg PO or IM for agitation  7. Therapy   -Individual, group and milieu therapy as appropriate   8. Disposition: state application submitted

## 2023-12-12 NOTE — BH INPATIENT PSYCHIATRY PROGRESS NOTE - NSBHFUPINTERVALHXFT_PSY_A_CORE
Refused vitals, refused PO medications, no PRNs for agitation, no acute events. Refused vitals, refused PO medications, no PRNs for agitation, no acute events. Slept well last night per log.    Patient seen laying in bed, awake. Lip twitching. Does not verbally respond to any questions. Room smells of urine.

## 2023-12-13 DIAGNOSIS — D64.9 ANEMIA, UNSPECIFIED: ICD-10-CM

## 2023-12-13 PROCEDURE — 99231 SBSQ HOSP IP/OBS SF/LOW 25: CPT

## 2023-12-13 NOTE — BH INPATIENT PSYCHIATRY PROGRESS NOTE - NSBHFUPINTERVALHXFT_PSY_A_CORE
Refused vitals, refused PO medications, no PRNs for agitation, no acute events.  Refused vitals, refused PO medications, no PRNs for agitation, no acute events. Slept about 4 hours overnight per log.    Patient seen in room, staring at toenail. Mild hand tremor noted. Room continues to smell of urine. Does not respond to any questions.

## 2023-12-13 NOTE — BH INPATIENT PSYCHIATRY PROGRESS NOTE - NSBHASSESSSUMMFT_PSY_ALL_CORE
Patient is a 53 year old adult (goes by "Juan Jose"), living at [facility] on [place] grounds, PMH of GERD, anemia, PPH of schizophrenia, selective mutism, currently with [facility] ACT team, previously with [facility] which , with history of multiple psychiatric hospitalizations, without known history of self-injury or suicide attempts, with history of aggression in the setting on nonadherence with medication, who was initially brought in by staff at residence for increasing agitation and aggression in the setting of 2 months of medication nonadherence. Patient was hospitalized from 8/3-23 on  where they were noted to be hostile, disorganized, threatening, malodorous and grossly psychotic. TOO was obtained, and patient eventually administered dual long-acting injectables (Haldol Decanoate and Zyprexa Relprevv), though had declined nearly all PO medications. Patient was transferred to [facility] where they were admitted due to anemia to 6 (medical workup largely unremarkable), patient stabilized and transferred back to [facility] for further management of psychosis.     Diagnostically, presentation consistent with exacerbation of schizophrenia.     Patient has shown overall very little clinical improvement since admission. Spends most of day isolative and talking to self in severely disorganized and largely incomprehensible fashion, with only very brief moments of reality based exchange with staff or peers. Has showered recently with significant staff encouragement, though remains very malodorous. No recent physical aggression, though can appear mistrustful and make vaguely threatening remarks. Sleep erratic, often just a few hours, but was adequate last night. Still declining all PO medications, on high dose haloperidol BREWER.     Spoke to internist regarding persistently low hemoglobin -- no indication for repeat CBC at this time.    Patient requires inpatient admission due to very poor self-care and for containment, stabilization, medication management and aftercare planning.    Plan:  1. Brink: admitted involuntary , TOO obtained 10/24  2. Observation: routine checks  3. Collateral: obtained collateral from [facility] psychiatrist  4. Psychiatric  -Continue Haldol Decanoate 200mg IM q4 weeks, last given , next due   	-Had received Zyprexa Replevv 405mg qMonth (given )--though no observable benefit above Haldol Decanoate, so will defer at this time  -Continue clozapine 25mg QHS (refusing), d/c'ed olanzapine IM backup order d/t tremulousness   -Continue depakote 1000mg QHS (refusing)  -Continue benztropine 1mg BID for EPS (refusing)  5. Medical:  -Anemia:   	-s/p medical admission at [facility] for anemia to Hgb 6, required 2u pRBCs, most recent Hgb 8.8-9. GI workup unremarkable with exception of diverticulosis and colonic polyp  	-Repeat CBCs with anemia 8.6-low 9s, per internist, encourage iron supplementation  	-Continue ferrous sulfate 325mg daily (refusing)  -Internist evaluated x2 in response to questions asked by [facility], responses sent to [facility]  -Fall on : likely mechanical, no sustained injuries. PT consult  -Bowel regimen: continue bisacodyl 10mg QHS (refusing), miralax 17g daily (refusing), senna 2 tablets QHS (refusing)  -Continue Vitamin D3 1000units QHS (refusing)  6. PRNs  -Olanzapine 5mg PO or IM for agitation  7. Therapy   -Individual, group and milieu therapy as appropriate   8. Disposition: state application submitted  Patient is a 53 year old adult (goes by "Juan Jose"), living at [facility] on [place] grounds, PMH of GERD, anemia, PPH of schizophrenia, selective mutism, currently with [facility] ACT team, previously with [facility] which , with history of multiple psychiatric hospitalizations, without known history of self-injury or suicide attempts, with history of aggression in the setting on nonadherence with medication, who was initially brought in by staff at residence for increasing agitation and aggression in the setting of 2 months of medication nonadherence. Patient was hospitalized from 8/3-23 on  where they were noted to be hostile, disorganized, threatening, malodorous and grossly psychotic. TOO was obtained, and patient eventually administered dual long-acting injectables (Haldol Decanoate and Zyprexa Relprevv), though had declined nearly all PO medications. Patient was transferred to [facility] where they were admitted due to anemia to 6 (medical workup largely unremarkable), patient stabilized and transferred back to [facility] for further management of psychosis.     Diagnostically, presentation consistent with exacerbation of schizophrenia.     Patient has shown overall very little clinical improvement since admission. Spends most of day isolative and talking to self in severely disorganized and largely incomprehensible fashion, with only very brief moments of reality based exchange with staff or peers. Has showered recently with significant staff encouragement, though remains very malodorous. No recent physical aggression, though can appear mistrustful and make vaguely threatening remarks. Sleep erratic, often just a few hours, but was adequate last night. Still declining all PO medications, on high dose haloperidol BREWER.     Spoke to internist regarding persistently low hemoglobin -- no indication for repeat CBC at this time.    Patient requires inpatient admission due to very poor self-care and for containment, stabilization, medication management and aftercare planning.    Plan:  1. Brink: admitted involuntary , TOO obtained 10/24  2. Observation: routine checks  3. Collateral: obtained collateral from [facility] psychiatrist  4. Psychiatric  -Continue Haldol Decanoate 200mg IM q4 weeks, last given , next due   	-Had received Zyprexa Replevv 405mg qMonth (given )--though no observable benefit above Haldol Decanoate, so will defer at this time  -Continue clozapine 25mg QHS (refusing), d/c'ed olanzapine IM backup order d/t tremulousness   -Continue depakote 1000mg QHS (refusing)  -Continue benztropine 1mg BID for EPS (refusing)  5. Medical:  -Anemia:   	-s/p medical admission at [facility] for anemia to Hgb 6, required 2u pRBCs, most recent Hgb 8.8-9. GI workup unremarkable with exception of diverticulosis and colonic polyp  	-Repeat CBCs with anemia 8.6-low 9s, per internist, encourage iron supplementation  	-Continue ferrous sulfate 325mg daily (refusing)  -Internist evaluated x2 in response to questions asked by [facility], responses sent to [facility]  -Fall on : likely mechanical, no sustained injuries. PT consult  -Bowel regimen: continue bisacodyl 10mg QHS (refusing), miralax 17g daily (refusing), senna 2 tablets QHS (refusing)  -Continue Vitamin D3 1000units QHS (refusing)  6. PRNs  -Olanzapine 5mg PO or IM for agitation  7. Therapy   -Individual, group and milieu therapy as appropriate   8. Disposition: state application submitted  Patient is a 53 year old adult (goes by "Juan Jose"), living at [facility] on [place] grounds, PMH of GERD, anemia, PPH of schizophrenia, selective mutism, currently with [facility] ACT team, previously with [facility] which , with history of multiple psychiatric hospitalizations, without known history of self-injury or suicide attempts, with history of aggression in the setting on nonadherence with medication, who was initially brought in by staff at residence for increasing agitation and aggression in the setting of 2 months of medication nonadherence. Patient was hospitalized from 8/3-23 on  where they were noted to be hostile, disorganized, threatening, malodorous and grossly psychotic. TOO was obtained, and patient eventually administered dual long-acting injectables (Haldol Decanoate and Zyprexa Relprevv), though had declined nearly all PO medications. Patient was transferred to [facility] where they were admitted due to anemia to 6 (medical workup largely unremarkable), patient stabilized and transferred back to [facility] for further management of psychosis.     Diagnostically, presentation consistent with exacerbation of schizophrenia.     Patient has shown overall very little clinical improvement since admission. Spends most of day isolative and talking to self in severely disorganized and largely incomprehensible fashion, with only very brief moments of reality based exchange with staff or peers. Has showered recently with significant staff encouragement, though remains very malodorous, room smells of urine. No recent physical aggression, though can appear mistrustful and make vaguely threatening remarks. Sleep erratic, often just a few hours, poor last night. Still declining all PO medications, on high dose haloperidol BREWER.     Spoke to internist regarding persistently low hemoglobin -- no indication for repeat CBC at this time.    Patient requires inpatient admission due to very poor self-care and for containment, stabilization, medication management and aftercare planning.    Plan:  1. Brink: admitted involuntary , TOO obtained 10/24  2. Observation: routine checks  3. Collateral: obtained collateral from [facility] psychiatrist  4. Psychiatric  -Continue Haldol Decanoate 200mg IM q4 weeks, last given , next due   	-Had received Zyprexa Replevv 405mg qMonth (given )--though no observable benefit above Haldol Decanoate, so will defer at this time  -Continue clozapine 25mg QHS (refusing), d/c'ed olanzapine IM backup order d/t tremulousness   -Continue depakote 1000mg QHS (refusing)  -Continue benztropine 1mg BID for EPS (refusing)  5. Medical:  -Anemia:   	-s/p medical admission at [facility] for anemia to Hgb 6, required 2u pRBCs, most recent Hgb 8.8-9. GI workup unremarkable with exception of diverticulosis and colonic polyp  	-Repeat CBCs with anemia 8.6-low 9s, per internist, encourage iron supplementation  	-Continue ferrous sulfate 325mg daily (refusing)  -Internist evaluated x2 in response to questions asked by [facility], responses sent to [facility]  -Fall on : likely mechanical, no sustained injuries. PT consult  -Bowel regimen: continue bisacodyl 10mg QHS (refusing), miralax 17g daily (refusing), senna 2 tablets QHS (refusing)  -Continue Vitamin D3 1000units QHS (refusing)  6. PRNs  -Olanzapine 5mg PO or IM for agitation  7. Therapy   -Individual, group and milieu therapy as appropriate   8. Disposition: state application submitted  Patient is a 53 year old adult (goes by "Juan Jose"), living at [facility] on [place] grounds, PMH of GERD, anemia, PPH of schizophrenia, selective mutism, currently with [facility] ACT team, previously with [facility] which , with history of multiple psychiatric hospitalizations, without known history of self-injury or suicide attempts, with history of aggression in the setting on nonadherence with medication, who was initially brought in by staff at residence for increasing agitation and aggression in the setting of 2 months of medication nonadherence. Patient was hospitalized from 8/3-23 on  where they were noted to be hostile, disorganized, threatening, malodorous and grossly psychotic. TOO was obtained, and patient eventually administered dual long-acting injectables (Haldol Decanoate and Zyprexa Relprevv), though had declined nearly all PO medications. Patient was transferred to [facility] where they were admitted due to anemia to 6 (medical workup largely unremarkable), patient stabilized and transferred back to [facility] for further management of psychosis.     Diagnostically, presentation consistent with exacerbation of schizophrenia.     Patient has shown overall very little clinical improvement since admission. Spends most of day isolative and talking to self in severely disorganized and largely incomprehensible fashion, with only very brief moments of reality based exchange with staff or peers. Has showered recently with significant staff encouragement, though remains very malodorous, room smells of urine. No recent physical aggression, though can appear mistrustful and make vaguely threatening remarks. Sleep erratic, often just a few hours, poor last night. Still declining all PO medications, on high dose haloperidol BREWER.     Spoke to internist regarding persistently low hemoglobin -- no indication for repeat CBC at this time.    Patient requires inpatient admission due to very poor self-care and for containment, stabilization, medication management and aftercare planning.    Plan:  1. Brink: admitted involuntary , TOO obtained 10/24  2. Observation: routine checks  3. Collateral: obtained collateral from [facility] psychiatrist  4. Psychiatric  -Continue Haldol Decanoate 200mg IM q4 weeks, last given , next due   	-Had received Zyprexa Replevv 405mg qMonth (given )--though no observable benefit above Haldol Decanoate, so will defer at this time  -Continue clozapine 25mg QHS (refusing), d/c'ed olanzapine IM backup order d/t tremulousness   -Continue depakote 1000mg QHS (refusing)  -Continue benztropine 1mg BID for EPS (refusing)  5. Medical:  -Anemia:   	-s/p medical admission at [facility] for anemia to Hgb 6, required 2u pRBCs, most recent Hgb 8.8-9. GI workup unremarkable with exception of diverticulosis and colonic polyp  	-Repeat CBCs with anemia 8.6-low 9s, per internist, encourage iron supplementation  	-Continue ferrous sulfate 325mg daily (refusing)  -Internist evaluated x2 in response to questions asked by [facility], responses sent to [facility]  -Fall on : likely mechanical, no sustained injuries. PT consult  -Bowel regimen: continue bisacodyl 10mg QHS (refusing), miralax 17g daily (refusing), senna 2 tablets QHS (refusing)  -Continue Vitamin D3 1000units QHS (refusing)  6. PRNs  -Olanzapine 5mg PO or IM for agitation  7. Therapy   -Individual, group and milieu therapy as appropriate   8. Disposition: state application submitted

## 2023-12-13 NOTE — BH INPATIENT PSYCHIATRY PROGRESS NOTE - NSBHATTESTBILLING_PSY_A_CORE
80569-[id number] OBS or IP - low complexity OR 25-34 mins 25432-[id number] OBS or IP - low complexity OR 25-34 mins

## 2023-12-13 NOTE — BH INPATIENT PSYCHIATRY PROGRESS NOTE - MSE UNSTRUCTURED FT
Appearance: laying in bed under blanket, wearing hospital attire  Behavior: uncooperative, suspicious  Speech: no speech today  Mood: unable to assess  Affect: irritable  Thought process: severely disorganized when does speak, illogical  Thought content: vaguely violent/threatening statements, though none today, not observed harming self or others.   Perceptions: observed talking to self  Insight: very poor  Judgement: very poor, refusing vitals intermittently, refusing PO medications, very poor ADLs  Cognition: unable to assess Appearance: laying in bed, wearing hospital attire  Behavior: uncooperative, suspicious  Speech: no speech today  Mood: unable to assess  Affect: irritable  Thought process: severely disorganized when does speak  Thought content: vaguely violent/threatening statements, though none today, not observed harming self or others.   Perceptions: observed talking to self  Insight: very poor  Judgement: very poor, refusing vitals intermittently, refusing PO medications, very poor ADLs  Cognition: unable to assess

## 2023-12-14 PROCEDURE — 99232 SBSQ HOSP IP/OBS MODERATE 35: CPT

## 2023-12-14 NOTE — BH INPATIENT PSYCHIATRY PROGRESS NOTE - MSE UNSTRUCTURED FT
Appearance: laying in bed, wearing hospital attire  Behavior: uncooperative, suspicious  Speech: no speech today  Mood: unable to assess  Affect: irritable  Thought process: severely disorganized when does speak  Thought content: vaguely violent/threatening statements, though none today, not observed harming self or others.   Perceptions: observed talking to self  Insight: very poor  Judgement: very poor, refusing vitals intermittently, refusing PO medications, very poor ADLs  Cognition: unable to assess

## 2023-12-14 NOTE — CHART NOTE - NSCHARTNOTEFT_GEN_A_CORE
Hematology asked in regards to chronic microcytic anemia for pt. B12, folate, haptoglobin, LDH, hemoglobin electrophoresis normal. Recent colonoscopy/endoscopy 10/5/23- diverticulosis in the sigmoid colon. One 8 mm polyp in the ascending colon, removed with a cold snare. Resected and retrieved. Pseudopolyps in the terminal ileum. Large hiatal hernia. Iron studies suggestive of iron deficiency anemia likely 2/2 to hiatal hernia impairing iron absorption. Would continue iron supplementation. Peripheral blood smear reviewed: Hematology asked in regards to chronic microcytic anemia for pt. B12, folate, haptoglobin, LDH, hemoglobin electrophoresis normal. Recent colonoscopy/endoscopy 10/5/23- diverticulosis in the sigmoid colon. One 8 mm polyp in the ascending colon, removed with a cold snare. Resected and retrieved. Pseudopolyps in the terminal ileum. Large hiatal hernia. Iron studies suggestive of iron deficiency anemia likely 2/2 to hiatal hernia impairing iron absorption. Would continue iron supplementation, pt appears to be refusing oral iron, can consider giving IV iron sucrose 200mg x1 if pt has an IV. Peripheral blood smear reviewed: Hematology asked in regards to microcytic anemia for pt. B12, folate, haptoglobin, LDH, hemoglobin electrophoresis normal. Recent colonoscopy/endoscopy 10/5/23- diverticulosis in the sigmoid colon. One 8 mm polyp in the ascending colon, removed with a cold snare. Resected and retrieved- tubular adenoma on pathology. Pseudopolyps in the terminal ileum. Large hiatal hernia. Iron studies suggestive of iron deficiency anemia. Would continue iron supplementation, pt appears to be refusing oral iron, can consider giving IV iron sucrose 200mg x1 if pt has an IV. F/u with GI outpatient. Peripheral blood smear reviewed: Hematology asked in regards to microcytic anemia for pt. B12, folate, haptoglobin, LDH, hemoglobin electrophoresis normal. Recent colonoscopy/endoscopy 10/5/23- diverticulosis in the sigmoid colon. One 8 mm polyp in the ascending colon, removed with a cold snare. Resected and retrieved- tubular adenoma on pathology. Pseudopolyps in the terminal ileum. Large hiatal hernia. Iron studies w/ peripheral blood smear reviewed: markedly hypochromic microcytic cells appreciated w/ pencil RBCs highly suggestive of iron deficiency. Would continue iron supplementation, pt appears to be refusing oral iron, can consider giving IV iron sucrose 200mg x1 if pt has an IV. F/u with GI outpatient. Hematology asked in regards to microcytic anemia for pt. B12, folate, haptoglobin, LDH, hemoglobin electrophoresis normal. Recent colonoscopy/endoscopy 10/5/23- diverticulosis in the sigmoid colon. One 8 mm polyp in the ascending colon, removed with a cold snare. Resected and retrieved- tubular adenoma on pathology. Pseudopolyps in the terminal ileum. Large hiatal hernia. Iron studies w/ peripheral blood smear reviewed: markedly hypochromic microcytic cells appreciated w/ pencil RBCs highly suggestive of iron deficiency. Would continue iron supplementation, pt appears to be refusing oral iron, can consider giving IV iron sucrose 200mg x1 if pt has an IV. F/u with GI outpatient.  . Hematology asked in regards to microcytic anemia for pt. B12, folate, haptoglobin, LDH, hemoglobin electrophoresis normal. Recent colonoscopy/endoscopy 10/5/23- diverticulosis in the sigmoid colon. One 8 mm polyp in the ascending colon, removed with a cold snare. Resected and retrieved- tubular adenoma on pathology. Pseudopolyps in the terminal ileum. Large hiatal hernia. Iron studies w/ peripheral blood smear reviewed: markedly hypochromic microcytic cells appreciated w/ pencil RBCs highly suggestive of iron deficiency. Would continue iron supplementation, pt appears to be refusing oral iron, can consider giving IV iron sucrose 200mg x1 if pt has an IV. Gi to f/u on when to see patient again Hematology asked in regards to microcytic anemia for pt. B12, folate, haptoglobin, LDH, hemoglobin electrophoresis normal. Recent colonoscopy/endoscopy 10/5/23- diverticulosis in the sigmoid colon. One 8 mm polyp in the ascending colon, removed with a cold snare. Resected and retrieved- tubular adenoma on pathology. Pseudopolyps in the terminal ileum. Large hiatal hernia. Iron studies w/ peripheral blood smear reviewed: markedly hypochromic microcytic cells appreciated w/ pencil RBCs highly suggestive of iron deficiency anemia. Would continue iron supplementation, pt appears to be refusing oral iron, can consider giving IV iron sucrose 200mg x1 if pt has an IV. Unclear if component of GI bleed contributing to BRYON, would ask Gi to f/u on when to see patient again as well as for abnormal endoscopy and colonoscopy findings Hematology asked in regards to microcytic anemia for pt. B12, folate, haptoglobin, LDH, hemoglobin electrophoresis normal. Recent colonoscopy/endoscopy 10/5/23- diverticulosis in the sigmoid colon. One 8 mm polyp in the ascending colon, removed with a cold snare. Resected and retrieved- tubular adenoma on pathology. Pseudopolyps in the terminal ileum. Large hiatal hernia. Iron studies w/ peripheral blood smear reviewed: markedly hypochromic microcytic cells appreciated w/ pencil RBCs highly suggestive of iron deficiency anemia. Would continue iron supplementation, pt appears to be refusing oral iron, can consider giving IV iron sucrose 200mg x1 if pt has an IV. If pt is refusing IV iron, can f/u with hematology as an outpatient. Unclear if component of GI bleed contributing to BRYON, would ask Gi to f/u on when to see patient again as well as for abnormal endoscopy and colonoscopy findings Hematology asked in regards to microcytic anemia for pt. B12, folate, haptoglobin, LDH, hemoglobin electrophoresis normal. Recent colonoscopy/endoscopy 10/5/23- diverticulosis in the sigmoid colon. One 8 mm polyp in the ascending colon, removed with a cold snare. Resected and retrieved- tubular adenoma on pathology. Pseudopolyps in the terminal ileum. Large hiatal hernia. Iron studies w/ peripheral blood smear reviewed: markedly hypochromic microcytic cells appreciated w/ pencil RBCs highly suggestive of iron deficiency anemia. Would continue iron supplementation, pt appears to be refusing oral iron, can consider giving IV iron sucrose 200mg x1 if pt has an IV. If pt is refusing PO and IV iron, can f/u with hematology as an outpatient. Unclear if component of GI bleed contributing to BRYON, would ask Gi to f/u on when to see patient again as well as for abnormal endoscopy and colonoscopy findings Hematology asked in regards to microcytic anemia for pt. B12, folate, haptoglobin, LDH, hemoglobin electrophoresis normal. Recent colonoscopy/endoscopy 10/5/23- diverticulosis in the sigmoid colon. One 8 mm polyp in the ascending colon, removed with a cold snare. Resected and retrieved- tubular adenoma on pathology. Pseudopolyps in the terminal ileum. Large hiatal hernia. Iron studies w/ peripheral blood smear reviewed: markedly hypochromic microcytic cells appreciated w/ pencil RBCs highly suggestive of iron deficiency anemia. Would continue iron supplementation, pt appears to be refusing oral iron, can consider giving IV iron sucrose 200mg x1 if pt has an IV. If pt is refusing PO and IV iron, can f/u as an outpatient for further therapy. Unclear if component of GI bleed contributing to BRYON, would ask Gi to f/u on when to see patient again as well as for abnormal endoscopy and colonoscopy findings Hematology asked in regards to microcytic anemia for pt. B12, folate, haptoglobin, LDH, hemoglobin electrophoresis normal. Recent colonoscopy/endoscopy 10/5/23- diverticulosis in the sigmoid colon. One 8 mm polyp in the ascending colon, removed with a cold snare. Resected and retrieved- tubular adenoma on pathology. Pseudopolyps in the terminal ileum. Large hiatal hernia. Iron studies w/ peripheral blood smear reviewed: markedly hypochromic microcytic cells appreciated w/ pencil RBCs highly suggestive of iron deficiency anemia. Would continue iron supplementation. If pt is refusing iron, can f/u as an outpatient for further therapy. Unclear if component of GI bleed contributing to BRYON, would ask Gi to f/u on when to see patient again as well as for abnormal endoscopy and colonoscopy findings Hematology asked in regards to microcytic anemia for pt. B12, folate, haptoglobin, LDH, hemoglobin electrophoresis normal. Recent colonoscopy/endoscopy 10/5/23- diverticulosis in the sigmoid colon. One 8 mm polyp in the ascending colon, removed with a cold snare. Resected and retrieved- tubular adenoma on pathology. Pseudopolyps in the terminal ileum. Large hiatal hernia. Iron studies w/ peripheral blood smear reviewed: markedly hypochromic microcytic cells appreciated w/ pencil RBCs highly suggestive of iron deficiency anemia. Would continue iron supplementation. If pt is refusing iron, can f/u as an outpatient for further therapy. Not an emergent indication for iron supplementation. Unclear if component of GI bleed contributing to BRYON, would ask Gi to f/u on when to see patient again as well as for abnormal endoscopy and colonoscopy findings Hematology asked in regards to microcytic anemia for pt. B12, folate, haptoglobin, LDH, hemoglobin electrophoresis normal. Recent colonoscopy/endoscopy 10/5/23- diverticulosis in the sigmoid colon. One 8 mm polyp in the ascending colon, removed with a cold snare. Resected and retrieved- tubular adenoma on pathology. Pseudopolyps in the terminal ileum. Large hiatal hernia. Iron studies w/ peripheral blood smear reviewed: markedly hypochromic microcytic cells appreciated w/ pencil RBCs highly suggestive of iron deficiency anemia. Would continue iron supplementation. If pt is refusing iron, can f/u as an outpatient for further therapy. Not an emergent indication for iron supplementation at this time. Unclear if component of GI bleed contributing to BRYON, would ask Gi to f/u on when to see patient again as well as for abnormal endoscopy and colonoscopy findings

## 2023-12-14 NOTE — BH INPATIENT PSYCHIATRY PROGRESS NOTE - NSBHCHARTREVIEWVS_PSY_A_CORE FT
Vital Signs Last 24 Hrs  T(C): --  T(F): --  HR: --  BP: --  BP(mean): --  RR: --  SpO2: --     Vital Signs Last 24 Hrs  T(C): 36.3 (12-14-23 @ 08:23), Max: 36.3 (12-14-23 @ 08:23)  T(F): 97.4 (12-14-23 @ 08:23), Max: 97.4 (12-14-23 @ 08:23)  HR: --  BP: --  BP(mean): --  RR: 18 (12-14-23 @ 08:23) (18 - 18)  SpO2: --    Orthostatic VS  12-14-23 @ 08:23  Lying BP: --/-- HR: --  Sitting BP: 110/68 HR: 62  Standing BP: 115/71 HR: 74  Site: --  Mode: --

## 2023-12-14 NOTE — BH INPATIENT PSYCHIATRY PROGRESS NOTE - NSBHATTESTBILLING_PSY_A_CORE
09937-Wivhuquryw OBS or IP - low complexity OR 25-34 mins 36005-Pmpfdphxag OBS or IP - low complexity OR 25-34 mins 13570-Qnelfkgnhp OBS or IP - moderate complexity OR 35-49 mins 95415-Jgotvuylmc OBS or IP - moderate complexity OR 35-49 mins

## 2023-12-14 NOTE — BH INPATIENT PSYCHIATRY PROGRESS NOTE - NSBHFUPINTERVALHXFT_PSY_A_CORE
Refused vitals, refused PO medications, no PRNs for agitation, no acute events.  VSS, refused PO medications, no PRNs for agitation, no acute events. Slept 5 hours overnight (broken)    Patient found sleeping in her bed. Not able to be awoken with verbal cues.

## 2023-12-14 NOTE — BH INPATIENT PSYCHIATRY PROGRESS NOTE - NSBHASSESSSUMMFT_PSY_ALL_CORE
Patient is a 53 year old adult (goes by "Juan Jose"), living at [facility] [place] grounds, PMH of GERD, anemia, PPH of schizophrenia, selective mutism, currently with [facility] ACT team, previously with [facility] which , with history of multiple psychiatric hospitalizations, without known history of self-injury or suicide attempts, with history of aggression in the setting on nonadherence with medication, who was initially brought in by staff at residence for increasing agitation and aggression in the setting of 2 months of medication nonadherence. Patient was hospitalized from 8/3-23 on  where they were noted to be hostile, disorganized, threatening, malodorous and grossly psychotic. TOO was obtained, and patient eventually administered dual long-acting injectables (Haldol Decanoate and Zyprexa Relprevv), though had declined nearly all PO medications. Patient was transferred to [facility] where they were admitted due to anemia to 6 (medical workup largely unremarkable), patient stabilized and transferred back to [facility] for further management of psychosis.     Diagnostically, presentation consistent with exacerbation of schizophrenia.     Patient has shown overall very little clinical improvement since admission. Spends most of day isolative and talking to self in severely disorganized and largely incomprehensible fashion, with only very brief moments of reality based exchange with staff or peers. Has showered recently with significant staff encouragement, though remains very malodorous, room smells of urine. No recent physical aggression, though can appear mistrustful and make vaguely threatening remarks. Sleep erratic, often just a few hours, poor last night. Still declining all PO medications, on high dose haloperidol BREWER.     Spoke to internist regarding persistently low hemoglobin -- no indication for repeat CBC at this time.    Patient requires inpatient admission due to very poor self-care and for containment, stabilization, medication management and aftercare planning.    Plan:  1. Brink: admitted involuntary , TOO obtained 10/24  2. Observation: routine checks  3. Collateral: obtained collateral from [facility] psychiatrist  4. Psychiatric  -Continue Haldol Decanoate 200mg IM q4 weeks, last given , next due   	-Had received Zyprexa Replevv 405mg qMonth (given )--though no observable benefit above Haldol Decanoate, so will defer at this time  -Continue clozapine 25mg QHS (refusing), d/c'ed olanzapine IM backup order d/t tremulousness   -Continue depakote 1000mg QHS (refusing)  -Continue benztropine 1mg BID for EPS (refusing)  5. Medical:  -Anemia:   	-s/p medical admission at [facility] for anemia to Hgb 6, required 2u pRBCs, most recent Hgb 8.8-9. GI workup unremarkable with exception of diverticulosis and colonic polyp  	-Repeat CBCs with anemia 8.6-low 9s, per internist, encourage iron supplementation  	-Continue ferrous sulfate 325mg daily (refusing)  -Internist evaluated x2 in response to questions asked by [facility], responses sent to [facility]  -Fall on : likely mechanical, no sustained injuries. PT consult  -Bowel regimen: continue bisacodyl 10mg QHS (refusing), miralax 17g daily (refusing), senna 2 tablets QHS (refusing)  -Continue Vitamin D3 1000units QHS (refusing)  6. PRNs  -Olanzapine 5mg PO or IM for agitation  7. Therapy   -Individual, group and milieu therapy as appropriate   8. Disposition: state application submitted  Patient is a 53 year old adult (goes by "Juan Jose"), living at [facility] on [place] grounds, PMH of GERD, anemia, PPH of schizophrenia, selective mutism, currently with [facility] ACT team, previously with [facility] which , with history of multiple psychiatric hospitalizations, without known history of self-injury or suicide attempts, with history of aggression in the setting on nonadherence with medication, who was initially brought in by staff at residence for increasing agitation and aggression in the setting of 2 months of medication nonadherence. Patient was hospitalized from 8/3-23 on  where they were noted to be hostile, disorganized, threatening, malodorous and grossly psychotic. TOO was obtained, and patient eventually administered dual long-acting injectables (Haldol Decanoate and Zyprexa Relprevv), though had declined nearly all PO medications. Patient was transferred to [facility] where they were admitted due to anemia to 6 (medical workup largely unremarkable), patient stabilized and transferred back to [facility] for further management of psychosis.     Diagnostically, presentation consistent with exacerbation of schizophrenia.     Patient has shown overall very little clinical improvement since admission. Spends most of day isolative and talking to self in severely disorganized and largely incomprehensible fashion, with only very brief moments of reality based exchange with staff or peers. Has showered recently with significant staff encouragement, though remains very malodorous, room smells of urine. No recent physical aggression, though can appear mistrustful and make vaguely threatening remarks. Sleep erratic, often just a few hours, poor last night. Still declining all PO medications, on high dose haloperidol BREWER.     Spoke to internist regarding persistently low hemoglobin -- no indication for repeat CBC at this time.    Patient requires inpatient admission due to very poor self-care and for containment, stabilization, medication management and aftercare planning.    Plan:  1. Brink: admitted involuntary , TOO obtained 10/24  2. Observation: routine checks  3. Collateral: obtained collateral from [facility] psychiatrist  4. Psychiatric  -Continue Haldol Decanoate 200mg IM q4 weeks, last given , next due   	-Had received Zyprexa Replevv 405mg qMonth (given )--though no observable benefit above Haldol Decanoate, so will defer at this time  -Continue clozapine 25mg QHS (refusing), d/c'ed olanzapine IM backup order d/t tremulousness   -Continue depakote 1000mg QHS (refusing)  -Continue benztropine 1mg BID for EPS (refusing)  5. Medical:  -Anemia:   	-s/p medical admission at [facility] for anemia to Hgb 6, required 2u pRBCs, most recent Hgb 8.8-9. GI workup unremarkable with exception of diverticulosis and colonic polyp  	-Repeat CBCs with anemia 8.6-low 9s, per internist, encourage iron supplementation  	-Continue ferrous sulfate 325mg daily (refusing)  -Internist evaluated x2 in response to questions asked by [facility], responses sent to [facility]  -Fall on : likely mechanical, no sustained injuries. PT consult  -Bowel regimen: continue bisacodyl 10mg QHS (refusing), miralax 17g daily (refusing), senna 2 tablets QHS (refusing)  -Continue Vitamin D3 1000units QHS (refusing)  6. PRNs  -Olanzapine 5mg PO or IM for agitation  7. Therapy   -Individual, group and milieu therapy as appropriate   8. Disposition: state application submitted  Patient is a 53 year old adult (goes by "Juan Jose"), living at [facility] on [place] grounds, PMH of GERD, anemia, PPH of schizophrenia, selective mutism, currently with [facility] ACT team, previously with [facility] which , with history of multiple psychiatric hospitalizations, without known history of self-injury or suicide attempts, with history of aggression in the setting on nonadherence with medication, who was initially brought in by staff at residence for increasing agitation and aggression in the setting of 2 months of medication nonadherence. Patient was hospitalized from 8/3-23 on  where they were noted to be hostile, disorganized, threatening, malodorous and grossly psychotic. TOO was obtained, and patient eventually administered dual long-acting injectables (Haldol Decanoate and Zyprexa Relprevv), though had declined nearly all PO medications. Patient was transferred to [facility] where they were admitted due to anemia to 6 (medical workup largely unremarkable), patient stabilized and transferred back to [facility] for further management of psychosis.     Diagnostically, presentation consistent with exacerbation of schizophrenia.     Patient has shown overall very little clinical improvement since admission. Spends most of day isolative and talking to self in severely disorganized and largely incomprehensible fashion, with only very brief moments of reality based exchange with staff or peers. Has showered recently with significant staff encouragement, though remains very malodorous, room smells of urine, improved with mattress change. No recent physical aggression, though can appear mistrustful and make vaguely threatening remarks, less recently. Sleep erratic, often just a few hours. Still declining all PO medications, on high dose haloperidol BREWER.     [facility] requesting hematology note. Consulted today, chart note to follow.    Patient requires inpatient admission due to very poor self-care and for containment, stabilization, medication management and aftercare planning.    Plan:  1. Brink: admitted involuntary , TOO obtained 10/24  2. Observation: routine checks  3. Collateral: obtained collateral from [facility] psychiatrist  4. Psychiatric  -Continue Haldol Decanoate 200mg IM q4 weeks, last given , next due   	-Had received Zyprexa Replevv 405mg qMonth (given )--though no observable benefit above Haldol Decanoate, so will defer at this time  -Continue clozapine 25mg QHS (refusing), d/c'ed olanzapine IM backup order d/t tremulousness   -Continue depakote 1000mg QHS (refusing)  -Continue benztropine 1mg BID for EPS (refusing)  5. Medical:  -Anemia:   	-s/p medical admission at [facility] for anemia to Hgb 6, required 2u pRBCs, most recent Hgb 8.8-9. GI workup unremarkable with exception of diverticulosis and colonic polyp  	-Repeat CBCs with anemia 8.6-low 9s, per internist, encourage iron supplementation  	-Continue ferrous sulfate 325mg daily (refusing)  -Internist evaluated x2 in response to questions asked by [facility], responses sent to [facility]  -[facility] requesting hematology c/s for chronic anemia, consulted today, chart note to follow  -Fall on : likely mechanical, no sustained injuries. PT consult  -Bowel regimen: continue bisacodyl 10mg QHS (refusing), miralax 17g daily (refusing), senna 2 tablets QHS (refusing)  -Continue Vitamin D3 1000units QHS (refusing)  6. PRNs  -Olanzapine 5mg PO or IM for agitation  7. Therapy   -Individual, group and milieu therapy as appropriate   8. Disposition: state application submitted  Patient is a 53 year old adult (goes by "Juan Jose"), living at [facility] on [place] grounds, PMH of GERD, anemia, PPH of schizophrenia, selective mutism, currently with [facility] ACT team, previously with [facility] which , with history of multiple psychiatric hospitalizations, without known history of self-injury or suicide attempts, with history of aggression in the setting on nonadherence with medication, who was initially brought in by staff at residence for increasing agitation and aggression in the setting of 2 months of medication nonadherence. Patient was hospitalized from 8/3-23 on  where they were noted to be hostile, disorganized, threatening, malodorous and grossly psychotic. TOO was obtained, and patient eventually administered dual long-acting injectables (Haldol Decanoate and Zyprexa Relprevv), though had declined nearly all PO medications. Patient was transferred to [facility] where they were admitted due to anemia to 6 (medical workup largely unremarkable), patient stabilized and transferred back to [facility] for further management of psychosis.     Diagnostically, presentation consistent with exacerbation of schizophrenia.     Patient has shown overall very little clinical improvement since admission. Spends most of day isolative and talking to self in severely disorganized and largely incomprehensible fashion, with only very brief moments of reality based exchange with staff or peers. Has showered recently with significant staff encouragement, though remains very malodorous, room smells of urine, improved with mattress change. No recent physical aggression, though can appear mistrustful and make vaguely threatening remarks, less recently. Sleep erratic, often just a few hours. Still declining all PO medications, on high dose haloperidol BREWER.     [facility] requesting hematology note. Consulted today, chart note to follow.    Patient requires inpatient admission due to very poor self-care and for containment, stabilization, medication management and aftercare planning.    Plan:  1. Brink: admitted involuntary , TOO obtained 10/24  2. Observation: routine checks  3. Collateral: obtained collateral from [facility] psychiatrist  4. Psychiatric  -Continue Haldol Decanoate 200mg IM q4 weeks, last given , next due   	-Had received Zyprexa Replevv 405mg qMonth (given )--though no observable benefit above Haldol Decanoate, so will defer at this time  -Continue clozapine 25mg QHS (refusing), d/c'ed olanzapine IM backup order d/t tremulousness   -Continue depakote 1000mg QHS (refusing)  -Continue benztropine 1mg BID for EPS (refusing)  5. Medical:  -Anemia:   	-s/p medical admission at [facility] for anemia to Hgb 6, required 2u pRBCs, most recent Hgb 8.8-9. GI workup unremarkable with exception of diverticulosis and colonic polyp  	-Repeat CBCs with anemia 8.6-low 9s, per internist, encourage iron supplementation  	-Continue ferrous sulfate 325mg daily (refusing)  -Internist evaluated x2 in response to questions asked by [facility], responses sent to [facility]  -[facility] requesting hematology c/s for chronic anemia, consulted today, chart note to follow  -Fall on : likely mechanical, no sustained injuries. PT consult  -Bowel regimen: continue bisacodyl 10mg QHS (refusing), miralax 17g daily (refusing), senna 2 tablets QHS (refusing)  -Continue Vitamin D3 1000units QHS (refusing)  6. PRNs  -Olanzapine 5mg PO or IM for agitation  7. Therapy   -Individual, group and milieu therapy as appropriate   8. Disposition: state application submitted

## 2023-12-14 NOTE — BH TREATMENT PLAN - NSCMSPTSTRENGTHS_PSY_ALL_CORE
Assertive/Expressive of emotions/Strong support system
Unable to obtain (specify)
Unable to obtain (specify)

## 2023-12-14 NOTE — BH INPATIENT PSYCHIATRY PROGRESS NOTE - NSBHMETABOLIC_PSY_ALL_CORE_FT
BMI: BMI (kg/m2): 35.2 (12-06-23 @ 08:57)  HbA1c: A1C with Estimated Average Glucose Result: 5.3 % (09-29-23 @ 10:00)    Glucose: POCT Blood Glucose.: 100 mg/dL (12-19-22 @ 15:06)    BP: --Vital Signs Last 24 Hrs  T(C): --  T(F): --  HR: --  BP: --  BP(mean): --  RR: --  SpO2: --      Lipid Panel: Date/Time: 09-29-23 @ 10:00  Cholesterol, Serum: 166  LDL Cholesterol Calculated: 94  HDL Cholesterol, Serum: 60  Total Cholesterol/HDL Ration Measurement: --  Triglycerides, Serum: 61   BMI: BMI (kg/m2): 35.2 (12-06-23 @ 08:57)  HbA1c: A1C with Estimated Average Glucose Result: 5.3 % (09-29-23 @ 10:00)    Glucose: POCT Blood Glucose.: 100 mg/dL (12-19-22 @ 15:06)    BP: --Vital Signs Last 24 Hrs  T(C): 36.3 (12-14-23 @ 08:23), Max: 36.3 (12-14-23 @ 08:23)  T(F): 97.4 (12-14-23 @ 08:23), Max: 97.4 (12-14-23 @ 08:23)  HR: --  BP: --  BP(mean): --  RR: 18 (12-14-23 @ 08:23) (18 - 18)  SpO2: --    Orthostatic VS  12-14-23 @ 08:23  Lying BP: --/-- HR: --  Sitting BP: 110/68 HR: 62  Standing BP: 115/71 HR: 74  Site: --  Mode: --    Lipid Panel: Date/Time: 09-29-23 @ 10:00  Cholesterol, Serum: 166  LDL Cholesterol Calculated: 94  HDL Cholesterol, Serum: 60  Total Cholesterol/HDL Ration Measurement: --  Triglycerides, Serum: 61

## 2023-12-15 PROCEDURE — 99232 SBSQ HOSP IP/OBS MODERATE 35: CPT

## 2023-12-15 NOTE — BH INPATIENT PSYCHIATRY PROGRESS NOTE - NSBHMETABOLIC_PSY_ALL_CORE_FT
BMI: BMI (kg/m2): 35.2 (12-06-23 @ 08:57)  HbA1c: A1C with Estimated Average Glucose Result: 5.3 % (09-29-23 @ 10:00)    Glucose: POCT Blood Glucose.: 100 mg/dL (12-19-22 @ 15:06)    BP: --Vital Signs Last 24 Hrs  T(C): --  T(F): --  HR: --  BP: --  BP(mean): --  RR: --  SpO2: --    Orthostatic VS  12-14-23 @ 08:23  Lying BP: --/-- HR: --  Sitting BP: 110/68 HR: 62  Standing BP: 115/71 HR: 74  Site: --  Mode: --    Lipid Panel: Date/Time: 09-29-23 @ 10:00  Cholesterol, Serum: 166  LDL Cholesterol Calculated: 94  HDL Cholesterol, Serum: 60  Total Cholesterol/HDL Ration Measurement: --  Triglycerides, Serum: 61

## 2023-12-15 NOTE — BH INPATIENT PSYCHIATRY PROGRESS NOTE - NSBHASSESSSUMMFT_PSY_ALL_CORE
Patient is a 53 year old adult (goes by "Juan Jose"), living at [facility] on [place] grounds, PMH of GERD, anemia, PPH of schizophrenia, selective mutism, currently with [facility] ACT team, previously with [facility] which , with history of multiple psychiatric hospitalizations, without known history of self-injury or suicide attempts, with history of aggression in the setting on nonadherence with medication, who was initially brought in by staff at residence for increasing agitation and aggression in the setting of 2 months of medication nonadherence. Patient was hospitalized from 8/3-23 on  where they were noted to be hostile, disorganized, threatening, malodorous and grossly psychotic. TOO was obtained, and patient eventually administered dual long-acting injectables (Haldol Decanoate and Zyprexa Relprevv), though had declined nearly all PO medications. Patient was transferred to [facility] where they were admitted due to anemia to 6 (medical workup largely unremarkable), patient stabilized and transferred back to [facility] for further management of psychosis.     Diagnostically, presentation consistent with exacerbation of schizophrenia.     Patient has shown overall very little clinical improvement since admission. Spends most of day isolative and talking to self in severely disorganized and largely incomprehensible fashion, with only very brief moments of reality based exchange with staff or peers. Has showered recently with significant staff encouragement, though remains very malodorous, room smells of urine, improved with mattress change. No recent physical aggression, though can appear mistrustful and make vaguely threatening remarks, less recently. Sleep erratic, often just a few hours. Still declining all PO medications, on high dose haloperidol BREWER.     [facility] requesting hematology note. Consulted today, chart note to follow.    Patient requires inpatient admission due to very poor self-care and for containment, stabilization, medication management and aftercare planning.    Plan:  1. Brink: admitted involuntary , TOO obtained 10/24  2. Observation: routine checks  3. Collateral: obtained collateral from [facility] psychiatrist  4. Psychiatric  -Continue Haldol Decanoate 200mg IM q4 weeks, last given , next due   	-Had received Zyprexa Replevv 405mg qMonth (given )--though no observable benefit above Haldol Decanoate, so will defer at this time  -Continue clozapine 25mg QHS (refusing), d/c'ed olanzapine IM backup order d/t tremulousness   -Continue depakote 1000mg QHS (refusing)  -Continue benztropine 1mg BID for EPS (refusing)  5. Medical:  -Anemia:   	-s/p medical admission at [facility] for anemia to Hgb 6, required 2u pRBCs, most recent Hgb 8.8-9. GI workup unremarkable with exception of diverticulosis and colonic polyp  	-Repeat CBCs with anemia 8.6-low 9s, per internist, encourage iron supplementation  	-Continue ferrous sulfate 325mg daily (refusing)  -Internist evaluated x2 in response to questions asked by [facility], responses sent to [facility]  -[facility] requesting hematology c/s for chronic anemia, consulted today, chart note to follow  -Fall on : likely mechanical, no sustained injuries. PT consult  -Bowel regimen: continue bisacodyl 10mg QHS (refusing), miralax 17g daily (refusing), senna 2 tablets QHS (refusing)  -Continue Vitamin D3 1000units QHS (refusing)  6. PRNs  -Olanzapine 5mg PO or IM for agitation  7. Therapy   -Individual, group and milieu therapy as appropriate   8. Disposition: state application submitted  Patient is a 53 year old adult (goes by "Juan Jose"), living at [facility] on [place] grounds, PMH of GERD, anemia, PPH of schizophrenia, selective mutism, currently with [facility] ACT team, previously with [facility] which , with history of multiple psychiatric hospitalizations, without known history of self-injury or suicide attempts, with history of aggression in the setting on nonadherence with medication, who was initially brought in by staff at residence for increasing agitation and aggression in the setting of 2 months of medication nonadherence. Patient was hospitalized from 8/3-23 on  where they were noted to be hostile, disorganized, threatening, malodorous and grossly psychotic. TOO was obtained, and patient eventually administered dual long-acting injectables (Haldol Decanoate and Zyprexa Relprevv), though had declined nearly all PO medications. Patient was transferred to [facility] where they were admitted due to anemia to 6 (medical workup largely unremarkable), patient stabilized and transferred back to [facility] for further management of psychosis.     Diagnostically, presentation consistent with exacerbation of schizophrenia.     Patient has shown overall very little clinical improvement since admission. Spends most of day isolative and talking to self in severely disorganized and largely incomprehensible fashion, with only very brief moments of reality based exchange with staff or peers. Has showered recently with significant staff encouragement, though remains very malodorous, room smells of urine, improved with mattress change. No recent physical aggression, though can appear mistrustful and make vaguely threatening remarks, less recently. Sleep erratic, often just a few hours. Still declining all PO medications, on high dose haloperidol BREWER.     [facility] requesting hematology note. Consulted today, chart note to follow.    Patient requires inpatient admission due to very poor self-care and for containment, stabilization, medication management and aftercare planning.    Plan:  1. Brink: admitted involuntary , TOO obtained 10/24  2. Observation: routine checks  3. Collateral: obtained collateral from [facility] psychiatrist  4. Psychiatric  -Continue Haldol Decanoate 200mg IM q4 weeks, last given , next due   	-Had received Zyprexa Replevv 405mg qMonth (given )--though no observable benefit above Haldol Decanoate, so will defer at this time  -Continue clozapine 25mg QHS (refusing), d/c'ed olanzapine IM backup order d/t tremulousness   -Continue depakote 1000mg QHS (refusing)  -Continue benztropine 1mg BID for EPS (refusing)  5. Medical:  -Anemia:   	-s/p medical admission at [facility] for anemia to Hgb 6, required 2u pRBCs, most recent Hgb 8.8-9. GI workup unremarkable with exception of diverticulosis and colonic polyp  	-Repeat CBCs with anemia 8.6-low 9s, per internist, encourage iron supplementation  	-Continue ferrous sulfate 325mg daily (refusing)  -Internist evaluated x2 in response to questions asked by [facility], responses sent to [facility]  -[facility] requesting hematology c/s for chronic anemia, consulted today, chart note to follow  -Fall on : likely mechanical, no sustained injuries. PT consult  -Bowel regimen: continue bisacodyl 10mg QHS (refusing), miralax 17g daily (refusing), senna 2 tablets QHS (refusing)  -Continue Vitamin D3 1000units QHS (refusing)  6. PRNs  -Olanzapine 5mg PO or IM for agitation  7. Therapy   -Individual, group and milieu therapy as appropriate   8. Disposition: state application submitted  Patient is a 53 year old adult (goes by "Juan Jose"), living at [facility] on [place] grounds, PMH of GERD, anemia, PPH of schizophrenia, selective mutism, currently with [facility] ACT team, previously with [facility] which , with history of multiple psychiatric hospitalizations, without known history of self-injury or suicide attempts, with history of aggression in the setting on nonadherence with medication, who was initially brought in by staff at residence for increasing agitation and aggression in the setting of 2 months of medication nonadherence. Patient was hospitalized from 8/3-23 on  where they were noted to be hostile, disorganized, threatening, malodorous and grossly psychotic. TOO was obtained, and patient eventually administered dual long-acting injectables (Haldol Decanoate and Zyprexa Relprevv), though had declined nearly all PO medications. Patient was transferred to [facility] where they were admitted due to anemia to 6 (medical workup largely unremarkable), patient stabilized and transferred back to [facility] for further management of psychosis.     Diagnostically, presentation consistent with exacerbation of schizophrenia.     Patient has shown overall very little clinical improvement since admission. Spends most of day isolative and talking to self in severely disorganized and largely incomprehensible fashion, with only very brief moments of reality based exchange with staff or peers. Has showered recently with significant staff encouragement, though remains very malodorous, room smells of urine, improved with mattress change. No recent physical aggression, though can appear mistrustful and make vaguely threatening remarks, less recently. Sleep erratic, often just a few hours, though sometimes sleeps during day. Still declining all PO medications, on high dose haloperidol BREWER.     [facility] requesting hematology consult - per hematology on , anemia most consistent with iron deficiency anemia. Encourage PO iron, IV iron if able. Recommend GI consult for possible bleeding given abnormal colonoscopy.    Patient requires inpatient admission due to very poor self-care and for containment, stabilization, medication management and aftercare planning.    Plan:  1. Brink: admitted involuntary , TOO obtained 10/24  2. Observation: routine checks  3. Collateral: obtained collateral from [facility] psychiatrist  4. Psychiatric  -Continue Haldol Decanoate 200mg IM q4 weeks, last given , next due   	-Had received Zyprexa Replevv 405mg qMonth (given )--though no observable benefit above Haldol Decanoate, so will defer at this time  -Continue clozapine 25mg QHS (refusing), d/c'ed olanzapine IM backup order d/t tremulousness   -Continue depakote 1000mg QHS (refusing)  -Continue benztropine 1mg BID for EPS (refusing)  5. Medical:  -Anemia:   	-s/p medical admission at [facility] for anemia to Hgb 6, required 2u pRBCs, most recent Hgb 8.8-9. GI workup unremarkable with exception of diverticulosis and colonic polyp  	-Repeat CBCs with anemia 8.6-low 9s, per internist, encourage iron supplementation  	-Continue ferrous sulfate 325mg daily (refusing)  -Internist evaluated x2 in response to questions asked by [facility], responses sent to [facility]  -[facility] requesting hematology c/s for chronic anemia---> wrote chart note , etiology c/w BRYON, recommended GI consult --> consulted 12/15  -Fall on : likely mechanical, no sustained injuries. PT consult  -Bowel regimen: continue bisacodyl 10mg QHS (refusing), miralax 17g daily (refusing), senna 2 tablets QHS (refusing)  -Continue Vitamin D3 1000units QHS (refusing)  6. PRNs  -Olanzapine 5mg PO or IM for agitation  7. Therapy   -Individual, group and milieu therapy as appropriate   8. Disposition: state application submitted  Patient is a 53 year old adult (goes by "Juan Jose"), living at [facility] on [place] grounds, PMH of GERD, anemia, PPH of schizophrenia, selective mutism, currently with [facility] ACT team, previously with [facility] which , with history of multiple psychiatric hospitalizations, without known history of self-injury or suicide attempts, with history of aggression in the setting on nonadherence with medication, who was initially brought in by staff at residence for increasing agitation and aggression in the setting of 2 months of medication nonadherence. Patient was hospitalized from 8/3-23 on  where they were noted to be hostile, disorganized, threatening, malodorous and grossly psychotic. TOO was obtained, and patient eventually administered dual long-acting injectables (Haldol Decanoate and Zyprexa Relprevv), though had declined nearly all PO medications. Patient was transferred to [facility] where they were admitted due to anemia to 6 (medical workup largely unremarkable), patient stabilized and transferred back to [facility] for further management of psychosis.     Diagnostically, presentation consistent with exacerbation of schizophrenia.     Patient has shown overall very little clinical improvement since admission. Spends most of day isolative and talking to self in severely disorganized and largely incomprehensible fashion, with only very brief moments of reality based exchange with staff or peers. Has showered recently with significant staff encouragement, though remains very malodorous, room smells of urine, improved with mattress change. No recent physical aggression, though can appear mistrustful and make vaguely threatening remarks, less recently. Sleep erratic, often just a few hours, though sometimes sleeps during day. Still declining all PO medications, on high dose haloperidol BREWER.     [facility] requesting hematology consult - per hematology on , anemia most consistent with iron deficiency anemia. Encourage PO iron, IV iron if able. Recommend GI consult for possible bleeding given abnormal colonoscopy.    Patient requires inpatient admission due to very poor self-care and for containment, stabilization, medication management and aftercare planning.    Plan:  1. Brink: admitted involuntary , TOO obtained 10/24  2. Observation: routine checks  3. Collateral: obtained collateral from [facility] psychiatrist  4. Psychiatric  -Continue Haldol Decanoate 200mg IM q4 weeks, last given , next due   	-Had received Zyprexa Replevv 405mg qMonth (given )--though no observable benefit above Haldol Decanoate, so will defer at this time  -Continue clozapine 25mg QHS (refusing), d/c'ed olanzapine IM backup order d/t tremulousness   -Continue depakote 1000mg QHS (refusing)  -Continue benztropine 1mg BID for EPS (refusing)  5. Medical:  -Anemia:   	-s/p medical admission at [facility] for anemia to Hgb 6, required 2u pRBCs, most recent Hgb 8.8-9. GI workup unremarkable with exception of diverticulosis and colonic polyp  	-Repeat CBCs with anemia 8.6-low 9s, per internist, encourage iron supplementation  	-Continue ferrous sulfate 325mg daily (refusing)  -Internist evaluated x2 in response to questions asked by [facility], responses sent to [facility]  -[facility] requesting hematology c/s for chronic anemia---> wrote chart note , etiology c/w BRYON, recommended GI consult --> consulted 12/15  -Fall on : likely mechanical, no sustained injuries. PT consult  -Bowel regimen: continue bisacodyl 10mg QHS (refusing), miralax 17g daily (refusing), senna 2 tablets QHS (refusing)  -Continue Vitamin D3 1000units QHS (refusing)  6. PRNs  -Olanzapine 5mg PO or IM for agitation  7. Therapy   -Individual, group and milieu therapy as appropriate   8. Disposition: state application submitted

## 2023-12-15 NOTE — CHART NOTE - NSCHARTNOTEFT_GEN_A_CORE
Called by primary team to comment on patient's chronic anemia per hematology's request.    Patient is currently anemic, at baseline, hgb 8's - 9's (as of 12/8/2023).   Patient recently underwent EGD/colonoscopy 10/5/2023, which revealed large hiatal hernia, normal stomach and duodenum; sigmoid diverticulosis; One 8-mm ascending colon polyp s/p resection with path showing tubular adenoma and pseudopolyps in TI.   Patient was advised to take iron supplements, but has not been adhering to PO iron consistently.     There are no current indications to repeat endoscopic evaluations at this time. Patient should be encouraged to take her iron supplements. May also take PPI PO once daily (pantoprazole 40 mg or omeprazole 20 mg) to prevent gastric ulcer given large hiatal hernia.   Rest of workup and management per primary team and hematology team.
Chart reviewed. Patient received PRN medications yesterday, continues to refuse oral medications. Attempted to evaluate her today on 2 separate occasions, morning and afternoon. However she had gone for Coloscopy. Continue same recommendations as yesterday.  to follow up with the patient tomorrow. For any acute issues, please call at [phone]
Consult received for "MST Score 2 or >". Upon chart review, patient does not currently meet criteria for MST, RD remains available for assessment per protocol or as needed.  Rain Tolbert RDN, CDN #[id number]  Also available on [employer] Teams
Pt is currently refusing lab, medications and vitals.   - Since patient is not violent, no harm to self or others, there is no justification to utilize PRN medications since no emergent medical needs is held.   - Pt will continue to refuse, and no PRN medications will be utilized at this time unless there is an emergent medical  - Appreciate discussion with Dr. Garfield HUBBARD

## 2023-12-15 NOTE — BH INPATIENT PSYCHIATRY PROGRESS NOTE - NSBHATTESTBILLING_PSY_A_CORE
51916-Hslrugwfvw OBS or IP - moderate complexity OR 35-49 mins 19809-Oanbuudefg OBS or IP - moderate complexity OR 35-49 mins

## 2023-12-15 NOTE — BH INPATIENT PSYCHIATRY PROGRESS NOTE - NSBHCHARTREVIEWVS_PSY_A_CORE FT
Vital Signs Last 24 Hrs  T(C): --  T(F): --  HR: --  BP: --  BP(mean): --  RR: --  SpO2: --    Orthostatic VS  12-14-23 @ 08:23  Lying BP: --/-- HR: --  Sitting BP: 110/68 HR: 62  Standing BP: 115/71 HR: 74  Site: --  Mode: --

## 2023-12-15 NOTE — BH INPATIENT PSYCHIATRY PROGRESS NOTE - NSBHFUPINTERVALHXFT_PSY_A_CORE
Refusing vitals, refused PO medications, no PRNs for agitation, no acute events.  Refusing vitals, refused PO medications, no PRNs for agitation, no acute events. Slept about 2 hours last night.     Patient found sleeping in room. Did not awaken to verbal cue. Allowed to keep sleeping, particularly given poor sleep overnight.

## 2023-12-15 NOTE — BH INPATIENT PSYCHIATRY PROGRESS NOTE - MSE UNSTRUCTURED FT
Appearance: laying in bed, wearing hospital attire  Behavior: uncooperative, suspicious  Speech: no speech today  Mood: unable to assess  Affect: irritable  Thought process: severely disorganized when does speak  Thought content: vaguely violent/threatening statements, though none today, not observed harming self or others.   Perceptions: observed talking to self  Insight: very poor  Judgement: very poor, refusing vitals intermittently, refusing PO medications, very poor ADLs  Cognition: unable to assess Appearance: laying in bed, wearing hospital attire  Behavior: uncooperative  Speech: no speech today  Mood: unable to assess  Affect: irritable  Thought process: severely disorganized when does speak  Thought content: can make vaguely violent/threatening statements, though none today, not observed harming self or others.   Perceptions: observed talking to self  Insight: very poor  Judgement: very poor, refusing vitals intermittently, refusing PO medications, very poor ADLs  Cognition: unable to assess

## 2023-12-18 PROCEDURE — 99231 SBSQ HOSP IP/OBS SF/LOW 25: CPT

## 2023-12-18 NOTE — BH INPATIENT PSYCHIATRY PROGRESS NOTE - MSE UNSTRUCTURED FT
Appearance: laying in bed, wearing hospital attire  Behavior: uncooperative  Speech: no speech today  Mood: unable to assess  Affect: irritable  Thought process: severely disorganized when does speak  Thought content: can make vaguely violent/threatening statements, though none today, not observed harming self or others.   Perceptions: observed talking to self  Insight: very poor  Judgement: very poor, refusing vitals intermittently, refusing PO medications, very poor ADLs  Cognition: unable to assess Appearance: sitting up in bed, wearing hospital attire  Behavior: uncooperative, suspicious appearing  Speech: mumbling to self, largely incomprehensible  Mood: unable to assess  Affect: irritable  Thought process: severely disorganized  Thought content: can make vaguely violent/threatening statements, today stated "turn her off", not observed harming self or others.   Perceptions: observed talking to self  Insight: very poor  Judgement: very poor, refusing vitals intermittently, refusing PO medications, very poor ADLs  Cognition: unable to assess

## 2023-12-18 NOTE — BH INPATIENT PSYCHIATRY PROGRESS NOTE - NSBHATTESTBILLING_PSY_A_CORE
03406-Dhavcqfrva OBS or IP - moderate complexity OR 35-49 mins 67212-Olrqjsczac OBS or IP - moderate complexity OR 35-49 mins 10087-Fvrvfnefeo OBS or IP - low complexity OR 25-34 mins 05909-Njmlchcjmw OBS or IP - low complexity OR 25-34 mins

## 2023-12-18 NOTE — BH INPATIENT PSYCHIATRY PROGRESS NOTE - NSBHFUPINTERVALHXFT_PSY_A_CORE
Refusing vitals, refusing PO medications, no PRNs for agitation, no acute events over weekend Refusing vitals, refusing PO medications, no PRNs for agitation, no acute events over weekend. Slept 2 hours last night.    Patient seen sitting up atop clean bed. Mumbling to self, difficult ot understand, does at one point say "turn her off." Room malodorous.

## 2023-12-18 NOTE — BH INPATIENT PSYCHIATRY PROGRESS NOTE - NSBHASSESSSUMMFT_PSY_ALL_CORE
Patient is a 53 year old adult (goes by "Juan Jose"), living at [facility] on [place] grounds, PMH of GERD, anemia, PPH of schizophrenia, selective mutism, currently with [facility] ACT team, previously with [facility] which , with history of multiple psychiatric hospitalizations, without known history of self-injury or suicide attempts, with history of aggression in the setting on nonadherence with medication, who was initially brought in by staff at residence for increasing agitation and aggression in the setting of 2 months of medication nonadherence. Patient was hospitalized from 8/3-23 on  where they were noted to be hostile, disorganized, threatening, malodorous and grossly psychotic. TOO was obtained, and patient eventually administered dual long-acting injectables (Haldol Decanoate and Zyprexa Relprevv), though had declined nearly all PO medications. Patient was transferred to [facility] where they were admitted due to anemia to 6 (medical workup largely unremarkable), patient stabilized and transferred back to [facility] for further management of psychosis.     Diagnostically, presentation consistent with exacerbation of schizophrenia.     Patient has shown overall very little clinical improvement since admission. Spends most of day isolative and talking to self in severely disorganized and largely incomprehensible fashion, with only very brief moments of reality based exchange with staff or peers. Has showered recently with significant staff encouragement, though remains very malodorous, room smells of urine, improved with mattress change. No recent physical aggression, though can appear mistrustful and make vaguely threatening remarks, less recently. Sleep erratic, often just a few hours, though sometimes sleeps during day. Still declining all PO medications, on high dose haloperidol BREWER.     [facility] [place] requesting hematology consult - per hematology on , anemia most consistent with iron deficiency anemia. Encourage PO iron, IV iron if able. Recommend GI consult for possible bleeding given abnormal colonoscopy.    Patient requires inpatient admission due to very poor self-care and for containment, stabilization, medication management and aftercare planning.    Plan:  1. Brink: admitted involuntary , TOO obtained 10/24  2. Observation: routine checks  3. Collateral: obtained collateral from [facility] psychiatrist  4. Psychiatric  -Continue Haldol Decanoate 200mg IM q4 weeks, last given , next due   	-Had received Zyprexa Replevv 405mg qMonth (given )--though no observable benefit above Haldol Decanoate, so will defer at this time  -Continue clozapine 25mg QHS (refusing), d/c'ed olanzapine IM backup order d/t tremulousness   -Continue depakote 1000mg QHS (refusing)  -Continue benztropine 1mg BID for EPS (refusing)  5. Medical:  -Anemia:   	-s/p medical admission at [facility] for anemia to Hgb 6, required 2u pRBCs, most recent Hgb 8.8-9. GI workup unremarkable with exception of diverticulosis and colonic polyp  	-Repeat CBCs with anemia 8.6-low 9s, per internist, encourage iron supplementation  	-Continue ferrous sulfate 325mg daily (refusing)  -Internist evaluated x2 in response to questions asked by [facility], responses sent to [facility] [place]  -[facility] [place] requesting hematology c/s for chronic anemia---> wrote chart note , etiology c/w BRYON, recommended GI consult --> consulted 12/15  -Fall on : likely mechanical, no sustained injuries. PT consult  -Bowel regimen: continue bisacodyl 10mg QHS (refusing), miralax 17g daily (refusing), senna 2 tablets QHS (refusing)  -Continue Vitamin D3 1000units QHS (refusing)  6. PRNs  -Olanzapine 5mg PO or IM for agitation  7. Therapy   -Individual, group and milieu therapy as appropriate   8. Disposition: state application submitted  Patient is a 53 year old adult (goes by "Juan Jose"), living at [facility] on [place] grounds, PMH of GERD, anemia, PPH of schizophrenia, selective mutism, currently with [facility] ACT team, previously with [facility] which , with history of multiple psychiatric hospitalizations, without known history of self-injury or suicide attempts, with history of aggression in the setting on nonadherence with medication, who was initially brought in by staff at residence for increasing agitation and aggression in the setting of 2 months of medication nonadherence. Patient was hospitalized from 8/3-23 on  where they were noted to be hostile, disorganized, threatening, malodorous and grossly psychotic. TOO was obtained, and patient eventually administered dual long-acting injectables (Haldol Decanoate and Zyprexa Relprevv), though had declined nearly all PO medications. Patient was transferred to [facility] where they were admitted due to anemia to 6 (medical workup largely unremarkable), patient stabilized and transferred back to [facility] for further management of psychosis.     Diagnostically, presentation consistent with exacerbation of schizophrenia.     Patient has shown overall very little clinical improvement since admission. Spends most of day isolative and talking to self in severely disorganized and largely incomprehensible fashion, with only very brief moments of reality based exchange with staff or peers. Has showered recently with significant staff encouragement, though remains very malodorous, room smells of urine, improved with mattress change. No recent physical aggression, though can appear mistrustful and make vaguely threatening remarks, less recently. Sleep erratic, often just a few hours, though sometimes sleeps during day. Still declining all PO medications, on high dose haloperidol BREWER.     [facility] requesting hematology consult - per hematology on , anemia most consistent with iron deficiency anemia. Encourage PO iron, IV iron if able. Recommend GI consult for possible bleeding given abnormal colonoscopy.    Patient requires inpatient admission due to very poor self-care and for containment, stabilization, medication management and aftercare planning.    Plan:  1. Brink: admitted involuntary , TOO obtained 10/24  2. Observation: routine checks  3. Collateral: obtained collateral from [facility] psychiatrist  4. Psychiatric  -Continue Haldol Decanoate 200mg IM q4 weeks, last given , next due   	-Had received Zyprexa Replevv 405mg qMonth (given )--though no observable benefit above Haldol Decanoate, so will defer at this time  -Continue clozapine 25mg QHS (refusing), d/c'ed olanzapine IM backup order d/t tremulousness   -Continue depakote 1000mg QHS (refusing)  -Continue benztropine 1mg BID for EPS (refusing)  5. Medical:  -Anemia:   	-s/p medical admission at [facility] for anemia to Hgb 6, required 2u pRBCs, most recent Hgb 8.8-9. GI workup unremarkable with exception of diverticulosis and colonic polyp  	-Repeat CBCs with anemia 8.6-low 9s, per internist, encourage iron supplementation  	-Continue ferrous sulfate 325mg daily (refusing)  -Internist evaluated x2 in response to questions asked by [facility], responses sent to [facility]  -[facility] requesting hematology c/s for chronic anemia---> wrote chart note , etiology c/w BRYON, recommended GI consult --> consulted 12/15  -Fall on : likely mechanical, no sustained injuries. PT consult  -Bowel regimen: continue bisacodyl 10mg QHS (refusing), miralax 17g daily (refusing), senna 2 tablets QHS (refusing)  -Continue Vitamin D3 1000units QHS (refusing)  6. PRNs  -Olanzapine 5mg PO or IM for agitation  7. Therapy   -Individual, group and milieu therapy as appropriate   8. Disposition: state application submitted  Patient is a 53 year old adult (goes by "Juan Jose"), living at [facility] on [place] grounds, PMH of GERD, anemia, PPH of schizophrenia, selective mutism, currently with [facility] ACT team, previously with [facility] which , with history of multiple psychiatric hospitalizations, without known history of self-injury or suicide attempts, with history of aggression in the setting on nonadherence with medication, who was initially brought in by staff at residence for increasing agitation and aggression in the setting of 2 months of medication nonadherence. Patient was hospitalized from 8/3-23 on  where they were noted to be hostile, disorganized, threatening, malodorous and grossly psychotic. TOO was obtained, and patient eventually administered dual long-acting injectables (Haldol Decanoate and Zyprexa Relprevv), though had declined nearly all PO medications. Patient was transferred to [facility] where they were admitted due to anemia to 6 (medical workup largely unremarkable), patient stabilized and transferred back to [facility] for further management of psychosis.     Diagnostically, presentation consistent with exacerbation of schizophrenia.     Patient has shown overall very little clinical improvement since admission. Spends most of day isolative and talking to self in severely disorganized and largely incomprehensible fashion, with only very brief moments of reality based exchange with staff or peers. Has showered recently with significant staff encouragement, though room very malodorous. No recent physical aggression, though can appear mistrustful and make vaguely threatening remarks. Sleep erratic, often just a few hours, though sometimes sleeps during day. Still declining all PO medications, on high dose haloperidol BREWER.     GI and hematology consulted last week in response to [facility]'s concerns around chronic anemia. Will send notes to [facility].    Patient requires inpatient admission due to very poor self-care and for containment, stabilization, medication management and aftercare planning.    Plan:  1. Brink: admitted involuntary , TOO obtained 10/24  2. Observation: routine checks  3. Collateral: obtained collateral from [facility] psychiatrist  4. Psychiatric  -Continue Haldol Decanoate 200mg IM q4 weeks, last given , next due   	-Had received Zyprexa Replevv 405mg qMonth (given )--though no observable benefit above Haldol Decanoate, so will defer at this time  -Continue clozapine 25mg QHS (refusing), d/c'ed olanzapine IM backup order d/t tremulousness   -Continue depakote 1000mg QHS (refusing)  -Continue benztropine 1mg BID for EPS (refusing)  5. Medical:  -Anemia:   	-s/p medical admission at [facility] for anemia to Hgb 6, required 2u pRBCs, most recent Hgb 8.8-9. GI workup unremarkable with exception of diverticulosis and colonic polyp  	-Repeat CBCs with anemia 8.6-low 9s, per internist, encourage iron supplementation  	-Continue ferrous sulfate 325mg daily (refusing)  -Internist evaluated x2 in response to questions asked by [facility], responses sent to [facility]  -[facility] requesting hematology c/s for chronic anemia---> wrote chart note , etiology c/w BRYON, recommended GI consult --> consulted 12/15  -Fall on : likely mechanical, no sustained injuries. PT consult  -Bowel regimen: continue bisacodyl 10mg QHS (refusing), miralax 17g daily (refusing), senna 2 tablets QHS (refusing)  -Continue Vitamin D3 1000units QHS (refusing)  6. PRNs  -Olanzapine 5mg PO or IM for agitation  7. Therapy   -Individual, group and milieu therapy as appropriate   8. Disposition: state application submitted  Patient is a 53 year old adult (goes by "Juan Jose"), living at [facility] on [place] grounds, PMH of GERD, anemia, PPH of schizophrenia, selective mutism, currently with [facility] ACT team, previously with [facility] which , with history of multiple psychiatric hospitalizations, without known history of self-injury or suicide attempts, with history of aggression in the setting on nonadherence with medication, who was initially brought in by staff at residence for increasing agitation and aggression in the setting of 2 months of medication nonadherence. Patient was hospitalized from 8/3-23 on  where they were noted to be hostile, disorganized, threatening, malodorous and grossly psychotic. TOO was obtained, and patient eventually administered dual long-acting injectables (Haldol Decanoate and Zyprexa Relprevv), though had declined nearly all PO medications. Patient was transferred to [facility] where they were admitted due to anemia to 6 (medical workup largely unremarkable), patient stabilized and transferred back to [facility] for further management of psychosis.     Diagnostically, presentation consistent with exacerbation of schizophrenia.     Patient has shown overall very little clinical improvement since admission. Spends most of day isolative and talking to self in severely disorganized and largely incomprehensible fashion, with only very brief moments of reality based exchange with staff or peers. Has showered recently with significant staff encouragement, though room very malodorous. No recent physical aggression, though can appear mistrustful and make vaguely threatening remarks. Sleep erratic, often just a few hours, though sometimes sleeps during day. Still declining all PO medications, on high dose haloperidol BREWER.     GI and hematology consulted last week in response to [facility]'s concerns around chronic anemia. Will send notes to [facility].    Patient requires inpatient admission due to very poor self-care and for containment, stabilization, medication management and aftercare planning.    Plan:  1. Brink: admitted involuntary , TOO obtained 10/24  2. Observation: routine checks  3. Collateral: obtained collateral from [facility] psychiatrist  4. Psychiatric  -Continue Haldol Decanoate 200mg IM q4 weeks, last given , next due   	-Had received Zyprexa Replevv 405mg qMonth (given )--though no observable benefit above Haldol Decanoate, so will defer at this time  -Continue clozapine 25mg QHS (refusing), d/c'ed olanzapine IM backup order d/t tremulousness   -Continue depakote 1000mg QHS (refusing)  -Continue benztropine 1mg BID for EPS (refusing)  5. Medical:  -Anemia:   	-s/p medical admission at [facility] for anemia to Hgb 6, required 2u pRBCs, most recent Hgb 8.8-9. GI workup unremarkable with exception of diverticulosis and colonic polyp  	-Repeat CBCs with anemia 8.6-low 9s, per internist, encourage iron supplementation  	-Continue ferrous sulfate 325mg daily (refusing)  -Internist evaluated x2 in response to questions asked by [facility], responses sent to [facility]  -[facility] requesting hematology c/s for chronic anemia---> wrote chart note , etiology c/w BRYON, recommended GI consult --> consulted 12/15  -Fall on : likely mechanical, no sustained injuries. PT consult  -Bowel regimen: continue bisacodyl 10mg QHS (refusing), miralax 17g daily (refusing), senna 2 tablets QHS (refusing)  -Continue Vitamin D3 1000units QHS (refusing)  6. PRNs  -Olanzapine 5mg PO or IM for agitation  7. Therapy   -Individual, group and milieu therapy as appropriate   8. Disposition: state application submitted

## 2023-12-19 PROCEDURE — 99232 SBSQ HOSP IP/OBS MODERATE 35: CPT

## 2023-12-19 RX ORDER — HALOPERIDOL DECANOATE 100 MG/ML
200 INJECTION INTRAMUSCULAR ONCE
Refills: 0 | Status: COMPLETED | OUTPATIENT
Start: 2023-12-19 | End: 2023-12-19

## 2023-12-19 RX ADMIN — HALOPERIDOL DECANOATE 200 MILLIGRAM(S): 100 INJECTION INTRAMUSCULAR at 12:56

## 2023-12-19 NOTE — BH INPATIENT PSYCHIATRY PROGRESS NOTE - NSBHATTESTBILLING_PSY_A_CORE
12221-Dqeyeferth OBS or IP - low complexity OR 25-34 mins 98904-Sbrecdgkri OBS or IP - low complexity OR 25-34 mins 69820-Uwvhpezman OBS or IP - moderate complexity OR 35-49 mins 12806-Ywapxnfhtg OBS or IP - moderate complexity OR 35-49 mins

## 2023-12-19 NOTE — BH INPATIENT PSYCHIATRY PROGRESS NOTE - NSBHFUPINTERVALHXFT_PSY_A_CORE
Refusing vitals, refusing PO medications, no PRNs for agitation, no acute events over weekend.  Refusing vitals, refusing PO medications, no PRNs for agitation, no acute events over weekend.     Patient seen laying in bed resting. Briefly awakens to this provider, though does not speak. Received Haldol Dec today, did not require manual hold.

## 2023-12-19 NOTE — BH INPATIENT PSYCHIATRY PROGRESS NOTE - NSTXPSYCHOINTERMD_PSY_ALL_CORE
med management with Haldol BREWER, clozapine (refusing), VPA (refusing) med management with Haldol BRWEER, clozapine (refusing), VPA (refusing)

## 2023-12-19 NOTE — BH INPATIENT PSYCHIATRY PROGRESS NOTE - MSE UNSTRUCTURED FT
Appearance: sitting up in bed, wearing hospital attire  Behavior: uncooperative, suspicious appearing  Speech: mumbling to self, largely incomprehensible  Mood: unable to assess  Affect: irritable  Thought process: severely disorganized  Thought content: can make vaguely violent/threatening statements, today stated "turn her off", not observed harming self or others.   Perceptions: observed talking to self  Insight: very poor  Judgement: very poor, refusing vitals intermittently, refusing PO medications, very poor ADLs  Cognition: unable to assess Appearance: laying in bed, wearing hospital attire  Behavior: uncooperative, suspicious appearing  Speech: no speech today  Mood: unable to assess  Affect: irritable  Thought process: severely disorganized when does speak, none today  Thought content: can make vaguely violent/threatening statements, not observed harming self or others.   Perceptions: observed talking to self  Insight: very poor  Judgement: very poor, refusing vitals intermittently, refusing PO medications, very poor ADLs  Cognition: unable to assess

## 2023-12-19 NOTE — BH INPATIENT PSYCHIATRY PROGRESS NOTE - NSBHASSESSSUMMFT_PSY_ALL_CORE
Patient is a 53 year old adult (goes by "Juan Jose"), living at [facility] on [place] grounds, PMH of GERD, anemia, PPH of schizophrenia, selective mutism, currently with [facility] ACT team, previously with [facility] which , with history of multiple psychiatric hospitalizations, without known history of self-injury or suicide attempts, with history of aggression in the setting on nonadherence with medication, who was initially brought in by staff at residence for increasing agitation and aggression in the setting of 2 months of medication nonadherence. Patient was hospitalized from 8/3-23 on  where they were noted to be hostile, disorganized, threatening, malodorous and grossly psychotic. TOO was obtained, and patient eventually administered dual long-acting injectables (Haldol Decanoate and Zyprexa Relprevv), though had declined nearly all PO medications. Patient was transferred to [facility] where they were admitted due to anemia to 6 (medical workup largely unremarkable), patient stabilized and transferred back to [facility] for further management of psychosis.     Diagnostically, presentation consistent with exacerbation of schizophrenia.     Patient has shown overall very little clinical improvement since admission. Spends most of day isolative and talking to self in severely disorganized and largely incomprehensible fashion, with only very brief moments of reality based exchange with staff or peers. Has showered recently with significant staff encouragement, though room very malodorous. No recent physical aggression, though can appear mistrustful and make vaguely threatening remarks. Sleep erratic, often just a few hours, though sometimes sleeps during day. Still declining all PO medications, on high dose haloperidol BREWER.     GI and hematology consulted last week in response to [facility]'s concerns around chronic anemia. Will send notes to [facility].    Patient requires inpatient admission due to very poor self-care and for containment, stabilization, medication management and aftercare planning.    Plan:  1. Brnik: admitted involuntary , TOO obtained 10/24  2. Observation: routine checks  3. Collateral: obtained collateral from [facility] psychiatrist  4. Psychiatric  -Continue Haldol Decanoate 200mg IM q4 weeks, last given , next due   	-Had received Zyprexa Replevv 405mg qMonth (given )--though no observable benefit above Haldol Decanoate, so will defer at this time  -Continue clozapine 25mg QHS (refusing), d/c'ed olanzapine IM backup order d/t tremulousness   -Continue depakote 1000mg QHS (refusing)  -Continue benztropine 1mg BID for EPS (refusing)  5. Medical:  -Anemia:   	-s/p medical admission at [facility] for anemia to Hgb 6, required 2u pRBCs, most recent Hgb 8.8-9. GI workup unremarkable with exception of diverticulosis and colonic polyp  	-Repeat CBCs with anemia 8.6-low 9s, per internist, encourage iron supplementation  	-Continue ferrous sulfate 325mg daily (refusing)  -Internist evaluated x2 in response to questions asked by [facility], responses sent to [facility]  -[facility] requesting hematology c/s for chronic anemia---> wrote chart note , etiology c/w BRYON, recommended GI consult --> consulted 12/15  -Fall on : likely mechanical, no sustained injuries. PT consult  -Bowel regimen: continue bisacodyl 10mg QHS (refusing), miralax 17g daily (refusing), senna 2 tablets QHS (refusing)  -Continue Vitamin D3 1000units QHS (refusing)  6. PRNs  -Olanzapine 5mg PO or IM for agitation  7. Therapy   -Individual, group and milieu therapy as appropriate   8. Disposition: state application submitted  Patient is a 53 year old adult (goes by "Juan Jose"), living at [facility] on [place] grounds, PMH of GERD, anemia, PPH of schizophrenia, selective mutism, currently with [facility] ACT team, previously with [facility] which , with history of multiple psychiatric hospitalizations, without known history of self-injury or suicide attempts, with history of aggression in the setting on nonadherence with medication, who was initially brought in by staff at residence for increasing agitation and aggression in the setting of 2 months of medication nonadherence. Patient was hospitalized from 8/3-23 on  where they were noted to be hostile, disorganized, threatening, malodorous and grossly psychotic. TOO was obtained, and patient eventually administered dual long-acting injectables (Haldol Decanoate and Zyprexa Relprevv), though had declined nearly all PO medications. Patient was transferred to [facility] where they were admitted due to anemia to 6 (medical workup largely unremarkable), patient stabilized and transferred back to [facility] for further management of psychosis.     Diagnostically, presentation consistent with exacerbation of schizophrenia.     Patient has shown overall very little clinical improvement since admission. Spends most of day isolative and talking to self in severely disorganized and largely incomprehensible fashion, with only very brief moments of reality based exchange with staff or peers. Has showered recently with significant staff encouragement, though room very malodorous. No recent physical aggression, though can appear mistrustful and make vaguely threatening remarks. Sleep erratic, often just a few hours, though sometimes sleeps during day. Still declining all PO medications, on high dose haloperidol BREWER.     GI and hematology consulted last week in response to [facility]'s concerns around chronic anemia. Will send notes to [facility].    Patient requires inpatient admission due to very poor self-care and for containment, stabilization, medication management and aftercare planning.    Plan:  1. Brink: admitted involuntary , TOO obtained 10/24  2. Observation: routine checks  3. Collateral: obtained collateral from [facility] psychiatrist  4. Psychiatric  -Continue Haldol Decanoate 200mg IM q4 weeks, last given , next due   	-Had received Zyprexa Replevv 405mg qMonth (given )--though no observable benefit above Haldol Decanoate, so will defer at this time  -Continue clozapine 25mg QHS (refusing), d/c'ed olanzapine IM backup order d/t tremulousness   -Continue depakote 1000mg QHS (refusing)  -Continue benztropine 1mg BID for EPS (refusing)  5. Medical:  -Anemia:   	-s/p medical admission at [facility] for anemia to Hgb 6, required 2u pRBCs, most recent Hgb 8.8-9. GI workup unremarkable with exception of diverticulosis and colonic polyp  	-Repeat CBCs with anemia 8.6-low 9s, per internist, encourage iron supplementation  	-Continue ferrous sulfate 325mg daily (refusing)  -Internist evaluated x2 in response to questions asked by [facility], responses sent to [facility]  -[facility] requesting hematology c/s for chronic anemia---> wrote chart note , etiology c/w BRYON, recommended GI consult --> consulted 12/15  -Fall on : likely mechanical, no sustained injuries. PT consult  -Bowel regimen: continue bisacodyl 10mg QHS (refusing), miralax 17g daily (refusing), senna 2 tablets QHS (refusing)  -Continue Vitamin D3 1000units QHS (refusing)  6. PRNs  -Olanzapine 5mg PO or IM for agitation  7. Therapy   -Individual, group and milieu therapy as appropriate   8. Disposition: state application submitted  Patient is a 53 year old adult (goes by "Juan Jose"), living at [facility] on [place] grounds, PMH of GERD, anemia, PPH of schizophrenia, selective mutism, currently with [facility] ACT team, previously with [facility] which , with history of multiple psychiatric hospitalizations, without known history of self-injury or suicide attempts, with history of aggression in the setting on nonadherence with medication, who was initially brought in by staff at residence for increasing agitation and aggression in the setting of 2 months of medication nonadherence. Patient was hospitalized from 8/3-23 on 1N where they were noted to be hostile, disorganized, threatening, malodorous and grossly psychotic. TOO was obtained, and patient eventually administered dual long-acting injectables (Haldol Decanoate and Zyprexa Relprevv), though had declined nearly all PO medications. Patient was transferred to [facility] where they were admitted due to anemia to 6 (medical workup largely unremarkable), patient stabilized and transferred back to [facility] for further management of psychosis.     Diagnostically, presentation consistent with exacerbation of schizophrenia.     Patient has shown overall very little clinical improvement since admission. Spends most of day isolative and talking to self in severely disorganized and largely incomprehensible fashion, with only very brief moments of reality based exchange with staff or peers. Has showered recently with significant staff encouragement, though room very malodorous. No recent physical aggression, though can appear mistrustful and make vaguely threatening remarks. Sleep erratic, often just a few hours, though sometimes sleeps during day. Still declining all PO medications, on high dose haloperidol BREWER, due today.    GI and hematology consulted last week in response to [facility]'s concerns around chronic anemia. Responses sent to [facility].    Patient requires inpatient admission due to very poor self-care and for containment, stabilization, medication management and aftercare planning.    Plan:  1. Brink: admitted involuntary , TOO obtained 10/24  2. Observation: routine checks  3. Collateral: obtained collateral from [facility] psychiatrist  4. Psychiatric  -Continue Haldol Decanoate 200mg IM q4 weeks, last given , next due   	-Had received Zyprexa Replevv 405mg qMonth (given )--though no observable benefit above Haldol Decanoate, so will defer at this time  -Continue clozapine 25mg QHS (refusing), d/c'ed olanzapine IM backup order d/t tremulousness   -Continue depakote 1000mg QHS (refusing)  -Continue benztropine 1mg BID for EPS (refusing)  5. Medical:  -Anemia:   	-s/p medical admission at [facility] for anemia to Hgb 6, required 2u pRBCs, most recent Hgb 8.8-9. GI workup unremarkable with exception of diverticulosis and colonic polyp  	-Repeat CBCs with anemia 8.6-low 9s, per internist, encourage iron supplementation  	-Continue ferrous sulfate 325mg daily (refusing)  -Internist evaluated x2 in response to questions asked by [facility], responses sent to [facility]  -[facility] requesting hematology c/s for chronic anemia---> wrote chart note , etiology c/w BRYON, recommended GI consult --> consulted 12/15  -Fall on : likely mechanical, no sustained injuries. PT consult  -Bowel regimen: continue bisacodyl 10mg QHS (refusing), miralax 17g daily (refusing), senna 2 tablets QHS (refusing)  -Continue Vitamin D3 1000units QHS (refusing)  6. PRNs  -Olanzapine 5mg PO or IM for agitation  7. Therapy   -Individual, group and milieu therapy as appropriate   8. Disposition: state application submitted  Patient is a 53 year old adult (goes by "Juan Jose"), living at [facility] on [place] grounds, PMH of GERD, anemia, PPH of schizophrenia, selective mutism, currently with [facility] ACT team, previously with [facility] which , with history of multiple psychiatric hospitalizations, without known history of self-injury or suicide attempts, with history of aggression in the setting on nonadherence with medication, who was initially brought in by staff at residence for increasing agitation and aggression in the setting of 2 months of medication nonadherence. Patient was hospitalized from 8/3-23 on 1N where they were noted to be hostile, disorganized, threatening, malodorous and grossly psychotic. TOO was obtained, and patient eventually administered dual long-acting injectables (Haldol Decanoate and Zyprexa Relprevv), though had declined nearly all PO medications. Patient was transferred to [facility] where they were admitted due to anemia to 6 (medical workup largely unremarkable), patient stabilized and transferred back to [facility] for further management of psychosis.     Diagnostically, presentation consistent with exacerbation of schizophrenia.     Patient has shown overall very little clinical improvement since admission. Spends most of day isolative and talking to self in severely disorganized and largely incomprehensible fashion, with only very brief moments of reality based exchange with staff or peers. Has showered recently with significant staff encouragement, though room very malodorous. No recent physical aggression, though can appear mistrustful and make vaguely threatening remarks. Sleep erratic, often just a few hours, though sometimes sleeps during day. Still declining all PO medications, on high dose haloperidol BREWER, due today.    GI and hematology consulted last week in response to [facility]'s concerns around chronic anemia. Responses sent to [facility].    Patient requires inpatient admission due to very poor self-care and for containment, stabilization, medication management and aftercare planning.    Plan:  1. Brink: admitted involuntary , TOO obtained 10/24  2. Observation: routine checks  3. Collateral: obtained collateral from [facility] psychiatrist  4. Psychiatric  -Continue Haldol Decanoate 200mg IM q4 weeks, last given , next due   	-Had received Zyprexa Replevv 405mg qMonth (given )--though no observable benefit above Haldol Decanoate, so will defer at this time  -Continue clozapine 25mg QHS (refusing), d/c'ed olanzapine IM backup order d/t tremulousness   -Continue depakote 1000mg QHS (refusing)  -Continue benztropine 1mg BID for EPS (refusing)  5. Medical:  -Anemia:   	-s/p medical admission at [facility] for anemia to Hgb 6, required 2u pRBCs, most recent Hgb 8.8-9. GI workup unremarkable with exception of diverticulosis and colonic polyp  	-Repeat CBCs with anemia 8.6-low 9s, per internist, encourage iron supplementation  	-Continue ferrous sulfate 325mg daily (refusing)  -Internist evaluated x2 in response to questions asked by [facility], responses sent to [facility]  -[facility] requesting hematology c/s for chronic anemia---> wrote chart note , etiology c/w BRYON, recommended GI consult --> consulted 12/15  -Fall on : likely mechanical, no sustained injuries. PT consult  -Bowel regimen: continue bisacodyl 10mg QHS (refusing), miralax 17g daily (refusing), senna 2 tablets QHS (refusing)  -Continue Vitamin D3 1000units QHS (refusing)  6. PRNs  -Olanzapine 5mg PO or IM for agitation  7. Therapy   -Individual, group and milieu therapy as appropriate   8. Disposition: state application submitted

## 2023-12-19 NOTE — BH INPATIENT PSYCHIATRY PROGRESS NOTE - NSBHMETABOLIC_PSY_ALL_CORE_FT
BMI: BMI (kg/m2): 35.2 (12-06-23 @ 08:57)  HbA1c: A1C with Estimated Average Glucose Result: 5.3 % (09-29-23 @ 10:00)    Glucose: POCT Blood Glucose.: 100 mg/dL (12-19-22 @ 15:06)    BP: --Vital Signs Last 24 Hrs  T(C): --  T(F): --  HR: --  BP: --  BP(mean): --  RR: --  SpO2: --      Lipid Panel: Date/Time: 09-29-23 @ 10:00  Cholesterol, Serum: 166  LDL Cholesterol Calculated: 94  HDL Cholesterol, Serum: 60  Total Cholesterol/HDL Ration Measurement: --  Triglycerides, Serum: 61   BMI: BMI (kg/m2): 35.2 (12-06-23 @ 08:57)  HbA1c: A1C with Estimated Average Glucose Result: 5.3 % (09-29-23 @ 10:00)    Glucose:   BP: --Vital Signs Last 24 Hrs  T(C): --  T(F): --  HR: --  BP: --  BP(mean): --  RR: --  SpO2: --      Lipid Panel: Date/Time: 09-29-23 @ 10:00  Cholesterol, Serum: 166  LDL Cholesterol Calculated: 94  HDL Cholesterol, Serum: 60  Total Cholesterol/HDL Ration Measurement: --  Triglycerides, Serum: 61

## 2023-12-20 PROCEDURE — 99231 SBSQ HOSP IP/OBS SF/LOW 25: CPT

## 2023-12-20 NOTE — BH INPATIENT PSYCHIATRY PROGRESS NOTE - NSBHASSESSSUMMFT_PSY_ALL_CORE
Patient is a 53 year old adult (goes by "Juan Jose"), living at [facility] on [place] grounds, PMH of GERD, anemia, PPH of schizophrenia, selective mutism, currently with [facility] ACT team, previously with [facility] which , with history of multiple psychiatric hospitalizations, without known history of self-injury or suicide attempts, with history of aggression in the setting on nonadherence with medication, who was initially brought in by staff at residence for increasing agitation and aggression in the setting of 2 months of medication nonadherence. Patient was hospitalized from 8/3-23 on 1N where they were noted to be hostile, disorganized, threatening, malodorous and grossly psychotic. TOO was obtained, and patient eventually administered dual long-acting injectables (Haldol Decanoate and Zyprexa Relprevv), though had declined nearly all PO medications. Patient was transferred to [facility] where they were admitted due to anemia to 6 (medical workup largely unremarkable), patient stabilized and transferred back to [facility] for further management of psychosis.     Diagnostically, presentation consistent with exacerbation of schizophrenia.     Patient has shown overall very little clinical improvement since admission. Spends most of day isolative and talking to self in severely disorganized and largely incomprehensible fashion, with only very brief moments of reality based exchange with staff or peers. Has showered recently with significant staff encouragement, though room very malodorous. No recent physical aggression, though can appear mistrustful and make vaguely threatening remarks. Sleep erratic, often just a few hours, though sometimes sleeps during day. Still declining all PO medications, on high dose haloperidol BREWER, due today.    GI and hematology consulted last week in response to [facility]'s concerns around chronic anemia. Responses sent to [facility].    Patient requires inpatient admission due to very poor self-care and for containment, stabilization, medication management and aftercare planning.    Plan:  1. Brink: admitted involuntary , TOO obtained 10/24  2. Observation: routine checks  3. Collateral: obtained collateral from [facility] psychiatrist  4. Psychiatric  -Continue Haldol Decanoate 200mg IM q4 weeks, last given , next due   	-Had received Zyprexa Replevv 405mg qMonth (given )--though no observable benefit above Haldol Decanoate, so will defer at this time  -Continue clozapine 25mg QHS (refusing), d/c'ed olanzapine IM backup order d/t tremulousness   -Continue depakote 1000mg QHS (refusing)  -Continue benztropine 1mg BID for EPS (refusing)  5. Medical:  -Anemia:   	-s/p medical admission at [facility] for anemia to Hgb 6, required 2u pRBCs, most recent Hgb 8.8-9. GI workup unremarkable with exception of diverticulosis and colonic polyp  	-Repeat CBCs with anemia 8.6-low 9s, per internist, encourage iron supplementation  	-Continue ferrous sulfate 325mg daily (refusing)  -Internist evaluated x2 in response to questions asked by [facility], responses sent to [facility]  -[facility] requesting hematology c/s for chronic anemia---> wrote chart note , etiology c/w BRYON, recommended GI consult --> consulted 12/15  -Fall on : likely mechanical, no sustained injuries. PT consult  -Bowel regimen: continue bisacodyl 10mg QHS (refusing), miralax 17g daily (refusing), senna 2 tablets QHS (refusing)  -Continue Vitamin D3 1000units QHS (refusing)  6. PRNs  -Olanzapine 5mg PO or IM for agitation  7. Therapy   -Individual, group and milieu therapy as appropriate   8. Disposition: state application submitted  Patient is a 53 year old adult (goes by "Juan Jose"), living at [facility] on [place] grounds, PMH of GERD, anemia, PPH of schizophrenia, selective mutism, currently with [facility] ACT team, previously with [facility] which , with history of multiple psychiatric hospitalizations, without known history of self-injury or suicide attempts, with history of aggression in the setting on nonadherence with medication, who was initially brought in by staff at residence for increasing agitation and aggression in the setting of 2 months of medication nonadherence. Patient was hospitalized from 8/3-23 on 1N where they were noted to be hostile, disorganized, threatening, malodorous and grossly psychotic. TOO was obtained, and patient eventually administered dual long-acting injectables (Haldol Decanoate and Zyprexa Relprevv), though had declined nearly all PO medications. Patient was transferred to [facility] where they were admitted due to anemia to 6 (medical workup largely unremarkable), patient stabilized and transferred back to [facility] for further management of psychosis.     Diagnostically, presentation consistent with exacerbation of schizophrenia.     Patient has shown overall very little clinical improvement since admission. Spends most of day isolative and talking to self in severely disorganized and largely incomprehensible fashion, with only very brief moments of reality based exchange with staff or peers. Has showered recently with significant staff encouragement, though room very malodorous. No recent physical aggression, though can appear mistrustful and make vaguely threatening remarks. Sleep erratic, often just a few hours, though sometimes sleeps during day. Still declining all PO medications, on high dose haloperidol BREWER, due today.    GI and hematology consulted last week in response to [facility]'s concerns around chronic anemia. Responses sent to [facility].    Patient requires inpatient admission due to very poor self-care and for containment, stabilization, medication management and aftercare planning.    Plan:  1. Brink: admitted involuntary , TOO obtained 10/24  2. Observation: routine checks  3. Collateral: obtained collateral from [facility] psychiatrist  4. Psychiatric  -Continue Haldol Decanoate 200mg IM q4 weeks, last given , next due   	-Had received Zyprexa Replevv 405mg qMonth (given )--though no observable benefit above Haldol Decanoate, so will defer at this time  -Continue clozapine 25mg QHS (refusing), d/c'ed olanzapine IM backup order d/t tremulousness   -Continue depakote 1000mg QHS (refusing)  -Continue benztropine 1mg BID for EPS (refusing)  5. Medical:  -Anemia:   	-s/p medical admission at [facility] for anemia to Hgb 6, required 2u pRBCs, most recent Hgb 8.8-9. GI workup unremarkable with exception of diverticulosis and colonic polyp  	-Repeat CBCs with anemia 8.6-low 9s, per internist, encourage iron supplementation  	-Continue ferrous sulfate 325mg daily (refusing)  -Internist evaluated x2 in response to questions asked by [facility], responses sent to [facility]  -[facility] requesting hematology c/s for chronic anemia---> wrote chart note , etiology c/w BRYON, recommended GI consult --> consulted 12/15  -Fall on : likely mechanical, no sustained injuries. PT consult  -Bowel regimen: continue bisacodyl 10mg QHS (refusing), miralax 17g daily (refusing), senna 2 tablets QHS (refusing)  -Continue Vitamin D3 1000units QHS (refusing)  6. PRNs  -Olanzapine 5mg PO or IM for agitation  7. Therapy   -Individual, group and milieu therapy as appropriate   8. Disposition: state application submitted  Patient is a 53 year old adult (goes by "Juan Jose"), living at [facility] on [place] grounds, PMH of GERD, anemia, PPH of schizophrenia, selective mutism, currently with [facility] ACT team, previously with [facility] which , with history of multiple psychiatric hospitalizations, without known history of self-injury or suicide attempts, with history of aggression in the setting on nonadherence with medication, who was initially brought in by staff at residence for increasing agitation and aggression in the setting of 2 months of medication nonadherence. Patient was hospitalized from 8/3-23 on 1N where they were noted to be hostile, disorganized, threatening, malodorous and grossly psychotic. TOO was obtained, and patient eventually administered dual long-acting injectables (Haldol Decanoate and Zyprexa Relprevv), though had declined nearly all PO medications. Patient was transferred to [facility] where they were admitted due to anemia to 6 (medical workup largely unremarkable), patient stabilized and transferred back to [facility] for further management of psychosis.     Diagnostically, presentation consistent with exacerbation of schizophrenia.     Patient has shown overall very little clinical improvement since admission. Spends most of day isolative and talking to self in severely disorganized and largely incomprehensible fashion, with only very brief moments of reality based exchange with staff or peers. Has showered recently with significant staff encouragement, though room very malodorous. No recent physical aggression, though can appear mistrustful and make vaguely threatening remarks. Sleep erratic, often just a few hours, though sometimes sleeps during day. Still declining all PO medications, on high dose haloperidol BREWER given yesterday.    GI and hematology consulted last week in response to [facility]'s concerns around chronic anemia. Responses sent to [facility]. No urgent indication for parenteral iron. Spoke to nutrition, will add Ensure to diet to try to increase iron intake.    Patient requires inpatient admission due to very poor self-care and for containment, stabilization, medication management and aftercare planning.    Plan:  1. Brink: admitted involuntary , TOO obtained 10/24  2. Observation: routine checks  3. Collateral: obtained collateral from [facility] psychiatrist  4. Psychiatric  -Continue Haldol Decanoate 200mg IM q4 weeks, last given , next due   	-Had received Zyprexa Replevv 405mg qMonth (given )--though no observable benefit above Haldol Decanoate, so will defer at this time  -Continue clozapine 25mg QHS (refusing), d/c'ed olanzapine IM backup order d/t tremulousness   -Continue depakote 1000mg QHS (refusing)  -Continue benztropine 1mg BID for EPS (refusing)  5. Medical:  -Anemia:   	-s/p medical admission at [facility] for anemia to Hgb 6, required 2u pRBCs, most recent Hgb 8.8-9. GI workup unremarkable with exception of diverticulosis and colonic polyp  	-Repeat CBCs with anemia 8.6-low 9s, per internist, encourage iron supplementation  	-Continue ferrous sulfate 325mg daily (refusing)  -Internist evaluated x2 in response to questions asked by [facility], responses sent to [facility]  -[facility] requesting hematology c/s for chronic anemia---> wrote chart note , etiology c/w BRYON, recommended GI consult --> consulted 12/15  -Fall on : likely mechanical, no sustained injuries. PT consult  -Bowel regimen: continue bisacodyl 10mg QHS (refusing), miralax 17g daily (refusing), senna 2 tablets QHS (refusing)  -Continue Vitamin D3 1000units QHS (refusing)  6. PRNs  -Olanzapine 5mg PO or IM for agitation  7. Therapy   -Individual, group and milieu therapy as appropriate   8. Disposition: state application submitted  Patient is a 53 year old adult (goes by "Juan Jose"), living at [facility] on [place] grounds, PMH of GERD, anemia, PPH of schizophrenia, selective mutism, currently with [facility] ACT team, previously with [facility] which , with history of multiple psychiatric hospitalizations, without known history of self-injury or suicide attempts, with history of aggression in the setting on nonadherence with medication, who was initially brought in by staff at residence for increasing agitation and aggression in the setting of 2 months of medication nonadherence. Patient was hospitalized from 8/3-23 on 1N where they were noted to be hostile, disorganized, threatening, malodorous and grossly psychotic. TOO was obtained, and patient eventually administered dual long-acting injectables (Haldol Decanoate and Zyprexa Relprevv), though had declined nearly all PO medications. Patient was transferred to [facility] where they were admitted due to anemia to 6 (medical workup largely unremarkable), patient stabilized and transferred back to [facility] for further management of psychosis.     Diagnostically, presentation consistent with exacerbation of schizophrenia.     Patient has shown overall very little clinical improvement since admission. Spends most of day isolative and talking to self in severely disorganized and largely incomprehensible fashion, with only very brief moments of reality based exchange with staff or peers. Has showered recently with significant staff encouragement, though room very malodorous. No recent physical aggression, though can appear mistrustful and make vaguely threatening remarks. Sleep erratic, often just a few hours, though sometimes sleeps during day. Still declining all PO medications, on high dose haloperidol BREWER given yesterday.    GI and hematology consulted last week in response to [facility]'s concerns around chronic anemia. Responses sent to [facility]. No urgent indication for parenteral iron. Spoke to nutrition, will add Ensure to diet to try to increase iron intake.    Patient requires inpatient admission due to very poor self-care and for containment, stabilization, medication management and aftercare planning.    Plan:  1. Brink: admitted involuntary , TOO obtained 10/24  2. Observation: routine checks  3. Collateral: obtained collateral from [facility] psychiatrist  4. Psychiatric  -Continue Haldol Decanoate 200mg IM q4 weeks, last given , next due   	-Had received Zyprexa Replevv 405mg qMonth (given )--though no observable benefit above Haldol Decanoate, so will defer at this time  -Continue clozapine 25mg QHS (refusing), d/c'ed olanzapine IM backup order d/t tremulousness   -Continue depakote 1000mg QHS (refusing)  -Continue benztropine 1mg BID for EPS (refusing)  5. Medical:  -Anemia:   	-s/p medical admission at [facility] for anemia to Hgb 6, required 2u pRBCs, most recent Hgb 8.8-9. GI workup unremarkable with exception of diverticulosis and colonic polyp  	-Repeat CBCs with anemia 8.6-low 9s, per internist, encourage iron supplementation  	-Continue ferrous sulfate 325mg daily (refusing)  -Internist evaluated x2 in response to questions asked by [facility], responses sent to [facility]  -[facility] requesting hematology c/s for chronic anemia---> wrote chart note , etiology c/w BRYON, recommended GI consult --> consulted 12/15  -Fall on : likely mechanical, no sustained injuries. PT consult  -Bowel regimen: continue bisacodyl 10mg QHS (refusing), miralax 17g daily (refusing), senna 2 tablets QHS (refusing)  -Continue Vitamin D3 1000units QHS (refusing)  6. PRNs  -Olanzapine 5mg PO or IM for agitation  7. Therapy   -Individual, group and milieu therapy as appropriate   8. Disposition: state application submitted

## 2023-12-20 NOTE — BH INPATIENT PSYCHIATRY PROGRESS NOTE - CURRENT MEDICATION
- stable  - continue atorvastatin 20mg daily MEDICATIONS  (STANDING):  benztropine 1 milliGRAM(s) Oral two times a day  bisacodyl 10 milliGRAM(s) Oral at bedtime  cholecalciferol 1000 Unit(s) Oral at bedtime  ferrous    sulfate 325 milliGRAM(s) Oral daily  polyethylene glycol 3350 17 Gram(s) Oral two times a day  senna 2 Tablet(s) Oral at bedtime  valproic  acid Syrup 1000 milliGRAM(s) Oral at bedtime    MEDICATIONS  (PRN):  acetaminophen     Tablet .. 650 milliGRAM(s) Oral every 4 hours PRN Mild Pain (1 - 3), Moderate Pain (4 - 6)  OLANZapine Disintegrating Tablet 5 milliGRAM(s) Oral every 4 hours PRN agitation  OLANZapine Injectable 5 milliGRAM(s) IntraMuscular once PRN Agitation

## 2023-12-20 NOTE — CHART NOTE - NSCHARTNOTEFT_GEN_A_CORE
Source: Patient [ ]    Family [ ]     other [x] RN    Diet : Diet, Regular (10-12-23 @ 20:58) [Active]      Patient reports [ ] nausea  [ ] vomiting [ ] diarrhea [ ] constipation  [ ]chewing problems [ ] swallowing issues  [x] other: No GI distress noted.      PO intake:  < 50% [ ] 50-75% [ ]   % [x]  other :     Source for PO intake [ ] Patient [ ] family [ ] chart [x] staff [ ] other    Met Pt in her room. Pt does not speak/answer questions.   Per chart and communication with MD and RN, Pt with Iron deficiency anemia and refusing all meds including iron supplements. Staff requesting iron rich foods for Pt. Per staff, Pt eating well in unit. Our regular menu contains iron rich food but will some other choices.   Will also recommend po supplement Ensure Plus x two daily (700 kcal, 26 g pro, 9 mg iron)  Spoke to Nurse Mujica this morning and communicated recommendations.    Current Weight: None      Pertinent Medications: MEDICATIONS  (STANDING):  benztropine 1 milliGRAM(s) Oral two times a day  bisacodyl 10 milliGRAM(s) Oral at bedtime  cholecalciferol 1000 Unit(s) Oral at bedtime  ferrous    sulfate 325 milliGRAM(s) Oral daily  polyethylene glycol 3350 17 Gram(s) Oral two times a day  senna 2 Tablet(s) Oral at bedtime  valproic  acid Syrup 1000 milliGRAM(s) Oral at bedtime    MEDICATIONS  (PRN):  acetaminophen     Tablet .. 650 milliGRAM(s) Oral every 4 hours PRN Mild Pain (1 - 3), Moderate Pain (4 - 6)  OLANZapine Disintegrating Tablet 5 milliGRAM(s) Oral every 4 hours PRN agitation  OLANZapine Injectable 5 milliGRAM(s) IntraMuscular once PRN Agitation    Pertinent Labs:  12/8 H/H 8.6/28.8 L, MCV/MCH 75/22.4 L, Iron WNL    Skin: Intact    Estimated Needs:   [x] no change since previous assessment  [ ] recalculated:       Previous Nutrition Diagnosis:     [ ] Inadequate Energy Intake [ ]Inadequate Oral Intake [ ] Excessive Energy Intake     [ ] Underweight [ ] Increased Nutrient Needs [x] Overweight/Obesity     [ ] Altered GI Function [ ] Unintended Weight Loss [ ] Food & Nutrition Related Knowledge Deficit [ ] Malnutrition          Nutrition Diagnosis is [x] ongoing  [ ] resolved [ ] not applicable          New Nutrition Diagnosis: [x] not applicable        Recommend    [x] Change Diet To: Regular, po supplement Ensure Plus 8 oz bottle x 2 daily    [ ] Nutrition Supplement    [ ] Nutrition Support    [ ] Other:        Monitoring and Evaluation:     [x] PO intake [ ] Tolerance to diet prescription [x] weekly weights [x] follow up per protocol    [x] other: Oma Wolfe RDN Pager [phone] Source: Patient [ ]    Family [ ]     other [x] RN    Diet : Diet, Regular (10-12-23 @ 20:58) [Active]      Patient reports [ ] nausea  [ ] vomiting [ ] diarrhea [ ] constipation  [ ]chewing problems [ ] swallowing issues  [x] other: No GI distress noted.      PO intake:  < 50% [ ] 50-75% [ ]   % [x]  other :     Source for PO intake [ ] Patient [ ] family [ ] chart [x] staff [ ] other    Met Pt in her room. Pt does not speak/answer questions.   Per chart and communication with MD and RN, Pt with Iron deficiency anemia and refusing all meds including iron supplements. Staff requesting iron rich foods for Pt. Per staff, Pt eating well in unit. Our regular menu contains iron rich food but will some other choices.   Will also recommend po supplement Ensure Plus x two daily (700 kcal, 26 g pro, 9 mg iron)  Spoke to Nurse Mujica this morning and communicated recommendations.    Current Weight: None      Pertinent Medications: MEDICATIONS  (STANDING):  benztropine 1 milliGRAM(s) Oral two times a day  bisacodyl 10 milliGRAM(s) Oral at bedtime  cholecalciferol 1000 Unit(s) Oral at bedtime  ferrous    sulfate 325 milliGRAM(s) Oral daily  polyethylene glycol 3350 17 Gram(s) Oral two times a day  senna 2 Tablet(s) Oral at bedtime  valproic  acid Syrup 1000 milliGRAM(s) Oral at bedtime    MEDICATIONS  (PRN):  acetaminophen     Tablet .. 650 milliGRAM(s) Oral every 4 hours PRN Mild Pain (1 - 3), Moderate Pain (4 - 6)  OLANZapine Disintegrating Tablet 5 milliGRAM(s) Oral every 4 hours PRN agitation  OLANZapine Injectable 5 milliGRAM(s) IntraMuscular once PRN Agitation    Pertinent Labs:  12/8 H/H 8.6/28.8 L, MCV/MCH 75/22.4 L, Iron WNL    Skin: Intact    Estimated Needs:   [x] no change since previous assessment  [ ] recalculated:       Previous Nutrition Diagnosis:     [ ] Inadequate Energy Intake [ ]Inadequate Oral Intake [ ] Excessive Energy Intake     [ ] Underweight [ ] Increased Nutrient Needs [x] Overweight/Obesity     [ ] Altered GI Function [ ] Unintended Weight Loss [ ] Food & Nutrition Related Knowledge Deficit [ ] Malnutrition          Nutrition Diagnosis is [x] ongoing  [ ] resolved [ ] not applicable          New Nutrition Diagnosis: [x] not applicable        Recommend    [x] Change Diet To: Regular, po supplement Ensure Plus 8 oz bottle x 2 daily    [ ] Nutrition Supplement    [ ] Nutrition Support    [ ] Other:        Monitoring and Evaluation:     [x] PO intake [ ] Tolerance to diet prescription [x] weekly weights [x] follow up per protocol    [x] other: Oma Wolfe RDN Pager [phone]

## 2023-12-20 NOTE — BH INPATIENT PSYCHIATRY PROGRESS NOTE - MSE UNSTRUCTURED FT
Appearance: laying in bed, wearing hospital attire  Behavior: uncooperative, suspicious appearing  Speech: no speech today  Mood: unable to assess  Affect: irritable  Thought process: severely disorganized when does speak, none today  Thought content: can make vaguely violent/threatening statements, not observed harming self or others.   Perceptions: observed talking to self  Insight: very poor  Judgement: very poor, refusing vitals intermittently, refusing PO medications, very poor ADLs  Cognition: unable to assess Appearance: laying in bed, wearing hospital attire  Behavior: uncooperative, suspicious appearing  Speech: mumbling to self  Mood: unable to assess  Affect: irritable  Thought process: severely disorganized   Thought content: can make vaguely violent/threatening statements, none today, not observed harming self or others.   Perceptions: observed talking to self  Insight: very poor  Judgement: very poor, refusing vitals intermittently, refusing PO medications, very poor ADLs  Cognition: unable to assess

## 2023-12-20 NOTE — BH INPATIENT PSYCHIATRY PROGRESS NOTE - NSBHATTESTBILLING_PSY_A_CORE
15554-Mitqsljgya OBS or IP - moderate complexity OR 35-49 mins 89794-Qofejevene OBS or IP - moderate complexity OR 35-49 mins 10845-Frxlggmohg OBS or IP - low complexity OR 25-34 mins 58942-Dmrbrnmdke OBS or IP - low complexity OR 25-34 mins

## 2023-12-20 NOTE — BH INPATIENT PSYCHIATRY PROGRESS NOTE - NSBHMETABOLIC_PSY_ALL_CORE_FT
BMI: BMI (kg/m2): 35.2 (12-06-23 @ 08:57)  HbA1c: A1C with Estimated Average Glucose Result: 5.3 % (09-29-23 @ 10:00)    Glucose:   BP: --Vital Signs Last 24 Hrs  T(C): --  T(F): --  HR: --  BP: --  BP(mean): --  RR: --  SpO2: --      Lipid Panel: Date/Time: 09-29-23 @ 10:00  Cholesterol, Serum: 166  LDL Cholesterol Calculated: 94  HDL Cholesterol, Serum: 60  Total Cholesterol/HDL Ration Measurement: --  Triglycerides, Serum: 61

## 2023-12-20 NOTE — BH INPATIENT PSYCHIATRY PROGRESS NOTE - NSBHFUPINTERVALHXFT_PSY_A_CORE
Refusing vitals, refusing PO medications, no PRNs for agitation.  Refusing vitals, refusing PO medications, no PRNs for agitation.     Patient seen laying awake in bed, mumbling to self. Does not answer questions asked by this provider, blankly stares back.

## 2023-12-20 NOTE — CHART NOTE - NSCHARTNOTESELECT_GEN_ALL_CORE
Event Note
Event Note
Heme onc recs/Event Note
F/U note/Nutrition Services
Follow up/Nutrition Services
MED/Event Note

## 2023-12-21 PROCEDURE — 99231 SBSQ HOSP IP/OBS SF/LOW 25: CPT

## 2023-12-21 NOTE — BH INPATIENT PSYCHIATRY PROGRESS NOTE - NSBHATTESTBILLING_PSY_A_CORE
23308-Tjvrhhodbz OBS or IP - low complexity OR 25-34 mins 12294-Jbajrzznaj OBS or IP - low complexity OR 25-34 mins

## 2023-12-21 NOTE — BH INPATIENT PSYCHIATRY PROGRESS NOTE - NSBHFUPINTERVALHXFT_PSY_A_CORE
Refusing vitals, refusing PO medications, no PRNs for agitation.  Did not sleep overnight per log.    Patient seen laying in bed, awake. Did not speak to this provider.

## 2023-12-21 NOTE — BH INPATIENT PSYCHIATRY PROGRESS NOTE - MSE UNSTRUCTURED FT
Appearance: laying in bed, wearing hospital attire  Behavior: uncooperative, suspicious appearing  Speech: no speech  Mood: unable to assess  Affect: irritable  Thought process: severely disorganized when does speak  Thought content: can make vaguely violent/threatening statements, none today, not observed harming self or others.   Perceptions: observed talking to self  Insight: very poor  Judgement: very poor, refusing vitals intermittently, refusing PO medications, very poor ADLs  Cognition: unable to assess

## 2023-12-22 PROCEDURE — 99231 SBSQ HOSP IP/OBS SF/LOW 25: CPT

## 2023-12-22 NOTE — BH INPATIENT PSYCHIATRY PROGRESS NOTE - NSBHASSESSSUMMFT_PSY_ALL_CORE
Patient is a 53 year old adult (goes by "Juan Jose"), living at [facility] on [place] grounds, PMH of GERD, anemia, PPH of schizophrenia, selective mutism, currently with [facility] ACT team, previously with [facility] which , with history of multiple psychiatric hospitalizations, without known history of self-injury or suicide attempts, with history of aggression in the setting on nonadherence with medication, who was initially brought in by staff at residence for increasing agitation and aggression in the setting of 2 months of medication nonadherence. Patient was hospitalized from 8/3-23 on 1N where they were noted to be hostile, disorganized, threatening, malodorous and grossly psychotic. TOO was obtained, and patient eventually administered dual long-acting injectables (Haldol Decanoate and Zyprexa Relprevv), though had declined nearly all PO medications. Patient was transferred to [facility] where they were admitted due to anemia to 6 (medical workup largely unremarkable), patient stabilized and transferred back to [facility] for further management of psychosis.     Diagnostically, presentation consistent with exacerbation of schizophrenia.     Patient has shown overall very little clinical improvement since admission. Spends most of day isolative and talking to self in severely disorganized and largely incomprehensible fashion, with only very brief moments of reality based exchange with staff or peers. Has showered recently with significant staff encouragement, though room very malodorous. No recent physical aggression, though can appear mistrustful and make vaguely threatening remarks. Sleep erratic, often just a few hours, though sometimes sleeps during day. Still declining all PO medications, on high dose haloperidol BREWER.    GI and hematology consulted last week in response to [facility]'s concerns around chronic anemia. Responses sent to [facility]. No urgent indication for parenteral iron. Spoke to nutrition, will add Ensure to diet to try to increase iron intake, will try to encourage use.    Patient requires inpatient admission due to very poor self-care and for containment, stabilization, medication management and aftercare planning.    Plan:  1. Brink: admitted involuntary , TOO obtained 10/24  2. Observation: routine checks  3. Collateral: obtained collateral from [facility] psychiatrist  4. Psychiatric  -Continue Haldol Decanoate 200mg IM q4 weeks, last given , next due   	-Had received Zyprexa Replevv 405mg qMonth (given )--though no observable benefit above Haldol Decanoate, so will defer at this time  -Continue clozapine 25mg QHS (refusing), d/c'ed olanzapine IM backup order d/t tremulousness   -Continue depakote 1000mg QHS (refusing)  -Continue benztropine 1mg BID for EPS (refusing)  5. Medical:  -Anemia:   	-s/p medical admission at [facility] for anemia to Hgb 6, required 2u pRBCs, most recent Hgb 8.8-9. GI workup unremarkable with exception of diverticulosis and colonic polyp  	-Repeat CBCs with anemia 8.6-low 9s, per internist, encourage iron supplementation  	-Continue ferrous sulfate 325mg daily (refusing)  -Internist evaluated x2 in response to questions asked by [facility], responses sent to [facility]  -[facility] requesting hematology c/s for chronic anemia---> wrote chart note , etiology c/w BRYON, recommended GI consult --> consulted 12/15  -Fall on : likely mechanical, no sustained injuries. PT consult  -Bowel regimen: continue bisacodyl 10mg QHS (refusing), miralax 17g daily (refusing), senna 2 tablets QHS (refusing)  -Continue Vitamin D3 1000units QHS (refusing)  6. PRNs  -Olanzapine 5mg PO or IM for agitation  7. Therapy   -Individual, group and milieu therapy as appropriate   8. Disposition: state application submitted  Patient is a 53 year old adult (goes by "Juan Jose"), living at [facility] on [place] grounds, PMH of GERD, anemia, PPH of schizophrenia, selective mutism, currently with [facility] ACT team, previously with [facility] which , with history of multiple psychiatric hospitalizations, without known history of self-injury or suicide attempts, with history of aggression in the setting on nonadherence with medication, who was initially brought in by staff at residence for increasing agitation and aggression in the setting of 2 months of medication nonadherence. Patient was hospitalized from 8/3-23 on 1N where they were noted to be hostile, disorganized, threatening, malodorous and grossly psychotic. TOO was obtained, and patient eventually administered dual long-acting injectables (Haldol Decanoate and Zyprexa Relprevv), though had declined nearly all PO medications. Patient was transferred to [facility] where they were admitted due to anemia to 6 (medical workup largely unremarkable), patient stabilized and transferred back to [facility] for further management of psychosis.     Diagnostically, presentation consistent with exacerbation of schizophrenia.     Patient has shown overall very little clinical improvement since admission. Spends most of day isolative and talking to self in severely disorganized and largely incomprehensible fashion, with only very brief moments of reality based exchange with staff or peers. Has showered recently with significant staff encouragement, though room very malodorous. No recent physical aggression, though can appear mistrustful and make vaguely threatening remarks. Sleep erratic, often just a few hours, though sometimes sleeps during day. Still declining all PO medications, on high dose haloperidol BREWER.    GI and hematology consulted last week in response to [facility]'s concerns around chronic anemia. Responses sent to [facility]. No urgent indication for parenteral iron. Spoke to nutrition, will add Ensure to diet to try to increase iron intake, will try to encourage use.    Patient requires inpatient admission due to very poor self-care and for containment, stabilization, medication management and aftercare planning.    Plan:  1. Brink: admitted involuntary , TOO obtained 10/24  2. Observation: routine checks  3. Collateral: obtained collateral from [facility] psychiatrist  4. Psychiatric  -Continue Haldol Decanoate 200mg IM q4 weeks, last given , next due   	-Had received Zyprexa Replevv 405mg qMonth (given )--though no observable benefit above Haldol Decanoate, so will defer at this time  -Continue clozapine 25mg QHS (refusing), d/c'ed olanzapine IM backup order d/t tremulousness   -Continue depakote 1000mg QHS (refusing)  -Continue benztropine 1mg BID for EPS (refusing)  5. Medical:  -Anemia:   	-s/p medical admission at [facility] for anemia to Hgb 6, required 2u pRBCs, most recent Hgb 8.8-9. GI workup unremarkable with exception of diverticulosis and colonic polyp  	-Repeat CBCs with anemia 8.6-low 9s, per internist, encourage iron supplementation  	-Continue ferrous sulfate 325mg daily (refusing)  -Internist evaluated x2 in response to questions asked by [facility], responses sent to [facility]  -[facility] requesting hematology c/s for chronic anemia---> wrote chart note , etiology c/w BRYON, recommended GI consult --> consulted 12/15  -Fall on : likely mechanical, no sustained injuries. PT consult  -Bowel regimen: continue bisacodyl 10mg QHS (refusing), miralax 17g daily (refusing), senna 2 tablets QHS (refusing)  -Continue Vitamin D3 1000units QHS (refusing)  6. PRNs  -Olanzapine 5mg PO or IM for agitation  7. Therapy   -Individual, group and milieu therapy as appropriate   8. Disposition: state application submitted  Patient is a 53 year old adult (goes by "Juan Jose"), living at [facility] on [place] grounds, PMH of GERD, anemia, PPH of schizophrenia, selective mutism, currently with [facility] ACT team, previously with [facility] which , with history of multiple psychiatric hospitalizations, without known history of self-injury or suicide attempts, with history of aggression in the setting on nonadherence with medication, who was initially brought in by staff at residence for increasing agitation and aggression in the setting of 2 months of medication nonadherence. Patient was hospitalized from 8/3-23 on 1N where they were noted to be hostile, disorganized, threatening, malodorous and grossly psychotic. TOO was obtained, and patient eventually administered dual long-acting injectables (Haldol Decanoate and Zyprexa Relprevv), though had declined nearly all PO medications. Patient was transferred to [facility] where they were admitted due to anemia to 6 (medical workup largely unremarkable), patient stabilized and transferred back to [facility] for further management of psychosis.     Diagnostically, presentation consistent with exacerbation of schizophrenia.     Patient has shown overall very little clinical improvement since admission. Spends most of day isolative and talking to self in severely disorganized and largely incomprehensible fashion, with only very brief moments of reality based exchange with staff or peers. Has showered recently with significant staff encouragement, though room very malodorous. No recent physical aggression, though can appear mistrustful and make vaguely threatening remarks. Sleep erratic, often just a few hours, very poor the last two nights but sometimes sleeps during the day. Still declining all PO medications, on high dose haloperidol BREWER.    GI and hematology consulted last week in response to [facility]'s concerns around chronic anemia. Responses sent to [facility]. No urgent indication for parenteral iron. Spoke to nutrition, will add Ensure to diet to try to increase iron intake, will try to encourage use.    Patient requires inpatient admission due to very poor self-care and for containment, stabilization, medication management and aftercare planning.    Plan:  1. Brink: admitted involuntary , TOO obtained 10/24  2. Observation: routine checks  3. Collateral: obtained collateral from [facility] psychiatrist  4. Psychiatric  -Continue Haldol Decanoate 200mg IM q4 weeks, last given , next due   	-Had received Zyprexa Replevv 405mg qMonth (given )--though no observable benefit above Haldol Decanoate, so will defer at this time  -Continue clozapine 25mg QHS (refusing), d/c'ed olanzapine IM backup order d/t tremulousness   -Continue depakote 1000mg QHS (refusing)  -Continue benztropine 1mg BID for EPS (refusing)  5. Medical:  -Anemia:   	-s/p medical admission at [facility] for anemia to Hgb 6, required 2u pRBCs, most recent Hgb 8.8-9. GI workup unremarkable with exception of diverticulosis and colonic polyp  	-Repeat CBCs with anemia 8.6-low 9s, per internist, encourage iron supplementation  	-Continue ferrous sulfate 325mg daily (refusing)  -Internist evaluated x2 in response to questions asked by [facility], responses sent to [facility]  -[facility] requesting hematology c/s for chronic anemia---> wrote chart note , etiology c/w BRYON, recommended GI consult --> consulted 12/15  -Fall on : likely mechanical, no sustained injuries. PT consult  -Bowel regimen: continue bisacodyl 10mg QHS (refusing), miralax 17g daily (refusing), senna 2 tablets QHS (refusing)  -Continue Vitamin D3 1000units QHS (refusing)  6. PRNs  -Olanzapine 5mg PO or IM for agitation  7. Therapy   -Individual, group and milieu therapy as appropriate   8. Disposition: state application submitted  Patient is a 53 year old adult (goes by "Juan Jose"), living at [facility] on [place] grounds, PMH of GERD, anemia, PPH of schizophrenia, selective mutism, currently with [facility] ACT team, previously with [facility] which , with history of multiple psychiatric hospitalizations, without known history of self-injury or suicide attempts, with history of aggression in the setting on nonadherence with medication, who was initially brought in by staff at residence for increasing agitation and aggression in the setting of 2 months of medication nonadherence. Patient was hospitalized from 8/3-23 on 1N where they were noted to be hostile, disorganized, threatening, malodorous and grossly psychotic. TOO was obtained, and patient eventually administered dual long-acting injectables (Haldol Decanoate and Zyprexa Relprevv), though had declined nearly all PO medications. Patient was transferred to [facility] where they were admitted due to anemia to 6 (medical workup largely unremarkable), patient stabilized and transferred back to [facility] for further management of psychosis.     Diagnostically, presentation consistent with exacerbation of schizophrenia.     Patient has shown overall very little clinical improvement since admission. Spends most of day isolative and talking to self in severely disorganized and largely incomprehensible fashion, with only very brief moments of reality based exchange with staff or peers. Has showered recently with significant staff encouragement, though room very malodorous. No recent physical aggression, though can appear mistrustful and make vaguely threatening remarks. Sleep erratic, often just a few hours, very poor the last two nights but sometimes sleeps during the day. Still declining all PO medications, on high dose haloperidol BREWER.    GI and hematology consulted last week in response to [facility]'s concerns around chronic anemia. Responses sent to [facility]. No urgent indication for parenteral iron. Spoke to nutrition, will add Ensure to diet to try to increase iron intake, will try to encourage use.    Patient requires inpatient admission due to very poor self-care and for containment, stabilization, medication management and aftercare planning.    Plan:  1. Brink: admitted involuntary , TOO obtained 10/24  2. Observation: routine checks  3. Collateral: obtained collateral from [facility] psychiatrist  4. Psychiatric  -Continue Haldol Decanoate 200mg IM q4 weeks, last given , next due   	-Had received Zyprexa Replevv 405mg qMonth (given )--though no observable benefit above Haldol Decanoate, so will defer at this time  -Continue clozapine 25mg QHS (refusing), d/c'ed olanzapine IM backup order d/t tremulousness   -Continue depakote 1000mg QHS (refusing)  -Continue benztropine 1mg BID for EPS (refusing)  5. Medical:  -Anemia:   	-s/p medical admission at [facility] for anemia to Hgb 6, required 2u pRBCs, most recent Hgb 8.8-9. GI workup unremarkable with exception of diverticulosis and colonic polyp  	-Repeat CBCs with anemia 8.6-low 9s, per internist, encourage iron supplementation  	-Continue ferrous sulfate 325mg daily (refusing)  -Internist evaluated x2 in response to questions asked by [facility], responses sent to [facility]  -[facility] requesting hematology c/s for chronic anemia---> wrote chart note , etiology c/w BRYON, recommended GI consult --> consulted 12/15  -Fall on : likely mechanical, no sustained injuries. PT consult  -Bowel regimen: continue bisacodyl 10mg QHS (refusing), miralax 17g daily (refusing), senna 2 tablets QHS (refusing)  -Continue Vitamin D3 1000units QHS (refusing)  6. PRNs  -Olanzapine 5mg PO or IM for agitation  7. Therapy   -Individual, group and milieu therapy as appropriate   8. Disposition: state application submitted

## 2023-12-22 NOTE — BH INPATIENT PSYCHIATRY PROGRESS NOTE - NSBHFUPINTERVALHXFT_PSY_A_CORE
Refusing vitals, refusing PO medications, no PRNs for agitation.  Refusing vitals, refusing PO medications, no PRNs for agitation. Did not sleep overnight, but was sleeping intermittently during day.    Patient seen laying in bed awake. Asks about why a white girl is here. Asked how they are doing, responds "minding my business." States something about how this provider is a girl. Then states GET OUT.

## 2023-12-22 NOTE — BH INPATIENT PSYCHIATRY PROGRESS NOTE - MSE UNSTRUCTURED FT
Appearance: laying in bed, wearing hospital attire  Behavior: uncooperative, suspicious appearing  Speech: no speech  Mood: unable to assess  Affect: irritable  Thought process: severely disorganized when does speak  Thought content: can make vaguely violent/threatening statements, none today, not observed harming self or others.   Perceptions: observed talking to self  Insight: very poor  Judgement: very poor, refusing vitals intermittently, refusing PO medications, very poor ADLs  Cognition: unable to assess Appearance: laying in bed, wearing hospital attire  Behavior: uncooperative, suspicious appearing  Speech: normal volume/rate  Mood: unable to assess  Affect: irritable  Thought process: severely disorganized   Thought content: can make vaguely violent/threatening statements, today states "GET OUT", not observed harming self or others.   Perceptions: observed talking to self  Insight: very poor  Judgement: very poor, refusing vitals intermittently, refusing PO medications, very poor ADLs  Cognition: unable to assess

## 2023-12-22 NOTE — BH INPATIENT PSYCHIATRY PROGRESS NOTE - NSBHATTESTBILLING_PSY_A_CORE
75186-Ntsoivmkof OBS or IP - low complexity OR 25-34 mins 70467-Wxumzavvzw OBS or IP - low complexity OR 25-34 mins

## 2023-12-26 PROCEDURE — 99231 SBSQ HOSP IP/OBS SF/LOW 25: CPT

## 2023-12-26 NOTE — BH INPATIENT PSYCHIATRY PROGRESS NOTE - NSBHATTESTBILLING_PSY_A_CORE
00538-Gjydoinujg OBS or IP - low complexity OR 25-34 mins 79884-Zwmnvfrfie OBS or IP - low complexity OR 25-34 mins

## 2023-12-26 NOTE — BH INPATIENT PSYCHIATRY PROGRESS NOTE - NSBHFUPINTERVALHXFT_PSY_A_CORE
Refusing vitals, refusing PO medications, no PRNs for agitation, no acute events. Slept well last night per log.    Patient seen laying in bed awake. Stares at this provider, no verbal responses.

## 2023-12-26 NOTE — BH INPATIENT PSYCHIATRY PROGRESS NOTE - NSBHASSESSSUMMFT_PSY_ALL_CORE
Patient is a 53 year old adult (goes by "Juan Jose"), living at [facility] on [place] grounds, PMH of GERD, anemia, PPH of schizophrenia, selective mutism, currently with [facility] ACT team, previously with [facility] which , with history of multiple psychiatric hospitalizations, without known history of self-injury or suicide attempts, with history of aggression in the setting on nonadherence with medication, who was initially brought in by staff at residence for increasing agitation and aggression in the setting of 2 months of medication nonadherence. Patient was hospitalized from 8/3-23 on 1N where they were noted to be hostile, disorganized, threatening, malodorous and grossly psychotic. TOO was obtained, and patient eventually administered dual long-acting injectables (Haldol Decanoate and Zyprexa Relprevv), though had declined nearly all PO medications. Patient was transferred to [facility] where they were admitted due to anemia to 6 (medical workup largely unremarkable), patient stabilized and transferred back to [facility] for further management of psychosis.     Diagnostically, presentation consistent with exacerbation of schizophrenia.     Patient has shown overall very little clinical improvement since admission. Spends most of day isolative and talking to self in severely disorganized and largely incomprehensible fashion, with only very brief moments of reality based exchange with staff or peers, or is non-verbal and stares at staff. Has showered recently with significant staff encouragement, though room very malodorous. No recent physical aggression, though can appear mistrustful and make vaguely threatening remarks. Sleep erratic, often just a few hours, adequate last night. Still declining all PO medications, on high dose haloperidol BREWER.    GI and hematology consulted last week in response to [facility]'s concerns around chronic anemia. Responses sent to [facility]. No urgent indication for parenteral iron. Spoke to nutrition, will add Ensure to diet to try to increase iron intake, will try to encourage use. Will repeat labs in January.    Patient requires inpatient admission due to very poor self-care and for containment, stabilization, medication management and aftercare planning.    Plan:  1. Brink: admitted involuntary , TOO obtained 10/24  2. Observation: routine checks  3. Collateral: obtained collateral from [facility] psychiatrist  4. Psychiatric  -Continue Haldol Decanoate 200mg IM q4 weeks, last given , next due   	-Had received Zyprexa Replevv 405mg qMonth (given )--though no observable benefit above Haldol Decanoate, so will defer at this time  -Continue clozapine 25mg QHS (refusing), d/c'ed olanzapine IM backup order d/t tremulousness   -Continue depakote 1000mg QHS (refusing)  -Continue benztropine 1mg BID for EPS (refusing)  5. Medical:  -Anemia:   	-s/p medical admission at [facility] for anemia to Hgb 6, required 2u pRBCs, most recent Hgb 8.8-9. GI workup unremarkable with exception of diverticulosis and colonic polyp  	-Repeat CBCs with anemia 8.6-low 9s, per internist, encourage iron supplementation  	-Continue ferrous sulfate 325mg daily (refusing)  -Internist evaluated x2 in response to questions asked by [facility], responses sent to [facility]  -[facility] requesting hematology c/s for chronic anemia---> wrote chart note , etiology c/w BRYON, recommended GI consult --> consulted 12/15  -Fall on : likely mechanical, no sustained injuries. PT consult  -Bowel regimen: continue bisacodyl 10mg QHS (refusing), miralax 17g daily (refusing), senna 2 tablets QHS (refusing)  -Continue Vitamin D3 1000units QHS (refusing)  6. PRNs  -Olanzapine 5mg PO or IM for agitation  7. Therapy   -Individual, group and milieu therapy as appropriate   8. Disposition: state application submitted  Patient is a 53 year old adult (goes by "Juan Jose"), living at [facility] on [place] grounds, PMH of GERD, anemia, PPH of schizophrenia, selective mutism, currently with [facility] ACT team, previously with [facility] which , with history of multiple psychiatric hospitalizations, without known history of self-injury or suicide attempts, with history of aggression in the setting on nonadherence with medication, who was initially brought in by staff at residence for increasing agitation and aggression in the setting of 2 months of medication nonadherence. Patient was hospitalized from 8/3-23 on 1N where they were noted to be hostile, disorganized, threatening, malodorous and grossly psychotic. TOO was obtained, and patient eventually administered dual long-acting injectables (Haldol Decanoate and Zyprexa Relprevv), though had declined nearly all PO medications. Patient was transferred to [facility] where they were admitted due to anemia to 6 (medical workup largely unremarkable), patient stabilized and transferred back to [facility] for further management of psychosis.     Diagnostically, presentation consistent with exacerbation of schizophrenia.     Patient has shown overall very little clinical improvement since admission. Spends most of day isolative and talking to self in severely disorganized and largely incomprehensible fashion, with only very brief moments of reality based exchange with staff or peers, or is non-verbal and stares at staff. Has showered recently with significant staff encouragement, though room very malodorous. No recent physical aggression, though can appear mistrustful and make vaguely threatening remarks. Sleep erratic, often just a few hours, adequate last night. Still declining all PO medications, on high dose haloperidol BREWER.    GI and hematology consulted last week in response to [facility]'s concerns around chronic anemia. Responses sent to [facility]. No urgent indication for parenteral iron. Spoke to nutrition, will add Ensure to diet to try to increase iron intake, will try to encourage use. Will repeat labs in January.    Patient requires inpatient admission due to very poor self-care and for containment, stabilization, medication management and aftercare planning.    Plan:  1. Brink: admitted involuntary , TOO obtained 10/24  2. Observation: routine checks  3. Collateral: obtained collateral from [facility] psychiatrist  4. Psychiatric  -Continue Haldol Decanoate 200mg IM q4 weeks, last given , next due   	-Had received Zyprexa Replevv 405mg qMonth (given )--though no observable benefit above Haldol Decanoate, so will defer at this time  -Continue clozapine 25mg QHS (refusing), d/c'ed olanzapine IM backup order d/t tremulousness   -Continue depakote 1000mg QHS (refusing)  -Continue benztropine 1mg BID for EPS (refusing)  5. Medical:  -Anemia:   	-s/p medical admission at [facility] for anemia to Hgb 6, required 2u pRBCs, most recent Hgb 8.8-9. GI workup unremarkable with exception of diverticulosis and colonic polyp  	-Repeat CBCs with anemia 8.6-low 9s, per internist, encourage iron supplementation  	-Continue ferrous sulfate 325mg daily (refusing)  -Internist evaluated x2 in response to questions asked by [facility], responses sent to [facility]  -[facility] requesting hematology c/s for chronic anemia---> wrote chart note , etiology c/w BRYON, recommended GI consult --> consulted 12/15  -Fall on : likely mechanical, no sustained injuries. PT consult  -Bowel regimen: continue bisacodyl 10mg QHS (refusing), miralax 17g daily (refusing), senna 2 tablets QHS (refusing)  -Continue Vitamin D3 1000units QHS (refusing)  6. PRNs  -Olanzapine 5mg PO or IM for agitation  7. Therapy   -Individual, group and milieu therapy as appropriate   8. Disposition: state application submitted

## 2023-12-26 NOTE — BH INPATIENT PSYCHIATRY PROGRESS NOTE - MSE UNSTRUCTURED FT
Appearance: laying in bed, wearing hospital attire  Behavior: uncooperative, suspicious appearing  Speech: no speech today  Mood: unable to assess  Affect: irritable  Thought process: severely disorganized when does speak  Thought content: can make vaguely violent/threatening statements, none today, not observed harming self or others.   Perceptions: observed talking to self  Insight: very poor  Judgement: very poor, refusing vitals intermittently, refusing PO medications, very poor ADLs  Cognition: unable to assess

## 2023-12-27 PROCEDURE — 99231 SBSQ HOSP IP/OBS SF/LOW 25: CPT

## 2023-12-27 NOTE — BH INPATIENT PSYCHIATRY PROGRESS NOTE - NSBHATTESTBILLING_PSY_A_CORE
25317-Kwxrnkpini OBS or IP - low complexity OR 25-34 mins 61432-Anzojaejzj OBS or IP - low complexity OR 25-34 mins

## 2023-12-27 NOTE — BH INPATIENT PSYCHIATRY PROGRESS NOTE - NSBHASSESSSUMMFT_PSY_ALL_CORE
Patient is a 53 year old adult (goes by "Juan Jose"), living at [facility] on [place] grounds, PMH of GERD, anemia, PPH of schizophrenia, selective mutism, currently with [facility] ACT team, previously with [facility] which , with history of multiple psychiatric hospitalizations, without known history of self-injury or suicide attempts, with history of aggression in the setting on nonadherence with medication, who was initially brought in by staff at residence for increasing agitation and aggression in the setting of 2 months of medication nonadherence. Patient was hospitalized from 8/3-23 on 1N where they were noted to be hostile, disorganized, threatening, malodorous and grossly psychotic. TOO was obtained, and patient eventually administered dual long-acting injectables (Haldol Decanoate and Zyprexa Relprevv), though had declined nearly all PO medications. Patient was transferred to [facility] where they were admitted due to anemia to 6 (medical workup largely unremarkable), patient stabilized and transferred back to [facility] for further management of psychosis.     Diagnostically, presentation consistent with exacerbation of schizophrenia.     Patient has shown overall very little clinical improvement since admission. Spends most of day isolative and talking to self in severely disorganized and largely incomprehensible fashion, with only very brief moments of reality based exchange with staff or peers, or is non-verbal and stares at staff. Has showered recently with significant staff encouragement, though room very malodorous. No recent physical aggression, though can appear mistrustful and make vaguely threatening remarks. Sleep erratic, often just a few hours, adequate last night. Still declining all PO medications, on high dose haloperidol BREWER.    GI and hematology consulted last week in response to [facility]'s concerns around chronic anemia. Responses sent to [facility]. No urgent indication for parenteral iron. Spoke to nutrition, will add Ensure to diet to try to increase iron intake, will try to encourage use. Will repeat labs in January.    Patient requires inpatient admission due to very poor self-care and for containment, stabilization, medication management and aftercare planning.    Plan:  1. Brink: admitted involuntary , TOO obtained 10/24  2. Observation: routine checks  3. Collateral: obtained collateral from [facility] psychiatrist  4. Psychiatric  -Continue Haldol Decanoate 200mg IM q4 weeks, last given , next due   	-Had received Zyprexa Replevv 405mg qMonth (given )--though no observable benefit above Haldol Decanoate, so will defer at this time  -Continue clozapine 25mg QHS (refusing), d/c'ed olanzapine IM backup order d/t tremulousness   -Continue depakote 1000mg QHS (refusing)  -Continue benztropine 1mg BID for EPS (refusing)  5. Medical:  -Anemia:   	-s/p medical admission at [facility] for anemia to Hgb 6, required 2u pRBCs, most recent Hgb 8.8-9. GI workup unremarkable with exception of diverticulosis and colonic polyp  	-Repeat CBCs with anemia 8.6-low 9s, per internist, encourage iron supplementation  	-Continue ferrous sulfate 325mg daily (refusing)  -Internist evaluated x2 in response to questions asked by [facility], responses sent to [facility]  -[facility] requesting hematology c/s for chronic anemia---> wrote chart note , etiology c/w BRYON, recommended GI consult --> consulted 12/15  -Fall on : likely mechanical, no sustained injuries. PT consult  -Bowel regimen: continue bisacodyl 10mg QHS (refusing), miralax 17g daily (refusing), senna 2 tablets QHS (refusing)  -Continue Vitamin D3 1000units QHS (refusing)  6. PRNs  -Olanzapine 5mg PO or IM for agitation  7. Therapy   -Individual, group and milieu therapy as appropriate   8. Disposition: state application submitted  Patient is a 53 year old adult (goes by "Juan Jose"), living at [facility] on [place] grounds, PMH of GERD, anemia, PPH of schizophrenia, selective mutism, currently with [facility] ACT team, previously with [facility] which , with history of multiple psychiatric hospitalizations, without known history of self-injury or suicide attempts, with history of aggression in the setting on nonadherence with medication, who was initially brought in by staff at residence for increasing agitation and aggression in the setting of 2 months of medication nonadherence. Patient was hospitalized from 8/3-23 on 1N where they were noted to be hostile, disorganized, threatening, malodorous and grossly psychotic. TOO was obtained, and patient eventually administered dual long-acting injectables (Haldol Decanoate and Zyprexa Relprevv), though had declined nearly all PO medications. Patient was transferred to [facility] where they were admitted due to anemia to 6 (medical workup largely unremarkable), patient stabilized and transferred back to [facility] for further management of psychosis.     Diagnostically, presentation consistent with exacerbation of schizophrenia.     Patient has shown overall very little clinical improvement since admission. Spends most of day isolative and talking to self in severely disorganized and largely incomprehensible fashion, with only very brief moments of reality based exchange with staff or peers, or is non-verbal and stares at staff. Has showered recently with significant staff encouragement, though room very malodorous. No recent physical aggression, though can appear mistrustful and make vaguely threatening remarks. Sleep erratic, often just a few hours, adequate last night. Still declining all PO medications, on high dose haloperidol BREWER.    GI and hematology consulted last week in response to [facility]'s concerns around chronic anemia. Responses sent to [facility]. No urgent indication for parenteral iron. Spoke to nutrition, will add Ensure to diet to try to increase iron intake, will try to encourage use. Will repeat labs in January.    Patient requires inpatient admission due to very poor self-care and for containment, stabilization, medication management and aftercare planning.    Plan:  1. Brink: admitted involuntary , TOO obtained 10/24  2. Observation: routine checks  3. Collateral: obtained collateral from [facility] psychiatrist  4. Psychiatric  -Continue Haldol Decanoate 200mg IM q4 weeks, last given , next due   	-Had received Zyprexa Replevv 405mg qMonth (given )--though no observable benefit above Haldol Decanoate, so will defer at this time  -Continue clozapine 25mg QHS (refusing), d/c'ed olanzapine IM backup order d/t tremulousness   -Continue depakote 1000mg QHS (refusing)  -Continue benztropine 1mg BID for EPS (refusing)  5. Medical:  -Anemia:   	-s/p medical admission at [facility] for anemia to Hgb 6, required 2u pRBCs, most recent Hgb 8.8-9. GI workup unremarkable with exception of diverticulosis and colonic polyp  	-Repeat CBCs with anemia 8.6-low 9s, per internist, encourage iron supplementation  	-Continue ferrous sulfate 325mg daily (refusing)  -Internist evaluated x2 in response to questions asked by [facility], responses sent to [facility]  -[facility] requesting hematology c/s for chronic anemia---> wrote chart note , etiology c/w BRYON, recommended GI consult --> consulted 12/15  -Fall on : likely mechanical, no sustained injuries. PT consult  -Bowel regimen: continue bisacodyl 10mg QHS (refusing), miralax 17g daily (refusing), senna 2 tablets QHS (refusing)  -Continue Vitamin D3 1000units QHS (refusing)  6. PRNs  -Olanzapine 5mg PO or IM for agitation  7. Therapy   -Individual, group and milieu therapy as appropriate   8. Disposition: state application submitted

## 2023-12-27 NOTE — BH INPATIENT PSYCHIATRY PROGRESS NOTE - NSBHFUPINTERVALHXFT_PSY_A_CORE
Refusing vitals, refusing PO medications, no PRNs for agitation, no acute events.  Refusing vitals, refusing PO medications, no PRNs for agitation, no acute events. Slept 6-7 hours per sleep log.    Patient found laying in bed, awake, mumbling to self incoherently. Upon attempt to interview, patient says "GET OUT" "GET OUT" loudly. When asked why, states "I am not playing."

## 2023-12-27 NOTE — BH INPATIENT PSYCHIATRY PROGRESS NOTE - MSE UNSTRUCTURED FT
Appearance: laying in bed, wearing hospital attire  Behavior: uncooperative, suspicious appearing  Speech: no speech today  Mood: unable to assess  Affect: irritable  Thought process: severely disorganized when does speak  Thought content: can make vaguely violent/threatening statements, none today, not observed harming self or others.   Perceptions: observed talking to self  Insight: very poor  Judgement: very poor, refusing vitals intermittently, refusing PO medications, very poor ADLs  Cognition: unable to assess Appearance: laying in bed, wearing hospital attire  Behavior: uncooperative, suspicious appearing  Speech: largely talking to self incoherently, loud at times  Mood: unable to assess  Affect: irritable  Thought process: severely disorganized  Thought content: can make vaguely violent/threatening statements, today, "GET OUT" repeatedly, not observed harming self or others.   Perceptions: observed talking to self  Insight: very poor  Judgement: very poor, refusing vitals intermittently, refusing PO medications, very poor ADLs  Cognition: unable to assess

## 2023-12-28 PROCEDURE — 99231 SBSQ HOSP IP/OBS SF/LOW 25: CPT

## 2023-12-28 NOTE — BH INPATIENT PSYCHIATRY PROGRESS NOTE - NSBHATTESTBILLING_PSY_A_CORE
53730-Boadfyliwk OBS or IP - low complexity OR 25-34 mins 10992-Hkhkqiyfzl OBS or IP - low complexity OR 25-34 mins

## 2023-12-28 NOTE — BH INPATIENT PSYCHIATRY PROGRESS NOTE - NSBHCHARTREVIEWVS_PSY_A_CORE FT
Vital Signs Last 24 Hrs  T(C): 35.7 (12-27-23 @ 13:50), Max: 35.7 (12-27-23 @ 13:50)  T(F): 96.3 (12-27-23 @ 13:50), Max: 96.3 (12-27-23 @ 13:50)  HR: 75 (12-27-23 @ 13:50) (75 - 75)  BP: 124/73 (12-27-23 @ 13:50) (124/73 - 124/73)  BP(mean): --  RR: 18 (12-27-23 @ 13:50) (18 - 18)  SpO2: --     Vital Signs Last 24 Hrs  T(C): --  T(F): --  HR: --  BP: --  BP(mean): --  RR: --  SpO2: --

## 2023-12-28 NOTE — BH INPATIENT PSYCHIATRY PROGRESS NOTE - MSE UNSTRUCTURED FT
Appearance: laying in bed, wearing hospital attire  Behavior: uncooperative, suspicious appearing  Speech: largely talking to self incoherently, loud at times  Mood: unable to assess  Affect: irritable  Thought process: severely disorganized  Thought content: can make vaguely violent/threatening statements, today, "GET OUT" repeatedly, not observed harming self or others.   Perceptions: observed talking to self  Insight: very poor  Judgement: very poor, refusing vitals intermittently, refusing PO medications, very poor ADLs  Cognition: unable to assess

## 2023-12-28 NOTE — BH INPATIENT PSYCHIATRY PROGRESS NOTE - NSBHFUPINTERVALHXFT_PSY_A_CORE
Refusing vitals, refusing PO medications, no PRNs for agitation, no acute events.  Refusing vitals, refusing PO medications, no PRNs for agitation, no acute events. Slept 5 hours per sleep log.    Patient seen laying in bed awake. Talking to self incoherently. Does say to this provider "turn off" and "get out."

## 2023-12-28 NOTE — BH INPATIENT PSYCHIATRY PROGRESS NOTE - NSBHMETABOLIC_PSY_ALL_CORE_FT
BMI: BMI (kg/m2): 35.2 (12-06-23 @ 08:57)  HbA1c: A1C with Estimated Average Glucose Result: 5.3 % (09-29-23 @ 10:00)    Glucose:   BP: 124/73 (12-27-23 @ 13:50) (124/73 - 124/73)Vital Signs Last 24 Hrs  T(C): 35.7 (12-27-23 @ 13:50), Max: 35.7 (12-27-23 @ 13:50)  T(F): 96.3 (12-27-23 @ 13:50), Max: 96.3 (12-27-23 @ 13:50)  HR: 75 (12-27-23 @ 13:50) (75 - 75)  BP: 124/73 (12-27-23 @ 13:50) (124/73 - 124/73)  BP(mean): --  RR: 18 (12-27-23 @ 13:50) (18 - 18)  SpO2: --      Lipid Panel: Date/Time: 09-29-23 @ 10:00  Cholesterol, Serum: 166  LDL Cholesterol Calculated: 94  HDL Cholesterol, Serum: 60  Total Cholesterol/HDL Ration Measurement: --  Triglycerides, Serum: 61   BMI: BMI (kg/m2): 35.2 (12-06-23 @ 08:57)  HbA1c: A1C with Estimated Average Glucose Result: 5.3 % (09-29-23 @ 10:00)    Glucose:   BP: 124/73 (12-27-23 @ 13:50) (124/73 - 124/73)Vital Signs Last 24 Hrs  T(C): --  T(F): --  HR: --  BP: --  BP(mean): --  RR: --  SpO2: --      Lipid Panel: Date/Time: 09-29-23 @ 10:00  Cholesterol, Serum: 166  LDL Cholesterol Calculated: 94  HDL Cholesterol, Serum: 60  Total Cholesterol/HDL Ration Measurement: --  Triglycerides, Serum: 61

## 2023-12-28 NOTE — BH INPATIENT PSYCHIATRY PROGRESS NOTE - NSBHASSESSSUMMFT_PSY_ALL_CORE
Patient is a 53 year old adult (goes by "Juan Jose"), living at [facility] on [place], PMH of GERD, anemia, PPH of schizophrenia, selective mutism, currently with [facility] ACT team, previously with [facility] which , with history of multiple psychiatric hospitalizations, without known history of self-injury or suicide attempts, with history of aggression in the setting on nonadherence with medication, who was initially brought in by staff at residence for increasing agitation and aggression in the setting of 2 months of medication nonadherence. Patient was hospitalized from 8/3-23 on [facility]N where they were noted to be hostile, disorganized, threatening, malodorous and grossly psychotic. TOO was obtained, and patient eventually administered dual long-acting injectables (Haldol Decanoate and Zyprexa Relprevv), though had declined nearly all PO medications. Patient was transferred to [facility] where they were admitted due to anemia to 6 (medical workup largely unremarkable), patient stabilized and transferred back to [facility] for further management of psychosis.     Diagnostically, presentation consistent with exacerbation of schizophrenia.     Patient has shown overall very little clinical improvement since admission. Spends most of day isolative and talking to self in severely disorganized and largely incomprehensible fashion, with only very brief moments of reality based exchange with staff or peers, or is non-verbal and stares at staff. Has showered recently with significant staff encouragement, though room very malodorous. No recent physical aggression, though can appear mistrustful and make vaguely threatening remarks. Sleep erratic, often just a few hours, adequate last night. Still declining all PO medications, on high dose haloperidol BREWER.    GI and hematology consulted last week in response to [facility]'s concerns around chronic anemia. Responses sent to [facility]. No urgent indication for parenteral iron. Spoke to nutrition, will add Ensure to diet to try to increase iron intake, will try to encourage use. Will repeat labs in January.    Patient requires inpatient admission due to very poor self-care and for containment, stabilization, medication management and aftercare planning.    Plan:  1. Brink: admitted involuntary , TOO obtained 10/24  2. Observation: routine checks  3. Collateral: obtained collateral from [facility] psychiatrist  4. Psychiatric  -Continue Haldol Decanoate 200mg IM q4 weeks, last given , next due   	-Had received Zyprexa Replevv 405mg qMonth (given )--though no observable benefit above Haldol Decanoate, so will defer at this time  -Continue clozapine 25mg QHS (refusing), d/c'ed olanzapine IM backup order d/t tremulousness   -Continue depakote 1000mg QHS (refusing)  -Continue benztropine 1mg BID for EPS (refusing)  5. Medical:  -Anemia:   	-s/p medical admission at [facility] for anemia to Hgb 6, required 2u pRBCs, most recent Hgb 8.8-9. GI workup unremarkable with exception of diverticulosis and colonic polyp  	-Repeat CBCs with anemia 8.6-low 9s, per internist, encourage iron supplementation  	-Continue ferrous sulfate 325mg daily (refusing)  -Internist evaluated x2 in response to questions asked by [facility], responses sent to [facility]  -[facility] requesting hematology c/s for chronic anemia---> wrote chart note , etiology c/w BRYON, recommended GI consult --> consulted 12/15  -Fall on : likely mechanical, no sustained injuries. PT consult  -Bowel regimen: continue bisacodyl 10mg QHS (refusing), miralax 17g daily (refusing), senna 2 tablets QHS (refusing)  -Continue Vitamin D3 1000units QHS (refusing)  6. PRNs  -Olanzapine 5mg PO or IM for agitation  7. Therapy   -Individual, group and milieu therapy as appropriate   8. Disposition: state application submitted  Patient is a 53 year old adult (goes by "Juan Jose"), living at [facility] on [place] grounds, PMH of GERD, anemia, PPH of schizophrenia, selective mutism, currently with [facility] ACT team, previously with [facility] which , with history of multiple psychiatric hospitalizations, without known history of self-injury or suicide attempts, with history of aggression in the setting on nonadherence with medication, who was initially brought in by staff at residence for increasing agitation and aggression in the setting of 2 months of medication nonadherence. Patient was hospitalized from 8/3-23 on [facility] where they were noted to be hostile, disorganized, threatening, malodorous and grossly psychotic. TOO was obtained, and patient eventually administered dual long-acting injectables (Haldol Decanoate and Zyprexa Relprevv), though had declined nearly all PO medications. Patient was transferred to [facility] where they were admitted due to anemia to 6 (medical workup largely unremarkable), patient stabilized and transferred back to [facility] for further management of psychosis.     Diagnostically, presentation consistent with exacerbation of schizophrenia.     Patient has shown overall very little clinical improvement since admission. Spends most of day isolative and talking to self in severely disorganized and largely incomprehensible fashion, with only very brief moments of reality based exchange with staff or peers, or is non-verbal and stares at staff. Has showered recently with significant staff encouragement, though room very malodorous. No recent physical aggression, though can appear mistrustful and make vaguely threatening remarks. Sleep erratic, often just a few hours, adequate last night. Still declining all PO medications, on high dose haloperidol BREWER.    GI and hematology consulted last week in response to [facility]'s concerns around chronic anemia. Responses sent to [facility]. No urgent indication for parenteral iron. Spoke to nutrition, will add Ensure to diet to try to increase iron intake, will try to encourage use. Will repeat labs in January.    Patient requires inpatient admission due to very poor self-care and for containment, stabilization, medication management and aftercare planning.    Plan:  1. Brink: admitted involuntary , TOO obtained 10/24  2. Observation: routine checks  3. Collateral: obtained collateral from [facility] psychiatrist  4. Psychiatric  -Continue Haldol Decanoate 200mg IM q4 weeks, last given , next due   	-Had received Zyprexa Replevv 405mg qMonth (given )--though no observable benefit above Haldol Decanoate, so will defer at this time  -Continue clozapine 25mg QHS (refusing), d/c'ed olanzapine IM backup order d/t tremulousness   -Continue depakote 1000mg QHS (refusing)  -Continue benztropine 1mg BID for EPS (refusing)  5. Medical:  -Anemia:   	-s/p medical admission at [facility] for anemia to Hgb 6, required 2u pRBCs, most recent Hgb 8.8-9. GI workup unremarkable with exception of diverticulosis and colonic polyp  	-Repeat CBCs with anemia 8.6-low 9s, per internist, encourage iron supplementation  	-Continue ferrous sulfate 325mg daily (refusing)  -Internist evaluated x2 in response to questions asked by [facility], responses sent to [facility]  -[facility] requesting hematology c/s for chronic anemia---> wrote chart note , etiology c/w BRYON, recommended GI consult --> consulted 12/15  -Fall on : likely mechanical, no sustained injuries. PT consult  -Bowel regimen: continue bisacodyl 10mg QHS (refusing), miralax 17g daily (refusing), senna 2 tablets QHS (refusing)  -Continue Vitamin D3 1000units QHS (refusing)  6. PRNs  -Olanzapine 5mg PO or IM for agitation  7. Therapy   -Individual, group and milieu therapy as appropriate   8. Disposition: state application submitted  Patient is a 53 year old adult (goes by "Juan Jose"), living at [facility] on [place] [facility], PMH of GERD, anemia, PPH of schizophrenia, selective mutism, currently with [facility] ACT team, previously with [facility] which , with history of multiple psychiatric hospitalizations, without known history of self-injury or suicide attempts, with history of aggression in the setting on nonadherence with medication, who was initially brought in by staff at residence for increasing agitation and aggression in the setting of 2 months of medication nonadherence. Patient was hospitalized from 8/3-23 on 1N where they were noted to be hostile, disorganized, threatening, malodorous and grossly psychotic. TOO was obtained, and patient eventually administered dual long-acting injectables (Haldol Decanoate and Zyprexa Relprevv), though had declined nearly all PO medications. Patient was transferred to [facility] where they were admitted due to anemia to 6 (medical workup largely unremarkable), patient stabilized and transferred back to [facility] for further management of psychosis.     Diagnostically, presentation consistent with exacerbation of schizophrenia.     Patient has shown overall very little clinical improvement since admission. Spends most of day isolative and talking to self in severely disorganized and largely incomprehensible fashion, with only very brief moments of reality based exchange with staff or peers, or is non-verbal and stares at staff. Has showered recently with significant staff encouragement, though room very malodorous. No recent physical aggression, though can appear mistrustful and make vaguely threatening remarks. Sleep erratic, often just a few hours, better last night. Still declining all PO medications, on high dose haloperidol BREWER.    GI and hematology consulted last week in response to [facility]'s concerns around chronic anemia. Responses sent to [facility]. No urgent indication for parenteral iron. Spoke to nutrition, will add Ensure to diet to try to increase iron intake, will try to encourage use (declining). Will repeat labs in January.    Patient requires inpatient admission due to very poor self-care and for containment, stabilization, medication management and aftercare planning.    Plan:  1. Brink: admitted involuntary , TOO obtained 10/24  2. Observation: routine checks  3. Collateral: obtained collateral from [facility] psychiatrist  4. Psychiatric  -Continue Haldol Decanoate 200mg IM q4 weeks, last given , next due   	-Had received Zyprexa Replevv 405mg qMonth (given )--though no observable benefit above Haldol Decanoate, so will defer at this time  -Continue clozapine 25mg QHS (refusing), d/c'ed olanzapine IM backup order d/t tremulousness   -Continue depakote 1000mg QHS (refusing)  -Continue benztropine 1mg BID for EPS (refusing)  5. Medical:  -Anemia:   	-s/p medical admission at [facility] for anemia to Hgb 6, required 2u pRBCs, most recent Hgb 8.8-9. GI workup unremarkable with exception of diverticulosis and colonic polyp  	-Repeat CBCs with anemia 8.6-low 9s, per internist, encourage iron supplementation  	-Continue ferrous sulfate 325mg daily (refusing)  -Internist evaluated x2 in response to questions asked by [facility], responses sent to [facility]  -[facility] requesting hematology c/s for chronic anemia---> wrote chart note , etiology c/w BRYON, recommended GI consult --> consulted 12/15  -Fall on : likely mechanical, no sustained injuries. PT consult  -Bowel regimen: continue bisacodyl 10mg QHS (refusing), miralax 17g daily (refusing), senna 2 tablets QHS (refusing)  -Continue Vitamin D3 1000units QHS (refusing)  6. PRNs  -Olanzapine 5mg PO or IM for agitation  7. Therapy   -Individual, group and milieu therapy as appropriate   8. Disposition: state application submitted  Patient is a 53 year old adult (goes by "Juan Jose"), living at [facility] on [place] grounds, PMH of GERD, anemia, PPH of schizophrenia, selective mutism, currently with [facility] ACT team, previously with [facility] which , with history of multiple psychiatric hospitalizations, without known history of self-injury or suicide attempts, with history of aggression in the setting on nonadherence with medication, who was initially brought in by staff at residence for increasing agitation and aggression in the setting of 2 months of medication nonadherence. Patient was hospitalized from 8/3-23 on 1N where they were noted to be hostile, disorganized, threatening, malodorous and grossly psychotic. TOO was obtained, and patient eventually administered dual long-acting injectables (Haldol Decanoate and Zyprexa Relprevv), though had declined nearly all PO medications. Patient was transferred to [facility] where they were admitted due to anemia to 6 (medical workup largely unremarkable), patient stabilized and transferred back to [facility] for further management of psychosis.     Diagnostically, presentation consistent with exacerbation of schizophrenia.     Patient has shown overall very little clinical improvement since admission. Spends most of day isolative and talking to self in severely disorganized and largely incomprehensible fashion, with only very brief moments of reality based exchange with staff or peers, or is non-verbal and stares at staff. Has showered recently with significant staff encouragement, though room very malodorous. No recent physical aggression, though can appear mistrustful and make vaguely threatening remarks. Sleep erratic, often just a few hours, better last night. Still declining all PO medications, on high dose haloperidol BREWER.    GI and hematology consulted last week in response to [facility]'s concerns around chronic anemia. Responses sent to [facility]. No urgent indication for parenteral iron. Spoke to nutrition, will add Ensure to diet to try to increase iron intake, will try to encourage use (declining). Will repeat labs in January.    Patient requires inpatient admission due to very poor self-care and for containment, stabilization, medication management and aftercare planning.    Plan:  1. Brink: admitted involuntary , TOO obtained 10/24  2. Observation: routine checks  3. Collateral: obtained collateral from [facility] psychiatrist  4. Psychiatric  -Continue Haldol Decanoate 200mg IM q4 weeks, last given , next due   	-Had received Zyprexa Replevv 405mg qMonth (given )--though no observable benefit above Haldol Decanoate, so will defer at this time  -Continue clozapine 25mg QHS (refusing), d/c'ed olanzapine IM backup order d/t tremulousness   -Continue depakote 1000mg QHS (refusing)  -Continue benztropine 1mg BID for EPS (refusing)  5. Medical:  -Anemia:   	-s/p medical admission at [facility] for anemia to Hgb 6, required 2u pRBCs, most recent Hgb 8.8-9. GI workup unremarkable with exception of diverticulosis and colonic polyp  	-Repeat CBCs with anemia 8.6-low 9s, per internist, encourage iron supplementation  	-Continue ferrous sulfate 325mg daily (refusing)  -Internist evaluated x2 in response to questions asked by [facility], responses sent to [facility]  -[facility] requesting hematology c/s for chronic anemia---> wrote chart note , etiology c/w BRYON, recommended GI consult --> consulted 12/15  -Fall on : likely mechanical, no sustained injuries. PT consult  -Bowel regimen: continue bisacodyl 10mg QHS (refusing), miralax 17g daily (refusing), senna 2 tablets QHS (refusing)  -Continue Vitamin D3 1000units QHS (refusing)  6. PRNs  -Olanzapine 5mg PO or IM for agitation  7. Therapy   -Individual, group and milieu therapy as appropriate   8. Disposition: state application submitted

## 2023-12-29 PROCEDURE — 99231 SBSQ HOSP IP/OBS SF/LOW 25: CPT

## 2023-12-29 NOTE — BH INPATIENT PSYCHIATRY PROGRESS NOTE - NSBHASSESSSUMMFT_PSY_ALL_CORE
Patient is a 53 year old adult (goes by "Juan Jose"), living at [facility] on [place] grounds, PMH of GERD, anemia, PPH of schizophrenia, selective mutism, currently with [facility] ACT team, previously with [facility] which , with history of multiple psychiatric hospitalizations, without known history of self-injury or suicide attempts, with history of aggression in the setting on nonadherence with medication, who was initially brought in by staff at residence for increasing agitation and aggression in the setting of 2 months of medication nonadherence. Patient was hospitalized from 8/3-23 on 1N where they were noted to be hostile, disorganized, threatening, malodorous and grossly psychotic. TOO was obtained, and patient eventually administered dual long-acting injectables (Haldol Decanoate and Zyprexa Relprevv), though had declined nearly all PO medications. Patient was transferred to [facility] where they were admitted due to anemia to 6 (medical workup largely unremarkable), patient stabilized and transferred back to [facility] for further management of psychosis.     Diagnostically, presentation consistent with exacerbation of schizophrenia.     Patient has shown overall very little clinical improvement since admission. Spends most of day isolative and talking to self in severely disorganized and largely incomprehensible fashion, with only very brief moments of reality based exchange with staff or peers, or is non-verbal and stares at staff. Has showered recently with significant staff encouragement, though room very malodorous. No recent physical aggression, though can appear mistrustful and make vaguely threatening remarks. Sleep erratic, often just a few hours, better last night. Still declining all PO medications, on high dose haloperidol BREWER.    GI and hematology consulted last week in response to [facility]'s concerns around chronic anemia. Responses sent to [facility]. No urgent indication for parenteral iron. Spoke to nutrition, will add Ensure to diet to try to increase iron intake, will try to encourage use (declining). Will repeat labs in January.    Patient requires inpatient admission due to very poor self-care and for containment, stabilization, medication management and aftercare planning.    Plan:  1. Brink: admitted involuntary , TOO obtained 10/24  2. Observation: routine checks  3. Collateral: obtained collateral from [facility] psychiatrist  4. Psychiatric  -Continue Haldol Decanoate 200mg IM q4 weeks, last given , next due   	-Had received Zyprexa Replevv 405mg qMonth (given )--though no observable benefit above Haldol Decanoate, so will defer at this time  -Continue clozapine 25mg QHS (refusing), d/c'ed olanzapine IM backup order d/t tremulousness   -Continue depakote 1000mg QHS (refusing)  -Continue benztropine 1mg BID for EPS (refusing)  5. Medical:  -Anemia:   	-s/p medical admission at [facility] for anemia to Hgb 6, required 2u pRBCs, most recent Hgb 8.8-9. GI workup unremarkable with exception of diverticulosis and colonic polyp  	-Repeat CBCs with anemia 8.6-low 9s, per internist, encourage iron supplementation  	-Continue ferrous sulfate 325mg daily (refusing)  -Internist evaluated x2 in response to questions asked by [facility], responses sent to [facility]  -[facility] requesting hematology c/s for chronic anemia---> wrote chart note , etiology c/w BRYON, recommended GI consult --> consulted 12/15  -Fall on : likely mechanical, no sustained injuries. PT consult  -Bowel regimen: continue bisacodyl 10mg QHS (refusing), miralax 17g daily (refusing), senna 2 tablets QHS (refusing)  -Continue Vitamin D3 1000units QHS (refusing)  6. PRNs  -Olanzapine 5mg PO or IM for agitation  7. Therapy   -Individual, group and milieu therapy as appropriate   8. Disposition: state application submitted  Patient is a 53 year old adult (goes by "Juan Jose"), living at [facility] on [place] grounds, PMH of GERD, anemia, PPH of schizophrenia, selective mutism, currently with [facility] ACT team, previously with [facility] which , with history of multiple psychiatric hospitalizations, without known history of self-injury or suicide attempts, with history of aggression in the setting on nonadherence with medication, who was initially brought in by staff at residence for increasing agitation and aggression in the setting of 2 months of medication nonadherence. Patient was hospitalized from 8/3-23 on 1N where they were noted to be hostile, disorganized, threatening, malodorous and grossly psychotic. TOO was obtained, and patient eventually administered dual long-acting injectables (Haldol Decanoate and Zyprexa Relprevv), though had declined nearly all PO medications. Patient was transferred to [facility] where they were admitted due to anemia to 6 (medical workup largely unremarkable), patient stabilized and transferred back to [facility] for further management of psychosis.     Diagnostically, presentation consistent with exacerbation of schizophrenia.     Patient has shown overall very little clinical improvement since admission. Spends most of day isolative and talking to self in severely disorganized and largely incomprehensible fashion, with only very brief moments of reality based exchange with staff or peers, or is non-verbal and stares at staff. Has showered recently with significant staff encouragement, though room very malodorous. No recent physical aggression, though can appear mistrustful and make vaguely threatening remarks. Sleep erratic, often just a few hours, better last night. Still declining all PO medications, on high dose haloperidol BREWER.    GI and hematology consulted last week in response to [facility]'s concerns around chronic anemia. Responses sent to [facility]. No urgent indication for parenteral iron. Spoke to nutrition, will add Ensure to diet to try to increase iron intake, will try to encourage use (declining). Will repeat labs in January.    Patient requires inpatient admission due to very poor self-care and for containment, stabilization, medication management and aftercare planning.    Plan:  1. Brink: admitted involuntary , TOO obtained 10/24  2. Observation: routine checks  3. Collateral: obtained collateral from [facility] psychiatrist  4. Psychiatric  -Continue Haldol Decanoate 200mg IM q4 weeks, last given , next due   	-Had received Zyprexa Replevv 405mg qMonth (given )--though no observable benefit above Haldol Decanoate, so will defer at this time  -Continue clozapine 25mg QHS (refusing), d/c'ed olanzapine IM backup order d/t tremulousness   -Continue depakote 1000mg QHS (refusing)  -Continue benztropine 1mg BID for EPS (refusing)  5. Medical:  -Anemia:   	-s/p medical admission at [facility] for anemia to Hgb 6, required 2u pRBCs, most recent Hgb 8.8-9. GI workup unremarkable with exception of diverticulosis and colonic polyp  	-Repeat CBCs with anemia 8.6-low 9s, per internist, encourage iron supplementation  	-Continue ferrous sulfate 325mg daily (refusing)  -Internist evaluated x2 in response to questions asked by [facility], responses sent to [facility]  -[facility] requesting hematology c/s for chronic anemia---> wrote chart note , etiology c/w BRYON, recommended GI consult --> consulted 12/15  -Fall on : likely mechanical, no sustained injuries. PT consult  -Bowel regimen: continue bisacodyl 10mg QHS (refusing), miralax 17g daily (refusing), senna 2 tablets QHS (refusing)  -Continue Vitamin D3 1000units QHS (refusing)  6. PRNs  -Olanzapine 5mg PO or IM for agitation  7. Therapy   -Individual, group and milieu therapy as appropriate   8. Disposition: state application submitted  Patient is a 53 year old adult (goes by "Juan Jose"), living at [facility] on [place] grounds, PMH of GERD, anemia, PPH of schizophrenia, selective mutism, currently with [facility] ACT team, previously with [facility] which , with history of multiple psychiatric hospitalizations, without known history of self-injury or suicide attempts, with history of aggression in the setting on nonadherence with medication, who was initially brought in by staff at residence for increasing agitation and aggression in the setting of 2 months of medication nonadherence. Patient was hospitalized from 8/3-23 on 1N where they were noted to be hostile, disorganized, threatening, malodorous and grossly psychotic. TOO was obtained, and patient eventually administered dual long-acting injectables (Haldol Decanoate and Zyprexa Relprevv), though had declined nearly all PO medications. Patient was transferred to [facility] where they were admitted due to anemia to 6 (medical workup largely unremarkable), patient stabilized and transferred back to [facility] for further management of psychosis.     Diagnostically, presentation consistent with exacerbation of schizophrenia.     Patient has shown overall very little clinical improvement since admission. Spends most of day isolative and talking to self in severely disorganized and largely incomprehensible fashion, with only very brief moments of reality based exchange with staff or peers, or is non-verbal and stares at staff. Has showered recently with significant staff encouragement, though room very malodorous. No recent physical aggression, though can appear mistrustful and make vaguely threatening remarks. Sleep erratic, often just a few hours, good last night. Still declining all PO medications, on high dose haloperidol BREWER.    GI and hematology consulted last week in response to [facility]'s concerns around chronic anemia. Responses sent to [facility]. No urgent indication for parenteral iron. Spoke to nutrition, added Ensure to diet to try to increase iron intake, will try to encourage use (intermittently accepts). Will repeat labs in January.    Patient requires inpatient admission due to very poor self-care and for containment, stabilization, medication management and aftercare planning.    Plan:  1. Brink: admitted involuntary , TOO obtained 10/24  2. Observation: routine checks  3. Collateral: obtained collateral from [facility] psychiatrist  4. Psychiatric  -Continue Haldol Decanoate 200mg IM q4 weeks, last given , next due   	-Had received Zyprexa Replevv 405mg qMonth (given )--though no observable benefit above Haldol Decanoate, so will defer at this time  -Continue clozapine 25mg QHS (refusing), d/c'ed olanzapine IM backup order d/t tremulousness   -Continue depakote 1000mg QHS (refusing)  -Continue benztropine 1mg BID for EPS (refusing)  5. Medical:  -Anemia:   	-s/p medical admission at [facility] for anemia to Hgb 6, required 2u pRBCs, most recent Hgb 8.8-9. GI workup unremarkable with exception of diverticulosis and colonic polyp  	-Repeat CBCs with anemia 8.6-low 9s, per internist, encourage iron supplementation  	-Continue ferrous sulfate 325mg daily (refusing)  -Internist evaluated x2 in response to questions asked by [facility], responses sent to [facility]  -[facility] requesting hematology c/s for chronic anemia---> wrote chart note , etiology c/w BRYON, recommended GI consult --> consulted 12/15  -Fall on : likely mechanical, no sustained injuries. PT consult  -Bowel regimen: continue bisacodyl 10mg QHS (refusing), miralax 17g daily (refusing), senna 2 tablets QHS (refusing)  -Continue Vitamin D3 1000units QHS (refusing)  6. PRNs  -Olanzapine 5mg PO or IM for agitation  7. Therapy   -Individual, group and milieu therapy as appropriate   8. Disposition: state application submitted  Patient is a 53 year old adult (goes by "Juan Jose"), living at [facility] on [place] grounds, PMH of GERD, anemia, PPH of schizophrenia, selective mutism, currently with [facility] ACT team, previously with [facility] which , with history of multiple psychiatric hospitalizations, without known history of self-injury or suicide attempts, with history of aggression in the setting on nonadherence with medication, who was initially brought in by staff at residence for increasing agitation and aggression in the setting of 2 months of medication nonadherence. Patient was hospitalized from 8/3-23 on 1N where they were noted to be hostile, disorganized, threatening, malodorous and grossly psychotic. TOO was obtained, and patient eventually administered dual long-acting injectables (Haldol Decanoate and Zyprexa Relprevv), though had declined nearly all PO medications. Patient was transferred to [facility] where they were admitted due to anemia to 6 (medical workup largely unremarkable), patient stabilized and transferred back to [facility] for further management of psychosis.     Diagnostically, presentation consistent with exacerbation of schizophrenia.     Patient has shown overall very little clinical improvement since admission. Spends most of day isolative and talking to self in severely disorganized and largely incomprehensible fashion, with only very brief moments of reality based exchange with staff or peers, or is non-verbal and stares at staff. Has showered recently with significant staff encouragement, though room very malodorous. No recent physical aggression, though can appear mistrustful and make vaguely threatening remarks. Sleep erratic, often just a few hours, good last night. Still declining all PO medications, on high dose haloperidol BREWER.    GI and hematology consulted last week in response to [facility]'s concerns around chronic anemia. Responses sent to [facility]. No urgent indication for parenteral iron. Spoke to nutrition, added Ensure to diet to try to increase iron intake, will try to encourage use (intermittently accepts). Will repeat labs in January.    Patient requires inpatient admission due to very poor self-care and for containment, stabilization, medication management and aftercare planning.    Plan:  1. Brink: admitted involuntary , TOO obtained 10/24  2. Observation: routine checks  3. Collateral: obtained collateral from [facility] psychiatrist  4. Psychiatric  -Continue Haldol Decanoate 200mg IM q4 weeks, last given , next due   	-Had received Zyprexa Replevv 405mg qMonth (given )--though no observable benefit above Haldol Decanoate, so will defer at this time  -Continue clozapine 25mg QHS (refusing), d/c'ed olanzapine IM backup order d/t tremulousness   -Continue depakote 1000mg QHS (refusing)  -Continue benztropine 1mg BID for EPS (refusing)  5. Medical:  -Anemia:   	-s/p medical admission at [facility] for anemia to Hgb 6, required 2u pRBCs, most recent Hgb 8.8-9. GI workup unremarkable with exception of diverticulosis and colonic polyp  	-Repeat CBCs with anemia 8.6-low 9s, per internist, encourage iron supplementation  	-Continue ferrous sulfate 325mg daily (refusing)  -Internist evaluated x2 in response to questions asked by [facility], responses sent to [facility]  -[facility] requesting hematology c/s for chronic anemia---> wrote chart note , etiology c/w BRYON, recommended GI consult --> consulted 12/15  -Fall on : likely mechanical, no sustained injuries. PT consult  -Bowel regimen: continue bisacodyl 10mg QHS (refusing), miralax 17g daily (refusing), senna 2 tablets QHS (refusing)  -Continue Vitamin D3 1000units QHS (refusing)  6. PRNs  -Olanzapine 5mg PO or IM for agitation  7. Therapy   -Individual, group and milieu therapy as appropriate   8. Disposition: state application submitted

## 2023-12-29 NOTE — BH INPATIENT PSYCHIATRY PROGRESS NOTE - NSBHCHARTREVIEWVS_PSY_A_CORE FT
Vital Signs Last 24 Hrs  T(C): --  T(F): --  HR: --  BP: --  BP(mean): --  RR: --  SpO2: --     Vital Signs Last 24 Hrs  T(C): 36.6 (12-29-23 @ 09:39), Max: 36.6 (12-29-23 @ 09:39)  T(F): 97.9 (12-29-23 @ 09:39), Max: 97.9 (12-29-23 @ 09:39)  HR: --  BP: --  BP(mean): --  RR: --  SpO2: --    Orthostatic VS  12-29-23 @ 09:39  Lying BP: --/-- HR: --  Sitting BP: 124/79 HR: 58  Standing BP: --/-- HR: --  Site: --  Mode: --

## 2023-12-29 NOTE — BH INPATIENT PSYCHIATRY PROGRESS NOTE - NSBHATTESTBILLING_PSY_A_CORE
42041-Avrvqoemkj OBS or IP - low complexity OR 25-34 mins 33064-Vufmiwvfal OBS or IP - low complexity OR 25-34 mins

## 2023-12-29 NOTE — BH INPATIENT PSYCHIATRY PROGRESS NOTE - NSBHFUPINTERVALHXFT_PSY_A_CORE
Refusing vitals, refusing PO medications, no PRNs for agitation, no acute events.  Refusing vitals, refusing PO medications, no PRNs for agitation, no acute events. Slept 8 hours overnight per sleep log. Per staff, has voluntarily drank Ensure when offered, will continue to offer.    Patient seen in room, sleeping. Did not awaken patient as patient consistently unable to engage in interview and has poor sleep.

## 2023-12-29 NOTE — BH INPATIENT PSYCHIATRY PROGRESS NOTE - NSBHMETABOLIC_PSY_ALL_CORE_FT
BMI: BMI (kg/m2): 35.2 (12-06-23 @ 08:57)  HbA1c: A1C with Estimated Average Glucose Result: 5.3 % (09-29-23 @ 10:00)    Glucose:   BP: 124/73 (12-27-23 @ 13:50) (124/73 - 124/73)Vital Signs Last 24 Hrs  T(C): --  T(F): --  HR: --  BP: --  BP(mean): --  RR: --  SpO2: --      Lipid Panel: Date/Time: 09-29-23 @ 10:00  Cholesterol, Serum: 166  LDL Cholesterol Calculated: 94  HDL Cholesterol, Serum: 60  Total Cholesterol/HDL Ration Measurement: --  Triglycerides, Serum: 61   BMI: BMI (kg/m2): 35.2 (12-06-23 @ 08:57)  HbA1c: A1C with Estimated Average Glucose Result: 5.3 % (09-29-23 @ 10:00)    Glucose:   BP: 124/73 (12-27-23 @ 13:50) (124/73 - 124/73)Vital Signs Last 24 Hrs  T(C): 36.6 (12-29-23 @ 09:39), Max: 36.6 (12-29-23 @ 09:39)  T(F): 97.9 (12-29-23 @ 09:39), Max: 97.9 (12-29-23 @ 09:39)  HR: --  BP: --  BP(mean): --  RR: --  SpO2: --    Orthostatic VS  12-29-23 @ 09:39  Lying BP: --/-- HR: --  Sitting BP: 124/79 HR: 58  Standing BP: --/-- HR: --  Site: --  Mode: --    Lipid Panel: Date/Time: 09-29-23 @ 10:00  Cholesterol, Serum: 166  LDL Cholesterol Calculated: 94  HDL Cholesterol, Serum: 60  Total Cholesterol/HDL Ration Measurement: --  Triglycerides, Serum: 61

## 2024-01-02 NOTE — BH INPATIENT PSYCHIATRY PROGRESS NOTE - MSE UNSTRUCTURED FT
Appearance: laying in bed, wearing hospital attire  Behavior: uncooperative  Speech: no speech today  Mood: unable to assess  Affect: irritable  Thought process: severely disorganized when speaks  Thought content: can make vaguely violent/threatening statements, none today, not observed harming self or others.   Perceptions: observed talking to self  Insight: very poor  Judgement: very poor, refusing vitals intermittently, refusing PO medications, very poor ADLs  Cognition: unable to assess

## 2024-01-02 NOTE — BH INPATIENT PSYCHIATRY PROGRESS NOTE - NSBHFUPINTERVALHXFT_PSY_A_CORE
Patient is followed up for psychosis. Chart, medications and labs reviewed. Patient is discussed with nursing staff, no overnight events.   Patient was seen and is evaluated at bedside. Pt seen awake and sitting up in bed. Writer attempted to engage pt for interview, although pt is not receptive, stares at writer and does not speak. Does not offer any complaints or concerns. Pt continues to refuse standing medications and vital signs.

## 2024-01-02 NOTE — BH INPATIENT PSYCHIATRY PROGRESS NOTE - NSBHATTESTBILLING_PSY_A_CORE
00848-Klpuaatyxm OBS or IP - low complexity OR 25-34 mins 24665-Qsiqwreofe OBS or IP - low complexity OR 25-34 mins

## 2024-01-02 NOTE — BH INPATIENT PSYCHIATRY PROGRESS NOTE - NSBHASSESSSUMMFT_PSY_ALL_CORE
Patient is a 53 year old adult (goes by "Juan Jose"), living at [facility] on [place] grounds, PMH of GERD, anemia, PPH of schizophrenia, selective mutism, currently with [facility] ACT team, previously with [facility] which , with history of multiple psychiatric hospitalizations, without known history of self-injury or suicide attempts, with history of aggression in the setting on nonadherence with medication, who was initially brought in by staff at residence for increasing agitation and aggression in the setting of 2 months of medication nonadherence. Patient was hospitalized from 8/3-23 on 1N where they were noted to be hostile, disorganized, threatening, malodorous and grossly psychotic. TOO was obtained, and patient eventually administered dual long-acting injectables (Haldol Decanoate and Zyprexa Relprevv), though had declined nearly all PO medications. Patient was transferred to [facility] where they were admitted due to anemia to 6 (medical workup largely unremarkable), patient stabilized and transferred back to [place] for further management of psychosis.     Diagnostically, presentation consistent with exacerbation of schizophrenia.     Patient has shown overall very little clinical improvement since admission. Spends most of day isolative and talking to self in severely disorganized and largely incomprehensible fashion, with only very brief moments of reality based exchange with staff or peers, or is non-verbal and stares at staff. Has showered recently with significant staff encouragement, though room very malodorous. No recent physical aggression, though can appear mistrustful and make vaguely threatening remarks. Sleep erratic, often just a few hours, good last night. Still declining all PO medications, on high dose haloperidol BREWER.    GI and hematology consulted last week in response to [facility]'s concerns around chronic anemia. Responses sent to [facility]. No urgent indication for parenteral iron. Spoke to nutrition, added Ensure to diet to try to increase iron intake, will try to encourage use (intermittently accepts). Will repeat labs in January.    Patient requires inpatient admission due to very poor self-care and for containment, stabilization, medication management and aftercare planning.    Plan:  1. Brink: admitted involuntary , TOO obtained 10/24  2. Observation: routine checks  3. Collateral: obtained collateral from [facility] psychiatrist  4. Psychiatric  -Continue Haldol Decanoate 200mg IM q4 weeks, last given , next due   	-Had received Zyprexa Replevv 405mg qMonth (given )--though no observable benefit above Haldol Decanoate, so will defer at this time  -Continue clozapine 25mg QHS (refusing), d/c'ed olanzapine IM backup order d/t tremulousness   -Continue depakote 1000mg QHS (refusing)  -Continue benztropine 1mg BID for EPS (refusing)  5. Medical:  -Anemia:   	-s/p medical admission at [facility] for anemia to Hgb 6, required 2u pRBCs, most recent Hgb 8.8-9. GI workup unremarkable with exception of diverticulosis and colonic polyp  	-Repeat CBCs with anemia 8.6-low 9s, per internist, encourage iron supplementation  	-Continue ferrous sulfate 325mg daily (refusing)  -Internist evaluated x2 in response to questions asked by [facility], responses sent to [facility]  -[facility] requesting hematology c/s for chronic anemia---> wrote chart note , etiology c/w BRYON, recommended GI consult --> consulted 12/15  -Fall on : likely mechanical, no sustained injuries. PT consult  -Bowel regimen: continue bisacodyl 10mg QHS (refusing), miralax 17g daily (refusing), senna 2 tablets QHS (refusing)  -Continue Vitamin D3 1000units QHS (refusing)  6. PRNs  -Olanzapine 5mg PO or IM for agitation  7. Therapy   -Individual, group and milieu therapy as appropriate   8. Disposition: state application submitted  Patient is a 53 year old adult (goes by "Juan Jose"), living at [facility] on [place] grounds, PMH of GERD, anemia, PPH of schizophrenia, selective mutism, currently with [facility] ACT team, previously with [facility] which , with history of multiple psychiatric hospitalizations, without known history of self-injury or suicide attempts, with history of aggression in the setting on nonadherence with medication, who was initially brought in by staff at residence for increasing agitation and aggression in the setting of 2 months of medication nonadherence. Patient was hospitalized from 8/3-23 on 1N where they were noted to be hostile, disorganized, threatening, malodorous and grossly psychotic. TOO was obtained, and patient eventually administered dual long-acting injectables (Haldol Decanoate and Zyprexa Relprevv), though had declined nearly all PO medications. Patient was transferred to [facility] where they were admitted due to anemia to 6 (medical workup largely unremarkable), patient stabilized and transferred back to [facility] for further management of psychosis.     Diagnostically, presentation consistent with exacerbation of schizophrenia.     Patient has shown overall very little clinical improvement since admission. Spends most of day isolative and talking to self in severely disorganized and largely incomprehensible fashion, with only very brief moments of reality based exchange with staff or peers, or is non-verbal and stares at staff. Has showered recently with significant staff encouragement, though room very malodorous. No recent physical aggression, though can appear mistrustful and make vaguely threatening remarks. Sleep erratic, often just a few hours, good last night. Still declining all PO medications, on high dose haloperidol BREWER.    GI and hematology consulted last week in response to [facility]'s concerns around chronic anemia. Responses sent to [facility]. No urgent indication for parenteral iron. Spoke to nutrition, added Ensure to diet to try to increase iron intake, will try to encourage use (intermittently accepts). Will repeat labs in January.    Patient requires inpatient admission due to very poor self-care and for containment, stabilization, medication management and aftercare planning.    Plan:  1. Brink: admitted involuntary , TOO obtained 10/24  2. Observation: routine checks  3. Collateral: obtained collateral from [facility] psychiatrist  4. Psychiatric  -Continue Haldol Decanoate 200mg IM q4 weeks, last given , next due   	-Had received Zyprexa Replevv 405mg qMonth (given )--though no observable benefit above Haldol Decanoate, so will defer at this time  -Continue clozapine 25mg QHS (refusing), d/c'ed olanzapine IM backup order d/t tremulousness   -Continue depakote 1000mg QHS (refusing)  -Continue benztropine 1mg BID for EPS (refusing)  5. Medical:  -Anemia:   	-s/p medical admission at [facility] for anemia to Hgb 6, required 2u pRBCs, most recent Hgb 8.8-9. GI workup unremarkable with exception of diverticulosis and colonic polyp  	-Repeat CBCs with anemia 8.6-low 9s, per internist, encourage iron supplementation  	-Continue ferrous sulfate 325mg daily (refusing)  -Internist evaluated x2 in response to questions asked by [facility], responses sent to [facility]  -[facility] requesting hematology c/s for chronic anemia---> wrote chart note , etiology c/w BRYON, recommended GI consult --> consulted 12/15  -Fall on : likely mechanical, no sustained injuries. PT consult  -Bowel regimen: continue bisacodyl 10mg QHS (refusing), miralax 17g daily (refusing), senna 2 tablets QHS (refusing)  -Continue Vitamin D3 1000units QHS (refusing)  6. PRNs  -Olanzapine 5mg PO or IM for agitation  7. Therapy   -Individual, group and milieu therapy as appropriate   8. Disposition: state application submitted

## 2024-01-03 LAB
ALBUMIN SERPL ELPH-MCNC: 3.7 G/DL — SIGNIFICANT CHANGE UP (ref 3.3–5)
ALBUMIN SERPL ELPH-MCNC: 3.7 G/DL — SIGNIFICANT CHANGE UP (ref 3.3–5)
ALP SERPL-CCNC: 116 U/L — SIGNIFICANT CHANGE UP (ref 40–120)
ALP SERPL-CCNC: 116 U/L — SIGNIFICANT CHANGE UP (ref 40–120)
ALT FLD-CCNC: 10 U/L — SIGNIFICANT CHANGE UP (ref 4–33)
ALT FLD-CCNC: 10 U/L — SIGNIFICANT CHANGE UP (ref 4–33)
ANION GAP SERPL CALC-SCNC: 13 MMOL/L — SIGNIFICANT CHANGE UP (ref 7–14)
ANION GAP SERPL CALC-SCNC: 13 MMOL/L — SIGNIFICANT CHANGE UP (ref 7–14)
AST SERPL-CCNC: 11 U/L — SIGNIFICANT CHANGE UP (ref 4–32)
AST SERPL-CCNC: 11 U/L — SIGNIFICANT CHANGE UP (ref 4–32)
BILIRUB SERPL-MCNC: 0.2 MG/DL — SIGNIFICANT CHANGE UP (ref 0.2–1.2)
BILIRUB SERPL-MCNC: 0.2 MG/DL — SIGNIFICANT CHANGE UP (ref 0.2–1.2)
BUN SERPL-MCNC: 20 MG/DL — SIGNIFICANT CHANGE UP (ref 7–23)
BUN SERPL-MCNC: 20 MG/DL — SIGNIFICANT CHANGE UP (ref 7–23)
CALCIUM SERPL-MCNC: 9.4 MG/DL — SIGNIFICANT CHANGE UP (ref 8.4–10.5)
CALCIUM SERPL-MCNC: 9.4 MG/DL — SIGNIFICANT CHANGE UP (ref 8.4–10.5)
CHLORIDE SERPL-SCNC: 106 MMOL/L — SIGNIFICANT CHANGE UP (ref 98–107)
CHLORIDE SERPL-SCNC: 106 MMOL/L — SIGNIFICANT CHANGE UP (ref 98–107)
CO2 SERPL-SCNC: 25 MMOL/L — SIGNIFICANT CHANGE UP (ref 22–31)
CO2 SERPL-SCNC: 25 MMOL/L — SIGNIFICANT CHANGE UP (ref 22–31)
CREAT SERPL-MCNC: 0.7 MG/DL — SIGNIFICANT CHANGE UP (ref 0.5–1.3)
CREAT SERPL-MCNC: 0.7 MG/DL — SIGNIFICANT CHANGE UP (ref 0.5–1.3)
EGFR: 103 ML/MIN/1.73M2 — SIGNIFICANT CHANGE UP
EGFR: 103 ML/MIN/1.73M2 — SIGNIFICANT CHANGE UP
GLUCOSE SERPL-MCNC: 118 MG/DL — HIGH (ref 70–99)
GLUCOSE SERPL-MCNC: 118 MG/DL — HIGH (ref 70–99)
HCT VFR BLD CALC: 32.2 % — LOW (ref 34.5–45)
HCT VFR BLD CALC: 32.2 % — LOW (ref 34.5–45)
HGB BLD-MCNC: 9.5 G/DL — LOW (ref 11.5–15.5)
HGB BLD-MCNC: 9.5 G/DL — LOW (ref 11.5–15.5)
MCHC RBC-ENTMCNC: 22.3 PG — LOW (ref 27–34)
MCHC RBC-ENTMCNC: 22.3 PG — LOW (ref 27–34)
MCHC RBC-ENTMCNC: 29.5 GM/DL — LOW (ref 32–36)
MCHC RBC-ENTMCNC: 29.5 GM/DL — LOW (ref 32–36)
MCV RBC AUTO: 75.6 FL — LOW (ref 80–100)
MCV RBC AUTO: 75.6 FL — LOW (ref 80–100)
NRBC # BLD: 0 /100 WBCS — SIGNIFICANT CHANGE UP (ref 0–0)
NRBC # BLD: 0 /100 WBCS — SIGNIFICANT CHANGE UP (ref 0–0)
NRBC # FLD: 0 K/UL — SIGNIFICANT CHANGE UP (ref 0–0)
NRBC # FLD: 0 K/UL — SIGNIFICANT CHANGE UP (ref 0–0)
PLATELET # BLD AUTO: 346 K/UL — SIGNIFICANT CHANGE UP (ref 150–400)
PLATELET # BLD AUTO: 346 K/UL — SIGNIFICANT CHANGE UP (ref 150–400)
POTASSIUM SERPL-MCNC: 3.7 MMOL/L — SIGNIFICANT CHANGE UP (ref 3.5–5.3)
POTASSIUM SERPL-MCNC: 3.7 MMOL/L — SIGNIFICANT CHANGE UP (ref 3.5–5.3)
POTASSIUM SERPL-SCNC: 3.7 MMOL/L — SIGNIFICANT CHANGE UP (ref 3.5–5.3)
POTASSIUM SERPL-SCNC: 3.7 MMOL/L — SIGNIFICANT CHANGE UP (ref 3.5–5.3)
PROT SERPL-MCNC: 7.4 G/DL — SIGNIFICANT CHANGE UP (ref 6–8.3)
PROT SERPL-MCNC: 7.4 G/DL — SIGNIFICANT CHANGE UP (ref 6–8.3)
RBC # BLD: 4.26 M/UL — SIGNIFICANT CHANGE UP (ref 3.8–5.2)
RBC # BLD: 4.26 M/UL — SIGNIFICANT CHANGE UP (ref 3.8–5.2)
RBC # FLD: 19.3 % — HIGH (ref 10.3–14.5)
RBC # FLD: 19.3 % — HIGH (ref 10.3–14.5)
SODIUM SERPL-SCNC: 144 MMOL/L — SIGNIFICANT CHANGE UP (ref 135–145)
SODIUM SERPL-SCNC: 144 MMOL/L — SIGNIFICANT CHANGE UP (ref 135–145)
WBC # BLD: 5.29 K/UL — SIGNIFICANT CHANGE UP (ref 3.8–10.5)
WBC # BLD: 5.29 K/UL — SIGNIFICANT CHANGE UP (ref 3.8–10.5)
WBC # FLD AUTO: 5.29 K/UL — SIGNIFICANT CHANGE UP (ref 3.8–10.5)
WBC # FLD AUTO: 5.29 K/UL — SIGNIFICANT CHANGE UP (ref 3.8–10.5)

## 2024-01-03 PROCEDURE — 99231 SBSQ HOSP IP/OBS SF/LOW 25: CPT

## 2024-01-03 NOTE — BH INPATIENT PSYCHIATRY PROGRESS NOTE - NSBHASSESSSUMMFT_PSY_ALL_CORE
Patient is a 53 year old adult (goes by "Juan Jose"), living at [facility] on [place] grounds, PMH of GERD, anemia, PPH of schizophrenia, selective mutism, currently with [facility] ACT team, previously with [facility] which , with history of multiple psychiatric hospitalizations, without known history of self-injury or suicide attempts, with history of aggression in the setting on nonadherence with medication, who was initially brought in by staff at residence for increasing agitation and aggression in the setting of 2 months of medication nonadherence. Patient was hospitalized from 8/3-23 on 1N where they were noted to be hostile, disorganized, threatening, malodorous and grossly psychotic. TOO was obtained, and patient eventually administered dual long-acting injectables (Haldol Decanoate and Zyprexa Relprevv), though had declined nearly all PO medications. [facility] where they were admitted due to anemia to 6 (medical workup largely unremarkable), patient stabilized and transferred back to [facility] for further management of psychosis.     Diagnostically, presentation consistent with exacerbation of schizophrenia.     Patient has shown overall very little clinical improvement since admission. Spends most of day isolative and talking to self in severely disorganized and largely incomprehensible fashion, with only very brief moments of reality based exchange with staff or peers, or is non-verbal and stares at staff. Has showered recently with significant staff encouragement, though room very malodorous. No recent physical aggression, though can appear mistrustful and make vaguely threatening remarks. Sleep erratic, often just a few hours, good last night. Still declining all PO medications, on high dose haloperidol BREWER.    GI and hematology consulted last week in response to [facility]'s concerns around chronic anemia. Responses sent to [facility]. No urgent indication for parenteral iron. Spoke to nutrition, added Ensure to diet to try to increase iron intake, will try to encourage use (intermittently accepts). Will repeat labs in January.    Patient requires inpatient admission due to very poor self-care and for containment, stabilization, medication management and aftercare planning.    Plan:  1. Brink: admitted involuntary , TOO obtained 10/24  2. Observation: routine checks  3. Collateral: obtained collateral from [facility] psychiatrist  4. Psychiatric  -Continue Haldol Decanoate 200mg IM q4 weeks, last given , next due   	-Had received Zyprexa Replevv 405mg qMonth (given )--though no observable benefit above Haldol Decanoate, so will defer at this time  -Continue clozapine 25mg QHS (refusing), d/c'ed olanzapine IM backup order d/t tremulousness   -Continue depakote 1000mg QHS (refusing)  -Continue benztropine 1mg BID for EPS (refusing)  5. Medical:  -Anemia:   	-s/p medical admission at [facility] for anemia to Hgb 6, required 2u pRBCs, most recent Hgb 8.8-9. GI workup unremarkable with exception of diverticulosis and colonic polyp  	-Repeat CBCs with anemia 8.6-low 9s, per internist, encourage iron supplementation  	-Continue ferrous sulfate 325mg daily (refusing)  -Internist evaluated x2 in response to questions asked by [facility], responses sent to [facility]  -[facility] requesting hematology c/s for chronic anemia---> wrote chart note , etiology c/w BRYON, recommended GI consult --> consulted 12/15  -Fall on : likely mechanical, no sustained injuries. PT consult  -Bowel regimen: continue bisacodyl 10mg QHS (refusing), miralax 17g daily (refusing), senna 2 tablets QHS (refusing)  -Continue Vitamin D3 1000units QHS (refusing)  6. PRNs  -Olanzapine 5mg PO or IM for agitation  7. Therapy   -Individual, group and milieu therapy as appropriate   8. Disposition: state application submitted  Patient is a 53 year old adult (goes by "Juan Jose"), living at [facility] on [place] grounds, PMH of GERD, anemia, PPH of schizophrenia, selective mutism, currently with [facility] ACT team, previously with [facility] which , with history of multiple psychiatric hospitalizations, without known history of self-injury or suicide attempts, with history of aggression in the setting on nonadherence with medication, who was initially brought in by staff at residence for increasing agitation and aggression in the setting of 2 months of medication nonadherence. Patient was hospitalized from 8/3-23 on 1N where they were noted to be hostile, disorganized, threatening, malodorous and grossly psychotic. TOO was obtained, and patient eventually administered dual long-acting injectables (Haldol Decanoate and Zyprexa Relprevv), though had declined nearly all PO medications. Patient was transferred to [facility] where they were admitted due to anemia to 6 (medical workup largely unremarkable), patient stabilized and transferred back to [facility] for further management of psychosis.     Diagnostically, presentation consistent with exacerbation of schizophrenia.     Patient has shown overall very little clinical improvement since admission. Spends most of day isolative and talking to self in severely disorganized and largely incomprehensible fashion, with only very brief moments of reality based exchange with staff or peers, or is non-verbal and stares at staff. Has showered recently with significant staff encouragement, though room very malodorous. No recent physical aggression, though can appear mistrustful and make vaguely threatening remarks. Sleep erratic, often just a few hours, good last night. Still declining all PO medications, on high dose haloperidol BREWER.    GI and hematology consulted last week in response to [facility]'s concerns around chronic anemia. Responses sent to [facility]. No urgent indication for parenteral iron. Spoke to nutrition, added Ensure to diet to try to increase iron intake, will try to encourage use (intermittently accepts). Will repeat labs in January.    Patient requires inpatient admission due to very poor self-care and for containment, stabilization, medication management and aftercare planning.    Plan:  1. Brink: admitted involuntary , TOO obtained 10/24  2. Observation: routine checks  3. Collateral: obtained collateral from [facility] psychiatrist  4. Psychiatric  -Continue Haldol Decanoate 200mg IM q4 weeks, last given , next due   	-Had received Zyprexa Replevv 405mg qMonth (given )--though no observable benefit above Haldol Decanoate, so will defer at this time  -Continue clozapine 25mg QHS (refusing), d/c'ed olanzapine IM backup order d/t tremulousness   -Continue depakote 1000mg QHS (refusing)  -Continue benztropine 1mg BID for EPS (refusing)  5. Medical:  -Anemia:   	-s/p medical admission at [facility] for anemia to Hgb 6, required 2u pRBCs, most recent Hgb 8.8-9. GI workup unremarkable with exception of diverticulosis and colonic polyp  	-Repeat CBCs with anemia 8.6-low 9s, per internist, encourage iron supplementation  	-Continue ferrous sulfate 325mg daily (refusing)  -Internist evaluated x2 in response to questions asked by [facility], responses sent to [facility]  -[facility] requesting hematology c/s for chronic anemia---> wrote chart note , etiology c/w BRYON, recommended GI consult --> consulted 12/15  -Fall on : likely mechanical, no sustained injuries. PT consult  -Bowel regimen: continue bisacodyl 10mg QHS (refusing), miralax 17g daily (refusing), senna 2 tablets QHS (refusing)  -Continue Vitamin D3 1000units QHS (refusing)  6. PRNs  -Olanzapine 5mg PO or IM for agitation  7. Therapy   -Individual, group and milieu therapy as appropriate   8. Disposition: state application submitted  Patient is a 53 year old adult (goes by "Juan Jose"), living at [facility] on [place] grounds, PMH of GERD, anemia, PPH of schizophrenia, selective mutism, currently with [facility] ACT team, previously with [facility] which , with history of multiple psychiatric hospitalizations, without known history of self-injury or suicide attempts, with history of aggression in the setting on nonadherence with medication, who was initially brought in by staff at residence for increasing agitation and aggression in the setting of 2 months of medication nonadherence. Patient was hospitalized from 8/3-23 on 1N where they were noted to be hostile, disorganized, threatening, malodorous and grossly psychotic. TOO was obtained, and patient eventually administered dual long-acting injectables (Haldol Decanoate and Zyprexa Relprevv), though had declined nearly all PO medications. Patient was transferred to [facility] where they were admitted due to anemia to 6 (medical workup largely unremarkable), patient stabilized and transferred back to [facility] for further management of psychosis.     Diagnostically, presentation consistent with exacerbation of schizophrenia.     Patient has shown overall very little clinical improvement since admission. Spends most of day isolative and talking to self in severely disorganized and largely incomprehensible fashion, with only very brief moments of reality based exchange with staff or peers, or is non-verbal and stares at staff. Has showered recently with significant staff encouragement, though room very malodorous. No recent physical aggression, though can appear mistrustful and make vaguely threatening remarks. Sleep erratic, often just a few hours, good last night. Still declining all PO medications, on high dose haloperidol BREWER.    GI and hematology consulted in response to [facility]'s concerns around chronic anemia. Responses sent to [facility]. No urgent indication for parenteral iron. Spoke to nutrition, added Ensure to diet to try to increase iron intake, will try to encourage use (intermittently accepts). Labs repeated 1/3 and showed stable (mildly improved) anemia.    Patient requires inpatient admission due to very poor self-care and for containment, stabilization, medication management and aftercare planning.    Plan:  1. Brink: admitted involuntary , TOO obtained 10/24  2. Observation: routine checks  3. Collateral: obtained collateral from [facility] psychiatrist  4. Psychiatric  -Continue Haldol Decanoate 200mg IM q4 weeks, last given , next due   	-Had received Zyprexa Replevv 405mg qMonth (given )--though no observable benefit above Haldol Decanoate, so will defer at this time  -Continue clozapine 25mg QHS (refusing), d/c'ed olanzapine IM backup order d/t tremulousness   -Continue depakote 1000mg QHS (refusing)  -Continue benztropine 1mg BID for EPS (refusing)  5. Medical:  -Anemia:   	-s/p medical admission at [facility] for anemia to Hgb 6, required 2u pRBCs. GI workup unremarkable with exception of diverticulosis and colonic polyp  	-Repeat CBCs with anemia 8.6-low 9s  	-Hemoglobin stable to improved on 1/3, will check again ~1 month  	-Encourage Ensure HP (refuses PO iron)  -[facility] requesting hematology c/s for chronic anemia---> wrote chart note , etiology c/w BRYON, recommended GI consult --> consulted 12/15 and wrote chart note --> sent to [facility]  -Fall on : likely mechanical, no sustained injuries. PT consult  -Bowel regimen: continue bisacodyl 10mg QHS (refusing), miralax 17g daily (refusing), senna 2 tablets QHS (refusing)  -Continue Vitamin D3 1000units QHS (refusing)  6. PRNs  -Olanzapine 5mg PO or IM for agitation  7. Therapy   -Individual, group and milieu therapy as appropriate   8. Disposition: state application submitted  Patient is a 53 year old adult (goes by "Juan Jose"), living at [facility] on [place] grounds, PMH of GERD, anemia, PPH of schizophrenia, selective mutism, currently with [facility] ACT team, previously with [facility] which , with history of multiple psychiatric hospitalizations, without known history of self-injury or suicide attempts, with history of aggression in the setting on nonadherence with medication, who was initially brought in by staff at residence for increasing agitation and aggression in the setting of 2 months of medication nonadherence. Patient was hospitalized from 8/3-23 on 1N where they were noted to be hostile, disorganized, threatening, malodorous and grossly psychotic. TOO was obtained, and patient eventually administered dual long-acting injectables (Haldol Decanoate and Zyprexa Relprevv), though had declined nearly all PO medications. Patient was transferred to [facility] where they were admitted due to anemia to 6 (medical workup largely unremarkable), patient stabilized and transferred back to [facility] for further management of psychosis.     Diagnostically, presentation consistent with exacerbation of schizophrenia.     Patient has shown overall very little clinical improvement since admission. Spends most of day isolative and talking to self in severely disorganized and largely incomprehensible fashion, with only very brief moments of reality based exchange with staff or peers, or is non-verbal and stares at staff. Has showered recently with significant staff encouragement, though room very malodorous. No recent physical aggression, though can appear mistrustful and make vaguely threatening remarks. Sleep erratic, often just a few hours, good last night. Still declining all PO medications, on high dose haloperidol BREWER.    GI and hematology consulted in response to [facility]'s concerns around chronic anemia. Responses sent to [facility]. No urgent indication for parenteral iron. Spoke to nutrition, added Ensure to diet to try to increase iron intake, will try to encourage use (intermittently accepts). Labs repeated 1/3 and showed stable (mildly improved) anemia.    Patient requires inpatient admission due to very poor self-care and for containment, stabilization, medication management and aftercare planning.    Plan:  1. Brink: admitted involuntary , TOO obtained 10/24  2. Observation: routine checks  3. Collateral: obtained collateral from [facility] psychiatrist  4. Psychiatric  -Continue Haldol Decanoate 200mg IM q4 weeks, last given , next due   	-Had received Zyprexa Replevv 405mg qMonth (given )--though no observable benefit above Haldol Decanoate, so will defer at this time  -Continue clozapine 25mg QHS (refusing), d/c'ed olanzapine IM backup order d/t tremulousness   -Continue depakote 1000mg QHS (refusing)  -Continue benztropine 1mg BID for EPS (refusing)  5. Medical:  -Anemia:   	-s/p medical admission at [facility] for anemia to Hgb 6, required 2u pRBCs. GI workup unremarkable with exception of diverticulosis and colonic polyp  	-Repeat CBCs with anemia 8.6-low 9s  	-Hemoglobin stable to improved on 1/3, will check again ~1 month  	-Encourage Ensure HP (refuses PO iron)  -[facility] requesting hematology c/s for chronic anemia---> wrote chart note , etiology c/w BRYON, recommended GI consult --> consulted 12/15 and wrote chart note --> sent to [facility]  -Fall on : likely mechanical, no sustained injuries. PT consult  -Bowel regimen: continue bisacodyl 10mg QHS (refusing), miralax 17g daily (refusing), senna 2 tablets QHS (refusing)  -Continue Vitamin D3 1000units QHS (refusing)  6. PRNs  -Olanzapine 5mg PO or IM for agitation  7. Therapy   -Individual, group and milieu therapy as appropriate   8. Disposition: state application submitted

## 2024-01-03 NOTE — BH INPATIENT PSYCHIATRY PROGRESS NOTE - NSBHATTESTAPPBILLTIME_PSY_A_CORE
I attest my time as CRYS is greater than 50% of the total combined time spent on qualifying patient care activities. I have reviewed and verified the documentation.
I attest my time as CRYS is greater than 50% of the total combined time spent on qualifying patient care activities. I have reviewed and verified the documentation.

## 2024-01-03 NOTE — BH INPATIENT PSYCHIATRY PROGRESS NOTE - NSBHFUPINTERVALHXFT_PSY_A_CORE
Refused vitals, refused PO medications, no PRNs for agitation, no acute events. Refused vitals, refused PO medications, no PRNs for agitation, no acute events. Slept 5 hours overnight.    Patient seen mumbling to self, incoherently. Eventually tells this MD to GET OUT.

## 2024-01-03 NOTE — BH INPATIENT PSYCHIATRY PROGRESS NOTE - MSE UNSTRUCTURED FT
Appearance: laying in bed, wearing hospital attire  Behavior: uncooperative  Speech: no speech today  Mood: unable to assess  Affect: irritable  Thought process: severely disorganized when speaks  Thought content: can make vaguely violent/threatening statements, none today, not observed harming self or others.   Perceptions: observed talking to self  Insight: very poor  Judgement: very poor, refusing vitals intermittently, refusing PO medications, very poor ADLs  Cognition: unable to assess Appearance: laying in bed, wearing hospital attire  Behavior: uncooperative  Speech: incomprehensible speech  Mood: unable to assess  Affect: irritable  Thought process: severely disorganized   Thought content: can make vaguely violent/threatening statements, today says "GET OUT", not observed harming self or others.   Perceptions: observed talking to self  Insight: very poor  Judgement: very poor, refusing vitals intermittently, refusing PO medications, very poor ADLs  Cognition: unable to assess

## 2024-01-03 NOTE — BH INPATIENT PSYCHIATRY PROGRESS NOTE - NSBHATTESTBILLING_PSY_A_CORE
67655-Bfkxqrwest OBS or IP - low complexity OR 25-34 mins 15443-Zfppttdhjd OBS or IP - low complexity OR 25-34 mins

## 2024-01-04 PROCEDURE — 99231 SBSQ HOSP IP/OBS SF/LOW 25: CPT

## 2024-01-04 NOTE — BH INPATIENT PSYCHIATRY PROGRESS NOTE - NSBHASSESSSUMMFT_PSY_ALL_CORE
Patient is a 53 year old adult (goes by "Juan Jose"), living at [facility] on [place] grounds, PMH of GERD, anemia, PPH of schizophrenia, selective mutism, currently with [facility] ACT team, previously with [facility] which , with history of multiple psychiatric hospitalizations, without known history of self-injury or suicide attempts, with history of aggression in the setting on nonadherence with medication, who was initially brought in by staff at residence for increasing agitation and aggression in the setting of 2 months of medication nonadherence. Patient was hospitalized from 8/3-23 on 1N where they were noted to be hostile, disorganized, threatening, malodorous and grossly psychotic. TOO was obtained, and patient eventually administered dual long-acting injectables (Haldol Decanoate and Zyprexa Relprevv), though had declined nearly all PO medications. Patient was transferred to [facility] where they were admitted due to anemia to 6 (medical workup largely unremarkable), patient stabilized and transferred back to [facility] for further management of psychosis.     Diagnostically, presentation consistent with exacerbation of schizophrenia.     Patient has shown overall very little clinical improvement since admission. Spends most of day isolative and talking to self in severely disorganized and largely incomprehensible fashion, with only very brief moments of reality based exchange with staff or peers, or is non-verbal and stares at staff. Has showered recently with significant staff encouragement, though room very malodorous. No recent physical aggression, though can appear mistrustful and make vaguely threatening remarks. Sleep erratic, often just a few hours, good last night. Still declining all PO medications, on high dose haloperidol BERWER.    GI and hematology consulted in response to [facility]'s concerns around chronic anemia. Responses sent to [facility]. No urgent indication for parenteral iron. Spoke to nutrition, added Ensure to diet to try to increase iron intake, will try to encourage use (intermittently accepts). Labs repeated 1/3 and showed stable (mildly improved) anemia.    Patient requires inpatient admission due to very poor self-care and for containment, stabilization, medication management and aftercare planning.    Plan:  1. Brink: admitted involuntary , TOO obtained 10/24  2. Observation: routine checks  3. Collateral: obtained collateral from [facility] psychiatrist  4. Psychiatric  -Continue Haldol Decanoate 200mg IM q4 weeks, last given , next due   	-Had received Zyprexa Replevv 405mg qMonth (given )--though no observable benefit above Haldol Decanoate, so will defer at this time  -Continue clozapine 25mg QHS (refusing), d/c'ed olanzapine IM backup order d/t tremulousness   -Continue depakote 1000mg QHS (refusing)  -Continue benztropine 1mg BID for EPS (refusing)  5. Medical:  -Anemia:   	-s/p medical admission at [facility] for anemia to Hgb 6, required 2u pRBCs. GI workup unremarkable with exception of diverticulosis and colonic polyp  	-Repeat CBCs with anemia 8.6-low 9s  	-Hemoglobin stable to improved on 1/3, will check again ~1 month  	-Encourage Ensure HP (refuses PO iron)  -[facility] requesting hematology c/s for chronic anemia---> wrote chart note , etiology c/w BRYON, recommended GI consult --> consulted 12/15 and wrote chart note --> sent to [facility]  -Fall on : likely mechanical, no sustained injuries. PT consult  -Bowel regimen: continue bisacodyl 10mg QHS (refusing), miralax 17g daily (refusing), senna 2 tablets QHS (refusing)  -Continue Vitamin D3 1000units QHS (refusing)  6. PRNs  -Olanzapine 5mg PO or IM for agitation  7. Therapy   -Individual, group and milieu therapy as appropriate   8. Disposition: state application submitted  Patient is a 53 year old adult (goes by "Juan Jose"), living at [facility] on [place] grounds, PMH of GERD, anemia, PPH of schizophrenia, selective mutism, currently with [facility] ACT team, previously with [facility] which , with history of multiple psychiatric hospitalizations, without known history of self-injury or suicide attempts, with history of aggression in the setting on nonadherence with medication, who was initially brought in by staff at residence for increasing agitation and aggression in the setting of 2 months of medication nonadherence. Patient was hospitalized from 8/3-23 on 1N where they were noted to be hostile, disorganized, threatening, malodorous and grossly psychotic. TOO was obtained, and patient eventually administered dual long-acting injectables (Haldol Decanoate and Zyprexa Relprevv), though had declined nearly all PO medications. Patient was transferred to [facility] where they were admitted due to anemia to 6 (medical workup largely unremarkable), patient stabilized and transferred back to [facility] for further management of psychosis.     Diagnostically, presentation consistent with exacerbation of schizophrenia.     Patient has shown overall very little clinical improvement since admission. Spends most of day isolative and talking to self in severely disorganized and largely incomprehensible fashion, with only very brief moments of reality based exchange with staff or peers, or is non-verbal and stares at staff. Has showered recently with significant staff encouragement, though room very malodorous. No recent physical aggression, though can appear mistrustful and make vaguely threatening remarks. Sleep erratic, often just a few hours, good last night. Still declining all PO medications, on high dose haloperidol BREWER.    GI and hematology consulted in response to [facility]'s concerns around chronic anemia. Responses sent to [facility]. No urgent indication for parenteral iron. Spoke to nutrition, added Ensure to diet to try to increase iron intake, will try to encourage use (intermittently accepts). Labs repeated 1/3 and showed stable (mildly improved) anemia.    Patient requires inpatient admission due to very poor self-care and for containment, stabilization, medication management and aftercare planning.    Plan:  1. Brink: admitted involuntary , TOO obtained 10/24  2. Observation: routine checks  3. Collateral: obtained collateral from [facility] psychiatrist  4. Psychiatric  -Continue Haldol Decanoate 200mg IM q4 weeks, last given , next due   	-Had received Zyprexa Replevv 405mg qMonth (given )--though no observable benefit above Haldol Decanoate, so will defer at this time  -Continue clozapine 25mg QHS (refusing), d/c'ed olanzapine IM backup order d/t tremulousness   -Continue depakote 1000mg QHS (refusing)  -Continue benztropine 1mg BID for EPS (refusing)  5. Medical:  -Anemia:   	-s/p medical admission at [facility] for anemia to Hgb 6, required 2u pRBCs. GI workup unremarkable with exception of diverticulosis and colonic polyp  	-Repeat CBCs with anemia 8.6-low 9s  	-Hemoglobin stable to improved on 1/3, will check again ~1 month  	-Encourage Ensure HP (refuses PO iron)  -[facility] requesting hematology c/s for chronic anemia---> wrote chart note , etiology c/w BRYON, recommended GI consult --> consulted 12/15 and wrote chart note --> sent to [facility]  -Fall on : likely mechanical, no sustained injuries. PT consult  -Bowel regimen: continue bisacodyl 10mg QHS (refusing), miralax 17g daily (refusing), senna 2 tablets QHS (refusing)  -Continue Vitamin D3 1000units QHS (refusing)  6. PRNs  -Olanzapine 5mg PO or IM for agitation  7. Therapy   -Individual, group and milieu therapy as appropriate   8. Disposition: state application submitted  Patient is a 53 year old adult (goes by "Juan Jose"), living at [facility] on [place] grounds, PMH of GERD, anemia, PPH of schizophrenia, selective mutism, currently with [facility] ACT team, previously with [facility] which , with history of multiple psychiatric hospitalizations, without known history of self-injury or suicide attempts, with history of aggression in the setting on nonadherence with medication, who was initially brought in by staff at residence for increasing agitation and aggression in the setting of 2 months of medication nonadherence. Patient was hospitalized from 8/3-23 on 1N where they were noted to be hostile, disorganized, threatening, malodorous and grossly psychotic. TOO was obtained, and patient eventually administered dual long-acting injectables (Haldol Decanoate and Zyprexa Relprevv), though had declined nearly all PO medications. Patient was transferred to [facility] where they were admitted due to anemia to 6 (medical workup largely unremarkable), patient stabilized and transferred back to [facility] for further management of psychosis.     Diagnostically, presentation consistent with exacerbation of schizophrenia.     Patient has shown overall very little clinical improvement since admission. Spends most of day isolative and talking to self in severely disorganized and largely incomprehensible fashion, with only very brief moments of reality based exchange with staff or peers, or is non-verbal and stares at staff. Has showered recently with staff encouragement, though room remains malodorous. No recent physical aggression, though can appear mistrustful and make vaguely threatening remarks. Sleep erratic, often just a few hours. Still declining all PO medications, on high dose haloperidol BREWER.    GI and hematology consulted in response to [facility]'s concerns around chronic anemia. Responses sent to [facility]. No urgent indication for parenteral iron. Spoke to nutrition, added Ensure to diet to try to increase iron intake, will try to encourage use (intermittently accepts). Labs repeated 1/3 and showed stable (mildly improved) anemia.    Patient requires inpatient admission due to very poor self-care and for containment, stabilization, medication management and aftercare planning.    Plan:  1. Brink: admitted involuntary , TOO obtained 10/24  2. Observation: routine checks  3. Collateral: obtained collateral from [facility] psychiatrist  4. Psychiatric  -Continue Haldol Decanoate 200mg IM q4 weeks, last given , next due   	-Had received Zyprexa Replevv 405mg qMonth (given )--though no observable benefit above Haldol Decanoate, so will defer at this time  -Continue clozapine 25mg QHS (refusing), d/c'ed olanzapine IM backup order d/t tremulousness   -Continue depakote 1000mg QHS (refusing)  -Continue benztropine 1mg BID for EPS (refusing)  5. Medical:  -Anemia:   	-s/p medical admission at [facility] for anemia to Hgb 6, required 2u pRBCs. GI workup unremarkable with exception of diverticulosis and colonic polyp  	-Repeat CBCs with anemia 8.6-low 9s  	-Hemoglobin stable to improved on 1/3, will check again ~1 month  	-Encourage Ensure HP (refuses PO iron)  -[facility] requesting hematology c/s for chronic anemia---> wrote chart note , etiology c/w BRYON, recommended GI consult --> consulted GI 12/15 and wrote chart note --> sent to [facility]  -Fall on : likely mechanical, no sustained injuries. PT consult  -Bowel regimen: continue bisacodyl 10mg QHS (refusing), miralax 17g daily (refusing), senna 2 tablets QHS (refusing)  -Continue Vitamin D3 1000units QHS (refusing)  6. PRNs  -Olanzapine 5mg PO or IM for agitation  7. Therapy   -Individual, group and milieu therapy as appropriate   8. Disposition: state application submitted  Patient is a 53 year old adult (goes by "Juan Jose"), living at [facility] on [place] grounds, PMH of GERD, anemia, PPH of schizophrenia, selective mutism, currently with [facility] ACT team, previously with [facility] which , with history of multiple psychiatric hospitalizations, without known history of self-injury or suicide attempts, with history of aggression in the setting on nonadherence with medication, who was initially brought in by staff at residence for increasing agitation and aggression in the setting of 2 months of medication nonadherence. Patient was hospitalized from 8/3-23 on 1N where they were noted to be hostile, disorganized, threatening, malodorous and grossly psychotic. TOO was obtained, and patient eventually administered dual long-acting injectables (Haldol Decanoate and Zyprexa Relprevv), though had declined nearly all PO medications. Patient was transferred to [facility] where they were admitted due to anemia to 6 (medical workup largely unremarkable), patient stabilized and transferred back to [facility] for further management of psychosis.     Diagnostically, presentation consistent with exacerbation of schizophrenia.     Patient has shown overall very little clinical improvement since admission. Spends most of day isolative and talking to self in severely disorganized and largely incomprehensible fashion, with only very brief moments of reality based exchange with staff or peers, or is non-verbal and stares at staff. Has showered recently with staff encouragement, though room remains malodorous. No recent physical aggression, though can appear mistrustful and make vaguely threatening remarks. Sleep erratic, often just a few hours. Still declining all PO medications, on high dose haloperidol BREWER.    GI and hematology consulted in response to [facility]'s concerns around chronic anemia. Responses sent to [facility]. No urgent indication for parenteral iron. Spoke to nutrition, added Ensure to diet to try to increase iron intake, will try to encourage use (intermittently accepts). Labs repeated 1/3 and showed stable (mildly improved) anemia.    Patient requires inpatient admission due to very poor self-care and for containment, stabilization, medication management and aftercare planning.    Plan:  1. Brink: admitted involuntary , TOO obtained 10/24  2. Observation: routine checks  3. Collateral: obtained collateral from [facility] psychiatrist  4. Psychiatric  -Continue Haldol Decanoate 200mg IM q4 weeks, last given , next due   	-Had received Zyprexa Replevv 405mg qMonth (given )--though no observable benefit above Haldol Decanoate, so will defer at this time  -Continue clozapine 25mg QHS (refusing), d/c'ed olanzapine IM backup order d/t tremulousness   -Continue depakote 1000mg QHS (refusing)  -Continue benztropine 1mg BID for EPS (refusing)  5. Medical:  -Anemia:   	-s/p medical admission at [facility] for anemia to Hgb 6, required 2u pRBCs. GI workup unremarkable with exception of diverticulosis and colonic polyp  	-Repeat CBCs with anemia 8.6-low 9s  	-Hemoglobin stable to improved on 1/3, will check again ~1 month  	-Encourage Ensure HP (refuses PO iron)  -[facility] requesting hematology c/s for chronic anemia---> wrote chart note , etiology c/w BRYON, recommended GI consult --> consulted GI 12/15 and wrote chart note --> sent to [facility]  -Fall on : likely mechanical, no sustained injuries. PT consult  -Bowel regimen: continue bisacodyl 10mg QHS (refusing), miralax 17g daily (refusing), senna 2 tablets QHS (refusing)  -Continue Vitamin D3 1000units QHS (refusing)  6. PRNs  -Olanzapine 5mg PO or IM for agitation  7. Therapy   -Individual, group and milieu therapy as appropriate   8. Disposition: state application submitted

## 2024-01-04 NOTE — BH INPATIENT PSYCHIATRY PROGRESS NOTE - MSE UNSTRUCTURED FT
Appearance: laying in bed, wearing hospital attire  Behavior: uncooperative  Speech: incomprehensible speech  Mood: unable to assess  Affect: irritable  Thought process: severely disorganized   Thought content: can make vaguely violent/threatening statements, today says "GET OUT", not observed harming self or others.   Perceptions: observed talking to self  Insight: very poor  Judgement: very poor, refusing vitals intermittently, refusing PO medications, very poor ADLs  Cognition: unable to assess Appearance: laying in bed, wearing hospital attire  Behavior: uncooperative  Speech: mostly incomprehensible speech  Mood: unable to assess  Affect: irritable  Thought process: severely disorganized   Thought content: can make vaguely violent/threatening statements, today says "GET OUT", not observed harming self or others.   Perceptions: observed talking to self  Insight: very poor  Judgement: very poor, refusing vitals intermittently, refusing PO medications, very poor ADLs  Cognition: unable to assess

## 2024-01-04 NOTE — BH INPATIENT PSYCHIATRY PROGRESS NOTE - NSBHFUPINTERVALHXFT_PSY_A_CORE
Refused vitals, refused PO medications, no PRNs for agitation, no acute events.  Refused vitals, refused PO medications, no PRNs for agitation, no acute events. Slept 3 hours overnight.     Patient found talking incoherently to themselves. Upon noticing this provider, states "GET OUT, GET OUT." Asked why, and states "BECAUSE I ORDERED IT."

## 2024-01-04 NOTE — BH INPATIENT PSYCHIATRY PROGRESS NOTE - NSBHATTESTBILLING_PSY_A_CORE
28929-Crhzsnwfik OBS or IP - low complexity OR 25-34 mins 44599-Zunpcnibqk OBS or IP - low complexity OR 25-34 mins

## 2024-01-05 PROCEDURE — 99231 SBSQ HOSP IP/OBS SF/LOW 25: CPT

## 2024-01-05 NOTE — BH INPATIENT PSYCHIATRY PROGRESS NOTE - NSBHASSESSSUMMFT_PSY_ALL_CORE
Patient is a 53 year old adult (goes by "Juan Jose"), living at [facility] on [place] grounds, PMH of GERD, anemia, PPH of schizophrenia, selective mutism, currently with [facility] ACT team, previously with [facility] which , with history of multiple psychiatric hospitalizations, without known history of self-injury or suicide attempts, with history of aggression in the setting on nonadherence with medication, who was initially brought in by staff at residence for increasing agitation and aggression in the setting of 2 months of medication nonadherence. Patient was hospitalized from 8/3-23 on 1N where they were noted to be hostile, disorganized, threatening, malodorous and grossly psychotic. TOO was obtained, and patient eventually administered dual long-acting injectables (Haldol Decanoate and Zyprexa Relprevv), though had declined nearly all PO medications. Patient was transferred to [facility] where they were admitted due to anemia to 6 (medical workup largely unremarkable), patient stabilized and transferred back to [facility] for further management of psychosis.     Diagnostically, presentation consistent with exacerbation of schizophrenia.     Patient has shown overall very little clinical improvement since admission. Spends most of day isolative and talking to self in severely disorganized and largely incomprehensible fashion, with only very brief moments of reality based exchange with staff or peers, or is non-verbal and stares at staff. Has showered recently with staff encouragement, though room remains malodorous. No recent physical aggression, though can appear mistrustful and make vaguely threatening remarks. Sleep erratic, often just a few hours. Still declining all PO medications, on high dose haloperidol BREWER.    GI and hematology consulted in response to [facility]'s concerns around chronic anemia. Responses sent to [facility]. No urgent indication for parenteral iron. Spoke to nutrition, added Ensure to diet to try to increase iron intake, will try to encourage use (intermittently accepts). Labs repeated 1/3 and showed stable (mildly improved) anemia.    Patient requires inpatient admission due to very poor self-care and for containment, stabilization, medication management and aftercare planning.    Plan:  1. Brink: admitted involuntary , TOO obtained 10/24  2. Observation: routine checks  3. Collateral: obtained collateral from [facility] psychiatrist  4. Psychiatric  -Continue Haldol Decanoate 200mg IM q4 weeks, last given , next due   	-Had received Zyprexa Replevv 405mg qMonth (given )--though no observable benefit above Haldol Decanoate, so will defer at this time  -Continue clozapine 25mg QHS (refusing), d/c'ed olanzapine IM backup order d/t tremulousness   -Continue depakote 1000mg QHS (refusing)  -Continue benztropine 1mg BID for EPS (refusing)  5. Medical:  -Anemia:   	-s/p medical admission at [facility] for anemia to Hgb 6, required 2u pRBCs. GI workup unremarkable with exception of diverticulosis and colonic polyp  	-Repeat CBCs with anemia 8.6-low 9s  	-Hemoglobin stable to improved on 1/3, will check again ~1 month  	-Encourage Ensure HP (refuses PO iron)  -[facility] requesting hematology c/s for chronic anemia---> wrote chart note , etiology c/w BRYON, recommended GI consult --> consulted GI 12/15 and wrote chart note --> sent to [facility]  -Fall on : likely mechanical, no sustained injuries. PT consult  -Bowel regimen: continue bisacodyl 10mg QHS (refusing), miralax 17g daily (refusing), senna 2 tablets QHS (refusing)  -Continue Vitamin D3 1000units QHS (refusing)  6. PRNs  -Olanzapine 5mg PO or IM for agitation  7. Therapy   -Individual, group and milieu therapy as appropriate   8. Disposition: state application submitted  Patient is a 53 year old adult (goes by "Juan Jose"), living at [facility] on [place] grounds, PMH of GERD, anemia, PPH of schizophrenia, selective mutism, currently with [facility] ACT team, previously with [facility] which , with history of multiple psychiatric hospitalizations, without known history of self-injury or suicide attempts, with history of aggression in the setting on nonadherence with medication, who was initially brought in by staff at residence for increasing agitation and aggression in the setting of 2 months of medication nonadherence. Patient was hospitalized from 8/3-23 on 1N where they were noted to be hostile, disorganized, threatening, malodorous and grossly psychotic. TOO was obtained, and patient eventually administered dual long-acting injectables (Haldol Decanoate and Zyprexa Relprevv), though had declined nearly all PO medications. Patient was transferred to [facility] where they were admitted due to anemia to 6 (medical workup largely unremarkable), patient stabilized and transferred back to [facility] for further management of psychosis.     Diagnostically, presentation consistent with exacerbation of schizophrenia.     Patient has shown overall very little clinical improvement since admission. Spends most of day isolative and talking to self in severely disorganized and largely incomprehensible fashion, with only very brief moments of reality based exchange with staff or peers, or is non-verbal and stares at staff. Has showered recently with staff encouragement, though room remains malodorous. No recent physical aggression, though can appear mistrustful and make vaguely threatening remarks. Sleep erratic, often just a few hours. Still declining all PO medications, on high dose haloperidol BREWER.    GI and hematology consulted in response to [facility]'s concerns around chronic anemia. Responses sent to [facility]. No urgent indication for parenteral iron. Spoke to nutrition, added Ensure to diet to try to increase iron intake, will try to encourage use (intermittently accepts). Labs repeated 1/3 and showed stable (mildly improved) anemia.    Patient requires inpatient admission due to very poor self-care and for containment, stabilization, medication management and aftercare planning.    Plan:  1. Brink: admitted involuntary , TOO obtained 10/24  2. Observation: routine checks  3. Collateral: obtained collateral from [facility] psychiatrist  4. Psychiatric  -Continue Haldol Decanoate 200mg IM q4 weeks, last given , next due   	-Had received Zyprexa Replevv 405mg qMonth (given )--though no observable benefit above Haldol Decanoate, so will defer at this time  -Continue clozapine 25mg QHS (refusing), d/c'ed olanzapine IM backup order d/t tremulousness   -Continue depakote 1000mg QHS (refusing)  -Continue benztropine 1mg BID for EPS (refusing)  5. Medical:  -Anemia:   	-s/p medical admission at [facility] for anemia to Hgb 6, required 2u pRBCs. GI workup unremarkable with exception of diverticulosis and colonic polyp  	-Repeat CBCs with anemia 8.6-low 9s  	-Hemoglobin stable to improved on 1/3, will check again ~1 month  	-Encourage Ensure HP (refuses PO iron)  -[facility] requesting hematology c/s for chronic anemia---> wrote chart note , etiology c/w BRYON, recommended GI consult --> consulted GI 12/15 and wrote chart note --> sent to [facility]  -Fall on : likely mechanical, no sustained injuries. PT consult  -Bowel regimen: continue bisacodyl 10mg QHS (refusing), miralax 17g daily (refusing), senna 2 tablets QHS (refusing)  -Continue Vitamin D3 1000units QHS (refusing)  6. PRNs  -Olanzapine 5mg PO or IM for agitation  7. Therapy   -Individual, group and milieu therapy as appropriate   8. Disposition: state application submitted  Patient is a 53 year old adult (goes by "Juan Jose"), living at [facility] on [place] grounds, PMH of GERD, anemia, PPH of schizophrenia, selective mutism, currently with [facility] ACT team, previously with [facility] which , with history of multiple psychiatric hospitalizations, without known history of self-injury or suicide attempts, with history of aggression in the setting on nonadherence with medication, who was initially brought in by staff at residence for increasing agitation and aggression in the setting of 2 months of medication nonadherence. Patient was hospitalized from 8/3-23 on 1N where they were noted to be hostile, disorganized, threatening, malodorous and grossly psychotic. TOO was obtained, and patient eventually administered dual long-acting injectables (Haldol Decanoate and Zyprexa Relprevv), though had declined nearly all PO medications. Patient was transferred to [facility] where they were admitted due to anemia to 6 (medical workup largely unremarkable), patient stabilized and transferred back to [facility] for further management of psychosis.     Diagnostically, presentation consistent with exacerbation of schizophrenia.     Patient has shown overall very little clinical improvement since admission. Spends most of day isolative and talking to self in severely disorganized and largely incomprehensible fashion, with only very brief moments of reality based exchange with staff or peers, or is non-verbal and stares at staff. Has showered recently with staff encouragement, though room remains malodorous. No recent physical aggression, though can appear mistrustful and make vaguely threatening remarks. Sleep erratic, often just a few hours. Still declining all PO medications, on high dose haloperidol BREWER. Patient's TOO order has , will re-apply. Will consider medication changes following renewal.    GI and hematology consulted in response to [facility]'s concerns around chronic anemia. Responses sent to [facility]. No urgent indication for parenteral iron. Spoke to nutrition, added Ensure to diet to try to increase iron intake, will try to encourage use (intermittently accepts). Labs repeated 1/3 and showed stable (mildly improved) anemia.    Patient requires inpatient admission due to very poor self-care and for containment, stabilization, medication management and aftercare planning.    Plan:  1. Brink: admitted involuntary , TOO obtained 10/24, , will need to re-apply  2. Observation: routine checks  3. Collateral: obtained collateral from ACT psychiatrist  4. Psychiatric  -Continue Haldol Decanoate 200mg IM q4 weeks, last given , next due   	-Had received Zyprexa Replevv 405mg qMonth (given )--though no observable benefit above Haldol Decanoate, deferred, will consider re-trial  -Continue clozapine 25mg QHS (refusing)  -Continue depakote 1000mg QHS (refusing)  -Continue benztropine 1mg BID for EPS (refusing)  5. Medical:  -Anemia:   	-s/p medical admission at [facility] for anemia to Hgb 6, required 2u pRBCs. GI workup unremarkable with exception of diverticulosis and colonic polyp  	-Repeat CBCs with anemia 8.6-low 9s  	-Hemoglobin stable to improved on 1/3, will check again ~1 month  	-Encourage Ensure HP (refuses PO iron)  -[facility] requesting hematology c/s for chronic anemia---> wrote chart note , etiology c/w BRYON, recommended GI consult --> consulted GI 12/15 and wrote chart note --> sent to [facility]  -Fall on : likely mechanical, no sustained injuries. PT consult  -Bowel regimen: continue bisacodyl 10mg QHS (refusing), miralax 17g daily (refusing), senna 2 tablets QHS (refusing)  -Continue Vitamin D3 1000units QHS (refusing)  6. PRNs  -Olanzapine 5mg PO or IM for agitation  7. Therapy   -Individual, group and milieu therapy as appropriate   8. Disposition: state application submitted  Patient is a 53 year old adult (goes by "Juan Jose"), living at [facility] on [place] grounds, PMH of GERD, anemia, PPH of schizophrenia, selective mutism, currently with [facility] ACT team, previously with [facility] which , with history of multiple psychiatric hospitalizations, without known history of self-injury or suicide attempts, with history of aggression in the setting on nonadherence with medication, who was initially brought in by staff at residence for increasing agitation and aggression in the setting of 2 months of medication nonadherence. Patient was hospitalized from 8/3-23 on 1N where they were noted to be hostile, disorganized, threatening, malodorous and grossly psychotic. TOO was obtained, and patient eventually administered dual long-acting injectables (Haldol Decanoate and Zyprexa Relprevv), though had declined nearly all PO medications. Patient was transferred to [facility] where they were admitted due to anemia to 6 (medical workup largely unremarkable), patient stabilized and transferred back to [facility] for further management of psychosis.     Diagnostically, presentation consistent with exacerbation of schizophrenia.     Patient has shown overall very little clinical improvement since admission. Spends most of day isolative and talking to self in severely disorganized and largely incomprehensible fashion, with only very brief moments of reality based exchange with staff or peers, or is non-verbal and stares at staff. Has showered recently with staff encouragement, though room remains malodorous. No recent physical aggression, though can appear mistrustful and make vaguely threatening remarks. Sleep erratic, often just a few hours. Still declining all PO medications, on high dose haloperidol BREWER. Patient's TOO order has , will re-apply. Will consider medication changes following renewal.    GI and hematology consulted in response to [facility]'s concerns around chronic anemia. Responses sent to [facility]. No urgent indication for parenteral iron. Spoke to nutrition, added Ensure to diet to try to increase iron intake, will try to encourage use (intermittently accepts). Labs repeated 1/3 and showed stable (mildly improved) anemia.    Patient requires inpatient admission due to very poor self-care and for containment, stabilization, medication management and aftercare planning.    Plan:  1. Brink: admitted involuntary , TOO obtained 10/24, , will need to re-apply  2. Observation: routine checks  3. Collateral: obtained collateral from ACT psychiatrist  4. Psychiatric  -Continue Haldol Decanoate 200mg IM q4 weeks, last given , next due   	-Had received Zyprexa Replevv 405mg qMonth (given )--though no observable benefit above Haldol Decanoate, deferred, will consider re-trial  -Continue clozapine 25mg QHS (refusing)  -Continue depakote 1000mg QHS (refusing)  -Continue benztropine 1mg BID for EPS (refusing)  5. Medical:  -Anemia:   	-s/p medical admission at [facility] for anemia to Hgb 6, required 2u pRBCs. GI workup unremarkable with exception of diverticulosis and colonic polyp  	-Repeat CBCs with anemia 8.6-low 9s  	-Hemoglobin stable to improved on 1/3, will check again ~1 month  	-Encourage Ensure HP (refuses PO iron)  -[facility] requesting hematology c/s for chronic anemia---> wrote chart note , etiology c/w BRYON, recommended GI consult --> consulted GI 12/15 and wrote chart note --> sent to [facility]  -Fall on : likely mechanical, no sustained injuries. PT consult  -Bowel regimen: continue bisacodyl 10mg QHS (refusing), miralax 17g daily (refusing), senna 2 tablets QHS (refusing)  -Continue Vitamin D3 1000units QHS (refusing)  6. PRNs  -Olanzapine 5mg PO or IM for agitation  7. Therapy   -Individual, group and milieu therapy as appropriate   8. Disposition: state application submitted

## 2024-01-05 NOTE — BH INPATIENT PSYCHIATRY PROGRESS NOTE - MSE UNSTRUCTURED FT
Appearance: laying in bed, wearing hospital attire  Behavior: uncooperative  Speech: mostly incomprehensible speech  Mood: unable to assess  Affect: irritable  Thought process: severely disorganized   Thought content: can make vaguely violent/threatening statements, today says "GET OUT", not observed harming self or others.   Perceptions: observed talking to self  Insight: very poor  Judgement: very poor, refusing vitals intermittently, refusing PO medications, very poor ADLs  Cognition: unable to assess Appearance: laying in bed, wearing hospital attire  Behavior: uncooperative  Speech: no speech today  Mood: unable to assess  Affect: irritable  Thought process: severely disorganized when does speak  Thought content: can make vaguely violent/threatening statements, none today, not observed harming self or others.   Perceptions: observed talking to self  Insight: very poor  Judgement: very poor, refusing vitals intermittently, refusing PO medications, very poor ADLs  Cognition: unable to assess

## 2024-01-05 NOTE — BH INPATIENT PSYCHIATRY PROGRESS NOTE - NSBHATTESTBILLING_PSY_A_CORE
[id number] OBS or IP - low complexity OR 25-34 mins [id number]-Mjxmbynibo OBS or IP - low complexity OR 25-34 mins

## 2024-01-05 NOTE — BH INPATIENT PSYCHIATRY PROGRESS NOTE - NSBHFUPINTERVALHXFT_PSY_A_CORE
Refused vitals, refused PO medications, no PRNs for agitation, no acute events.  Refused vitals, refused PO medications, no PRNs for agitation, no acute events. Slept 5 hours overnight.    Patient laying in bed, awake. Stares at this provider but does not speak or respond to questions.

## 2024-01-08 PROCEDURE — 99231 SBSQ HOSP IP/OBS SF/LOW 25: CPT

## 2024-01-08 NOTE — BH INPATIENT PSYCHIATRY PROGRESS NOTE - MSE UNSTRUCTURED FT
Appearance: laying in bed, wearing hospital attire  Behavior: uncooperative  Speech: no speech today  Mood: unable to assess  Affect: irritable  Thought process: severely disorganized when does speak  Thought content: can make vaguely violent/threatening statements, none today, not observed harming self or others.   Perceptions: observed talking to self  Insight: very poor  Judgement: very poor, refusing vitals intermittently, refusing PO medications, very poor ADLs  Cognition: unable to assess Appearance: laying in bed, wearing hospital attire  Behavior: uncooperative  Speech: incoherent speech  Mood: unable to assess  Affect: irritable  Thought process: severely disorganized   Thought content: can make vaguely violent/threatening statements, not observed harming self or others.   Perceptions: observed talking to self  Insight: very poor  Judgement: very poor, refusing vitals intermittently, refusing PO medications, very poor ADLs  Cognition: unable to assess

## 2024-01-08 NOTE — BH INPATIENT PSYCHIATRY PROGRESS NOTE - NSBHASSESSSUMMFT_PSY_ALL_CORE
Patient is a 53 year old adult (goes by "Juan Jose"), living at [facility] on [place] grounds, PMH of GERD, anemia, PPH of schizophrenia, selective mutism, currently with [facility] ACT team, previously with [facility] which , with history of multiple psychiatric hospitalizations, without known history of self-injury or suicide attempts, with history of aggression in the setting on nonadherence with medication, who was initially brought in by staff at residence for increasing agitation and aggression in the setting of 2 months of medication nonadherence. Patient was hospitalized from 8/3-23 on 1N where they were noted to be hostile, disorganized, threatening, malodorous and grossly psychotic. TOO was obtained, and patient eventually administered dual long-acting injectables (Haldol Decanoate and Zyprexa Relprevv), though had declined nearly all PO medications. Patient was transferred to [facility] where they were admitted due to anemia to 6 (medical workup largely unremarkable), patient stabilized and transferred back to [facility] for further management of psychosis.     Diagnostically, presentation consistent with exacerbation of schizophrenia.     Patient has shown overall very little clinical improvement since admission. Spends most of day isolative and talking to self in severely disorganized and largely incomprehensible fashion, with only very brief moments of reality based exchange with staff or peers, or is non-verbal and stares at staff. Has showered recently with staff encouragement, though room remains malodorous. No recent physical aggression, though can appear mistrustful and make vaguely threatening remarks. Sleep erratic, often just a few hours. Still declining all PO medications, on high dose haloperidol BREWER. Patient's TOO order has , will re-apply. Will consider medication changes following renewal.    GI and hematology consulted in response to [facility]'s concerns around chronic anemia. Responses sent to [facility]. No urgent indication for parenteral iron. Spoke to nutrition, added Ensure to diet to try to increase iron intake, will try to encourage use (intermittently accepts). Labs repeated 1/3 and showed stable (mildly improved) anemia.    Patient requires inpatient admission due to very poor self-care and for containment, stabilization, medication management and aftercare planning.    Plan:  1. Brink: admitted involuntary , TOO obtained 10/24, , will need to re-apply  2. Observation: routine checks  3. Collateral: obtained collateral from ACT psychiatrist  4. Psychiatric  -Continue Haldol Decanoate 200mg IM q4 weeks, last given , next due   	-Had received Zyprexa Replevv 405mg qMonth (given )--though no observable benefit above Haldol Decanoate, deferred, will consider re-trial  -Continue clozapine 25mg QHS (refusing)  -Continue depakote 1000mg QHS (refusing)  -Continue benztropine 1mg BID for EPS (refusing)  5. Medical:  -Anemia:   	-s/p medical admission at [facility] for anemia to Hgb 6, required 2u pRBCs. GI workup unremarkable with exception of diverticulosis and colonic polyp  	-Repeat CBCs with anemia 8.6-low 9s  	-Hemoglobin stable to improved on 1/3, will check again ~1 month  	-Encourage Ensure HP (refuses PO iron)  -[facility] requesting hematology c/s for chronic anemia---> wrote chart note , etiology c/w BRYON, recommended GI consult --> consulted GI 12/15 and wrote chart note --> sent to [facility]  -Fall on : likely mechanical, no sustained injuries. PT consult  -Bowel regimen: continue bisacodyl 10mg QHS (refusing), miralax 17g daily (refusing), senna 2 tablets QHS (refusing)  -Continue Vitamin D3 1000units QHS (refusing)  6. PRNs  -Olanzapine 5mg PO or IM for agitation  7. Therapy   -Individual, group and milieu therapy as appropriate   8. Disposition: state application submitted  Patient is a 53 year old adult (goes by "Juan Jose"), living at [facility] on [place] grounds, PMH of GERD, anemia, PPH of schizophrenia, selective mutism, currently with [facility] ACT team, previously with [facility] which , with history of multiple psychiatric hospitalizations, without known history of self-injury or suicide attempts, with history of aggression in the setting on nonadherence with medication, who was initially brought in by staff at residence for increasing agitation and aggression in the setting of 2 months of medication nonadherence. Patient was hospitalized from 8/3-23 on 1N where they were noted to be hostile, disorganized, threatening, malodorous and grossly psychotic. TOO was obtained, and patient eventually administered dual long-acting injectables (Haldol Decanoate and Zyprexa Relprevv), though had declined nearly all PO medications. Patient was transferred to [facility] where they were admitted due to anemia to 6 (medical workup largely unremarkable), patient stabilized and transferred back to [facility] for further management of psychosis.     Diagnostically, presentation consistent with exacerbation of schizophrenia.     Patient has shown overall very little clinical improvement since admission. Spends most of day isolative and talking to self in severely disorganized and largely incomprehensible fashion, with only very brief moments of reality based exchange with staff or peers, or is non-verbal and stares at staff. Has showered recently with staff encouragement, though room remains malodorous. No recent physical aggression, though can appear mistrustful and make vaguely threatening remarks. Sleep erratic, often just a few hours. Still declining all PO medications, on high dose haloperidol BREWER. Patient's TOO order has , will re-apply. Will consider medication changes following renewal.    GI and hematology consulted in response to [facility]'s concerns around chronic anemia. Responses sent to [facility]. No urgent indication for parenteral iron. Spoke to nutrition, added Ensure to diet to try to increase iron intake, will try to encourage use (intermittently accepts). Labs repeated 1/3 and showed stable (mildly improved) anemia.    Patient requires inpatient admission due to very poor self-care and for containment, stabilization, medication management and aftercare planning.    Plan:  1. Brink: admitted involuntary , TOO obtained 10/24, , will need to re-apply  2. Observation: routine checks  3. Collateral: obtained collateral from ACT psychiatrist  4. Psychiatric  -Continue Haldol Decanoate 200mg IM q4 weeks, last given , next due   	-Had received Zyprexa Replevv 405mg qMonth (given )--though no observable benefit above Haldol Decanoate, deferred, will consider re-trial  -Continue clozapine 25mg QHS (refusing)  -Continue depakote 1000mg QHS (refusing)  -Continue benztropine 1mg BID for EPS (refusing)  5. Medical:  -Anemia:   	-s/p medical admission at [facility] for anemia to Hgb 6, required 2u pRBCs. GI workup unremarkable with exception of diverticulosis and colonic polyp  	-Repeat CBCs with anemia 8.6-low 9s  	-Hemoglobin stable to improved on 1/3, will check again ~1 month  	-Encourage Ensure HP (refuses PO iron)  -[facility] requesting hematology c/s for chronic anemia---> wrote chart note , etiology c/w BRYON, recommended GI consult --> consulted GI 12/15 and wrote chart note --> sent to [facility]  -Fall on : likely mechanical, no sustained injuries. PT consult  -Bowel regimen: continue bisacodyl 10mg QHS (refusing), miralax 17g daily (refusing), senna 2 tablets QHS (refusing)  -Continue Vitamin D3 1000units QHS (refusing)  6. PRNs  -Olanzapine 5mg PO or IM for agitation  7. Therapy   -Individual, group and milieu therapy as appropriate   8. Disposition: state application submitted  Patient is a 53 year old adult (goes by "Juan Jose"), living at [facility] on [place] grounds, PMH of GERD, anemia, PPH of schizophrenia, selective mutism, currently with [facility] ACT team, previously with [facility] which , with history of multiple psychiatric hospitalizations, without known history of self-injury or suicide attempts, with history of aggression in the setting on nonadherence with medication, who was initially brought in by staff at residence for increasing agitation and aggression in the setting of 2 months of medication nonadherence. Patient was hospitalized from 8/3-23 on 1N where they were noted to be hostile, disorganized, threatening, malodorous and grossly psychotic. TOO was obtained, and patient eventually administered dual long-acting injectables (Haldol Decanoate and Zyprexa Relprevv), though had declined nearly all PO medications. Patient was transferred to [facility] where they were admitted due to anemia to 6 (medical workup largely unremarkable), patient stabilized and transferred back to [facility] for further management of psychosis.     Diagnostically, presentation consistent with exacerbation of schizophrenia.     Patient has shown overall very little clinical improvement since admission. Spends most of day isolative and talking to self in severely disorganized and largely incomprehensible fashion, with only very brief moments of reality based exchange with staff or peers, or is non-verbal and stares at staff. Has showered recently with staff encouragement, though room remains malodorous. No recent physical aggression, though can appear mistrustful and make vaguely threatening remarks. Sleep erratic, often just a few hours. Still declining all PO medications, on high dose haloperidol BREWER. Patient's TOO order has , re-applied. Will consider medication changes following renewal.    GI and hematology consulted in response to [facility]'s concerns around chronic anemia. Responses sent to [facility]. No urgent indication for parenteral iron. Spoke to nutrition, added Ensure to diet to try to increase iron intake, will try to encourage use (intermittently accepts). Labs repeated 1/3 and showed stable (mildly improved) anemia.    Patient requires inpatient admission due to very poor self-care and for containment, stabilization, medication management and aftercare planning.    Plan:  1. Brink: admitted involuntary , TOO obtained 10/24, , will need to re-apply  2. Observation: routine checks  3. Collateral: obtained collateral from ACT psychiatrist  4. Psychiatric  -Continue Haldol Decanoate 200mg IM q4 weeks, last given , next due   	-Had received Zyprexa Replevv 405mg qMonth (given )--though no observable benefit above Haldol Decanoate, deferred, will consider re-trial  -Continue clozapine 25mg QHS (refusing)  -Continue depakote 1000mg QHS (refusing)  -Continue benztropine 1mg BID for EPS (refusing)  5. Medical:  -Anemia:   	-s/p medical admission at [facility] for anemia to Hgb 6, required 2u pRBCs. GI workup unremarkable with exception of diverticulosis and colonic polyp  	-Repeat CBCs with anemia 8.6-low 9s  	-Hemoglobin stable to improved on 1/3, will check again ~1 month  	-Encourage Ensure HP (refuses PO iron)  -[facility] requesting hematology c/s for chronic anemia---> wrote chart note , etiology c/w BRYON, recommended GI consult --> consulted GI 12/15 and wrote chart note --> sent to [facility]  -Fall on : likely mechanical, no sustained injuries. PT consult  -Bowel regimen: continue bisacodyl 10mg QHS (refusing), miralax 17g daily (refusing), senna 2 tablets QHS (refusing)  -Continue Vitamin D3 1000units QHS (refusing)  6. PRNs  -Olanzapine 5mg PO or IM for agitation  7. Therapy   -Individual, group and milieu therapy as appropriate   8. Disposition: state application submitted  Patient is a 53 year old adult (goes by "Juan Jose"), living at [facility] on [place] [facility], PMH of GERD, anemia, PPH of schizophrenia, selective mutism, currently with [facility] ACT team, previously with [facility] which , with history of multiple psychiatric hospitalizations, without known history of self-injury or suicide attempts, with history of aggression in the setting on nonadherence with medication, who was initially brought in by staff at residence for increasing agitation and aggression in the setting of 2 months of medication nonadherence. Patient was hospitalized from 8/3-23 on 1N where they were noted to be hostile, disorganized, threatening, malodorous and grossly psychotic. TOO was obtained, and patient eventually administered dual long-acting injectables (Haldol Decanoate and Zyprexa Relprevv), though had declined nearly all PO medications. Patient was transferred to [facility] where they were admitted due to anemia to 6 (medical workup largely unremarkable), patient stabilized and transferred back to [facility] for further management of psychosis.     Diagnostically, presentation consistent with exacerbation of schizophrenia.     Patient has shown overall very little clinical improvement since admission. Spends most of day isolative and talking to self in severely disorganized and largely incomprehensible fashion, with only very brief moments of reality based exchange with staff or peers, or is non-verbal and stares at staff. Has showered recently with staff encouragement, though room remains malodorous. No recent physical aggression, though can appear mistrustful and make vaguely threatening remarks. Sleep erratic, often just a few hours. Still declining all PO medications, on high dose haloperidol BREWER. Patient's TOO order has , re-applied. Will consider medication changes following renewal.    GI and hematology consulted in response to [facility]'s concerns around chronic anemia. Responses sent to [facility]. No urgent indication for parenteral iron. Spoke to nutrition, added Ensure to diet to try to increase iron intake, will try to encourage use (intermittently accepts). Labs repeated 1/3 and showed stable (mildly improved) anemia.    Patient requires inpatient admission due to very poor self-care and for containment, stabilization, medication management and aftercare planning.    Plan:  1. Brink: admitted involuntary , TOO obtained 10/24, , will need to re-apply  2. Observation: routine checks  3. Collateral: obtained collateral from ACT psychiatrist  4. Psychiatric  -Continue Haldol Decanoate 200mg IM q4 weeks, last given , next due   	-Had received Zyprexa Replevv 405mg qMonth (given )--though no observable benefit above Haldol Decanoate, deferred, will consider re-trial  -Continue clozapine 25mg QHS (refusing)  -Continue depakote 1000mg QHS (refusing)  -Continue benztropine 1mg BID for EPS (refusing)  5. Medical:  -Anemia:   	-s/p medical admission at [facility] for anemia to Hgb 6, required 2u pRBCs. GI workup unremarkable with exception of diverticulosis and colonic polyp  	-Repeat CBCs with anemia 8.6-low 9s  	-Hemoglobin stable to improved on 1/3, will check again ~1 month  	-Encourage Ensure HP (refuses PO iron)  -[facility] requesting hematology c/s for chronic anemia---> wrote chart note , etiology c/w BRYON, recommended GI consult --> consulted GI 12/15 and wrote chart note --> sent to [facility]  -Fall on : likely mechanical, no sustained injuries. PT consult  -Bowel regimen: continue bisacodyl 10mg QHS (refusing), miralax 17g daily (refusing), senna 2 tablets QHS (refusing)  -Continue Vitamin D3 1000units QHS (refusing)  6. PRNs  -Olanzapine 5mg PO or IM for agitation  7. Therapy   -Individual, group and milieu therapy as appropriate   8. Disposition: state application submitted

## 2024-01-08 NOTE — BH INPATIENT PSYCHIATRY PROGRESS NOTE - NSBHFUPINTERVALHXFT_PSY_A_CORE
Refusing vitals, refusing PO medications, no acute events over weekend  Refusing vitals, refusing PO medications, no acute events over weekend. Slept about 5 hours overnight per log.    Patient found laying in bed, awake. Speaking largely incoherently, at one point stating "get her off." Unable to engage in interview.

## 2024-01-08 NOTE — BH INPATIENT PSYCHIATRY PROGRESS NOTE - NSBHATTESTBILLING_PSY_A_CORE
20897-Cuyrojodof OBS or IP - low complexity OR 25-34 mins 87682-Lewmdidvsk OBS or IP - low complexity OR 25-34 mins

## 2024-01-09 PROCEDURE — 99231 SBSQ HOSP IP/OBS SF/LOW 25: CPT

## 2024-01-09 NOTE — BH INPATIENT PSYCHIATRY PROGRESS NOTE - NSBHFUPINTERVALHXFT_PSY_A_CORE
Refusing vitals, refusing PO medications, no acute events over weekend.  Refusing vitals, refusing PO medications, no acute events over weekend. Slept 5+ hours per sleep log.    On approach, patient laying in bed, awake. Talking to self about nura hudson. Upon this writer's attempt at interview, patient screams GET OUT.

## 2024-01-09 NOTE — BH INPATIENT PSYCHIATRY PROGRESS NOTE - NSBHASSESSSUMMFT_PSY_ALL_CORE
Patient is a 53 year old adult (goes by "Juan Jose"), living at [facility] on [place] grounds, PMH of GERD, anemia, PPH of schizophrenia, selective mutism, currently with [facility] ACT team, previously with [facility] which , with history of multiple psychiatric hospitalizations, without known history of self-injury or suicide attempts, with history of aggression in the setting on nonadherence with medication, who was initially brought in by staff at residence for increasing agitation and aggression in the setting of 2 months of medication nonadherence. Patient was hospitalized from 8/3-23 on 1N where they were noted to be hostile, disorganized, threatening, malodorous and grossly psychotic. TOO was obtained, and patient eventually administered dual long-acting injectables (Haldol Decanoate and Zyprexa Relprevv), though had declined nearly all PO medications. Patient was transferred to [facility] where they were admitted due to anemia to 6 (medical workup largely unremarkable), patient stabilized and transferred back to [facility] for further management of psychosis.     Diagnostically, presentation consistent with exacerbation of schizophrenia.     Patient has shown overall very little clinical improvement since admission. Spends most of day isolative and talking to self in severely disorganized and largely incomprehensible fashion, with only very brief moments of reality based exchange with staff or peers, or is non-verbal and stares at staff. Has showered recently with staff encouragement, though room remains malodorous. No recent physical aggression, though can appear mistrustful and make vaguely threatening remarks. Sleep erratic, often just a few hours. Still declining all PO medications, on high dose haloperidol BREWER. Patient's TOO order has , re-applied. Will consider medication changes following renewal.    GI and hematology consulted in response to [facility]'s concerns around chronic anemia. Responses sent to [facility]. No urgent indication for parenteral iron. Spoke to nutrition, added Ensure to diet to try to increase iron intake, will try to encourage use (intermittently accepts). Labs repeated 1/3 and showed stable (mildly improved) anemia.    Patient requires inpatient admission due to very poor self-care and for containment, stabilization, medication management and aftercare planning.    Plan:  1. Brink: admitted involuntary , TOO obtained 10/24, , will need to re-apply  2. Observation: routine checks  3. Collateral: obtained collateral from ACT psychiatrist  4. Psychiatric  -Continue Haldol Decanoate 200mg IM q4 weeks, last given , next due   	-Had received Zyprexa Replevv 405mg qMonth (given )--though no observable benefit above Haldol Decanoate, deferred, will consider re-trial  -Continue clozapine 25mg QHS (refusing)  -Continue depakote 1000mg QHS (refusing)  -Continue benztropine 1mg BID for EPS (refusing)  5. Medical:  -Anemia:   	-s/p medical admission at [facility] for anemia to Hgb 6, required 2u pRBCs. GI workup unremarkable with exception of diverticulosis and colonic polyp  	-Repeat CBCs with anemia 8.6-low 9s  	-Hemoglobin stable to improved on 1/3, will check again ~1 month  	-Encourage Ensure HP (refuses PO iron)  -[facility] requesting hematology c/s for chronic anemia---> wrote chart note , etiology c/w BRYON, recommended GI consult --> consulted GI 12/15 and wrote chart note --> sent to [facility]  -Fall on : likely mechanical, no sustained injuries. PT consult  -Bowel regimen: continue bisacodyl 10mg QHS (refusing), miralax 17g daily (refusing), senna 2 tablets QHS (refusing)  -Continue Vitamin D3 1000units QHS (refusing)  6. PRNs  -Olanzapine 5mg PO or IM for agitation  7. Therapy   -Individual, group and milieu therapy as appropriate   8. Disposition: state application submitted  Patient is a 53 year old adult (goes by "Juan Jose"), living at [facility] on [place] grounds, PMH of GERD, anemia, PPH of schizophrenia, selective mutism, currently with [facility] ACT team, previously with [facility] which , with history of multiple psychiatric hospitalizations, without known history of self-injury or suicide attempts, with history of aggression in the setting on nonadherence with medication, who was initially brought in by staff at residence for increasing agitation and aggression in the setting of 2 months of medication nonadherence. Patient was hospitalized from 8/3-23 on 1N where they were noted to be hostile, disorganized, threatening, malodorous and grossly psychotic. TOO was obtained, and patient eventually administered dual long-acting injectables (Haldol Decanoate and Zyprexa Relprevv), though had declined nearly all PO medications. Patient was transferred to [facility] where they were admitted due to anemia to 6 (medical workup largely unremarkable), patient stabilized and transferred back to [facility] for further management of psychosis.     Diagnostically, presentation consistent with exacerbation of schizophrenia.     Patient has shown overall very little clinical improvement since admission. Spends most of day isolative and talking to self in severely disorganized and largely incomprehensible fashion, with only very brief moments of reality based exchange with staff or peers, or is non-verbal and stares at staff. Has showered recently with staff encouragement, though room remains malodorous. No recent physical aggression, though can appear mistrustful and make vaguely threatening remarks. Sleep erratic, often just a few hours. Still declining all PO medications, on high dose haloperidol BREWER. Patient's TOO order has , re-applied. Will consider medication changes following renewal.    GI and hematology consulted in response to [facility]'s concerns around chronic anemia. Responses sent to [facility]. No urgent indication for parenteral iron. Spoke to nutrition, added Ensure to diet to try to increase iron intake, will try to encourage use (intermittently accepts). Labs repeated 1/3 and showed stable (mildly improved) anemia.    Patient requires inpatient admission due to very poor self-care and for containment, stabilization, medication management and aftercare planning.    Plan:  1. Brink: admitted involuntary , TOO obtained 10/24, , will need to re-apply  2. Observation: routine checks  3. Collateral: obtained collateral from ACT psychiatrist  4. Psychiatric  -Continue Haldol Decanoate 200mg IM q4 weeks, last given , next due   	-Had received Zyprexa Replevv 405mg qMonth (given )--though no observable benefit above Haldol Decanoate, deferred, will consider re-trial  -Continue clozapine 25mg QHS (refusing)  -Continue depakote 1000mg QHS (refusing)  -Continue benztropine 1mg BID for EPS (refusing)  5. Medical:  -Anemia:   	-s/p medical admission at [facility] for anemia to Hgb 6, required 2u pRBCs. GI workup unremarkable with exception of diverticulosis and colonic polyp  	-Repeat CBCs with anemia 8.6-low 9s  	-Hemoglobin stable to improved on 1/3, will check again ~1 month  	-Encourage Ensure HP (refuses PO iron)  -[facility] requesting hematology c/s for chronic anemia---> wrote chart note , etiology c/w BRYON, recommended GI consult --> consulted GI 12/15 and wrote chart note --> sent to [facility]  -Fall on : likely mechanical, no sustained injuries. PT consult  -Bowel regimen: continue bisacodyl 10mg QHS (refusing), miralax 17g daily (refusing), senna 2 tablets QHS (refusing)  -Continue Vitamin D3 1000units QHS (refusing)  6. PRNs  -Olanzapine 5mg PO or IM for agitation  7. Therapy   -Individual, group and milieu therapy as appropriate   8. Disposition: state application submitted

## 2024-01-09 NOTE — BH INPATIENT PSYCHIATRY PROGRESS NOTE - NSTXVIOLNTINTERMD_PSY_ALL_CORE
med management with Haldol BRWEER, clozapine (refusing), VPA (refusing) med management with Haldol BREWER, clozapine (refusing), VPA (refusing)

## 2024-01-09 NOTE — BH INPATIENT PSYCHIATRY PROGRESS NOTE - MSE UNSTRUCTURED FT
Appearance: laying in bed, wearing hospital attire  Behavior: uncooperative  Speech: incoherent speech  Mood: unable to assess  Affect: irritable  Thought process: severely disorganized   Thought content: can make vaguely violent/threatening statements, not observed harming self or others.   Perceptions: observed talking to self  Insight: very poor  Judgement: very poor, refusing vitals intermittently, refusing PO medications, very poor ADLs  Cognition: unable to assess Appearance: laying in bed, wearing hospital attire  Behavior: uncooperative  Speech: incoherent speech  Mood: unable to assess  Affect: irritable  Thought process: severely disorganized   Thought content: can make vaguely violent/threatening statements, today screamed GET OUT, not observed harming self or others.   Perceptions: observed talking to self  Insight: very poor  Judgement: very poor, refusing vitals intermittently, refusing PO medications, very poor ADLs  Cognition: unable to assess

## 2024-01-09 NOTE — BH INPATIENT PSYCHIATRY PROGRESS NOTE - NSBHATTESTBILLING_PSY_A_CORE
07041-Icbdywsxeq OBS or IP - low complexity OR 25-34 mins 28439-Samwdgmmfx OBS or IP - low complexity OR 25-34 mins

## 2024-01-10 PROCEDURE — 99231 SBSQ HOSP IP/OBS SF/LOW 25: CPT

## 2024-01-10 NOTE — BH INPATIENT PSYCHIATRY PROGRESS NOTE - NSBHFUPINTERVALHXFT_PSY_A_CORE
Refusing vitals, refusing PO medications, no acute events.  Slept 3 hours per sleep log.    Patient seen awake in bed. Attempted to engage with patient but they immediately screamed GET OUT repeatedly. Showered today with staff encouragement.

## 2024-01-10 NOTE — BH INPATIENT PSYCHIATRY PROGRESS NOTE - NSBHATTESTBILLING_PSY_A_CORE
44887-Uzymovvpoq OBS or IP - low complexity OR 25-34 mins 14174-Fywkkfbwpy OBS or IP - low complexity OR 25-34 mins

## 2024-01-10 NOTE — BH INPATIENT PSYCHIATRY PROGRESS NOTE - NSBHASSESSSUMMFT_PSY_ALL_CORE
Patient is a 53 year old adult (goes by "Juan Jose"), living at [facility] on [place] grounds, PMH of GERD, anemia, PPH of schizophrenia, selective mutism, currently with [facility] ACT team, previously with [facility] which , with history of multiple psychiatric hospitalizations, without known history of self-injury or suicide attempts, with history of aggression in the setting on nonadherence with medication, who was initially brought in by staff at residence for increasing agitation and aggression in the setting of 2 months of medication nonadherence. Patient was hospitalized from 8/3-23 on 1N where they were noted to be hostile, disorganized, threatening, malodorous and grossly psychotic. TOO was obtained, and patient eventually administered dual long-acting injectables (Haldol Decanoate and Zyprexa Relprevv), though had declined nearly all PO medications. Patient was transferred to [facility] where they were admitted due to anemia to 6 (medical workup largely unremarkable), patient stabilized and transferred back to [facility] for further management of psychosis.     Diagnostically, presentation consistent with exacerbation of schizophrenia.     Patient has shown overall very little clinical improvement since admission. Spends most of day isolative and talking to self in severely disorganized and largely incomprehensible fashion, with only very brief moments of reality based exchange with staff or peers, or is non-verbal and stares at staff. Has showered recently with staff encouragement, though room remains malodorous. No recent physical aggression, though can appear mistrustful and make vaguely threatening remarks. Sleep erratic, often just a few hours. Still declining all PO medications, on high dose haloperidol BREWER. Patient's TOO order has , re-applied. Will consider medication changes following renewal.    GI and hematology consulted in response to [facility]'s concerns around chronic anemia. Responses sent to [facility]. No urgent indication for parenteral iron. Spoke to nutrition, added Ensure to diet to try to increase iron intake, will try to encourage use (intermittently accepts). Labs repeated 1/3 and showed stable (mildly improved) anemia.    Patient requires inpatient admission due to very poor self-care and for containment, stabilization, medication management and aftercare planning.    Plan:  1. Brink: admitted involuntary , TOO obtained 10/24, , re-application hearing today  2. Observation: routine checks  3. Collateral: obtained collateral from ACT psychiatrist  4. Psychiatric  -Continue Haldol Decanoate 200mg IM q4 weeks, last given , next due   	-Had received Zyprexa Replevv 405mg qMonth (given )--though no observable benefit above Haldol Decanoate, deferred, will consider re-trial  -Continue clozapine 25mg QHS (refusing)  -Continue depakote 1000mg QHS (refusing)  -Continue benztropine 1mg BID for EPS (refusing)  5. Medical:  -Anemia:   	-s/p medical admission at [facility] for anemia to Hgb 6, required 2u pRBCs. GI workup unremarkable with exception of diverticulosis and colonic polyp  	-Repeat CBCs with anemia 8.6-low 9s  	-Hemoglobin stable to improved on 1/3, will check again ~1 month  	-Encourage Ensure HP (refuses PO iron)  -[facility] requesting hematology c/s for chronic anemia---> wrote chart note , etiology c/w BRYON, recommended GI consult --> consulted GI 12/15 and wrote chart note --> sent to [facility]  -Fall on : likely mechanical, no sustained injuries. PT consult  -Bowel regimen: continue bisacodyl 10mg QHS (refusing), miralax 17g daily (refusing), senna 2 tablets QHS (refusing)  -Continue Vitamin D3 1000units QHS (refusing)  6. PRNs  -Olanzapine 5mg PO or IM for agitation  7. Therapy   -Individual, group and milieu therapy as appropriate   8. Disposition: state application submitted , on waiting list Patient is a 53 year old adult (goes by "Juan Jose"), living at [facility] on [place] grounds, PMH of GERD, anemia, PPH of schizophrenia, selective mutism, currently with [facility] ACT team, previously with [facility] which , with history of multiple psychiatric hospitalizations, without known history of self-injury or suicide attempts, with history of aggression in the setting on nonadherence with medication, who was initially brought in by staff at residence for increasing agitation and aggression in the setting of 2 months of medication nonadherence. Patient was hospitalized from 8/3-23 on 1N where they were noted to be hostile, disorganized, threatening, malodorous and grossly psychotic. TOO was obtained, and patient eventually administered dual long-acting injectables (Haldol Decanoate and Zyprexa Relprevv), though had declined nearly all PO medications. Patient was transferred to [facility] where they were admitted due to anemia to 6 (medical workup largely unremarkable), patient stabilized and transferred back to [facility] for further management of psychosis.     Diagnostically, presentation consistent with exacerbation of schizophrenia.     Patient has shown overall very little clinical improvement since admission. Spends most of day isolative and talking to self in severely disorganized and largely incomprehensible fashion, with only very brief moments of reality based exchange with staff or peers, or is non-verbal and stares at staff. Has showered recently with staff encouragement, though room remains malodorous. No recent physical aggression, though can appear mistrustful and make vaguely threatening remarks. Sleep erratic, often just a few hours. Still declining all PO medications, on high dose haloperidol BREWER. Patient's TOO order has , re-applied. Will consider medication changes following renewal.    GI and hematology consulted in response to [facility]'s concerns around chronic anemia. Responses sent to [facility]. No urgent indication for parenteral iron. Spoke to nutrition, added Ensure to diet to try to increase iron intake, will try to encourage use (intermittently accepts). Labs repeated 1/3 and showed stable (mildly improved) anemia.    Patient requires inpatient admission due to very poor self-care and for containment, stabilization, medication management and aftercare planning.    Plan:  1. Brink: admitted involuntary , TOO obtained 10/24, , re-application hearing today  2. Observation: routine checks  3. Collateral: obtained collateral from ACT psychiatrist  4. Psychiatric  -Continue Haldol Decanoate 200mg IM q4 weeks, last given , next due   	-Had received Zyprexa Replevv 405mg qMonth (given )--though no observable benefit above Haldol Decanoate, deferred, will consider re-trial  -Continue clozapine 25mg QHS (refusing)  -Continue depakote 1000mg QHS (refusing)  -Continue benztropine 1mg BID for EPS (refusing)  5. Medical:  -Anemia:   	-s/p medical admission at [facility] for anemia to Hgb 6, required 2u pRBCs. GI workup unremarkable with exception of diverticulosis and colonic polyp  	-Repeat CBCs with anemia 8.6-low 9s  	-Hemoglobin stable to improved on 1/3, will check again ~1 month  	-Encourage Ensure HP (refuses PO iron)  -[facility] requesting hematology c/s for chronic anemia---> wrote chart note , etiology c/w BRYON, recommended GI consult --> consulted GI 12/15 and wrote chart note --> sent to [facility]  -Fall on : likely mechanical, no sustained injuries. PT consult  -Bowel regimen: continue bisacodyl 10mg QHS (refusing), miralax 17g daily (refusing), senna 2 tablets QHS (refusing)  -Continue Vitamin D3 1000units QHS (refusing)  6. PRNs  -Olanzapine 5mg PO or IM for agitation  7. Therapy   -Individual, group and milieu therapy as appropriate   8. Disposition: state application submitted , on waiting list

## 2024-01-10 NOTE — BH INPATIENT PSYCHIATRY PROGRESS NOTE - MSE UNSTRUCTURED FT
Appearance: laying in bed, wearing hospital attire  Behavior: uncooperative  Speech: loud  Mood: unable to assess  Affect: irritable  Thought process: severely disorganized   Thought content: can make vaguely violent/threatening statements, today screamed GET OUT repeatedly, not observed harming self or others.   Perceptions: observed talking to self  Insight: very poor  Judgement: very poor, refusing vitals intermittently, refusing PO medications, very poor ADLs  Cognition: unable to assess

## 2024-01-10 NOTE — BH CHART NOTE - NSEVENTNOTEFT_PSY_ALL_CORE
DIRECTOR OF INPATIENT PSYCHIATRY NOTE:  I have evaluated the patient’s need for medication over her objection in the presence of the LS  Mr. Anselmo Harmon, the risks and benefits of medication, and her ability to make a reasoned decision.      Briefly, the pt is a 53 year old adult (goes by "Juan Jose"), living at [facility] on [place] grounds, PMH of GERD, anemia, PPH of schizophrenia, selective mutism, currently with [facility], previously with [facility] which , with history of multiple psychiatric hospitalizations, without known history of self-injury or suicide attempts, with history of aggression in the setting on nonadherence with medication, who was initially brought in by staff at residence for increasing agitation and aggression in the setting of 2 months of medication nonadherence. Pt has been admitted since 2023, required 2 TOO and one medical admission to [facility] for anemia. Her 2PC  in December and she needs a new TOO.    On evaluation, the   She has been prescribed Haldol dec, Cogentin, Depakene ferrous sulfate and bowel regimen, Vitamin D3.    She has been given    She has been refusing oral medications as prescribed. She does not understand the extent of her illness.    It is my opinion that this patient currently experiences psychotic symptoms that are severely impacting her safety and ability to care for herself, and would likely benefit from antipsychotic medications.  Furthermore, it is my opinion that she lacks capacity to refuse antipsychotic therapy.  I have informed the patient of my decision and that unless she withdraws her objection to taking medications, we will make application to court for authorization to treat her over her objection. I have notified the patient and LS of this decision by letter.   DIRECTOR OF INPATIENT PSYCHIATRY NOTE:  I have evaluated the patient’s need for medication over her objection in the presence of the LS  Mr. Anselmo Harmon, the risks and benefits of medication, and her ability to make a reasoned decision.      Briefly, the pt is a 53 year old adult (goes by "Juan Jose"), living at [facility] on [place] grounds, PMH of GERD, anemia, PPH of schizophrenia, selective mutism, [facility], previously with [facility] which , with history of multiple psychiatric hospitalizations, without known history of self-injury or suicide attempts, with history of aggression in the setting on nonadherence with medication, who was initially brought in by staff at residence for increasing agitation and aggression in the setting of 2 months of medication nonadherence. Pt has been admitted since 2023, required 2 TOO and one medical admission to [facility] for anemia. Her 2PC  in December and she needs a new TOO.    On evaluation, the   She has been prescribed Haldol dec, Cogentin, Depakene ferrous sulfate and bowel regimen, Vitamin D3.    She has been given    She has been refusing oral medications as prescribed. She does not understand the extent of her illness.    It is my opinion that this patient currently experiences psychotic symptoms that are severely impacting her safety and ability to care for herself, and would likely benefit from antipsychotic medications.  Furthermore, it is my opinion that she lacks capacity to refuse antipsychotic therapy.  I have informed the patient of my decision and that unless she withdraws her objection to taking medications, we will make application to court for authorization to treat her over her objection. I have notified the patient and LS of this decision by letter.   DIRECTOR OF INPATIENT PSYCHIATRY NOTE:  I have evaluated the patient’s need for medication over her objection in the presence of the [facility]  Mr. Anselmo Harmon, the risks and benefits of medication, and her ability to make a reasoned decision.      Briefly, the pt is a 53 year old adult (goes by "Juan Jose"), living at [facility] on [place] grounds, PMH of GERD, anemia, PPH of schizophrenia, selective mutism, [facility], previously with [facility] which , with history of multiple psychiatric hospitalizations, without known history of self-injury or suicide attempts, with history of aggression in the setting on nonadherence with medication, who was initially brought in by staff at residence for increasing agitation and aggression in the setting of 2 months of medication nonadherence. Pt has been admitted since 2023, required 2 TOO and one medical admission to [facility] for anemia. Her 2PC  in December and she needs a new TOO.    On evaluation, the patient was mostly mute. After hearing this writer's intruducing herself and reason for the meeting, she stated, "Kill them all".   She has been prescribed Haldol dec, Cogentin, Depakene ferrous sulfate and bowel regimen, Vitamin D3.    She has been given the last Haldol Dec in December.    She has been refusing oral medications as prescribed. She does not understand the extent of her illness.    It is my opinion that this patient currently experiences psychotic symptoms that are severely impacting her safety and ability to care for herself, and would likely benefit from antipsychotic medications.  Furthermore, it is my opinion that she lacks capacity to refuse antipsychotic therapy.  I have informed the patient of my decision and that unless she withdraws her objection to taking medications, we will make application to court for authorization to treat her over her objection. I have notified the patient and LS of this decision by letter.   DIRECTOR OF INPATIENT PSYCHIATRY NOTE:  I have evaluated the patient’s need for medication over her objection in the presence of the [facility]  Mr. Anselmo Harmon, the risks and benefits of medication, and her ability to make a reasoned decision.      Briefly, the pt is a 53 year old adult (goes by "Juan Jose"), living at [facility] on [place] grounds, PMH of GERD, anemia, PPH of schizophrenia, selective mutism, [facility], previously with [facility] which , with history of multiple psychiatric hospitalizations, without known history of self-injury or suicide attempts, with history of aggression in the setting on nonadherence with medication, who was initially brought in by staff at residence for increasing agitation and aggression in the setting of 2 months of medication nonadherence. Pt has been admitted since 2023, required 2 TOO and one medical admission to [facility] for anemia. Her 2PC  in December and she needs a new TOO.    On evaluation, the patient was mostly mute. After hearing this writer's intruducing herself and reason for the meeting, she stated, "Kill them all".   She has been prescribed Haldol dec, Cogentin, Depakene ferrous sulfate and bowel regimen, Vitamin D3.    She has been given the last Haldol Dec in December.    She has been refusing oral medications as prescribed. She does not understand the extent of her illness.    It is my opinion that this patient currently experiences psychotic symptoms that are severely impacting her safety and ability to care for herself, and would likely benefit from antipsychotic medications.  Furthermore, it is my opinion that she lacks capacity to refuse antipsychotic therapy.  I have informed the patient of my decision and that unless she withdraws her objection to taking medications, we will make application to court for authorization to treat her over her objection. I have notified the patient and LS of this decision by letter.

## 2024-01-11 PROCEDURE — 99231 SBSQ HOSP IP/OBS SF/LOW 25: CPT

## 2024-01-11 NOTE — BH INPATIENT PSYCHIATRY PROGRESS NOTE - MSE UNSTRUCTURED FT
Appearance: laying in bed, wearing hospital attire  Behavior: uncooperative  Speech: loud  Mood: unable to assess  Affect: irritable  Thought process: severely disorganized   Thought content: can make vaguely violent/threatening statements, today screamed GET OUT repeatedly, not observed harming self or others.   Perceptions: observed talking to self  Insight: very poor  Judgement: very poor, refusing vitals intermittently, refusing PO medications, very poor ADLs  Cognition: unable to assess Appearance: laying in bed, wearing hospital attire  Behavior: uncooperative  Speech: soft, difficult to understand  Mood: unable to assess  Affect: irritable  Thought process: severely disorganized   Thought content: can make vaguely violent/threatening statements, today stated "kill her," repeatedly, not observed harming self or others.   Perceptions: observed talking to self  Insight: very poor  Judgement: very poor, refusing vitals intermittently, refusing PO medications, very poor ADLs  Cognition: unable to assess

## 2024-01-11 NOTE — BH INPATIENT PSYCHIATRY PROGRESS NOTE - NSBHATTESTBILLING_PSY_A_CORE
[id number] OBS or IP - low complexity OR 25-34 mins [id number] OBS or IP - low complexity OR 25-34 mins

## 2024-01-11 NOTE — BH INPATIENT PSYCHIATRY PROGRESS NOTE - NSBHASSESSSUMMFT_PSY_ALL_CORE
Patient is a 53 year old adult (goes by "Juan Jose"), living at [facility] on [place] grounds, PMH of GERD, anemia, PPH of schizophrenia, selective mutism, currently with [facility] ACT team, previously with [facility] which , with history of multiple psychiatric hospitalizations, without known history of self-injury or suicide attempts, with history of aggression in the setting on nonadherence with medication, who was initially brought in by staff at residence for increasing agitation and aggression in the setting of 2 months of medication nonadherence. Patient was hospitalized from 8/3-23 on 1N where they were noted to be hostile, disorganized, threatening, malodorous and grossly psychotic. TOO was obtained, and patient eventually administered dual long-acting injectables (Haldol Decanoate and Zyprexa Relprevv), though had declined nearly all PO medications. Patient was transferred to [facility] where they were admitted due to anemia to 6 (medical workup largely unremarkable), patient stabilized and transferred back to [facility] for further management of psychosis.     Diagnostically, presentation consistent with exacerbation of schizophrenia.     Patient has shown overall very little clinical improvement since admission. Spends most of day isolative and talking to self in severely disorganized and largely incomprehensible fashion, with only very brief moments of reality based exchange with staff or peers, or is non-verbal and stares at staff. Has showered recently with staff encouragement, though room remains malodorous. No recent physical aggression, though can appear mistrustful and make vaguely threatening remarks. Sleep erratic, often just a few hours. Still declining all PO medications, on high dose haloperidol BREWER. Patient's TOO order has , re-applied. Will consider medication changes following renewal.    GI and hematology consulted in response to [facility]'s concerns around chronic anemia. Responses sent to [facility]. No urgent indication for parenteral iron. Spoke to nutrition, added Ensure to diet to try to increase iron intake, will try to encourage use (intermittently accepts). Labs repeated 1/3 and showed stable (mildly improved) anemia.    Patient requires inpatient admission due to very poor self-care and for containment, stabilization, medication management and aftercare planning.    Plan:  1. Brink: admitted involuntary , TOO obtained 10/24, , re-application hearing today  2. Observation: routine checks  3. Collateral: obtained collateral from ACT psychiatrist  4. Psychiatric  -Continue Haldol Decanoate 200mg IM q4 weeks, last given , next due   	-Had received Zyprexa Replevv 405mg qMonth (given )--though no observable benefit above Haldol Decanoate, deferred, will consider re-trial  -Continue clozapine 25mg QHS (refusing)  -Continue depakote 1000mg QHS (refusing)  -Continue benztropine 1mg BID for EPS (refusing)  5. Medical:  -Anemia:   	-s/p medical admission at [facility] for anemia to Hgb 6, required 2u pRBCs. GI workup unremarkable with exception of diverticulosis and colonic polyp  	-Repeat CBCs with anemia 8.6-low 9s  	-Hemoglobin stable to improved on 1/3, will check again ~1 month  	-Encourage Ensure HP (refuses PO iron)  -[facility] requesting hematology c/s for chronic anemia---> wrote chart note , etiology c/w BRYON, recommended GI consult --> consulted GI 12/15 and wrote chart note --> sent to [facility]  -Fall on : likely mechanical, no sustained injuries. PT consult  -Bowel regimen: continue bisacodyl 10mg QHS (refusing), miralax 17g daily (refusing), senna 2 tablets QHS (refusing)  -Continue Vitamin D3 1000units QHS (refusing)  6. PRNs  -Olanzapine 5mg PO or IM for agitation  7. Therapy   -Individual, group and milieu therapy as appropriate   8. Disposition: state application submitted , on waiting list Patient is a 53 year old adult (goes by "Juan Jose"), living at [facility] on [place] grounds, PMH of GERD, anemia, PPH of schizophrenia, selective mutism, currently with [facility] ACT team, previously with [facility] which , with history of multiple psychiatric hospitalizations, without known history of self-injury or suicide attempts, with history of aggression in the setting on nonadherence with medication, who was initially brought in by staff at residence for increasing agitation and aggression in the setting of 2 months of medication nonadherence. Patient was hospitalized from 8/3-23 on 1N where they were noted to be hostile, disorganized, threatening, malodorous and grossly psychotic. TOO was obtained, and patient eventually administered dual long-acting injectables (Haldol Decanoate and Zyprexa Relprevv), though had declined nearly all PO medications. Patient was transferred to [facility] where they were admitted due to anemia to 6 (medical workup largely unremarkable), patient stabilized and transferred back to [facility] for further management of psychosis.     Diagnostically, presentation consistent with exacerbation of schizophrenia.     Patient has shown overall very little clinical improvement since admission. Spends most of day isolative and talking to self in severely disorganized and largely incomprehensible fashion, with only very brief moments of reality based exchange with staff or peers, or is non-verbal and stares at staff. Has showered recently with staff encouragement, though room remains malodorous. No recent physical aggression, though can appear mistrustful and make vaguely threatening remarks. Sleep erratic, often just a few hours. Still declining all PO medications, on high dose haloperidol BREWER. Patient's TOO order has , re-applied. Will consider medication changes following renewal.    GI and hematology consulted in response to [facility]'s concerns around chronic anemia. Responses sent to [facility]. No urgent indication for parenteral iron. Spoke to nutrition, added Ensure to diet to try to increase iron intake, will try to encourage use (intermittently accepts). Labs repeated 1/3 and showed stable (mildly improved) anemia.    Patient requires inpatient admission due to very poor self-care and for containment, stabilization, medication management and aftercare planning.    Plan:  1. Brink: admitted involuntary , TOO obtained 10/24, , re-application hearing today  2. Observation: routine checks  3. Collateral: obtained collateral from ACT psychiatrist  4. Psychiatric  -Continue Haldol Decanoate 200mg IM q4 weeks, last given , next due   	-Had received Zyprexa Replevv 405mg qMonth (given )--though no observable benefit above Haldol Decanoate, deferred, will consider re-trial  -Continue clozapine 25mg QHS (refusing)  -Continue depakote 1000mg QHS (refusing)  -Continue benztropine 1mg BID for EPS (refusing)  5. Medical:  -Anemia:   	-s/p medical admission at [facility] for anemia to Hgb 6, required 2u pRBCs. GI workup unremarkable with exception of diverticulosis and colonic polyp  	-Repeat CBCs with anemia 8.6-low 9s  	-Hemoglobin stable to improved on 1/3, will check again ~1 month  	-Encourage Ensure HP (refuses PO iron)  -[facility] requesting hematology c/s for chronic anemia---> wrote chart note , etiology c/w BRYON, recommended GI consult --> consulted GI 12/15 and wrote chart note --> sent to [facility]  -Fall on : likely mechanical, no sustained injuries. PT consult  -Bowel regimen: continue bisacodyl 10mg QHS (refusing), miralax 17g daily (refusing), senna 2 tablets QHS (refusing)  -Continue Vitamin D3 1000units QHS (refusing)  6. PRNs  -Olanzapine 5mg PO or IM for agitation  7. Therapy   -Individual, group and milieu therapy as appropriate   8. Disposition: state application submitted , on waiting list Patient is a 53 year old adult (goes by "Juan Jose"), living at [facility] on [place] grounds, PMH of GERD, anemia, PPH of schizophrenia, selective mutism, currently with [facility] ACT team, previously with [facility] which , with history of multiple psychiatric hospitalizations, without known history of self-injury or suicide attempts, with history of aggression in the setting on nonadherence with medication, who was initially brought in by staff at residence for increasing agitation and aggression in the setting of 2 months of medication nonadherence. Patient was hospitalized from 8/3-23 on 1N where they were noted to be hostile, disorganized, threatening, malodorous and grossly psychotic. TOO was obtained, and patient eventually administered dual long-acting injectables (Haldol Decanoate and Zyprexa Relprevv), though had declined nearly all PO medications. Patient was transferred to [facility] where they were admitted due to anemia to 6 (medical workup largely unremarkable), patient stabilized and transferred back to [facility] for further management of psychosis.     Diagnostically, presentation consistent with exacerbation of schizophrenia.     Patient has shown overall very little clinical improvement since admission. Spends most of day isolative and talking to self in severely disorganized and largely incomprehensible fashion, with only very brief moments of reality based exchange with staff or peers, or is non-verbal and stares at staff. Has showered recently with staff encouragement, though room remains malodorous. No recent physical aggression, though can appear mistrustful and make vaguely threatening remarks. Sleep erratic, often just a few hours. Still declining all PO medications, on high dose haloperidol BREWER. Patient's TOO order has , re-applied. Will consider medication changes following renewal.    GI and hematology consulted in response to [facility]'s concerns around chronic anemia. Responses sent to [facility]. No urgent indication for parenteral iron. Spoke to nutrition, added Ensure to diet to try to increase iron intake, will try to encourage use (intermittently accepts). Labs repeated 1/3 and showed stable (mildly improved) anemia.    Patient requires inpatient admission due to very poor self-care and for containment, stabilization, medication management and aftercare planning.    Plan:  1. Brink: admitted involuntary , TOO obtained 10/24, , re-application hearing 10/10  2. Observation: routine checks  3. Collateral: obtained collateral from ACT psychiatrist  4. Psychiatric  -Continue Haldol Decanoate 200mg IM q4 weeks, last given , next due   	-Had received Zyprexa Replevv 405mg qMonth (given )--though no observable benefit above Haldol Decanoate, deferred, will consider re-trial  -Continue clozapine 25mg QHS (refusing)  -Continue depakote 1000mg QHS (refusing)  -Continue benztropine 1mg BID for EPS (refusing)  5. Medical:  -Anemia:   	-s/p medical admission at [facility] for anemia to Hgb 6, required 2u pRBCs. GI workup unremarkable with exception of diverticulosis and colonic polyp  	-Repeat CBCs with anemia 8.6-low 9s  	-Hemoglobin stable to improved on 1/3, will check again ~1 month  	-Encourage Ensure HP (refuses PO iron)  -[facility] requesting hematology c/s for chronic anemia---> wrote chart note , etiology c/w BRYON, recommended GI consult --> consulted GI 12/15 and wrote chart note --> sent to [facility]  -Fall on : likely mechanical, no sustained injuries. PT consult  -Bowel regimen: continue bisacodyl 10mg QHS (refusing), miralax 17g daily (refusing), senna 2 tablets QHS (refusing)  -Continue Vitamin D3 1000units QHS (refusing)  6. PRNs  -Olanzapine 5mg PO or IM for agitation  7. Therapy   -Individual, group and milieu therapy as appropriate   8. Disposition: state application submitted , on waiting list Patient is a 53 year old adult (goes by "Juan Jose"), living at [facility] on [place] grounds, PMH of GERD, anemia, PPH of schizophrenia, selective mutism, currently with [facility] ACT team, previously with [facility] which , with history of multiple psychiatric hospitalizations, without known history of self-injury or suicide attempts, with history of aggression in the setting on nonadherence with medication, who was initially brought in by staff at residence for increasing agitation and aggression in the setting of 2 months of medication nonadherence. Patient was hospitalized from 8/3-23 on 1N where they were noted to be hostile, disorganized, threatening, malodorous and grossly psychotic. TOO was obtained, and patient eventually administered dual long-acting injectables (Haldol Decanoate and Zyprexa Relprevv), though had declined nearly all PO medications. Patient was transferred to [facility] where they were admitted due to anemia to 6 (medical workup largely unremarkable), patient stabilized and transferred back to [facility] for further management of psychosis.     Diagnostically, presentation consistent with exacerbation of schizophrenia.     Patient has shown overall very little clinical improvement since admission. Spends most of day isolative and talking to self in severely disorganized and largely incomprehensible fashion, with only very brief moments of reality based exchange with staff or peers, or is non-verbal and stares at staff. Has showered recently with staff encouragement, though room remains malodorous. No recent physical aggression, though can appear mistrustful and make vaguely threatening remarks. Sleep erratic, often just a few hours. Still declining all PO medications, on high dose haloperidol BREWER. Patient's TOO order has , re-applied. Will consider medication changes following renewal.    GI and hematology consulted in response to [facility]'s concerns around chronic anemia. Responses sent to [facility]. No urgent indication for parenteral iron. Spoke to nutrition, added Ensure to diet to try to increase iron intake, will try to encourage use (intermittently accepts). Labs repeated 1/3 and showed stable (mildly improved) anemia.    Patient requires inpatient admission due to very poor self-care and for containment, stabilization, medication management and aftercare planning.    Plan:  1. Brink: admitted involuntary , TOO obtained 10/24, , re-application hearing 10/10  2. Observation: routine checks  3. Collateral: obtained collateral from ACT psychiatrist  4. Psychiatric  -Continue Haldol Decanoate 200mg IM q4 weeks, last given , next due   	-Had received Zyprexa Replevv 405mg qMonth (given )--though no observable benefit above Haldol Decanoate, deferred, will consider re-trial  -Continue clozapine 25mg QHS (refusing)  -Continue depakote 1000mg QHS (refusing)  -Continue benztropine 1mg BID for EPS (refusing)  5. Medical:  -Anemia:   	-s/p medical admission at [facility] for anemia to Hgb 6, required 2u pRBCs. GI workup unremarkable with exception of diverticulosis and colonic polyp  	-Repeat CBCs with anemia 8.6-low 9s  	-Hemoglobin stable to improved on 1/3, will check again ~1 month  	-Encourage Ensure HP (refuses PO iron)  -[facility] requesting hematology c/s for chronic anemia---> wrote chart note , etiology c/w BRYON, recommended GI consult --> consulted GI 12/15 and wrote chart note --> sent to [facility]  -Fall on : likely mechanical, no sustained injuries. PT consult  -Bowel regimen: continue bisacodyl 10mg QHS (refusing), miralax 17g daily (refusing), senna 2 tablets QHS (refusing)  -Continue Vitamin D3 1000units QHS (refusing)  6. PRNs  -Olanzapine 5mg PO or IM for agitation  7. Therapy   -Individual, group and milieu therapy as appropriate   8. Disposition: state application submitted , on waiting list

## 2024-01-11 NOTE — BH INPATIENT PSYCHIATRY PROGRESS NOTE - NSBHFUPINTERVALHXFT_PSY_A_CORE
Refusing vitals, refusing PO medications, no acute events.   Refusing vitals, refusing PO medications, no acute events.  Slept 3 hours overnight.    Patient found resting in bed. Talking to self incoherently, stating "kill her." Attempted to explain need for medications, application to court. Patient unable to engage in any conversation around need, risks or benefits of medications.

## 2024-01-12 PROCEDURE — 99231 SBSQ HOSP IP/OBS SF/LOW 25: CPT

## 2024-01-12 NOTE — BH INPATIENT PSYCHIATRY PROGRESS NOTE - NSBHFUPINTERVALHXFT_PSY_A_CORE
Refusing vitals, refusing PO medications, no acute events.  Slept 2 hours overnight.    Patient initially seen resting in bed. Does not speak or respond to this provider. Seen later requesting food from staff, asking for fruit. Tells this provider they are minding their business and that they didn't want to talk.

## 2024-01-12 NOTE — BH INPATIENT PSYCHIATRY PROGRESS NOTE - NSBHATTESTBILLING_PSY_A_CORE
46809-[id number] OBS or IP - low complexity OR 25-34 mins [id number]-Ukicocqkfq OBS or IP - low complexity OR 25-34 mins

## 2024-01-12 NOTE — BH INPATIENT PSYCHIATRY PROGRESS NOTE - MSE UNSTRUCTURED FT
Appearance: laying in bed, wearing hospital attire  Behavior: uncooperative  Speech: normal volume, rate  Mood: unable to assess  Affect: irritable  Thought process: severely disorganized   Thought content: can make vaguely violent/threatening statements, repeatedly, not observed harming self or others.   Perceptions: observed talking to self  Insight: very poor  Judgement: very poor, refusing vitals intermittently, refusing PO medications, very poor ADLs  Cognition: unable to assess

## 2024-01-12 NOTE — BH INPATIENT PSYCHIATRY PROGRESS NOTE - NSBHASSESSSUMMFT_PSY_ALL_CORE
Patient is a 53 year old adult (goes by "Juan Jose"), living at [facility] on [place] grounds, PMH of GERD, anemia, PPH of schizophrenia, selective mutism, currently with [facility] ACT team, previously with [facility] which , with history of multiple psychiatric hospitalizations, without known history of self-injury or suicide attempts, with history of aggression in the setting on nonadherence with medication, who was initially brought in by staff at residence for increasing agitation and aggression in the setting of 2 months of medication nonadherence. Patient was hospitalized from 8/3-23 on 1N where they were noted to be hostile, disorganized, threatening, malodorous and grossly psychotic. TOO was obtained, and patient eventually administered dual long-acting injectables (Haldol Decanoate and Zyprexa Relprevv), though had declined nearly all PO medications. Patient was transferred to [facility] where they were admitted due to anemia to 6 (medical workup largely unremarkable), patient stabilized and transferred back to [facility] for further management of psychosis.     Diagnostically, presentation consistent with exacerbation of schizophrenia.     Patient has shown overall very little clinical improvement since admission. Spends most of day isolative and talking to self in severely disorganized and largely incomprehensible fashion, with only very brief moments of reality based exchange with staff or peers, or is non-verbal and stares at staff. Has showered recently with staff encouragement, though room remains malodorous. No recent physical aggression, though can appear mistrustful and make vaguely threatening remarks. Sleep erratic, often just a few hours. Still declining all PO medications, on high dose haloperidol BREWER. Patient's TOO order has , re-applied. Will consider medication changes following renewal.    GI and hematology consulted in response to [facility]'s concerns around chronic anemia. Responses sent to [facility]. No urgent indication for parenteral iron. Spoke to nutrition, added Ensure to diet to try to increase iron intake, will try to encourage use (intermittently accepts). Labs repeated 1/3 and showed stable (mildly improved) anemia.    Patient requires inpatient admission due to very poor self-care and for containment, stabilization, medication management and aftercare planning.    Plan:  1. Brink: admitted involuntary , TOO obtained 10/24, , re-application hearing 10/10  2. Observation: routine checks  3. Collateral: obtained collateral from ACT psychiatrist  4. Psychiatric  -Continue Haldol Decanoate 200mg IM q4 weeks, last given , next due   	-Had received Zyprexa Replevv 405mg qMonth (given )--though no observable benefit above Haldol Decanoate, deferred, will consider re-trial  -Continue clozapine 25mg QHS (refusing)  -Continue depakote 1000mg QHS (refusing)  -Continue benztropine 1mg BID for EPS (refusing)  5. Medical:  -Anemia:   	-s/p medical admission at [facility] for anemia to Hgb 6, required 2u pRBCs. GI workup unremarkable with exception of diverticulosis and colonic polyp  	-Repeat CBCs with anemia 8.6-low 9s  	-Hemoglobin stable to improved on 1/3, will check again ~1 month  	-Encourage Ensure HP (refuses PO iron)  -[facility] requesting hematology c/s for chronic anemia---> wrote chart note , etiology c/w BRYON, recommended GI consult --> consulted GI 12/15 and wrote chart note --> sent to [facility]  -Fall on : likely mechanical, no sustained injuries. PT consult  -Bowel regimen: continue bisacodyl 10mg QHS (refusing), miralax 17g daily (refusing), senna 2 tablets QHS (refusing)  -Continue Vitamin D3 1000units QHS (refusing)  6. PRNs  -Olanzapine 5mg PO or IM for agitation  7. Therapy   -Individual, group and milieu therapy as appropriate   8. Disposition: state application submitted , on waiting list Patient is a 53 year old adult (goes by "Juan Jose"), living at [facility] on [place] grounds, PMH of GERD, anemia, PPH of schizophrenia, selective mutism, currently with [facility] ACT team, previously with [facility] which , with history of multiple psychiatric hospitalizations, without known history of self-injury or suicide attempts, with history of aggression in the setting on nonadherence with medication, who was initially brought in by staff at residence for increasing agitation and aggression in the setting of 2 months of medication nonadherence. Patient was hospitalized from 8/3-23 on 1N where they were noted to be hostile, disorganized, threatening, malodorous and grossly psychotic. TOO was obtained, and patient eventually administered dual long-acting injectables (Haldol Decanoate and Zyprexa Relprevv), though had declined nearly all PO medications. Patient was transferred to [facility] where they were admitted due to anemia to 6 (medical workup largely unremarkable), patient stabilized and transferred back to [facility] for further management of psychosis.     Diagnostically, presentation consistent with exacerbation of schizophrenia.     Patient has shown overall very little clinical improvement since admission. Spends most of day isolative and talking to self in severely disorganized and largely incomprehensible fashion, with only very brief moments of reality based exchange with staff or peers, or is non-verbal and stares at staff. Has showered recently with staff encouragement, though room remains malodorous. No recent physical aggression, though can appear mistrustful and make vaguely threatening remarks. Sleep erratic, often just a few hours. Still declining all PO medications, on high dose haloperidol BREWER. Patient's TOO order has , re-applied. Will consider medication changes following renewal.    GI and hematology consulted in response to [facility]'s concerns around chronic anemia. Responses sent to [facility]. No urgent indication for parenteral iron. Spoke to nutrition, added Ensure to diet to try to increase iron intake, will try to encourage use (intermittently accepts). Labs repeated 1/3 and showed stable (mildly improved) anemia.    Patient requires inpatient admission due to very poor self-care and for containment, stabilization, medication management and aftercare planning.    Plan:  1. Brink: admitted involuntary , TOO obtained 10/24, , re-application hearing 10/10  2. Observation: routine checks  3. Collateral: obtained collateral from ACT psychiatrist  4. Psychiatric  -Continue Haldol Decanoate 200mg IM q4 weeks, last given , next due   	-Had received Zyprexa Replevv 405mg qMonth (given )--though no observable benefit above Haldol Decanoate, deferred, will consider re-trial  -Continue clozapine 25mg QHS (refusing)  -Continue depakote 1000mg QHS (refusing)  -Continue benztropine 1mg BID for EPS (refusing)  5. Medical:  -Anemia:   	-s/p medical admission at [facility] for anemia to Hgb 6, required 2u pRBCs. GI workup unremarkable with exception of diverticulosis and colonic polyp  	-Repeat CBCs with anemia 8.6-low 9s  	-Hemoglobin stable to improved on 1/3, will check again ~1 month  	-Encourage Ensure HP (refuses PO iron)  -[facility] requesting hematology c/s for chronic anemia---> wrote chart note , etiology c/w BRYON, recommended GI consult --> consulted GI 12/15 and wrote chart note --> sent to [facility]  -Fall on : likely mechanical, no sustained injuries. PT consult  -Bowel regimen: continue bisacodyl 10mg QHS (refusing), miralax 17g daily (refusing), senna 2 tablets QHS (refusing)  -Continue Vitamin D3 1000units QHS (refusing)  6. PRNs  -Olanzapine 5mg PO or IM for agitation  7. Therapy   -Individual, group and milieu therapy as appropriate   8. Disposition: state application submitted , on waiting list

## 2024-01-16 PROCEDURE — 99232 SBSQ HOSP IP/OBS MODERATE 35: CPT

## 2024-01-16 NOTE — BH INPATIENT PSYCHIATRY PROGRESS NOTE - NSBHFUPINTERVALHXFT_PSY_A_CORE
Refusing vitals, refusing medications, no PRNs for agitation, no acute events over weekend.    Patient seen laying awake in bed in room. Talking to self incoherently, eventually yells GET OUT.

## 2024-01-16 NOTE — BH INPATIENT PSYCHIATRY PROGRESS NOTE - NSBHATTESTBILLING_PSY_A_CORE
14814-Gqdoxlchlc OBS or IP - moderate complexity OR 35-49 mins 01569-Fnhbigojce OBS or IP - moderate complexity OR 35-49 mins

## 2024-01-16 NOTE — BH INPATIENT PSYCHIATRY PROGRESS NOTE - NSBHASSESSSUMMFT_PSY_ALL_CORE
Patient is a 53 year old adult (goes by "Juan Jose"), living at [facility] on [place] grounds, PMH of GERD, anemia, PPH of schizophrenia, selective mutism, currently with [facility] ACT team, previously with [facility] which , with history of multiple psychiatric hospitalizations, without known history of self-injury or suicide attempts, with history of aggression in the setting on nonadherence with medication, who was initially brought in by staff at residence for increasing agitation and aggression in the setting of 2 months of medication nonadherence. Patient was hospitalized from 8/3-23 on 1N where they were noted to be hostile, disorganized, threatening, malodorous and grossly psychotic. TOO was obtained, and patient eventually administered dual long-acting injectables (Haldol Decanoate and Zyprexa Relprevv), though had declined nearly all PO medications. Patient was transferred to [facility] where they were admitted due to anemia to 6 (medical workup largely unremarkable), patient stabilized and transferred back to [facility] for further management of psychosis.     Diagnostically, presentation consistent with exacerbation of schizophrenia.     Patient has shown overall very little clinical improvement since admission. Spends most of day isolative and talking to self in severely disorganized and largely incomprehensible fashion, with only very brief moments of reality based exchange with staff or peers, or is non-verbal and stares at staff. Has showered recently with staff encouragement, though room remains malodorous. No recent physical aggression, though can appear mistrustful and make vaguely threatening remarks. Sleep erratic, often just a few hours. Still declining all PO medications, on high dose haloperidol BREWER. Patient's TOO order has , re-applied, hearing today. Will consider medication changes following renewal.    GI and hematology consulted in response to [facility]'s concerns around chronic anemia. Responses sent to [facility]. No urgent indication for parenteral iron. Spoke to nutrition, added Ensure to diet to try to increase iron intake, will try to encourage use (intermittently accepts). Labs repeated /3 and showed stable (mildly improved) anemia.    Patient requires inpatient admission due to very poor self-care and for containment, stabilization, medication management and aftercare planning.    Plan:  1. Brink: admitted involuntary , TOO obtained 10/24, , re-application in court today   2. Observation: routine checks  3. Collateral: obtained collateral from ACT psychiatrist  4. Psychiatric  -Continue Haldol Decanoate 200mg IM q4 weeks, last given , next due   	-Had received Zyprexa Replevv 405mg qMonth (given )--though no observable benefit above Haldol Decanoate, deferred, will consider re-trial  -Continue clozapine 25mg QHS (refusing)  -Continue depakote 1000mg QHS (refusing)  -Continue benztropine 1mg BID for EPS (refusing)  5. Medical:  -Anemia:   	-s/p medical admission at [facility] for anemia to Hgb 6, required 2u pRBCs. GI workup unremarkable with exception of diverticulosis and colonic polyp  	-Repeat CBCs with anemia 8.6-low 9s  	-Hemoglobin stable to improved on 1/3, will check again ~1 month  	-Encourage Ensure HP (refuses PO iron)  -[facility] requesting hematology c/s for chronic anemia---> wrote chart note , etiology c/w BRYON, recommended GI consult --> consulted GI 12/15 and wrote chart note --> sent to [facility]  -Fall on : likely mechanical, no sustained injuries. PT consult  -Bowel regimen: continue bisacodyl 10mg QHS (refusing), miralax 17g daily (refusing), senna 2 tablets QHS (refusing)  -Continue Vitamin D3 1000units QHS (refusing)  6. PRNs  -Olanzapine 5mg PO or IM for agitation  7. Therapy   -Individual, group and milieu therapy as appropriate   8. Disposition: state application submitted , on waiting list Patient is a 53 year old adult (goes by "Juan Jose"), living at [facility] on [place] grounds, PMH of GERD, anemia, PPH of schizophrenia, selective mutism, [facility] with [facility], previously with [facility] which , with history of multiple psychiatric hospitalizations, without known history of self-injury or suicide attempts, with history of aggression in the setting on nonadherence with medication, who was initially brought in by staff at residence for increasing agitation and aggression in the setting of 2 months of medication nonadherence. Patient was hospitalized from 8/3-23 on 1N where they were noted to be hostile, disorganized, threatening, malodorous and grossly psychotic. TOO was obtained, and patient eventually administered dual long-acting injectables (Haldol Decanoate and Zyprexa Relprevv), though had declined nearly all PO medications. Patient was transferred to [facility] where they were admitted due to anemia to 6 (medical workup largely unremarkable), patient stabilized and transferred back to [facility] for further management of psychosis.     Diagnostically, presentation consistent with exacerbation of schizophrenia.     Patient has shown overall very little clinical improvement since admission. Spends most of day isolative and talking to self in severely disorganized and largely incomprehensible fashion, with only very brief moments of reality based exchange with staff or peers, or is non-verbal and stares at staff. Has showered recently with staff encouragement, though room remains malodorous. No recent physical aggression, though can appear mistrustful and make vaguely threatening remarks. Sleep erratic, often just a few hours. Still declining all PO medications, on high dose haloperidol BREWER. Patient's TOO order has , re-applied, hearing today. Will consider medication changes following renewal.    GI and hematology consulted in response to [facility]'s concerns around chronic anemia. Responses sent to [facility]. No urgent indication for parenteral iron. Spoke to nutrition, added Ensure to diet to try to increase iron intake, will try to encourage use (intermittently accepts). Labs repeated /3 and showed stable (mildly improved) anemia.    Patient requires inpatient admission due to very poor self-care and for containment, stabilization, medication management and aftercare planning.    Plan:  1. Brink: admitted involuntary , TOO obtained 10/24, , re-application in court today   2. Observation: routine checks  3. Collateral: obtained collateral from ACT psychiatrist  4. Psychiatric  -Continue Haldol Decanoate 200mg IM q4 weeks, last given , next due   	-Had received Zyprexa Replevv 405mg qMonth (given )--though no observable benefit above Haldol Decanoate, deferred, will consider re-trial  -Continue clozapine 25mg QHS (refusing)  -Continue depakote 1000mg QHS (refusing)  -Continue benztropine 1mg BID for EPS (refusing)  5. Medical:  -Anemia:   	-s/p medical admission at [facility] for anemia to Hgb 6, required 2u pRBCs. GI workup unremarkable with exception of diverticulosis and colonic polyp  	-Repeat CBCs with anemia 8.6-low 9s  	-Hemoglobin stable to improved on 1/3, will check again ~1 month  	-Encourage Ensure HP (refuses PO iron)  -[facility] requesting hematology c/s for chronic anemia---> wrote chart note , etiology c/w BRYON, recommended GI consult --> consulted GI 12/15 and wrote chart note --> sent to [facility]  -Fall on : likely mechanical, no sustained injuries. PT consult  -Bowel regimen: continue bisacodyl 10mg QHS (refusing), miralax 17g daily (refusing), senna 2 tablets QHS (refusing)  -Continue Vitamin D3 1000units QHS (refusing)  6. PRNs  -Olanzapine 5mg PO or IM for agitation  7. Therapy   -Individual, group and milieu therapy as appropriate   8. Disposition: state application submitted , on waiting list

## 2024-01-16 NOTE — BH INPATIENT PSYCHIATRY PROGRESS NOTE - MSE UNSTRUCTURED FT
Appearance: laying in bed, wearing hospital attire  Behavior: uncooperative  Speech: normal volume, rate though loud at times  Mood: unable to assess  Affect: irritable  Thought process: severely disorganized   Thought content: can make vaguely violent/threatening statements, today states GET OUT, not observed harming self or others.   Perceptions: observed talking to self  Insight: very poor  Judgement: very poor, refusing vitals intermittently, refusing PO medications, very poor ADLs  Cognition: unable to assess

## 2024-01-17 PROCEDURE — 99232 SBSQ HOSP IP/OBS MODERATE 35: CPT

## 2024-01-17 RX ORDER — CHOLECALCIFEROL (VITAMIN D3) 125 MCG
1000 CAPSULE ORAL AT BEDTIME
Refills: 0 | Status: DISCONTINUED | OUTPATIENT
Start: 2024-01-17 | End: 2024-02-08

## 2024-01-17 RX ORDER — OLANZAPINE 15 MG/1
5 TABLET, FILM COATED ORAL EVERY 4 HOURS
Refills: 0 | Status: DISCONTINUED | OUTPATIENT
Start: 2024-01-17 | End: 2024-02-28

## 2024-01-17 RX ORDER — SENNA PLUS 8.6 MG/1
2 TABLET ORAL AT BEDTIME
Refills: 0 | Status: DISCONTINUED | OUTPATIENT
Start: 2024-01-17 | End: 2024-02-08

## 2024-01-17 RX ORDER — ACETAMINOPHEN 500 MG
650 TABLET ORAL EVERY 4 HOURS
Refills: 0 | Status: DISCONTINUED | OUTPATIENT
Start: 2024-01-17 | End: 2024-02-28

## 2024-01-17 RX ORDER — OLANZAPINE 15 MG/1
5 TABLET, FILM COATED ORAL ONCE
Refills: 0 | Status: DISCONTINUED | OUTPATIENT
Start: 2024-01-17 | End: 2024-01-17

## 2024-01-17 RX ORDER — FERROUS SULFATE 325(65) MG
325 TABLET ORAL DAILY
Refills: 0 | Status: DISCONTINUED | OUTPATIENT
Start: 2024-01-17 | End: 2024-02-08

## 2024-01-17 RX ORDER — OLANZAPINE 15 MG/1
5 TABLET, FILM COATED ORAL AT BEDTIME
Refills: 0 | Status: DISCONTINUED | OUTPATIENT
Start: 2024-01-18 | End: 2024-01-19

## 2024-01-17 RX ORDER — OLANZAPINE 15 MG/1
5 TABLET, FILM COATED ORAL ONCE
Refills: 0 | Status: DISCONTINUED | OUTPATIENT
Start: 2024-01-17 | End: 2024-02-28

## 2024-01-17 RX ORDER — HALOPERIDOL DECANOATE 100 MG/ML
200 INJECTION INTRAMUSCULAR ONCE
Refills: 0 | Status: COMPLETED | OUTPATIENT
Start: 2024-01-17 | End: 2024-01-17

## 2024-01-17 RX ORDER — BENZTROPINE MESYLATE 1 MG
1 TABLET ORAL
Refills: 0 | Status: DISCONTINUED | OUTPATIENT
Start: 2024-01-17 | End: 2024-02-08

## 2024-01-17 RX ORDER — OLANZAPINE 15 MG/1
5 TABLET, FILM COATED ORAL DAILY
Refills: 0 | Status: DISCONTINUED | OUTPATIENT
Start: 2024-01-17 | End: 2024-01-19

## 2024-01-17 RX ORDER — POLYETHYLENE GLYCOL 3350 17 G/17G
17 POWDER, FOR SOLUTION ORAL
Refills: 0 | Status: DISCONTINUED | OUTPATIENT
Start: 2024-01-17 | End: 2024-02-08

## 2024-01-17 RX ADMIN — OLANZAPINE 5 MILLIGRAM(S): 15 TABLET, FILM COATED ORAL at 14:34

## 2024-01-17 RX ADMIN — HALOPERIDOL DECANOATE 200 MILLIGRAM(S): 100 INJECTION INTRAMUSCULAR at 14:34

## 2024-01-17 NOTE — BH INPATIENT PSYCHIATRY PROGRESS NOTE - NSBHMETABOLIC_PSY_ALL_CORE_FT
BMI: BMI (kg/m2): 35.2 (12-06-23 @ 08:57)  HbA1c: A1C with Estimated Average Glucose Result: 5.3 % (09-29-23 @ 10:00)    Glucose:   BP: --Vital Signs Last 24 Hrs  T(C): 36.7 (01-17-24 @ 08:33), Max: 36.7 (01-17-24 @ 08:33)  T(F): 98 (01-17-24 @ 08:33), Max: 98 (01-17-24 @ 08:33)  HR: --  BP: --  BP(mean): --  RR: --  SpO2: --    Orthostatic VS  01-17-24 @ 08:33  Lying BP: --/-- HR: --  Sitting BP: 109/86 HR: 80  Standing BP: 112/79 HR: 81  Site: --  Mode: --    Lipid Panel: Date/Time: 09-29-23 @ 10:00  Cholesterol, Serum: 166  LDL Cholesterol Calculated: 94  HDL Cholesterol, Serum: 60  Total Cholesterol/HDL Ration Measurement: --  Triglycerides, Serum: 61

## 2024-01-17 NOTE — BH INPATIENT PSYCHIATRY PROGRESS NOTE - CURRENT MEDICATION
Acute cystitis MEDICATIONS  (STANDING):  benztropine 1 milliGRAM(s) Oral two times a day  bisacodyl 10 milliGRAM(s) Oral at bedtime  cholecalciferol 1000 Unit(s) Oral at bedtime  ferrous    sulfate 325 milliGRAM(s) Oral daily  haloperidol decanoate Injectable, Long Acting 200 milliGRAM(s) IntraMuscular once  OLANZapine 5 milliGRAM(s) Oral once  polyethylene glycol 3350 17 Gram(s) Oral two times a day  senna 2 Tablet(s) Oral at bedtime    MEDICATIONS  (PRN):  acetaminophen     Tablet .. 650 milliGRAM(s) Oral every 4 hours PRN Mild Pain (1 - 3), Moderate Pain (4 - 6)  OLANZapine Disintegrating Tablet 5 milliGRAM(s) Oral every 4 hours PRN agitation  OLANZapine Injectable 5 milliGRAM(s) IntraMuscular daily PRN If refuses Zyprexa PO  OLANZapine Injectable 5 milliGRAM(s) IntraMuscular once PRN Agitation

## 2024-01-17 NOTE — BH INPATIENT PSYCHIATRY PROGRESS NOTE - NSBHFUPINTERVALHXFT_PSY_A_CORE
VSS, refusing PO medications, no acute events. Slept 2 hours overnight.    Patient found in room today, eating lunch. Patient repeating "turn her off" "get her out." Attempt to discuss with patient that we are adding Zyprexa, and if they refuse the oral, they will receive injection of it, patient unable to engage in conversation around this topic.

## 2024-01-17 NOTE — BH INPATIENT PSYCHIATRY PROGRESS NOTE - MSE UNSTRUCTURED FT
Appearance: sitting up in bed eating lunch, wearing hospital attire  Behavior: uncooperative  Speech: normal volume, rate   Mood: unable to assess  Affect: irritable  Thought process: severely disorganized   Thought content: can make vaguely violent/threatening statements, today states "turn her off", not observed harming self or others.   Perceptions: observed talking to self  Insight: very poor  Judgement: very poor, refusing vitals intermittently, refusing PO medications, very poor ADLs  Cognition: unable to assess

## 2024-01-17 NOTE — BH INPATIENT PSYCHIATRY PROGRESS NOTE - NSBHASSESSSUMMFT_PSY_ALL_CORE
Patient is a 53 year old adult (goes by "Juan Jose"), living at [facility] on [place] grounds, PMH of GERD, anemia, PPH of schizophrenia, selective mutism, currently with [facility] ACT team, previously with [facility] which , with history of multiple psychiatric hospitalizations, without known history of self-injury or suicide attempts, with history of aggression in the setting on nonadherence with medication, who was initially brought in by staff at residence for increasing agitation and aggression in the setting of 2 months of medication nonadherence. Patient was hospitalized from 8/3-23 on 1N where they were noted to be hostile, disorganized, threatening, malodorous and grossly psychotic. TOO was obtained, and patient eventually administered dual long-acting injectables (Haldol Decanoate and Zyprexa Relprevv), though had declined nearly all PO medications. Patient was transferred to [facility] where they were admitted due to anemia to 6 (medical workup largely unremarkable), patient stabilized and transferred back to [facility] for further management of psychosis.     Diagnostically, presentation consistent with exacerbation of schizophrenia.     Patient has shown overall very little clinical improvement since admission. Spends most of day isolative and talking to self in severely disorganized and largely incomprehensible fashion, with only very brief moments of reality based exchange with staff or peers, or is non-verbal and stares at staff. Has showered recently with staff encouragement, though room remains malodorous. No recent physical aggression, though can appear mistrustful and make vaguely threatening remarks. Sleep erratic, often just a few hours.     Upon re-review of patient's chart, while patient's presentation on 1N with the addition of olanzapine was largely similar to that on ML3, there seems to have been some increase in verbalization and reality-based conversation towards the very end of 1N hospitalization and during [facility] medical stay, which could potentially have been due to olanzapine. Although unclear how clinically significant this was, or if it was in fact due to olanzapine, given that patient has been stagnated without improvement, and dose of long-acting haloperidol has been optimized, at this point would be worth re-trial of olanzapine. TOO renewed on 24. Will give due dose of haloperidol decanoate. Will also start olanzapine with IM backup. Will stop clozapine/VPA as patient persistently refusing POs and no IM backups available.    GI and hematology consulted in response to [facility]'s concerns around chronic anemia. Responses sent to [facility]. No urgent indication for parenteral iron. Spoke to nutrition, added Ensure to diet to try to increase iron intake, will try to encourage use (intermittently accepts). Labs repeated 1/3 and showed stable (mildly improved) anemia.    Patient requires inpatient admission due to very poor self-care and for containment, stabilization, medication management and aftercare planning.    Plan:  1. Brink: admitted involuntary , TOO renewed   2. Observation: routine checks  3. Collateral: obtained collateral from ACT psychiatrist  4. Psychiatric  -Continue Haldol Decanoate 200mg IM q4 weeks, will give today , next due   	-Had received Zyprexa Replevv 405mg qMonth (given )--though no observable benefit above Haldol Decanoate, deferred, will consider re-trial  -START olanzapine 5mg QHS + olanzapine 5mg IM backup if refuses per court order  -Continue benztropine 1mg BID for EPS (refusing)  5. Medical:  -Anemia:   	-s/p medical admission at [facility] for anemia to Hgb 6, required 2u pRBCs. GI workup unremarkable with exception of diverticulosis and colonic polyp  	-Repeat CBCs with anemia 8.6-low 9s  	-Hemoglobin stable to improved on 1/3, will check again ~1 month  	-Encourage Ensure HP (refuses PO iron)  -[facility] requesting hematology c/s for chronic anemia---> wrote chart note , etiology c/w BRYON, recommended GI consult --> consulted GI 12/15 and wrote chart note --> sent to [facility]  -Fall on : likely mechanical, no sustained injuries. PT consult  -Bowel regimen: continue bisacodyl 10mg QHS (refusing), miralax 17g daily (refusing), senna 2 tablets QHS (refusing)  -Continue Vitamin D3 1000units QHS (refusing)  6. PRNs  -Olanzapine 5mg PO or IM for agitation  7. Therapy   -Individual, group and milieu therapy as appropriate   8. Disposition: state application submitted , on waiting list

## 2024-01-17 NOTE — BH INPATIENT PSYCHIATRY PROGRESS NOTE - NSBHCHARTREVIEWVS_PSY_A_CORE FT
Vital Signs Last 24 Hrs  T(C): 36.7 (01-17-24 @ 08:33), Max: 36.7 (01-17-24 @ 08:33)  T(F): 98 (01-17-24 @ 08:33), Max: 98 (01-17-24 @ 08:33)  HR: --  BP: --  BP(mean): --  RR: --  SpO2: --    Orthostatic VS  01-17-24 @ 08:33  Lying BP: --/-- HR: --  Sitting BP: 109/86 HR: 80  Standing BP: 112/79 HR: 81  Site: --  Mode: --

## 2024-01-18 PROCEDURE — 99231 SBSQ HOSP IP/OBS SF/LOW 25: CPT

## 2024-01-18 RX ADMIN — OLANZAPINE 5 MILLIGRAM(S): 15 TABLET, FILM COATED ORAL at 21:44

## 2024-01-18 NOTE — BH INPATIENT PSYCHIATRY PROGRESS NOTE - CURRENT MEDICATION
MEDICATIONS  (STANDING):  benztropine 1 milliGRAM(s) Oral two times a day  bisacodyl 10 milliGRAM(s) Oral at bedtime  cholecalciferol 1000 Unit(s) Oral at bedtime  ferrous    sulfate 325 milliGRAM(s) Oral daily  OLANZapine 5 milliGRAM(s) Oral at bedtime  polyethylene glycol 3350 17 Gram(s) Oral two times a day  senna 2 Tablet(s) Oral at bedtime    MEDICATIONS  (PRN):  acetaminophen     Tablet .. 650 milliGRAM(s) Oral every 4 hours PRN Mild Pain (1 - 3), Moderate Pain (4 - 6)  OLANZapine Disintegrating Tablet 5 milliGRAM(s) Oral every 4 hours PRN agitation  OLANZapine Injectable 5 milliGRAM(s) IntraMuscular daily PRN If refuses Zyprexa PO  OLANZapine Injectable 5 milliGRAM(s) IntraMuscular once PRN Agitation

## 2024-01-18 NOTE — BH INPATIENT PSYCHIATRY PROGRESS NOTE - NSBHFUPINTERVALHXFT_PSY_A_CORE
Refusing vitals, refusing PO medications, no acute events. Slept 6 hours overnight.    Patient seen walking in hallway carrying food tray. Attempted to meet with patient, but he yelled "get away from me" and walked away.

## 2024-01-18 NOTE — BH INPATIENT PSYCHIATRY PROGRESS NOTE - NSBHASSESSSUMMFT_PSY_ALL_CORE
Patient is a 53 year old adult (goes by "Juan Jose"), living at [facility] on [place] grounds, PMH of GERD, anemia, PPH of schizophrenia, selective mutism, currently with [facility] ACT team, previously with [facility] which , with history of multiple psychiatric hospitalizations, without known history of self-injury or suicide attempts, with history of aggression in the setting on nonadherence with medication, who was initially brought in by staff at residence for increasing agitation and aggression in the setting of 2 months of medication nonadherence. Patient was hospitalized from 8/3-23 on 1N where they were noted to be hostile, disorganized, threatening, malodorous and grossly psychotic. TOO was obtained, and patient eventually administered dual long-acting injectables (Haldol Decanoate and Zyprexa Relprevv), though had declined nearly all PO medications. Patient was transferred to [facility] where they were admitted due to anemia to 6 (medical workup largely unremarkable), patient stabilized and transferred back to [facility] for further management of psychosis.     Diagnostically, presentation consistent with exacerbation of schizophrenia.     Patient has shown overall very little clinical improvement since admission. Spends most of day isolative and talking to self in severely disorganized and largely incomprehensible fashion, with only very brief moments of reality based exchange with staff or peers, or is non-verbal and stares at staff. Has showered recently with staff encouragement, though room remains malodorous. No recent physical aggression, though can appear mistrustful and make vaguely threatening remarks. Sleep erratic, often just a few hours.     Upon re-review of patient's chart, while patient's presentation on 1N with the addition of olanzapine was largely similar to that on ML3, there seems to have been some increase in verbalization and reality-based conversation towards the very end of 1N hospitalization and during [facility] medical stay, which could potentially have been due to olanzapine. Although unclear how clinically significant this was, or if it was in fact due to olanzapine, given that patient has been stagnated without improvement, and dose of long-acting haloperidol has been optimized, at this point would be worth re-trial of olanzapine. TOO renewed on 24. Olanzapine with IM backup added with goal for Relprevv. Haloperidal decanoate continued.    GI and hematology consulted in response to [facility]'s concerns around chronic anemia. Responses sent to [facility]. No urgent indication for parenteral iron. Spoke to nutrition, added Ensure to diet to try to increase iron intake, will try to encourage use (intermittently accepts). Labs repeated 1/3 and showed stable (mildly improved) anemia.    Patient requires inpatient admission due to very poor self-care and for containment, stabilization, medication management and aftercare planning.    Plan:  1. Brink: admitted involuntary 9.27, TOO renewed 24 (expires 3/19/24)  2. Observation: routine checks  3. Collateral: obtained collateral from ACT psychiatrist  4. Psychiatric  -Continue Haldol Decanoate 200mg IM q4 weeks, will give today , next due   -Continue olanzapine 5mg QHS + olanzapine 5mg IM backup if refuses per court order  -Continue benztropine 1mg BID for EPS (refusing)  5. Medical:  -Anemia:   	-s/p medical admission at [facility] for anemia to Hgb 6, required 2u pRBCs. GI workup unremarkable with exception of diverticulosis and colonic polyp  	-Repeat CBCs with anemia 8.6-low 9s  	-Hemoglobin stable to improved on 1/3, will check again ~1 month  	-Encourage Ensure HP (refuses PO iron)  -[facility] requesting hematology c/s for chronic anemia---> wrote chart note , etiology c/w BRYON, recommended GI consult --> consulted GI 12/15 and wrote chart note --> sent to [facility]  -Fall on : likely mechanical, no sustained injuries. PT consult  -Bowel regimen: continue bisacodyl 10mg QHS (refusing), miralax 17g daily (refusing), senna 2 tablets QHS (refusing)  -Continue Vitamin D3 1000units QHS (refusing)  6. PRNs  -Olanzapine 5mg PO or IM for agitation  7. Therapy   -Individual, group and milieu therapy as appropriate   8. Disposition: state application submitted , on waiting list

## 2024-01-18 NOTE — BH INPATIENT PSYCHIATRY PROGRESS NOTE - NSBHMETABOLIC_PSY_ALL_CORE_FT
BMI: BMI (kg/m2): 35.2 (12-06-23 @ 08:57)  HbA1c: A1C with Estimated Average Glucose Result: 5.3 % (09-29-23 @ 10:00)    Glucose:   BP: --Vital Signs Last 24 Hrs  T(C): --  T(F): --  HR: --  BP: --  BP(mean): --  RR: --  SpO2: --    Orthostatic VS  01-17-24 @ 08:33  Lying BP: --/-- HR: --  Sitting BP: 109/86 HR: 80  Standing BP: 112/79 HR: 81  Site: --  Mode: --    Lipid Panel: Date/Time: 09-29-23 @ 10:00  Cholesterol, Serum: 166  LDL Cholesterol Calculated: 94  HDL Cholesterol, Serum: 60  Total Cholesterol/HDL Ration Measurement: --  Triglycerides, Serum: 61

## 2024-01-18 NOTE — BH INPATIENT PSYCHIATRY PROGRESS NOTE - MSE UNSTRUCTURED FT
Appearance: walking in hallway with food tray, wearing hospital attire  Behavior: uncooperative  Speech: loud  Mood: unable to assess  Affect: irritable  Thought process: severely disorganized   Thought content: can make vaguely violent/threatening statements, today states "get away from me", not observed harming self or others.   Perceptions: observed talking to self  Insight: very poor  Judgement: very poor, refusing vitals intermittently, refusing PO medications, very poor ADLs  Cognition: unable to assess

## 2024-01-18 NOTE — BH INPATIENT PSYCHIATRY PROGRESS NOTE - NSBHCHARTREVIEWVS_PSY_A_CORE FT
Vital Signs Last 24 Hrs  T(C): --  T(F): --  HR: --  BP: --  BP(mean): --  RR: --  SpO2: --    Orthostatic VS  01-17-24 @ 08:33  Lying BP: --/-- HR: --  Sitting BP: 109/86 HR: 80  Standing BP: 112/79 HR: 81  Site: --  Mode: --

## 2024-01-19 PROCEDURE — 99232 SBSQ HOSP IP/OBS MODERATE 35: CPT

## 2024-01-19 RX ORDER — OLANZAPINE 15 MG/1
5 TABLET, FILM COATED ORAL
Refills: 0 | Status: DISCONTINUED | OUTPATIENT
Start: 2024-01-19 | End: 2024-01-22

## 2024-01-19 RX ORDER — OLANZAPINE 15 MG/1
5 TABLET, FILM COATED ORAL EVERY 8 HOURS
Refills: 0 | Status: DISCONTINUED | OUTPATIENT
Start: 2024-01-19 | End: 2024-01-22

## 2024-01-19 RX ADMIN — OLANZAPINE 5 MILLIGRAM(S): 15 TABLET, FILM COATED ORAL at 21:12

## 2024-01-19 RX ADMIN — OLANZAPINE 5 MILLIGRAM(S): 15 TABLET, FILM COATED ORAL at 10:26

## 2024-01-19 NOTE — BH INPATIENT PSYCHIATRY PROGRESS NOTE - NSBHASSESSSUMMFT_PSY_ALL_CORE
Patient is a 53 year old adult (goes by "Juan Jose"), living at [facility] on [place] grounds, PMH of GERD, anemia, PPH of schizophrenia, selective mutism, currently with [facility] ACT team, previously with [facility] which , with history of multiple psychiatric hospitalizations, without known history of self-injury or suicide attempts, with history of aggression in the setting on nonadherence with medication, who was initially brought in by staff at residence for increasing agitation and aggression in the setting of 2 months of medication nonadherence. Patient was hospitalized from 8/3-23 on 1N where they were noted to be hostile, disorganized, threatening, malodorous and grossly psychotic. TOO was obtained, and patient eventually administered dual long-acting injectables (Haldol Decanoate and Zyprexa Relprevv), though had declined nearly all PO medications. Patient was transferred to [facility] where they were admitted due to anemia to 6 (medical workup largely unremarkable), patient stabilized and transferred back to [facility] for further management of psychosis.     Diagnostically, presentation consistent with exacerbation of schizophrenia.     Patient has shown overall very little clinical improvement since admission. Spends most of day isolative and talking to self in severely disorganized and largely incomprehensible fashion, with only very brief moments of reality based exchange with staff or peers, or is non-verbal and stares at staff. Has showered recently with staff encouragement, though room remains malodorous. No recent physical aggression, though can appear mistrustful and make vaguely threatening remarks. Sleep erratic, often just a few hours. TOO renewed on 24. Haloperidol decanoate continued. Olanzapine with IM backup added with goal for Relprevv. Will increase dose today. Will obtain vitals today.    GI and hematology consulted in response to [facility]'s concerns around chronic anemia. Responses sent to [facility]. No urgent indication for parenteral iron. Spoke to nutrition, added Ensure to diet to try to increase iron intake, will try to encourage use (intermittently accepts). Labs repeated 1/3 and showed stable (mildly improved) anemia.    Patient requires inpatient admission due to very poor self-care and for containment, stabilization, medication management and aftercare planning.    Plan:  1. Brink: admitted involuntary , TOO renewed 24 (expires 3/19/24)  2. Observation: routine checks  3. Collateral: obtained collateral from ACT psychiatrist  4. Psychiatric  -Continue haloperidol decanoate 200mg IM q4 weeks, last given , next due   -Continue olanzapine 5mg QHS + olanzapine 5mg IM backup if refuses per court order  -Continue benztropine 1mg BID for EPS (refusing)  5. Medical:  -Anemia:   	-s/p medical admission at [facility] for anemia to Hgb 6, required 2u pRBCs. GI workup unremarkable with exception of diverticulosis and colonic polyp  	-Repeat CBCs with anemia 8.6-low 9s  	-Hemoglobin stable to improved on 1/3, will check again ~1 month  	-Encourage Ensure HP (refuses PO iron)  -[facility] requesting hematology c/s for chronic anemia---> wrote chart note , etiology c/w BRYON, recommended GI consult --> consulted GI 12/15 and wrote chart note --> sent to [facility]  -Fall on : likely mechanical, no sustained injuries. PT consult  -Bowel regimen: continue bisacodyl 10mg QHS (refusing), miralax 17g daily (refusing), senna 2 tablets QHS (refusing)  -Continue Vitamin D3 1000units QHS (refusing)  6. PRNs  -Olanzapine 5mg PO or IM for agitation  7. Therapy   -Individual, group and milieu therapy as appropriate   8. Disposition: state application submitted , on waiting list

## 2024-01-19 NOTE — BH INPATIENT PSYCHIATRY PROGRESS NOTE - PRN MEDS
MEDICATIONS  (PRN):  acetaminophen     Tablet .. 650 milliGRAM(s) Oral every 4 hours PRN Mild Pain (1 - 3), Moderate Pain (4 - 6)  OLANZapine Disintegrating Tablet 5 milliGRAM(s) Oral every 4 hours PRN agitation  OLANZapine Injectable 5 milliGRAM(s) IntraMuscular every 8 hours PRN If refuses Zyprexa PO  OLANZapine Injectable 5 milliGRAM(s) IntraMuscular once PRN Agitation

## 2024-01-19 NOTE — BH INPATIENT PSYCHIATRY PROGRESS NOTE - MSE UNSTRUCTURED FT
Appearance: laying in bed, awake, wearing hospital attire  Behavior: uncooperative  Speech: normal rate, volume  Mood: unable to assess  Affect: irritable  Thought process: severely disorganized   Thought content: can make vaguely violent/threatening statements, today states "kill her off", not observed harming self or others.   Perceptions: observed talking to self  Insight: very poor  Judgement: very poor, refusing vitals intermittently, refusing PO medications, very poor ADLs  Cognition: unable to assess

## 2024-01-19 NOTE — BH INPATIENT PSYCHIATRY PROGRESS NOTE - CURRENT MEDICATION
MEDICATIONS  (STANDING):  benztropine 1 milliGRAM(s) Oral two times a day  bisacodyl 10 milliGRAM(s) Oral at bedtime  cholecalciferol 1000 Unit(s) Oral at bedtime  ferrous    sulfate 325 milliGRAM(s) Oral daily  OLANZapine 5 milliGRAM(s) Oral two times a day  polyethylene glycol 3350 17 Gram(s) Oral two times a day  senna 2 Tablet(s) Oral at bedtime    MEDICATIONS  (PRN):  acetaminophen     Tablet .. 650 milliGRAM(s) Oral every 4 hours PRN Mild Pain (1 - 3), Moderate Pain (4 - 6)  OLANZapine Disintegrating Tablet 5 milliGRAM(s) Oral every 4 hours PRN agitation  OLANZapine Injectable 5 milliGRAM(s) IntraMuscular every 8 hours PRN If refuses Zyprexa PO  OLANZapine Injectable 5 milliGRAM(s) IntraMuscular once PRN Agitation

## 2024-01-19 NOTE — BH INPATIENT PSYCHIATRY PROGRESS NOTE - NSBHFUPINTERVALHXFT_PSY_A_CORE
Refusing vitals, refusing PO medications, no acute events. Slept 5+ hours overnight.    Patient seen in room, laying in bed awake. Talking to self and responding to internal stimuli on entrance. Upon my entrance, patient states "kill her off." Explain plan to continue the Zyprexa, including in IM form if he doesn't accept, patient states "you do not have the authority to do that" but unable to engage in any further conversation.

## 2024-01-20 RX ADMIN — OLANZAPINE 5 MILLIGRAM(S): 15 TABLET, FILM COATED ORAL at 22:33

## 2024-01-20 RX ADMIN — OLANZAPINE 5 MILLIGRAM(S): 15 TABLET, FILM COATED ORAL at 09:22

## 2024-01-21 RX ADMIN — OLANZAPINE 5 MILLIGRAM(S): 15 TABLET, FILM COATED ORAL at 20:47

## 2024-01-21 RX ADMIN — OLANZAPINE 5 MILLIGRAM(S): 15 TABLET, FILM COATED ORAL at 09:06

## 2024-01-22 PROCEDURE — 99232 SBSQ HOSP IP/OBS MODERATE 35: CPT

## 2024-01-22 PROCEDURE — 90853 GROUP PSYCHOTHERAPY: CPT

## 2024-01-22 RX ORDER — OLANZAPINE 15 MG/1
5 TABLET, FILM COATED ORAL DAILY
Refills: 0 | Status: DISCONTINUED | OUTPATIENT
Start: 2024-01-22 | End: 2024-01-24

## 2024-01-22 RX ORDER — OLANZAPINE 15 MG/1
10 TABLET, FILM COATED ORAL AT BEDTIME
Refills: 0 | Status: DISCONTINUED | OUTPATIENT
Start: 2024-01-22 | End: 2024-01-24

## 2024-01-22 RX ADMIN — OLANZAPINE 10 MILLIGRAM(S): 15 TABLET, FILM COATED ORAL at 21:35

## 2024-01-22 RX ADMIN — OLANZAPINE 5 MILLIGRAM(S): 15 TABLET, FILM COATED ORAL at 09:53

## 2024-01-22 NOTE — BH INPATIENT PSYCHIATRY PROGRESS NOTE - NSBHASSESSSUMMFT_PSY_ALL_CORE
Patient is a 53 year old adult (goes by "Juan Jose"), living at [facility] on [place] grounds, PMH of GERD, anemia, PPH of schizophrenia, selective mutism, currently with [facility] ACT team, previously with [facility] which , with history of multiple psychiatric hospitalizations, without known history of self-injury or suicide attempts, with history of aggression in the setting on nonadherence with medication, who was initially brought in by staff at residence for increasing agitation and aggression in the setting of 2 months of medication nonadherence. Patient was hospitalized from 8/3-23 on 1N where they were noted to be hostile, disorganized, threatening, malodorous and grossly psychotic. TOO was obtained, and patient eventually administered dual long-acting injectables (Haldol Decanoate and Zyprexa Relprevv), though had declined nearly all PO medications. Patient was transferred to [facility] where they were admitted due to anemia to 6 (medical workup largely unremarkable), patient stabilized and transferred back to [facility] for further management of psychosis.     Diagnostically, presentation consistent with exacerbation of schizophrenia.     Patient has shown overall very little clinical improvement since admission. Spends most of day isolative and talking to self in severely disorganized and largely incomprehensible fashion, with only very brief moments of reality based exchange with staff or peers, or is non-verbal and stares at staff. Has showered recently with staff encouragement, though room remains malodorous. No recent physical aggression, though can appear mistrustful and make vaguely threatening remarks. Sleep erratic, often just a few hours. TOO renewed on 24. Haloperidol decanoate continued. Olanzapine with IM backup added with goal for Relprevv. Will increase dose further today. Will obtain vitals today to monitor tolerance.    GI and hematology consulted in response to [facility]'s concerns around chronic anemia. Responses sent to [facility]. No urgent indication for parenteral iron. Spoke to nutrition, added Ensure to diet to try to increase iron intake, will try to encourage use (intermittently accepts). Labs repeated 1/3 and showed stable (mildly improved) anemia.    Patient requires inpatient admission due to very poor self-care and for containment, stabilization, medication management and aftercare planning.    Plan:  1. Brink: admitted involuntary , TOO renewed 24 (expires 3/19/24)  2. Observation: routine checks  3. Collateral: obtained collateral from ACT psychiatrist  4. Psychiatric  -Continue haloperidol decanoate 200mg IM q4 weeks, last given , next due   -INCREASE to olanzapine 5mg daily/10mg QHS + olanzapine IM backup if refuses per court order  -Continue benztropine 1mg BID for EPS (refusing)  5. Medical:  -Anemia:   	-s/p medical admission at [facility] for anemia to Hgb 6, required 2u pRBCs. GI workup unremarkable with exception of diverticulosis and colonic polyp  	-Repeat CBCs with anemia 8.6-low 9s  	-Hemoglobin stable to improved on 1/3, will check again ~1 month  	-Encourage Ensure HP (refuses PO iron)  -[facility] requesting hematology c/s for chronic anemia---> wrote chart note , etiology c/w BRYON, recommended GI consult --> consulted GI 12/15 and wrote chart note --> sent to [facility]  -Fall on : likely mechanical, no sustained injuries. PT consult  -Bowel regimen: continue bisacodyl 10mg QHS (refusing), miralax 17g daily (refusing), senna 2 tablets QHS (refusing)  -Continue Vitamin D3 1000units QHS (refusing)  6. PRNs  -Olanzapine 5mg PO or IM for agitation  7. Therapy   -Individual, group and milieu therapy as appropriate   8. Disposition: state application submitted , on waiting list

## 2024-01-22 NOTE — BH PSYCHOLOGY - GROUP THERAPY NOTE - NSPSYCHOLGRPGENPT_PSY_A_CORE FT
Patient attended the cognitive behavior therapy (CBT) group session.  Group focused on the topic of motivation and learning how to develop the commitment to change their current circumstances.  Patient was encouraged to list difficulties as well as their strengths in dealing with their problems.  Patient was also encouraged to do a cost benefit analysis of changing current situations.  Group expectations and guidelines (as well as confidentiality and its limitations) were addressed.  Principles of cognitive behavior therapy related to today's group topic were reviewed.  Group members illustrated understanding of these concepts by giving personal examples based on their current experiences.  Discussions stemmed from the group topics to the causal connection between thoughts, feelings, and behaviors.

## 2024-01-22 NOTE — BH INPATIENT PSYCHIATRY PROGRESS NOTE - NSBHFUPINTERVALHXFT_PSY_A_CORE
Refusing vitals, refusing PO medication, receiving Zyprexa IM backups, no acute events. Slept 2.5 hours overnight.    Patient found in dayroom eating breakfast, as room is under maintenance. Patient speaking incoherently but tells this writer to get away and that they will "shut her down." Unable to continue interview.

## 2024-01-22 NOTE — BH INPATIENT PSYCHIATRY PROGRESS NOTE - MSE UNSTRUCTURED FT
Appearance: sitting in dayroom, eating breakfast, wearing hospital attire  Behavior: uncooperative  Speech: normal rate, volume  Mood: unable to assess  Affect: irritable  Thought process: severely disorganized   Thought content: can make vaguely violent/threatening statements, today states "shut her down", not observed harming self or others.   Perceptions: observed talking to self  Insight: very poor  Judgement: very poor, refusing vitals intermittently, refusing PO medications, very poor ADLs  Cognition: unable to assess

## 2024-01-22 NOTE — BH INPATIENT PSYCHIATRY PROGRESS NOTE - PRN MEDS
MEDICATIONS  (PRN):  acetaminophen     Tablet .. 650 milliGRAM(s) Oral every 4 hours PRN Mild Pain (1 - 3), Moderate Pain (4 - 6)  OLANZapine Disintegrating Tablet 5 milliGRAM(s) Oral every 4 hours PRN agitation  OLANZapine Injectable 10 milliGRAM(s) IntraMuscular at bedtime PRN If refuses Zyprexa 10mg PO  OLANZapine Injectable 5 milliGRAM(s) IntraMuscular once PRN Agitation  OLANZapine Injectable 5 milliGRAM(s) IntraMuscular daily PRN If refuses Zyprexa 5mg PO

## 2024-01-22 NOTE — BH PSYCHOLOGY - GROUP THERAPY NOTE - NSBHPSYCHOLPARTICIPCOMMENT_PSY_A_CORE FT
Patient arrived to the group session and accepted a worksheet when offered by the . Patient was observed to have spent parts of the session walking in and out of the day room. Patient declined to participate when the  attempted to engage in the discussion.

## 2024-01-22 NOTE — BH INPATIENT PSYCHIATRY PROGRESS NOTE - CURRENT MEDICATION
MEDICATIONS  (STANDING):  benztropine 1 milliGRAM(s) Oral two times a day  bisacodyl 10 milliGRAM(s) Oral at bedtime  cholecalciferol 1000 Unit(s) Oral at bedtime  ferrous    sulfate 325 milliGRAM(s) Oral daily  OLANZapine 10 milliGRAM(s) Oral at bedtime  OLANZapine 5 milliGRAM(s) Oral daily  polyethylene glycol 3350 17 Gram(s) Oral two times a day  senna 2 Tablet(s) Oral at bedtime    MEDICATIONS  (PRN):  acetaminophen     Tablet .. 650 milliGRAM(s) Oral every 4 hours PRN Mild Pain (1 - 3), Moderate Pain (4 - 6)  OLANZapine Disintegrating Tablet 5 milliGRAM(s) Oral every 4 hours PRN agitation  OLANZapine Injectable 10 milliGRAM(s) IntraMuscular at bedtime PRN If refuses Zyprexa 10mg PO  OLANZapine Injectable 5 milliGRAM(s) IntraMuscular once PRN Agitation  OLANZapine Injectable 5 milliGRAM(s) IntraMuscular daily PRN If refuses Zyprexa 5mg PO

## 2024-01-23 PROCEDURE — 99231 SBSQ HOSP IP/OBS SF/LOW 25: CPT

## 2024-01-23 RX ADMIN — OLANZAPINE 10 MILLIGRAM(S): 15 TABLET, FILM COATED ORAL at 21:19

## 2024-01-23 RX ADMIN — OLANZAPINE 5 MILLIGRAM(S): 15 TABLET, FILM COATED ORAL at 08:45

## 2024-01-23 NOTE — BH INPATIENT PSYCHIATRY PROGRESS NOTE - NSBHFUPINTERVALHXFT_PSY_A_CORE
Refusing vitals, refusing PO medications, no acute events. Slept 7 hours today.    Patient seen in dayroom, requesting food tray from staff. Attempted to engage patient interview/basic medical questions, but patient does not speak or answer any questions.

## 2024-01-23 NOTE — BH INPATIENT PSYCHIATRY PROGRESS NOTE - MSE UNSTRUCTURED FT
Appearance: standing in dayroom, wearing hospital attire  Behavior: uncooperative  Speech: no speech today  Mood: unable to assess  Affect: irritable  Thought process: severely disorganized  when does speak  Thought content: can make vaguely violent/threatening statements, none today, not observed harming self or others.   Perceptions: observed talking to self  Insight: very poor  Judgement: very poor, refusing vitals intermittently, refusing PO medications, very poor ADLs  Cognition: unable to assess

## 2024-01-23 NOTE — BH INPATIENT PSYCHIATRY PROGRESS NOTE - NSBHASSESSSUMMFT_PSY_ALL_CORE
Patient is a 53 year old adult (goes by "Juan Jose"), living at [facility] on [place] grounds, PMH of GERD, anemia, PPH of schizophrenia, selective mutism, currently with [facility] ACT team, previously with [facility] which , with history of multiple psychiatric hospitalizations, without known history of self-injury or suicide attempts, with history of aggression in the setting on nonadherence with medication, who was initially brought in by staff at residence for increasing agitation and aggression in the setting of 2 months of medication nonadherence. Patient was hospitalized from 8/3-23 on 1N where they were noted to be hostile, disorganized, threatening, malodorous and grossly psychotic. TOO was obtained, and patient eventually administered dual long-acting injectables (Haldol Decanoate and Zyprexa Relprevv), though had declined nearly all PO medications. Patient was transferred to [facility] where they were admitted due to anemia to 6 (medical workup largely unremarkable), patient stabilized and transferred back to [facility] for further management of psychosis.     Diagnostically, presentation consistent with exacerbation of schizophrenia.     Patient has shown overall very little clinical improvement since admission. Spends most of day isolative and talking to self in severely disorganized and largely incomprehensible fashion, with only very brief moments of reality based exchange with staff or peers, or is non-verbal and stares at staff. Has showered recently with staff encouragement, though room remains malodorous. No recent physical aggression, though can appear mistrustful and make vaguely threatening remarks. Sleep erratic, often just a few hours. TOO renewed on 24. Haloperidol decanoate continued. Olanzapine with IM backup added with goal for Relprevv. Will attempt to get vitals regularly with medication changes.    GI and hematology consulted in response to [facility]'s concerns around chronic anemia. Responses sent to [facility]. No urgent indication for parenteral iron. Spoke to nutrition, added Ensure to diet to try to increase iron intake, will try to encourage use (intermittently accepts). Labs repeated 1/3 and showed stable (mildly improved) anemia.    Patient requires inpatient admission due to very poor self-care and for containment, stabilization, medication management and aftercare planning.    Plan:  1. Brink: admitted involuntary , TOO renewed 24 (expires 3/19/24)  2. Observation: routine checks  3. Collateral: obtained collateral from ACT psychiatrist  4. Psychiatric  -Continue haloperidol decanoate 200mg IM q4 weeks, last given , next due   -Continue olanzapine 5mg daily/10mg QHS + olanzapine IM backup if refuses per court order  -Continue benztropine 1mg BID for EPS (refusing)  5. Medical:  -Anemia:   	-s/p medical admission at [facility] for anemia to Hgb 6, required 2u pRBCs. GI workup unremarkable with exception of diverticulosis and colonic polyp  	-Repeat CBCs with anemia 8.6-low 9s  	-Hemoglobin stable to improved on 1/3, will check again ~1 month  	-Encourage Ensure HP (refuses PO iron)  -[facility] requesting hematology c/s for chronic anemia---> wrote chart note , etiology c/w BRYON, recommended GI consult --> consulted GI 12/15 and wrote chart note --> sent to [facility]  -Fall on : likely mechanical, no sustained injuries. PT consult  -Bowel regimen: continue bisacodyl 10mg QHS (refusing), miralax 17g daily (refusing), senna 2 tablets QHS (refusing)  -Continue Vitamin D3 1000units QHS (refusing)  6. PRNs  -Olanzapine 5mg PO or IM for agitation  7. Therapy   -Individual, group and milieu therapy as appropriate   8. Disposition: state application submitted , on waiting list

## 2024-01-23 NOTE — BH INPATIENT PSYCHIATRY PROGRESS NOTE - NSBHMETABOLIC_PSY_ALL_CORE_FT
BMI: BMI (kg/m2): 35.2 (12-06-23 @ 08:57)  HbA1c: A1C with Estimated Average Glucose Result: 5.3 % (09-29-23 @ 10:00)    Glucose:   BP: --Vital Signs Last 24 Hrs  T(C): --  T(F): --  HR: --  BP: --  BP(mean): --  RR: --  SpO2: --    Orthostatic VS  01-22-24 @ 13:03  Lying BP: --/-- HR: --  Sitting BP: 99/64 HR: 102  Standing BP: --/-- HR: --  Site: --  Mode: --    Lipid Panel: Date/Time: 09-29-23 @ 10:00  Cholesterol, Serum: 166  LDL Cholesterol Calculated: 94  HDL Cholesterol, Serum: 60  Total Cholesterol/HDL Ration Measurement: --  Triglycerides, Serum: 61

## 2024-01-23 NOTE — BH INPATIENT PSYCHIATRY PROGRESS NOTE - NSBHCHARTREVIEWVS_PSY_A_CORE FT
Vital Signs Last 24 Hrs  T(C): --  T(F): --  HR: --  BP: --  BP(mean): --  RR: --  SpO2: --    Orthostatic VS  01-22-24 @ 13:03  Lying BP: --/-- HR: --  Sitting BP: 99/64 HR: 102  Standing BP: --/-- HR: --  Site: --  Mode: --

## 2024-01-24 PROCEDURE — 99232 SBSQ HOSP IP/OBS MODERATE 35: CPT

## 2024-01-24 RX ORDER — OLANZAPINE 15 MG/1
10 TABLET, FILM COATED ORAL
Refills: 0 | Status: DISCONTINUED | OUTPATIENT
Start: 2024-01-24 | End: 2024-01-31

## 2024-01-24 RX ORDER — OLANZAPINE 15 MG/1
10 TABLET, FILM COATED ORAL EVERY 8 HOURS
Refills: 0 | Status: DISCONTINUED | OUTPATIENT
Start: 2024-01-24 | End: 2024-01-25

## 2024-01-24 RX ADMIN — OLANZAPINE 10 MILLIGRAM(S): 15 TABLET, FILM COATED ORAL at 21:51

## 2024-01-24 RX ADMIN — OLANZAPINE 5 MILLIGRAM(S): 15 TABLET, FILM COATED ORAL at 10:49

## 2024-01-24 NOTE — BH INPATIENT PSYCHIATRY PROGRESS NOTE - NSBHASSESSSUMMFT_PSY_ALL_CORE
Patient is a 53 year old adult (goes by "Juan Jose"), living at [facility] on [place] grounds, PMH of GERD, anemia, PPH of schizophrenia, selective mutism, currently with [facility] ACT team, previously with [facility] which , with history of multiple psychiatric hospitalizations, without known history of self-injury or suicide attempts, with history of aggression in the setting on nonadherence with medication, who was initially brought in by staff at residence for increasing agitation and aggression in the setting of 2 months of medication nonadherence. Patient was hospitalized from 8/3-23 on 1N where they were noted to be hostile, disorganized, threatening, malodorous and grossly psychotic. TOO was obtained, and patient eventually administered dual long-acting injectables (Haldol Decanoate and Zyprexa Relprevv), though had declined nearly all PO medications. Patient was transferred to [facility] where they were admitted due to anemia to 6 (medical workup largely unremarkable), patient stabilized and transferred back to [facility] for further management of psychosis.     Diagnostically, presentation consistent with exacerbation of schizophrenia.     Patient has shown overall very little clinical improvement since admission. Spends most of day isolative and talking to self in severely disorganized and largely incomprehensible fashion, with only very brief moments of reality based exchange with staff or peers, or is non-verbal and stares at staff. Has showered recently with staff encouragement, though room remains malodorous. No recent physical aggression, though can appear mistrustful and make vaguely threatening remarks. Sleep erratic, often just a few hours. TOO renewed on 24. Haloperidol decanoate continued. Olanzapine with IM backup added with goal for Relprevv. Vitals stable. Will optimize to 10mg BID today.    GI and hematology consulted in response to [facility]'s concerns around chronic anemia. Responses sent to [facility]. No urgent indication for parenteral iron. Spoke to nutrition, added Ensure to diet to try to increase iron intake, will try to encourage use (intermittently accepts). Labs repeated 1/3 and showed stable (mildly improved) anemia.    Patient requires inpatient admission due to very poor self-care and for containment, stabilization, medication management and aftercare planning.    Plan:  1. Brink: admitted involuntary , TOO renewed 24 (expires 3/19/24)  2. Observation: routine checks  3. Collateral: obtained collateral from ACT psychiatrist  4. Psychiatric  -Continue haloperidol decanoate 200mg IM q4 weeks, last given , next due   -INCREASE to olanzapine 10mg BID with IM backup  -Continue benztropine 1mg BID for EPS (refusing)  5. Medical:  -Anemia:   	-s/p medical admission at [facility] for anemia to Hgb 6, required 2u pRBCs. GI workup unremarkable with exception of diverticulosis and colonic polyp  	-Repeat CBCs with anemia 8.6-low 9s  	-Hemoglobin stable to improved on 1/3, will check again ~1 month  	-Encourage Ensure HP (refuses PO iron)  -[facility] requesting hematology c/s for chronic anemia---> wrote chart note , etiology c/w BRYON, recommended GI consult --> consulted GI 12/15 and wrote chart note --> sent to [facility]  -Fall on : likely mechanical, no sustained injuries. PT consult  -Bowel regimen: continue bisacodyl 10mg QHS (refusing), miralax 17g daily (refusing), senna 2 tablets QHS (refusing)  -Continue Vitamin D3 1000units QHS (refusing)  6. PRNs  -Olanzapine 5mg PO or IM for agitation  7. Therapy   -Individual, group and milieu therapy as appropriate   8. Disposition: state application submitted , on waiting list

## 2024-01-24 NOTE — BH INPATIENT PSYCHIATRY PROGRESS NOTE - NSBHCHARTREVIEWVS_PSY_A_CORE FT
Vital Signs Last 24 Hrs  T(C): --  T(F): --  HR: 88 (01-24-24 @ 10:52) (88 - 88)  BP: 107/70 (01-24-24 @ 10:52) (107/70 - 107/70)  BP(mean): --  RR: 20 (01-24-24 @ 10:52) (20 - 20)  SpO2: --    Orthostatic VS  01-22-24 @ 13:03  Lying BP: --/-- HR: --  Sitting BP: 99/64 HR: 102  Standing BP: --/-- HR: --  Site: --  Mode: --

## 2024-01-24 NOTE — BH INPATIENT PSYCHIATRY PROGRESS NOTE - NSBHMETABOLIC_PSY_ALL_CORE_FT
BMI: BMI (kg/m2): 35.2 (12-06-23 @ 08:57)  HbA1c: A1C with Estimated Average Glucose Result: 5.3 % (09-29-23 @ 10:00)    Glucose:   BP: 107/70 (01-24-24 @ 10:52) (107/70 - 107/70)Vital Signs Last 24 Hrs  T(C): --  T(F): --  HR: 88 (01-24-24 @ 10:52) (88 - 88)  BP: 107/70 (01-24-24 @ 10:52) (107/70 - 107/70)  BP(mean): --  RR: 20 (01-24-24 @ 10:52) (20 - 20)  SpO2: --    Orthostatic VS  01-22-24 @ 13:03  Lying BP: --/-- HR: --  Sitting BP: 99/64 HR: 102  Standing BP: --/-- HR: --  Site: --  Mode: --    Lipid Panel: Date/Time: 09-29-23 @ 10:00  Cholesterol, Serum: 166  LDL Cholesterol Calculated: 94  HDL Cholesterol, Serum: 60  Total Cholesterol/HDL Ration Measurement: --  Triglycerides, Serum: 61

## 2024-01-24 NOTE — BH INPATIENT PSYCHIATRY PROGRESS NOTE - MSE UNSTRUCTURED FT
Appearance: laying in bed, wearing hospital attire  Behavior: uncooperative  Speech: normal rate, volume  Mood: unable to assess  Affect: irritable  Thought process: severely disorganized   Thought content: can make vaguely violent/threatening statements, states "get out" today, not observed harming self or others.   Perceptions: observed talking to self  Insight: very poor  Judgement: very poor, refusing vitals intermittently, refusing PO medications, very poor ADLs  Cognition: unable to assess

## 2024-01-24 NOTE — BH INPATIENT PSYCHIATRY PROGRESS NOTE - NSBHFUPINTERVALHXFT_PSY_A_CORE
VSS, refusing PO medications, no PRNs for agitation, no acute events. Slept 2.5 hours per log.    Patient seen laying in bed, awake. Multiple attempts to engage in conversation, and patient largely silent, though eventually states "get out."

## 2024-01-24 NOTE — BH INPATIENT PSYCHIATRY PROGRESS NOTE - NSBHATTESTTYPEVISIT_PSY_A_CORE
Varun Li mother Melissa Ontiveros called requesting details of upcoming procedure. HIPAA / POA not on file.  Please return her call to discuss @ [phone]      Thank you Attending Only

## 2024-01-25 PROCEDURE — 99231 SBSQ HOSP IP/OBS SF/LOW 25: CPT

## 2024-01-25 RX ORDER — OLANZAPINE 15 MG/1
10 TABLET, FILM COATED ORAL EVERY 8 HOURS
Refills: 0 | Status: DISCONTINUED | OUTPATIENT
Start: 2024-01-25 | End: 2024-02-01

## 2024-01-25 RX ADMIN — OLANZAPINE 10 MILLIGRAM(S): 15 TABLET, FILM COATED ORAL at 21:00

## 2024-01-25 RX ADMIN — OLANZAPINE 10 MILLIGRAM(S): 15 TABLET, FILM COATED ORAL at 08:59

## 2024-01-25 NOTE — BH INPATIENT PSYCHIATRY PROGRESS NOTE - NSBHMETABOLIC_PSY_ALL_CORE_FT
BMI: BMI (kg/m2): 35.2 (12-06-23 @ 08:57)  HbA1c: A1C with Estimated Average Glucose Result: 5.3 % (09-29-23 @ 10:00)    Glucose:   BP: 122/78 (01-25-24 @ 12:09) (107/70 - 122/78)Vital Signs Last 24 Hrs  T(C): --  T(F): --  HR: 88 (01-25-24 @ 12:09) (88 - 88)  BP: 122/78 (01-25-24 @ 12:09) (122/78 - 122/78)  BP(mean): --  RR: 18 (01-25-24 @ 12:09) (18 - 18)  SpO2: --      Lipid Panel: Date/Time: 09-29-23 @ 10:00  Cholesterol, Serum: 166  LDL Cholesterol Calculated: 94  HDL Cholesterol, Serum: 60  Total Cholesterol/HDL Ration Measurement: --  Triglycerides, Serum: 61

## 2024-01-25 NOTE — BH INPATIENT PSYCHIATRY PROGRESS NOTE - CURRENT MEDICATION
MEDICATIONS  (STANDING):  benztropine 1 milliGRAM(s) Oral two times a day  bisacodyl 10 milliGRAM(s) Oral at bedtime  cholecalciferol 1000 Unit(s) Oral at bedtime  ferrous    sulfate 325 milliGRAM(s) Oral daily  OLANZapine 10 milliGRAM(s) Oral two times a day  polyethylene glycol 3350 17 Gram(s) Oral two times a day  senna 2 Tablet(s) Oral at bedtime    MEDICATIONS  (PRN):  acetaminophen     Tablet .. 650 milliGRAM(s) Oral every 4 hours PRN Mild Pain (1 - 3), Moderate Pain (4 - 6)  OLANZapine Disintegrating Tablet 5 milliGRAM(s) Oral every 4 hours PRN agitation  OLANZapine Injectable 10 milliGRAM(s) IntraMuscular every 8 hours PRN If refuses Zyprexa 10mg PO  OLANZapine Injectable 5 milliGRAM(s) IntraMuscular once PRN Agitation

## 2024-01-25 NOTE — BH INPATIENT PSYCHIATRY PROGRESS NOTE - NSBHFUPINTERVALHXFT_PSY_A_CORE
VSS, refused PO medications, no acute events. Slept 2 hours overnight.     Patient found awake in bed. Initially staring at this writer, not responding to questions. When this writer notes being patient's doctor, patient repeats "you are not my doctor" and then "turn her off."

## 2024-01-25 NOTE — BH INPATIENT PSYCHIATRY PROGRESS NOTE - MSE UNSTRUCTURED FT
Appearance: laying in bed, wearing hospital attire  Behavior: uncooperative  Speech: normal rate, volume  Mood: unable to assess  Affect: irritable  Thought process: severely disorganized   Thought content: can make vaguely violent/threatening statements, states "turn her off" today, not observed harming self or others.   Perceptions: observed talking to self  Insight: very poor  Judgement: very poor, refusing vitals intermittently, refusing PO medications, very poor ADLs  Cognition: unable to assess

## 2024-01-25 NOTE — BH INPATIENT PSYCHIATRY PROGRESS NOTE - PRN MEDS
MEDICATIONS  (PRN):  acetaminophen     Tablet .. 650 milliGRAM(s) Oral every 4 hours PRN Mild Pain (1 - 3), Moderate Pain (4 - 6)  OLANZapine Disintegrating Tablet 5 milliGRAM(s) Oral every 4 hours PRN agitation  OLANZapine Injectable 10 milliGRAM(s) IntraMuscular every 8 hours PRN If refuses Zyprexa 10mg PO  OLANZapine Injectable 5 milliGRAM(s) IntraMuscular once PRN Agitation

## 2024-01-25 NOTE — BH INPATIENT PSYCHIATRY PROGRESS NOTE - NSBHCHARTREVIEWVS_PSY_A_CORE FT
Vital Signs Last 24 Hrs  T(C): --  T(F): --  HR: 88 (01-25-24 @ 12:09) (88 - 88)  BP: 122/78 (01-25-24 @ 12:09) (122/78 - 122/78)  BP(mean): --  RR: 18 (01-25-24 @ 12:09) (18 - 18)  SpO2: --

## 2024-01-25 NOTE — BH INPATIENT PSYCHIATRY PROGRESS NOTE - NSBHASSESSSUMMFT_PSY_ALL_CORE
Patient is a 53 year old adult (goes by "Juan Jose"), living at [facility] on [address] grounds, PMH of GERD, anemia, PPH of schizophrenia, selective mutism, currently with [facility] ACT team, previously with [facility] which , with history of multiple psychiatric hospitalizations, without known history of self-injury or suicide attempts, with history of aggression in the setting on nonadherence with medication, who was initially brought in by staff at residence for increasing agitation and aggression in the setting of 2 months of medication nonadherence. Patient was hospitalized from 8/3-23 on 1N where they were noted to be hostile, disorganized, threatening, malodorous and grossly psychotic. TOO was obtained, and patient eventually administered dual long-acting injectables (Haldol Decanoate and Zyprexa Relprevv), though had declined nearly all PO medications. Patient was transferred to [facility] where they were admitted due to anemia to 6 (medical workup largely unremarkable), patient stabilized and transferred back to [facility] for further management of psychosis.     Diagnostically, presentation consistent with exacerbation of schizophrenia.     Patient has shown overall very little clinical improvement since admission. Spends most of day isolative and talking to self in severely disorganized and largely incomprehensible fashion, with only very brief moments of reality based exchange with staff or peers, or is non-verbal and stares at staff. Has showered with staff encouragement, though room remains malodorous. No recent physical aggression, though appears mistrustful and make vaguely threatening remarks. Sleep erratic, often just a few hours. TOO renewed on 24. Haloperidol decanoate continued. Olanzapine with IM backup added with goal for Relprevv. Vitals stable. Optimized to olanzapine 10mg BID. Today, patient with brief exchange with writer (to tell writer that I am not their doctor), though unlike patient to directly speak with writer.    GI and hematology consulted in response to [facility]'s concerns around chronic anemia. Responses sent to [facility]. No urgent indication for parenteral iron. Spoke to nutrition, added Ensure to diet to try to increase iron intake, will try to encourage use (intermittently accepts). Labs repeated 1/3 and showed stable (mildly improved) anemia.    Patient requires inpatient admission due to very poor self-care and for containment, stabilization, medication management and aftercare planning.    Plan:  1. Brink: admitted involuntary 9.27, TOO renewed 24 (expires 3/19/24)  2. Observation: routine checks  3. Collateral: obtained collateral from ACT psychiatrist  4. Psychiatric  -Continue haloperidol decanoate 200mg IM q4 weeks, last given , next due   -Continue olanzapine 10mg BID with IM backup  -Continue benztropine 1mg BID for EPS (refusing)  5. Medical:  -Anemia:   	-s/p medical admission at [facility] for anemia to Hgb 6, required 2u pRBCs. GI workup unremarkable with exception of diverticulosis and colonic polyp  	-Repeat CBCs with anemia 8.6-low 9s  	-Hemoglobin stable to improved on 1/3, will check again ~1 month  	-Encourage Ensure HP (refuses PO iron)  -[facility] requesting hematology c/s for chronic anemia---> wrote chart note , etiology c/w BRYON, recommended GI consult --> consulted GI 12/15 and wrote chart note --> sent to [facility]  -Fall on : likely mechanical, no sustained injuries. PT consult  -Bowel regimen: continue bisacodyl 10mg QHS (refusing), miralax 17g daily (refusing), senna 2 tablets QHS (refusing)  -Continue Vitamin D3 1000units QHS (refusing)  6. PRNs  -Olanzapine 5mg PO or IM for agitation  7. Therapy   -Individual, group and milieu therapy as appropriate   8. Disposition: state application submitted , on waiting list

## 2024-01-26 PROCEDURE — 99231 SBSQ HOSP IP/OBS SF/LOW 25: CPT

## 2024-01-26 RX ADMIN — OLANZAPINE 10 MILLIGRAM(S): 15 TABLET, FILM COATED ORAL at 08:29

## 2024-01-26 RX ADMIN — OLANZAPINE 10 MILLIGRAM(S): 15 TABLET, FILM COATED ORAL at 21:07

## 2024-01-26 NOTE — BH INPATIENT PSYCHIATRY PROGRESS NOTE - NSBHFUPINTERVALHXFT_PSY_A_CORE
VSS, refusing PO medications, no PRNs for agitation, no acute events. Slept 2.5 hours per log. Per staff, showered today with encouragement. Continues to eat well, make needs known to staff.     Patient seen laying awake in bed. Multiple attempts to speak to patient, but patient does not speak or respond verbally.

## 2024-01-26 NOTE — BH INPATIENT PSYCHIATRY PROGRESS NOTE - NSBHCHARTREVIEWVS_PSY_A_CORE FT
Vital Signs Last 24 Hrs  T(C): 36.4 (01-26-24 @ 08:50), Max: 36.4 (01-26-24 @ 08:50)  T(F): 97.6 (01-26-24 @ 08:50), Max: 97.6 (01-26-24 @ 08:50)  HR: --  BP: --  BP(mean): --  RR: --  SpO2: --    Orthostatic VS  01-26-24 @ 08:50  Lying BP: --/-- HR: --  Sitting BP: 116/61 HR: 67  Standing BP: 116/71 HR: 80  Site: --  Mode: --

## 2024-01-26 NOTE — BH INPATIENT PSYCHIATRY PROGRESS NOTE - MSE UNSTRUCTURED FT
Appearance: laying in bed, wearing hospital attire  Behavior: uncooperative  Speech: no speech today  Mood: unable to assess  Affect: irritable  Thought process: severely disorganized when does speak  Thought content: can make vaguely violent/threatening statements, none today, not observed harming self or others.   Perceptions: observed talking to self  Insight: very poor  Judgement: very poor, refusing vitals intermittently, refusing PO medications, very poor ADLs  Cognition: unable to assess

## 2024-01-26 NOTE — BH INPATIENT PSYCHIATRY PROGRESS NOTE - NSBHASSESSSUMMFT_PSY_ALL_CORE
Patient is a 53 year old adult (goes by "Juan Jose"), living at [facility] on [place] grounds, PMH of GERD, anemia, PPH of schizophrenia, selective mutism, currently with [facility] ACT team, previously with [facility] which , with history of multiple psychiatric hospitalizations, without known history of self-injury or suicide attempts, with history of aggression in the setting on nonadherence with medication, who was initially brought in by staff at residence for increasing agitation and aggression in the setting of 2 months of medication nonadherence. Patient was hospitalized from 8/3-23 on 1N where they were noted to be hostile, disorganized, threatening, malodorous and grossly psychotic. TOO was obtained, and patient eventually administered dual long-acting injectables (Haldol Decanoate and Zyprexa Relprevv), though had declined nearly all PO medications. Patient was transferred to [facility] where they were admitted due to anemia to 6 (medical workup largely unremarkable), patient stabilized and transferred back to [facility] for further management of psychosis.     Diagnostically, presentation consistent with exacerbation of schizophrenia.     Patient has shown overall very little clinical improvement since admission. Spends most of day isolative and talking to self in severely disorganized and largely incomprehensible fashion, with only very brief moments of reality based exchange with staff or peers, or is non-verbal and stares at staff. Has showered with staff encouragement, though room remains malodorous. No recent physical aggression, though appears mistrustful and make vaguely threatening remarks. Sleep erratic, often just a few hours. TOO renewed on 24. Haloperidol decanoate continued. Olanzapine with IM backup added with goal for Relprevv. Vitals stable. Optimized to olanzapine 10mg BID. Patient mute with this writer today.    GI and hematology consulted in response to [facility]'s concerns around chronic anemia. No urgent indication for parenteral iron. Spoke to nutrition, added Ensure to diet to try to increase iron intake, will try to encourage use (intermittently accepts). Labs repeated 1/3 and showed stable (mildly improved) anemia. Will repeat again next month.    Patient requires inpatient admission due to very poor self-care and for containment, stabilization, medication management and aftercare planning.    Plan:  1. Brink: admitted involuntary , TOO renewed 24 (expires 3/19/24)  2. Observation: routine checks  3. Collateral: obtained collateral from ACT psychiatrist  4. Psychiatric  -Continue haloperidol decanoate 200mg IM q4 weeks, last given , next due   -Continue olanzapine 10mg BID with IM backup  -Continue benztropine 1mg BID for EPS (refusing)  5. Medical:  -Anemia:   	-s/p medical admission at [facility] for anemia to Hgb 6, required 2u pRBCs. GI workup unremarkable with exception of diverticulosis and colonic polyp  	-Repeat CBCs with anemia 8.6-low 9s  	-Hemoglobin stable to improved on 1/3, will check again ~1 month  	-Encourage Ensure HP (refuses PO iron)  -[facility] requesting hematology c/s for chronic anemia---> wrote chart note , etiology c/w BRYON, recommended GI consult --> consulted GI 12/15 and wrote chart note --> sent to [facility]  -Fall on : likely mechanical, no sustained injuries. PT consult  -Bowel regimen: continue bisacodyl 10mg QHS (refusing), miralax 17g daily (refusing), senna 2 tablets QHS (refusing)  -Continue Vitamin D3 1000units QHS (refusing)  6. PRNs  -Olanzapine 5mg PO or IM for agitation  7. Therapy   -Individual, group and milieu therapy as appropriate   8. Disposition: state application submitted , on waiting list

## 2024-01-26 NOTE — BH INPATIENT PSYCHIATRY PROGRESS NOTE - NSBHMETABOLIC_PSY_ALL_CORE_FT
BMI: BMI (kg/m2): 35.2 (12-06-23 @ 08:57)  HbA1c: A1C with Estimated Average Glucose Result: 5.3 % (09-29-23 @ 10:00)    Glucose:   BP: 122/78 (01-25-24 @ 12:09) (107/70 - 122/78)Vital Signs Last 24 Hrs  T(C): 36.4 (01-26-24 @ 08:50), Max: 36.4 (01-26-24 @ 08:50)  T(F): 97.6 (01-26-24 @ 08:50), Max: 97.6 (01-26-24 @ 08:50)  HR: --  BP: --  BP(mean): --  RR: --  SpO2: --    Orthostatic VS  01-26-24 @ 08:50  Lying BP: --/-- HR: --  Sitting BP: 116/61 HR: 67  Standing BP: 116/71 HR: 80  Site: --  Mode: --    Lipid Panel: Date/Time: 09-29-23 @ 10:00  Cholesterol, Serum: 166  LDL Cholesterol Calculated: 94  HDL Cholesterol, Serum: 60  Total Cholesterol/HDL Ration Measurement: --  Triglycerides, Serum: 61

## 2024-01-27 RX ADMIN — OLANZAPINE 10 MILLIGRAM(S): 15 TABLET, FILM COATED ORAL at 08:32

## 2024-01-27 RX ADMIN — OLANZAPINE 10 MILLIGRAM(S): 15 TABLET, FILM COATED ORAL at 20:50

## 2024-01-28 RX ADMIN — OLANZAPINE 10 MILLIGRAM(S): 15 TABLET, FILM COATED ORAL at 08:22

## 2024-01-28 RX ADMIN — OLANZAPINE 10 MILLIGRAM(S): 15 TABLET, FILM COATED ORAL at 21:58

## 2024-01-29 PROCEDURE — 99231 SBSQ HOSP IP/OBS SF/LOW 25: CPT

## 2024-01-29 RX ADMIN — OLANZAPINE 10 MILLIGRAM(S): 15 TABLET, FILM COATED ORAL at 08:25

## 2024-01-29 RX ADMIN — OLANZAPINE 10 MILLIGRAM(S): 15 TABLET, FILM COATED ORAL at 20:48

## 2024-01-29 NOTE — BH INPATIENT PSYCHIATRY PROGRESS NOTE - NSBHMETABOLIC_PSY_ALL_CORE_FT
BMI: BMI (kg/m2): 35.2 (12-06-23 @ 08:57)  HbA1c: A1C with Estimated Average Glucose Result: 5.3 % (09-29-23 @ 10:00)    Glucose:   BP: --Vital Signs Last 24 Hrs  T(C): --  T(F): --  HR: --  BP: --  BP(mean): --  RR: --  SpO2: --    Orthostatic VS  01-28-24 @ 08:33  Lying BP: --/-- HR: --  Sitting BP: 137/64 HR: 103  Standing BP: --/-- HR: --  Site: --  Mode: --    Lipid Panel: Date/Time: 09-29-23 @ 10:00  Cholesterol, Serum: 166  LDL Cholesterol Calculated: 94  HDL Cholesterol, Serum: 60  Total Cholesterol/HDL Ration Measurement: --  Triglycerides, Serum: 61

## 2024-01-29 NOTE — BH INPATIENT PSYCHIATRY PROGRESS NOTE - NSBHASSESSSUMMFT_PSY_ALL_CORE
Patient is a 53 year old adult (goes by "Juan Jose"), living at [facility] on [place] grounds, PMH of GERD, anemia, PPH of schizophrenia, selective mutism, currently with [facility] ACT team, previously with [facility] which , with history of multiple psychiatric hospitalizations, without known history of self-injury or suicide attempts, with history of aggression in the setting on nonadherence with medication, who was initially brought in by staff at residence for increasing agitation and aggression in the setting of 2 months of medication nonadherence. Patient was hospitalized from 8/3-23 on 1N where they were noted to be hostile, disorganized, threatening, malodorous and grossly psychotic. TOO was obtained, and patient eventually administered dual long-acting injectables (Haldol Decanoate and Zyprexa Relprevv), though had declined nearly all PO medications. Patient was transferred to [facility] where they were admitted due to anemia to 6 (medical workup largely unremarkable), patient stabilized and transferred back to [facility] for further management of psychosis.     Diagnostically, presentation consistent with exacerbation of schizophrenia.     Patient has shown overall very little clinical improvement since admission. Spends most of day isolative and talking to self in severely disorganized and largely incomprehensible fashion, with only very brief moments of reality based exchange with staff or peers, or is non-verbal and stares at staff. Has showered with staff encouragement, though room remains malodorous. No recent physical aggression, though appears mistrustful and make vaguely threatening remarks. Sleep erratic, often just a few hours. TOO renewed on 24. Haloperidol decanoate continued. Olanzapine with IM backup added with goal for Relprevv. Vitals stable. Optimized to olanzapine 10mg BID.     Very poor sleep last night, but sleeping into morning today so did not disturb. Per staff report, no significant improvements, will CTM.    GI and hematology consulted in response to [facility]'s concerns around chronic anemia. No urgent indication for parenteral iron. Spoke to nutrition, added Ensure to diet to try to increase iron intake, will try to encourage use (intermittently accepts). Labs repeated 1/3 and showed stable (mildly improved) anemia. Will repeat again 2/2.    Patient requires inpatient admission due to very poor self-care and for containment, stabilization, medication management and aftercare planning.    Plan:  1. Brink: admitted involuntary 9.27, TOO renewed 24 (expires 3/19/24)  2. Observation: routine checks  3. Collateral: obtained collateral from ACT psychiatrist  4. Psychiatric  -Continue haloperidol decanoate 200mg IM q4 weeks, last given , next due   -Continue olanzapine 10mg BID with IM backup  -Continue benztropine 1mg BID for EPS (refusing)  5. Medical:  -Anemia:   	-s/p medical admission at [facility] for anemia to Hgb 6, required 2u pRBCs. GI workup unremarkable with exception of diverticulosis and colonic polyp  	-Repeat CBCs with anemia 8.6-low 9s  	-Hemoglobin stable to improved on 1/3, will repeat   	-Encourage Ensure HP (refuses PO iron)  -[facility] requesting hematology c/s for chronic anemia---> wrote chart note , etiology c/w BRYON, recommended GI consult --> consulted GI 12/15 and wrote chart note --> sent to [facility]  -Fall on : likely mechanical, no sustained injuries. PT consult  -Bowel regimen: continue bisacodyl 10mg QHS (refusing), miralax 17g daily (refusing), senna 2 tablets QHS (refusing)  -Continue Vitamin D3 1000units QHS (refusing)  6. PRNs  -Olanzapine 5mg PO or IM for agitation  7. Therapy   -Individual, group and milieu therapy as appropriate   8. Disposition: state application submitted , on waiting list

## 2024-01-29 NOTE — BH INPATIENT PSYCHIATRY PROGRESS NOTE - NSBHFUPINTERVALHXFT_PSY_A_CORE
HR to 103 yesterday, refusing PO medications, no acute events over weekend. Slept 1 hour overnight per log. Per staff, patient continues to be internally pre-occupied, delusional, vaguely threatening, though able to make needs known and eating well.     Patient seen in room for interview. Sleeping, and given very poor sleep overnight did not wake up.

## 2024-01-29 NOTE — BH INPATIENT PSYCHIATRY PROGRESS NOTE - MSE UNSTRUCTURED FT
Appearance: laying in bed asleep, wearing hospital attire  Behavior: uncooperative  Speech: no speech today (sleeping)  Mood: unable to assess  Affect: irritable  Thought process: severely disorganized when does speak  Thought content: can make vaguely violent/threatening statements, not observed harming self or others.   Perceptions: observed talking to self  Insight: very poor  Judgement: very poor, refusing vitals intermittently, refusing PO medications, very poor ADLs  Cognition: unable to assess

## 2024-01-29 NOTE — BH INPATIENT PSYCHIATRY PROGRESS NOTE - NSBHCHARTREVIEWVS_PSY_A_CORE FT
Vital Signs Last 24 Hrs  T(C): --  T(F): --  HR: --  BP: --  BP(mean): --  RR: --  SpO2: --    Orthostatic VS  01-28-24 @ 08:33  Lying BP: --/-- HR: --  Sitting BP: 137/64 HR: 103  Standing BP: --/-- HR: --  Site: --  Mode: --

## 2024-01-30 PROCEDURE — 99232 SBSQ HOSP IP/OBS MODERATE 35: CPT

## 2024-01-30 RX ADMIN — OLANZAPINE 10 MILLIGRAM(S): 15 TABLET, FILM COATED ORAL at 21:28

## 2024-01-30 RX ADMIN — OLANZAPINE 10 MILLIGRAM(S): 15 TABLET, FILM COATED ORAL at 08:43

## 2024-01-30 NOTE — BH INPATIENT PSYCHIATRY PROGRESS NOTE - PRN MEDS
MEDICATIONS  (PRN):  acetaminophen     Tablet .. 650 milliGRAM(s) Oral every 4 hours PRN Mild Pain (1 - 3), Moderate Pain (4 - 6)  OLANZapine Disintegrating Tablet 5 milliGRAM(s) Oral every 4 hours PRN agitation  OLANZapine Injectable 5 milliGRAM(s) IntraMuscular once PRN Agitation  OLANZapine Injectable 10 milliGRAM(s) IntraMuscular every 8 hours PRN If refuses Zyprexa 10mg PO

## 2024-01-30 NOTE — BH INPATIENT PSYCHIATRY PROGRESS NOTE - NSBHASSESSSUMMFT_PSY_ALL_CORE
Patient is a 53 year old adult (goes by "Juan Jose"), living at [facility] on [place] grounds, PMH of GERD, anemia, PPH of schizophrenia, selective mutism, currently with [facility] ACT team, previously with [facility] which , with history of multiple psychiatric hospitalizations, without known history of self-injury or suicide attempts, with history of aggression in the setting on nonadherence with medication, who was initially brought in by staff at residence for increasing agitation and aggression in the setting of 2 months of medication nonadherence. Patient was hospitalized from 8/3-23 on 1N where they were noted to be hostile, disorganized, threatening, malodorous and grossly psychotic. TOO was obtained, and patient eventually administered dual long-acting injectables (Haldol Decanoate and Zyprexa Relprevv), though had declined nearly all PO medications. Patient was transferred to [facility] where they were admitted due to anemia to 6 (medical workup largely unremarkable), patient stabilized and transferred back to [facility] for further management of psychosis.     Diagnostically, presentation consistent with exacerbation of schizophrenia.     Patient has shown overall very little clinical improvement since admission. Spends most of day isolative and talking to self in severely disorganized and largely incomprehensible fashion, with only very brief moments of reality based exchange with staff or peers, or is non-verbal and stares at staff. Has showered with staff encouragement, though room remains malodorous. No recent physical aggression, though appears mistrustful and make vaguely threatening remarks. Sleep erratic, often just a few hours. TOO renewed on 24. Haloperidol decanoate continued. Olanzapine with IM backup added with goal for Relprevv. Vitals stable. Optimized to olanzapine 10mg BID.     Very poor sleep last night, but sleeping into morning today so did not disturb. Per staff report, no significant improvements, will CTM.    GI and hematology consulted in response to [facility]'s concerns around chronic anemia. No urgent indication for parenteral iron. Spoke to nutrition, added Ensure to diet to try to increase iron intake, will try to encourage use (intermittently accepts). Labs repeated 1/3 and showed stable (mildly improved) anemia. Will repeat again 2/2.    Patient requires inpatient admission due to very poor self-care and for containment, stabilization, medication management and aftercare planning.    Plan:  1. Brink: admitted involuntary 9.27, TOO renewed 24 (expires 3/19/24)  2. Observation: routine checks  3. Collateral: obtained collateral from ACT psychiatrist  4. Psychiatric  -Continue haloperidol decanoate 200mg IM q4 weeks, last given , next due   -Continue olanzapine 10mg BID with IM backup, plan for Relprevv, will enroll pt today  -Continue benztropine 1mg BID for EPS (refusing)  5. Medical:  -Anemia:   	-s/p medical admission at [facility] for anemia to Hgb 6, required 2u pRBCs. GI workup unremarkable with exception of diverticulosis and colonic polyp  	-Repeat CBCs with anemia 8.6-low 9s  	-Hemoglobin stable to improved on 1/3, will repeat   	-Encourage Ensure HP (refuses PO iron)  -[facility] requesting hematology c/s for chronic anemia---> wrote chart note , etiology c/w BRYON, recommended GI consult --> consulted GI 12/15 and wrote chart note --> sent to [facility]  -Fall on : likely mechanical, no sustained injuries. PT consult  -Bowel regimen: continue bisacodyl 10mg QHS (refusing), miralax 17g daily (refusing), senna 2 tablets QHS (refusing)  -Continue Vitamin D3 1000units QHS (refusing)  6. PRNs  -Olanzapine 5mg PO or IM for agitation  7. Therapy   -Individual, group and milieu therapy as appropriate   8. Disposition: state application submitted , on waiting list

## 2024-01-30 NOTE — BH INPATIENT PSYCHIATRY PROGRESS NOTE - NSBHMETABOLIC_PSY_ALL_CORE_FT
BMI: BMI (kg/m2): 35.2 (12-06-23 @ 08:57)  HbA1c: A1C with Estimated Average Glucose Result: 5.3 % (09-29-23 @ 10:00)    Glucose:   BP: 113/93 (01-30-24 @ 09:32) (113/93 - 113/93)Vital Signs Last 24 Hrs  T(C): --  T(F): --  HR: 76 (01-30-24 @ 09:32) (76 - 76)  BP: 113/93 (01-30-24 @ 09:32) (113/93 - 113/93)  BP(mean): --  RR: --  SpO2: --      Lipid Panel: Date/Time: 09-29-23 @ 10:00  Cholesterol, Serum: 166  LDL Cholesterol Calculated: 94  HDL Cholesterol, Serum: 60  Total Cholesterol/HDL Ration Measurement: --  Triglycerides, Serum: 61

## 2024-01-30 NOTE — BH INPATIENT PSYCHIATRY PROGRESS NOTE - NSBHCHARTREVIEWVS_PSY_A_CORE FT
Vital Signs Last 24 Hrs  T(C): --  T(F): --  HR: 76 (01-30-24 @ 09:32) (76 - 76)  BP: 113/93 (01-30-24 @ 09:32) (113/93 - 113/93)  BP(mean): --  RR: --  SpO2: --

## 2024-01-30 NOTE — BH INPATIENT PSYCHIATRY PROGRESS NOTE - NSBHFUPINTERVALHXFT_PSY_A_CORE
VSS, refusing PO medications, no acute events. Slept 5 hours overnight.    Patient seen sitting up in bed, awake. Attempt to speak to patient, but patient is mute and does not engage verbally.

## 2024-01-30 NOTE — BH INPATIENT PSYCHIATRY PROGRESS NOTE - MSE UNSTRUCTURED FT
Appearance: laying in bed awake, wearing hospital attire  Behavior: uncooperative  Speech: no speech today   Mood: unable to assess  Affect: irritable  Thought process: severely disorganized when does speak  Thought content: can make vaguely violent/threatening statements, not observed harming self or others.   Perceptions: observed talking to self  Insight: very poor  Judgement: very poor, refusing vitals intermittently, refusing PO medications, very poor ADLs  Cognition: unable to assess

## 2024-01-30 NOTE — BH INPATIENT PSYCHIATRY PROGRESS NOTE - CURRENT MEDICATION
MEDICATIONS  (STANDING):  benztropine 1 milliGRAM(s) Oral two times a day  bisacodyl 10 milliGRAM(s) Oral at bedtime  cholecalciferol 1000 Unit(s) Oral at bedtime  ferrous    sulfate 325 milliGRAM(s) Oral daily  OLANZapine 10 milliGRAM(s) Oral two times a day  polyethylene glycol 3350 17 Gram(s) Oral two times a day  senna 2 Tablet(s) Oral at bedtime    MEDICATIONS  (PRN):  acetaminophen     Tablet .. 650 milliGRAM(s) Oral every 4 hours PRN Mild Pain (1 - 3), Moderate Pain (4 - 6)  OLANZapine Disintegrating Tablet 5 milliGRAM(s) Oral every 4 hours PRN agitation  OLANZapine Injectable 5 milliGRAM(s) IntraMuscular once PRN Agitation  OLANZapine Injectable 10 milliGRAM(s) IntraMuscular every 8 hours PRN If refuses Zyprexa 10mg PO

## 2024-01-31 PROCEDURE — 99231 SBSQ HOSP IP/OBS SF/LOW 25: CPT

## 2024-01-31 RX ORDER — OLANZAPINE 15 MG/1
10 TABLET, FILM COATED ORAL AT BEDTIME
Refills: 0 | Status: DISCONTINUED | OUTPATIENT
Start: 2024-01-31 | End: 2024-02-01

## 2024-01-31 RX ADMIN — OLANZAPINE 10 MILLIGRAM(S): 15 TABLET, FILM COATED ORAL at 08:11

## 2024-01-31 RX ADMIN — OLANZAPINE 10 MILLIGRAM(S): 15 TABLET, FILM COATED ORAL at 20:37

## 2024-01-31 NOTE — ED PROVIDER NOTE - GASTROINTESTINAL, MLM
[FreeTextEntry1] : Lesions are consistent with traumatic scratches Small seborrheic keratosis present southeast Abdomen soft, non-tender, no guarding.

## 2024-01-31 NOTE — BH INPATIENT PSYCHIATRY PROGRESS NOTE - NSBHMETABOLIC_PSY_ALL_CORE_FT
BMI: BMI (kg/m2): 35.2 (12-06-23 @ 08:57)  HbA1c: A1C with Estimated Average Glucose Result: 5.3 % (09-29-23 @ 10:00)    Glucose:   BP: 113/93 (01-30-24 @ 09:32) (113/93 - 113/93)Vital Signs Last 24 Hrs  T(C): --  T(F): --  HR: --  BP: --  BP(mean): --  RR: --  SpO2: --      Lipid Panel: Date/Time: 09-29-23 @ 10:00  Cholesterol, Serum: 166  LDL Cholesterol Calculated: 94  HDL Cholesterol, Serum: 60  Total Cholesterol/HDL Ration Measurement: --  Triglycerides, Serum: 61

## 2024-01-31 NOTE — BH INPATIENT PSYCHIATRY PROGRESS NOTE - MSE UNSTRUCTURED FT
Appearance: laying in bed awake, wearing hospital attire  Behavior: uncooperative  Speech: normal rate and volume  Mood: unable to assess  Affect: irritable  Thought process: severely disorganized   Thought content: can make vaguely violent/threatening statements, today states "kill her off" not observed harming self or others.   Perceptions: observed talking to self  Insight: very poor  Judgement: very poor, refusing vitals intermittently, refusing PO medications, very poor ADLs  Cognition: unable to assess

## 2024-01-31 NOTE — BH INPATIENT PSYCHIATRY PROGRESS NOTE - NSBHFUPINTERVALHXFT_PSY_A_CORE
Refused vitals, refusing PO medications, no PRNs, no acute events overnight. Slept 4 hours overnight.    Patient seen laying in bed awake. Talking to self, largely incoherently, though does say "kill her off" multiple times to this writer.

## 2024-01-31 NOTE — BH INPATIENT PSYCHIATRY PROGRESS NOTE - NSBHASSESSSUMMFT_PSY_ALL_CORE
Patient is a 53 year old adult (goes by "Juan Jose"), living at [facility] on [place] grounds, PMH of GERD, anemia, PPH of schizophrenia, selective mutism, currently with [facility] ACT team, previously with [facility] which , with history of multiple psychiatric hospitalizations, without known history of self-injury or suicide attempts, with history of aggression in the setting on nonadherence with medication, who was initially brought in by staff at residence for increasing agitation and aggression in the setting of 2 months of medication nonadherence. Patient was hospitalized from 8/3-23 on 1N where they were noted to be hostile, disorganized, threatening, malodorous and grossly psychotic. TOO was obtained, and patient eventually administered dual long-acting injectables (Haldol Decanoate and Zyprexa Relprevv), though had declined nearly all PO medications. Patient was transferred to [facility] where they were admitted due to anemia to 6 (medical workup largely unremarkable), patient stabilized and transferred back to [facility] for further management of psychosis.     Diagnostically, presentation consistent with exacerbation of schizophrenia.     Patient has shown overall very little clinical improvement since admission. Spends most of day isolative and talking to self in severely disorganized and largely incomprehensible fashion, with only very brief moments of reality based exchange with staff or peers, or is non-verbal and stares at staff. Has showered with staff encouragement, though room remains malodorous. No recent physical aggression, though appears mistrustful and make vaguely threatening remarks. Sleep erratic, often just a few hours. TOO renewed on 24. Haloperidol decanoate continued. Olanzapine with IM backup added with goal for Relprevv. Vitals stable. Optimized to olanzapine 10mg BID.     Very poor sleep last night, but sleeping into morning today so did not disturb. Per staff report, no significant improvements, will CTM.    GI and hematology consulted in response to [facility]'s concerns around chronic anemia. No urgent indication for parenteral iron. Spoke to nutrition, added Ensure to diet to try to increase iron intake, will try to encourage use (intermittently accepts). Labs repeated 1/3 and showed stable (mildly improved) anemia. Will repeat again 2/2.    Patient requires inpatient admission due to very poor self-care and for containment, stabilization, medication management and aftercare planning.    Plan:  1. Brink: admitted involuntary 9.27, TOO renewed 24 (expires 3/19/24)  2. Observation: routine checks  3. Collateral: obtained collateral from ACT psychiatrist  4. Psychiatric  -Continue haloperidol decanoate 200mg IM q4 weeks, last given , next due   -Continue olanzapine 10mg BID with IM backup, plan for Relprevv, will enroll pt today  -Continue benztropine 1mg BID for EPS (refusing)  5. Medical:  -Anemia:   	-s/p medical admission at [facility] for anemia to Hgb 6, required 2u pRBCs. GI workup unremarkable with exception of diverticulosis and colonic polyp  	-Repeat CBCs with anemia 8.6-low 9s  	-Hemoglobin stable to improved on 1/3, will repeat   	-Encourage Ensure HP (refuses PO iron)  -[facility] requesting hematology c/s for chronic anemia---> wrote chart note , etiology c/w BRYON, recommended GI consult --> consulted GI 12/15 and wrote chart note --> sent to [facility]  -Fall on : likely mechanical, no sustained injuries. PT consult  -Bowel regimen: continue bisacodyl 10mg QHS (refusing), miralax 17g daily (refusing), senna 2 tablets QHS (refusing)  -Continue Vitamin D3 1000units QHS (refusing)  6. PRNs  -Olanzapine 5mg PO or IM for agitation  7. Therapy   -Individual, group and milieu therapy as appropriate   8. Disposition: state application submitted , on waiting list

## 2024-02-01 PROCEDURE — 99232 SBSQ HOSP IP/OBS MODERATE 35: CPT

## 2024-02-01 RX ORDER — OLANZAPINE 15 MG/1
300 TABLET, FILM COATED ORAL ONCE
Refills: 0 | Status: COMPLETED | OUTPATIENT
Start: 2024-02-01 | End: 2024-02-01

## 2024-02-01 RX ADMIN — OLANZAPINE 300 MILLIGRAM(S): 15 TABLET, FILM COATED ORAL at 13:40

## 2024-02-01 NOTE — BH INPATIENT PSYCHIATRY PROGRESS NOTE - NSBHMETABOLIC_PSY_ALL_CORE_FT
BMI: BMI (kg/m2): 35.2 (12-06-23 @ 08:57)  HbA1c: A1C with Estimated Average Glucose Result: 5.3 % (09-29-23 @ 10:00)    Glucose:   BP: 115/74 (02-01-24 @ 08:44) (113/93 - 115/74)Vital Signs Last 24 Hrs  T(C): --  T(F): --  HR: 84 (02-01-24 @ 08:44) (84 - 84)  BP: 115/74 (02-01-24 @ 08:44) (115/74 - 115/74)  BP(mean): --  RR: --  SpO2: --      Lipid Panel: Date/Time: 09-29-23 @ 10:00  Cholesterol, Serum: 166  LDL Cholesterol Calculated: 94  HDL Cholesterol, Serum: 60  Total Cholesterol/HDL Ration Measurement: --  Triglycerides, Serum: 61

## 2024-02-01 NOTE — BH INPATIENT PSYCHIATRY PROGRESS NOTE - NSBHCHARTREVIEWVS_PSY_A_CORE FT
Vital Signs Last 24 Hrs  T(C): --  T(F): --  HR: 84 (02-01-24 @ 08:44) (84 - 84)  BP: 115/74 (02-01-24 @ 08:44) (115/74 - 115/74)  BP(mean): --  RR: --  SpO2: --

## 2024-02-01 NOTE — BH INPATIENT PSYCHIATRY PROGRESS NOTE - NSBHFUPINTERVALHXFT_PSY_A_CORE
VSS, refused PO medications, no acute events overnight. Slept 6-7 hours. Per staff, patient has had some increase in reality-based conversation, and appears more calm. Attempted to interview patient, who was found laying in bed. Patient did not respond verbally to any of this writer's questions.

## 2024-02-01 NOTE — BH INPATIENT PSYCHIATRY PROGRESS NOTE - NSBHASSESSSUMMFT_PSY_ALL_CORE
[FreeTextEntry1] : Impression\par \par Colitis clinically in remission\par \par Continue mesalamine and budesonide\par \par Blood work here today\par \par Follow up in 6 months\par \par Call or return sooner as needed Patient is a 53 year old adult (goes by "Juan Jose"), living at [facility] on [place] grounds, PMH of GERD, anemia, PPH of schizophrenia, selective mutism, currently with [facility] ACT team, previously with [facility] which , with history of multiple psychiatric hospitalizations, without known history of self-injury or suicide attempts, with history of aggression in the setting on nonadherence with medication, who was initially brought in by staff at residence for increasing agitation and aggression in the setting of 2 months of medication nonadherence. Patient was hospitalized from 8/3-23 on 1N where they were noted to be hostile, disorganized, threatening, malodorous and grossly psychotic. TOO was obtained, and patient eventually administered dual long-acting injectables (Haldol Decanoate and Zyprexa Relprevv), though had declined nearly all PO medications. Patient was transferred to [facility] where they were admitted due to anemia to 6 (medical workup largely unremarkable), patient stabilized and transferred back to [facility] for further management of psychosis.     Diagnostically, presentation consistent with exacerbation of schizophrenia.     Patient spends most of day isolative and talking to self in severely disorganized and largely incomprehensible fashion, with only very brief moments of reality based exchange with staff or peers, or is non-verbal and stares at staff. Has showered with staff encouragement, though room remains malodorous. No recent physical aggression, though appears mistrustful and makes vaguely threatening remarks. Sleep erratic, often just a few hours. TOO renewed on 24. Haloperidol decanoate continued. Olanzapine with IM backup added. Per staff, some slight improvements, though would like to assess with more time. Optimized to olanzapine 10mg BID. Tolerating without issue, vitals stable. Will d/c olanzapine today and adminster Relprevv.    GI and hematology consulted in response to [facility]'s concerns around chronic anemia. No urgent indication for parenteral iron. Spoke to nutrition, added Ensure to diet to try to increase iron intake, will try to encourage use (intermittently accepts). Labs repeated 1/3 and showed stable (mildly improved) anemia. Will repeat again 2/.    Patient requires inpatient admission due to very poor self-care and for containment, stabilization, medication management and aftercare planning.    Plan:  1. Brink: admitted involuntary 9.27, TOO renewed 24 (expires 3/19/24)  2. Observation: routine checks  3. Collateral: obtained collateral from ACT psychiatrist  4. Psychiatric  -Continue haloperidol decanoate 200mg IM q4 weeks, last given , next due   -Give olanzapine BREWER 300mg today, next due 2/15  -STOP olanzapine  -Continue benztropine 1mg BID for EPS (refusing)  5. Medical:  -Anemia:   	-s/p medical admission at [facility] for anemia to Hgb 6, required 2u pRBCs. GI workup unremarkable with exception of diverticulosis and colonic polyp  	-Repeat CBCs with anemia 8.6-low 9s  	-Hemoglobin stable to improved on 1/3, will repeat   	-Encourage Ensure HP (refuses PO iron)  -[facility] requesting hematology c/s for chronic anemia---> wrote chart note , etiology c/w BRYON, recommended GI consult --> consulted GI 12/15 and wrote chart note --> sent to [facility]  -Fall on : likely mechanical, no sustained injuries. PT consult  -Bowel regimen: continue bisacodyl 10mg QHS (refusing), miralax 17g daily (refusing), senna 2 tablets QHS (refusing)  -Continue Vitamin D3 1000units QHS (refusing)  6. PRNs  -Olanzapine 5mg PO or IM for agitation  7. Therapy   -Individual, group and milieu therapy as appropriate   8. Disposition: state application submitted , on waiting list

## 2024-02-01 NOTE — BH INPATIENT PSYCHIATRY PROGRESS NOTE - MSE UNSTRUCTURED FT
Appearance: laying in bed awake, wearing hospital attire  Behavior: uncooperative  Speech: no speech  Mood: unable to assess  Affect: irritable  Thought process: severely disorganized when does speak  Thought content: can make vaguely violent/threatening statements, not observed harming self or others.   Perceptions: observed talking to self  Insight: very poor  Judgement: very poor, refusing vitals intermittently, refusing PO medications, very poor ADLs  Cognition: unable to assess

## 2024-02-01 NOTE — BH INPATIENT PSYCHIATRY PROGRESS NOTE - CURRENT MEDICATION
MEDICATIONS  (STANDING):  benztropine 1 milliGRAM(s) Oral two times a day  bisacodyl 10 milliGRAM(s) Oral at bedtime  cholecalciferol 1000 Unit(s) Oral at bedtime  ferrous    sulfate 325 milliGRAM(s) Oral daily  polyethylene glycol 3350 17 Gram(s) Oral two times a day  senna 2 Tablet(s) Oral at bedtime    MEDICATIONS  (PRN):  acetaminophen     Tablet .. 650 milliGRAM(s) Oral every 4 hours PRN Mild Pain (1 - 3), Moderate Pain (4 - 6)  OLANZapine Disintegrating Tablet 5 milliGRAM(s) Oral every 4 hours PRN agitation  OLANZapine Injectable 5 milliGRAM(s) IntraMuscular once PRN Agitation

## 2024-02-02 PROCEDURE — 99231 SBSQ HOSP IP/OBS SF/LOW 25: CPT

## 2024-02-02 NOTE — BH INPATIENT PSYCHIATRY PROGRESS NOTE - MSE UNSTRUCTURED FT
Appearance: laying in bed awake, wearing hospital attire  Behavior: uncooperative  Speech: normal rate and volume, rambling and incoherent  Mood: unable to assess  Affect: irritable  Thought process: severely disorganized   Thought content: can make vaguely violent/threatening statements, today stated "kill her off," not observed harming self or others.   Perceptions: observed talking to self  Insight: very poor  Judgement: very poor, refusing vitals intermittently, refusing PO medications, very poor ADLs  Cognition: unable to assess

## 2024-02-02 NOTE — BH INPATIENT PSYCHIATRY PROGRESS NOTE - NSBHMETABOLIC_PSY_ALL_CORE_FT
BMI: BMI (kg/m2): 35.2 (12-06-23 @ 08:57)  HbA1c: A1C with Estimated Average Glucose Result: 5.3 % (09-29-23 @ 10:00)    Glucose:   BP: 115/74 (02-01-24 @ 08:44) (115/74 - 115/74)Vital Signs Last 24 Hrs  T(C): --  T(F): --  HR: --  BP: --  BP(mean): --  RR: --  SpO2: --      Lipid Panel: Date/Time: 09-29-23 @ 10:00  Cholesterol, Serum: 166  LDL Cholesterol Calculated: 94  HDL Cholesterol, Serum: 60  Total Cholesterol/HDL Ration Measurement: --  Triglycerides, Serum: 61

## 2024-02-02 NOTE — BH INPATIENT PSYCHIATRY PROGRESS NOTE - NSBHASSESSSUMMFT_PSY_ALL_CORE
Patient is a 53 year old adult (goes by "Juan Jose"), living at [facility] on [place] grounds, PMH of GERD, anemia, PPH of schizophrenia, selective mutism, currently with [facility] ACT team, previously with [facility] which , with history of multiple psychiatric hospitalizations, without known history of self-injury or suicide attempts, with history of aggression in the setting on nonadherence with medication, who was initially brought in by staff at residence for increasing agitation and aggression in the setting of 2 months of medication nonadherence. Patient was hospitalized from 8/3-23 on 1N where they were noted to be hostile, disorganized, threatening, malodorous and grossly psychotic. TOO was obtained, and patient eventually administered dual long-acting injectables (Haldol Decanoate and Zyprexa Relprevv), though had declined nearly all PO medications. Patient was transferred to [facility] where they were admitted due to anemia to 6 (medical workup largely unremarkable), patient stabilized and transferred back to [facility] for further management of psychosis.     Diagnostically, presentation consistent with exacerbation of schizophrenia.     Patient spends most of day isolative and talking to self in severely disorganized and largely incomprehensible fashion, with only very brief moments of reality based exchange with staff or peers, or is non-verbal and stares at staff. Has showered with staff encouragement, though room and patient remain malodorous. No physical aggression, though appears mistrustful and makes vaguely threatening remarks. Sleep erratic, often just a few hours, better recently. TOO renewed on 24. Haloperidol decanoate continued. Olanzapine with IM backup added. Optimized to olanzapine 10mg BID. Tolerating without issue, vitals stable. Per staff, some slight improvements, though would like to assess with more time. Relprevv administered on     GI and hematology consulted in response to [facility]'s concerns around chronic anemia. No urgent indication for parenteral iron. Spoke to nutrition, added Ensure to diet to try to increase iron intake, will try to encourage use (intermittently accepts). Labs repeated 13 and showed stable (mildly improved) anemia. Will repeat again , along with CMP    Patient requires inpatient admission due to very poor self-care and for containment, stabilization, medication management and aftercare planning.    Plan:  1. Brink: admitted involuntary 9.27, TOO renewed 24 (expires 3/19/24)  2. Observation: routine checks  3. Collateral: obtained collateral from ACT psychiatrist  4. Psychiatric  -Continue haloperidol decanoate 200mg IM q4 weeks, last given , next due   -Continue olanzapine long-acting injectable (Relprevv) 300mg, last given , next due 2/15  -Continue benztropine 1mg BID for EPS (refusing)  5. Medical:  -Anemia:   	-s/p medical admission at [facility] for anemia to Hgb 6, required 2u pRBCs. GI workup unremarkable with exception of diverticulosis and colonic polyp  	-Repeat CBCs with anemia 8.6-low 9s  	-Hemoglobin stable to improved on 1/3, will repeat   	-Encourage Ensure HP (refuses PO iron)  -[facility] requesting hematology c/s for chronic anemia---> wrote chart note , etiology c/w BRYON, recommended GI consult --> consulted GI 12/15 and wrote chart note --> sent to [facility]  -Fall on : likely mechanical, no sustained injuries. PT consult  -Bowel regimen: continue bisacodyl 10mg QHS (refusing), miralax 17g daily (refusing), senna 2 tablets QHS (refusing)  -Continue Vitamin D3 1000units QHS (refusing)  6. PRNs  -Olanzapine 5mg PO or IM for agitation  7. Therapy   -Individual, group and milieu therapy as appropriate   8. Disposition: state application submitted , on waiting list

## 2024-02-02 NOTE — BH INPATIENT PSYCHIATRY PROGRESS NOTE - NSBHFUPINTERVALHXFT_PSY_A_CORE
VSS, refusing PO medications, no PRNs for agitation, no acute events overnight. Slept 6 hours overnight.     Patient seen laying in bed, awake. Talking to self, and cannot be redirected towards reality based conversation. Noted to be saying "kill her off."

## 2024-02-05 LAB
ALBUMIN SERPL ELPH-MCNC: 3.7 G/DL — SIGNIFICANT CHANGE UP (ref 3.3–5)
ALP SERPL-CCNC: 128 U/L — HIGH (ref 40–120)
ALT FLD-CCNC: 18 U/L — SIGNIFICANT CHANGE UP (ref 4–33)
ANION GAP SERPL CALC-SCNC: 11 MMOL/L — SIGNIFICANT CHANGE UP (ref 7–14)
AST SERPL-CCNC: 21 U/L — SIGNIFICANT CHANGE UP (ref 4–32)
BILIRUB SERPL-MCNC: <0.2 MG/DL — SIGNIFICANT CHANGE UP (ref 0.2–1.2)
BUN SERPL-MCNC: 24 MG/DL — HIGH (ref 7–23)
CALCIUM SERPL-MCNC: 9.2 MG/DL — SIGNIFICANT CHANGE UP (ref 8.4–10.5)
CHLORIDE SERPL-SCNC: 106 MMOL/L — SIGNIFICANT CHANGE UP (ref 98–107)
CO2 SERPL-SCNC: 23 MMOL/L — SIGNIFICANT CHANGE UP (ref 22–31)
CREAT SERPL-MCNC: 0.75 MG/DL — SIGNIFICANT CHANGE UP (ref 0.5–1.3)
EGFR: 95 ML/MIN/1.73M2 — SIGNIFICANT CHANGE UP
GLUCOSE SERPL-MCNC: 104 MG/DL — HIGH (ref 70–99)
HCT VFR BLD CALC: 29.7 % — LOW (ref 34.5–45)
HGB BLD-MCNC: 9 G/DL — LOW (ref 11.5–15.5)
MCHC RBC-ENTMCNC: 22.3 PG — LOW (ref 27–34)
MCHC RBC-ENTMCNC: 30.3 GM/DL — LOW (ref 32–36)
MCV RBC AUTO: 73.7 FL — LOW (ref 80–100)
NRBC # BLD: 0 /100 WBCS — SIGNIFICANT CHANGE UP (ref 0–0)
NRBC # FLD: 0 K/UL — SIGNIFICANT CHANGE UP (ref 0–0)
PLATELET # BLD AUTO: 306 K/UL — SIGNIFICANT CHANGE UP (ref 150–400)
POTASSIUM SERPL-MCNC: 4 MMOL/L — SIGNIFICANT CHANGE UP (ref 3.5–5.3)
POTASSIUM SERPL-SCNC: 4 MMOL/L — SIGNIFICANT CHANGE UP (ref 3.5–5.3)
PROT SERPL-MCNC: 7.8 G/DL — SIGNIFICANT CHANGE UP (ref 6–8.3)
RBC # BLD: 4.03 M/UL — SIGNIFICANT CHANGE UP (ref 3.8–5.2)
RBC # FLD: 20 % — HIGH (ref 10.3–14.5)
SODIUM SERPL-SCNC: 140 MMOL/L — SIGNIFICANT CHANGE UP (ref 135–145)
WBC # BLD: 5.72 K/UL — SIGNIFICANT CHANGE UP (ref 3.8–10.5)
WBC # FLD AUTO: 5.72 K/UL — SIGNIFICANT CHANGE UP (ref 3.8–10.5)

## 2024-02-05 PROCEDURE — 99231 SBSQ HOSP IP/OBS SF/LOW 25: CPT

## 2024-02-05 NOTE — BH INPATIENT PSYCHIATRY PROGRESS NOTE - NSBHASSESSSUMMFT_PSY_ALL_CORE
Patient is a 53 year old adult (goes by "Juan Jose"), living at [facility] on [place] grounds, PMH of GERD, anemia, PPH of schizophrenia, selective mutism, currently with [facility] ACT team, previously with [facility] which , with history of multiple psychiatric hospitalizations, without known history of self-injury or suicide attempts, with history of aggression in the setting on nonadherence with medication, who was initially brought in by staff at residence for increasing agitation and aggression in the setting of 2 months of medication nonadherence. Patient was hospitalized from 8/3-23 on 1N where they were noted to be hostile, disorganized, threatening, malodorous and grossly psychotic. TOO was obtained, and patient eventually administered dual long-acting injectables (Haldol Decanoate and Zyprexa Relprevv), though had declined nearly all PO medications. Patient was transferred to [facility] where they were admitted due to anemia to 6 (medical workup largely unremarkable), patient stabilized and transferred back to [facility] for further management of psychosis.     Diagnostically, presentation consistent with exacerbation of schizophrenia.     Patient spends most of day isolative and talking to self in severely disorganized and largely incomprehensible fashion, with only very brief moments of reality based exchange with staff or peers, or is non-verbal and stares at staff. Has showered with staff encouragement, though room and patient remain malodorous. No physical aggression, though appears mistrustful and makes vaguely threatening remarks. Sleep erratic, often just a few hours, better recently. TOO renewed on 24. Haloperidol decanoate continued. Olanzapine with IM backup added, and optimized to olanzapine 10mg BID, discontinued on  after Relprevv administered. Will assess with more time for improvements.     GI and hematology consulted in response to [facility]'s concerns around chronic anemia. No urgent indication for parenteral iron. Spoke to nutrition, added Ensure to diet to try to increase iron intake, will try to encourage use (intermittently accepts). Labs 2/ with stable anemia (Hgb 9.0) and unremarkable CMP, will repeat monthly.    Patient requires inpatient admission due to very poor self-care and for containment, stabilization, medication management and aftercare planning.    Plan:  1. Brink: admitted involuntary , TOO renewed 24 (expires 3/19/24)  2. Observation: routine checks  3. Collateral: obtained collateral from ACT psychiatrist  4. Psychiatric  -Continue haloperidol decanoate 200mg IM q4 weeks, last given , next due   -Continue olanzapine long-acting injectable (Relprevv) 300mg, last given , next due 2/15  -Continue benztropine 1mg BID for EPS (refusing)  5. Medical:  -Anemia:   	-s/p medical admission at [facility] for anemia to Hgb 6, required 2u pRBCs. GI workup unremarkable with exception of diverticulosis and colonic polyp  	-Repeat CBCs with anemia 8.6-low 9s  	-Hemoglobin stable to improved on 1/3, will repeat   	-Encourage Ensure HP (refuses PO iron)  -[facility] requesting hematology c/s for chronic anemia---> wrote chart note , etiology c/w BRYON, recommended GI consult --> consulted GI 12/15 and wrote chart note --> sent to [facility]  -Fall on : likely mechanical, no sustained injuries. PT consult  -Bowel regimen: continue bisacodyl 10mg QHS (refusing), miralax 17g daily (refusing), senna 2 tablets QHS (refusing)  -Continue Vitamin D3 1000units QHS (refusing)  6. PRNs  -Olanzapine 5mg PO or IM for agitation  7. Therapy   -Individual, group and milieu therapy as appropriate   8. Disposition: state application submitted , on waiting list Propranolol Pregnancy And Lactation Text: This medication is Pregnancy Category C and it isn't known if it is safe during pregnancy. It is excreted in breast milk.

## 2024-02-05 NOTE — BH INPATIENT PSYCHIATRY PROGRESS NOTE - NSBHFUPINTERVALHXFT_PSY_A_CORE
Refusing vitals, refusing PO medications, no PRNs for agitation, no acute events over weekend. Slept 6 hours overnight.    Patient seen awake laying in bed. Screams, "Get out" multiple times and will not answer any questions form this writer.

## 2024-02-05 NOTE — BH INPATIENT PSYCHIATRY PROGRESS NOTE - MSE UNSTRUCTURED FT
Appearance: laying in bed awake, wearing hospital attire  Behavior: uncooperative  Speech: loud at times, rambling and incoherent  Mood: unable to assess  Affect: irritable  Thought process: severely disorganized   Thought content: can make vaguely violent/threatening statements, today stated "GET OUT" not observed harming self or others.   Perceptions: observed talking to self  Insight: very poor  Judgement: very poor, refusing vitals intermittently, refusing PO medications, very poor ADLs  Cognition: unable to assess

## 2024-02-06 PROCEDURE — 99231 SBSQ HOSP IP/OBS SF/LOW 25: CPT

## 2024-02-06 NOTE — BH INPATIENT PSYCHIATRY PROGRESS NOTE - NSBHASSESSSUMMFT_PSY_ALL_CORE
Patient is a 53 year old adult (goes by "Juan Jose"), living at [facility] on [place] grounds, PMH of GERD, anemia, PPH of schizophrenia, selective mutism, currently with [facility] ACT team, previously with [facility] which , with history of multiple psychiatric hospitalizations, without known history of self-injury or suicide attempts, with history of aggression in the setting on nonadherence with medication, who was initially brought in by staff at residence for increasing agitation and aggression in the setting of 2 months of medication nonadherence. Patient was hospitalized from 8/3-23 on 1N where they were noted to be hostile, disorganized, threatening, malodorous and grossly psychotic. TOO was obtained, and patient eventually administered dual long-acting injectables (Haldol Decanoate and Zyprexa Relprevv), though had declined nearly all PO medications. Patient was transferred to [facility] where they were admitted due to anemia to 6 (medical workup largely unremarkable), patient stabilized and transferred back to [facility] for further management of psychosis.     Diagnostically, presentation consistent with exacerbation of schizophrenia.     Patient spends most of day isolative and talking to self in severely disorganized and largely incomprehensible fashion, with only very brief moments of reality based exchange with staff or peers, or is non-verbal and stares at staff. Has showered with staff encouragement, though room and patient remain malodorous. No physical aggression, though appears mistrustful and makes vaguely threatening remarks. Sleep erratic, often just a few hours, better recently. TOO renewed on 24. Haloperidol decanoate continued. Olanzapine with IM backup added, and optimized to olanzapine 10mg BID, discontinued on  after Relprevv administered. Will assess with more time for improvements. Some improvements noted by staff in reality based exchanges.    GI and hematology consulted in response to [facility]'s concerns around chronic anemia. No urgent indication for parenteral iron. Spoke to nutrition, added Ensure to diet to try to increase iron intake, will try to encourage use (intermittently accepts). Labs 2/ with stable anemia (Hgb 9.0) and unremarkable CMP, will repeat monthly.    Patient requires inpatient admission due to very poor self-care and for containment, stabilization, medication management and aftercare planning.    Plan:  1. Brink: admitted involuntary , TOO renewed 24 (expires 3/19/24)  2. Observation: routine checks  3. Collateral: obtained collateral from ACT psychiatrist  4. Psychiatric  -Continue haloperidol decanoate 200mg IM q4 weeks, last given , next due   -Continue olanzapine long-acting injectable (Relprevv) 300mg, last given , next due 2/15  -Continue benztropine 1mg BID for EPS (refusing)  5. Medical:  -Anemia:   	-s/p medical admission at [facility] for anemia to Hgb 6, required 2u pRBCs. GI workup unremarkable with exception of diverticulosis and colonic polyp  	-Repeat CBCs with anemia 8.6-low 9s  	-Hemoglobin stable to improved on 1/3, will repeat   	-Encourage Ensure HP (refuses PO iron)  -[facility] requesting hematology c/s for chronic anemia---> wrote chart note , etiology c/w BRYON, recommended GI consult --> consulted GI 12/15 and wrote chart note --> sent to [facility]  -Fall on : likely mechanical, no sustained injuries. PT consult  -Bowel regimen: continue bisacodyl 10mg QHS (refusing), miralax 17g daily (refusing), senna 2 tablets QHS (refusing)  -Continue Vitamin D3 1000units QHS (refusing)  6. PRNs  -Olanzapine 5mg PO or IM for agitation  7. Therapy   -Individual, group and milieu therapy as appropriate   8. Disposition: state application submitted , on waiting list

## 2024-02-06 NOTE — BH INPATIENT PSYCHIATRY PROGRESS NOTE - NSBHFUPINTERVALHXFT_PSY_A_CORE
Refused vitals, refusing PO medications, no acute events. Slept 6-7 hours overnight.     Patient seen in room, awake and laying in bed. Upon multiple attempts to engage in interview, repeats "GET OUT" "GET OUT."

## 2024-02-06 NOTE — BH INPATIENT PSYCHIATRY PROGRESS NOTE - MSE UNSTRUCTURED FT
Appearance: laying in bed awake, wearing hospital attire  Behavior: uncooperative  Speech: rambling and incoherent  Mood: unable to assess  Affect: irritable  Thought process: severely disorganized   Thought content: can make vaguely violent/threatening statements, today stated "GET OUT" repeatedly, not observed harming self or others.   Perceptions: observed talking to self  Insight: very poor  Judgement: very poor, refusing vitals intermittently, refusing PO medications, very poor ADLs  Cognition: unable to assess

## 2024-02-07 PROCEDURE — 99231 SBSQ HOSP IP/OBS SF/LOW 25: CPT

## 2024-02-07 NOTE — BH INPATIENT PSYCHIATRY PROGRESS NOTE - MSE UNSTRUCTURED FT
Appearance: laying in bed awake, wearing hospital attire  Behavior: uncooperative  Speech: rambling and incoherent  Mood: unable to assess  Affect: irritable  Thought process: severely disorganized   Thought content: can make vaguely violent/threatening statements, today stated "GET OUT" repeatedly, not observed harming self or others.   Perceptions: observed talking to self  Insight: very poor  Judgement: very poor, refusing vitals, refusing PO medications, very poor ADLs  Cognition: unable to assess

## 2024-02-07 NOTE — BH INPATIENT PSYCHIATRY PROGRESS NOTE - NSBHASSESSSUMMFT_PSY_ALL_CORE
Patient is a 53 year old adult (goes by "Juan Jose"), living at [facility] on [place] grounds, PMH of GERD, anemia, PPH of schizophrenia, selective mutism, currently with [facility] ACT team, previously with [facility] which , with history of multiple psychiatric hospitalizations, without known history of self-injury or suicide attempts, with history of aggression in the setting on nonadherence with medication, who was initially brought in by staff at residence for increasing agitation and aggression in the setting of 2 months of medication nonadherence. Patient was hospitalized from 8/3-23 on 1N where they were noted to be hostile, disorganized, threatening, malodorous and grossly psychotic. TOO was obtained, and patient eventually administered dual long-acting injectables (Haldol Decanoate and Zyprexa Relprevv), though had declined nearly all PO medications. Patient was transferred to [facility] where they were admitted due to anemia to 6 (medical workup largely unremarkable), patient stabilized and transferred back to [facility] for further management of psychosis.     Diagnostically, presentation consistent with exacerbation of schizophrenia.     Patient spends most of day isolative and talking to self in severely disorganized and largely incomprehensible fashion, with only very brief moments of reality based exchange with staff or peers, or is non-verbal and stares at staff. Has showered with staff encouragement, though room and patient remain malodorous. No physical aggression, though appears mistrustful and makes vaguely threatening remarks. Sleep erratic, often just a few hours, better recently. TOO renewed on 24. Haloperidol decanoate continued. Olanzapine with IM backup added, and optimized to olanzapine 10mg BID, discontinued on  after Relprevv administered. Will assess with more time for improvements. Some improvements noted by staff in reality based exchanges, though not appreciated by MD in daily rounds.    GI and hematology consulted in response to [facility]'s concerns around chronic anemia. No urgent indication for parenteral iron. Spoke to nutrition, added Ensure to diet to try to increase iron intake, will try to encourage use (intermittently accepts). Labs  with stable anemia (Hgb 9.0) and unremarkable CMP, will repeat monthly.    Patient requires inpatient admission due to very poor self-care and for containment, stabilization, medication management and aftercare planning.    Plan:  1. Brink: admitted involuntary ., TOO renewed 24 (expires 3/19/24)  2. Observation: routine checks  3. Collateral: obtained collateral from ACT psychiatrist  4. Psychiatric  -Continue haloperidol decanoate 200mg IM q4 weeks, last given , next due   -Continue olanzapine long-acting injectable (Relprevv) 300mg, last given , next due 2/15  -Continue benztropine 1mg BID for EPS (refusing)  5. Medical:  -Anemia:   	-s/p medical admission at [facility] for anemia to Hgb 6, required 2u pRBCs. GI workup unremarkable with exception of diverticulosis and colonic polyp  	-Repeat CBCs with anemia 8.6-low 9s  	-Hemoglobin stable on , will repeat in March  	-Encourage Ensure HP (refuses PO iron)  -[facility] requesting hematology c/s for chronic anemia---> wrote chart note , etiology c/w BRYON, recommended GI consult --> consulted GI 12/15 and wrote chart note --> sent to [facility]  -Fall on : likely mechanical, no sustained injuries. PT consult  -Bowel regimen: continue bisacodyl 10mg QHS (refusing), miralax 17g daily (refusing), senna 2 tablets QHS (refusing)  -Continue Vitamin D3 1000units QHS (refusing)  6. PRNs  -Olanzapine 5mg PO or IM for agitation  7. Therapy   -Individual, group and milieu therapy as appropriate   8. Disposition: state application submitted , on waiting list

## 2024-02-07 NOTE — BH INPATIENT PSYCHIATRY PROGRESS NOTE - NSBHFUPINTERVALHXFT_PSY_A_CORE
Refusing vitals, refusing PO medications, no acute events overnight. Slept 6 hours overnight.     Patient seen laying in bed awake. Room remains quite malodorous. She repeatedly states "get out" on any attempt to engage in questions. Denies that this writer is her doctor.

## 2024-02-08 PROCEDURE — 99231 SBSQ HOSP IP/OBS SF/LOW 25: CPT

## 2024-02-08 RX ORDER — BENZTROPINE MESYLATE 1 MG
1 TABLET ORAL
Refills: 0 | Status: DISCONTINUED | OUTPATIENT
Start: 2024-02-08 | End: 2024-02-28

## 2024-02-08 RX ORDER — CHOLECALCIFEROL (VITAMIN D3) 125 MCG
1000 CAPSULE ORAL AT BEDTIME
Refills: 0 | Status: DISCONTINUED | OUTPATIENT
Start: 2024-02-08 | End: 2024-02-28

## 2024-02-08 RX ORDER — FERROUS SULFATE 325(65) MG
325 TABLET ORAL DAILY
Refills: 0 | Status: DISCONTINUED | OUTPATIENT
Start: 2024-02-08 | End: 2024-02-28

## 2024-02-08 NOTE — BH TREATMENT PLAN - NSTXVIOLNTINTERRN_PSY_ALL_CORE
Promote calm environment  Recognize patient's unique triggers  Monitor patient carefully for signs of irritation/aggression  Maintain safe, clutter-free environment  Escalate aggression appropriately

## 2024-02-08 NOTE — BH TREATMENT PLAN - NSTXPSYCHOINTERRN_PSY_ALL_CORE
Assess thought process daily  Monitor medication compliance and response  Maintain reality orientation daily   Maintain safe and therapeutic process  If command hallucinations present, assess for content and direction
Assess thought process daily  Monitor medication compliance and response  Maintain reality orientation daily
Assess thought process daily  Monitor medication compliance and response  Maintain reality orientation daily   Maintain safe and therapeutic process  If command hallucinations present, assess for content and direction

## 2024-02-08 NOTE — BH INPATIENT PSYCHIATRY PROGRESS NOTE - NSBHFUPINTERVALHXFT_PSY_A_CORE
Refused vitals, refusing PO medications, no acute events. Slept 5.5+ hours last night. Per staff, some increase in reality based exchanges.    Patient seen laying in bed awake. Room is malodorous. Patient states that this writer is not their doctor and then repeats "get out" until this writer eventually leaves room.

## 2024-02-08 NOTE — BH TREATMENT PLAN - NSTXDISORGINTERRN_PSY_ALL_CORE
Assess thought process daily  Monitor medication compliance and response  Maintain reality orientation daily  Maintain safe and therapeutic process

## 2024-02-08 NOTE — BH TREATMENT PLAN - NSTXMEDICINTERRN_PSY_ALL_CORE
Provide education on medication indications and side effects  Evaluate patient understanding with teachback  Encourage patient to adhere to medication schedule  Encourage patient to verbalize concerns

## 2024-02-08 NOTE — BH TREATMENT PLAN - NSTXIMPULSINTERRN_PSY_ALL_CORE
Promote calm environment  Recognize patient's unique triggers  Monitor patient carefully for signs of impulsivity/agitation  Maintain safe, clutter-free environment  Offer PRNS as appropriate

## 2024-02-08 NOTE — BH TREATMENT PLAN - NSTXSLFCREINTERRN_PSY_ALL_CORE
Will encourage patient to complete ADLs  Will offer assistance as needed  Will provide positive reinforcement when ADLs are completed

## 2024-02-08 NOTE — BH INPATIENT PSYCHIATRY PROGRESS NOTE - MSE UNSTRUCTURED FT
Appearance: laying in bed awake, wearing hospital attire, room very malodorous  Behavior: uncooperative  Speech: normal rate and volume  Mood: unable to assess  Affect: irritable  Thought process: severely disorganized   Thought content: can make vaguely violent/threatening statements, today stated "GET OUT" repeatedly, not observed harming self or others.   Perceptions: observed talking to self  Insight: very poor  Judgement: very poor, refusing vitals, refusing PO medications, very poor ADLs  Cognition: unable to assess

## 2024-02-08 NOTE — BH INPATIENT PSYCHIATRY PROGRESS NOTE - NSBHASSESSSUMMFT_PSY_ALL_CORE
Patient is a 53 year old adult (goes by "Juan Jose"), living at [facility] on [place] grounds, PMH of GERD, anemia, PPH of schizophrenia, selective mutism, currently with [facility] ACT team, previously with [facility] which , with history of multiple psychiatric hospitalizations, without known history of self-injury or suicide attempts, with history of aggression in the setting on nonadherence with medication, who was initially brought in by staff at residence for increasing agitation and aggression in the setting of 2 months of medication nonadherence. Patient was hospitalized from 8/3-23 on 1N where they were noted to be hostile, disorganized, threatening, malodorous and grossly psychotic. TOO was obtained, and patient eventually administered dual long-acting injectables (Haldol Decanoate and Zyprexa Relprevv), though had declined nearly all PO medications. Patient was transferred to [facility] where they were admitted due to anemia to 6 (medical workup largely unremarkable), patient stabilized and transferred back to [facility] for further management of psychosis.     Diagnostically, presentation consistent with exacerbation of schizophrenia.     Patient spends most of day isolative and talking to self in severely disorganized and largely incomprehensible fashion, with only very brief moments of reality based exchange with staff or peers, or is non-verbal and stares at staff. Has showered with staff encouragement, though room and patient remain malodorous. No physical aggression, though appears mistrustful and makes vaguely threatening remarks. Sleep erratic, often just a few hours, better recently. TOO renewed on 24. Haloperidol decanoate continued. Olanzapine with IM backup added, and optimized to olanzapine 10mg BID, discontinued on  after Relprevv administered. Will assess with more time for improvements. Some improvements noted by staff in reality based exchanges, though very little improvement appreciated by MD in daily rounds. Able ot make needs known.     GI and hematology consulted in response to [facility]'s concerns around chronic anemia. No urgent indication for parenteral iron. Spoke to nutrition, added Ensure to diet to try to increase iron intake, will encourage use (has been accepting). Labs 2/ with stable anemia (Hgb 9.0) and unremarkable CMP, will repeat monthly.    Patient requires inpatient admission due to very poor self-care and for containment, stabilization, medication management and aftercare planning.    Plan:  1. Brink: admitted involuntary 9.27, TOO renewed 24 (expires 3/19/24)  2. Observation: routine checks  3. Collateral: obtained collateral from ACT psychiatrist  4. Psychiatric  -Continue haloperidol decanoate 200mg IM q4 weeks, last given , next due   -Continue olanzapine long-acting injectable (Relprevv) 300mg, last given , next due 2/15  	-Continue benztropine 1mg BID for EPS (refusing)  5. Medical:  -Anemia:   	-s/p medical admission at [facility] for anemia to Hgb 6, required 2u pRBCs. GI workup unremarkable with exception of diverticulosis and colonic polyp  	-Continue ferous sulfate 325mg daily (refusing), 	encourage Ensure HP instead daily  	-Repeat CBCs with anemia 8.6-low 9s  	-Hemoglobin stable on , will repeat in March  -[facility] requesting hematology c/s for chronic anemia---> wrote chart note , etiology c/w BRYON, recommended GI consult --> consulted GI 12/15 and wrote chart note --> sent to [facility]  -Fall on : likely mechanical, no sustained injuries. PT consult  -Bowel regimen: continue bisacodyl 10mg QHS (refusing), miralax 17g daily (refusing), senna 2 tablets QHS (refusing)  -Continue Vitamin D3 1000units QHS (refusing)  6. PRNs  -Olanzapine 5mg PO or IM for agitation  7. Therapy   -Individual, group and milieu therapy as appropriate   8. Disposition: state application submitted , on waiting list

## 2024-02-09 PROCEDURE — 99231 SBSQ HOSP IP/OBS SF/LOW 25: CPT

## 2024-02-09 NOTE — BH INPATIENT PSYCHIATRY PROGRESS NOTE - NSBHASSESSSUMMFT_PSY_ALL_CORE
Patient is a 53 year old adult (goes by "Juan Jose"), living at [facility] on [place] grounds, PMH of GERD, anemia, PPH of schizophrenia, selective mutism, currently with [facility] ACT team, previously with [facility] which , with history of multiple psychiatric hospitalizations, without known history of self-injury or suicide attempts, with history of aggression in the setting on nonadherence with medication, who was initially brought in by staff at residence for increasing agitation and aggression in the setting of 2 months of medication nonadherence. Patient was hospitalized from 8/3-23 on 1N where they were noted to be hostile, disorganized, threatening, malodorous and grossly psychotic. TOO was obtained, and patient eventually administered dual long-acting injectables (Haldol Decanoate and Zyprexa Relprevv), though had declined nearly all PO medications. Patient was transferred to [facility] where they were admitted due to anemia to 6 (medical workup largely unremarkable), patient stabilized and transferred back to [facility] for further management of psychosis.     Diagnostically, presentation consistent with exacerbation of schizophrenia.     Patient spends most of day isolative and talking to self in severely disorganized and largely incomprehensible fashion, with only very brief moments of reality based exchange with staff or peers, or is non-verbal and stares at staff. Has showered with staff encouragement, though room and patient remain malodorous. No physical aggression, though appears mistrustful and makes vaguely threatening remarks. Sleep erratic, often just a few hours, better recently. TOO renewed on 24. Haloperidol decanoate continued. Olanzapine with IM backup added, and optimized to olanzapine 10mg BID, discontinued on  after Relprevv administered. Will assess with more time for improvements. Some improvements noted by staff in reality based exchanges, though very little improvement appreciated by MD in daily rounds. Able ot make needs known.     GI and hematology consulted in response to [facility]'s concerns around chronic anemia. No urgent indication for parenteral iron. Spoke to nutrition, added Ensure to diet to try to increase iron intake, will encourage use (has been accepting). Labs 2/ with stable anemia (Hgb 9.0) and unremarkable CMP, will repeat monthly.    Patient requires inpatient admission due to very poor self-care and for containment, stabilization, medication management and aftercare planning.    Plan:  1. Brink: admitted involuntary 9.27, TOO renewed 24 (expires 3/19/24)  2. Observation: routine checks  3. Collateral: obtained collateral from ACT psychiatrist  4. Psychiatric  -Continue haloperidol decanoate 200mg IM q4 weeks, last given , next due   -Continue olanzapine long-acting injectable (Relprevv) 300mg, last given , next due 2/15  	-Continue benztropine 1mg BID for EPS (refusing)  5. Medical:  -Anemia:   	-s/p medical admission at [facility] for anemia to Hgb 6, required 2u pRBCs. GI workup unremarkable with exception of diverticulosis and colonic polyp  	-Continue ferous sulfate 325mg daily (refusing), 	encourage Ensure HP instead daily  	-Repeat CBCs with anemia 8.6-low 9s  	-Hemoglobin stable on , will repeat in March  -[facility] requesting hematology c/s for chronic anemia---> wrote chart note , etiology c/w BRYON, recommended GI consult --> consulted GI 12/15 and wrote chart note --> sent to [facility]  -Fall on : likely mechanical, no sustained injuries. PT consult  -Bowel regimen: continue bisacodyl 10mg QHS (refusing), miralax 17g daily (refusing), senna 2 tablets QHS (refusing)  -Continue Vitamin D3 1000units QHS (refusing)  6. PRNs  -Olanzapine 5mg PO or IM for agitation  7. Therapy   -Individual, group and milieu therapy as appropriate   8. Disposition: state application submitted , on waiting list

## 2024-02-09 NOTE — BH INPATIENT PSYCHIATRY PROGRESS NOTE - CURRENT MEDICATION
MEDICATIONS  (STANDING):  benztropine 1 milliGRAM(s) Oral two times a day  bisacodyl 10 milliGRAM(s) Oral at bedtime  cholecalciferol 1000 Unit(s) Oral at bedtime  ferrous    sulfate 325 milliGRAM(s) Oral daily    MEDICATIONS  (PRN):  acetaminophen     Tablet .. 650 milliGRAM(s) Oral every 4 hours PRN Mild Pain (1 - 3), Moderate Pain (4 - 6)  OLANZapine Disintegrating Tablet 5 milliGRAM(s) Oral every 4 hours PRN agitation  OLANZapine Injectable 5 milliGRAM(s) IntraMuscular once PRN Agitation

## 2024-02-09 NOTE — BH INPATIENT PSYCHIATRY PROGRESS NOTE - NSBHMETABOLIC_PSY_ALL_CORE_FT
BMI: BMI (kg/m2): 35.2 (12-06-23 @ 08:57)  HbA1c: A1C with Estimated Average Glucose Result: 5.3 % (09-29-23 @ 10:00)    Glucose:   BP: 90/68 (02-08-24 @ 12:36) (90/68 - 90/68)Vital Signs Last 24 Hrs  T(C): 36.7 (02-08-24 @ 12:36), Max: 36.7 (02-08-24 @ 12:36)  T(F): 98.1 (02-08-24 @ 12:36), Max: 98.1 (02-08-24 @ 12:36)  HR: 88 (02-08-24 @ 12:36) (88 - 88)  BP: 90/68 (02-08-24 @ 12:36) (90/68 - 90/68)  BP(mean): --  RR: 18 (02-08-24 @ 12:36) (18 - 18)  SpO2: --      Lipid Panel: Date/Time: 09-29-23 @ 10:00  Cholesterol, Serum: 166  LDL Cholesterol Calculated: 94  HDL Cholesterol, Serum: 60  Total Cholesterol/HDL Ration Measurement: --  Triglycerides, Serum: 61

## 2024-02-09 NOTE — BH INPATIENT PSYCHIATRY PROGRESS NOTE - MSE UNSTRUCTURED FT
Appearance: laying in bed awake, wearing hospital attire, room very malodorous  Behavior: uncooperative, hostile  Speech: normal rate and volume  Mood: unable to assess  Affect: irritable  Thought process: severely disorganized   Thought content: can make vaguely violent/threatening statements, today stated "GET OUT" repeatedly, not observed harming self or others.   Perceptions: observed talking to self  Insight: very poor  Judgement: very poor, refusing vitals, refusing PO medications, very poor ADLs  Cognition: unable to assess

## 2024-02-09 NOTE — BH INPATIENT PSYCHIATRY PROGRESS NOTE - NSBHCHARTREVIEWVS_PSY_A_CORE FT
Vital Signs Last 24 Hrs  T(C): 36.7 (02-08-24 @ 12:36), Max: 36.7 (02-08-24 @ 12:36)  T(F): 98.1 (02-08-24 @ 12:36), Max: 98.1 (02-08-24 @ 12:36)  HR: 88 (02-08-24 @ 12:36) (88 - 88)  BP: 90/68 (02-08-24 @ 12:36) (90/68 - 90/68)  BP(mean): --  RR: 18 (02-08-24 @ 12:36) (18 - 18)  SpO2: --

## 2024-02-09 NOTE — BH INPATIENT PSYCHIATRY PROGRESS NOTE - NSBHFUPINTERVALHXFT_PSY_A_CORE
Refused vitals, refusing PO medications, no acute events. Slept 3+ hours overnight.    Patient seen laying in bed, awake. Upon this writer opening the door, patient screams "GET OUT" and growls at this writer, and interview is terminated.

## 2024-02-12 LAB
ALBUMIN SERPL ELPH-MCNC: 3.8 G/DL — SIGNIFICANT CHANGE UP (ref 3.3–5)
ALP SERPL-CCNC: 131 U/L — HIGH (ref 40–120)
ALT FLD-CCNC: 13 U/L — SIGNIFICANT CHANGE UP (ref 4–33)
ANION GAP SERPL CALC-SCNC: 13 MMOL/L — SIGNIFICANT CHANGE UP (ref 7–14)
AST SERPL-CCNC: 15 U/L — SIGNIFICANT CHANGE UP (ref 4–32)
BASOPHILS # BLD AUTO: 0.02 K/UL — SIGNIFICANT CHANGE UP (ref 0–0.2)
BASOPHILS NFR BLD AUTO: 0.3 % — SIGNIFICANT CHANGE UP (ref 0–2)
BILIRUB SERPL-MCNC: 0.3 MG/DL — SIGNIFICANT CHANGE UP (ref 0.2–1.2)
BUN SERPL-MCNC: 18 MG/DL — SIGNIFICANT CHANGE UP (ref 7–23)
CALCIUM SERPL-MCNC: 9.3 MG/DL — SIGNIFICANT CHANGE UP (ref 8.4–10.5)
CHLORIDE SERPL-SCNC: 107 MMOL/L — SIGNIFICANT CHANGE UP (ref 98–107)
CO2 SERPL-SCNC: 24 MMOL/L — SIGNIFICANT CHANGE UP (ref 22–31)
CREAT SERPL-MCNC: 0.75 MG/DL — SIGNIFICANT CHANGE UP (ref 0.5–1.3)
EGFR: 95 ML/MIN/1.73M2 — SIGNIFICANT CHANGE UP
EOSINOPHIL # BLD AUTO: 0.1 K/UL — SIGNIFICANT CHANGE UP (ref 0–0.5)
EOSINOPHIL NFR BLD AUTO: 1.6 % — SIGNIFICANT CHANGE UP (ref 0–6)
FOLATE SERPL-MCNC: 15.1 NG/ML — SIGNIFICANT CHANGE UP (ref 3.1–17.5)
GLUCOSE SERPL-MCNC: 98 MG/DL — SIGNIFICANT CHANGE UP (ref 70–99)
HCT VFR BLD CALC: 29.5 % — LOW (ref 34.5–45)
HGB BLD-MCNC: 8.8 G/DL — LOW (ref 11.5–15.5)
IANC: 4.33 K/UL — SIGNIFICANT CHANGE UP (ref 1.8–7.4)
IMM GRANULOCYTES NFR BLD AUTO: 0.2 % — SIGNIFICANT CHANGE UP (ref 0–0.9)
LYMPHOCYTES # BLD AUTO: 1.35 K/UL — SIGNIFICANT CHANGE UP (ref 1–3.3)
LYMPHOCYTES # BLD AUTO: 21.6 % — SIGNIFICANT CHANGE UP (ref 13–44)
MAGNESIUM SERPL-MCNC: 1.9 MG/DL — SIGNIFICANT CHANGE UP (ref 1.6–2.6)
MCHC RBC-ENTMCNC: 22.6 PG — LOW (ref 27–34)
MCHC RBC-ENTMCNC: 29.8 GM/DL — LOW (ref 32–36)
MCV RBC AUTO: 75.6 FL — LOW (ref 80–100)
MONOCYTES # BLD AUTO: 0.45 K/UL — SIGNIFICANT CHANGE UP (ref 0–0.9)
MONOCYTES NFR BLD AUTO: 7.2 % — SIGNIFICANT CHANGE UP (ref 2–14)
NEUTROPHILS # BLD AUTO: 4.33 K/UL — SIGNIFICANT CHANGE UP (ref 1.8–7.4)
NEUTROPHILS NFR BLD AUTO: 69.1 % — SIGNIFICANT CHANGE UP (ref 43–77)
NRBC # BLD: 0 /100 WBCS — SIGNIFICANT CHANGE UP (ref 0–0)
NRBC # FLD: 0 K/UL — SIGNIFICANT CHANGE UP (ref 0–0)
PHOSPHATE SERPL-MCNC: 3.5 MG/DL — SIGNIFICANT CHANGE UP (ref 2.5–4.5)
PLATELET # BLD AUTO: 380 K/UL — SIGNIFICANT CHANGE UP (ref 150–400)
POTASSIUM SERPL-MCNC: 4.1 MMOL/L — SIGNIFICANT CHANGE UP (ref 3.5–5.3)
POTASSIUM SERPL-SCNC: 4.1 MMOL/L — SIGNIFICANT CHANGE UP (ref 3.5–5.3)
PROT SERPL-MCNC: 7.9 G/DL — SIGNIFICANT CHANGE UP (ref 6–8.3)
RBC # BLD: 3.9 M/UL — SIGNIFICANT CHANGE UP (ref 3.8–5.2)
RBC # FLD: 20.5 % — HIGH (ref 10.3–14.5)
SODIUM SERPL-SCNC: 144 MMOL/L — SIGNIFICANT CHANGE UP (ref 135–145)
TSH SERPL-MCNC: 1.05 UIU/ML — SIGNIFICANT CHANGE UP (ref 0.27–4.2)
VIT B12 SERPL-MCNC: 756 PG/ML — SIGNIFICANT CHANGE UP (ref 200–900)
WBC # BLD: 6.26 K/UL — SIGNIFICANT CHANGE UP (ref 3.8–10.5)
WBC # FLD AUTO: 6.26 K/UL — SIGNIFICANT CHANGE UP (ref 3.8–10.5)

## 2024-02-12 PROCEDURE — 99232 SBSQ HOSP IP/OBS MODERATE 35: CPT

## 2024-02-12 NOTE — BH INPATIENT PSYCHIATRY PROGRESS NOTE - NSBHMETABOLIC_PSY_ALL_CORE_FT
BMI: BMI (kg/m2): 35.2 (12-06-23 @ 08:57)  HbA1c: A1C with Estimated Average Glucose Result: 5.3 % (09-29-23 @ 10:00)    Glucose:   BP: 126/76 (02-12-24 @ 12:37) (102/69 - 126/76)Vital Signs Last 24 Hrs  T(C): --  T(F): --  HR: 89 (02-12-24 @ 12:37) (89 - 89)  BP: 126/76 (02-12-24 @ 12:37) (126/76 - 126/76)  BP(mean): --  RR: --  SpO2: --      Lipid Panel: Date/Time: 09-29-23 @ 10:00  Cholesterol, Serum: 166  LDL Cholesterol Calculated: 94  HDL Cholesterol, Serum: 60  Total Cholesterol/HDL Ration Measurement: --  Triglycerides, Serum: 61

## 2024-02-12 NOTE — BH INPATIENT PSYCHIATRY PROGRESS NOTE - NSBHASSESSSUMMFT_PSY_ALL_CORE
Patient is a 53 year old adult (goes by "Juan Jose"), living at [facility] on [place] grounds, PMH of GERD, anemia, PPH of schizophrenia, selective mutism, currently with [facility] ACT team, previously with [facility] which , with history of multiple psychiatric hospitalizations, without known history of self-injury or suicide attempts, with history of aggression in the setting on nonadherence with medication, who was initially brought in by staff at residence for increasing agitation and aggression in the setting of 2 months of medication nonadherence. Patient was hospitalized from 8/3-23 on 1N where they were noted to be hostile, disorganized, threatening, malodorous and grossly psychotic. TOO was obtained, and patient eventually administered dual long-acting injectables (Haldol Decanoate and Zyprexa Relprevv), though had declined nearly all PO medications. Patient was transferred to [facility] where they were admitted due to anemia to 6 (medical workup largely unremarkable), patient stabilized and transferred back to [facility] for further management of psychosis.     Diagnostically, presentation consistent with exacerbation of schizophrenia.     Patient spends most of day isolative and talking to self in severely disorganized and largely incomprehensible fashion, with only very brief moments of reality based exchange with staff or peers, or is non-verbal and stares at staff. Has showered with staff encouragement, though room and patient remain very malodorous. Sleep erratic, often just a few hours, better recently. TOO renewed on 24. Haloperidol decanoate continued. Olanzapine with IM backup added, and optimized to olanzapine 10mg BID, discontinued on  after Relprevv administered. Will assess with more time for improvements. Some improvements noted by staff in reality based exchanges, though very little improvement appreciated by MD in daily rounds. Able ot make needs known.     In past few days, patient with more aggressive edge. Has been yelling "get out" more frequently and making a fist and/or gesturing towards staff. As cannot obtain history from patient, will get full set of labs today to assess for any medical abnormalities that could account for change in mental status.    GI and hematology consulted in response to [facility]'s concerns around chronic anemia. No urgent indication for parenteral iron. Spoke to nutrition, added Ensure to diet to try to increase iron intake, will encourage use (has been accepting). Labs 2/ with stable anemia (Hgb 9.0) and unremarkable CMP, will aim to get labs at least monthly.     Patient requires inpatient admission due to very poor self-care and for containment, stabilization, medication management and aftercare planning.    Plan:  1. Brink: admitted involuntary 9., TOO renewed 24 (expires 3/19/24)  2. Observation: routine checks  3. Collateral: obtained collateral from ACT psychiatrist  4. Psychiatric  -Continue haloperidol decanoate 200mg IM q4 weeks, last given , next due   -Continue olanzapine long-acting injectable (Relprevv) 300mg, last given , next due 2/15  	-Continue benztropine 1mg BID for EPS (refusing)  5. Medical:  -CBC, CMP, TSH, folate, Vitamin B12, UA today given more aggression in past few days  -Anemia:   	-s/p medical admission at [facility] for anemia to Hgb 6, required 2u pRBCs. GI workup unremarkable with exception of diverticulosis and colonic polyp  	-Continue ferous sulfate 325mg daily (refusing), 	encourage Ensure HP instead daily  	-Repeat CBCs with anemia 8.6-low 9s  	-Hemoglobin stable on , will repeat in March  -[facility] requesting hematology c/s for chronic anemia---> wrote chart note , etiology c/w BRYON, recommended GI consult --> consulted GI 12/15 and wrote chart note --> sent to [facility]  -Fall on : likely mechanical, no sustained injuries. PT consult  -Bowel regimen: continue bisacodyl 10mg QHS (refusing), miralax 17g daily (refusing), senna 2 tablets QHS (refusing)  -Continue Vitamin D3 1000units QHS (refusing)  6. PRNs  -Olanzapine 5mg PO or IM for agitation  7. Therapy   -Individual, group and milieu therapy as appropriate   8. Disposition: state application submitted , on waiting list

## 2024-02-12 NOTE — BH INPATIENT PSYCHIATRY PROGRESS NOTE - NSBHCHARTREVIEWVS_PSY_A_CORE FT
Vital Signs Last 24 Hrs  T(C): --  T(F): --  HR: 89 (02-12-24 @ 12:37) (89 - 89)  BP: 126/76 (02-12-24 @ 12:37) (126/76 - 126/76)  BP(mean): --  RR: --  SpO2: --

## 2024-02-12 NOTE — BH INPATIENT PSYCHIATRY PROGRESS NOTE - MSE UNSTRUCTURED FT
Appearance: laying in bed awake, wearing hospital attire, room very malodorous  Behavior: uncooperative, hostile, aggressive edge  Speech: normal rate and volume  Mood: unable to assess  Affect: irritable  Thought process: severely disorganized   Thought content: can make vaguely violent/threatening statements, today stated "GET OUT" repeatedly, not observed harming self or others but more aggressively gesturing  Perceptions: observed talking to self  Insight: very poor  Judgement: very poor, refusing PO medications, very poor ADLs  Cognition: unable to assess

## 2024-02-12 NOTE — BH INPATIENT PSYCHIATRY PROGRESS NOTE - NSBHFUPINTERVALHXFT_PSY_A_CORE
VSS, refusing PO medications, yelled and raised fist at staff on Sunday. Slept 5 hours per log.     Patient seen in room. After saying hello and asking patient how they are doing, they yelled "get out" and got up and gestured towards walking towards this provider. Per staff, has been more aggressive past few days.

## 2024-02-13 DIAGNOSIS — F20.9 SCHIZOPHRENIA, UNSPECIFIED: ICD-10-CM

## 2024-02-13 DIAGNOSIS — Z86.79 PERSONAL HISTORY OF OTHER DISEASES OF THE CIRCULATORY SYSTEM: ICD-10-CM

## 2024-02-13 DIAGNOSIS — E55.9 VITAMIN D DEFICIENCY, UNSPECIFIED: ICD-10-CM

## 2024-02-13 DIAGNOSIS — I95.9 HYPOTENSION, UNSPECIFIED: ICD-10-CM

## 2024-02-13 LAB
APPEARANCE UR: CLEAR — SIGNIFICANT CHANGE UP
BILIRUB UR-MCNC: NEGATIVE — SIGNIFICANT CHANGE UP
COLOR SPEC: YELLOW — SIGNIFICANT CHANGE UP
DIFF PNL FLD: NEGATIVE — SIGNIFICANT CHANGE UP
GLUCOSE UR QL: NEGATIVE MG/DL — SIGNIFICANT CHANGE UP
KETONES UR-MCNC: NEGATIVE MG/DL — SIGNIFICANT CHANGE UP
LEUKOCYTE ESTERASE UR-ACNC: NEGATIVE — SIGNIFICANT CHANGE UP
NITRITE UR-MCNC: NEGATIVE — SIGNIFICANT CHANGE UP
PH UR: 7.5 — SIGNIFICANT CHANGE UP (ref 5–8)
PROT UR-MCNC: NEGATIVE MG/DL — SIGNIFICANT CHANGE UP
SP GR SPEC: 1.02 — SIGNIFICANT CHANGE UP (ref 1–1.03)
UROBILINOGEN FLD QL: 1 MG/DL — SIGNIFICANT CHANGE UP (ref 0.2–1)

## 2024-02-13 PROCEDURE — 99232 SBSQ HOSP IP/OBS MODERATE 35: CPT

## 2024-02-13 PROCEDURE — 99233 SBSQ HOSP IP/OBS HIGH 50: CPT

## 2024-02-13 NOTE — BH INPATIENT PSYCHIATRY PROGRESS NOTE - NSBHASSESSSUMMFT_PSY_ALL_CORE
Patient is a 53 year old adult (goes by "Juan Jose"), living at [facility] on [place] grounds, PMH of GERD, anemia, PPH of schizophrenia, selective mutism, currently with [facility] ACT team, previously with [facility] which , with history of multiple psychiatric hospitalizations, without known history of self-injury or suicide attempts, with history of aggression in the setting on nonadherence with medication, who was initially brought in by staff at residence for increasing agitation and aggression in the setting of 2 months of medication nonadherence. Patient was hospitalized from 8/3-23 on 1N where they were noted to be hostile, disorganized, threatening, malodorous and grossly psychotic. TOO was obtained, and patient eventually administered dual long-acting injectables (Haldol Decanoate and Zyprexa Relprevv), though had declined nearly all PO medications. Patient was transferred to [facility] where they were admitted due to anemia to 6 (medical workup largely unremarkable), patient stabilized and transferred back to [facility] for further management of psychosis.     Diagnostically, presentation consistent with exacerbation of schizophrenia.     Patient spends most of day isolative and talking to self in severely disorganized and largely incomprehensible fashion, with only very brief moments of reality based exchange with staff or peers, or is non-verbal and stares at staff. Has showered with staff encouragement, though room and patient remain very malodorous. Sleep erratic, often just a few hours, better recently. TOO renewed on 24. Haloperidol decanoate continued. Olanzapine with IM backup added, and optimized to olanzapine 10mg BID, discontinued on  after Relprevv administered. Will assess with more time for improvements. Able to make needs known.     In past few days, patient has appeared more aggressive. Has been yelling "get out" more frequently and making a fist and/or gesturing towards staff at times. As cannot obtain history from patient, basic labs and UA obtained, which were unremarkable besides chronic anemia. BP slightly low today, but improved on manual and repeat. Will continue to monitor closely.    GI and hematology consulted in response to [facility]'s concerns around chronic anemia. No urgent indication for parenteral iron. Spoke to nutrition, added Ensure to diet to try to increase iron intake, will encourage use (has been accepting). Labs  and  with stable anemia (Hgb 8.8-9.0), will aim to get labs at least monthly.     Patient requires inpatient admission due to very poor self-care and for containment, stabilization, medication management and aftercare planning.    Plan:  1. [place]: admitted involuntary 9.27, TOO renewed 24 (expires 3/19/24)  2. Observation: routine checks  3. Collateral: obtained collateral from ACT psychiatrist  4. Psychiatric  -Continue haloperidol decanoate 200mg IM q4 weeks, last given , next due   -Continue olanzapine long-acting injectable (Relprevv) 300mg, last given , next due 2/15  	-Continue benztropine 1mg BID for EPS (refusing)  5. Medical:  -CBC, CMP, TSH, folate, Vitamin B12, UA on  given more aggression in past few days-- unremarkable  -Anemia:   	-s/p medical admission at [facility] for anemia to Hgb 6, required 2u pRBCs. GI workup unremarkable with exception of diverticulosis and colonic polyp  	-Continue ferous sulfate 325mg daily (refusing), 	encourage Ensure HP instead daily  	-Repeat CBCs with anemia 8.6-low 9s  	-Hemoglobin stable on  and , will repeat in March  -[facility] requesting hematology c/s for chronic anemia---> wrote chart note , etiology c/w BRYON, recommended GI consult --> consulted GI 12/15 and wrote chart note --> sent to [facility]  -Fall on : likely mechanical, no sustained injuries. PT consult  -Bowel regimen: continue bisacodyl 10mg QHS (refusing), miralax 17g daily (refusing), senna 2 tablets QHS (refusing)  -Continue Vitamin D3 1000units QHS (refusing)  6. PRNs  -Olanzapine 5mg PO or IM for agitation  7. Therapy   -Individual, group and milieu therapy as appropriate   8. Disposition: state application submitted , on waiting list

## 2024-02-13 NOTE — BH INPATIENT PSYCHIATRY PROGRESS NOTE - NSBHCHARTREVIEWVS_PSY_A_CORE FT
Vital Signs Last 24 Hrs  T(C): --  T(F): --  HR: 103 (02-13-24 @ 12:50) (88 - 103)  BP: 119/83 (02-13-24 @ 12:50) (87/45 - 119/83)  BP(mean): --  RR: --  SpO2: --    Orthostatic VS  02-13-24 @ 13:05  Lying BP: --/-- HR: --  Sitting BP: 107/76 HR: 105  Standing BP: 107/59 HR: 117  Site: --  Mode: electronic

## 2024-02-13 NOTE — BH INPATIENT PSYCHIATRY PROGRESS NOTE - NSBHMETABOLIC_PSY_ALL_CORE_FT
BMI: BMI (kg/m2): 35.2 (12-06-23 @ 08:57)  HbA1c: A1C with Estimated Average Glucose Result: 5.3 % (09-29-23 @ 10:00)    Glucose:   BP: 119/83 (02-13-24 @ 12:50) (87/45 - 126/76)Vital Signs Last 24 Hrs  T(C): --  T(F): --  HR: 103 (02-13-24 @ 12:50) (88 - 103)  BP: 119/83 (02-13-24 @ 12:50) (87/45 - 119/83)  BP(mean): --  RR: --  SpO2: --    Orthostatic VS  02-13-24 @ 13:05  Lying BP: --/-- HR: --  Sitting BP: 107/76 HR: 105  Standing BP: 107/59 HR: 117  Site: --  Mode: electronic    Lipid Panel: Date/Time: 09-29-23 @ 10:00  Cholesterol, Serum: 166  LDL Cholesterol Calculated: 94  HDL Cholesterol, Serum: 60  Total Cholesterol/HDL Ration Measurement: --  Triglycerides, Serum: 61

## 2024-02-13 NOTE — BH INPATIENT PSYCHIATRY PROGRESS NOTE - NSBHFUPINTERVALHXFT_PSY_A_CORE
BP 87-88/45-62, HR 88-92 (machine) --> repeat manual BP 96/50 HR 92 --> repeat 119/83,  (machine), UA obtained and unremarkable, refusing PO medications, no PRNs, no acute events. Staff continue to report increase in aggression in past few days.     Patient seen in AM calmly eating breakfast in bed. Talking to self when this writer enters room, states "kill her off." Does not respond to any direct questioning.

## 2024-02-13 NOTE — PROGRESS NOTE ADULT - ASSESSMENT
53 year old female with Schizophrenia now with exacerbation.      #BRYON - c/w iron supplementation; prev labs consistent with BRYON and/or alpha thalassemia trait  #Gender dysphoria - reportedly transitioning from female to male from prior documentation.  #Vit D def - c/w supplementation  #Schizophrenia - plan and meds per primary psych team    Concern by primary team for worsening AMS. R/o urinary infection - f/u UA - as patient does admit to some increased urinary frequency.  Transient hypotension but possibly erroneous. Monitor vital signs.

## 2024-02-13 NOTE — PROGRESS NOTE ADULT - TIME BILLING
Time-based billing (NON-critical care).     50 minutes spent on total encounter; more than 50% of the visit was spent counseling and / or coordinating care by the attending physician.  The necessity of the time spent during the encounter on this date of service was due to:     review of laboratory data, radiology results, consultants' recommendations, documentation in [place], discussion with patient/RN/primary team

## 2024-02-13 NOTE — BH INPATIENT PSYCHIATRY PROGRESS NOTE - NSBHATTESTTYPEVISIT_PSY_A_CORE
Mitch Etienne  : [facility] [place] [address] [facility]  [address], [facility] [address]  [phone]   [phone]        OUTPATIENT DAILY NOTE    NAME/AGE/GENDER: Mitch Etienne is a 75 y.o. female. DATE: 2018    Patient did not come for appointment today due to attedning a .  Will plan to follow up on next scheduled visit.     Agatha Giron, PT Attending Only

## 2024-02-13 NOTE — BH INPATIENT PSYCHIATRY PROGRESS NOTE - MSE UNSTRUCTURED FT
Appearance: laying in bed awake, wearing hospital attire, room very malodorous (smells of urine)  Behavior: uncooperative, no aggression today  Speech: talking to self with normal rate and volume  Mood: unable to assess  Affect: irritable  Thought process: severely disorganized   Thought content: can make vaguely violent/threatening statements, today stated "kill her off," not observed harming self or others   Perceptions: observed talking to self  Insight: very poor  Judgement: very poor, refusing PO medications, very poor ADLs  Cognition: unable to assess

## 2024-02-13 NOTE — PROGRESS NOTE ADULT - SUBJECTIVE AND OBJECTIVE BOX
CC/Reason for Consult:   concern for AMS    SUBJECTIVE / OVERNIGHT EVENTS:  Primary team with reported borderline BPs. Improved to normotension on manual BP reading just prior to my arrival.  UA collected from patient by team/RN.    Patient offers no acute complaints, but is noted to have tangential thoughts. Asking if I know what a doctor means, then if I know what medicine means.    MEDICATIONS  (STANDING):  benztropine 1 milliGRAM(s) Oral two times a day  bisacodyl 10 milliGRAM(s) Oral at bedtime  cholecalciferol 1000 Unit(s) Oral at bedtime  ferrous    sulfate 325 milliGRAM(s) Oral daily    MEDICATIONS  (PRN):  acetaminophen     Tablet .. 650 milliGRAM(s) Oral every 4 hours PRN Mild Pain (1 - 3), Moderate Pain (4 - 6)  OLANZapine Disintegrating Tablet 5 milliGRAM(s) Oral every 4 hours PRN agitation  OLANZapine Injectable 5 milliGRAM(s) IntraMuscular once PRN Agitation      Vital Signs Last 24 Hrs  T(C): --  T(F): --  HR: 103 (2024 12:50) (88 - 103)  BP: 119/83 (2024 12:50) (87/45 - 119/83)  BP(mean): --  RR: --  SpO2: --      CAPILLARY BLOOD GLUCOSE            PHYSICAL EXAM:  GENERAL: NAD, well-developed  HEAD:  Atraumatic, Normocephalic  EYES: EOMI, conjunctiva and sclera clear  NECK: Supple, No JVD  CHEST/LUNG: Clear to auscultation bilaterally; No wheeze  HEART: Regular rate and rhythm; No murmurs, rubs, or gallops  ABDOMEN: Soft, Nontender, Nondistended; Bowel sounds present  EXTREMITIES:  2+ Peripheral Pulses, No clubbing, cyanosis, or edema  PSYCH: AAOx1  NEUROLOGY: non-focal  SKIN: No rashes or lesions    LABS:                        8.8    6.26  )-----------( 380      ( 2024 12:00 )             29.5     02-12    144  |  107  |  18  ----------------------------<  98  4.1   |  24  |  0.75    Ca    9.3      2024 12:00  Phos  3.5     02-12  Mg     1.90     02-12    TPro  7.9  /  Alb  3.8  /  TBili  0.3  /  DBili  x   /  AST  15  /  ALT  13  /  AlkPhos  131<H>            Urinalysis Basic - ( 2024 13:10 )    Color: Yellow / Appearance: Clear / S.025 / pH: x  Gluc: x / Ketone: Negative mg/dL  / Bili: Negative / Urobili: 1.0 mg/dL   Blood: x / Protein: Negative mg/dL / Nitrite: Negative   Leuk Esterase: Negative / RBC: x / WBC x   Sq Epi: x / Non Sq Epi: x / Bacteria: x

## 2024-02-14 LAB
CULTURE RESULTS: SIGNIFICANT CHANGE UP
SPECIMEN SOURCE: SIGNIFICANT CHANGE UP

## 2024-02-14 PROCEDURE — 99232 SBSQ HOSP IP/OBS MODERATE 35: CPT

## 2024-02-14 NOTE — BH INPATIENT PSYCHIATRY PROGRESS NOTE - NSBHFUPINTERVALHXFT_PSY_A_CORE
No interval events reported, refuses po meds, no prns.  Per sleep log she spent an entire 45 minutes in bed last night.  She is lying in her bed this morning clean and neat.  She is internally preoccupied.  She initially angrily yells at me to leave her room and tells me I am an impostor.  I retreat to the door threshold and she suddenly becomes much less irritable/angry.  She pleasantly mumbles gibberish to me.  She looks up and to the left and talks to "the entity".  At times she seems to appear that we are a threat to her.  She responds well to levity and playfulness.  She asks me to leave the door half open.

## 2024-02-14 NOTE — BH INPATIENT PSYCHIATRY PROGRESS NOTE - NSBHASSESSSUMMFT_PSY_ALL_CORE
Patient is a 53 year old adult (goes by "Juan Jose"), living at [facility] on [place] [facility], PMH of GERD, anemia, PPH of schizophrenia, selective mutism, currently with [facility] ACT team, previously with [facility] which , with history of multiple psychiatric hospitalizations, without known history of self-injury or suicide attempts, with history of aggression in the setting on nonadherence with medication, who was initially brought in by staff at residence for increasing agitation and aggression in the setting of 2 months of medication nonadherence. Patient was hospitalized from 8/3-23 on 1N where they were noted to be hostile, disorganized, threatening, malodorous and grossly psychotic. TOO was obtained, and patient eventually administered dual long-acting injectables (Haldol Decanoate and Zyprexa Relprevv), though had declined nearly all PO medications. Patient was transferred to [facility] where they were admitted due to anemia to 6 (medical workup largely unremarkable), patient stabilized and transferred back to [facility] for further management of psychosis.     Diagnostically, presentation consistent with exacerbation of schizophrenia.     Patient spends most of day isolative and talking to self in severely disorganized and largely incomprehensible fashion, with only very brief moments of reality based exchange with staff or peers, or is non-verbal and stares at staff. Has showered with staff encouragement, though room and patient remain very malodorous. Sleep erratic, often just a few hours, better recently. TOO renewed on 24. Haloperidol decanoate continued. Olanzapine with IM backup added, and optimized to olanzapine 10mg BID, discontinued on  after Relprevv administered. Will assess with more time for improvements. Able to make needs known.     In past few days, patient has appeared more aggressive. Has been yelling "get out" more frequently and making a fist and/or gesturing towards staff at times. As cannot obtain history from patient, basic labs and UA obtained, which were unremarkable besides chronic anemia. BP slightly low today, but improved on manual and repeat. Will continue to monitor closely.    GI and hematology consulted in response to [facility]'s concerns around chronic anemia. No urgent indication for parenteral iron. Spoke to nutrition, added Ensure to diet to try to increase iron intake, will encourage use (has been accepting). Labs  and  with stable anemia (Hgb 8.8-9.0), will aim to get labs at least monthly.     Patient requires inpatient admission due to very poor self-care and for containment, stabilization, medication management and aftercare planning.  --vss, severely disorganized, responding to internal stimuli, extremely poor sleep, labile affect  Plan:  1. [place]ink: admitted involuntary 9.27, TOO renewed 24 (expires 3/19/24)  2. Observation: routine checks  3. Collateral: obtained collateral from ACT psychiatrist  4. Psychiatric  -Continue haloperidol decanoate 200mg IM q4 weeks, last given , next due   -Continue olanzapine long-acting injectable (Relprevv) 300mg, last given , next due 2/15  	-Continue benztropine 1mg BID for EPS (refusing)  5. Medical:  -CBC, CMP, TSH, folate, Vitamin B12, UA on  given more aggression in past few days-- unremarkable  -Anemia:   	-s/p medical admission at [facility] for anemia to Hgb 6, required 2u pRBCs. GI workup unremarkable with exception of diverticulosis and colonic polyp  	-Continue ferous sulfate 325mg daily (refusing), 	encourage Ensure HP instead daily  	-Repeat CBCs with anemia 8.6-low 9s  	-Hemoglobin stable on  and , will repeat in March  -[facility] requesting hematology c/s for chronic anemia---> wrote chart note , etiology c/w BRYON, recommended GI consult --> consulted GI 12/15 and wrote chart note --> sent to [facility]  -Fall on : likely mechanical, no sustained injuries. PT consult  -Bowel regimen: continue bisacodyl 10mg QHS (refusing), miralax 17g daily (refusing), senna 2 tablets QHS (refusing)  -Continue Vitamin D3 1000units QHS (refusing)  6. PRNs  -Olanzapine 5mg PO or IM for agitation  7. Therapy   -Individual, group and milieu therapy as appropriate   8. Disposition: state application submitted , on waiting list

## 2024-02-14 NOTE — BH INPATIENT PSYCHIATRY PROGRESS NOTE - NSBHCHARTREVIEWVS_PSY_A_CORE FT
Vital Signs Last 24 Hrs  T(C): 36.7 (02-14-24 @ 10:48), Max: 36.7 (02-14-24 @ 10:48)  T(F): 98.1 (02-14-24 @ 10:48), Max: 98.1 (02-14-24 @ 10:48)  HR: 103 (02-13-24 @ 12:50) (88 - 103)  BP: 120/84 (02-14-24 @ 10:48) (88/62 - 120/84)  BP(mean): 86 (02-14-24 @ 10:48) (86 - 86)  RR: --  SpO2: --    Orthostatic VS  02-13-24 @ 13:05  Lying BP: --/-- HR: --  Sitting BP: 107/76 HR: 105  Standing BP: 107/59 HR: 117  Site: --  Mode: electronic

## 2024-02-14 NOTE — BH INPATIENT PSYCHIATRY PROGRESS NOTE - MSE UNSTRUCTURED FT
Appearance: laying in bed awake, wearing hospital attire, clean, neat  Behavior: initially uncooperative and yells at first, moderates with some persists and a light approach to her  Speech: talking to self after some yelling by and large mumbling  Mood: unable to assess  Affect: irritable but then oddly bright and pleasant  Thought process: severely disorganized   Thought content: can make vaguely violent/threatening statements, not observed harming self or others   Perceptions: observed talking to self, talking to "the entity"  Insight: very poor  Judgement: very poor, refusing PO medications, ADLs better today?  Cognition: unable to assess

## 2024-02-14 NOTE — BH INPATIENT PSYCHIATRY PROGRESS NOTE - NSBHMETABOLIC_PSY_ALL_CORE_FT
BMI: BMI (kg/m2): 35.2 (12-06-23 @ 08:57)  HbA1c: A1C with Estimated Average Glucose Result: 5.3 % (09-29-23 @ 10:00)    Glucose:   BP: 120/84 (02-14-24 @ 10:48) (87/45 - 126/76)Vital Signs Last 24 Hrs  T(C): 36.7 (02-14-24 @ 10:48), Max: 36.7 (02-14-24 @ 10:48)  T(F): 98.1 (02-14-24 @ 10:48), Max: 98.1 (02-14-24 @ 10:48)  HR: 103 (02-13-24 @ 12:50) (88 - 103)  BP: 120/84 (02-14-24 @ 10:48) (88/62 - 120/84)  BP(mean): 86 (02-14-24 @ 10:48) (86 - 86)  RR: --  SpO2: --    Orthostatic VS  02-13-24 @ 13:05  Lying BP: --/-- HR: --  Sitting BP: 107/76 HR: 105  Standing BP: 107/59 HR: 117  Site: --  Mode: electronic    Lipid Panel: Date/Time: 09-29-23 @ 10:00  Cholesterol, Serum: 166  LDL Cholesterol Calculated: 94  HDL Cholesterol, Serum: 60  Total Cholesterol/HDL Ration Measurement: --  Triglycerides, Serum: 61

## 2024-02-15 PROCEDURE — 99232 SBSQ HOSP IP/OBS MODERATE 35: CPT

## 2024-02-15 RX ORDER — OLANZAPINE 15 MG/1
300 TABLET, FILM COATED ORAL ONCE
Refills: 0 | Status: DISCONTINUED | OUTPATIENT
Start: 2024-02-15 | End: 2024-02-15

## 2024-02-15 NOTE — BH INPATIENT PSYCHIATRY PROGRESS NOTE - NSBHFUPINTERVALHXFT_PSY_A_CORE
She ate last night, asked after some food also.  Poor sleep continues at most three hours distributed over short blocks of being in bed.  Continues to refuse PO meds.  I encourage her to take them noting that they are nonpsychotropic and for her general physical health.  Does not yell at me today but tells me to leave the room when I enter for assessment.  She lies in bed with covers over her and does not respond to me.  Her zyprexa relprevv 300mg IM is due today.  We ordered but are informed that it is out of stock and alter informed that it is backordered.  Unable to administer at this time.

## 2024-02-15 NOTE — BH INPATIENT PSYCHIATRY PROGRESS NOTE - NSBHTIMEACTIVITIESPERFORMED_PSY_A_CORE
review mar, review vs, discuss with team, interview patient, access zyrpexarelprevc rems, talk with representative to try to get accoutn reactivated, discuss with other providers, discuss with pharmacy, documentation

## 2024-02-15 NOTE — BH INPATIENT PSYCHIATRY PROGRESS NOTE - NSBHFUPMEDSE_PSY_A_CORE
None known

## 2024-02-15 NOTE — BH INPATIENT PSYCHIATRY PROGRESS NOTE - NSBHASSESSSUMMFT_PSY_ALL_CORE
Patient is a 53 year old adult (goes by "Juan Jose"), living at [facility] on [place] [facility], PMH of GERD, anemia, PPH of schizophrenia, selective mutism, currently with [facility] ACT team, previously with [facility] which , with history of multiple psychiatric hospitalizations, without known history of self-injury or suicide attempts, with history of aggression in the setting on nonadherence with medication, who was initially brought in by staff at residence for increasing agitation and aggression in the setting of 2 months of medication nonadherence. Patient was hospitalized from 8/3-23 on 1N where they were noted to be hostile, disorganized, threatening, malodorous and grossly psychotic. TOO was obtained, and patient eventually administered dual long-acting injectables (Haldol Decanoate and Zyprexa Relprevv), though had declined nearly all PO medications. Patient was transferred to [facility] where they were admitted due to anemia to 6 (medical workup largely unremarkable), patient stabilized and transferred back to [facility] for further management of psychosis.     Diagnostically, presentation consistent with exacerbation of schizophrenia.     Patient spends most of day isolative and talking to self in severely disorganized and largely incomprehensible fashion, with only very brief moments of reality based exchange with staff or peers, or is non-verbal and stares at staff. Has showered with staff encouragement, though room and patient remain very malodorous. Sleep erratic, often just a few hours, better recently. TOO renewed on 24. Haloperidol decanoate continued. Olanzapine with IM backup added, and optimized to olanzapine 10mg BID, discontinued on  after Relprevv administered. Will assess with more time for improvements. Able to make needs known.     In past few days, patient has appeared more aggressive. Has been yelling "get out" more frequently and making a fist and/or gesturing towards staff at times. As cannot obtain history from patient, basic labs and UA obtained, which were unremarkable besides chronic anemia. BP slightly low today, but improved on manual and repeat. Will continue to monitor closely.    GI and hematology consulted in response to [facility]'s concerns around chronic anemia. No urgent indication for parenteral iron. Spoke to nutrition, added Ensure to diet to try to increase iron intake, will encourage use (has been accepting). Labs  and  with stable anemia (Hgb 8.8-9.0), will aim to get labs at least monthly.     Patient requires inpatient admission due to very poor self-care and for containment, stabilization, medication management and aftercare planning.  --uncooprative, lying in bed, unable to give relprevv today because on back order  Plan:  1. [place]: admitted involuntary 9.27, TOO renewed 24 (expires 3/19/24)  2. Observation: routine checks  3. Collateral: obtained collateral from ACT psychiatrist  4. Psychiatric  -Continue haloperidol decanoate 200mg IM q4 weeks, last given , next due   -Unable to obtain olanzapine long-acting injectable (Relprevv) 300mg, last given , next due 2/15  	-Continue benztropine 1mg BID for EPS (refusing)  5. Medical:  -CBC, CMP, TSH, folate, Vitamin B12, UA on  given more aggression in past few days-- unremarkable  -Anemia:   	-s/p medical admission at [facility] for anemia to Hgb 6, required 2u pRBCs. GI workup unremarkable with exception of diverticulosis and colonic polyp  	-Continue ferous sulfate 325mg daily (refusing), 	encourage Ensure HP instead daily  	-Repeat CBCs with anemia 8.6-low 9s  	-Hemoglobin stable on  and , will repeat in March  -[facility] requesting hematology c/s for chronic anemia---> wrote chart note , etiology c/w BRYON, recommended GI consult --> consulted GI 12/15 and wrote chart note --> sent to [facility]  -Fall on : likely mechanical, no sustained injuries. PT consult  -Bowel regimen: continue bisacodyl 10mg QHS (refusing), miralax 17g daily (refusing), senna 2 tablets QHS (refusing)  -Continue Vitamin D3 1000units QHS (refusing)  6. PRNs  -Olanzapine 5mg PO or IM for agitation  7. Therapy   -Individual, group and milieu therapy as appropriate   8. Disposition: state application submitted , on waiting list

## 2024-02-15 NOTE — BH INPATIENT PSYCHIATRY PROGRESS NOTE - NS ED [facility] REVIEW OF ED CHART AVAILABLE LABS REVIEWED
None available

## 2024-02-15 NOTE — BH INPATIENT PSYCHIATRY PROGRESS NOTE - NSBHMETABOLIC_PSY_ALL_CORE_FT
BMI: BMI (kg/m2): 35.2 (12-06-23 @ 08:57)  HbA1c: A1C with Estimated Average Glucose Result: 5.3 % (09-29-23 @ 10:00)    Glucose:   BP: 120/84 (02-14-24 @ 10:48) (87/45 - 120/84)Vital Signs Last 24 Hrs  T(C): --  T(F): --  HR: --  BP: --  BP(mean): --  RR: --  SpO2: --      Lipid Panel: Date/Time: 09-29-23 @ 10:00  Cholesterol, Serum: 166  LDL Cholesterol Calculated: 94  HDL Cholesterol, Serum: 60  Total Cholesterol/HDL Ration Measurement: --  Triglycerides, Serum: 61

## 2024-02-15 NOTE — BH INPATIENT PSYCHIATRY PROGRESS NOTE - MSE UNSTRUCTURED FT
Appearance: laying in bed placeds hospital gown over head after telling me "get out"   Behavior: uncooperative, ignores me  Speech: selective mutism  Mood: unable to assess  Affect: flat  Thought process: severely disorganized   Thought content: can make vaguely violent/threatening statements, not observed harming self or others   Perceptions: previously observed talking to self, talking to "the entity"  Insight: very poor  Judgement: very poor, refusing PO medications  Cognition: unable to assess

## 2024-02-16 PROCEDURE — 99231 SBSQ HOSP IP/OBS SF/LOW 25: CPT

## 2024-02-16 NOTE — BH INPATIENT PSYCHIATRY PROGRESS NOTE - MSE UNSTRUCTURED FT
Appearance: laying in bed, wearing hospital gowns    Behavior: uncooperative, ignores me  Speech: selective mutism  Mood: unable to assess  Affect: flat  Thought process: severely disorganized   Thought content: can make vaguely violent/threatening statements, not observed harming self or others   Perceptions: previously observed talking to self, talking to "the entity"  Insight: very poor  Judgement: very poor, refusing PO medications  Cognition: unable to assess

## 2024-02-16 NOTE — BH INPATIENT PSYCHIATRY PROGRESS NOTE - NSBHMETABOLIC_PSY_ALL_CORE_FT
BMI: BMI (kg/m2): 35.2 (12-06-23 @ 08:57)  HbA1c: A1C with Estimated Average Glucose Result: 5.3 % (09-29-23 @ 10:00)    Glucose:   BP: 120/84 (02-14-24 @ 10:48) (120/84 - 120/84)Vital Signs Last 24 Hrs  T(C): --  T(F): --  HR: --  BP: --  BP(mean): --  RR: --  SpO2: --    Orthostatic VS  02-16-24 @ 10:41  Lying BP: --/-- HR: --  Sitting BP: --/-- HR: --  Standing BP: 110/74 HR: 113  Site: upper left arm  Mode: --    Lipid Panel: Date/Time: 09-29-23 @ 10:00  Cholesterol, Serum: 166  LDL Cholesterol Calculated: 94  HDL Cholesterol, Serum: 60  Total Cholesterol/HDL Ration Measurement: --  Triglycerides, Serum: 61   

## 2024-02-16 NOTE — BH INPATIENT PSYCHIATRY PROGRESS NOTE - NSBHFUPINTERVALHXFT_PSY_A_CORE
No acute events overnight. Sleep was intermittent. Pt continues to refuse all oral medication. Attempted to see pt however they repeatedly yelled "get out, get out! there are no doctors here!" Remains isolative to room, rarely coming out (only for meals).

## 2024-02-16 NOTE — BH INPATIENT PSYCHIATRY PROGRESS NOTE - NSBHASSESSSUMMFT_PSY_ALL_CORE
Patient is a 53 year old adult (goes by "Juan Jose"), living at [facility] on [place] grounds, PMH of GERD, anemia, PPH of schizophrenia, selective mutism, currently with [facility] ACT team, previously with [facility] which , with history of multiple psychiatric hospitalizations, without known history of self-injury or suicide attempts, with history of aggression in the setting on nonadherence with medication, who was initially brought in by staff at residence for increasing agitation and aggression in the setting of 2 months of medication nonadherence. Patient was hospitalized from 8/3-23 on 1N where they were noted to be hostile, disorganized, threatening, malodorous and grossly psychotic. TOO was obtained, and patient eventually administered dual long-acting injectables (Haldol Decanoate and Zyprexa Relprevv), though had declined nearly all PO medications. Patient was transferred to [facility] where they were admitted due to anemia to 6 (medical workup largely unremarkable), patient stabilized and transferred back to [facility] for further management of psychosis.     Diagnostically, presentation consistent with exacerbation of schizophrenia.     Patient spends most of day isolative and talking to self in severely disorganized and largely incomprehensible fashion, with only very brief moments of reality based exchange with staff or peers, or is non-verbal and stares at staff. Has showered with staff encouragement, though room and patient remain very malodorous. Sleep erratic, often just a few hours, better recently. TOO renewed on 24. Haloperidol decanoate continued. Olanzapine with IM backup added, and optimized to olanzapine 10mg BID, discontinued on  after Relprevv administered. Will assess with more time for improvements. Able to make needs known.     In past few days, patient has appeared more aggressive. Has been yelling "get out" more frequently and making a fist and/or gesturing towards staff at times. As cannot obtain history from patient, basic labs and UA obtained, which were unremarkable besides chronic anemia. BP slightly low today, but improved on manual and repeat. Will continue to monitor closely.    GI and hematology consulted in response to [facility]'s concerns around chronic anemia. No urgent indication for parenteral iron. Spoke to nutrition, added Ensure to diet to try to increase iron intake, will encourage use (has been accepting). Labs  and  with stable anemia (Hgb 8.8-9.0), will aim to get labs at least monthly.     Patient requires inpatient admission due to very poor self-care and for containment, stabilization, medication management and aftercare planning.    -- pt uncooperative and refusing interview     Plan:  1. Legal: admitted involuntary 9.27, TOO renewed 24 (expires 3/19/24)  2. Observation: routine checks  3. Collateral: obtained collateral from ACT psychiatrist  4. Psychiatric  -Continue haloperidol decanoate 200mg IM q4 weeks, last given , next due   - S/p Zyprexa Relprevv 300mg on , was next due on 2/15 however informed by pharmacy that it is on back order until   	- Will consider restarting oral Zyprexa with IM alternative per court order  - Continue benztropine 1mg BID for EPS (refusing)  5. Medical:  -CBC, CMP, TSH, folate, Vitamin B12, UA on  given more aggression in past few days-- unremarkable  -Anemia:   	-s/p medical admission at [facility] for anemia to Hgb 6, required 2u pRBCs. GI workup unremarkable with exception of diverticulosis and colonic polyp  	-Continue ferous sulfate 325mg daily (refusing), 	encourage Ensure HP instead daily  	-Repeat CBCs with anemia 8.6-low 9s  	-Hemoglobin stable on  and , will repeat in March  -[facility] requesting hematology c/s for chronic anemia---> wrote chart note , etiology c/w BRYON, recommended GI consult --> consulted GI 12/15 and wrote chart note --> sent to [facility]  -Fall on : likely mechanical, no sustained injuries. PT consult  -Bowel regimen: continue bisacodyl 10mg QHS (refusing), miralax 17g daily (refusing), senna 2 tablets QHS (refusing)  -Continue Vitamin D3 1000units QHS (refusing)  6. PRNs  -Olanzapine 5mg PO or IM for agitation  7. Therapy   -Individual, group and milieu therapy as appropriate   8. Disposition: state application submitted , on waiting list

## 2024-02-16 NOTE — BH INPATIENT PSYCHIATRY PROGRESS NOTE - NSBHCHARTREVIEWVS_PSY_A_CORE FT
Vital Signs Last 24 Hrs  T(C): --  T(F): --  HR: --  BP: --  BP(mean): --  RR: --  SpO2: --    Orthostatic VS  02-16-24 @ 10:41  Lying BP: --/-- HR: --  Sitting BP: --/-- HR: --  Standing BP: 110/74 HR: 113  Site: upper left arm  Mode: --

## 2024-02-20 PROCEDURE — 99231 SBSQ HOSP IP/OBS SF/LOW 25: CPT

## 2024-02-20 NOTE — BH INPATIENT PSYCHIATRY PROGRESS NOTE - NSBHASSESSSUMMFT_PSY_ALL_CORE
Patient is a 53 year old adult (goes by "Juan Jose"), living at [facility] on [place] grounds, PMH of GERD, anemia, PPH of schizophrenia, selective mutism, currently with [facility] ACT team, previously with [facility] which , with history of multiple psychiatric hospitalizations, without known history of self-injury or suicide attempts, with history of aggression in the setting on nonadherence with medication, who was initially brought in by staff at residence for increasing agitation and aggression in the setting of 2 months of medication nonadherence. Patient was hospitalized from 8/3-23 on 1N where they were noted to be hostile, disorganized, threatening, malodorous and grossly psychotic. TOO was obtained, and patient eventually administered dual long-acting injectables (Haldol Decanoate and Zyprexa Relprevv), though had declined nearly all PO medications. Patient was transferred to [facility] where they were admitted due to anemia to 6 (medical workup largely unremarkable), patient stabilized and transferred back to [facility] for further management of psychosis.     Diagnostically, presentation consistent with exacerbation of schizophrenia.     Patient spends most of day isolative and talking to self in severely disorganized and largely incomprehensible fashion, with only very brief moments of reality based exchange with staff or peers, or is non-verbal and stares at staff. Has showered with staff encouragement, though room and patient become quickly malodorous after cleaning. Sleep erratic, often just a few hours, better recently. TOO renewed on 24. Haloperidol decanoate continued.     Olanzapine with IM backup added, and optimized to olanzapine 10mg BID, discontinued on  after Relprevv administered, due again 2/15 but per pharmacy back-ordered until . Given no clear benefit noted by treatment team or staff, and need to use IM medications twice daily in order to continue olanzapine regimen, decision to not re-start short acting olanzapine.     GI and hematology consulted in response to [facility]'s concerns around chronic anemia. No urgent indication for parenteral iron. Spoke to nutrition, added Ensure to diet to try to increase iron intake, will encourage use (has been accepting). Labs  and  with stable anemia (Hgb 8.8-9.0), will aim to get labs at least monthly.     Patient requires inpatient admission due to very poor self-care and for containment, stabilization, medication management and aftercare planning.    Plan:  1. Legal: admitted involuntary 9.27, TOO renewed 24 (expires 3/19/24)  2. Observation: routine checks  3. Collateral: obtained collateral from ACT psychiatrist  4. Psychiatric  -Continue haloperidol decanoate 200mg IM q4 weeks, last given , next due   - S/p Zyprexa Relprevv 300mg on , was next due on 2/15 however informed by pharmacy that it is on back order until . Will not re-start short-acting olanzapine give no clear benefit and need to use twice daily IM dosing to continue  - Continue benztropine 1mg BID for EPS (refusing)  5. Medical:  -CBC, CMP, TSH, folate, Vitamin B12, UA on  given more aggression in past few days-- unremarkable  -Anemia:   	-s/p medical admission at [facility] for anemia to Hgb 6, required 2u pRBCs. GI workup unremarkable with exception of diverticulosis and colonic polyp  	-Continue ferous sulfate 325mg daily (refusing), 	encourage Ensure HP instead daily  	-Repeat CBCs with anemia 8.6-low 9s  	-Hemoglobin stable on  and , will repeat in March  -[facility] requesting hematology c/s for chronic anemia---> wrote chart note , etiology c/w BRYON, recommended GI consult --> consulted GI 12/15 and wrote chart note --> sent to [facility]  -Fall on : likely mechanical, no sustained injuries. PT consult  -Bowel regimen: continue bisacodyl 10mg QHS (refusing), miralax 17g daily (refusing), senna 2 tablets QHS (refusing)  -Continue Vitamin D3 1000units QHS (refusing)  6. PRNs  -Olanzapine 5mg PO or IM for agitation  7. Therapy   -Individual, group and milieu therapy as appropriate   8. Disposition: state application submitted , on waiting list

## 2024-02-20 NOTE — BH INPATIENT PSYCHIATRY PROGRESS NOTE - NSBHFUPINTERVALHXFT_PSY_A_CORE
VSS, refusing medications, no PRNs for agitation, no acute events. Slept 6 hours per log. Patient found in room, laying in bed awake. Bed unkempt but room not frankly malodorous today. Empty food tray in room. Patient stares at this writer, declines to answer questions. Asks writer to "get out" but no physical or aggressive gesturing.

## 2024-02-20 NOTE — BH INPATIENT PSYCHIATRY PROGRESS NOTE - NSBHCHARTREVIEWVS_PSY_A_CORE FT
Vital Signs Last 24 Hrs  T(C): 36.9 (02-20-24 @ 12:38), Max: 36.9 (02-20-24 @ 12:38)  T(F): 98.4 (02-20-24 @ 12:38), Max: 98.4 (02-20-24 @ 12:38)  HR: 71 (02-20-24 @ 12:38) (71 - 71)  BP: 95/68 (02-20-24 @ 12:38) (95/68 - 95/68)  BP(mean): --  RR: 18 (02-20-24 @ 12:38) (18 - 18)  SpO2: --    Orthostatic VS  02-19-24 @ 09:47  Lying BP: --/-- HR: --  Sitting BP: 103/53 HR: 113  Standing BP: --/-- HR: --  Site: --  Mode: --

## 2024-02-20 NOTE — BH INPATIENT PSYCHIATRY PROGRESS NOTE - NSBHMETABOLIC_PSY_ALL_CORE_FT
BMI: BMI (kg/m2): 35.2 (12-06-23 @ 08:57)  HbA1c: A1C with Estimated Average Glucose Result: 5.3 % (09-29-23 @ 10:00)    Glucose:   BP: 95/68 (02-20-24 @ 12:38) (95/68 - 95/68)Vital Signs Last 24 Hrs  T(C): 36.9 (02-20-24 @ 12:38), Max: 36.9 (02-20-24 @ 12:38)  T(F): 98.4 (02-20-24 @ 12:38), Max: 98.4 (02-20-24 @ 12:38)  HR: 71 (02-20-24 @ 12:38) (71 - 71)  BP: 95/68 (02-20-24 @ 12:38) (95/68 - 95/68)  BP(mean): --  RR: 18 (02-20-24 @ 12:38) (18 - 18)  SpO2: --    Orthostatic VS  02-19-24 @ 09:47  Lying BP: --/-- HR: --  Sitting BP: 103/53 HR: 113  Standing BP: --/-- HR: --  Site: --  Mode: --    Lipid Panel: Date/Time: 09-29-23 @ 10:00  Cholesterol, Serum: 166  LDL Cholesterol Calculated: 94  HDL Cholesterol, Serum: 60  Total Cholesterol/HDL Ration Measurement: --  Triglycerides, Serum: 61

## 2024-02-20 NOTE — BH INPATIENT PSYCHIATRY PROGRESS NOTE - MSE UNSTRUCTURED FT
Appearance: laying in bed, wearing hospital gowns    Behavior: uncooperative  Speech: very limited speech  Mood: unable to assess  Affect: irritable, blunted  Thought process: severely disorganized   Thought content: can make vaguely violent/threatening statements, not observed harming self or others   Perceptions: previously observed talking to self  Insight: very poor  Judgement: very poor, refusing PO medications  Cognition: unable to assess

## 2024-02-21 PROCEDURE — 99232 SBSQ HOSP IP/OBS MODERATE 35: CPT

## 2024-02-21 RX ORDER — HALOPERIDOL DECANOATE 100 MG/ML
200 INJECTION INTRAMUSCULAR ONCE
Refills: 0 | Status: COMPLETED | OUTPATIENT
Start: 2024-02-21 | End: 2024-02-21

## 2024-02-21 RX ADMIN — HALOPERIDOL DECANOATE 200 MILLIGRAM(S): 100 INJECTION INTRAMUSCULAR at 15:52
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
## 2024-02-21 NOTE — BH INPATIENT PSYCHIATRY PROGRESS NOTE - CURRENT MEDICATION
MEDICATIONS  (STANDING):  benztropine 1 milliGRAM(s) Oral two times a day  bisacodyl 10 milliGRAM(s) Oral at bedtime  cholecalciferol 1000 Unit(s) Oral at bedtime  ferrous    sulfate 325 milliGRAM(s) Oral daily    MEDICATIONS  (PRN):  acetaminophen     Tablet .. 650 milliGRAM(s) Oral every 4 hours PRN Mild Pain (1 - 3), Moderate Pain (4 - 6)  OLANZapine Disintegrating Tablet 5 milliGRAM(s) Oral every 4 hours PRN agitation  OLANZapine Injectable 5 milliGRAM(s) IntraMuscular once PRN Agitation   MEDICATIONS  (STANDING):  benztropine 1 milliGRAM(s) Oral two times a day  bisacodyl 10 milliGRAM(s) Oral at bedtime  cholecalciferol 1000 Unit(s) Oral at bedtime  ferrous    sulfate 325 milliGRAM(s) Oral daily  haloperidol decanoate Injectable, Long Acting 200 milliGRAM(s) IntraMuscular once    MEDICATIONS  (PRN):  acetaminophen     Tablet .. 650 milliGRAM(s) Oral every 4 hours PRN Mild Pain (1 - 3), Moderate Pain (4 - 6)  OLANZapine Disintegrating Tablet 5 milliGRAM(s) Oral every 4 hours PRN agitation  OLANZapine Injectable 5 milliGRAM(s) IntraMuscular once PRN Agitation

## 2024-02-21 NOTE — BH INPATIENT PSYCHIATRY PROGRESS NOTE - NSBHATTESTBILLING_PSY_A_CORE
[id number] OBS or IP - low complexity OR 25-34 mins [id number] OBS or IP - moderate complexity OR 35-49 mins

## 2024-02-21 NOTE — BH INPATIENT PSYCHIATRY PROGRESS NOTE - NSBHMETABOLIC_PSY_ALL_CORE_FT
BMI: BMI (kg/m2): 35.2 (12-06-23 @ 08:57)  HbA1c: A1C with Estimated Average Glucose Result: 5.3 % (09-29-23 @ 10:00)    Glucose:   BP: 95/68 (02-20-24 @ 12:38) (95/68 - 95/68)Vital Signs Last 24 Hrs  T(C): --  T(F): --  HR: --  BP: --  BP(mean): --  RR: --  SpO2: --      Lipid Panel: Date/Time: 09-29-23 @ 10:00  Cholesterol, Serum: 166  LDL Cholesterol Calculated: 94  HDL Cholesterol, Serum: 60  Total Cholesterol/HDL Ration Measurement: --  Triglycerides, Serum: 61

## 2024-02-22 PROCEDURE — 99232 SBSQ HOSP IP/OBS MODERATE 35: CPT

## 2024-02-22 NOTE — BH TREATMENT PLAN - NSTXDCOPNODATETRGT_PSY_ALL_CORE
28-Dec-2023
01-Dec-2023
01-Dec-2023
19-Oct-2023
29-Feb-2024
19-Oct-2023
25-Dec-2023
07-Feb-2024
12-Jan-2024
07-Feb-2024
31-Jan-2024
28-Dec-2023
27-Feb-2024
29-Feb-2024
31-Jan-2024
05-Jan-2024
31-Jan-2024
07-Feb-2024

## 2024-02-22 NOTE — BH TREATMENT PLAN - NSTXDCOPNODATEEST_PSY_ALL_CORE
10-Hima-2024
29-Nov-2023
01-Nov-2023
10-Hima-2024
13-Oct-2023
01-Feb-2024
21-Nov-2023
13-Oct-2023
20-Dec-2023
01-Nov-2023
20-Feb-2024
10-Hima-2024
20-Dec-2023
10-Nov-2023
01-Feb-2024
08-Dec-2023
29-Nov-2023
20-Dec-2023

## 2024-02-22 NOTE — BH TREATMENT PLAN - NSTXVIOLNTINTERMD_PSY_ALL_CORE
med management with Haldol BREWER, clozapine (refusing), VPA (refusing)
med management with Haldol BREWER, clozapine (refusing), Zyprexa
med management with Haldol BREWER, clozapine (refusing), VPA (refusing)
med management with Haldol BREWER
med management with Haldol BREWER, Zyprexa BREWER
med management with Haldol BREWER, clozapine (refusing), VPA (refusing)
med management with Zyprexa, Haldol, clozapine, VPA
med management with Haldol BREWER, Zyprexa
med management with Haldol BREWER, clozapine (refusing), VPA (refusing)
med management with Haldol BREWER, Zyprexa BREWER
med management with Haldol BREWER, clozapine (refusing), VPA (refusing)

## 2024-02-22 NOTE — BH TREATMENT PLAN - NSTXIMPULSDATETRGT_PSY_ALL_CORE
06-Feb-2024
25-Jan-2024
07-Dec-2023
18-Jan-2024
08-Nov-2023
20-Nov-2023
27-Oct-2023
10-Hima-2024
20-Nov-2023
27-Dec-2023
20-Feb-2024
18-Jan-2024
07-Dec-2023
20-Nov-2023
06-Feb-2024
20-Dec-2023
03-Jan-2024

## 2024-02-22 NOTE — BH TREATMENT PLAN - NSTXIMPULSGOAL_PSY_ALL_CORE
Will be able to demonstrate the ability to pause before acting out negatively
Will be able to demonstrate the ability to pause before acting out negatively
Will identify 1 behavior to cope with impulsive urges
Will be able to demonstrate the ability to pause before acting out negatively
Will identify 1 negative consequence due to impulsivity
Will be able to demonstrate the ability to pause before acting out negatively
Will be able to recognize 2+ triggers

## 2024-02-22 NOTE — BH TREATMENT PLAN - NSTXVIOLNTDATETRGT_PSY_ALL_CORE
06-Feb-2024
06-Feb-2024
09-Aug-2023
15-Nov-2023
26-Oct-2023
22-Feb-2024
18-Jan-2024
18-Jan-2024
20-Dec-2023
26-Nov-2023
10-Dec-2023
08-Nov-2023
08-Nov-2023
03-Jan-2024
27-Dec-2023
10-Hima-2024
18-Jan-2024
10-Dec-2023

## 2024-02-22 NOTE — BH TREATMENT PLAN - NSTXOTHERDATEEST_PSY_ALL_CORE
13-Oct-2023

## 2024-02-22 NOTE — BH TREATMENT PLAN - NSTXPATIENTAGREEMENT_PSY_ALL_CORE
Patient unable to participate
No
Patient unable to participate

## 2024-02-22 NOTE — BH TREATMENT PLAN - NSTXVIOLNTGOAL_PSY_ALL_CORE
Will be able to express understanding of at least one trigger to their aggressive behavior
Will be able to express understanding of at least one trigger to their aggressive behavior
Will decrease the number/duration/intensity of angry/aggressive outbursts
Will be able to express understanding of at least one trigger to their aggressive behavior
Will identify 2 situations that cause an aggressive response
Will identify 2 situations that cause an aggressive response
Will be able to express understanding of at least one trigger to their aggressive behavior
Will be able to express understanding of at least one trigger to their aggressive behavior
Will decrease the number/duration/intensity of angry/aggressive outbursts
Will identify 2 situations that cause an aggressive response
Will identify 2 situations that cause an aggressive response
Will decrease the number/duration/intensity of angry/aggressive outbursts
Will be able to express understanding of at least one trigger to their aggressive behavior
Will identify 2 situations that cause an aggressive response
Will be able to express understanding of at least one trigger to their aggressive behavior
Will identify 2 situations that cause an aggressive response
Will identify thoughts/feelings/behaviors prior to loss of control
Will be able to express understanding of at least one trigger to their aggressive behavior

## 2024-02-22 NOTE — BH TREATMENT PLAN - NSDCCRITERIA_PSY_ALL_CORE
patient will be stable for discharge

## 2024-02-22 NOTE — BH TREATMENT PLAN - NSTXPSYCHOGOAL_PSY_ALL_CORE
Will ask for PRN medication to manage hallucinations
Will ask for PRN medication to manage hallucinations
Will identify 2 coping skills that assist with focus on reality
Make at least 5 reality based statements/requests to staff and/or peers
Will identify 2 coping skills that assist with focus on reality
Will identify 2 coping skills that assist with focus on reality
Will identify 2 coping skills that help mitigate hallucinations
Will identify 2 coping skills that assist with focus on reality
Will identify 2 coping skills that help mitigate hallucinations
Will ask for PRN medication to manage hallucinations
Will identify 2 coping skills that assist with focus on reality
Will identify 2 coping skills that assist with focus on reality
Make at least 5 reality based statements/requests to staff and/or peers
Will identify 2 coping skills that assist with focus on reality
Will ask for PRN medication to manage hallucinations
Will identify 2 coping skills that assist with focus on reality
Will identify 2 coping skills that assist with focus on reality
Make at least 5 reality based statements/requests to staff and/or peers

## 2024-02-22 NOTE — BH TREATMENT PLAN - NSTXPLANTHERAPYSESSIONSFT_PSY_ALL_CORE
01-15-24  Type of therapy: Pt self isolative to personal room and does not engage in the group process  Type of session: Individual  Level of patient participation: pt sleeping  Duration of participation: pt sleeping  Therapy conducted by: Psych rehab  Therapy Summary: Writer attempted to meet with the pt to discuss and assess the pt's progress towards specified psychiatric rehabilitation goal this week. However, on approach, the pt was sleeping and unable to be roused. Typically, the pt refuses to meet with writer, and per chart, the pt has refused several staff interactions this week. The following information will be based off of the pt's chart, as well as from observations made of the pt by attending treatment team members.     This morning, the pt received a shower with support from staff. Pt made comment to [facility] that "you're not pretty enough to see me naked." However, the pt was reportedly less resistant to assistance with ADL maintenence today. Pt remians on shower schedule due to severely diminished ADLs. Pt remains self isolative to personal room daily, not engaging in groups or the therapeutic milieu. Pt emerges from room seldomly for meals/coffee/needs. Pt continues to present with disorganized thought process, irritability, and is internally preoccupied and responding to internal stimuli. Per chart, the pt refused medications and ate breakfast in personal room. Psychiatric rehabilitation staff will support the pt towards goal progress and provide emotional support throughout duration of the current stay. Unable to assess for SI/HI/I/P and AH/VH/TH although pt appears internally preoccupied.    Over the next 7 days, psychiatric rehabilitation staff will support the pt towards goal progress, and encourage group participation for skill development and socialization.  
  02-18-24  Type of therapy: pt did not engage in the group process this week  Type of session: Individual  Level of patient participation: Not engaged, Resistance to participation  Duration of participation: Less than 15 minutes  Therapy conducted by: Psych rehab  Therapy Summary: Writer attempted to meet with the patient to discuss and assess the patient's progress towards specified psychiatric rehabilitation goal this week. Psychiatric rehabilitation staff and the patient have been working towards goal of being able to engage in a meaningful conversation with treatment team member once a day, however the patient has been unable to meet goal as the patient remains with incredibly disorganized thought process and appears to be extremely out of touch with reality as evidenced by the patient's inability to acknowledge writer when writer greeted the patient, and the patient's inability to engage, relate, and communicate with others.    On approach, the patient was sitting up in her bed, talking to self, and responding to internal stimuli. The patient was unable to note writer's presence in their space. Patient unable to answer writer's questions. Patient remains with extremely diminished ADLs. Patient encouraged to tend to ADLs on a daily basis. Patient continues to require addtional support to accomplish daily tasks such as showering and caring for self. Patient has become a bit tense and agitated towards staff when staff encourages treatment or offers medications. Will continue to emotionally support the patient towards goal progress during the current stay.      Over the next 7 days, psychiatric rehabilitation staff will support the patient towards goal progress and encourage group participation for skill development and socialization.  
  01-22-24  Type of therapy: Cognitive behavior therapy, Peer advocate  Type of session: Individual  Level of patient participation: unable to meet with pt, pt engaged in the group process  Duration of participation: unable to meet with the pt, pt engaged in the group process  Therapy conducted by: Psych rehab  Therapy Summary: Writer attempted to meet with the pt to discuss and assess the pt's progress towards specified psychiatric rehabilitation goal this week. On approach, the pt was engaged in the group process, therefore, writer unable to meet with the pt. However psychiatric rehabilitation staff will make another attempt to touch base with the pt regarding their progress this week.     Per chart, the pt remains mostly isolative to personal room and self. Pt emerges from room for some needs as well as meals, however does not engage meaningfully with others, or socialize with others (peers and staff). Pt remains with diminished ADLs therefore remaining on a shower schedule to improve ADL maintenence. Pt continues to respond to internal stimuli, talking to self, etc. Pt unable to demonstrate the ability to engage in a reciprocal discussion with staff and mostly demands them to "get out." Per chart, the pt refused HS medications. Will continue to support throughout the current stay.      Over the next 7 days, psychiatric rehabilitation staff will support the patient towards goal progress and encourage group participation for skill development and socialization.  
  02-04-24  Type of therapy: pt is isolative to self and personal room  Type of session: Individual  Level of patient participation: pt sleeping unable to be roused  Duration of participation: Less than 15 minutes, pt sleeping unable to be roused  Therapy conducted by: Psych rehab  Therapy Summary: Writer attempted to meet with the pt to discuss and assess the pt’s progress towards specified psychiatric rehabilitation goal this week, however on approach, the pt was sleeping and unable to be roused. Therefore, the following information will be based off the pt’s chart as well as from collateral obtained by attending treatment team members. This week, the pt remains self-isolative to personal room. Pt remains essentially with the same presentation to previous weeks as they continue not to tend to ADLs. Pt's room remains severely malodorous. Pt does not take a shower unless prompted by staff following shower schedule implemented by staff. At times the patient emerges from personal room for meals and coffee. Pt remains internally preoccupied and responding to internal stimuli while talking to self throughout the day. Unable to assess for SI/HI/AH/VH/TH although the pt visibly responding to internal stimuli. Pt does not engage in the group process and remains self isolative to personal room. Per chart, the patient refused all AM meds. Pt is able to make needs known to staff. Will continue to provide emotional support to the patient throughout the current stay.    Over the next 7 days, psychiatric rehabilitation staff will support the pt towards goal progress and encourage group participation for skill development and socialization.  
  11-29-23  Type of therapy: pt does not engage in the group process  Type of session: Individual  Level of patient participation: Not engaged, Resistance to participation  Duration of participation: Less than 15 minutes  Therapy conducted by: Psych rehab  Therapy Summary: Writer attempted to meet with the pt to discuss and assess the pt's progress towards specified psychiatric rehabilitation goal this week, however on approach, the pt was unwilling/unable to interact and engage with writer in a reciprocal discussion. Pt was laying in bed without clothing, covered by bed sheets, and responding to internal stimuli. The pt was repeating disturbing comments such as "burn her, burn her" and "beat her, beat her." When writer attempted several times to get the pt's attention, the pt did not seem to even acknowledge writer's presence in the room. Pt mumbled, "she's not really there" while looking towards writer. Pt unable to answer writer's questions related to current emotional status and treatment. Per chart, the pt continues to remain mostly self isolative to personal room and not willing to engage with others (peers/staff). Pt unable to respond to questions regarding SI/HI/I/P and AH/VH/TH. Will continue to support the pt during the current stay.    Over the next 7 days, psychiatric rehabilitation staff will support the pt towards goal progress and encourage group participation for skill development and socialization.  
  12-12-23  Type of therapy: none  Type of session: Individual  Level of patient participation: Not engaged  Duration of participation: Less than 15 minutes  Therapy conducted by: Psych rehab  Therapy Summary: Writer met with pt. to assess progress of psych rehab goal over the past 7 days. Pt. was received in their room, and they were not receptive to writer. Pt. laid in in their bed not responding to writer but blinking and laying on the bed. According to staff verbal reports pt. communicated that their preferred pronouns are “they/them” which has not been communicated in the past to staff. This demonstrates the most related that pt. has been with treatment team members. Staff has not observed continued relatedness from pt. and pt. continues to be isolated. Pt. has not demonstrated aggressive behaviors as of recently but continues to refuse to speak with treatment team about treatment. Pt. is seen talking to themselves in the hallways and needs to be placed on a shower schedule as they will not shower on their own.     Pt. has continues to present the same as they have in the past. Pt. does not engage meaningfully with treatment team and has to be promoted to take care of themselves. Over the next seven days, Psychiatric rehabilitation staff will continue to provide encouragement, support, psychotherapy, and psychoeducation to assist the patient in the progression of established treatment goal.  
  01-09-24  Type of therapy: pt is self isolative to personal room  Type of session: Individual  Level of patient participation: Resistance to participation  Duration of participation: Less than 15 minutes  Therapy conducted by: Psych rehab  Therapy Summary: Writer met with the pt to discuss and assess the pt's progress towards specified psychiatric rehabilitation goal this week. Pt has been working towards engaging in a meaningful conversation with a member of the treatment team daily. However, it seems that the pt has not made much progress towards this goal as evidenced by the pt continuing to self isolate to personal room, refuse to meet with staff, and not engaging autonomously in the milieu/community. On approach today, the pt refused to meet with writer and shouted, "Just kill her already! Stop sending these girls to me." Pt was irritable and did not demonstrate willingness to engage in discussion with writer. Per chart, the pt continues to self isolate to personal room, at times emerging for meals/needs. Pt continues to talk to self/respond to internal stimuli. Pt remains on a shower schedule to better maintain ADLs. Pt resistant to treatment at times. Per chart, the pt remains disorganized and mumbling to self. Chart indicates the pt stating "shoot her" and "cut her head off" during another staff's interaction with the pt. Pt continues to be nonadherent with HS medication, not receptive to education. Psychiatric rehabilitation staff will continue to provide emotional support to the pt throughout the current stay.    Over the next 7 days, psychiatric rehabilitation staff will support the pt towards goal progress and encourage group participation for skill development and socialization.  
  01-28-24  Type of therapy: Peer advocate  Type of session: Individual  Level of patient participation: Quiet, Resistance to participation  Duration of participation: Less than 15 minutes  Therapy conducted by: Psych rehab  Therapy Summary: Writer attempted to meet with the pt to discuss and assess the pt’s progress towards specified psychiatric rehabilitation goal this week, however on approach, the pt was selectively mute and did not engage in discussion with writer.  Therefore, the following information will be based off the pt’s chart as well as from collateral obtained by attending treatment team members. This week, the pt remains self-isolative to personal room. Pt observed laying in personal bed throughout much of the days, at times emerging from personal room for meals and coffee. Pt continues to refuse medications and per chart, was given IM Zyprexa court ordered medication. Pt remains internally preoccupied and responding to internal stimuli while talking to self throughout the day. Per chart, the pt demonstrates good sleep. Pt appears disheveled and remains on a shower schedule to maintain ADLs appropriately. Pt does not do so by self. Pt continues to state unpleasant statements towards staff. This week the pt stated to staff members that “You’re all gonna die today.” Psychiatric rehabilitation staff will continue to provide emotional support to the pt throughout the current stay. Unable to assess for SI/HI/AH/VH/TH although the pt visibly responding to internal stimuli. Pt does not engage in the group process unless their room is being cleaned etc. Pt noted to be visible in a peer group however unable to follow group directions or cues. Will continue to support.    Over the next 7 days, psychiatric rehabilitation staff will support the pt towards goal progress and encourage group participation for skill development and socialization.  
  11-09-23  Type of therapy: pt isolative and does not engage with others  Type of session: Individual  Level of patient participation: pt sleeping   Duration of participation: pt sleeping  Therapy conducted by: Psych rehab  Therapy Summary: Writer attempted to meet with the pt to discuss and assess the pt's progress towards specified psychiatric rehabilitation goal this week. However, on approach, the pt was sleeping, unable to be roused, and was observed in unclean gowns and bed sheets. Team notified. This week, the pt has presented in a similar manner to previous weeks in which the pt does not choose to engage with staff, declines meeting with staff, threatens staff, uses foul language, and though process is illogical and disorganized. Per chart, the pt continues to refuse HS medications despite being provided with medication teaching, and continues to appear guarded and dismissive. Pt does not engage in groups meaningfully, however at times makes apperances in peer advocate group. Pt's ADLs are diminished and pt does not demonstrate willingness to upkeep hygiene maintenence.    Over the next 7 days, psychiatric rehabilitation staff will support the pt towards goal progress and encourage group participation for skill development and socialization.    11-15-23  Type of therapy: none  Type of session: Individual  Level of patient participation: Not engaged  Duration of participation: Less than 15 minutes  Therapy conducted by: Psych rehab  Therapy Summary: Writer attempted to meet with pt. to assess progress of psych rehab goal over the past week. Pt. was not willing to engage with writer and presented with a tense affect and congruent mood. Pt. was seen in the hallway stating something about “killing them all”. When writer approached him, he stood there starring and walked away. Pt. is extremely malodorous, is not redirectable and requires significant support to attend to daily needs. Pt. remains in his room for the majority of the day, he does not reach out to staff or engage with peers. Pt. does not attend any of the groups that are offered to hi despite having personal invitations to the groups. Pt. has not been able to respond to assessment questions such as If he is experiencing SI/HI/TH/AH/VH.    Pt. has not made any progress towards established psych rehab goals as evidenced by pt. not engaging in any meaningful conversations with any of the treatment team members. Over the next seven days, Psychiatric rehabilitation staff will continue to provide encouragement, support, psychotherapy, and psychoeducation to assist the patient in the progression of established treatment goal.  
  10-19-23  Type of therapy: none  Type of session: Individual  Level of patient participation: Not engaged  Duration of participation: Less than 15 minutes  Therapy conducted by: Psych rehab  Therapy Summary: Writer attempted to meet with pt. to assess progress of psych rehab goal over the past 7 days. Pt. did not wish to meet with writer. Writer went to pt. room and pt. was laying in bed looking a the door and when writer offered time for an individual session, pt. shook his head stating that he did not wish to meet with writer. Pt. did not respond to the general standard assessment questions and continued to lay there. Writer reminded pt. where he could find writer if he needed additional support. Over the past 7 days, the pt. has remained in his room, has not been engaging with staff or treatment team. Pt. has presented with very poor ADLs and the [facility] staff has had to go into his room 3 times a day to clean the room to manage the odor. Pt. has not been showering and continues to be malodorous. Pt. does not engage with treatment team. Pt. does not report SI/HI/TH/AH/VH.     Pt. has made no progress towards established psych rehab goal as of yet, as evidenced by pt. not engaging with staff or treatment team members. Over the next seven days, Psychiatric rehabilitation staff will continue to provide encouragement, support, psychotherapy, and psychoeducation to assist the patient in the progression of established treatment goal.  
  10-26-23  Type of therapy: none  Type of session: Individual  Level of patient participation: Not engaged  Duration of participation: Less than 15 minutes  Therapy conducted by: Psych rehab  Therapy Summary: Writer attempted to meet with pt. to assess progress of psych rehab goal over the past 7 days. Pt. was received in his room, and he was talking to himself .When writer reintroduced herself, pt. began to talk about the different people, how they were doing something “shady”. Pt. was counting something on his fingers and was saying that there were different things that people were going to do but was unable to elaborate further. When writer asked pt. a follow up question pt began to yell at writer and stated “GET OUT”. Writer then ended the session. Over the past 7 days, the pt. has been isolative but today he was seen around the unit more frequently. Pt. has not been engaging with peers or staff and continues to be malodorous. Pt. received court ordered medications today and before his interaction with writer he appeared brighter.     Pt. has not made progress toward established psych rehab goal as he is not engaging in meaningful therapeutic conversations. Over the next seven days, Psychiatric rehabilitation staff will continue to provide encouragement, support, psychotherapy, and psychoeducation to assist the patient in the progression of established treatment goal.  
  12-26-23  Type of therapy: pt does not engage in the group process  Type of session: Individual  Level of patient participation: Quiet, Resistance to participation  Duration of participation: 15 minutes  Therapy conducted by: Psych rehab  Therapy Summary: Writer attempted to meet with the pt to discuss and assess the pt's progress towards specified psychiatric rehabilitation goal, however on approach, the pt presented with a blank stare. Pt was laying in personal bed with no pants on. Room was malodourous. Pt didn't respond for several minutes to writer's introduction and invitation to meet, and then pt stated, "Why do you look like a girl?" Writer replied, "Because I am a girl." Pt then stated, "Are you a boy girl or a girl girl?" Writer replied, "I am a woman. I identify as female." The pt replied, "No you're not because you just got fired. Your knowledge doesn't matter becuase you have none." Then the pt said, "get out" with an intense facial affect. Therefore, the session was terminated. Pt was reminded that if they needed anything relating to psychiatric rehabilitation, where to find writer and other members of the psychiatric rehabilitation treatment team. Pt didn't respond. Will continue to provide emotional support during the current stay.    Over the next 7 days, psychiatric rehabilitation staff will support the pt towards goal progress and encourage group participation for skill development and socialization.  
  01-02-24  Type of therapy: None  Type of session: Individual  Level of patient participation: Not engaged, Quiet, Resistance to participation  Duration of participation: Less than 15 minutes  Therapy conducted by: Psych rehab  Therapy Summary: Pt was laying in bed upon approach. Pt appears irritable, disorganized, internally preoccupied, responding to internal stimuli, not cooperative during the induvial therapy session. Pt was initially mute, then stated get out after writer attempted to talk to him. As per team, pt has been isolated to himself, not engage with others. Pt has not attended any group despite prompting. Pt took a shower today with assistant and encouragement. As per team, pt has been non-compliant with medication.  
  12-19-23  Type of therapy: no groups attended   Type of session: Individual  Level of patient participation: Resistance to participation  Duration of participation: Less than 15 minutes  Therapy conducted by: Psych rehab  Therapy Summary: Writer met with pt. to assess progress of psych rehab goal over the past 7 days. Pt. was received in their room, and they were not receptive to writer. Pt. was initially seen talking to self. When prompted they stared at writer and declined to verbally respond to questions. Pt declined to complete a safety plan when prompted.  Therefore, below information is based on observation and treatment team report. Pt continues to be isolated. Pt remains disorganized and continues to refuse PO medication. Pt. has not demonstrated aggressive behaviors recently but continues to refuse to speak with treatment team about treatment. Pt. is seen talking to themselves in the hallways and needs to be placed on a shower schedule as they will not shower on their own.     Pt. presentation remains essentially unchanged this week. Pt. does not engage meaningfully with treatment team and has to be promoted to take care of themselves. Over the next seven days, Psychiatric rehabilitation staff will continue to provide encouragement, support, psychotherapy, and psychoeducation to assist the patient in the progression of established treatment goal.

## 2024-02-22 NOTE — BH INPATIENT PSYCHIATRY PROGRESS NOTE - NSBHFUPINTERVALHXFT_PSY_A_CORE
Refusing vitals, refusing PO medications, no PRNs for agitation, no acute events overnight. Slept 4 hours per log.    Patient seen in room with team RN. Patient self-dialouging, and did not direct attention towards team until multiple verbal attempts made. When they did notice team, appeared angry and repeated "get out."

## 2024-02-22 NOTE — BH TREATMENT PLAN - NSTXSLFCREDATEEST_PSY_ALL_CORE
01-Nov-2023
19-Nov-2023
19-Oct-2023
01-Nov-2023
10-Hima-2024
01-Nov-2023
30-Jan-2024
26-Nov-2023
30-Jan-2024
13-Dec-2023
27-Dec-2023
10-Hima-2024
13-Feb-2024
20-Dec-2023
10-Hima-2024
03-Jan-2024
26-Nov-2023

## 2024-02-22 NOTE — BH TREATMENT PLAN - NSTXDISORGDATEEST_PSY_ALL_CORE
13-Oct-2023
13-Feb-2024
03-Jan-2024
01-Nov-2023
01-Nov-2023
30-Jan-2024
20-Dec-2023
30-Jan-2024
01-Nov-2023
10-Hima-2024
13-Oct-2023
01-Nov-2023
23-Nov-2023
10-Hima-2024
13-Dec-2023
23-Nov-2023
10-Hima-2024
27-Dec-2023

## 2024-02-22 NOTE — BH TREATMENT PLAN - NSTXPROBOTHER_PSY_ALL_CORE
OTHER PROBLEM

## 2024-02-22 NOTE — BH TREATMENT PLAN - NSTXMEDICGOAL_PSY_ALL_CORE
Take all medications as prescribed
Be able to describe the benefit of medication/treatment
Take all medications as prescribed
Be able to describe the benefit of medication/treatment

## 2024-02-22 NOTE — BH TREATMENT PLAN - NSTXIMPULSDATEEST_PSY_ALL_CORE
30-Jan-2024
01-Nov-2023
13-Feb-2024
13-Oct-2023
01-Nov-2023
10-Hima-2024
01-Nov-2023
01-Nov-2023
27-Dec-2023
23-Nov-2023
10-Hima-2024
30-Jan-2024
23-Nov-2023
10-Hima-2024
03-Jan-2024
20-Dec-2023
13-Dec-2023

## 2024-02-22 NOTE — BH TREATMENT PLAN - NSTXOTHERINTERPR_PSY_ALL_CORE
Pt. has made no progress towards established psych rehab goal as of yet, as evidenced by pt. not engaging with staff or treatment team members. Over the next seven days, Psychiatric rehabilitation staff will continue to provide encouragement, support, psychotherapy, and psychoeducation to assist the patient in the progression of established treatment goal.
Pt. has not made any progress towards established psych rehab goals as evidenced by pt. not engaging in any meaningful conversations with any of the treatment team members. Over the next seven days, Psychiatric rehabilitation staff will continue to provide encouragement, support, psychotherapy, and psychoeducation to assist the patient in the progression of established treatment goal.
Over the next 7 days, psychiatric rehabilitation staff will support the pt towards goal progress, and encourage group participation for skill development and socialization.
Over the next 7 days, psychiatric rehabilitation staff will support the patient towards goal progress and encourage group participation for skill development and socialization.
Over the next 7 days, psychiatric rehabilitation staff will support the patient towards goal progress and encourage group participation for skill development and socialization.
Pt made no progress over this past week. Pt refused to engage in individual therapy session. Psychiatric rehabilitation staff will continue to encourage pt to engage in meaningful manner.
Pt. has not made any progress towards established psych rehab goals as evidenced by pt. not engaging in any meaningful conversations with any of the treatment team members. Over the next seven days, Psychiatric rehabilitation staff will continue to provide encouragement, support, psychotherapy, and psychoeducation to assist the patient in the progression of established treatment goal.
Over the next 7 days, psychiatric rehabilitation staff will support the pt towards goal progress and encourage group participation for skill development and socialization.
Over the next 7 days, psychiatric rehabilitation staff will support the patient towards goal progress and encourage group participation for skill development and socialization.
Over the next 7 days, Psychiatric Rehabilitation staff will utilize group and individual psychotherapy, and psychoeducation to assist the patient in reaching established treatment goal.
Pt. has not made progress toward established psych rehab goal as he is not engaging in meaningful therapeutic conversations. Over the next seven days, Psychiatric rehabilitation staff will continue to provide encouragement, support, psychotherapy, and psychoeducation to assist the patient in the progression of established treatment goal.
Over the next 7 days, psychiatric rehabilitation staff will support the pt towards goal progress and encourage group participation for skill development and socialization.
Over the next 7 days, psychiatric rehabilitation staff will support the pt towards goal progress and encourage group participation for skill development and socialization.
Pt would not engage with writer and has made no noted progress towards goal attainment. Over the next seven days, Psychiatric rehabilitation staff will continue to provide encouragement, support, psychotherapy, and psychoeducation to assist the patient in the progression of established treatment goal.
Over the next 7 days, psychiatric rehabilitation staff will support the pt towards goal progress and encourage group participation for skill development and socialization.
Pt. has continues to present the same as they have in the past. Pt. does not engage meaningfully with treatment team and has to be promoted to take care of themselves. Over the next seven days, Psychiatric rehabilitation staff will continue to provide encouragement, support, psychotherapy, and psychoeducation to assist the patient in the progression of established treatment goal.
Pt. has made no progress towards established psych rehab goal, as evidenced by him not engaging meaningfully in any conversation with any treatment team members. Pt. will continued to be encouraged to make progress towards established psych rehab goals. In Response to the covid-19 outbreak there has been a shift in hospital wide policies and protocols. As a result, it should be noted unit activities and structure have been temporarily modified to promote safety of pt. and staff.

## 2024-02-22 NOTE — BH TREATMENT PLAN - NSTXPSYCHODATETRGT_PSY_ALL_CORE
18-Jan-2024
20-Dec-2023
22-Nov-2023
18-Jan-2024
08-Nov-2023
10-Dec-2023
27-Feb-2024
27-Dec-2023
19-Oct-2023
06-Feb-2024
03-Jan-2024
26-Oct-2023
22-Nov-2023
08-Nov-2023
18-Jan-2024
06-Feb-2024
10-Hima-2024
10-Dec-2023

## 2024-02-22 NOTE — BH TREATMENT PLAN - NSTXVIOLNTDATEEST_PSY_ALL_CORE
01-Nov-2023
10-Hima-2024
19-Nov-2023
30-Jan-2024
27-Dec-2023
30-Jan-2024
03-Aug-2023
01-Nov-2023
26-Nov-2023
03-Aug-2023
26-Nov-2023
13-Feb-2024
10-Hima-2024
03-Jan-2024
01-Nov-2023
13-Dec-2023
10-Hima-2024
20-Dec-2023

## 2024-02-22 NOTE — BH TREATMENT PLAN - NSTXPLANTYPE_PSY_ALL_CORE
Ongoing
Initial
Ongoing

## 2024-02-22 NOTE — BH TREATMENT PLAN - NSTXOTHERDATETRGT_PSY_ALL_CORE
11-Feb-2024
20-Nov-2023
18-Dec-2023
26-Dec-2023
06-Dec-2023
20-Oct-2023
16-Jan-2024
23-Nov-2023
02-Jan-2024
27-Oct-2023
23-Nov-2023
22-Jan-2024
27-Oct-2023
25-Feb-2024
29-Jan-2024
04-Feb-2024
09-Jan-2024
13-Dec-2023

## 2024-02-22 NOTE — BH TREATMENT PLAN - NSTXSLFCREGOAL_PSY_ALL_CORE
Will perform ADLs without assistance/prompts daily
Be able to describe elements of self-care and a plan to attend to these elements
Will perform ADLs without assistance/prompts daily
Will perform ADLs without assistance/prompts daily
Be able to describe elements of self-care and a plan to attend to these elements
Be able to describe elements of self-care and a plan to attend to these elements
Will perform ADLs without assistance/prompts daily
Will perform ADLs without assistance/prompts daily
Be able to describe elements of self-care and a plan to attend to these elements
Will perform ADLs without assistance/prompts daily
Be able to describe elements of self-care and a plan to attend to these elements

## 2024-02-22 NOTE — BH TREATMENT PLAN - NSTXDCOPNOPROGRES_PSY_ALL_CORE
No Change
DVT (deep venous thrombosis)
No Change

## 2024-02-22 NOTE — BH TREATMENT PLAN - NSTXPROBSLFCRE_PSY_ALL_CORE
SELF-CARE DEFICIT

## 2024-02-22 NOTE — BH TREATMENT PLAN - NSTXSLFCREDATETRGT_PSY_ALL_CORE
08-Nov-2023
26-Nov-2023
20-Dec-2023
27-Dec-2023
06-Feb-2024
18-Jan-2024
15-Nov-2023
06-Feb-2024
18-Jan-2024
18-Jan-2024
10-Hima-2024
20-Feb-2024
08-Nov-2023
10-Dec-2023
10-Dec-2023
26-Oct-2023
03-Jan-2024

## 2024-02-22 NOTE — BH TREATMENT PLAN - NSTXVIOLNTPROGRES_PSY_ALL_CORE
No Change
Improving
Improving
No Change
Improving
No Change
Improving
No Change

## 2024-02-22 NOTE — BH TREATMENT PLAN - NSTXSLFCREPROGRES_PSY_ALL_CORE
No Change
Improving
Improving
No Change
No Change
Improving
No Change
Improving
No Change

## 2024-02-22 NOTE — BH TREATMENT PLAN - NSTXSLFCREINTERMD_PSY_ALL_CORE
med management with Haldol BREWER, clozapine (refusing), VPA (refusing)
med management with Haldol BREWER, Zyprexa BREWER
med management with Haldol BREWER, clozapine (refusing), VPA (refusing)
med management with Haldol BREWER, Zyprexa BREWER
med management with Haldol BREWER, clozapine (refusing), VPA (refusing)
med management with Haldol BREWER, clozapine (refusing), VPA (refusing)
med management with Zyprexa, Haldol, clozapine, VPA
med management with Haldol BREWER, clozapine (refusing), VPA (refusing)
med management with Haldol BREWER
med management with Haldol BREWER, clozapine (refusing), VPA (refusing)
med management with Haldol BREWER, clozapine (refusing), VPA (refusing)
med management with Haldol BREWER, clozapine (refusing), Zyprexa
med management with Haldol BREWER, clozapine (refusing), VPA (refusing)
med management with Haldol BREWER, Zyprexa
med management with Haldol BREWER, clozapine (refusing), VPA (refusing)
med management with Haldol BREWER, clozapine (refusing), VPA (refusing)

## 2024-02-22 NOTE — BH TREATMENT PLAN - NSTXDISORGGOAL_PSY_ALL_CORE
Will identify 2 coping skills that assist in organizing
Will make at least 3 goal and reality oriented statements during therapy
Will identify 2 coping skills that assist in organizing
Will identify 2 coping skills that assist in organizing
Will demonstrate purposeful and predictable thoughts/behaviors by making a request
Will identify 2 coping skills that assist in organizing
Will identify 2 coping skills that assist in organizing
Will demonstrate purposeful and predictable thoughts/behaviors by making a request
Will identify 2 coping skills that assist in organizing
Will make at least 3 goal and reality oriented statements during therapy
Will identify 2 coping skills that assist in organizing
Will demonstrate purposeful and predictable thoughts/behaviors by making a request

## 2024-02-22 NOTE — BH TREATMENT PLAN - NSTXDISORGINTERMD_PSY_ALL_CORE
med management with Haldol BREWER, clozapine (refusing), VPA (refusing)
med management with Haldol BREWER, Zyprexa
med management with Haldol BREWER, Zyprexa BREWER
med management with Haldol BREWER, Zyprexa
med management with Haldol BREWER, clozapine (refusing), VPA (refusing)
med management with Zyprexa, Haldol, clozapine, VPA
med management with Haldol BREWER
med management with Haldol BREWER, clozapine (refusing), VPA (refusing)
med management with Haldol BREWER, Zyprexa BREWER
med management with Haldol BREWER, clozapine (refusing), VPA (refusing)

## 2024-02-22 NOTE — BH TREATMENT PLAN - NSTXPATIENTPARTICIPATE_PSY_ALL_CORE
No, patient unwilling to participate

## 2024-02-22 NOTE — BH TREATMENT PLAN - NSTXMEDICDATETRGT_PSY_ALL_CORE
26-Nov-2023
18-Jan-2024
20-Feb-2024
10-Dec-2023
19-Oct-2023
26-Oct-2023
18-Jan-2024
26-Oct-2023
06-Feb-2024
27-Dec-2023
18-Jan-2024
06-Feb-2024
20-Dec-2023
26-Nov-2023
26-Nov-2023
03-Jan-2024
10-Dec-2023
10-Hima-2024

## 2024-02-22 NOTE — BH TREATMENT PLAN - NSTXPSYCHOINTERMD_PSY_ALL_CORE
med management with Haldol BREWER, Zyprexa
med management with Haldol BREWER, Zyprexa
med management with Haldol BREWER, Zyprexa BREWER
med management with Haldol BREWER, clozapine (refusing), VPA (refusing)
med management with Zyprexa, Haldol, clozapine, VPA
med management with Haldol BREWER, clozapine (refusing), VPA (refusing)
med management with Haldol BREWER
med management with Haldol BREWER, Zyprexa BREWER
med management with Haldol BREWER, clozapine (refusing), VPA (refusing)

## 2024-02-22 NOTE — BH TREATMENT PLAN - NSTXDCOPNOINTERSW_PSY_ALL_CORE
support provided, psyhchoed and encouragement
ongoing support, psychoed and encouragement provided
Ongoing support, psychoed and encouragement provided in effort to comply with meds, tx and discharge plans.
SW to continue to provide support, education and encouragement to patient during admission.
Ongoing support provided in effort to comply with meds, follow behavioral plan and participate in discharge planning.
ongoing support, psychoed and encouragement provided
Ongoing support, psychoed and encouragement provided
support provided
support provided, psychoed and encouragement
Ongoing support provided in effort to assit pt with med compliance and participate in treatment.

## 2024-02-22 NOTE — BH TREATMENT PLAN - NSTXMEDICINTERMD_PSY_ALL_CORE
Quentin
BREWER
Quentin

## 2024-02-22 NOTE — BH TREATMENT PLAN - NSTXDISORGPROGRES_PSY_ALL_CORE
Pt notified of hcg results through [employer]. EOB already scheduled.
No Change
Improving
No Change
Improving
No Change
Improving
No Change

## 2024-02-22 NOTE — BH TREATMENT PLAN - NSTXIMPULSINTERMD_PSY_ALL_CORE
med management with Haldol BREWER, clozapine (refusing), VPA (refusing)
med management with Haldol BREWER, Zyprexa BREWER
med management with Haldol BREWER, Zyprexa
med management with Zyprexa, Haldol, clozapine, VPA
med management with Haldol BREWER, Zyprexa
med management with Haldol BREWER, Zyprexa BREWER
med management with Haldol BREWER
med management with Haldol BREWER, clozapine (refusing), VPA (refusing)

## 2024-02-22 NOTE — BH TREATMENT PLAN - NSTXPSYCHODATEEST_PSY_ALL_CORE
30-Jan-2024
13-Oct-2023
01-Nov-2023
01-Nov-2023
10-Hima-2024
13-Oct-2023
01-Nov-2023
01-Nov-2023
26-Nov-2023
10-Hima-2024
13-Feb-2024
10-Hima-2024
30-Jan-2024
27-Dec-2023
20-Dec-2023
13-Dec-2023
03-Jan-2024
26-Nov-2023

## 2024-02-22 NOTE — BH TREATMENT PLAN - NSTXPROBIMPULS_PSY_ALL_CORE
IMPULSIVITY/AGITATION

## 2024-02-22 NOTE — BH INPATIENT PSYCHIATRY PROGRESS NOTE - MSE UNSTRUCTURED FT
Appearance: laying in bed awake, wearing hospital gowns, malodorous  Behavior: uncooperative, hostile  Speech: limited speech, but normal rate/volume   Mood: unable to assess  Affect: irritable, angry  Thought process: severely disorganized   Thought content: can make vaguely violent/threatening statements, today repeated "GET OUT"  Perceptions: observed talking to self  Insight: very poor  Judgement: very poor, refusing PO medications  Cognition: unable to assess

## 2024-02-22 NOTE — BH TREATMENT PLAN - NSTXOTHERINTERMD_PSY_ALL_CORE
encourage participation

## 2024-02-22 NOTE — BH TREATMENT PLAN - NSTXDISORGDATETRGT_PSY_ALL_CORE
25-Jan-2024
18-Jan-2024
08-Nov-2023
03-Jan-2024
20-Dec-2023
07-Dec-2023
06-Feb-2024
19-Oct-2023
26-Oct-2023
24-Nov-2023
20-Feb-2024
18-Jan-2024
24-Nov-2023
24-Nov-2023
06-Feb-2024
27-Dec-2023
07-Dec-2023
10-Hima-2024

## 2024-02-22 NOTE — BH TREATMENT PLAN - NSTXOTHERGOAL_PSY_ALL_CORE
Will engage in a meaningful discussion with member of the treatment team once per day.
Will engage in a meaningful conversation with treatment team member once a day.
Pt. will have a meaningful conversation wt treatment team members once a day.
Pt will engage in a meaningful conversation with treatment team member once per day.
Pt. will have a meaningful conversation wt treatment team members once a day.
Pt. will have a meaningful conversation wt treatment team members once a day.
Will engage in a meaningful discussion with member of the treatment team once per day.
Pt. will have a meaningful conversation wt treatment team members once a day.
Will engage in a meaningful discussion with member of the treatment team once per day.
Will engage in a meaningful discussion with member of the treatment team once per day.
Will engage in a meaningful conversation with treatment team member once a day
Will engage in a meaningful conversation with treatment team member once a day.
Will engage in a meaningful conversation with treatment team member once a day
Pt will engage in a meaningful conversation with treatment team member once per day.
Will engage in a meaningful conversation with treatment team member once a day

## 2024-02-22 NOTE — BH TREATMENT PLAN - NSTXIMPULSPROGRES_PSY_ALL_CORE
No Change
Improving
No Change
Improving
No Change
No Change

## 2024-02-22 NOTE — BH TREATMENT PLAN - NSTXMEDICDATEEST_PSY_ALL_CORE
26-Nov-2023
03-Jan-2024
13-Feb-2024
13-Oct-2023
13-Oct-2023
10-Hima-2024
13-Oct-2023
27-Dec-2023
13-Oct-2023
30-Jan-2024
10-Hima-2024
30-Jan-2024
13-Oct-2023
10-Hima-2024
13-Oct-2023
13-Dec-2023
26-Nov-2023
20-Dec-2023

## 2024-02-22 NOTE — BH INPATIENT PSYCHIATRY PROGRESS NOTE - NSBHASSESSSUMMFT_PSY_ALL_CORE
Patient is a 53 year old adult (goes by "Juan Jose"), living at [facility] [place] [facility], PMH of GERD, anemia, PPH of schizophrenia, selective mutism, currently with [facility] ACT team, previously with [facility] which , with history of multiple psychiatric hospitalizations, without known history of self-injury or suicide attempts, with history of aggression in the setting on nonadherence with medication, who was initially brought in by staff at residence for increasing agitation and aggression in the setting of 2 months of medication nonadherence. Patient was hospitalized from 8/3-23 on  where they were noted to be hostile, disorganized, threatening, malodorous and grossly psychotic. TOO was obtained, and patient eventually administered dual long-acting injectables (Haldol Decanoate and Zyprexa Relprevv), though had declined nearly all PO medications. Patient was transferred to [facility] where they were admitted due to anemia to 6 (medical workup largely unremarkable), patient stabilized and transferred back to [facility] for further management of psychosis.     Diagnostically, presentation consistent with exacerbation of schizophrenia.     Patient spends most of day isolative and talking to self in severely disorganized and largely incomprehensible fashion, with only very brief moments of reality based exchange with staff or peers, or is non-verbal and stares at staff. Has showered with staff encouragement, though room and patient become quickly malodorous after cleaning. Sleep erratic, often just a few hours. TOO renewed on 24. Haloperidol decanoate continued. Olanzapine initiated (with one dose of Relprevv), but later discontinued given no appreciable benefit after a few weeks, and loss of available long-acting injectable formulation (Relprevv back-ordered until 2024).    Patient  has been intermittently more aggressive in the past few weeks. Labs within normal limits, no reported or observed physical symptoms/issues.     GI and hematology consulted in response to [facility]'s concerns around chronic anemia. No urgent indication for parenteral iron. Spoke to nutrition, added Ensure to diet to try to increase iron intake, will encourage use (has been accepting). Labs  and  with stable anemia (Hgb 8.8-9.0), will aim to get labs at least monthly.     Patient requires inpatient admission due to very poor self-care and for containment, stabilization, medication management and aftercare planning.    Plan:  1. Legal: admitted involuntary 9.27, TOO renewed 24 (expires 3/19/24)  2. Observation: routine checks  3. Collateral: obtained collateral from ACT psychiatrist  4. Psychiatric  -Continue haloperidol decanoate 200mg IM q4 weeks, last given , will give today , next due 3/20  - S/p Zyprexa Relprevv 300mg on , was next due on 2/15 however informed by pharmacy that it is on back order until . Will not re-start short-acting olanzapine give no clear benefit and need to use twice daily IM dosing to continue  - Continue benztropine 1mg BID for EPS (refusing)  5. Medical:  -CBC, CMP, TSH, folate, Vitamin B12, UA on  given more aggression in past few days-- unremarkable  -Anemia:   	-s/p medical admission at [facility] for anemia to Hgb 6, required 2u pRBCs. GI workup unremarkable with exception of diverticulosis and colonic polyp  	-Continue ferous sulfate 325mg daily (refusing), 	encourage Ensure HP instead daily  	-Repeat CBCs with anemia 8.6-low 9s  	-Hemoglobin stable on  and , will repeat in March  -[facility] requesting hematology c/s for chronic anemia---> wrote chart note , etiology c/w BRYON, recommended GI consult --> consulted GI 12/15 and wrote chart note --> sent to [facility]  -Fall on : likely mechanical, no sustained injuries. PT consult  -Bowel regimen: continue bisacodyl 10mg QHS (refusing), miralax 17g daily (refusing), senna 2 tablets QHS (refusing)  -Continue Vitamin D3 1000units QHS (refusing)  6. PRNs  -Olanzapine 5mg PO or IM for agitation  7. Therapy   -Individual, group and milieu therapy as appropriate   8. Disposition: state application submitted , on waiting list

## 2024-02-23 PROCEDURE — 99231 SBSQ HOSP IP/OBS SF/LOW 25: CPT

## 2024-02-23 NOTE — BH INPATIENT PSYCHIATRY PROGRESS NOTE - MSE UNSTRUCTURED FT
Appearance: sleeping in bed, wearing hospital gowns, malodorous  Behavior: generally uncooperative  Speech: no speech today (sleeping)  Mood: unable to assess  Affect: generally irritable  Thought process: severely disorganized when speaking  Thought content: can make vaguely violent/threatening statements  Perceptions: previously observed talking to self  Insight: very poor  Judgement: very poor, refusing PO medications  Cognition: unable to assess

## 2024-02-23 NOTE — BH INPATIENT PSYCHIATRY PROGRESS NOTE - NSBHCHARTREVIEWVS_PSY_A_CORE FT
Vital Signs Last 24 Hrs  T(C): --  T(F): --  HR: 75 (02-22-24 @ 13:09) (75 - 75)  BP: 111/65 (02-22-24 @ 13:09) (111/65 - 111/65)  BP(mean): --  RR: --  SpO2: --

## 2024-02-23 NOTE — BH INPATIENT PSYCHIATRY PROGRESS NOTE - NSBHFUPINTERVALHXFT_PSY_A_CORE
Refusing vitals, refusing PO medications, no PRNs for agitation, NAZ. Chart reviewed, discussed with team, no aggression or posturing in past day. Slept 6 hours per sleep log. Attempted to see patient on two occasions today, though asleep and did not awaken given intermittent very poor sleep. Room very malodorous, smells of urine.

## 2024-02-23 NOTE — BH INPATIENT PSYCHIATRY PROGRESS NOTE - NSBHMETABOLIC_PSY_ALL_CORE_FT
BMI: BMI (kg/m2): 35.2 (12-06-23 @ 08:57)  HbA1c: A1C with Estimated Average Glucose Result: 5.3 % (09-29-23 @ 10:00)    Glucose:   BP: 111/65 (02-22-24 @ 13:09) (95/68 - 111/65)Vital Signs Last 24 Hrs  T(C): --  T(F): --  HR: 75 (02-22-24 @ 13:09) (75 - 75)  BP: 111/65 (02-22-24 @ 13:09) (111/65 - 111/65)  BP(mean): --  RR: --  SpO2: --      Lipid Panel: Date/Time: 09-29-23 @ 10:00  Cholesterol, Serum: 166  LDL Cholesterol Calculated: 94  HDL Cholesterol, Serum: 60  Total Cholesterol/HDL Ration Measurement: --  Triglycerides, Serum: 61

## 2024-02-23 NOTE — BH INPATIENT PSYCHIATRY PROGRESS NOTE - NSBHASSESSSUMMFT_PSY_ALL_CORE
Patient is a 53 year old adult (goes by "Juan Jose"), living at [facility] on [place] [facility], PMH of GERD, anemia, PPH of schizophrenia, selective mutism, currently with [facility] ACT team, previously with [facility] which , with history of multiple psychiatric hospitalizations, without known history of self-injury or suicide attempts, with history of aggression in the setting on nonadherence with medication, who was initially brought in by staff at residence for increasing agitation and aggression in the setting of 2 months of medication nonadherence. Patient was hospitalized from 8/3-23 on  where they were noted to be hostile, disorganized, threatening, malodorous and grossly psychotic. TOO was obtained, and patient eventually administered dual long-acting injectables (Haldol Decanoate and Zyprexa Relprevv), though had declined nearly all PO medications. Patient was transferred to [facility] where they were admitted due to anemia to 6 (medical workup largely unremarkable), patient stabilized and transferred back to [facility] for further management of psychosis.     Diagnostically, presentation consistent with exacerbation of schizophrenia.     Patient spends most of day isolative and talking to self in severely disorganized and largely incomprehensible fashion, with only very brief moments of reality based exchange with staff or peers, or is non-verbal and stares at staff. Has showered with staff encouragement, though room and patient become quickly malodorous after cleaning. Sleep erratic, often just a few hours. TOO renewed on 24. Haloperidol decanoate continued. Olanzapine initiated (with one dose of Relprevv), but later discontinued given no appreciable benefit after a few weeks, and loss of available long-acting injectable formulation (Relprevv back-ordered until 2024).    Patient  has been intermittently more aggressive in the past few weeks. Labs within normal limits, no reported or observed physical symptoms/issues.     GI and hematology consulted in response to [facility]'s concerns around chronic anemia. No urgent indication for parenteral iron. Spoke to nutrition, added Ensure to diet to try to increase iron intake, will encourage use (has been accepting). Labs  and  with stable anemia (Hgb 8.8-9.0), will aim to get labs at least monthly.     Patient requires inpatient admission due to very poor self-care and for containment, stabilization, medication management and aftercare planning.    Plan:  1. Legal: admitted involuntary 9.27, TOO renewed 24 (expires 3/19/24)  2. Observation: routine checks  3. Collateral: obtained collateral from ACT psychiatrist  4. Psychiatric  -Continue haloperidol decanoate 200mg IM q4 weeks, last given , will give today , next due 3/20  - S/p Zyprexa Relprevv 300mg on , was next due on 2/15 however informed by pharmacy that it is on back order until . Will not re-start short-acting olanzapine give no clear benefit and need to use twice daily IM dosing to continue  - Continue benztropine 1mg BID for EPS (refusing)  5. Medical:  -CBC, CMP, TSH, folate, Vitamin B12, UA on  given more aggression in past few days-- unremarkable  -Anemia:   	-s/p medical admission at [facility] for anemia to Hgb 6, required 2u pRBCs. GI workup unremarkable with exception of diverticulosis and colonic polyp  	-Continue ferous sulfate 325mg daily (refusing), 	encourage Ensure HP instead daily  	-Repeat CBCs with anemia 8.6-low 9s  	-Hemoglobin stable on  and , will repeat in March  -[facility] requesting hematology c/s for chronic anemia---> wrote chart note , etiology c/w BRYON, recommended GI consult --> consulted GI 12/15 and wrote chart note --> sent to [facility]  -Fall on : likely mechanical, no sustained injuries. PT consult  -Bowel regimen: continue bisacodyl 10mg QHS (refusing), miralax 17g daily (refusing), senna 2 tablets QHS (refusing)  -Continue Vitamin D3 1000units QHS (refusing)  6. PRNs  -Olanzapine 5mg PO or IM for agitation  7. Therapy   -Individual, group and milieu therapy as appropriate   8. Disposition: state application submitted , on waiting list Patient is a 53 year old adult (goes by "Juan Jose"), living at [facility] on [place] grounds, PMH of GERD, anemia, PPH of schizophrenia, selective mutism, currently with [facility] ACT team, previously with [facility] which , with history of multiple psychiatric hospitalizations, without known history of self-injury or suicide attempts, with history of aggression in the setting on nonadherence with medication, who was initially brought in by staff at residence for increasing agitation and aggression in the setting of 2 months of medication nonadherence. Patient was hospitalized from 8/3-23 on  where they were noted to be hostile, disorganized, threatening, malodorous and grossly psychotic. TOO was obtained, and patient eventually administered dual long-acting injectables (Haldol Decanoate and Zyprexa Relprevv), though had declined nearly all PO medications. Patient was transferred to [facility] where they were admitted due to anemia to 6 (medical workup largely unremarkable), patient stabilized and transferred back to [facility] for further management of psychosis.     Diagnostically, presentation consistent with exacerbation of schizophrenia.     Patient spends most of day isolative and talking to self in severely disorganized and largely incomprehensible fashion, with only very brief moments of reality based exchange with staff or peers, or is non-verbal and stares at staff. Has showered with staff encouragement, though room and patient become quickly malodorous after cleaning. Sleep erratic, often just a few hours. TOO renewed on 24. Haloperidol decanoate continued. Olanzapine initiated (with one dose of Relprevv), but later discontinued given no appreciable benefit after a few weeks, and loss of available long-acting injectable formulation (Relprevv back-ordered until 2024).    Patient  has been intermittently more aggressive in the past few weeks. Labs within normal limits, no reported or observed physical symptoms/issues.     Discussed incontinence today with internist (per chart review, chronically urinates/defecates on self). Recent UA wnl. Per internist, no needed diagnostics, or appropriate intervention if patient will not take medications for symptomatic relief.     GI and hematology consulted in response to [facility]'s concerns around chronic anemia. No urgent indication for parenteral iron. Spoke to nutrition, added Ensure to diet to try to increase iron intake, will encourage use (has been accepting). Labs  and  with stable anemia (Hgb 8.8-9.0), will aim to get labs at least monthly.     Patient requires inpatient admission due to very poor self-care and for containment, stabilization, medication management and aftercare planning.    Plan:  1. Legal: admitted involuntary 9.27, TOO renewed 24 (expires 3/19/24)  2. Observation: routine checks  3. Collateral: obtained collateral from ACT psychiatrist  4. Psychiatric  -Continue haloperidol decanoate 200mg IM q4 weeks, last given , will give today , next due 3/20  - S/p Zyprexa Relprevv 300mg on , was next due on 2/15 however informed by pharmacy that it is on back order until . Will not re-start short-acting olanzapine give no clear benefit and need to use twice daily IM dosing to continue  - Continue benztropine 1mg BID for EPS (refusing)  5. Medical:  -CBC, CMP, TSH, folate, Vitamin B12, UA on  given more aggression in past few days-- unremarkable  -Anemia:   	-s/p medical admission at [facility] for anemia to Hgb 6, required 2u pRBCs. GI workup unremarkable with exception of diverticulosis and colonic polyp  	-Continue ferous sulfate 325mg daily (refusing), 	encourage Ensure HP instead daily  	-Repeat CBCs with anemia 8.6-low 9s  	-Hemoglobin stable on  and , will repeat in March  -[facility] requesting hematology c/s for chronic anemia---> wrote chart note , etiology c/w BRYON, recommended GI consult --> consulted GI 12/15 and wrote chart note --> sent to [facility]  -Fall on : likely mechanical, no sustained injuries. PT consult  -Bowel regimen: continue bisacodyl 10mg QHS (refusing), miralax 17g daily (refusing), senna 2 tablets QHS (refusing)  -Continue Vitamin D3 1000units QHS (refusing)  -Urinary/bowel inconvenience (chronic): per internist on , no indication for further testing, only option would be offering medications for symptomatic relief  6. PRNs  -Olanzapine 5mg PO or IM for agitation  7. Therapy   -Individual, group and milieu therapy as appropriate   8. Disposition: state application submitted , on waiting list

## 2024-02-25 VITALS — TEMPERATURE: 97 F

## 2024-02-26 PROCEDURE — 99231 SBSQ HOSP IP/OBS SF/LOW 25: CPT

## 2024-02-26 NOTE — BH INPATIENT PSYCHIATRY PROGRESS NOTE - NSBHMETABOLIC_PSY_ALL_CORE_FT
BMI: BMI (kg/m2): 35.2 (12-06-23 @ 08:57)  HbA1c: A1C with Estimated Average Glucose Result: 5.3 % (09-29-23 @ 10:00)    Glucose:   BP: --Vital Signs Last 24 Hrs  T(C): --  T(F): --  HR: --  BP: --  BP(mean): --  RR: --  SpO2: --    Orthostatic VS  02-25-24 @ 08:26  Lying BP: --/-- HR: --  Sitting BP: 123/63 HR: 68  Standing BP: --/-- HR: --  Site: --  Mode: --    Lipid Panel: Date/Time: 09-29-23 @ 10:00  Cholesterol, Serum: 166  LDL Cholesterol Calculated: 94  HDL Cholesterol, Serum: 60  Total Cholesterol/HDL Ration Measurement: --  Triglycerides, Serum: 61   BMI: BMI (kg/m2): 35.2 (12-06-23 @ 08:57)  HbA1c: A1C with Estimated Average Glucose Result: 5.3 % (09-29-23 @ 10:00)    Glucose:   BP: --Vital Signs Last 24 Hrs  T(C): --  T(F): --  HR: --  BP: --  BP(mean): --  RR: --  SpO2: --      Lipid Panel: Date/Time: 09-29-23 @ 10:00  Cholesterol, Serum: 166  LDL Cholesterol Calculated: 94  HDL Cholesterol, Serum: 60  Total Cholesterol/HDL Ration Measurement: --  Triglycerides, Serum: 61

## 2024-02-26 NOTE — BH INPATIENT PSYCHIATRY PROGRESS NOTE - MSE UNSTRUCTURED FT
Appearance: laying in bed, wearing hospital gowns, malodorous  Behavior: uncooperative, hostile  Speech: talking to self, monotone, then loud when asking provider to leave  Mood: unable to assess  Affect: irritable  Thought process: severely disorganized   Thought content: can make vaguely violent/threatening statements, today stated "kill her off" and "GET OUT"  Perceptions: observed talking to self  Insight: very poor  Judgement: very poor, refusing PO medications  Cognition: unable to assess Appearance: laying in bed, wearing hospital gowns, malodorous  Behavior: uncooperative, hostile  Speech: talking to self, monotone, then loud when speaking to provider   Mood: unable to assess  Affect: irritable  Thought process: severely disorganized   Thought content: can make vaguely violent/threatening statements, often speaks about killing people  Perceptions: observed talking to self  Insight: very poor  Judgement: very poor, refusing PO medications  Cognition: unable to assess Appearance: laying in bed, wearing hospital gowns, malodorous  Behavior: uncooperative, hostile  Speech: talking to self, monotone, then loud when speaking to provider   Mood: unable to assess  Affect: irritable  Thought process: severely disorganized   Thought content: can make vaguely violent/threatening statements  Perceptions: observed talking to self  Insight: very poor  Judgement: very poor, refusing PO medications  Cognition: unable to assess

## 2024-02-26 NOTE — BH INPATIENT PSYCHIATRY PROGRESS NOTE - NSBHFUPINTERVALHXFT_PSY_A_CORE
VSS yesterday, refused today, refusing PO medications, no PRNs for agitation, no acute events over weekend. Slept 7 hours last night per log. Chart reviewed, discussed with team.    Patient seen laying in bed, awake. Patient talking to self, stating "kill her off," and then upon seeing this provider, states "GET OUT." Refusing vitals, refusing PO medications, no PRNs for agitation, no acute events over weekend. Slept 6-7 hours per sleep log. Chart reviewed, discussed with team. Patient accepted to [facility] for transfer 2/28.    Seen with staff before attempting to shower patient. Patient seen laying in bed awake, talking to self. Does not respond to interview questions. Informed patient of transfer to another hospital. VSS yesterday, refused today, refusing PO medications, no PRNs for agitation, no acute events over weekend. Slept 7 hours last night per log. Chart reviewed, discussed with team.    Patient seen laying in bed, awake. Patient talking to self, stating "kill her off," and then upon seeing this provider, states "GET OUT."

## 2024-02-26 NOTE — BH INPATIENT PSYCHIATRY PROGRESS NOTE - NSBHATTESTBILLING_PSY_A_CORE
54604-[id number] OBS or IP - low complexity OR 25-34 mins 15019-[id number] OBS or IP - moderate complexity OR 35-49 mins

## 2024-02-26 NOTE — BH INPATIENT PSYCHIATRY PROGRESS NOTE - NSBHASSESSSUMMFT_PSY_ALL_CORE
Patient is a 53 year old adult (goes by "Juan Jose"), living at [facility] on [place] grounds, PMH of GERD, anemia, PPH of schizophrenia, selective mutism, currently with [facility] ACT team, previously with [facility] which , with history of multiple psychiatric hospitalizations, without known history of self-injury or suicide attempts, with history of aggression in the setting on nonadherence with medication, who was initially brought in by staff at residence for increasing agitation and aggression in the setting of 2 months of medication nonadherence. Patient was hospitalized from 8/3-23 on  where they were noted to be hostile, disorganized, threatening, malodorous and grossly psychotic. TOO was obtained, and patient eventually administered dual long-acting injectables (Haldol Decanoate and Zyprexa Relprevv), though had declined nearly all PO medications. Patient was transferred to [facility] where they were admitted due to anemia to 6 (medical workup largely unremarkable), patient stabilized and transferred back to [facility] for further management of psychosis.     Diagnostically, presentation consistent with exacerbation of schizophrenia.     Patient spends most of day isolative and talking to self in severely disorganized and largely incomprehensible fashion, with only very brief moments of reality based exchange with staff or peers, or is non-verbal and stares at staff. Has showered with staff encouragement, though room and patient become quickly malodorous after cleaning. Sleep erratic, often just a few hours. TOO renewed on 24. Haloperidol decanoate continued. Olanzapine initiated (with one dose of Relprevv), but later discontinued given no appreciable benefit after a few weeks, and loss of available long-acting injectable formulation (Relprevv back-ordered until 2024).    Patient  has been intermittently more aggressive in the past few weeks. Labs within normal limits, no reported or observed physical symptoms/issues.    GI and hematology consulted in response to [facility]'s concerns around chronic anemia. No urgent indication for parenteral iron. Spoke to nutrition, added Ensure to diet to try to increase iron intake, will encourage use (has been accepting). Labs  and  with stable anemia (Hgb 8.8-9.0), will aim to get labs at least monthly.     Patient requires inpatient admission due to very poor self-care and for containment, stabilization, medication management and aftercare planning.    Plan:  1. Legal: admitted involuntary 9.27, TOO renewed 24 (expires 3/19/24)  2. Observation: routine checks  3. Collateral: obtained collateral from ACT psychiatrist  4. Psychiatric  -Continue haloperidol decanoate 200mg IM q4 weeks, last given , next due 3/20  - S/p Zyprexa Relprevv 300mg on , was next due on 2/15 however informed by pharmacy that it is on back order until . Will not re-start short-acting olanzapine give no clear benefit and need to use twice daily IM dosing to continue  - Continue benztropine 1mg BID for EPS (refusing)  5. Medical:  -CBC, CMP, TSH, folate, Vitamin B12, UA on  given more aggression in past few days-- unremarkable  -Anemia:   	-s/p medical admission at [facility] for anemia to Hgb 6, required 2u pRBCs. GI workup unremarkable with exception of diverticulosis and colonic polyp  	-Continue ferous sulfate 325mg daily (refusing), 	encourage Ensure HP instead daily  	-Repeat CBCs with anemia 8.6-low 9s  	-Hemoglobin stable on  and , will repeat in March  -[facility] requesting hematology c/s for chronic anemia---> wrote chart note , etiology c/w BRYON, recommended GI consult --> consulted GI 12/15 and wrote chart note --> sent to [facility]  -Fall on : likely mechanical, no sustained injuries. PT consult  -Bowel regimen: continue bisacodyl 10mg QHS (refusing), miralax 17g daily (refusing), senna 2 tablets QHS (refusing)  -Continue Vitamin D3 1000units QHS (refusing)  -Urinary/bowel inconvenience (chronic): per internist on , no indication for further testing, only option would be offering medications for symptomatic relief  6. PRNs  -Olanzapine 5mg PO or IM for agitation  7. Therapy   -Individual, group and milieu therapy as appropriate   8. Disposition: state application submitted , on waiting list Patient is a 53 year old adult (goes by "Juan Jose"), living at [facility] on [place] grounds, PMH of GERD, anemia, PPH of schizophrenia, selective mutism, currently with [facility] ACT team, previously with [facility] which , with history of multiple psychiatric hospitalizations, without known history of self-injury or suicide attempts, with history of aggression in the setting on nonadherence with medication, who was initially brought in by staff at residence for increasing agitation and aggression in the setting of 2 months of medication nonadherence. Patient was hospitalized from 8/3-23 on  where they were noted to be hostile, disorganized, threatening, malodorous and grossly psychotic. TOO was obtained, and patient eventually administered dual long-acting injectables (Haldol Decanoate and Zyprexa Relprevv), though had declined nearly all PO medications. Patient was transferred to [facility] where they were admitted due to anemia to 6 (medical workup largely unremarkable), patient stabilized and transferred back to [facility] for further management of psychosis.     Diagnostically, presentation consistent with exacerbation of schizophrenia.     Patient spends most of day isolative and talking to self in a severely disorganized and largely incomprehensible fashion, with only very brief moments of reality based exchange with staff or peers, or is non-verbal and stares at staff. Has showered with staff encouragement, though room and patient become quickly malodorous after cleaning. Sleep erratic, often just a few hours. TOO renewed on 24. Haloperidol decanoate continued. Olanzapine initiated (with one dose of Relprevv), but later discontinued given no appreciable benefit after a few weeks, and loss of available long-acting injectable formulation (Relprevv back-ordered until 2024).    Patient  has been intermittently more aggressive in the past few weeks. Labs within normal limits, no reported or observed physical symptoms/issues.    GI and hematology consulted in response to [facility]'s concerns around chronic anemia. No urgent indication for parenteral iron. Spoke to nutrition, added Ensure to diet to try to increase iron intake, will encourage use (has been accepting). Labs  and  with stable anemia (Hgb 8.8-9.0), will aim to get labs at least monthly.     Patient accepted to [facility] for . Will get COVID test today pre-transfer.    Plan:  1. Legal: admitted involuntary 9.27, TOO renewed 24 (expires 3/19/24)  2. Observation: routine checks  3. Collateral: obtained collateral from ACT psychiatrist  4. Psychiatric  -Continue haloperidol decanoate 200mg IM q4 weeks, last given , next due 3/20  - S/p Zyprexa Relprevv 300mg on , was next due on 2/15 however informed by pharmacy that it is on back order until . Will not re-start short-acting olanzapine give no clear benefit and need to use twice daily IM dosing to continue  - Continue benztropine 1mg BID for EPS (refusing)  5. Medical:  -CBC, CMP, TSH, folate, Vitamin B12, UA on  given more aggression in past few days-- unremarkable  -Anemia:   	-s/p medical admission at [facility] for anemia to Hgb 6, required 2u pRBCs. GI workup unremarkable with exception of diverticulosis and colonic polyp  	-Continue ferous sulfate 325mg daily (refusing), 	encourage Ensure HP instead daily  	-Repeat CBCs with anemia 8.6-low 9s  	-Hemoglobin stable on  and   -[facility] requesting hematology c/s for chronic anemia---> wrote chart note , etiology c/w BRYON, recommended GI consult --> consulted GI 12/15 and wrote chart note --> sent to [facility]  -Fall on : likely mechanical, no sustained injuries. PT consult  -Bowel regimen: continue bisacodyl 10mg QHS (refusing), miralax 17g daily (refusing), senna 2 tablets QHS (refusing)  -Continue Vitamin D3 1000units QHS (refusing)  -Urinary/bowel inconvenience (chronic): per internist on , no indication for further testing, only option would be offering medications for symptomatic relief  6. PRNs  -Olanzapine 5mg PO or IM for agitation  7. Therapy   -Individual, group and milieu therapy as appropriate   8. Disposition: state application submitted , on waiting list Patient is a 53 year old adult (goes by "Juan Jose"), living at [facility] on [place] grounds, PMH of GERD, anemia, PPH of schizophrenia, selective mutism, currently with [facility] ACT team, previously with [facility] which , with history of multiple psychiatric hospitalizations, without known history of self-injury or suicide attempts, with history of aggression in the setting on nonadherence with medication, who was initially brought in by staff at residence for increasing agitation and aggression in the setting of 2 months of medication nonadherence. Patient was hospitalized from 8/3-23 on  where they were noted to be hostile, disorganized, threatening, malodorous and grossly psychotic. TOO was obtained, and patient eventually administered dual long-acting injectables (Haldol Decanoate and Zyprexa Relprevv), though had declined nearly all PO medications. Patient was transferred to [facility] where they were admitted due to anemia to 6 (medical workup largely unremarkable), patient stabilized and transferred back to [facility] for further management of psychosis.     Diagnostically, presentation consistent with exacerbation of schizophrenia.     Patient spends most of day isolative and talking to self in a severely disorganized and largely incomprehensible fashion, with only very brief moments of reality based exchange with staff or peers, or is non-verbal and stares at staff. Has showered with staff encouragement, though room and patient become quickly malodorous after cleaning. Sleep erratic, often just a few hours. TOO renewed on 24. Haloperidol decanoate continued. Olanzapine initiated (with one dose of Relprevv), but later discontinued given no appreciable benefit after a few weeks, and loss of available long-acting injectable formulation (Relprevv back-ordered until 2024).    Patient  has been intermittently more aggressive in the past few weeks. Labs within normal limits, no reported or observed physical symptoms/issues.    GI and hematology consulted in response to [facility]'s concerns around chronic anemia. No urgent indication for parenteral iron. Spoke to nutrition, added Ensure to diet to try to increase iron intake, will encourage use (has been accepting). Labs  and  with stable anemia (Hgb 8.8-9.0), will aim to get labs at least monthly.     Patient requires inpatient admission for containment, stabilization, medication management and aftercare planning.    Plan:  1. Legal: admitted involuntary  TOO renewed 24 (expires 3/19/24)  2. Observation: routine checks  3. Collateral: obtained collateral from ACT psychiatrist  4. Psychiatric  -Continue haloperidol decanoate 200mg IM q4 weeks, last given , next due 3/20  - S/p Zyprexa Relprevv 300mg on , was next due on 2/15 however informed by pharmacy that it is on back order until . Will not re-start short-acting olanzapine give no clear benefit and need to use twice daily IM dosing to continue  - Continue benztropine 1mg BID for EPS (refusing)  5. Medical:  -CBC, CMP, TSH, folate, Vitamin B12, UA on  given more aggression in past few days-- unremarkable  -Anemia:   	-s/p medical admission at [facility] for anemia to Hgb 6, required 2u pRBCs. GI workup unremarkable with exception of diverticulosis and colonic polyp  	-Continue ferous sulfate 325mg daily (refusing), 	encourage Ensure HP instead daily  	-Repeat CBCs with anemia 8.6-low 9s  	-Hemoglobin stable on  and   -[facility] requesting hematology c/s for chronic anemia---> wrote chart note , etiology c/w BRYON, recommended GI consult --> consulted GI 12/15 and wrote chart note --> sent to [facility]  -Fall on : likely mechanical, no sustained injuries. PT consult  -Bowel regimen: continue bisacodyl 10mg QHS (refusing), miralax 17g daily (refusing), senna 2 tablets QHS (refusing)  -Continue Vitamin D3 1000units QHS (refusing)  -Urinary/bowel inconvenience (chronic): per internist on , no indication for further testing, only option would be offering medications for symptomatic relief  6. PRNs  -Olanzapine 5mg PO or IM for agitation  7. Therapy   -Individual, group and milieu therapy as appropriate   8. Disposition: state application submitted , on waiting list

## 2024-02-26 NOTE — BH INPATIENT PSYCHIATRY PROGRESS NOTE - NSBHCHARTREVIEWVS_PSY_A_CORE FT
Vital Signs Last 24 Hrs  T(C): --  T(F): --  HR: --  BP: --  BP(mean): --  RR: --  SpO2: --    Orthostatic VS  02-25-24 @ 08:26  Lying BP: --/-- HR: --  Sitting BP: 123/63 HR: 68  Standing BP: --/-- HR: --  Site: --  Mode: --   Vital Signs Last 24 Hrs  T(C): --  T(F): --  HR: --  BP: --  BP(mean): --  RR: --  SpO2: --

## 2024-02-27 LAB — SARS-COV-2 RNA SPEC QL NAA+PROBE: SIGNIFICANT CHANGE UP

## 2024-02-27 PROCEDURE — 99238 HOSP IP/OBS DSCHRG MGMT 30/<: CPT | Mod: 1L

## 2024-02-27 PROCEDURE — 99232 SBSQ HOSP IP/OBS MODERATE 35: CPT

## 2024-02-27 RX ORDER — FERROUS SULFATE 325(65) MG
1 TABLET ORAL
Qty: 0 | Refills: 0 | DISCHARGE
Start: 2024-02-27

## 2024-02-27 RX ORDER — SENNA PLUS 8.6 MG/1
1 TABLET ORAL
Refills: 0 | DISCHARGE

## 2024-02-27 RX ORDER — BENZTROPINE MESYLATE 1 MG
1 TABLET ORAL
Qty: 0 | Refills: 0 | DISCHARGE
Start: 2024-02-27

## 2024-02-27 RX ORDER — CHOLECALCIFEROL (VITAMIN D3) 125 MCG
1000 CAPSULE ORAL
Qty: 0 | Refills: 0 | DISCHARGE
Start: 2024-02-27

## 2024-02-27 RX ORDER — HALOPERIDOL DECANOATE 100 MG/ML
200 INJECTION INTRAMUSCULAR
Qty: 0 | Refills: 0 | DISCHARGE

## 2024-02-27 NOTE — BH INPATIENT PSYCHIATRY PROGRESS NOTE - NSBHLEGALSTATUSNEW_PSY_ALL_CORE
9.27 (2PC)

## 2024-02-27 NOTE — BH INPATIENT PSYCHIATRY PROGRESS NOTE - TELEPSYCHIATRY?
No

## 2024-02-27 NOTE — BH INPATIENT PSYCHIATRY PROGRESS NOTE - NSTXIMPULSDATEEST_PSY_ALL_CORE
23-Nov-2023
23-Nov-2023
01-Nov-2023
27-Dec-2023
13-Feb-2024
27-Dec-2023
23-Nov-2023
23-Nov-2023
27-Dec-2023
23-Nov-2023
13-Oct-2023
30-Jan-2024
01-Nov-2023
13-Dec-2023
23-Nov-2023
01-Nov-2023
01-Nov-2023
27-Dec-2023
01-Nov-2023
10-Hima-2024
10-Hima-2024
13-Oct-2023
10-Hima-2024
13-Oct-2023
10-Hima-2024
23-Nov-2023
01-Nov-2023
22-Feb-2024
10-Hima-2024
13-Oct-2023
13-Oct-2023
10-Hima-2024
13-Dec-2023
22-Feb-2024
10-Hima-2024
22-Feb-2024
03-Jan-2024
13-Oct-2023
01-Nov-2023
13-Oct-2023
30-Jan-2024
13-Feb-2024
03-Jan-2024
30-Jan-2024
13-Dec-2023
30-Jan-2024
01-Nov-2023
10-Hima-2024
13-Feb-2024
13-Oct-2023
01-Nov-2023
01-Nov-2023
30-Jan-2024
30-Jan-2024
01-Nov-2023
03-Jan-2024
01-Nov-2023
01-Nov-2023
10-Hima-2024
13-Feb-2024
13-Oct-2023
10-Hima-2024
30-Jan-2024
13-Feb-2024
30-Jan-2024
01-Nov-2023
13-Feb-2024
30-Jan-2024
30-Jan-2024
01-Nov-2023
10-Hima-2024
23-Nov-2023
20-Dec-2023
20-Dec-2023
03-Jan-2024
20-Dec-2023
10-Hima-2024
23-Dec-2023
20-Dec-2023
23-Dec-2023
13-Dec-2023
27-Dec-2023

## 2024-02-27 NOTE — BH INPATIENT PSYCHIATRY DISCHARGE NOTE - DISCHARGE SERVICE FOR PATIENT
on the discharge service for the patient. I have reviewed and made amendments to the documentation where necessary.
Yes

## 2024-02-27 NOTE — BH INPATIENT PSYCHIATRY PROGRESS NOTE - NSTXVIOLNTPROGRES_PSY_ALL_CORE
No Change
Improving
Improving
No Change
Improving
No Change
Improving
Improving
No Change
Improving
No Change
Improving
No Change
Improving
No Change
Improving
No Change
Improving
No Change
Improving
No Change
No Change
Improving
No Change
Improving
No Change
No Change
Improving
Improving
No Change
Improving
No Change
Improving
Improving
No Change
Improving
No Change
No Change
Improving
No Change

## 2024-02-27 NOTE — BH INPATIENT PSYCHIATRY DISCHARGE NOTE - VIOLENCE RISK FACTORS:
Violent ideation/threat/speech/Impulsivity/Lack of insight into violence risk/need for treatment/Noncompliance with treatment/Irritability

## 2024-02-27 NOTE — BH INPATIENT PSYCHIATRY PROGRESS NOTE - NSTXDCOPNODATEEST_PSY_ALL_CORE
08-Dec-2023
13-Oct-2023
13-Oct-2023
20-Dec-2023
08-Dec-2023
20-Dec-2023
01-Nov-2023
13-Oct-2023
29-Nov-2023
13-Oct-2023
29-Nov-2023
01-Nov-2023
10-Hima-2024
10-Nov-2023
10-Nov-2023
08-Dec-2023
09-Feb-2024
04-Aug-2023
01-Nov-2023
13-Oct-2023
27-Oct-2023
01-Feb-2024
08-Dec-2023
09-Feb-2024
13-Oct-2023
20-Dec-2023
01-Feb-2024
27-Oct-2023
27-Oct-2023
21-Nov-2023
01-Nov-2023
26-Jan-2024
13-Oct-2023
29-Nov-2023
10-Hima-2024
10-Nov-2023
01-Feb-2024
01-Nov-2023
13-Oct-2023
13-Oct-2023
20-Feb-2024
21-Nov-2023
20-Dec-2023
09-Feb-2024
09-Feb-2024
10-Hima-2024
26-Jan-2024
13-Oct-2023
10-Hima-2024
13-Oct-2023
10-Hima-2024
10-Nov-2023
09-Feb-2024
20-Dec-2023
09-Feb-2024
10-Hima-2024
20-Feb-2024
20-Feb-2024
01-Nov-2023
26-Jan-2024
01-Feb-2024
21-Nov-2023
20-Feb-2024
20-Feb-2024
27-Oct-2023
08-Dec-2023
10-Nov-2023
26-Jan-2024
10-Nov-2023
21-Nov-2023
01-Feb-2024
10-Hima-2024
20-Dec-2023
01-Nov-2023
20-Dec-2023
29-Nov-2023
01-Feb-2024
09-Feb-2024
10-Hima-2024
21-Nov-2023
29-Nov-2023
20-Dec-2023
10-Hima-2024
10-Nov-2023
29-Nov-2023
20-Dec-2023
20-Dec-2023
08-Dec-2023
10-Hima-2024
20-Dec-2023
29-Nov-2023
20-Dec-2023
20-Dec-2023
08-Dec-2023

## 2024-02-27 NOTE — BH INPATIENT PSYCHIATRY PROGRESS NOTE - NSTXPROBSLFCRE_PSY_ALL_CORE
SELF-CARE DEFICIT

## 2024-02-27 NOTE — BH INPATIENT PSYCHIATRY PROGRESS NOTE - NSTXOTHERINTERMD_PSY_ALL_CORE
encourage participation

## 2024-02-27 NOTE — BH INPATIENT PSYCHIATRY PROGRESS NOTE - NSTXDISORGDATETRGT_PSY_ALL_CORE
06-Feb-2024
29-Feb-2024
06-Feb-2024
25-Jan-2024
08-Nov-2023
15-Dec-2023
20-Oct-2023
06-Feb-2024
08-Nov-2023
20-Dec-2023
25-Jan-2024
27-Dec-2023
01-Feb-2024
07-Dec-2023
26-Oct-2023
26-Oct-2023
20-Feb-2024
26-Oct-2023
24-Nov-2023
30-Nov-2023
20-Oct-2023
26-Oct-2023
27-Dec-2023
03-Jan-2024
20-Dec-2023
06-Feb-2024
18-Jan-2024
26-Oct-2023
08-Nov-2023
20-Oct-2023
26-Oct-2023
20-Oct-2023
20-Feb-2024
26-Oct-2023
08-Nov-2023
24-Nov-2023
20-Feb-2024
24-Nov-2023
06-Feb-2024
25-Jan-2024
30-Nov-2023
20-Feb-2024
06-Feb-2024
06-Feb-2024
09-Aug-2023
20-Dec-2023
20-Oct-2023
25-Jan-2024
27-Dec-2023
06-Feb-2024
07-Dec-2023
18-Jan-2024
01-Feb-2024
20-Feb-2024
24-Nov-2023
06-Feb-2024
07-Dec-2023
24-Nov-2023
24-Nov-2023
18-Jan-2024
18-Jan-2024
19-Oct-2023
29-Feb-2024
29-Feb-2024
24-Nov-2023
26-Oct-2023
01-Feb-2024
07-Dec-2023
10-Hima-2024
20-Nov-2023
20-Feb-2024
26-Oct-2023
06-Feb-2024
07-Dec-2023
10-Hima-2024
25-Jan-2024
30-Nov-2023
24-Nov-2023
07-Dec-2023
07-Dec-2023
10-Hima-2024
03-Jan-2024
10-Hima-2024
15-Dec-2023
27-Dec-2023
18-Jan-2024
03-Jan-2024
18-Jan-2024
03-Jan-2024
15-Dec-2023
03-Jan-2024
20-Dec-2023

## 2024-02-27 NOTE — BH INPATIENT PSYCHIATRY PROGRESS NOTE - NSTREATMENTCERTY_PSY_ALL_CORE

## 2024-02-27 NOTE — BH INPATIENT PSYCHIATRY PROGRESS NOTE - NSTXMEDICGOAL_PSY_ALL_CORE
Take all medications as prescribed
Be able to describe the benefit of medication/treatment
Take all medications as prescribed
Be able to describe the benefit of medication/treatment
Be able to describe the benefit of medication/treatment
Take all medications as prescribed
Be able to describe the benefit of medication/treatment
Take all medications as prescribed
Be able to describe the benefit of medication/treatment
Take all medications as prescribed
Be able to describe the benefit of medication/treatment
Take all medications as prescribed
Be able to describe the benefit of medication/treatment
Take all medications as prescribed
Be able to describe the benefit of medication/treatment
Take all medications as prescribed
Be able to describe the benefit of medication/treatment
Take all medications as prescribed
Be able to describe the benefit of medication/treatment
Be able to describe the benefit of medication/treatment
Take all medications as prescribed
Be able to describe the benefit of medication/treatment
Take all medications as prescribed
Be able to describe the benefit of medication/treatment
Be able to describe the benefit of medication/treatment
Take all medications as prescribed
Be able to describe the benefit of medication/treatment
Take all medications as prescribed
Take all medications as prescribed
Be able to describe the benefit of medication/treatment
Take all medications as prescribed
Be able to describe the benefit of medication/treatment
Take all medications as prescribed
Be able to describe the benefit of medication/treatment
Take all medications as prescribed
Take all medications as prescribed

## 2024-02-27 NOTE — BH INPATIENT PSYCHIATRY PROGRESS NOTE - NSTXMEDICDATEEST_PSY_ALL_CORE
03-Jan-2024
13-Oct-2023
13-Oct-2023
30-Jan-2024
10-Hima-2024
10-Hima-2024
30-Jan-2024
13-Oct-2023
10-Hima-2024
26-Nov-2023
13-Oct-2023
03-Jan-2024
13-Dec-2023
26-Nov-2023
10-Hima-2024
20-Dec-2023
13-Oct-2023
26-Nov-2023
03-Aug-2023
22-Feb-2024
26-Nov-2023
13-Feb-2024
13-Feb-2024
13-Oct-2023
26-Nov-2023
10-Hima-2024
13-Oct-2023
10-Hima-2024
13-Oct-2023
20-Dec-2023
13-Oct-2023
13-Oct-2023
10-Hima-2024
13-Oct-2023
13-Feb-2024
13-Oct-2023
03-Jan-2024
30-Jan-2024
03-Jan-2024
13-Oct-2023
30-Jan-2024
10-Hima-2024
13-Oct-2023
27-Dec-2023
10-Hima-2024
13-Oct-2023
13-Oct-2023
10-Hima-2024
13-Feb-2024
13-Feb-2024
30-Jan-2024
26-Nov-2023
30-Jan-2024
26-Nov-2023
27-Dec-2023
13-Oct-2023
10-Hima-2024
30-Jan-2024
10-Hima-2024
13-Oct-2023
22-Feb-2024
20-Dec-2023
30-Jan-2024
22-Feb-2024
13-Feb-2024
13-Dec-2023
30-Jan-2024
20-Dec-2023
10-Hima-2024
13-Oct-2023
26-Nov-2023
30-Jan-2024
27-Dec-2023
26-Nov-2023
27-Dec-2023
13-Dec-2023
26-Nov-2023
26-Nov-2023
13-Dec-2023
27-Dec-2023
10-Hima-2024
26-Nov-2023

## 2024-02-27 NOTE — BH INPATIENT PSYCHIATRY PROGRESS NOTE - NSTXIMPULSGOAL_PSY_ALL_CORE
Will be able to demonstrate the ability to pause before acting out negatively
Will identify 1 negative consequence due to impulsivity
Will be able to demonstrate the ability to pause before acting out negatively
Will identify 1 behavior to cope with impulsive urges
Will be able to demonstrate the ability to pause before acting out negatively
Will identify 1 behavior to cope with impulsive urges
Will identify 1 behavior to cope with impulsive urges
Will be able to demonstrate the ability to pause before acting out negatively
Will be able to recognize 2+ triggers
Will be able to demonstrate the ability to pause before acting out negatively
Will identify 1 behavior to cope with impulsive urges
Will be able to demonstrate the ability to pause before acting out negatively
Will be able to demonstrate the ability to pause before acting out negatively
Will identify 1 behavior to cope with impulsive urges
Will be able to demonstrate the ability to pause before acting out negatively
Will identify 1 negative consequence due to impulsivity
Will be able to demonstrate the ability to pause before acting out negatively
Will identify 1 behavior to cope with impulsive urges
Will be able to demonstrate the ability to pause before acting out negatively
Will identify 1 negative consequence due to impulsivity
Will be able to demonstrate the ability to pause before acting out negatively
Will be able to demonstrate the ability to pause before acting out negatively
Will identify 1 negative consequence due to impulsivity
Will identify 1 behavior to cope with impulsive urges
Will be able to demonstrate the ability to pause before acting out negatively
Will be able to recognize 2+ triggers
Will identify 1 behavior to cope with impulsive urges

## 2024-02-27 NOTE — BH INPATIENT PSYCHIATRY PROGRESS NOTE - NSTXPROBMEDIC_PSY_ALL_CORE
MEDICATION/TREATMENT NON-COMPLIANCE

## 2024-02-27 NOTE — BH INPATIENT PSYCHIATRY PROGRESS NOTE - NSBHCONSULTIPREASON_PSY_A_CORE
danger to self; mental illness expected to respond to inpatient care
danger to others; mental illness expected to respond to inpatient care
danger to self; mental illness expected to respond to inpatient care
danger to others; mental illness expected to respond to inpatient care
danger to self; mental illness expected to respond to inpatient care
danger to others; mental illness expected to respond to inpatient care
danger to self; mental illness expected to respond to inpatient care
danger to others; mental illness expected to respond to inpatient care
danger to self; mental illness expected to respond to inpatient care
danger to others; mental illness expected to respond to inpatient care
danger to self; mental illness expected to respond to inpatient care
danger to others; mental illness expected to respond to inpatient care
danger to self; mental illness expected to respond to inpatient care

## 2024-02-27 NOTE — BH DISCHARGE NOTE NURSING/SOCIAL WORK/PSYCH REHAB - PATIENT PORTAL LINK FT
You can access the FollowMyHealth Patient Portal offered by [facility] by registering at the following website: [email or web address]. By joining [employer]’s FollowMyHealth portal, you will also be able to view your health information using other applications (apps) compatible with our system.

## 2024-02-27 NOTE — BH INPATIENT PSYCHIATRY PROGRESS NOTE - NSTXIMPULSPROGRES_PSY_ALL_CORE
No Change
Improving
No Change
Improving
No Change
Improving
No Change
No Change
Improving
No Change
Improving
No Change
Improving
No Change
No Change
Improving
No Change
Improving
No Change
Improving
Improving
No Change

## 2024-02-27 NOTE — BH DISCHARGE NOTE NURSING/SOCIAL WORK/PSYCH REHAB - DISCHARGE INSTRUCTIONS AFTERCARE APPOINTMENTS
In order to check the location, date, or time of your aftercare appointment, please refer to your Discharge Instructions Document given to you upon leaving the hospital.  If you have lost the instructions please call [phone]

## 2024-02-27 NOTE — BH INPATIENT PSYCHIATRY PROGRESS NOTE - NSTXPROBIMPULS_PSY_ALL_CORE
IMPULSIVITY/AGITATION

## 2024-02-27 NOTE — BH INPATIENT PSYCHIATRY PROGRESS NOTE - NSTXDISORGDATEEST_PSY_ALL_CORE
10-Hima-2024
13-Oct-2023
13-Oct-2023
13-Dec-2023
23-Nov-2023
13-Feb-2024
01-Nov-2023
13-Oct-2023
23-Nov-2023
20-Dec-2023
01-Nov-2023
13-Oct-2023
10-Hima-2024
03-Aug-2023
13-Oct-2023
23-Nov-2023
01-Nov-2023
23-Nov-2023
13-Oct-2023
13-Oct-2023
23-Nov-2023
01-Nov-2023
13-Feb-2024
13-Oct-2023
01-Nov-2023
22-Feb-2024
30-Jan-2024
13-Oct-2023
23-Nov-2023
13-Oct-2023
13-Oct-2023
10-Hima-2024
10-Hima-2024
01-Nov-2023
23-Nov-2023
01-Nov-2023
23-Nov-2023
10-Hima-2024
13-Feb-2024
30-Jan-2024
13-Feb-2024
13-Oct-2023
13-Oct-2023
30-Jan-2024
01-Nov-2023
10-Hima-2024
30-Jan-2024
01-Nov-2023
30-Jan-2024
03-Jan-2024
10-Hima-2024
01-Nov-2023
01-Nov-2023
10-Hima-2024
20-Dec-2023
23-Nov-2023
23-Nov-2023
10-Hima-2024
13-Feb-2024
22-Feb-2024
01-Nov-2023
13-Oct-2023
23-Nov-2023
22-Feb-2024
10-Hima-2024
10-Hima-2024
13-Feb-2024
30-Jan-2024
01-Nov-2023
20-Dec-2023
30-Jan-2024
03-Jan-2024
30-Jan-2024
03-Jan-2024
10-Hima-2024
13-Oct-2023
13-Dec-2023
20-Dec-2023
27-Dec-2023
10-Hima-2024
13-Dec-2023
23-Nov-2023
13-Dec-2023
27-Dec-2023
03-Jan-2024
10-Hima-2024
27-Dec-2023
23-Nov-2023

## 2024-02-27 NOTE — BH INPATIENT PSYCHIATRY PROGRESS NOTE - NSTXVIOLNTDATEEST_PSY_ALL_CORE
10-Hima-2024
26-Nov-2023
03-Aug-2023
10-Hima-2024
13-Feb-2024
30-Jan-2024
10-Hima-2024
19-Nov-2023
27-Dec-2023
26-Nov-2023
30-Jan-2024
03-Jan-2024
01-Nov-2023
20-Dec-2023
30-Jan-2024
30-Jan-2024
10-Hima-2024
26-Nov-2023
03-Aug-2023
20-Dec-2023
22-Feb-2024
22-Feb-2024
13-Dec-2023
01-Nov-2023
13-Feb-2024
26-Nov-2023
01-Nov-2023
20-Dec-2023
01-Nov-2023
19-Nov-2023
01-Nov-2023
10-Hima-2024
10-Hima-2024
03-Aug-2023
26-Nov-2023
03-Aug-2023
01-Nov-2023
01-Nov-2023
03-Aug-2023
03-Aug-2023
10-Hima-2024
01-Nov-2023
03-Aug-2023
13-Feb-2024
13-Feb-2024
10-Hima-2024
13-Feb-2024
03-Aug-2023
03-Aug-2023
30-Jan-2024
26-Nov-2023
01-Nov-2023
30-Jan-2024
03-Aug-2023
10-Hima-2024
01-Nov-2023
03-Jan-2024
10-Hima-2024
03-Aug-2023
03-Aug-2023
26-Nov-2023
26-Nov-2023
10-Hima-2024
26-Nov-2023
27-Dec-2023
10-Hima-2024
30-Jan-2024
01-Nov-2023
13-Feb-2024
30-Jan-2024
22-Feb-2024
03-Aug-2023
19-Nov-2023
30-Jan-2024
30-Jan-2024
01-Nov-2023
19-Nov-2023
13-Dec-2023
10-Hima-2024
13-Dec-2023
27-Dec-2023
26-Nov-2023
03-Jan-2024
20-Dec-2023
27-Dec-2023
03-Jan-2024
13-Dec-2023
26-Nov-2023
26-Nov-2023
10-Hima-2024
27-Dec-2023

## 2024-02-27 NOTE — BH INPATIENT PSYCHIATRY PROGRESS NOTE - NSTXPSYCHODATETRGT_PSY_ALL_CORE
27-Dec-2023
20-Feb-2024
20-Oct-2023
27-Feb-2024
06-Feb-2024
18-Jan-2024
22-Nov-2023
03-Jan-2024
27-Dec-2023
03-Dec-2023
10-Dec-2023
18-Jan-2024
18-Jan-2024
06-Feb-2024
18-Jan-2024
22-Nov-2023
27-Feb-2024
22-Nov-2023
26-Oct-2023
20-Feb-2024
22-Nov-2023
08-Nov-2023
15-Dec-2023
20-Feb-2024
06-Feb-2024
08-Nov-2023
10-Dec-2023
01-Feb-2024
06-Feb-2024
08-Nov-2023
20-Oct-2023
22-Nov-2023
26-Oct-2023
20-Oct-2023
26-Oct-2023
03-Dec-2023
10-Dec-2023
18-Jan-2024
22-Nov-2023
06-Feb-2024
22-Nov-2023
22-Nov-2023
18-Jan-2024
20-Dec-2023
01-Feb-2024
19-Oct-2023
26-Oct-2023
26-Oct-2023
03-Jan-2024
06-Feb-2024
10-Dec-2023
01-Feb-2024
20-Oct-2023
06-Feb-2024
26-Oct-2023
18-Jan-2024
27-Feb-2024
03-Jan-2024
26-Oct-2023
08-Nov-2023
20-Oct-2023
22-Nov-2023
06-Feb-2024
06-Feb-2024
08-Nov-2023
26-Oct-2023
27-Feb-2024
08-Nov-2023
26-Oct-2023
10-Hima-2024
18-Jan-2024
22-Nov-2023
06-Feb-2024
10-Dec-2023
15-Dec-2023
03-Jan-2024
27-Feb-2024
10-Dec-2023
27-Feb-2024
18-Jan-2024
18-Jan-2024
10-Hima-2024
10-Dec-2023
15-Dec-2023
20-Dec-2023
20-Dec-2023
10-Hima-2024
27-Dec-2023
18-Jan-2024
03-Jan-2024
20-Dec-2023
10-Hima-2024
27-Dec-2023

## 2024-02-27 NOTE — BH INPATIENT PSYCHIATRY DISCHARGE NOTE - ATTENDING DISCHARGE PHYSICAL EXAMINATION:
Appearance: laying in bed, wearing hospital gowns, malodorous  Behavior: uncooperative  Speech: loud at times, normal rate  Mood: unable to assess  Affect: irritable  Thought process: severely disorganized   Thought content: can make vaguely violent/threatening statements, none today  Perceptions: previously observed talking to self  Insight: very poor  Judgement: very poor, refusing PO medications  Cognition: unable to assess

## 2024-02-27 NOTE — BH INPATIENT PSYCHIATRY PROGRESS NOTE - NSTXOTHERDATEEST_PSY_ALL_CORE
13-Oct-2023
13-Feb-2024
13-Oct-2023
19-Dec-2023
13-Oct-2023

## 2024-02-27 NOTE — BH DISCHARGE NOTE NURSING/SOCIAL WORK/PSYCH REHAB - NSDCPRRECOMMEND_PSY_ALL_CORE
Psychiatric Rehabilitation staff recommends that the patient engage in services for continued medication and symptom management.

## 2024-02-27 NOTE — BH INPATIENT PSYCHIATRY PROGRESS NOTE - NSTXPROBDISORG_PSY_ALL_CORE
DISORGANIZATION OF THOUGHT/BEHAVIOR

## 2024-02-27 NOTE — BH INPATIENT PSYCHIATRY PROGRESS NOTE - NSICDXBHPRIMARYDX_PSY_ALL_CORE
Schizophrenia   F20.9  

## 2024-02-27 NOTE — BH INPATIENT PSYCHIATRY DISCHARGE NOTE - OTHER PAST PSYCHIATRIC HISTORY (INCLUDE DETAILS REGARDING ONSET, COURSE OF ILLNESS, INPATIENT/OUTPATIENT TREATMENT)
History of the following diagnoses: schizophrenia, schizoaffective dx, bipolar dx, anxiety  Multiple hospitalizations including last hospital discharge April 10 to 2023 at [facility] 2023 to 3/22/2023 , 2023 to 2023, [facility] term from 2018 to 7/10/2019  No known suicide attempts or self injurious behaviors.   Has ACT team with [facility] and [facility]   Lives in [facility] at [facility]

## 2024-02-27 NOTE — BH INPATIENT PSYCHIATRY PROGRESS NOTE - NSTXOTHERGOAL_PSY_ALL_CORE
Pt. will have a meaningful conversation wt treatment team members once a day.
Will engage in a meaningful conversation with treatment team member once a day
Pt. will have a meaningful conversation wt treatment team members once a day.
Will engage in a meaningful conversation with treatment team member once a day
will engage in ameaningful conversation with teammember.
Pt. will have a meaningful conversation wt treatment team members once a day.
Will engage in a meaningful conversation with treatment team member once a day.
Pt will engage in a meaningful conversation with treatment team member once per day.
Pt. will have a meaningful conversation wt treatment team members once a day.
Will engage in a meaningful conversation with treatment team member once a day.
Will engage in a meaningful conversation with treatment team member once a day.
Pt. will have a meaningful conversation wt treatment team members once a day.
Will engage in a meaningful discussion with member of the treatment team once per day.
Will engage in a meaningful discussion with member of the treatment team once per day.
Will engage in a meaningful conversation with treatment team member once a day.
Pt. will have a meaningful conversation wt treatment team members once a day.
Will engage in a meaningful discussion with member of the treatment team once per day.
Pt will engage in a meaningful conversation with treatment team member once per day.
Will engage in a meaningful conversation with treatment team member once a day
Pt. will have a meaningful conversation wt treatment team members once a day.
Will engage in a meaningful discussion with member of the treatment team once per day.
Pt will engage in a meaningful conversation with treatment team member once per day.
Pt. will have a meaningful conversation wt treatment team members once a day.
Will engage in a meaningful discussion with member of the treatment team once per day.
Pt. will have a meaningful conversation wt treatment team members once a day.
Pt. will have a meaningful conversation wt treatment team members once a day.
Patient will engage in a meaningful conversation with treatment team member once a day.
Pt. will have a meaningful conversation wt treatment team members once a day.
Will engage in a meaningful discussion with member of the treatment team once per day.
Will engage in a meaningful discussion with member of the treatment team once per day.
Pt. will have a meaningful conversation wt treatment team members once a day.
Pt will engage in a meaningful conversation with treatment team member once per day.
Pt. will have a meaningful conversation wt treatment team members once a day.
Will engage in a meaningful discussion with member of the treatment team once per day.
Pt. will have a meaningful conversation wt treatment team members once a day.
Will engage in a meaningful discussion with member of the treatment team once per day.
Pt. will have a meaningful conversation wt treatment team members once a day.
Will engage in a meaningful conversation with treatment team member once a day
Pt. will have a meaningful conversation wt treatment team members once a day.
Will engage in a meaningful conversation with treatment team member once a day.
Pt. will have a meaningful conversation wt treatment team members once a day.
Pt. will have a meaningful conversation wt treatment team members once a day.
Will engage in a meaningful conversation with treatment team member once a day
Will engage in a meaningful discussion with member of the treatment team once per day.
Pt will engage in a meaningful conversation with treatment team member once per day.
Pt. will have a meaningful conversation wt treatment team members once a day.
Pt. will have a meaningful conversation wt treatment team members once a day.
Will engage in a meaningful conversation with treatment team member once a day.
Will engage in a meaningful discussion with member of the treatment team once per day.
Pt. will have a meaningful conversation wt treatment team members once a day.
Pt. will have a meaningful conversation wt treatment team members once a day.
Patient will engage in a meaningful conversation with treatment team member once a day.
Pt will engage in a meaningful conversation with treatment team member once per day.
Pt. will have a meaningful conversation wt treatment team members once a day.
Will engage in a meaningful discussion with member of the treatment team once per day.
Pt. will have a meaningful conversation wt treatment team members once a day.
Will engage in a meaningful discussion with member of the treatment team once per day.
Will engage in a meaningful discussion with member of the treatment team once per day.
Pt. will have a meaningful conversation wt treatment team members once a day.
Will engage in a meaningful discussion with member of the treatment team once per day.
Will engage in a meaningful conversation with treatment team member once a day.
Will engage in a meaningful discussion with member of the treatment team once per day.
Will engage in a meaningful discussion with member of the treatment team once per day.
Pt. will have a meaningful conversation wt treatment team members once a day.
Pt. will have a meaningful conversation wt treatment team members once a day.
Will engage in a meaningful conversation with treatment team member once a day
Will engage in a meaningful conversation with treatment team member once a day.
Pt will engage in a meaningful conversation with treatment team member once per day.
Pt will engage in a meaningful conversation with treatment team member once per day.
Will engage in a meaningful conversation with treatment team member once a day
Pt. will have a meaningful conversation wt treatment team members once a day.
Will engage in a meaningful conversation with treatment team member once a day
Will engage in a meaningful conversation with treatment team member once a day.
Will engage in a meaningful conversation with treatment team member once a day
Will engage in a meaningful conversation with treatment team member once a day.
Will engage in a meaningful conversation with treatment team member once a day

## 2024-02-27 NOTE — BH INPATIENT PSYCHIATRY PROGRESS NOTE - NSTXSLFCREDATEEST_PSY_ALL_CORE
13-Feb-2024
27-Dec-2023
30-Jan-2024
10-Hima-2024
10-Hima-2024
26-Nov-2023
26-Nov-2023
01-Nov-2023
10-Hima-2024
13-Dec-2023
13-Feb-2024
10-Hima-2024
20-Dec-2023
27-Dec-2023
01-Nov-2023
03-Jan-2024
26-Nov-2023
01-Nov-2023
03-Jan-2024
30-Jan-2024
19-Oct-2023
10-Hima-2024
01-Nov-2023
26-Nov-2023
10-Hima-2024
13-Dec-2023
19-Oct-2023
13-Feb-2024
19-Oct-2023
30-Jan-2024
10-Hima-2024
20-Dec-2023
30-Jan-2024
26-Nov-2023
01-Nov-2023
01-Nov-2023
19-Nov-2023
01-Nov-2023
27-Dec-2023
13-Feb-2024
10-Hima-2024
26-Nov-2023
01-Nov-2023
22-Feb-2024
19-Oct-2023
20-Dec-2023
19-Oct-2023
26-Nov-2023
30-Jan-2024
01-Nov-2023
19-Nov-2023
13-Feb-2024
27-Dec-2023
19-Nov-2023
19-Oct-2023
03-Jan-2024
19-Oct-2023
30-Jan-2024
30-Jan-2024
26-Nov-2023
30-Jan-2024
13-Dec-2023
30-Jan-2024
19-Oct-2023
10-Hima-2024
13-Dec-2023
20-Dec-2023
10-Hima-2024
26-Nov-2023
01-Nov-2023
19-Oct-2023
22-Feb-2024
30-Jan-2024
19-Nov-2023
22-Feb-2024
13-Feb-2024
27-Dec-2023
10-Hima-2024
26-Nov-2023
10-Hima-2024
03-Jan-2024

## 2024-02-27 NOTE — BH INPATIENT PSYCHIATRY PROGRESS NOTE - NSBHASSESSSUMMFT_PSY_ALL_CORE
Patient is a 53 year old adult (goes by "Juan Jose"), living at [facility] on [place] grounds, PMH of GERD, anemia, PPH of schizophrenia, selective mutism, currently with [facility] ACT team, previously with [facility] which , with history of multiple psychiatric hospitalizations, without known history of self-injury or suicide attempts, with history of aggression in the setting on nonadherence with medication, who was initially brought in by staff at residence for increasing agitation and aggression in the setting of 2 months of medication nonadherence. Patient was hospitalized from 8/3-23 on  where they were noted to be hostile, disorganized, threatening, malodorous and grossly psychotic. TOO was obtained, and patient eventually administered dual long-acting injectables (Haldol Decanoate and Zyprexa Relprevv), though had declined nearly all PO medications. Patient was transferred to [facility] where they were admitted due to anemia to 6 (medical workup largely unremarkable), patient stabilized and transferred back to [facility] for further management of psychosis.     Diagnostically, presentation consistent with exacerbation of schizophrenia.     Patient spends most of day isolative and talking to self in a severely disorganized and largely incomprehensible fashion, with only very brief moments of reality based exchange with staff or peers, or is non-verbal and stares at staff. Has showered with staff encouragement, though room and patient become quickly malodorous after cleaning. Sleep erratic, often just a few hours. TOO renewed on 24. Haloperidol decanoate continued. Olanzapine initiated (with one dose of Relprevv), but later discontinued given no appreciable benefit after a few weeks, and loss of available long-acting injectable formulation (Relprevv back-ordered until 2024).    Patient  has been intermittently more aggressive in the past few weeks. Labs within normal limits, no reported or observed physical symptoms/issues.    GI and hematology consulted in response to [facility]'s concerns around chronic anemia. No urgent indication for parenteral iron. Spoke to nutrition, added Ensure to diet to try to increase iron intake, will encourage use (has been accepting). Labs  and  with stable anemia (Hgb 8.8-9.0), will aim to get labs at least monthly.     Patient accepted to [facility] for . Will get COVID test today pre-transfer.    Plan:  1. Legal: admitted involuntary 9.27, TOO renewed 24 (expires 3/19/24)  2. Observation: routine checks  3. Collateral: obtained collateral from ACT psychiatrist  4. Psychiatric  -Continue haloperidol decanoate 200mg IM q4 weeks, last given , next due 3/20  - S/p Zyprexa Relprevv 300mg on , was next due on 2/15 however informed by pharmacy that it is on back order until . Will not re-start short-acting olanzapine give no clear benefit and need to use twice daily IM dosing to continue  - Continue benztropine 1mg BID for EPS (refusing)  5. Medical:  -CBC, CMP, TSH, folate, Vitamin B12, UA on  given more aggression in past few days-- unremarkable  -Anemia:   	-s/p medical admission at [facility] for anemia to Hgb 6, required 2u pRBCs. GI workup unremarkable with exception of diverticulosis and colonic polyp  	-Continue ferous sulfate 325mg daily (refusing), 	encourage Ensure HP instead daily  	-Repeat CBCs with anemia 8.6-low 9s  	-Hemoglobin stable on  and   -[facility] requesting hematology c/s for chronic anemia---> wrote chart note , etiology c/w BRYON, recommended GI consult --> consulted GI 12/15 and wrote chart note --> sent to [facility]  -Fall on : likely mechanical, no sustained injuries. PT consult  -Bowel regimen: continue bisacodyl 10mg QHS (refusing), miralax 17g daily (refusing), senna 2 tablets QHS (refusing)  -Continue Vitamin D3 1000units QHS (refusing)  -Urinary/bowel inconvenience (chronic): per internist on , no indication for further testing, only option would be offering medications for symptomatic relief  6. PRNs  -Olanzapine 5mg PO or IM for agitation  7. Therapy   -Individual, group and milieu therapy as appropriate   8. Disposition: state application submitted , on waiting list Patient is a 53 year old adult (goes by "Juan Jose"), living at [facility] on [place] grounds, PMH of GERD, anemia, PPH of schizophrenia, selective mutism, currently with [facility] ACT team, previously with [facility] which , with history of multiple psychiatric hospitalizations, without known history of self-injury or suicide attempts, with history of aggression in the setting on nonadherence with medication, who was initially brought in by staff at residence for increasing agitation and aggression in the setting of 2 months of medication nonadherence. Patient was hospitalized from 8/3-23 on  where they were noted to be hostile, disorganized, threatening, malodorous and grossly psychotic. TOO was obtained, and patient eventually administered dual long-acting injectables (Haldol Decanoate and Zyprexa Relprevv), though had declined nearly all PO medications. Patient was transferred to [facility] where they were admitted due to anemia to 6 (medical workup largely unremarkable), patient stabilized and transferred back to [facility] for further management of psychosis.     Diagnostically, presentation consistent with exacerbation of schizophrenia.     Patient spends most of day isolative and talking to self in a severely disorganized and largely incomprehensible fashion, with only very brief moments of reality based exchange with staff or peers, or is non-verbal and stares at staff. Has showered with staff encouragement, though room and patient become quickly malodorous after cleaning. Sleep erratic, often just a few hours. TOO renewed on 24. Haloperidol decanoate continued. Olanzapine initiated (with one dose of Relprevv), but later discontinued given no appreciable benefit after a few weeks, and loss of available long-acting injectable formulation (Relprevv back-ordered until 2024).    Patient  has been intermittently more aggressive in the past few weeks. Labs within normal limits, no reported or observed physical symptoms/issues.    GI and hematology consulted in response to [facility]'s concerns around chronic anemia. No urgent indication for parenteral iron. Spoke to nutrition, added Ensure to diet to try to increase iron intake, will encourage use (has been accepting). Labs  and  with stable anemia (Hgb 8.8-9.0), will aim to get labs at least monthly.     Patient accepted to [facility] for . Will get COVID test today pre-transfer.    Plan:  1. Legal: admitted involuntary 9.27, TOO renewed 24 (expires 3/19/24)  2. Observation: routine checks  3. Collateral: obtained collateral from ACT psychiatrist  4. Psychiatric  -Continue haloperidol decanoate 200mg IM q4 weeks, last given , next due 3/20  - S/p Zyprexa Relprevv 300mg on , was next due on 2/15 however informed by pharmacy that it is on back order until . Will not re-start short-acting olanzapine give no clear benefit and need to use twice daily IM dosing to continue  - Continue benztropine 1mg BID for EPS (refusing)  5. Medical:  -CBC, CMP, TSH, folate, Vitamin B12, UA on  given more aggression in past few days-- unremarkable  -Anemia:   	-s/p medical admission at [facility] for anemia to Hgb 6, required 2u pRBCs. GI workup unremarkable with exception of diverticulosis and colonic polyp  	-Continue ferous sulfate 325mg daily (refusing), 	encourage Ensure HP instead daily  	-Repeat CBCs with anemia 8.6-low 9s  	-Hemoglobin stable on  and   -[facility] requesting hematology c/s for chronic anemia---> wrote chart note , etiology c/w BRYON, recommended GI consult --> consulted GI 12/15 and wrote chart note --> sent to [facility]  -Fall on : likely mechanical, no sustained injuries. PT consult  -Bowel regimen: continue bisacodyl 10mg QHS (refusing), miralax 17g daily (refusing), senna 2 tablets QHS (refusing)  -Continue Vitamin D3 1000units QHS (refusing)  -Urinary/bowel inconvenience (chronic): per internist on , no indication for further testing, only option would be offering medications for symptomatic relief  6. PRNs  -Olanzapine 5mg PO or IM for agitation  7. Therapy   -Individual, group and milieu therapy as appropriate   8. Disposition: transfer to [facility]  pending COVID

## 2024-02-27 NOTE — BH INPATIENT PSYCHIATRY PROGRESS NOTE - NSTXPROBPSYCHO_PSY_ALL_CORE
PSYCHOTIC SYMPTOMS

## 2024-02-27 NOTE — BH INPATIENT PSYCHIATRY PROGRESS NOTE - NSCGISEVERILLNESS_PSY_ALL_CORE
7 = Among the most extremely ill patients – pathology drastically interferes in many life functions; may be hospitalized
6 = Severely ill - disruptive pathology, behavior and function are frequently influenced by symptoms, may require assistance from others
7 = Among the most extremely ill patients – pathology drastically interferes in many life functions; may be hospitalized
6 = Severely ill - disruptive pathology, behavior and function are frequently influenced by symptoms, may require assistance from others
7 = Among the most extremely ill patients – pathology drastically interferes in many life functions; may be hospitalized
6 = Severely ill - disruptive pathology, behavior and function are frequently influenced by symptoms, may require assistance from others
6 = Severely ill - disruptive pathology, behavior and function are frequently influenced by symptoms, may require assistance from others
7 = Among the most extremely ill patients – pathology drastically interferes in many life functions; may be hospitalized
6 = Severely ill - disruptive pathology, behavior and function are frequently influenced by symptoms, may require assistance from others
7 = Among the most extremely ill patients – pathology drastically interferes in many life functions; may be hospitalized
6 = Severely ill - disruptive pathology, behavior and function are frequently influenced by symptoms, may require assistance from others
7 = Among the most extremely ill patients – pathology drastically interferes in many life functions; may be hospitalized
6 = Severely ill - disruptive pathology, behavior and function are frequently influenced by symptoms, may require assistance from others

## 2024-02-27 NOTE — BH INPATIENT PSYCHIATRY PROGRESS NOTE - NSTXPROBDCOPNO_PSY_ALL_CORE
DISCHARGE ISSUE - NON-ADHERENT WITH OUTPATIENT SERVICES

## 2024-02-27 NOTE — BH INPATIENT PSYCHIATRY PROGRESS NOTE - NSDCCRITERIA_PSY_ALL_CORE
patient will be stable for discharge

## 2024-02-27 NOTE — BH INPATIENT PSYCHIATRY DISCHARGE NOTE - NSBHASSESSSUMMFT_PSY_ALL_CORE
Patient is a 53 year old adult (goes by "Juan Jose"), living at [facility] on [place] grounds, PMH of GERD, anemia, PPH of schizophrenia, selective mutism, currently with [facility] ACT team, previously with [facility] which , with history of multiple psychiatric hospitalizations, without known history of self-injury or suicide attempts, with history of aggression in the setting on nonadherence with medication, who was initially brought in by staff at residence for increasing agitation and aggression in the setting of 2 months of medication nonadherence. Patient was hospitalized from 8/3-23 on  where they were noted to be hostile, disorganized, threatening, malodorous and grossly psychotic. TOO was obtained, and patient eventually administered dual long-acting injectables (Haldol Decanoate and Zyprexa Relprevv), though had declined nearly all PO medications. Patient was transferred to [facility] where they were admitted due to anemia to 6 (medical workup largely unremarkable), patient stabilized and transferred back to [facility] for further management of psychosis.     Diagnostically, presentation consistent with exacerbation of schizophrenia.     Patient spends most of day isolative and talking to self in a severely disorganized and largely incomprehensible fashion, with only very brief moments of reality based exchange with staff or peers, or is non-verbal and stares at staff. Has showered with staff encouragement, though room and patient become quickly malodorous after cleaning. Sleep erratic, often just a few hours. TOO renewed on 24. Haloperidol decanoate continued. Olanzapine initiated (with one dose of Relprevv), but later discontinued given no appreciable benefit after a few weeks, and loss of available long-acting injectable formulation (Relprevv back-ordered until 2024).    Patient  has been intermittently more aggressive in the past few weeks. Labs within normal limits, no reported or observed physical symptoms/issues.    GI and hematology consulted in response to [facility]'s concerns around chronic anemia. No urgent indication for parenteral iron. Spoke to nutrition, added Ensure to diet to try to increase iron intake, will encourage use (has been accepting). Labs  and  with stable anemia (Hgb 8.8-9.0).    Patient accepted to [facility] for transfer ***    Plan:  -Transfer to [facility] ***  -Continue haloperidol decanoate 200mg IM q4 weeks, last given , next due 3/20  - S/p Zyprexa Relprevv 300mg on , was next due on 2/15 however informed by pharmacy that it is on back order until . Will not re-start short-acting olanzapine give no clear benefit and need to use twice daily IM dosing to continue  -Continue benztropine 1mg BID for EPS (refusing)  -Anemia:   	-s/p medical admission at [facility] for anemia to Hgb 6, required 2u pRBCs. GI workup unremarkable with exception of diverticulosis and colonic polyp  	-Continue ferous sulfate 325mg daily (refusing), 	encourage Ensure HP instead daily  	-Repeat CBCs with anemia 8.6-low 9s, repeat in March  	-Hemoglobin stable on  and   -[facility] requesting hematology c/s for chronic anemia---> wrote chart note , etiology c/w BRYON, recommended GI consult --> consulted GI 12/15 and wrote chart note --> sent to [facility]  -Fall on : likely mechanical, no sustained injuries  -Bowel regimen: continue bisacodyl 10mg QHS (refusing)  -Continue Vitamin D3 1000units QHS (refusing)  -Urinary/bowel inconvenience (chronic): per internist on , no indication for further testing, only option would be offering medications for symptomatic relief Patient is a 53 year old adult (goes by "Juan Jose"), living at [facility] on [address] grounds, PMH of GERD, anemia, PPH of schizophrenia, selective mutism, currently with [facility] ACT team, previously with [facility] which , with history of multiple psychiatric hospitalizations, without known history of self-injury or suicide attempts, with history of aggression in the setting on nonadherence with medication, who was initially brought in by staff at residence for increasing agitation and aggression in the setting of 2 months of medication nonadherence. Patient was hospitalized from 8/3-23 on  where they were noted to be hostile, disorganized, threatening, malodorous and grossly psychotic. TOO was obtained, and patient eventually administered dual long-acting injectables (Haldol Decanoate and Zyprexa Relprevv), though had declined nearly all PO medications. Patient was transferred to [facility] where they were admitted due to anemia to 6 (medical workup largely unremarkable), patient stabilized and transferred back to [facility] for further management of psychosis.     Diagnostically, presentation consistent with exacerbation of schizophrenia.     Patient spends most of day isolative and talking to self in a severely disorganized and largely incomprehensible fashion, with only very brief moments of reality based exchange with staff or peers, or is non-verbal and stares at staff. Has showered with staff encouragement, though room and patient become quickly malodorous after cleaning. Sleep erratic, often just a few hours. TOO renewed on 24. Haloperidol decanoate continued. Olanzapine initiated (with one dose of Relprevv), but later discontinued given no appreciable benefit after a few weeks, and loss of available long-acting injectable formulation (Relprevv back-ordered until 2024).    Patient  has been intermittently more aggressive in the past few weeks. Labs within normal limits, no reported or observed physical symptoms/issues.    GI and hematology consulted in response to [facility]'s concerns around chronic anemia. No urgent indication for parenteral iron. Spoke to nutrition, added Ensure to diet to try to increase iron intake, will encourage use (has been accepting). Labs  and  with stable anemia (Hgb 8.8-9.0).    Patient accepted to [facility] for transfer on     Plan:  -Transfer to [facility]   -Continue haloperidol decanoate 200mg IM q4 weeks, last given , next due 3/20  	-S/p Zyprexa Relprevv 300mg on , was next due on 2/15 however informed by pharmacy that it is on back order until . Will not re-start short-acting olanzapine give no clear benefit and need to use twice daily IM dosing to continue  -Continue benztropine 1mg BID for EPS (refusing)  -Anemia:   	-s/p medical admission at [facility] for anemia to Hgb 6, required 2u pRBCs. GI workup unremarkable with exception of diverticulosis and colonic polyp  	-Continue ferous sulfate 325mg daily (refusing), 	encourage Ensure HP instead daily  	-Repeat CBCs with anemia 8.6-low 9s, repeat in March  	-Hemoglobin stable on  and   -[facility] requesting hematology c/s for chronic anemia---> wrote chart note , etiology c/w BRYON, recommended GI consult --> consulted GI 12/15 and wrote chart note --> sent to [facility]  -Fall on : likely mechanical, no sustained injuries  -Bowel regimen: continue bisacodyl 10mg QHS (refusing)  -Continue Vitamin D3 1000units QHS (refusing)  -Urinary/bowel inconvenience (chronic): per internist on , no indication for further testing, only option would be offering medications for symptomatic relief Patient is a 53 year old adult (goes by "Juan Jose"), living at [facility] on [place] grounds, PMH of GERD, anemia, PPH of schizophrenia, selective mutism, currently with [facility] ACT team, previously with [facility] which , with history of multiple psychiatric hospitalizations, without known history of self-injury or suicide attempts, with history of aggression in the setting on nonadherence with medication, who was initially brought in by staff at residence for increasing agitation and aggression in the setting of 2 months of medication nonadherence. Patient was hospitalized from 8/3-23 on  where they were noted to be hostile, disorganized, threatening, malodorous and grossly psychotic. TOO was obtained, and patient eventually administered dual long-acting injectables (Haldol Decanoate and Zyprexa Relprevv), though had declined nearly all PO medications. Patient was transferred to [facility] where they were admitted due to anemia to 6 (medical workup largely unremarkable), patient stabilized and transferred back to [facility] for further management of psychosis.     Diagnostically, presentation consistent with exacerbation of schizophrenia.     Patient spends most of day isolative and talking to self in a severely disorganized and largely incomprehensible fashion, with only very brief moments of reality based exchange with staff or peers, or is non-verbal and stares at staff. Has showered with staff encouragement, though room and patient become quickly malodorous after cleaning. Sleep erratic, often just a few hours. TOO renewed on 24. Haloperidol decanoate continued. Olanzapine initiated (with one dose of Relprevv), but later discontinued given no appreciable benefit after a few weeks, and loss of available long-acting injectable formulation (Relprevv back-ordered until 2024).    Patient  has been intermittently more aggressive in the past few weeks. Labs within normal limits, no reported or observed physical symptoms/issues.    GI and hematology consulted in response to [facility]'s concerns around chronic anemia. No urgent indication for parenteral iron. Spoke to nutrition, added Ensure to diet to try to increase iron intake, will encourage use (has been accepting). Labs  and  with stable anemia (Hgb 8.8-9.0).    Patient accepted to [facility] for transfer today, . COVID negative     Plan:  -Transfer to [facility] today  -Continue haloperidol decanoate 200mg IM q4 weeks, last given , next due 3/20  	-S/p Zyprexa Relprevv 300mg on , was next due on 2/15 however informed by pharmacy that it is on back order until . Did not re-start short-acting olanzapine give no clear benefit and need to use twice daily IM dosing to continue  -Continue benztropine 1mg BID for EPS (refusing)  -Anemia:   	-s/p medical admission at [facility] for anemia to Hgb 6, required 2u pRBCs. GI workup unremarkable with exception of diverticulosis and colonic polyp  	-Continue ferous sulfate 325mg daily (refusing), 	encourage Ensure HP instead daily  	-Repeat CBCs with anemia 8.6-low 9s, repeat in March  	-Hemoglobin stable on  and   -[facility] requesting hematology c/s for chronic anemia---> wrote chart note , etiology c/w BRYON, recommended GI consult --> consulted GI 12/15 and wrote chart note --> sent to [facility]  -Fall on : likely mechanical, no sustained injuries  -Bowel regimen: continue bisacodyl 10mg QHS (refusing)  -Continue Vitamin D3 1000units QHS (refusing)  -Urinary/bowel inconvenience (chronic): per internist on , no indication for further testing, only option would be offering medications for symptomatic relief

## 2024-02-27 NOTE — BH INPATIENT PSYCHIATRY PROGRESS NOTE - NSTXVIOLNTGOAL_PSY_ALL_CORE
Will be able to express understanding of at least one trigger to their aggressive behavior
Will be able to express understanding of at least one trigger to their aggressive behavior
Will identify 2 situations that cause an aggressive response
Will be able to express understanding of at least one trigger to their aggressive behavior
Will be able to express understanding of at least one trigger to their aggressive behavior
Will decrease the number/duration/intensity of angry/aggressive outbursts
Will identify 2 situations that cause an aggressive response
Will be able to express understanding of at least one trigger to their aggressive behavior
Will identify 2 situations that cause an aggressive response
Will be able to express understanding of at least one trigger to their aggressive behavior
Will decrease the number/duration/intensity of angry/aggressive outbursts
Will identify thoughts/feelings/behaviors prior to loss of control
Will be able to express understanding of at least one trigger to their aggressive behavior
Will be able to express understanding of at least one trigger to their aggressive behavior
Will identify 2 situations that cause an aggressive response
Will be able to express understanding of at least one trigger to their aggressive behavior
Will identify thoughts/feelings/behaviors prior to loss of control
Will identify 2 situations that cause an aggressive response
Will be able to express understanding of at least one trigger to their aggressive behavior
Will identify 2 situations that cause an aggressive response
Will be able to express understanding of at least one trigger to their aggressive behavior
Will identify 2 situations that cause an aggressive response
Will be able to express understanding of at least one trigger to their aggressive behavior
Will identify 2 situations that cause an aggressive response
Will decrease the number/duration/intensity of angry/aggressive outbursts
Will identify 2 situations that cause an aggressive response
Will decrease the number/duration/intensity of angry/aggressive outbursts
Will be able to express understanding of at least one trigger to their aggressive behavior
Will decrease the number/duration/intensity of angry/aggressive outbursts
Will identify 2 situations that cause an aggressive response
Will be able to express understanding of at least one trigger to their aggressive behavior
Will identify 2 situations that cause an aggressive response
Will identify 2 situations that cause an aggressive response
Will be able to express understanding of at least one trigger to their aggressive behavior
Will identify 2 situations that cause an aggressive response
Will decrease the number/duration/intensity of angry/aggressive outbursts
Will identify 2 situations that cause an aggressive response
Will identify 2 situations that cause an aggressive response
Will be able to express understanding of at least one trigger to their aggressive behavior
Will decrease the number/duration/intensity of angry/aggressive outbursts
Will identify 2 situations that cause an aggressive response
Will identify 2 situations that cause an aggressive response
Will be able to express understanding of at least one trigger to their aggressive behavior
Will decrease the number/duration/intensity of angry/aggressive outbursts
Will identify 2 situations that cause an aggressive response
Will be able to express understanding of at least one trigger to their aggressive behavior
Will identify 2 situations that cause an aggressive response
Will be able to express understanding of at least one trigger to their aggressive behavior
Will identify 2 situations that cause an aggressive response
Will identify thoughts/feelings/behaviors prior to loss of control
Will be able to express understanding of at least one trigger to their aggressive behavior
Will identify 2 situations that cause an aggressive response
Will decrease the number/duration/intensity of angry/aggressive outbursts
Will be able to express understanding of at least one trigger to their aggressive behavior
Will identify 2 situations that cause an aggressive response
Will be able to express understanding of at least one trigger to their aggressive behavior
Will identify 2 situations that cause an aggressive response
Will be able to express understanding of at least one trigger to their aggressive behavior
Will decrease the number/duration/intensity of angry/aggressive outbursts
Will decrease the number/duration/intensity of angry/aggressive outbursts
Will be able to express understanding of at least one trigger to their aggressive behavior
Will be able to express understanding of at least one trigger to their aggressive behavior
Will decrease the number/duration/intensity of angry/aggressive outbursts
Will identify 2 situations that cause an aggressive response
Will decrease the number/duration/intensity of angry/aggressive outbursts
Will be able to express understanding of at least one trigger to their aggressive behavior
Will be able to express understanding of at least one trigger to their aggressive behavior
Will decrease the number/duration/intensity of angry/aggressive outbursts
Will be able to express understanding of at least one trigger to their aggressive behavior
Will identify 2 situations that cause an aggressive response
Will be able to express understanding of at least one trigger to their aggressive behavior
Will identify thoughts/feelings/behaviors prior to loss of control
Will be able to express understanding of at least one trigger to their aggressive behavior
Will be able to express understanding of at least one trigger to their aggressive behavior
Will identify 2 situations that cause an aggressive response
Will be able to express understanding of at least one trigger to their aggressive behavior
Will identify thoughts/feelings/behaviors prior to loss of control

## 2024-02-27 NOTE — BH INPATIENT PSYCHIATRY PROGRESS NOTE - MSE OPTIONS
Unstructured MSE

## 2024-02-27 NOTE — BH INPATIENT PSYCHIATRY PROGRESS NOTE - NSTXMEDICPROGRES_PSY_ALL_CORE
No Change

## 2024-02-27 NOTE — BH INPATIENT PSYCHIATRY DISCHARGE NOTE - MSE UNSTRUCTURED FT
Appearance: laying in bed, wearing hospital gowns, malodorous  Behavior: uncooperative, hostile  Speech: talking to self, monotone, then loud when speaking to provider   Mood: unable to assess  Affect: irritable  Thought process: severely disorganized   Thought content: can make vaguely violent/threatening statements, often speaks about killing people  Perceptions: observed talking to self  Insight: very poor  Judgement: very poor, refusing PO medications  Cognition: unable to assess Appearance: laying in bed, wearing hospital gowns, malodorous  Behavior: uncooperative  Speech: loud at times, normal rate  Mood: unable to assess  Affect: irritable  Thought process: severely disorganized   Thought content: can make vaguely violent/threatening statements, none today  Perceptions: previously observed talking to self  Insight: very poor  Judgement: very poor, refusing PO medications  Cognition: unable to assess

## 2024-02-27 NOTE — BH INPATIENT PSYCHIATRY PROGRESS NOTE - NSTXOTHERPROGRES_PSY_ALL_CORE
No Change
No Change
Worsening
No Change
Worsening
Improving
No Change
Worsening
No Change
No Change
Worsening
No Change
Worsening
No Change

## 2024-02-27 NOTE — BH INPATIENT PSYCHIATRY DISCHARGE NOTE - REASON FOR ADMISSION
Patient was admitted due to aggression at their residence, and following medical hospitalization for anemia

## 2024-02-27 NOTE — BH INPATIENT PSYCHIATRY PROGRESS NOTE - NSTXOTHERDATETRGT_PSY_ALL_CORE
25-Feb-2024
25-Feb-2024
27-Oct-2023
23-Nov-2023
26-Dec-2023
11-Feb-2024
03-Mar-2024
20-Oct-2023
23-Nov-2023
06-Dec-2023
04-Feb-2024
20-Nov-2023
18-Feb-2024
20-Nov-2023
22-Jan-2024
16-Nov-2023
20-Oct-2023
23-Nov-2023
25-Feb-2024
27-Oct-2023
27-Oct-2023
18-Feb-2024
29-Jan-2024
13-Dec-2023
16-Nov-2023
16-Nov-2023
29-Jan-2024
06-Dec-2023
27-Oct-2023
22-Jan-2024
09-Nov-2023
30-Nov-2023
18-Feb-2024
23-Nov-2023
15-Dec-2023
20-Oct-2023
20-Oct-2023
18-Dec-2023
03-Mar-2024
20-Oct-2023
07-Dec-2023
27-Oct-2023
22-Jan-2024
29-Jan-2024
22-Jan-2024
27-Oct-2023
27-Oct-2023
09-Nov-2023
18-Feb-2024
27-Oct-2023
22-Jan-2024
29-Jan-2024
30-Nov-2023
11-Feb-2024
20-Oct-2023
16-Nov-2023
04-Feb-2024
06-Dec-2023
25-Feb-2024
30-Nov-2023
04-Feb-2024
23-Nov-2023
15-Dec-2023
04-Feb-2024
09-Jan-2024
09-Nov-2023
11-Feb-2024
04-Feb-2024
11-Feb-2024
02-Jan-2024
11-Feb-2024
06-Dec-2023
09-Jan-2024
02-Jan-2024
18-Feb-2024
26-Dec-2023
09-Jan-2024
18-Dec-2023
26-Dec-2023
13-Dec-2023
26-Dec-2023
09-Jan-2024
16-Jan-2024
09-Jan-2024
09-Jan-2024
18-Dec-2023
16-Jan-2024
18-Dec-2023
02-Jan-2024
02-Jan-2024
16-Jan-2024

## 2024-02-27 NOTE — BH INPATIENT PSYCHIATRY PROGRESS NOTE - NSTXDISORGGOAL_PSY_ALL_CORE
Will identify 2 coping skills that assist in organizing
Will identify 2 coping skills that assist in organizing
Will demonstrate purposeful and predictable thoughts/behaviors by making a request
Will make at least 3 goal and reality oriented statements during therapy
Will demonstrate purposeful and predictable thoughts/behaviors by making a request
Will identify 2 coping skills that assist in organizing
Will make at least 3 goal and reality oriented statements during therapy
Will demonstrate purposeful and predictable thoughts/behaviors by making a request
Will identify 2 coping skills that assist in organizing
Will identify 2 coping skills that assist in organizing
Will make at least 3 goal and reality oriented statements during therapy
Will identify 2 coping skills that assist in organizing
Will demonstrate purposeful and predictable thoughts/behaviors by making a request
Will identify 2 coping skills that assist in organizing
Will make at least 3 goal and reality oriented statements during therapy
Will make at least 3 goal and reality oriented statements during therapy
Will identify 2 coping skills that assist in organizing
Will make at least 3 goal and reality oriented statements during therapy
Will make at least 3 goal and reality oriented statements during therapy
Will identify 2 coping skills that assist in organizing
Will make at least 3 goal and reality oriented statements during therapy
Will identify 2 coping skills that assist in organizing
Will make at least 3 goal and reality oriented statements during therapy
Will demonstrate purposeful and predictable thoughts/behaviors by making a request
Will identify 2 coping skills that assist in organizing
Will make at least 3 goal and reality oriented statements during therapy
Will identify 2 coping skills that assist in organizing
Will make at least 3 goal and reality oriented statements during therapy
Will identify 2 coping skills that assist in organizing
Will identify 2 coping skills that assist in organizing
Will make at least 3 goal and reality oriented statements during therapy
Will make at least 3 goal and reality oriented statements during therapy
Will demonstrate purposeful and predictable thoughts/behaviors by making a request
Will demonstrate purposeful and predictable thoughts/behaviors by making a request
Will identify 2 coping skills that assist in organizing
Will demonstrate purposeful and predictable thoughts/behaviors by making a request
Will identify 2 coping skills that assist in organizing
Will demonstrate purposeful and predictable thoughts/behaviors by making a request
Will demonstrate purposeful and predictable thoughts/behaviors by making a request
Will make at least 3 goal and reality oriented statements during therapy
Will make at least 3 goal and reality oriented statements during therapy
Will identify 2 coping skills that assist in organizing
Will demonstrate purposeful and predictable thoughts/behaviors by making a request
Will identify 2 coping skills that assist in organizing
Will identify 2 coping skills that assist in organizing
Will demonstrate purposeful and predictable thoughts/behaviors by making a request
Will identify 2 coping skills that assist in organizing
Will identify 2 coping skills that assist in organizing
Will demonstrate purposeful and predictable thoughts/behaviors by making a request
Will identify 2 coping skills that assist in organizing
Will identify 2 coping skills that assist in organizing
Will make at least 3 goal and reality oriented statements during therapy
Will identify 2 coping skills that assist in organizing
Will make at least 3 goal and reality oriented statements during therapy
Will identify 2 coping skills that assist in organizing
Will make at least 3 goal and reality oriented statements during therapy
Will identify 2 coping skills that assist in organizing
Will make at least 3 goal and reality oriented statements during therapy
Will make at least 3 goal and reality oriented statements during therapy
Will identify 2 coping skills that assist in organizing
Will identify 2 coping skills that assist in organizing
Will demonstrate purposeful and predictable thoughts/behaviors by making a request
Will identify 2 coping skills that assist in organizing

## 2024-02-27 NOTE — BH INPATIENT PSYCHIATRY PROGRESS NOTE - NSTXDISORGINTERMD_PSY_ALL_CORE
med management with Haldol BREWER, Zyprexa BREWER
med management with Haldol BREWER, clozapine (refusing), VPA (refusing)
medications to target psychosis
med management with Haldol BREWER, clozapine (refusing), VPA (refusing)
med management with Haldol BREWER
medications to target psychosis
med management with Haldol BREWER
med management with Zyprexa, Haldol, clozapine, VPA
med management with Haldol BREWER, Zyprexa BREWER
medications to target psychosis
med management with Haldol BREWER, Zyprexa
medications to target psychosis
med management with Haldol BREWER, clozapine (refusing), VPA (refusing)
med management with Haldol BREWER, clozapine (refusing), VPA (refusing)
med management with Haldol BREWER, Zyprexa
med management with Haldol BREWER, Zyprexa BREWER
med management with Haldol BREWER, clozapine (refusing), VPA (refusing)
med management with Zyprexa, Haldol, clozapine, VPA
med management with Haldol BREWER, Zyprexa BREWER
med management with Haldol BREWER, clozapine (refusing), VPA (refusing)
med management with Zyprexa, Haldol, clozapine, VPA
med management with Haldol BREWER, clozapine (refusing), VPA (refusing)
med management with Haldol BREWER, Zyprexa
med management with Haldol BREWER, Zyprexa BREWER
med management with Haldol BREWER, clozapine (refusing), VPA (refusing)
med management with Haldol BREWER, Zyprexa
med management with Haldol BREWER, clozapine (refusing), VPA (refusing)
med management with Haldol BREWER, clozapine (refusing), VPA (refusing)
med management with Haldol BREWER, Zyprexa BREWER
med management with Haldol BREWER, clozapine (refusing), VPA (refusing)
medications to target psychosis
med management with Haldol BREWER, Zyprexa BREWER
med management with Haldol BREWER, clozapine (refusing), VPA (refusing)
med management with Haldol BREWER, Zyprexa BREWER
med management with Haldol BREWER
med management with Haldol BREWER, clozapine (refusing), VPA (refusing)
med management with Haldol BREWER, Zyprexa BREWER
med management with Haldol BREWER, clozapine (refusing), VPA (refusing)
med management with Haldol BREWER, clozapine (refusing), VPA (refusing)
med management with Haldol BREWER, Zyprexa
med management with Haldol BREWER, Zyprexa BREWER
med management with Haldol BREWER, clozapine (refusing), VPA (refusing)
med management with Haldol BREWER, Zyprexa
med management with Haldol BREWER, Zyprexa BREWER
med management with Haldol BREWER, Zyprexa BREWER
medications to target psychosis
medications to target psychosis
med management with Haldol BREWER, clozapine (refusing), VPA (refusing)
med management with Haldol BREWER, Zyprexa
med management with Haldol BREWER, clozapine (refusing), VPA (refusing)
med management with Haldol BREWER, Zyprexa BREWER
med management with Haldol BREWER, clozapine (refusing), VPA (refusing)
med management with Haldol BREWER, Zyprexa BREWER
med management with Haldol BREWER, clozapine (refusing), VPA (refusing)
med management with Haldol BREWER, Zyprexa
med management with Zyprexa, Haldol, clozapine, VPA
med management with Haldol BREWER, clozapine (refusing), VPA (refusing)
med management with Haldol BREWER, Zyprexa
med management with Haldol BREWER, clozapine (refusing), VPA (refusing)
med management with Zyprexa, Haldol, clozapine, VPA
med management with Haldol BREWER, clozapine (refusing), VPA (refusing)
med management with Haldol BREWER, Zyprexa
med management with Haldol BREWER, clozapine (refusing), VPA (refusing)

## 2024-02-27 NOTE — BH INPATIENT PSYCHIATRY PROGRESS NOTE - NSTXDCOPNOGOAL_PSY_ALL_CORE
Will agree to participate in appropriate outpatient care
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
## 2024-02-27 NOTE — BH INPATIENT PSYCHIATRY PROGRESS NOTE - NSTXPROBOTHER_PSY_ALL_CORE
OTHER PROBLEM

## 2024-02-27 NOTE — BH INPATIENT PSYCHIATRY PROGRESS NOTE - NSTXVIOLNTDATETRGT_PSY_ALL_CORE
27-Dec-2023
08-Nov-2023
10-Dec-2023
10-Dec-2023
27-Dec-2023
18-Jan-2024
01-Feb-2024
18-Jan-2024
20-Dec-2023
15-Nov-2023
26-Oct-2023
03-Jan-2024
20-Feb-2024
20-Feb-2024
01-Feb-2024
03-Dec-2023
06-Feb-2024
20-Dec-2023
09-Aug-2023
26-Nov-2023
06-Feb-2024
09-Aug-2023
08-Nov-2023
08-Nov-2023
09-Aug-2023
15-Nov-2023
06-Feb-2024
08-Nov-2023
09-Aug-2023
18-Jan-2024
22-Feb-2024
15-Nov-2023
18-Jan-2024
29-Feb-2024
15-Nov-2023
26-Oct-2023
29-Feb-2024
22-Feb-2024
03-Jan-2024
09-Aug-2023
26-Oct-2023
03-Dec-2023
06-Feb-2024
18-Jan-2024
26-Oct-2023
26-Oct-2023
15-Nov-2023
20-Feb-2024
15-Dec-2023
18-Jan-2024
26-Nov-2023
26-Oct-2023
03-Jan-2024
09-Aug-2023
22-Feb-2024
06-Feb-2024
06-Feb-2024
01-Feb-2024
06-Feb-2024
06-Feb-2024
18-Jan-2024
09-Aug-2023
08-Nov-2023
15-Nov-2023
26-Oct-2023
15-Dec-2023
26-Nov-2023
10-Dec-2023
26-Nov-2023
29-Feb-2024
06-Feb-2024
18-Jan-2024
18-Jan-2024
27-Dec-2023
08-Nov-2023
06-Feb-2024
26-Oct-2023
18-Jan-2024
26-Oct-2023
10-Dec-2023
03-Jan-2024
10-Dec-2023
10-Dec-2023
10-Hima-2024
10-Hima-2024
20-Dec-2023
10-Hima-2024
27-Dec-2023
10-Hima-2024
18-Jan-2024
20-Dec-2023
03-Jan-2024
10-Dec-2023
15-Dec-2023

## 2024-02-27 NOTE — BH INPATIENT PSYCHIATRY DISCHARGE NOTE - NSBHDCMEDICALFT_PSY_A_CORE
Internal medicine, hematology and GI consults for chronic anemia/iron deficiency anemia (see hospital course)

## 2024-02-27 NOTE — BH INPATIENT PSYCHIATRY PROGRESS NOTE - NS ED BHA MED ROS PSYCHIATRIC
See HPI

## 2024-02-27 NOTE — BH INPATIENT PSYCHIATRY PROGRESS NOTE - NSTXSLFCREDATETRGT_PSY_ALL_CORE
03-Dec-2023
18-Jan-2024
18-Jan-2024
10-Dec-2023
26-Nov-2023
27-Dec-2023
10-Hima-2024
06-Feb-2024
15-Nov-2023
10-Dec-2023
01-Feb-2024
15-Dec-2023
15-Nov-2023
15-Dec-2023
26-Nov-2023
10-Dec-2023
18-Jan-2024
08-Nov-2023
08-Nov-2023
15-Nov-2023
26-Oct-2023
01-Feb-2024
03-Dec-2023
29-Feb-2024
20-Feb-2024
03-Jan-2024
29-Feb-2024
10-Hima-2024
10-Dec-2023
18-Jan-2024
06-Feb-2024
08-Nov-2023
18-Jan-2024
26-Oct-2023
29-Feb-2024
26-Oct-2023
20-Feb-2024
08-Nov-2023
20-Feb-2024
26-Oct-2023
03-Jan-2024
26-Oct-2023
18-Jan-2024
27-Dec-2023
10-Dec-2023
06-Feb-2024
27-Dec-2023
03-Jan-2024
08-Nov-2023
18-Jan-2024
20-Dec-2023
01-Feb-2024
06-Feb-2024
20-Dec-2023
06-Feb-2024
06-Feb-2024
26-Nov-2023
26-Oct-2023
08-Nov-2023
06-Feb-2024
18-Jan-2024
20-Feb-2024
26-Oct-2023
15-Dec-2023
20-Feb-2024
10-Hima-2024
15-Nov-2023
18-Jan-2024
06-Feb-2024
20-Feb-2024
06-Feb-2024
15-Nov-2023
15-Nov-2023
18-Jan-2024
06-Feb-2024
26-Nov-2023
20-Dec-2023
03-Jan-2024
10-Dec-2023
27-Dec-2023
20-Dec-2023
18-Jan-2024
03-Jan-2024
10-Hima-2024
10-Dec-2023
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
## 2024-02-27 NOTE — BH INPATIENT PSYCHIATRY DISCHARGE NOTE - NSBHFUPINTERVALHXFT_PSY_A_CORE
Discharge Progress Note Date and Time: 02-28-24 @ 08:47    Patient seen and evaluated, discussed with staff. No acute events overnight. COVID negative yesterday. Patient seen with staff members in room. Patient is disorganized, pointing to different people in room stating who is good and bad. States they will die today. Room and patient malodorous, encouraging shower this morning before transfer.

## 2024-02-27 NOTE — BH INPATIENT PSYCHIATRY DISCHARGE NOTE - NSICDXPASTMEDICALHX_GEN_ALL_CORE_FT
PAST MEDICAL HISTORY:  Anemia     Anxiety     Constipation     Iron deficiency anemia     Obesity     Schizophrenia

## 2024-02-27 NOTE — BH INPATIENT PSYCHIATRY PROGRESS NOTE - NSTXDCOPNOPROGRES_PSY_ALL_CORE
No Change

## 2024-02-27 NOTE — BH INPATIENT PSYCHIATRY PROGRESS NOTE - NSTXMEDICDATETRGT_PSY_ALL_CORE
15-Dec-2023
20-Feb-2024
06-Feb-2024
06-Feb-2024
18-Jan-2024
26-Nov-2023
26-Nov-2023
06-Feb-2024
10-Dec-2023
20-Dec-2023
20-Feb-2024
26-Nov-2023
10-Hima-2024
20-Oct-2023
20-Feb-2024
26-Nov-2023
06-Feb-2024
18-Jan-2024
26-Oct-2023
29-Feb-2024
26-Oct-2023
20-Oct-2023
06-Feb-2024
10-Dec-2023
03-Jan-2024
10-Dec-2023
26-Oct-2023
01-Feb-2024
19-Oct-2023
18-Jan-2024
26-Oct-2023
26-Nov-2023
26-Oct-2023
20-Oct-2023
26-Oct-2023
03-Dec-2023
18-Jan-2024
26-Oct-2023
20-Oct-2023
10-Dec-2023
29-Feb-2024
09-Aug-2023
01-Feb-2024
03-Dec-2023
20-Oct-2023
26-Nov-2023
26-Nov-2023
26-Oct-2023
18-Jan-2024
20-Feb-2024
06-Feb-2024
10-Dec-2023
26-Nov-2023
26-Oct-2023
06-Feb-2024
26-Nov-2023
06-Feb-2024
20-Dec-2023
20-Feb-2024
26-Oct-2023
20-Feb-2024
18-Jan-2024
10-Dec-2023
18-Jan-2024
10-Dec-2023
20-Dec-2023
01-Feb-2024
26-Nov-2023
06-Feb-2024
26-Oct-2023
10-Hima-2024
20-Dec-2023
26-Nov-2023
15-Dec-2023
26-Nov-2023
06-Feb-2024
10-Hima-2024
29-Feb-2024
03-Jan-2024
03-Jan-2024
10-Hima-2024
27-Dec-2023
27-Dec-2023
18-Jan-2024
27-Dec-2023
03-Jan-2024
03-Jan-2024
18-Jan-2024
15-Dec-2023
27-Dec-2023

## 2024-02-27 NOTE — BH INPATIENT PSYCHIATRY PROGRESS NOTE - NSBHFUPINTERVALHXFT_PSY_A_CORE
Refusing vitals, refusing PO medications, no PRNs for agitation, no acute events over weekend. Slept 6-7 hours per sleep log. Chart reviewed, discussed with team. Patient accepted to [facility] for transfer 2/28.    Seen with staff before attempting to shower patient. Patient seen laying in bed awake, talking to self. Does not respond to interview questions. Informed patient of transfer to another hospital.

## 2024-02-27 NOTE — BH DISCHARGE NOTE NURSING/SOCIAL WORK/PSYCH REHAB - NSDCPRGOAL_PSY_ALL_CORE
Staff attempted to collaborate with patient on safety planning. Patient was unable to complete safety plan. Patient was provided with crisis intervention and community resources on Transitions of Care document. Pt. is being discharged to [facility] and has not demonstrated improvement since the beginning of stay. Pt. came in and continues to be talking to themselves, has not been able to maintain good ADLs, is not able to engage meaningfully with treatment staff and has had to be placed on a shower plan to maintain ADLs. Pt. takes medications, but cannot state why or how medications are beneficial. Pt. does not engage with staff or peers and remains isolated on the unit. Pt. has had bouts of verbal aggression. Pt. has not made progress towards established psych rehab goal and is encouraged to continue treatment in [facility] hospital facility.

## 2024-02-27 NOTE — BH INPATIENT PSYCHIATRY PROGRESS NOTE - NSTXIMPULSDATETRGT_PSY_ALL_CORE
27-Dec-2023
20-Nov-2023
27-Oct-2023
06-Feb-2024
01-Feb-2024
03-Jan-2024
20-Dec-2023
06-Feb-2024
20-Nov-2023
27-Dec-2023
06-Feb-2024
03-Jan-2024
15-Dec-2023
20-Nov-2023
20-Nov-2023
06-Feb-2024
20-Feb-2024
27-Oct-2023
25-Jan-2024
06-Feb-2024
18-Jan-2024
20-Nov-2023
20-Nov-2023
25-Jan-2024
07-Dec-2023
20-Nov-2023
30-Nov-2023
07-Dec-2023
25-Jan-2024
27-Oct-2023
08-Nov-2023
06-Feb-2024
18-Jan-2024
20-Nov-2023
20-Feb-2024
27-Oct-2023
27-Oct-2023
08-Nov-2023
06-Feb-2024
15-Dec-2023
20-Feb-2024
27-Oct-2023
08-Nov-2023
08-Nov-2023
20-Feb-2024
07-Dec-2023
18-Jan-2024
27-Oct-2023
03-Jan-2024
10-Hima-2024
20-Feb-2024
18-Jan-2024
25-Jan-2024
29-Feb-2024
29-Feb-2024
07-Dec-2023
30-Nov-2023
27-Dec-2023
27-Oct-2023
03-Jan-2024
06-Feb-2024
01-Feb-2024
20-Nov-2023
27-Oct-2023
29-Feb-2024
20-Feb-2024
07-Dec-2023
30-Nov-2023
01-Feb-2024
06-Feb-2024
06-Feb-2024
20-Nov-2023
25-Jan-2024
15-Dec-2023
18-Jan-2024
20-Nov-2023
10-Hima-2024
20-Dec-2023
10-Hima-2024
18-Jan-2024
20-Dec-2023
03-Jan-2024
07-Dec-2023
10-Hima-2024
27-Dec-2023
07-Dec-2023
20-Dec-2023

## 2024-02-27 NOTE — BH INPATIENT PSYCHIATRY PROGRESS NOTE - NSBHROSSYSTEMS_PSY_ALL_CORE
Psychiatric

## 2024-02-27 NOTE — BH INPATIENT PSYCHIATRY PROGRESS NOTE - MSE UNSTRUCTURED FT
Appearance: laying in bed, wearing hospital gowns, malodorous  Behavior: uncooperative, hostile  Speech: talking to self, monotone, then loud when speaking to provider   Mood: unable to assess  Affect: irritable  Thought process: severely disorganized   Thought content: can make vaguely violent/threatening statements, often speaks about killing people  Perceptions: observed talking to self  Insight: very poor  Judgement: very poor, refusing PO medications  Cognition: unable to assess

## 2024-02-27 NOTE — BH INPATIENT PSYCHIATRY PROGRESS NOTE - NSTXSLFCREGOAL_PSY_ALL_CORE
Be able to describe elements of self-care and a plan to attend to these elements
Will perform ADLs without assistance/prompts daily
Be able to describe elements of self-care and a plan to attend to these elements
Be able to describe elements of self-care and a plan to attend to these elements
Will perform ADLs without assistance/prompts daily
Will perform ADLs without assistance/prompts daily
Be able to describe elements of self-care and a plan to attend to these elements
Be able to describe elements of self-care and a plan to attend to these elements
Will perform ADLs without assistance/prompts daily
Be able to describe elements of self-care and a plan to attend to these elements
Will perform ADLs without assistance/prompts daily
Be able to describe elements of self-care and a plan to attend to these elements
Will perform ADLs without assistance/prompts daily
Be able to describe elements of self-care and a plan to attend to these elements
Be able to describe elements of self-care and a plan to attend to these elements
Be able to demonstrate the ability to care for self in 2 areas such as feeding oneself and hygiene
Will perform ADLs without assistance/prompts daily
Will perform ADLs without assistance/prompts daily
Be able to describe elements of self-care and a plan to attend to these elements
Will perform ADLs without assistance/prompts daily
Be able to describe elements of self-care and a plan to attend to these elements
Will perform ADLs without assistance/prompts daily
Be able to describe elements of self-care and a plan to attend to these elements
Will perform ADLs without assistance/prompts daily
Will perform ADLs without assistance/prompts daily
Be able to describe elements of self-care and a plan to attend to these elements
Will perform ADLs without assistance/prompts daily
Be able to describe elements of self-care and a plan to attend to these elements
Will perform ADLs without assistance/prompts daily
Will perform ADLs without assistance/prompts daily
Be able to describe elements of self-care and a plan to attend to these elements
Will perform ADLs without assistance/prompts daily
Be able to describe elements of self-care and a plan to attend to these elements
Be able to describe elements of self-care and a plan to attend to these elements
Will perform ADLs without assistance/prompts daily
Be able to describe elements of self-care and a plan to attend to these elements
Be able to describe elements of self-care and a plan to attend to these elements
Will perform ADLs without assistance/prompts daily
Be able to describe elements of self-care and a plan to attend to these elements
Will perform ADLs without assistance/prompts daily
Will perform ADLs without assistance/prompts daily
Be able to describe elements of self-care and a plan to attend to these elements
Will perform ADLs without assistance/prompts daily
Be able to describe elements of self-care and a plan to attend to these elements
Will perform ADLs without assistance/prompts daily
Be able to describe elements of self-care and a plan to attend to these elements
Will perform ADLs without assistance/prompts daily
Be able to describe elements of self-care and a plan to attend to these elements
Will perform ADLs without assistance/prompts daily
Be able to describe elements of self-care and a plan to attend to these elements
Be able to describe elements of self-care and a plan to attend to these elements
Will perform ADLs without assistance/prompts daily
Be able to describe elements of self-care and a plan to attend to these elements

## 2024-02-27 NOTE — BH INPATIENT PSYCHIATRY DISCHARGE NOTE - NSDCMRMEDTOKEN_GEN_ALL_CORE_FT
benztropine 1 mg oral tablet: 1 tab(s) orally 2 times a day  bisacodyl 5 mg oral delayed release tablet: 2 tab(s) orally once a day (at bedtime)  cholecalciferol oral tablet: 1000 unit(s) orally once a day (at bedtime)  ferrous sulfate 325 mg (65 mg elemental iron) oral tablet: 1 tab(s) orally once a day  Haldol Decanoate 100 mg/mL intramuscular solution: 200 milligram(s) intramuscularly once a month Last given 2/21, next due 3/20

## 2024-02-27 NOTE — BH INPATIENT PSYCHIATRY PROGRESS NOTE - NSTXDCOPNODATETRGT_PSY_ALL_CORE
18-Dec-2023
27-Feb-2024
31-Jan-2024
07-Feb-2024
01-Dec-2023
28-Dec-2023
29-Feb-2024
31-Jan-2024
08-Dec-2023
08-Mar-2024
01-Mar-2024
27-Feb-2024
29-Feb-2024
20-Oct-2023
08-Dec-2023
29-Feb-2024
07-Feb-2024
07-Feb-2024
08-Mar-2024
01-Dec-2023
07-Feb-2024
25-Dec-2023
01-Dec-2023
20-Oct-2023
20-Oct-2023
08-Mar-2024
20-Oct-2023
01-Dec-2023
12-Jan-2024
08-Dec-2023
31-Jan-2024
08-Mar-2024
20-Oct-2023
07-Feb-2024
01-Dec-2023
29-Feb-2024
08-Dec-2023
01-Mar-2024
19-Oct-2023
27-Feb-2024
01-Dec-2023
12-Jan-2024
01-Dec-2023
28-Dec-2023
12-Jan-2024
19-Oct-2023
05-Jan-2024
08-Mar-2024
29-Feb-2024
11-Aug-2023
19-Oct-2023
12-Jan-2024
07-Feb-2024
12-Jan-2024
29-Feb-2024
07-Feb-2024
25-Dec-2023
28-Dec-2023
01-Mar-2024
27-Feb-2024
01-Dec-2023
19-Oct-2023
28-Dec-2023
27-Feb-2024
31-Jan-2024
01-Dec-2023
08-Mar-2024
01-Mar-2024
07-Feb-2024
01-Dec-2023
01-Dec-2023
19-Oct-2023
08-Mar-2024
19-Oct-2023
28-Dec-2023
07-Feb-2024
31-Jan-2024
31-Jan-2024
05-Jan-2024
07-Feb-2024
28-Dec-2023
05-Jan-2024
28-Dec-2023
31-Jan-2024
05-Jan-2024
31-Jan-2024
05-Jan-2024

## 2024-02-27 NOTE — BH INPATIENT PSYCHIATRY PROGRESS NOTE - NSTXPSYCHODATEEST_PSY_ALL_CORE
10-Hima-2024
13-Feb-2024
13-Dec-2023
13-Feb-2024
26-Nov-2023
13-Feb-2024
13-Oct-2023
13-Oct-2023
10-Hima-2024
10-Hima-2024
01-Nov-2023
26-Nov-2023
26-Nov-2023
13-Feb-2024
01-Nov-2023
13-Feb-2024
13-Oct-2023
13-Oct-2023
20-Dec-2023
27-Dec-2023
30-Jan-2024
10-Hima-2024
13-Feb-2024
01-Nov-2023
26-Nov-2023
13-Oct-2023
26-Nov-2023
10-Hima-2024
30-Jan-2024
20-Dec-2023
13-Oct-2023
30-Jan-2024
01-Nov-2023
26-Nov-2023
26-Nov-2023
01-Nov-2023
01-Nov-2023
30-Jan-2024
01-Nov-2023
13-Oct-2023
10-Hima-2024
30-Jan-2024
13-Oct-2023
13-Oct-2023
01-Nov-2023
30-Jan-2024
10-Hima-2024
13-Oct-2023
13-Feb-2024
13-Oct-2023
30-Jan-2024
30-Jan-2024
13-Oct-2023
10-Hima-2024
01-Nov-2023
10-Hima-2024
01-Nov-2023
30-Jan-2024
01-Nov-2023
10-Hima-2024
26-Nov-2023
13-Feb-2024
01-Nov-2023
20-Dec-2023
13-Oct-2023
30-Jan-2024
10-Hima-2024
13-Feb-2024
01-Nov-2023
26-Nov-2023
27-Dec-2023
10-Hima-2024
26-Nov-2023
26-Nov-2023
27-Dec-2023
26-Nov-2023
10-Hima-2024
20-Dec-2023
13-Dec-2023
13-Dec-2023
03-Jan-2024
13-Dec-2023
27-Dec-2023
27-Dec-2023
10-Hima-2024

## 2024-02-27 NOTE — BH INPATIENT PSYCHIATRY PROGRESS NOTE - NSTXPSYCHOGOAL_PSY_ALL_CORE
Will identify 2 coping skills that assist with focus on reality
Will identify 2 coping skills that assist with focus on reality
Will ask for PRN medication to manage hallucinations
Will identify 2 coping skills that assist with focus on reality
Make at least 5 reality based statements/requests to staff and/or peers
Make at least 5 reality based statements/requests to staff and/or peers
Will identify 2 coping skills that assist with focus on reality
Will identify 2 coping skills that assist with focus on reality
Will identify 2 coping skills that help mitigate hallucinations
Make at least 5 reality based statements/requests to staff and/or peers
Will identify 2 coping skills that assist with focus on reality
Make at least 5 reality based statements/requests to staff and/or peers
Will identify 2 coping skills that assist with focus on reality
Make at least 5 reality based statements/requests to staff and/or peers
Will ask for PRN medication to manage hallucinations
Will identify 2 coping skills that assist with focus on reality
Make at least 5 reality based statements/requests to staff and/or peers
Will ask for PRN medication to manage hallucinations
Will identify 2 coping skills that assist with focus on reality
Make at least 5 reality based statements/requests to staff and/or peers
Will identify 2 coping skills that help mitigate hallucinations
Will identify 2 coping skills that assist with focus on reality
Will identify 2 coping skills that assist with focus on reality
Will ask for PRN medication to manage hallucinations
Will identify 2 coping skills that assist with focus on reality
Will ask for PRN medication to manage hallucinations
Will identify 2 coping skills that assist with focus on reality
Make at least 5 reality based statements/requests to staff and/or peers
Will ask for PRN medication to manage hallucinations
Will identify 2 coping skills that assist with focus on reality
Will identify 2 coping skills that help mitigate hallucinations
Will ask for PRN medication to manage hallucinations
Will ask for PRN medication to manage hallucinations
Will identify 2 coping skills that assist with focus on reality
Will identify 2 coping skills that help mitigate hallucinations
Will identify 2 coping skills that assist with focus on reality
Make at least 5 reality based statements/requests to staff and/or peers
Will identify 2 coping skills that assist with focus on reality
Will identify 2 coping skills that assist with focus on reality
Make at least 5 reality based statements/requests to staff and/or peers
Make at least 5 reality based statements/requests to staff and/or peers
Will identify 2 coping skills that assist with focus on reality
Will ask for PRN medication to manage hallucinations
Will identify 2 coping skills that assist with focus on reality
Will ask for PRN medication to manage hallucinations
Will ask for PRN medication to manage hallucinations
Will identify 2 coping skills that assist with focus on reality
Will ask for PRN medication to manage hallucinations
Will identify 2 coping skills that assist with focus on reality
Make at least 5 reality based statements/requests to staff and/or peers
Will engage in a 15 minute conversation with no irrational content
Will identify 2 coping skills that assist with focus on reality
Will identify 2 coping skills that help mitigate hallucinations
Will identify 2 coping skills that help mitigate hallucinations
Will ask for PRN medication to manage hallucinations
Will identify 2 coping skills that assist with focus on reality
Will ask for PRN medication to manage hallucinations
Will ask for PRN medication to manage hallucinations
Will identify 2 coping skills that assist with focus on reality
Will identify 2 coping skills that help mitigate hallucinations
Will identify 2 coping skills that assist with focus on reality
Will identify 2 coping skills that help mitigate hallucinations
Will identify 2 coping skills that assist with focus on reality
Will ask for PRN medication to manage hallucinations
Will ask for PRN medication to manage hallucinations
Will identify 2 coping skills that help mitigate hallucinations
Make at least 5 reality based statements/requests to staff and/or peers
Will identify 2 coping skills that assist with focus on reality
Make at least 5 reality based statements/requests to staff and/or peers
Will identify 2 coping skills that assist with focus on reality

## 2024-02-27 NOTE — BH INPATIENT PSYCHIATRY DISCHARGE NOTE - NSBHDCHANDOFFFT_PSY_ALL_CORE
Left message with [facility] with call back number to discuss patient case  For questions, can call Dr. Jessie Deleon, [phone] Left message with [facility] with call back number to discuss patient case. Discharge summary to be sent by .  For questions, can call Dr. Jessie Deleon, [phone]

## 2024-02-27 NOTE — BH INPATIENT PSYCHIATRY PROGRESS NOTE - NSTXSLFCREPROGRES_PSY_ALL_CORE
No Change
No Change
Improving
No Change
No Change
Improving
No Change
Improving
No Change
Improving
No Change
Improving
No Change
Improving
Improving
No Change
Improving
No Change
Improving
No Change
Improving
No Change
Improving
No Change
Improving
No Change
No Change
Improving
Improving
No Change
Improving
No Change
Improving
No Change
Improving
No Change
Improving

## 2024-02-27 NOTE — BH INPATIENT PSYCHIATRY PROGRESS NOTE - NSTXDISORGPROGRES_PSY_ALL_CORE
No Change
Improving
No Change
Improving
No Change
Improving
No Change
No Change
Improving
No Change
Improving
Improving
No Change
Improving
No Change
Improving
No Change
Improving
No Change
Improving
No Change
Improving
Improving
No Change
Improving
No Change
Improving

## 2024-02-27 NOTE — BH INPATIENT PSYCHIATRY PROGRESS NOTE - NSCGIIMPROVESX_PSY_ALL_CORE
4 = No change - symptoms remain essentially unchanged
3 = Minimally improved - slightly better with little or no clinically meaningful reduction of symptoms.  Represents very little change in basic clinical status, level of care, or functional capacity.
3 = Minimally improved - slightly better with little or no clinically meaningful reduction of symptoms.  Represents very little change in basic clinical status, level of care, or functional capacity.
4 = No change - symptoms remain essentially unchanged
3 = Minimally improved - slightly better with little or no clinically meaningful reduction of symptoms.  Represents very little change in basic clinical status, level of care, or functional capacity.
4 = No change - symptoms remain essentially unchanged
3 = Minimally improved - slightly better with little or no clinically meaningful reduction of symptoms.  Represents very little change in basic clinical status, level of care, or functional capacity.
4 = No change - symptoms remain essentially unchanged

## 2024-02-27 NOTE — BH INPATIENT PSYCHIATRY PROGRESS NOTE - NSTXSLFCREINTERMD_PSY_ALL_CORE
med management with Haldol BREWER, clozapine (refusing), VPA (refusing)
med management with Haldol BREWER, clozapine (refusing), VPA (refusing)
med management with Haldol BREWER, Zyprexa
med management with Haldol BREWER, Zyprexa BREWER
med management with Haldol BREWER, clozapine (refusing), VPA (refusing)
med management with Haldol BREWER, Zyprexa
med management with Haldol BREWER, Zyprexa
med management with Haldol BREWER
med management with Haldol BREWER
med management with Haldol BREWER, clozapine (refusing), VPA (refusing)
med management with Zyprexa, Haldol, clozapine, VPA
medications to target psychosis
med management with Haldol BREWER, clozapine (refusing), VPA (refusing)
med management with Haldol BREWER, Zyprexa BREWER
med management with Haldol BREWER, clozapine (refusing), Zyprexa
med management with Haldol BREWER, Zyprexa BREWER
med management with Haldol BREWER, clozapine (refusing), VPA (refusing)
med management with Haldol BREWER, clozapine (refusing), VPA (refusing)
med management with Haldol BREWER, clozapine (refusing), Zyprexa
med management with Haldol BREWER, Zyprexa BREWER
medications to target psychosis
med management with Haldol BREWER, clozapine (refusing), VPA (refusing)
med management with Haldol BREWER, clozapine (refusing), Zyprexa
med management with Haldol BREWER, clozapine (refusing), Zyprexa
med management with Zyprexa, Haldol, clozapine, VPA
medications to target psychosis
med management with Haldol BREWER, clozapine (refusing), VPA (refusing)
med management with Haldol BREWER, clozapine (refusing), VPA (refusing)
med management with Haldol BREWER, Zyprexa
med management with Haldol BREWER, clozapine (refusing), VPA (refusing)
med management with Haldol BREWER, Zyprexa BREWER
med management with Haldol BREWER, clozapine (refusing), VPA (refusing)
medications to target psychosis
medications to target psychosis
med management with Zyprexa, Haldol, clozapine, VPA
med management with Haldol BREWER, Zyprexa BREWER
med management with Haldol BREWER, clozapine (refusing), VPA (refusing)
med management with Haldol BREWER, Zyprexa BREWER
med management with Haldol BREWER
med management with Haldol BREWER, clozapine (refusing), VPA (refusing)
med management with Haldol BREWER, clozapine (refusing), VPA (refusing)
med management with Haldol BREWER, Zyprexa BREWER
med management with Haldol BREWER, Zyprexa
medications to target psychosis
med management with Haldol BREWER, Zyprexa BREWER
med management with Haldol BREWER, clozapine (refusing), VPA (refusing)
med management with Zyprexa, Haldol, clozapine, VPA
med management with Haldol BREWER, clozapine (refusing), VPA (refusing)
medications to target psychosis
med management with Haldol BREWER, clozapine (refusing), VPA (refusing)
med management with Haldol BREWER, Zyprexa BREWER
med management with Haldol BREWER, clozapine (refusing), VPA (refusing)
med management with Haldol BREWER, clozapine (refusing), Zyprexa
med management with Haldol BREWER, clozapine (refusing), VPA (refusing)
med management with Haldol BREWER, Zyprexa BREWER
med management with Zyprexa, Haldol, clozapine, VPA
med management with Haldol BREWER, clozapine (refusing), VPA (refusing)

## 2024-02-27 NOTE — BH INPATIENT PSYCHIATRY PROGRESS NOTE - NSTXIMPULSINTERMD_PSY_ALL_CORE
med management with Haldol BREWER, Zyprexa
med management with Haldol BREWER, clozapine (refusing), VPA (refusing)
med management with Haldol BREWER, Zyprexa BREWER
med management with Haldol BREWER, clozapine (refusing), VPA (refusing)
med management with Zyprexa, Haldol, clozapine, VPA
med management with Haldol BREWER, Zyprexa BREWER
med management with Haldol BREWER, clozapine (refusing), VPA (refusing)
med management with Zyprexa, Haldol, clozapine, VPA
med management with Haldol BREWER, Zyprexa
med management with Haldol BREWER, Zyprexa BREWER
med management with Haldol BREWER, Zyprexa
med management with Haldol BREWER, clozapine (refusing), VPA (refusing)
med management with Haldol BREWER, clozapine (refusing), VPA (refusing)
med management with Haldol BREWER, Zyprexa
med management with Haldol BREWER, Zyprexa BREWER
med management with Haldol BREWER
med management with Haldol BREWER, clozapine (refusing), VPA (refusing)
med management with Haldol BREWER, clozapine (refusing), VPA (refusing)
med management with Haldol BREWER
24-Nov-2021 02:56
med management with Haldol BREWER
medications to target psychosis
med management with Haldol BREWER, clozapine (refusing), VPA (refusing)
med management with Haldol BREWER, Zyprexa
med management with Haldol BREWER, Zyprexa BREWER
med management with Haldol BREWER, clozapine (refusing), VPA (refusing)
med management with Haldol BREWER, Zyprexa
med management with Haldol BREWER, clozapine (refusing), VPA (refusing)
med management with Haldol BREWER, Zyprexa
med management with Haldol BREWER, clozapine (refusing), VPA (refusing)
med management with Zyprexa, Haldol, clozapine, VPA
med management with Haldol BREWER, clozapine (refusing), VPA (refusing)
medications to target psychosis
medications to target psychosis
med management with Haldol BREWER, clozapine (refusing), VPA (refusing)
med management with Haldol BREWER, Zyprexa BREWER
medications to target psychosis
med management with Haldol BREWER, clozapine (refusing), VPA (refusing)
med management with Zyprexa, Haldol, clozapine, VPA
med management with Haldol BREWER, clozapine (refusing), VPA (refusing)
med management with Haldol BREWER, clozapine (refusing), VPA (refusing)
med management with Haldol BREWER, Zyprexa BREWER
med management with Haldol BREWER, clozapine (refusing), VPA (refusing)
medications to target psychosis
med management with Haldol BREWER, Zyprexa BREWER
medications to target psychosis
med management with Haldol BREWER, Zyprexa
med management with Haldol BREWER, clozapine (refusing), VPA (refusing)
med management with Haldol BREWER, clozapine (refusing), VPA (refusing)
medications to target psychosis
med management with Haldol BREWER, clozapine (refusing), VPA (refusing)
med management with Zyprexa, Haldol, clozapine, VPA
med management with Haldol BREWER, clozapine (refusing), VPA (refusing)
med management with Haldol BREWER, clozapine (refusing), VPA (refusing)
med management with Haldol BREWER, Zyprexa BREWER
med management with Haldol BREWER, Zyprexa BREWER
med management with Haldol BREWER, clozapine (refusing), VPA (refusing)
med management with Haldol BREWER, Zyprexa BREWER
med management with Haldol BREWER, clozapine (refusing), VPA (refusing)
med management with Haldol BREWER, clozapine (refusing), VPA (refusing)
med management with Haldol BREWER, Zyprexa BREWER
med management with Haldol BREWER, clozapine (refusing), VPA (refusing)
med management with Haldol BREWER, clozapine (refusing), VPA (refusing)
med management with Haldol BREWER, Zyprexa
med management with Haldol BREWER, Zyprexa
med management with Haldol BREWER, clozapine (refusing), VPA (refusing)

## 2024-02-27 NOTE — BH INPATIENT PSYCHIATRY PROGRESS NOTE - NSBHROSSTATEMENT_PSY_A_CORE
.

## 2024-02-27 NOTE — BH INPATIENT PSYCHIATRY PROGRESS NOTE - NSBHMETABOLICLABS_PSY_ALL_CORE
Labs within last 12 months
- - -
Labs within last 12 months

## 2024-02-27 NOTE — BH INPATIENT PSYCHIATRY PROGRESS NOTE - NSTXPROBVIOLNT_PSY_ALL_CORE
VIOLENT/AGGRESSIVE BEHAVIOR

## 2024-02-27 NOTE — BH INPATIENT PSYCHIATRY PROGRESS NOTE - NSBHFUPINTERVALCCFT_PSY_A_CORE
Patient seen for f/u for psychosis. Chart reviewed, discussed with team.
Patient seen for f/u for psychosis. Chart reviewed, discussed with team.
f/u psychosis.   
Patient seen for f/u for psychosis. Chart reviewed, discussed with team.
Patient seen for follow up for psychosis.
Patient seen for f/u for psychosis. Chart reviewed, discussed with team.
f/u psychosis.   
Patient seen for follow up for psychosis. Chart reviewed, discussed
f/u psychosis.   
Patient seen for follow up for psychosis. Chart reviewed, discussed
Patient seen for f/u for psychosis. Chart reviewed, discussed with team.
Patient seen for follow up for psychosis. Chart reviewed, discussed
"all of you be careful". 
Patient seen for follow up for psychosis. Chart reviewed, discussed
Patient seen for f/u for psychosis. Chart reviewed, discussed with team.
Patient seen for f/u for psychosis. Chart reviewed, discussed with team.
Patient seen for follow up for psychosis. Chart reviewed, discussed
Patient seen for follow up for psychosis. Chart reviewed, discussed
Follow-up for psychosis. Patient seen, chart reviewed, discussed with team. 
Patient seen for follow up for psychosis. Chart reviewed, discussed
Patient seen for f/u for psychosis. Chart reviewed, discussed with team.
f/u psychosis
Patient seen for f/u for psychosis. Chart reviewed, discussed with team.
Patient seen for f/u for psychosis. Chart reviewed, discussed with team.
Patient seen for follow up for psychosis. Chart reviewed, discussed
f/u psychosis. Chart reviewed, discussed with team.   
Patient seen for f/u for psychosis. Chart reviewed, discussed with team.
Patient seen for follow up for psychosis. Chart reviewed, discussed
Patient seen for f/u for psychosis. Chart reviewed, discussed with team.
Patient seen for follow up for psychosis. Chart reviewed, discussed
Patient seen for f/u for psychosis. Chart reviewed, discussed with team.
Patient seen for follow up for psychosis. Chart reviewed, discussed
Patient seen for f/u for psychosis. Chart reviewed, discussed with team.
Patient seen for follow up for psychosis. Chart reviewed, discussed
refusing to engage, mumbling to self
Patient seen for follow up for psychosis.
Patient seen for follow up for psychosis. Chart reviewed, discussed
Patient seen for follow up for psychosis. Chart reviewed, discussed
Patient seen for f/u for psychosis. Chart reviewed, discussed with team.
Patient seen for follow up for psychosis. Chart reviewed, discussed
f/u psychosis. Chart reviewed, discussed with team.   
Patient seen for f/u for psychosis. Chart reviewed, discussed with team.
Patient seen for f/u for psychosis. Chart reviewed, discussed with team.
Patient seen for follow up for psychosis. Chart reviewed, discussed
Patient seen for f/u for psychosis. Chart reviewed, discussed with team.
Patient seen for follow up for psychosis. Chart reviewed, discussed
f/u psychosis. Chart reviewed, discussed with team.   
Patient seen for follow up for psychosis. Chart reviewed, discussed
"shut off", "turn off". 
Patient seen for f/u for psychosis. Chart reviewed, discussed with team.
Patient seen for follow up for psychosis. Chart reviewed, discussed
Patient seen for f/u for psychosis. Chart reviewed, discussed with team.
Patient seen for follow up for psychosis. Chart reviewed, discussed
Patient seen for f/u for psychosis. Chart reviewed, discussed with team.
Patient seen for f/u for psychosis. 
Patient seen for f/u for psychosis. Chart reviewed, discussed with team.
"what this for"? 
Patient seen for f/u for psychosis. Chart reviewed, discussed with team.
"I am fine, yourself, get out".

## 2024-02-27 NOTE — BH INPATIENT PSYCHIATRY PROGRESS NOTE - NSBHCONTPROVIDER_PSY_ALL_CORE
Yes...

## 2024-02-28 NOTE — BH SAFETY PLAN - WARNING SIGN 1
Staff attempted to collaborate with patient on safety planning. Patient was unable to complete safety plan. Patient was provided with crisis intervention and community resources on Transitions of Care document. Pt. being discharged to state and is not able to safety plan due to being too symptomatic at this time.

## 2024-03-14 NOTE — BH INPATIENT PSYCHIATRY PROGRESS NOTE - NSBHCHARTREVIEWVS_PSY_A_CORE FT
Dave Montoya presents for a Periodic exam. Verbal consent for treatment given in addition to the forms.     Reviewed health history - Patient is ASA I  Consents signed: Yes     Perio: Normal  Pain Scale: 0  Caries Assessment: Medium  Radiographs: Bitewings x4  Periapical teeth #C, H     EOE WNL.  IOE shows no soft tissue concerns.  Good oral hygiene.  Tooth R still present with tooth 27 erupted lingually, recommend extraction of Tooth R.  Teeth C and H still present in mouth with no mobility noted, PAs show minimally resorbed roots.  Unerupted teeth 6 and 11 show roots that appear mostly developed, recommend orthodontic evaluation.     Oral Hygiene instructions and nutritional recommendations reviewed and given.  Recommended Hygiene recall visits with the patient.     Treatment Plan:  1.  Infection control: None  2.  Periodontal therapy: Child prophy and fluoride varnish completed  3.  Caries control: as charted  4.  Occlusal evaluation: Recommend Orthodontic evaluation for unerupted #6 and #11    Prognosis is Good.  Referrals needed: Ortho  Next Visit:  [facility], [facility] or [facility]     Vital Signs Last 24 Hrs  T(C): --  T(F): --  HR: --  BP: --  BP(mean): --  RR: --  SpO2: --

## 2024-03-29 NOTE — ED ADULT NURSE NOTE - NS ED NOTE ABUSE RESPONSE YN
GYN POD 1    Farideh Martinez      Feeling well this AM.  Is ambulating without difficulty.  Voiding spontaneously.  Passing gas.  Pain is well controlled.  Denies vaginal bleeding. Eating and drinking without nausea or vomiting.     Vitals:  BP (!) 145/75   Pulse 80   Temp 98.5 °F (36.9 °C)   Resp 16   Ht 1.575 m (5' 2\")   Wt 91.1 kg (200 lb 13.4 oz)   LMP 2024 (Approximate) Comment: urine pregnancy negative  SpO2 99%   BMI 36.73 kg/m²   Temp (24hrs), Av.5 °F (36.9 °C), Min:98.1 °F (36.7 °C), Max:99.1 °F (37.3 °C)      Last 24hr Input/Output:  No intake or output data in the 24 hours ending 24 0907       Exam:  Patient without distress.    Abdomen soft,  expected tenderness.                Incision dry and clean without erythema.              Lower extremities are negative for swelling, cords, or tenderness.    Labs:   Lab Results   Component Value Date/Time    WBC 10.9 2024 01:10 AM    WBC 4.7 2024 10:52 AM    HGB 11.4 2024 01:10 AM    HGB 12.1 2024 10:52 AM    HCT 36.4 2024 01:10 AM    HCT 39.4 2024 10:52 AM     2024 01:10 AM     2024 10:52 AM       Recent Results (from the past 24 hour(s))   POCT Glucose    Collection Time: 24 11:31 AM   Result Value Ref Range    POC Glucose 162 (H) 65 - 117 mg/dL    Performed by: Amanda Disla (CON)    POCT Glucose    Collection Time: 24  4:18 PM   Result Value Ref Range    POC Glucose 168 (H) 65 - 117 mg/dL    Performed by: Francisco Javier Cano (CON)    POCT Glucose    Collection Time: 24  7:25 PM   Result Value Ref Range    POC Glucose 185 (H) 65 - 117 mg/dL    Performed by: Jamari Benites      Assessment:   POD#2 s/p CHETAN, RSO, left salpingectomy.   Plan: POD#2 s/p CHETAN, RSO, left salpingectomy.  Doing well   1.  Routine PO care  2.  IVF  d/c'd  3.  Pain control - Percocet/ibuprofen.   4.  Labs - hgb stable at 11.4  5.  Voiding adequately  6.  SCDs and ambulating  7.  Continue to  Yes

## 2024-05-03 NOTE — BH SOCIAL WORK INITIAL PSYCHOSOCIAL EVALUATION - NSBHTREATHX_PSY_ALL_CORE
[facility],   [facility]   [phone] [place] [id number]  Phone: [phone]  Fax: (   )    -  Follow Up Time: 2 weeks   Yes...

## 2024-07-03 NOTE — H&P ADULT - NSHPPHYSICALEXAM_GEN_ALL_CORE
-Start physical therapy and home exercises  -Mobic daily for the next 7-10 days and then as needed  -Ice/Heat as tolerated  -Continue working with weight loss management  -Please stop the medication if you have any side effects and call the office if you have any questions or concerns  -If no better will consider MRI for further evaluation  -Follow up in 4 weeks     Healthy Diet and Regular Exercise  [employer] Good [employer]    Tips for making healthy food choices    Enjoy your food, but eat less.  Fully enjoy your food when eating.   Don’t eat while distracted and slow down.   Avoid over sized portions.   Don’t eat while when you’re bored.     EAT THESE FOODS MORE OFTEN: EAT THESE FOODS LESS OFTEN:   Make half your plate fruits and vegetables Highly refined, white starches including white bread, rice and pasta   Eat plenty of protein, keep the fat content low Sugars:  sodas and sports drinks, candies and desserts   Eat plenty of low-fat dairy products High fat meats and dairy   Choose whole grain products Foods high in sodium   Water is best for hydration Fast food.   Eat at home when possible     Tips for increasing your physical activity - Adults who are physically active are less likely to develop some chronic diseases than adults who are inactive.     HOW TO GET STARTED: HOW TO STAY MOTIVATED:   Start activities slowly and build up over time Do what you like   Get your heart pumping - brisk walking, biking, swimming Even 10 minute increments are effective and add up over the week   2 ½ hours per week - spread out over time Use a buddy to keep you motivated   Don’t forget strength training with weights and resistance Set goals and track your progress   You don’t need to join a gym.   Home exercises work great. Put more priority on exercise in your life      PHYSICAL EXAM:  Vital Signs Last 24 Hrs  T(C): 36.6 (09-29-23 @ 23:55)  T(F): 97.8 (09-29-23 @ 23:55), Max: 98.2 (09-29-23 @ 15:45)  HR: 87 (09-29-23 @ 23:55) (80 - 96)  BP: 112/77 (09-29-23 @ 23:55)  BP(mean): --  RR: 18 (09-29-23 @ 23:55) (16 - 18)  SpO2: 98% (09-29-23 @ 23:55) (98% - 100%)  Wt(kg): --    Constitutional: NAD, awake and alert, well developed  EYES: EOMI, conjunctiva clear  ENT:  Normal Hearing, no tonsillar exudates   Neck: Soft and supple , no thyromegaly   Respiratory: Breath sounds are clear bilaterally, No wheezing, rales or rhonchi, no tachypnea, no accessory muscle use  Cardiovascular: S1 and S2, regular rate and rhythm, no Murmurs, gallops or rubs, no JVD, no leg edema  Gastrointestinal: Bowel Sounds present, soft, nontender, nondistended, no guarding, no rebound  Rectal exam deferred as patient refused and is in hallway  Extremities: No cyanosis or clubbing; warm to touch  Vascular: 2+ peripheral pulses lower ex  Neurological: No focal deficits, CN II-XII intact bilaterally, sensation to light touch intact in all extremities.  Musculoskeletal: 5/5 strength b/l upper and lower extremities; no joint swelling.  Skin: No rashes, no ulcerations

## 2024-07-30 NOTE — PSYCHIATRIC REHAB INITIAL EVALUATION - NSBHEDUCURSCHOOL_PSY_ALL_CORE
I spoke with Veronika's daughter, Irma.    Patient has not received pre-meds for contrast in the past but would be open to this if Dr. Thomas deems it necessary.   No

## 2024-09-30 NOTE — BH INPATIENT PSYCHIATRY PROGRESS NOTE - NSBHASSESSSUMMFT_PSY_ALL_CORE
Subjective:      Patient ID:  Norm Mendoza is a 89 y.o. male who presents for   Chief Complaint   Patient presents with    Skin Check     Ubse     History of Present Illness: The patient presents for follow up of skin check.    The patient was last seen on: 4/3/2023 by Dr. Sierra for bx L ventral forearm-SCC excised by APC.  This is a high risk patient here to check for the development of new lesions.     Other skin complaints: C/o some areas on face. No s/s.        Review of Systems   Skin:  Positive for activity-related sunscreen use. Negative for daily sunscreen use, recent sunburn and wears hat.   Hematologic/Lymphatic: Bruises/bleeds easily.       Objective:   Physical Exam   Constitutional: He appears well-developed and well-nourished. No distress.   HENT:   Mouth/Throat: Lips normal.    Eyes: Lids are normal.    Neurological: He is alert and oriented to person, place, and time. He is not disoriented.   Psychiatric: He has a normal mood and affect.   Skin:   Areas Examined (abnormalities noted in diagram):   Scalp / Hair Palpated and Inspected  Head / Face Inspection Performed  Neck Inspection Performed  Chest / Axilla Inspection Performed  Back Inspection Performed  RUE Inspected  LUE Inspection Performed                 Diagram Legend     Erythematous scaling macule/papule c/w actinic keratosis       Vascular papule c/w angioma      Pigmented verrucoid papule/plaque c/w seborrheic keratosis      Yellow umbilicated papule c/w sebaceous hyperplasia      Irregularly shaped tan macule c/w lentigo     1-2 mm smooth white papules consistent with Milia      Movable subcutaneous cyst with punctum c/w epidermal inclusion cyst      Subcutaneous movable cyst c/w pilar cyst      Firm pink to brown papule c/w dermatofibroma      Pedunculated fleshy papule(s) c/w skin tag(s)      Evenly pigmented macule c/w junctional nevus     Mildly variegated pigmented, slightly irregular-bordered macule c/w mildly atypical  nevus      Flesh colored to evenly pigmented papule c/w intradermal nevus       Pink pearly papule/plaque c/w basal cell carcinoma      Erythematous hyperkeratotic cursted plaque c/w SCC      Surgical scar with no sign of skin cancer recurrence      Open and closed comedones      Inflammatory papules and pustules      Verrucoid papule consistent consistent with wart     Erythematous eczematous patches and plaques     Dystrophic onycholytic nail with subungual debris c/w onychomycosis     Umbilicated papule    Erythematous-base heme-crusted tan verrucoid plaque consistent with inflamed seborrheic keratosis     Erythematous Silvery Scaling Plaque c/w Psoriasis     See annotation      Assessment / Plan:        Actinic keratoses  -     Ambulatory referral/consult to Dermatology  -     fluorouraciL (EFUDEX) 5 % cream; Compound fluorouracil 5% + calcipotriene 0.005% cream. Apply a pea-sized amount to entire bilateral lateral cheekbones BID x 7 days.  Dispense: 30 g; Refill: 0    And     Cryosurgery Procedure Note    Verbal consent from the patient is obtained including, but not limited to, risk of hypopigmentation/hyperpigmentation, scar, recurrence of lesion. The patient is aware of the precancerous quality and need for treatment of these lesions. Liquid nitrogen cryosurgery is applied to the 2 actinic keratoses, as detailed in the physical exam, to produce a freeze injury. The patient is aware that blisters may form and is instructed on wound care with gentle cleansing and use of vaseline ointment to keep moist until healed. The patient is supplied a handout on cryosurgery and is instructed to call if lesions do not completely resolve.    SK (seborrheic keratosis)  These are benign inherited growths without a malignant potential. Reassurance given to patient. No treatment is necessary.     Angioma of skin  This is a benign vascular lesion. Reassurance given. No treatment required.     History of nonmelanoma skin  cancer  Area(s) of previous NMSC evaluated with no signs of recurrence.    Upper body skin examination performed today including at least 6 points as noted in physical examination. No lesions suspicious for malignancy noted.    Recommend daily sun protection/avoidance and use of at least SPF 30, broad spectrum sunscreen (OTC drug).              Follow up in about 3 months (around 12/30/2024) for recheck of area treated with topical chemo.     This is a 53 year old woman, currently living at [facility] [place] [facility], PMH of GERD, anemia, PPH of schizophrenia, selective mutism, [facility], previously with [facility] which , with history of psychiatric hospitalization(s), without known history of self-injury or suicide attempts, with history of aggression in the setting on nonadherence with medication, who is brought in by staff at residence for increasing agitation and aggression in the setting of 2 months of medication nonadherence.     Today, patient continues to refuse to cooperate with interview and treatment. State application in process. Due for next haldol dec . Patient continues to require inpatient psychiatric hospitalization for stabilization and treatment.     Plan:  Legals:  	- continue with  involuntary admission   	- TOO obtained 8/15  Safety:  	- routine obs, bed block for aggression, currently ADLs are poor, slightly improved  Psychiatric:  	- c/w psychiatric medications: Zyprexa 10mg PO BID (IM if refuse PO, can consider Zyprexa Relprev), Valproic acid 250mg 2 caps BID, Cogentin 2mg BID (IM if refuse PO).    	- c/w clozapine 25mg qhs  	- s/p loading dose of haldol dec 100mg , next dose due  (plan to increase dose)  Medical:   	- Nexium 40mg, Feosol 300mg daily, Vit D3 1000U, Senokot 8.6mg qhs  Dispo:  	- plan for state application vs returning to [facility]  	- AOT is under investigation,  in   	- no family involved in care     This is a 53 year old woman, currently living at [facility] [place] grounds, PMH of GERD, anemia, PPH of schizophrenia, selective mutism, [facility], previously with [facility] which , with history of psychiatric hospitalization(s), without known history of self-injury or suicide attempts, with history of aggression in the setting on nonadherence with medication, who is brought in by staff at residence for increasing agitation and aggression in the setting of 2 months of medication nonadherence.     Today, patient continues to refuse to cooperate with interview and treatment. State application in process. Due for next haldol dec . Patient continues to require inpatient psychiatric hospitalization for stabilization and treatment.     Plan:  Legals:  	- continue with  involuntary admission   	- TOO obtained 8/15  Safety:  	- routine obs, bed block for aggression, currently ADLs are poor, slightly improved  Psychiatric:  	- c/w psychiatric medications: Zyprexa 10mg PO BID (IM if refuse PO, can consider Zyprexa Relprev), Valproic acid 250mg 2 caps BID, Cogentin 2mg BID (IM if refuse PO).    	- c/w clozapine 25mg qhs  	- s/p loading dose of haldol dec 100mg , next dose due  (plan to increase dose to 150mg IM)  Medical:   	- Nexium 40mg, Feosol 300mg daily, Vit D3 1000U, Senokot 8.6mg qhs  Dispo:  	- plan for state application vs returning to [facility]  	- AOT is under investigation,  in   	- no family involved in care

## 2024-10-22 NOTE — ED ADULT NURSE NOTE - OBJECTIVE STATEMENT
Gave pt results... ----- Message from Mariana sent at 10/22/2024  8:26 AM CDT -----  Contact: [phone] patient  Patient is returning a phone call.    Who left a message for the patient: Devorah Flores LPN    Does patient know what this is regarding:  labs results    Would you like a call back, or a response through your [facility] portal?:   call     Comments:   Pt presents to ED from [id number] after an episode of dizziness. Pt states she started to feel dizzy and unwell. At this time patient states she is dizzy. Pt is NSR on the monitor. Pt states she has been having abdominal pain and diarrhea for 4-5 days but did not tell anyone at [facility]. Pt states the abdominal pain is at her belly button. Abdomen soft but tender at the umbilicus.  Pt is AxOx3. Pt denies chest pain, lightheadedness and shortness of breath. Pt denies dysuria and hematuria. Skin clean dry and intact. MD at bedside evaluating patient. BP lying and sitting performed as per MD at bedside. Skin clean dry and intact. 20G IVL placed in the L AC. Labs drawn and sent. Staff member from [facility] at bedside with patient. Will continue to monitor.

## 2024-10-24 NOTE — ED ADULT NURSE NOTE - NSFALLUNIVINTERV_ED_ALL_ED
Bed/Stretcher in lowest position, wheels locked, appropriate side rails in place/Call bell, personal items and telephone in reach/Instruct patient to call for assistance before getting out of bed/chair/stretcher/Non-slip footwear applied when patient is off stretcher/[place] to call system/Physically safe environment - no spills, clutter or unnecessary equipment/Purposeful proactive rounding/Room/bathroom lighting operational, light cord in reach
Standing/Walking

## 2024-11-04 NOTE — ED PROVIDER NOTE - NS ED MD DISPO DISCHARGE
Additional Notes: Discussed allergy testing. Recommended to wait until patient is older, around 8-10 years old.
Detail Level: Simple
Render Risk Assessment In Note?: no
Home

## 2025-01-08 NOTE — ED PROVIDER NOTE - ATTESTATION, MLM
,[email or web address] I have reviewed and confirmed nurses' notes for patient's medications, allergies, medical history, and surgical history.

## 2025-03-28 NOTE — ED PROVIDER NOTE - PROGRESS NOTE DETAILS
Goal Outcome Evaluation:     Patient has been uncooperative and unpleasant throughout the night. Has refused some care/ treatments, medications as well as signing a refusal form. Patient c/o nausea and not being able to sleep throughout the night. B/P (98/53 and 101/62) HR NSR-ST @ . Patient up to the bathroom multiple times throughout the night. Cardiac cath scheduled for sometime today. CHG preformed per patient, Linen changed.                                        Camilo, PGY-2: Pt reassessed multiple times in the past several hours. Patient feeling improved, work-up negative, urine preg negative, ekg wnl, d/c back to [facility].

## 2025-04-07 NOTE — BH INPATIENT PSYCHIATRY PROGRESS NOTE - NSBHCHARTREVIEWVS_PSY_A_CORE FT
[de-identified] : 6/21/21- Afib 63, LAFB, QTc 435 8/23/21- Afib 112, LAFB, QTc 453 5/3/22- Afib 77 short pause <2 seconds, LAFB, QTc 429 11/14/22- Afib 65, LAFB, PVC, QTc 420 02/16/2023  Afib 81 LAFB, QTc 460 04/18/2023 Afib 84, QTc 422 6/12/2023 Afib 64, QTc 403 10/5/23- Afib 98, LAFB, QTc 477 2/12/24 Afib 63 LAFB, QTc 430 5/21/24 Afib 79. PVC x1, nonspecific ST, QTc 455 9/30/24 Afib 81, left axis, nonspecific T-wave, QTc 476 12/17/2024: Afib 89, left axis and nonspecific T abnormality, consistent with prior. QTc 471 [de-identified] : 6/2022 Pharmacologic nuclear stress test: very mild distal anterior wall attenuation artifact, no clear evidence of ischemia or infarct  [de-identified] : 6/1/22- LV EF 53%, mild LVH,  inferior/inferoseptal mild hypokinesis, PASP 33 mmHg, mild biatrial enlargement\par  \par  2/2022- \par  Summary:\par   1. Technically fair study. Due to patient's respiratory status, limited \par  images were obtained.\par   2. Moderately decreased global left ventricular systolic function.\par   3. Left ventricular ejection fraction, by visual estimation, is 30 to \par  35%.\par   4. Left atrial enlargement.\par   5. Mildly reduced RV systolic function.\par   6. There is a well seated WATCHMAN device occluding the left atrai \par  lappendage. The device is free of thrombus. There is no significant color \par  flow around the device.\par   7. Normal right atrial size.\par   8. Mild mitral valve regurgitation.\par   9. Mild tricuspid regurgitation.\par  10. There is no evidence of pericardial effusion.\par  \par  6/10/21- Summary: \par   1. Endocardial visualization was enhanced with intravenous echo contrast. \par   2. Left ventricular ejection fraction, by visual estimation, is 40 to 45%. \par   3. Mildly to moderately decreased global left ventricular systolic function. \par   4. Basal and mid anterior septum and basal and mid inferior septum are abnormal as described above. \par   5. The mitral in-flow pattern reveals no discernable A-wave, therefore no comment on diastolic function can be made. \par   6. There is mild concentric left ventricular hypertrophy. \par   7. Normal left ventricular internal cavity size. \par   8. Severely enlarged left atrium. \par   9. Moderately enlarged right atrium. \par  10. Mild mitral annular calcification. \par  11. Mild thickening and calcification of the anterior and posterior mitral valve leaflets. \par  12. Mild mitral valve regurgitation. \par  13. Mild aortic valve stenosis. \par  14. Mild aortic regurgitation. \par  15. There is no evidence of pericardial effusion. Vital Signs Last 24 Hrs  T(C): --  T(F): --  HR: --  BP: --  BP(mean): --  RR: --  SpO2: --

## 2025-06-03 NOTE — ED PROVIDER NOTE - NSTIMEPROVIDERCAREINITIATE_GEN_ER
Wet to dry dressing changed on sacrum. Foam placed over dressing to protect. Patient floated on pillows.    15-Hima-2019 16:36

## 2025-06-06 ENCOUNTER — EMERGENCY (EMERGENCY)
Facility: HOSPITAL | Age: 55
LOS: 1 days | End: 2025-06-06
Attending: STUDENT IN AN ORGANIZED HEALTH CARE EDUCATION/TRAINING PROGRAM | Admitting: STUDENT IN AN ORGANIZED HEALTH CARE EDUCATION/TRAINING PROGRAM
Payer: MEDICAID

## 2025-06-06 VITALS
HEART RATE: 122 BPM | SYSTOLIC BLOOD PRESSURE: 107 MMHG | WEIGHT: 248.9 LBS | RESPIRATION RATE: 17 BRPM | OXYGEN SATURATION: 99 % | TEMPERATURE: 98 F | DIASTOLIC BLOOD PRESSURE: 69 MMHG

## 2025-06-06 PROCEDURE — 93010 ELECTROCARDIOGRAM REPORT: CPT

## 2025-06-06 PROCEDURE — 99285 EMERGENCY DEPT VISIT HI MDM: CPT

## 2025-06-06 NOTE — ED ADULT TRIAGE NOTE - WEIGHT IN LBS
The patient is a 82y Male complaining of fever.  81 y/o M w/ Hx of HTN and AML on Azacitidine (Vidaza), p/w neutropenic fever. Had chemo today, after chemo developed fever to 100.5, 100.7 here in ER. Is on levaquin and fluzonazole prophylactically. Reports HA for past wk, had CT 5 days ago, which was unremarkable. Denies cough, SOB, vomiting, diarrhea, or dysuria.     Hematologsit: Dr. Any Sarmiento    VS: T 100.7 BP: 168/74  RR22 O2: 99 248.9 82M PMHx of HTN ans AML on Azacitidine (Cycle 7 Vidaza) developed fever at outpatient , p/w   neutropenic fever. . Had chemo today, after chemo developed fever to 100.5, 100.7 here in ER. Is on levaquin and fluzonazole prophylactically. Reports HA for past wk, had CT 5 days ago, which was unremarkable. Denies cough, SOB, vomiting, diarrhea, or dysuria.     Hematologsit: Dr. Any Sarmiento    VS: T 100.7 BP: 168/74  RR22 O2: 99 82M PMHx of HTN ans AML on Azacitidine (Cycle 7 Vidaza) developed fever and chills during chemo at [facility] (Hematologist Dr. Sarmiento) T-max 100.7. Patient was diagnosed in 06/2016 and is currently on last chemo on 2/16 for day 4  cycle 7 of chemo Vidaza. Patient is on prophylactic Fluconazole and Levaquin since 10/2016.Patient denies prior fever but he has been taking Tylenol daily for a week long A-ffsjb-yvykwfy headache. Had a negative CTH five days ago. Patient endorsed rhinorrhea in the morning which is normal for him but denies cough, sore throat, SOB. Patient denies sick contact, or recent travel. No dysuria, urinary frequency or purulent discharge. No skin rashes or insect bites.  He denies constipation diarrhea, nausea, vomiting.        VS: T 102.9 HR: 119  BP:193/79  RR22 O2: 99  Lactate 2.2  Seen by Hematology  s/p x1 Vancomycin Cefepime 82M PMHx of HTN ans AML on Azacitidine (Cycle 7 Vidaza) developed fever and chills during chemo at [facility] (Hematologist Dr. Sarmiento) T-max 100.7. Patient was diagnosed in 06/2016 and received chemotherapy today 2/16 - today is day 4  of cycle 7 of chemo Vidaza. Patient is on prophylactic Fluconazole and Levaquin since 10/2016.Patient denies prior fever but he has been taking Tylenol daily for a week long S-nmakv-ymsymso headache. Had a negative CTH five days ago. Patient endorsed rhinorrhea in the morning which is normal for him but denies cough, sore throat, SOB. Patient denies sick contact, or recent travel. No dysuria, urinary frequency or purulent discharge. No skin rashes or insect bites.  He denies constipation diarrhea, nausea, vomiting.        VS: T 102.9 HR: 119  BP:193/79  RR22 O2: 99  Lactate 2.2  Seen by Hematology  s/p x1 Vancomycin Cefepime

## 2025-06-06 NOTE — ED ADULT TRIAGE NOTE - CHIEF COMPLAINT QUOTE
pt reporting to the ED  from [facility] for dizziness X 7 years. Denies S/I, H/I. pmh schizophrenia

## 2025-06-07 VITALS
SYSTOLIC BLOOD PRESSURE: 139 MMHG | RESPIRATION RATE: 17 BRPM | OXYGEN SATURATION: 100 % | DIASTOLIC BLOOD PRESSURE: 85 MMHG | HEART RATE: 97 BPM

## 2025-06-07 PROBLEM — D50.9 IRON DEFICIENCY ANEMIA, UNSPECIFIED: Chronic | Status: ACTIVE | Noted: 2024-02-27

## 2025-06-07 LAB
ALBUMIN SERPL ELPH-MCNC: 3.7 G/DL — SIGNIFICANT CHANGE UP (ref 3.3–5)
ALP SERPL-CCNC: 122 U/L — HIGH (ref 40–120)
ALT FLD-CCNC: 15 U/L — SIGNIFICANT CHANGE UP (ref 4–33)
ANION GAP SERPL CALC-SCNC: 13 MMOL/L — SIGNIFICANT CHANGE UP (ref 7–14)
ANISOCYTOSIS BLD QL: SLIGHT — SIGNIFICANT CHANGE UP
AST SERPL-CCNC: 24 U/L — SIGNIFICANT CHANGE UP (ref 4–32)
BASOPHILS # BLD AUTO: 0 K/UL — SIGNIFICANT CHANGE UP (ref 0–0.2)
BASOPHILS NFR BLD AUTO: 0 % — SIGNIFICANT CHANGE UP (ref 0–2)
BILIRUB SERPL-MCNC: <0.2 MG/DL — SIGNIFICANT CHANGE UP (ref 0.2–1.2)
BUN SERPL-MCNC: 19 MG/DL — SIGNIFICANT CHANGE UP (ref 7–23)
CALCIUM SERPL-MCNC: 8.7 MG/DL — SIGNIFICANT CHANGE UP (ref 8.4–10.5)
CHLORIDE SERPL-SCNC: 108 MMOL/L — HIGH (ref 98–107)
CO2 SERPL-SCNC: 21 MMOL/L — LOW (ref 22–31)
CREAT SERPL-MCNC: 0.78 MG/DL — SIGNIFICANT CHANGE UP (ref 0.5–1.3)
EGFR: 90 ML/MIN/1.73M2 — SIGNIFICANT CHANGE UP
EGFR: 90 ML/MIN/1.73M2 — SIGNIFICANT CHANGE UP
EOSINOPHIL # BLD AUTO: 0 K/UL — SIGNIFICANT CHANGE UP (ref 0–0.5)
EOSINOPHIL NFR BLD AUTO: 0 % — SIGNIFICANT CHANGE UP (ref 0–6)
GIANT PLATELETS BLD QL SMEAR: PRESENT — SIGNIFICANT CHANGE UP
GLUCOSE SERPL-MCNC: 92 MG/DL — SIGNIFICANT CHANGE UP (ref 70–99)
HCT VFR BLD CALC: 24.1 % — LOW (ref 34.5–45)
HGB BLD-MCNC: 6.9 G/DL — CRITICAL LOW (ref 11.5–15.5)
IANC: 5.02 K/UL — SIGNIFICANT CHANGE UP (ref 1.8–7.4)
LYMPHOCYTES # BLD AUTO: 2.08 K/UL — SIGNIFICANT CHANGE UP (ref 1–3.3)
LYMPHOCYTES # BLD AUTO: 26.5 % — SIGNIFICANT CHANGE UP (ref 13–44)
MAGNESIUM SERPL-MCNC: 2 MG/DL — SIGNIFICANT CHANGE UP (ref 1.6–2.6)
MCHC RBC-ENTMCNC: 20.6 PG — LOW (ref 27–34)
MCHC RBC-ENTMCNC: 28.6 G/DL — LOW (ref 32–36)
MCV RBC AUTO: 71.9 FL — LOW (ref 80–100)
MICROCYTES BLD QL: SLIGHT — SIGNIFICANT CHANGE UP
MONOCYTES # BLD AUTO: 0.56 K/UL — SIGNIFICANT CHANGE UP (ref 0–0.9)
MONOCYTES NFR BLD AUTO: 7.1 % — SIGNIFICANT CHANGE UP (ref 2–14)
NEUTROPHILS # BLD AUTO: 5.21 K/UL — SIGNIFICANT CHANGE UP (ref 1.8–7.4)
NEUTROPHILS NFR BLD AUTO: 66.4 % — SIGNIFICANT CHANGE UP (ref 43–77)
OVALOCYTES BLD QL SMEAR: SLIGHT — SIGNIFICANT CHANGE UP
PHOSPHATE SERPL-MCNC: 3.2 MG/DL — SIGNIFICANT CHANGE UP (ref 2.5–4.5)
PLAT MORPH BLD: NORMAL — SIGNIFICANT CHANGE UP
PLATELET # BLD AUTO: 372 K/UL — SIGNIFICANT CHANGE UP (ref 150–400)
PLATELET COUNT - ESTIMATE: NORMAL — SIGNIFICANT CHANGE UP
POIKILOCYTOSIS BLD QL AUTO: SLIGHT — SIGNIFICANT CHANGE UP
POLYCHROMASIA BLD QL SMEAR: SLIGHT — SIGNIFICANT CHANGE UP
POTASSIUM SERPL-MCNC: 4.6 MMOL/L — SIGNIFICANT CHANGE UP (ref 3.5–5.3)
POTASSIUM SERPL-SCNC: 4.6 MMOL/L — SIGNIFICANT CHANGE UP (ref 3.5–5.3)
PROT SERPL-MCNC: 7.2 G/DL — SIGNIFICANT CHANGE UP (ref 6–8.3)
RBC # BLD: 3.35 M/UL — LOW (ref 3.8–5.2)
RBC # FLD: 21.2 % — HIGH (ref 10.3–14.5)
RBC BLD AUTO: ABNORMAL
SCHISTOCYTES BLD QL AUTO: SLIGHT — SIGNIFICANT CHANGE UP
SODIUM SERPL-SCNC: 142 MMOL/L — SIGNIFICANT CHANGE UP (ref 135–145)
TROPONIN T, HIGH SENSITIVITY RESULT: 6 NG/L — SIGNIFICANT CHANGE UP
TSH SERPL-MCNC: 2.83 UIU/ML — SIGNIFICANT CHANGE UP (ref 0.27–4.2)
WBC # BLD: 7.85 K/UL — SIGNIFICANT CHANGE UP (ref 3.8–10.5)
WBC # FLD AUTO: 7.85 K/UL — SIGNIFICANT CHANGE UP (ref 3.8–10.5)

## 2025-06-07 NOTE — ED PROVIDER NOTE - PHYSICAL EXAMINATION
Physical Exam  GEN: Alert and oriented x 3, in no acute distress, speaking full clear sentences  HEENT: NC/AT, PERRL, EOMI, normal oropharynx  NECK: Supple, nontender, FROM  CV: RRR, no m/r/g  PULM: CTA bilat, no wheezing/rales/rhonchi  ABD: Soft, nontender, nondistended. No organomegaly  EXTR: FROM to all extremities, nontender, no edema  SKIN: Warm, dry, no rash  NEURO: AOx3, speaking full clear sentences, SCHMIDT 5/5 strength, ambulating with stable gait

## 2025-06-07 NOTE — ED ADULT NURSE REASSESSMENT NOTE - NS ED NURSE REASSESS COMMENT FT1
Pt. resting in bed, respirations even and unlabored on RA. Pt. ate food provided. Comfort measures provided, safety maintained throughout.

## 2025-06-07 NOTE — ED PROVIDER NOTE - CLINICAL SUMMARY MEDICAL DECISION MAKING FREE TEXT BOX
Patient is well-appearing. Was initially tachycardic to 120s in triage; EKG shows sinus tachycardia , no QT or QRS prolongation. Differential diagnoses include but not limited to anemia vs. renal failure vs. other metabolic derangement. Physical exam unremarkable, pt ambulating with stable gait from stretcher to bathroom without issue. Dispo pending, though anticipate DC back to [place].

## 2025-06-07 NOTE — ED PROVIDER NOTE - PROGRESS NOTE DETAILS
Pt denies active bleeding or light bleeding (hematuria/hematochezia/melena/hematemesis); pt offered blood transfusion and declined, will refer to hematology for further management, pt requesting discharge. SW contacted for DC planning given pt [facility] resident. - Lawrence Domínguez MD. EM Attending

## 2025-06-07 NOTE — ED ADULT NURSE NOTE - OBJECTIVE STATEMENT
Pt. received to intake room 10A. Pt. A&OX4, but forgetful and ambulatory, coming from [facility] for 11 years of intermittent dizziness. Pt. medical hx schizophrenia, anemia, anxiety. Pt. states she has been having intermittent dizziness as well as syncopal episodes over the last 11 years. Pt. states her outpatient provider told her she had a low hemoglobin count. pt. states she would refuse blood transfusion due to Denominational beliefs. Pt. denies any other symptoms at this time. Upon assessment, airway patent, respirations even and unlabored on RA. pt. abd soft and nontender. Pt. denies SI/HI and any auditory or visual hallucinations. Comfort measures provided, safety maintained throughout.

## 2025-06-07 NOTE — ED PROVIDER NOTE - OBJECTIVE STATEMENT
54 year old female w/ PMHx schizophrenia, iron deficiency anemia, resides at [place] presents for evaluation of lightheadedness and dizziness ongoing for the past "many years." Patient also states she was sent here for further evaluation after outpatient labs showed her Hgb was "low," though she cannot recall the number. She denies any hematochezia, melena, abd pain, nausea, vomiting, diarrhea, chest pain, or dyspnea. She has had multiple evaluations by cardiology for multiple episodes of pre-syncope and lightheadedness, all with unremarkable workups. States her sx of intermittent lightheadedness are no different or no worse than usual.

## 2025-06-07 NOTE — ED BEHAVIORAL HEALTH NOTE - BEHAVIORAL HEALTH NOTE
Writer informed pt cleared for discharge at this time. Pt arrives from [facility] with staff member. Writer met with pt and staff member at bedside. Pt from inpatient [facility] ([address] [facility]) unit 7A at [phone]. Pt accompanied at bedside by [facility] staff member, Madalyn, who provided unit number. Writer contacted unit and spoke to Christiana TABARES who was informed of pt's discharge. Verbal huddle completed at this time. AMBULANCE transport arranged with [facility] #[phone] with trip #98A. Pt in addition found to have emergency via [facility] search found that pt has "coverage for "Inpatient Hospital Services Only" [facility] does not arrange transportation for Medicaid enrollees in this Medicaid Plan". Trip therefore set up as a bill back at this time. Verbal huddle occurred.

## 2025-06-07 NOTE — ED PROVIDER NOTE - PATIENT PORTAL LINK FT
You can access the FollowMyHealth Patient Portal offered by [facility] by registering at the following website: [email or web address]. By joining [facility]’s FollowMyHealth portal, you will also be able to view your health information using other applications (apps) compatible with our system.

## 2025-06-07 NOTE — ED PROVIDER NOTE - NSFOLLOWUPINSTRUCTIONS_ED_ALL_ED_FT
You were seen and evaluated in the Emergency Department for your dizziness. IT MAY BE RELATED TO YOUR LOW BLOOD COUNTS (ANEMIA). You were offered and declined a blood transfusion in the Emergency Department.     We strongly recommend you follow up with a HEMATOLOGIST for further evaluation of your ANEMIA.    Should you develop persistent nausea, vomiting, worsening headaches, inability to walk properly, numbness or tingling in the extremities, dizziness, or changes to your hearing/vision - please immediately return to the Emergency Department for evaluation of your symptoms.     Furthermore we recommend you see your Primary Care Physician for a comprehensive evaluation of your health within the next 48-72 hours.

## 2025-06-07 NOTE — ED ADULT NURSE NOTE - CHIEF COMPLAINT QUOTE
pt reporting to the ED  from [facility] for dizziness X 7 years. Denies S/I, H/I. pmh schizophrenia

## 2025-06-07 NOTE — ED PROVIDER NOTE - NSFOLLOWUPCLINICS_GEN_ALL_ED_FT
[facility]  Hematology/Oncology  [address] [id number]  Phone: [phone]  Fax:

## 2025-07-03 ENCOUNTER — EMERGENCY (EMERGENCY)
Facility: HOSPITAL | Age: 55
LOS: 1 days | End: 2025-07-03
Attending: STUDENT IN AN ORGANIZED HEALTH CARE EDUCATION/TRAINING PROGRAM | Admitting: STUDENT IN AN ORGANIZED HEALTH CARE EDUCATION/TRAINING PROGRAM
Payer: MEDICAID

## 2025-07-03 VITALS
DIASTOLIC BLOOD PRESSURE: 75 MMHG | HEART RATE: 83 BPM | TEMPERATURE: 97 F | RESPIRATION RATE: 18 BRPM | OXYGEN SATURATION: 97 % | SYSTOLIC BLOOD PRESSURE: 106 MMHG

## 2025-07-03 VITALS
DIASTOLIC BLOOD PRESSURE: 67 MMHG | SYSTOLIC BLOOD PRESSURE: 101 MMHG | OXYGEN SATURATION: 98 % | HEART RATE: 86 BPM | RESPIRATION RATE: 16 BRPM | TEMPERATURE: 98 F

## 2025-07-03 LAB
ALBUMIN SERPL ELPH-MCNC: 3.7 G/DL — SIGNIFICANT CHANGE UP (ref 3.3–5)
ALP SERPL-CCNC: 121 U/L — HIGH (ref 40–120)
ALT FLD-CCNC: 7 U/L — SIGNIFICANT CHANGE UP (ref 4–33)
ANION GAP SERPL CALC-SCNC: 9 MMOL/L — SIGNIFICANT CHANGE UP (ref 7–14)
ANISOCYTOSIS BLD QL: SLIGHT — SIGNIFICANT CHANGE UP
AST SERPL-CCNC: 11 U/L — SIGNIFICANT CHANGE UP (ref 4–32)
BASOPHILS # BLD AUTO: 0.01 K/UL — SIGNIFICANT CHANGE UP (ref 0–0.2)
BASOPHILS # BLD MANUAL: 0 K/UL — SIGNIFICANT CHANGE UP (ref 0–0.2)
BASOPHILS NFR BLD AUTO: 0.2 % — SIGNIFICANT CHANGE UP (ref 0–2)
BASOPHILS NFR BLD MANUAL: 0 % — SIGNIFICANT CHANGE UP (ref 0–2)
BILIRUB SERPL-MCNC: <0.2 MG/DL — SIGNIFICANT CHANGE UP (ref 0.2–1.2)
BLD GP AB SCN SERPL QL: NEGATIVE — SIGNIFICANT CHANGE UP
BUN SERPL-MCNC: 12 MG/DL — SIGNIFICANT CHANGE UP (ref 7–23)
CALCIUM SERPL-MCNC: 9.6 MG/DL — SIGNIFICANT CHANGE UP (ref 8.4–10.5)
CHLORIDE SERPL-SCNC: 108 MMOL/L — HIGH (ref 98–107)
CO2 SERPL-SCNC: 26 MMOL/L — SIGNIFICANT CHANGE UP (ref 22–31)
CREAT SERPL-MCNC: 0.76 MG/DL — SIGNIFICANT CHANGE UP (ref 0.5–1.3)
DACRYOCYTES BLD QL SMEAR: SLIGHT — SIGNIFICANT CHANGE UP
EGFR: 92 ML/MIN/1.73M2 — SIGNIFICANT CHANGE UP
EGFR: 92 ML/MIN/1.73M2 — SIGNIFICANT CHANGE UP
ELLIPTOCYTES BLD QL SMEAR: SLIGHT — SIGNIFICANT CHANGE UP
EOSINOPHIL # BLD AUTO: 0 K/UL — SIGNIFICANT CHANGE UP (ref 0–0.5)
EOSINOPHIL # BLD MANUAL: 0 K/UL — SIGNIFICANT CHANGE UP (ref 0–0.5)
EOSINOPHIL NFR BLD AUTO: 0 % — SIGNIFICANT CHANGE UP (ref 0–6)
EOSINOPHIL NFR BLD MANUAL: 0 % — SIGNIFICANT CHANGE UP (ref 0–6)
GIANT PLATELETS BLD QL SMEAR: PRESENT
GLUCOSE SERPL-MCNC: 85 MG/DL — SIGNIFICANT CHANGE UP (ref 70–99)
HCT VFR BLD CALC: 27.8 % — LOW (ref 34.5–45)
HGB BLD-MCNC: 7.6 G/DL — LOW (ref 11.5–15.5)
HYPOCHROMIA BLD QL: SLIGHT — SIGNIFICANT CHANGE UP
IMM GRANULOCYTES # BLD AUTO: 0.04 K/UL — SIGNIFICANT CHANGE UP (ref 0–0.07)
IMM GRANULOCYTES NFR BLD AUTO: 0.7 % — SIGNIFICANT CHANGE UP (ref 0–0.9)
LYMPHOCYTES # BLD AUTO: 1.73 K/UL — SIGNIFICANT CHANGE UP (ref 1–3.3)
LYMPHOCYTES # BLD MANUAL: 1.09 K/UL — SIGNIFICANT CHANGE UP (ref 1–3.3)
LYMPHOCYTES NFR BLD AUTO: 29.3 % — SIGNIFICANT CHANGE UP (ref 13–44)
LYMPHOCYTES NFR BLD MANUAL: 18.4 % — SIGNIFICANT CHANGE UP (ref 13–44)
MAGNESIUM SERPL-MCNC: 1.9 MG/DL — SIGNIFICANT CHANGE UP (ref 1.6–2.6)
MANUAL NRBC #: 0.06 K/UL — HIGH (ref 0–0)
MCHC RBC-ENTMCNC: 19.1 PG — LOW (ref 27–34)
MCHC RBC-ENTMCNC: 27.3 G/DL — LOW (ref 32–36)
MCV RBC AUTO: 70 FL — LOW (ref 80–100)
MICROCYTES BLD QL: ABNORMAL
MONOCYTES # BLD AUTO: 0.56 K/UL — SIGNIFICANT CHANGE UP (ref 0–0.9)
MONOCYTES # BLD MANUAL: 0.15 K/UL — SIGNIFICANT CHANGE UP (ref 0–0.9)
MONOCYTES NFR BLD AUTO: 9.5 % — SIGNIFICANT CHANGE UP (ref 2–14)
MONOCYTES NFR BLD MANUAL: 2.6 % — SIGNIFICANT CHANGE UP (ref 2–14)
NEUTROPHILS # BLD AUTO: 3.57 K/UL — SIGNIFICANT CHANGE UP (ref 1.8–7.4)
NEUTROPHILS # BLD MANUAL: 4.67 K/UL — SIGNIFICANT CHANGE UP (ref 1.8–7.4)
NEUTROPHILS NFR BLD AUTO: 60.3 % — SIGNIFICANT CHANGE UP (ref 43–77)
NEUTROPHILS NFR BLD MANUAL: 79 % — HIGH (ref 43–77)
NRBC # BLD AUTO: 0.02 K/UL — HIGH (ref 0–0)
NRBC # BLD: 1 /100 WBCS — HIGH (ref 0–0)
NRBC # FLD: 0.02 K/UL — HIGH (ref 0–0)
NRBC BLD AUTO-RTO: 0 /100 WBCS — SIGNIFICANT CHANGE UP (ref 0–0)
NRBC BLD-RTO: 1 /100 WBCS — HIGH (ref 0–0)
OVALOCYTES BLD QL SMEAR: ABNORMAL
PHOSPHATE SERPL-MCNC: 3.1 MG/DL — SIGNIFICANT CHANGE UP (ref 2.5–4.5)
PLAT MORPH BLD: NORMAL — SIGNIFICANT CHANGE UP
PLATELET # BLD AUTO: 375 K/UL — SIGNIFICANT CHANGE UP (ref 150–400)
PLATELET COUNT - ESTIMATE: NORMAL — SIGNIFICANT CHANGE UP
PMV BLD: 10 FL — SIGNIFICANT CHANGE UP (ref 7–13)
POIKILOCYTOSIS BLD QL AUTO: ABNORMAL
POLYCHROMASIA BLD QL SMEAR: SLIGHT — SIGNIFICANT CHANGE UP
POTASSIUM SERPL-MCNC: 4 MMOL/L — SIGNIFICANT CHANGE UP (ref 3.5–5.3)
POTASSIUM SERPL-SCNC: 4 MMOL/L — SIGNIFICANT CHANGE UP (ref 3.5–5.3)
PROT SERPL-MCNC: 7.6 G/DL — SIGNIFICANT CHANGE UP (ref 6–8.3)
RBC # BLD: 3.97 M/UL — SIGNIFICANT CHANGE UP (ref 3.8–5.2)
RBC # FLD: 21.4 % — HIGH (ref 10.3–14.5)
RBC BLD AUTO: ABNORMAL
RH IG SCN BLD-IMP: POSITIVE — SIGNIFICANT CHANGE UP
SODIUM SERPL-SCNC: 143 MMOL/L — SIGNIFICANT CHANGE UP (ref 135–145)
TARGETS BLD QL SMEAR: SLIGHT — SIGNIFICANT CHANGE UP
WBC # BLD: 5.91 K/UL — SIGNIFICANT CHANGE UP (ref 3.8–10.5)
WBC # FLD AUTO: 5.91 K/UL — SIGNIFICANT CHANGE UP (ref 3.8–10.5)

## 2025-07-03 PROCEDURE — 99285 EMERGENCY DEPT VISIT HI MDM: CPT

## 2025-07-03 PROCEDURE — 93010 ELECTROCARDIOGRAM REPORT: CPT

## 2025-07-03 NOTE — ED PROVIDER NOTE - DATE/TIME 2
[facility]    History and Physical - Hospitalist Service       Date of Admission:  5/21/2020    Assessment & Plan   Benjamín Us is a 55 year old male with a past medical history of aortic stenosis, hypertension, hyperlipidemia, DMII, tobacco abuse, and HARIS admitted on 5/21/2020 following a tooth extraction with planned AVR and CABG on 5/22/2020.     Severe Aortic Stenosis  CAD significant disease LAD and diagonal    Planned AVR and CABG on 5/22/2020  Patient reports worsening of symptoms related to his aortic stenosis over the last 60 days with chest discomfort and dyspnea on exertion.  Coronary angiogram from 01/03/2020 demonstrates severe single-vessel coronary artery disease involving the LAD distribution with a 70% mid LAD disease and also an 85% moderate-sized second diagonal artery disease.  Last echocardiogram was from 10/29/2019 demonstrating preserved LV systolic function, severe LVH, severe aortic stenosis with a mean gradient of 50 mmHg, trileaflet valve.  No aortic valve insufficiency.  -Planned AVR/CABG on 5/22/2020  -NPO at midnight  -CV surgery consult  -Telemetry monitoring  -Continue PTA carvedilol 25mg PO BID  -Hold PTA ASA 81mg PO daily    Dental Extraction  5/21/2020 dental extraction for carious dentition for pre-operative management for planned AVR/CABG 5/2020  EBL<50ml  Recommendations per OMFS  -Continue OMFS  - Have patient bite on 4x4 gauze if excessive oozing, expect oozing for 24-48 hours  - Sergicel placed in all extraction sites and patient hemostatic at end of procedure  - Peridex rinses 15cc swish and spit BID  - Please continue 3 doses of ancef perioperatively, or longer duration if desired by cardiology/cardiothoracic surgery  - Keep head of bed elevated  - Ice to jaws 20 minutes on and off first 24-48 hours  - Okay for full liquid diet    Hypertension  -Continue PTA Carvedilol 25mg PO BID  -Hold PTA Lisinopril 40mg PO daily due to planned OR  -Defer BP goal  parameters to CV surgery    Hyperlipidemia  -Continue PTA Atorvastatin 40mg PO daily    DMII  HgbA1C 12.2  Last HbgA1C 7.2 in 2016. Patient report he met with his primary and no medications were prescribed plan was diet management. Patient has not followed up.   's on arrival to [facility].  Starting sliding scale insulin only at this time as patient will be NPO for AVR/CABG at midnight. Plan for insulin drip post-operatively.   -Sliding scale insulin medium resistance  -Hypoglycemia protocol  -Diabetes education  -New close follow up outpatient    HARIS  -Does not use home CPAP  -Follow up with outpatient sleep recommendation    Nicotine Dependence  Patient reports he quit 3/2020 with the use of chantix     Diet: Regular Diet Adult    DVT Prophylaxis: Pneumatic Compression Devices  Bernal Catheter: not present  Code Status: Full Code           Disposition Plan   Expected discharge: 4-7 days pending AVR/CABG.  Entered: JACKSON Pardo CNP 05/21/2020, 2:27 PM     The patient's care was discussed with the Attending Physician, Dr. Walton.    JACKSON Pardo CNP  [facility]    ______________________________________________________________________    Chief Complaint   Dental extraction    History is obtained from the patient and electronic health record    History of Present Illness   Benjamín Us is a 55 year old male with a past medical history of aortic stenosis, hypertension, hyperlipidemia, DMII, tobacco abuse, and HARIS admitted on 5/21/2020 following a tooth extraction with planned AVR and CABG on 5/22/2020. Patient reports worsening of symptoms related to his aortic stenosis over the last 60 days with chest discomfort and dyspnea on exertion. Per chart review, patient's planned surgical intervention was delayed related to the COVID pandemic. He presented 5/21/2020 for multiple dental extractions for pre-operative planning for AVR/CABG. The patient reports known dental caries for quite  some time but he does not regularly see a dentist.   Coronary angiogram from 01/03/2020 demonstrates severe single-vessel coronary artery disease involving the LAD distribution with a 70% mid LAD disease and also an 85% moderate-sized second diagonal artery disease.  Last echocardiogram was from 10/29/2019 demonstrating preserved LV systolic function, severe LVH, severe aortic stenosis with a mean gradient of 50 mmHg, trileaflet valve.  No aortic valve insufficiency.  He denies any fever, chills, sore throat, headache, resting shortness of breath, nausea, vomiting diarrhea, numbness, tingling, dizziness, lightheadedness, or palpations.    Review of Systems    The 10 point Review of Systems is negative other than noted in the HPI or here.     Past Medical History    I have reviewed this patient's medical history and updated it with pertinent information if needed.   Past Medical History:   Diagnosis Date     ADD (attention deficit disorder)      Aneurysm of thoracic aorta (H) 5/21/2014     Problem list name updated by automated process. Provider to review     Aortic valve disorder 5/21/2014     CARDIOVASCULAR SCREENING; LDL GOAL LESS THAN 160 2/25/2014     Dermatitis 9/14/2018     Essential hypertension, benign 9/14/2018     Hypertension      Hypertensive urgency 8/8/2013     HARIS (obstructive sleep apnea)      Type 2 diabetes mellitus with diabetic dermatitis, without long-term current use of insulin (H) 9/14/2018       Past Surgical History   I have reviewed this patient's surgical history and updated it with pertinent information if needed.  Past Surgical History:   Procedure Laterality Date     APPENDECTOMY       COLONOSCOPY N/A 6/15/2015    Procedure: COLONOSCOPY;  Surgeon: Vasyl Lawson MD;  Location:  GI     CV CORONARY ANGIOGRAM N/A 1/3/2020    Procedure: Coronary Angiogram;  Surgeon: Álvaro Andrade MD;  Location: [facility] CARDIAC CATH LAB       Social History   I have reviewed this patient's  social history and updated it with pertinent information if needed.  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   Family History   Problem Relation Age of Onset     Hypertension Mother      Breast Cancer Mother      Diabetes Father      Diabetes Brother      Thyroid Disease Sister      Colon Cancer No family hx of        Prior to Admission Medications   Prior to Admission Medications   Prescriptions Last Dose Informant Patient Reported? Taking?   amphetamine-dextroamphetamine (ADDERALL) 20 MG tablet 2020 at 0800  Yes Yes   Si mg 3 times daily    aspirin (ASPIRIN) 81 MG EC tablet 2020 at 0800  Yes Yes   Sig: Take 81 mg by mouth 2 times daily   atorvastatin (LIPITOR) 40 MG tablet 2020 at 1600  No Yes   Sig: Take 1 tablet (40 mg) by mouth daily   carvedilol (COREG) 25 MG tablet 2020 at 1600  No Yes   Sig: Take 1 tablet (25 mg) by mouth 2 times daily (with meals)   lisinopril (PRINIVIL/ZESTRIL) 40 MG tablet 2020 at 0600  No Yes   Sig: Take 1 tablet (40 mg) by mouth daily   varenicline (CHANTIX) 1 MG tablet 2020 at Unknown time  Yes Yes   Sig: Take 1 mg by mouth 2 times daily      Facility-Administered Medications: None     Allergies   Allergies   Allergen Reactions     Penicillins Nausea and Vomiting     Zithromax [Azithromycin Dihydrate]      hives       Physical Exam   Vital Signs: Temp: 97.6  F (36.4  C) Temp src: Axillary BP: (!) 143/91 Pulse: 82 Heart Rate: 91 Resp: 12 SpO2: 95 % O2 Device: None (Room air) Oxygen Delivery: 2 LPM  Weight: 223 lbs 0 oz    Constitutional:  Awake, alert, cooperative, no apparent distress.  Eyes: Conjunctiva and pupils examined and normal.  HEENT: Moist mucous membranes, dental extraction of all dentition, scabbed areas, no active oozing.  Respiratory: Clear to auscultation bilaterally, no crackles or wheezing.  Cardiovascular: Regular rate and rhythm, normal S1 and S2, and systolic murmur noted.  GI: Soft, non-distended, non-tender, normal bowel sounds.  Skin: No rashes, no cyanosis, no edema on exposed skin  Musculoskeletal: No joint swelling, erythema or tenderness.  Neurologic: Cranial nerves 2-12 intact, normal strength and sensation.  Psychiatric: Alert, oriented to person, place and time, no obvious anxiety or depression.    Data   Data reviewed today: I reviewed all medications, new labs and imaging results over the last 24 hours. I personally reviewed no images or EKG's today.    Recent Labs   Lab 05/21/20  0724   WBC 12.0*   HGB 12.7*   MCV 89         POTASSIUM 4.4   CHLORIDE 100   CO2 23   BUN 23   CR 0.95   ARMIN 8.9   *   ALBUMIN 3.4   PROTTOTAL 7.5   BILITOTAL 0.3   ALKPHOS 104   ALT 32   AST 19     No results found for this or any previous visit (from the past 24 hour(s)).   04-Jul-2025 00:06

## 2025-07-03 NOTE — ED PROVIDER NOTE - CLINICAL SUMMARY MEDICAL DECISION MAKING FREE TEXT BOX
54-year-old female past medical history of schizophrenia, iron deficiency anemia coming from [facility] for abnormal labs–potassium of 6.5.  Patient is denying any complaints at this time.  Denies chest pain, difficulty breathing, vaginal bleeding, blood in the stool.  Upon chart review noted patient has history of anemia–was seen by hematology team in December 2023–stated this is likely secondary to iron deficiency anemia.    Non toxic apprearing. No resp distress. Pt does not want to be examined.      EKG–no ST elevations, depressions.    Differential diagnoses include but not limited to electrolyte abnormalities, anemia.

## 2025-07-03 NOTE — ED PROVIDER NOTE - PROGRESS NOTE DETAILS
Dexter Lam DO Patient was seen here about a month ago and noted H&H was 6.9/24, which is improved today to 7.6/27.8 without any interventions.  Both resident and I (Yeny Lam) soke with Dr. Lund at [facility] at [phone]–states he will not accept the patient back to the facility unless gynecology team comes and evaluate the patient.  States he is concerned about the low H&H.  He is explained that patient is hemodynamically stable, does not have any symptoms at this time.  Her last CT in the system is from October 2023 which showed a uterine fibroid.  As per him she did not have any other imaging performed.  There is no documentation as per him that she stated that patient has been having any heavy bleeding.  States patient is under court order to be inpatient [facility] and does not have capacity.  Attempted to explain that given there were no interventions, patient's kidney function is within normal range and her H&H improved, no source of active bleed patient is stable to be discharged.  He is adamant that patient be evaluated by gynecology team before patient is sent back to the facility. Brooke Mcgrath MD (PGY2) Patient signed out to me.  Patient presenting for elevated potassium of 6.5 at [facility].  Repeat blood work here, potassium within normal limits.  Dr. Lund from [facility] was not willing to accept patient until evaluated by OB for her chronic vaginal bleeding due to fibroids.  Patient was assessed by OB and patient is cleared for discharge as per OB recommendations.  Dr. Lund was called back and is willing to accept patient.

## 2025-07-03 NOTE — ED ADULT NURSE NOTE - CHIEF COMPLAINT QUOTE
k+6.5   pt appears well NAD at triage time  sent from [facility] for eval pt refused oral temp

## 2025-07-03 NOTE — PROVIDER CONTACT NOTE (OTHER) - RECOMMENDATIONS
Update-pt now cleared and accepted back to [facility]; confirmed with RN Ms. Marshall at [facility]; pt travels via ambulance.  Provider aware and in agreement with ambulance transport.

## 2025-07-03 NOTE — ED ADULT NURSE REASSESSMENT NOTE - NS ED NURSE REASSESS COMMENT FT1
report received from [facility] laureano. pt alert, oriented. pt from [facility], staff at bedside. pt denies headache, dizziness, chest pain, SOB, chills at this time. respirations even and unlabored. pt provided with food/drink. pending dispo. safety maintained, bed locked in appropriate position

## 2025-07-03 NOTE — ED ADULT NURSE NOTE - OBJECTIVE STATEMENT
54 year old female, received to [facility]. Pt A&Ox3, respirations equal and unlabored. Hx of anemia, anxiety, constipation, schizophrenia. Pt coming from [facility] with staff at bedside with c/o elevated potassium and low hemoglobin. Pt will not answer any questions, pt refusing IV access at first but able to educate patient on why we need blood work. Pt refusing cardiac monitor, MD Lam aware. Pt denies any physical complaints. 22G IV placed to R AC. Labs obtained. Pending results. No acute distress noted. Safety maintained.

## 2025-07-03 NOTE — ED PROVIDER NOTE - NSFOLLOWUPINSTRUCTIONS_ED_ALL_ED_FT
It was a pleasure caring for you today!    You were seen in the ER today for  elevated potassium on blood work. Your labs were repeated today and your potassium was normal. You were also evaluated by ob/gyn.    Please follow up with your primary care doctor within 1 - 3 days. Call and let them know you were seen in the ER today.   Bring the results of your blood work and imaging with you to your appointment, if applicable.      Return to the ER for any worsening symptoms or concerns, including chest pain, shortness of breath, lightheadedness, weakness, or any other concerns.

## 2025-07-03 NOTE — ED PROVIDER NOTE - PATIENT PORTAL LINK FT
You can access the FollowMyHealth Patient Portal offered by [facility] by registering at the following website: [email or web address]. By joining [facility]’s FollowMyHealth portal, you will also be able to view your health information using other applications (apps) compatible with our system.

## 2025-07-03 NOTE — ED ADULT TRIAGE NOTE - CHIEF COMPLAINT QUOTE
k+6.5   pt appears well NAD at triage time  sent from [facility] for eval pt refused oral temp

## 2025-07-03 NOTE — PROVIDER CONTACT NOTE (OTHER) - ASSESSMENT
Called [facility] safety [phone]-was connected with [facility]; spoke with RN Ms. Marshall; who reports that the # for the unit today is [phone].  MD on call is Dr. Lund [phone]; MD to MD handoff needed prior to pt return.  SW to coordinate transport when pt accepted back to unit.

## 2025-07-03 NOTE — ED PROVIDER NOTE - ATTENDING CONTRIBUTION TO CARE
54-year-old female past medical history of schizophrenia, iron deficiency anemia coming from [facility] for abnormal labs–potassium of 6.5.  Patient is denying any complaints at this time.  Denies chest pain, difficulty breathing, vaginal bleeding, blood in the stool.  Upon chart review noted patient has history of anemia–was seen by hematology team in December 2023–stated this is likely secondary to iron deficiency anemia.    Non toxic apprearing. No resp distress. Pt does not want to be examined.      EKG–no ST elevations, depressions.    Differential diagnoses include but not limited to electrolyte abnormalities, anemia.

## 2025-07-04 NOTE — CONSULT NOTE ADULT - ASSESSMENT
A/P: Patient is a 53yo female with hx of psychiatric illness who was sent in from [facility] due to abnormal lab value of K+ 6.5, found to have hgb 7.6. Known hx of fibroid uterus but patient (and aide at bedside) report no vaginal bleeding. In absence of vaginal bleeding and no abdominal/pelvic pain, low concern for acute GYN pathology.     Recs:   - no acute GYN intervention at this time   - consider hematology workup for anemia of unknown cause   - recommend outpatient followup with GYN for routine care   - patient cleared for discharge from GYN perspective, remainder of care per ED team     D/w Dr. Wang A Sacks, PGY2     note delayed 2/2 clinical duties  A/P: Patient is a 55yo female with hx of psychiatric illness who was sent in from [facility] due to abnormal lab value of K+ 6.5, found to have hgb 7.6. Known hx of fibroid uterus but patient (and aide at bedside) report no vaginal bleeding. In absence of vaginal bleeding and no abdominal/pelvic pain, low concern for acute GYN pathology.     Recs:   - no acute GYN intervention at this time   - consider hematology workup for anemia of unknown cause   - recommend outpatient followup with GYN for routine care   - patient cleared for discharge from GYN perspective, remainder of care per ED team     D/w Dr. Wang A Sacks, PGY2     note delayed 2/2 clinical duties     Attending attestation:  Pt discussed with resident. Agree with findings and plan as documented with changes made as necessary.   55yo PMHx psychiatric illness here for hyperkalemia and anemia. GYN consulted for clearance for return to psych facility due to anemia and fibroid uterus. While fibroids can be a cause of AUB, there has not been any vaginal bleeding, so do not suspect fibroids as etiology for anemia. Unclear if pt with oligomenorrhea vs postmenopausal. Recommend Hematology consultation/follow up for anemia of unknown etiology and outpatient GYN follow up for routine GYN care and can further follow fibroids. Hyperkalemia management per ED.     MATT Braros MD

## 2025-07-04 NOTE — CONSULT NOTE ADULT - SUBJECTIVE AND OBJECTIVE BOX
GEOFFREY KHOURY  54y  Female [id number]    HPI: Patient is a 53yo female with hx of psychiatric illness who was sent in from [facility] due to abnormal lab value of K+ 6.5. Patient found to have Hgb 7.6. Known hx of fibroid uterus. Patient unwilling to provide further history but denies vaginal bleeding, denies abdominal/pelvic pain. States she does not have regular menses but is unwilling to provide further details regarding menopause/LMP.     No further history obtained.     Vital Signs Last 24 Hrs  T(C): 36.4 (03 Jul 2025 22:57), Max: 36.4 (03 Jul 2025 22:57)  T(F): 97.5 (03 Jul 2025 22:57), Max: 97.5 (03 Jul 2025 22:57)  HR: 86 (03 Jul 2025 22:57) (80 - 86)  BP: 101/67 (03 Jul 2025 22:57) (101/67 - 121/76)  BP(mean): 91 (03 Jul 2025 19:53) (91 - 91)  RR: 16 (03 Jul 2025 22:57) (16 - 18)  SpO2: 98% (03 Jul 2025 22:57) (97% - 100%)    Parameters below as of 03 Jul 2025 22:57  Patient On (Oxygen Delivery Method): room air        Physical Exam:   General: sleeping in bed, NAD    HEENT: neck supple, full ROM  CV: well perfused   Lungs: non labored breathing on RA   Abd: Soft, non-tender, non-distended. No rebound./guarding   :  Declined   Speculum Exam: Declined   Ext: non-tender b/l, no edema     LABS:                              7.6    5.91  )-----------( 375      ( 03 Jul 2025 18:40 )             27.8     07-03    143  |  108[H]  |  12  ----------------------------<  85  4.0   |  26  |  0.76    Ca    9.6      03 Jul 2025 18:40  Phos  3.1     07-03  Mg     1.90     07-03    TPro  7.6  /  Alb  3.7  /  TBili  <0.2  /  DBili  x   /  AST  11  /  ALT  7   /  AlkPhos  121[H]  07-03    I&O's Detail      Urinalysis Basic - ( 03 Jul 2025 18:40 )    Color: x / Appearance: x / SG: x / pH: x  Gluc: 85 mg/dL / Ketone: x  / Bili: x / Urobili: x   Blood: x / Protein: x / Nitrite: x   Leuk Esterase: x / RBC: x / WBC x   Sq Epi: x / Non Sq Epi: x / Bacteria: x        RADIOLOGY & ADDITIONAL STUDIES:

## 2025-07-10 ENCOUNTER — INPATIENT (INPATIENT)
Facility: HOSPITAL | Age: 55
LOS: 4 days | Discharge: PSYCHIATRIC OR SUBSTANCE USE FACILITY | End: 2025-07-15
Attending: INTERNAL MEDICINE | Admitting: INTERNAL MEDICINE
Payer: MEDICAID

## 2025-07-10 VITALS
TEMPERATURE: 98 F | DIASTOLIC BLOOD PRESSURE: 65 MMHG | RESPIRATION RATE: 16 BRPM | SYSTOLIC BLOOD PRESSURE: 91 MMHG | WEIGHT: 248.9 LBS | HEART RATE: 103 BPM | OXYGEN SATURATION: 100 %

## 2025-07-10 LAB
ALBUMIN SERPL ELPH-MCNC: 3.6 G/DL — SIGNIFICANT CHANGE UP (ref 3.3–5)
ALP SERPL-CCNC: 117 U/L — SIGNIFICANT CHANGE UP (ref 40–120)
ALT FLD-CCNC: 10 U/L — SIGNIFICANT CHANGE UP (ref 4–33)
ANION GAP SERPL CALC-SCNC: 10 MMOL/L — SIGNIFICANT CHANGE UP (ref 7–14)
APTT BLD: 31 SEC — SIGNIFICANT CHANGE UP (ref 26.1–36.8)
AST SERPL-CCNC: 11 U/L — SIGNIFICANT CHANGE UP (ref 4–32)
BILIRUB SERPL-MCNC: <0.2 MG/DL — SIGNIFICANT CHANGE UP (ref 0.2–1.2)
BLD GP AB SCN SERPL QL: NEGATIVE — SIGNIFICANT CHANGE UP
BUN SERPL-MCNC: 18 MG/DL — SIGNIFICANT CHANGE UP (ref 7–23)
CALCIUM SERPL-MCNC: 9.3 MG/DL — SIGNIFICANT CHANGE UP (ref 8.4–10.5)
CHLORIDE SERPL-SCNC: 106 MMOL/L — SIGNIFICANT CHANGE UP (ref 98–107)
CO2 SERPL-SCNC: 28 MMOL/L — SIGNIFICANT CHANGE UP (ref 22–31)
CREAT SERPL-MCNC: 0.8 MG/DL — SIGNIFICANT CHANGE UP (ref 0.5–1.3)
EGFR: 87 ML/MIN/1.73M2 — SIGNIFICANT CHANGE UP
EGFR: 87 ML/MIN/1.73M2 — SIGNIFICANT CHANGE UP
GLUCOSE SERPL-MCNC: 99 MG/DL — SIGNIFICANT CHANGE UP (ref 70–99)
HCT VFR BLD CALC: 22.8 % — LOW (ref 34.5–45)
HGB BLD-MCNC: 6.5 G/DL — CRITICAL LOW (ref 11.5–15.5)
INR BLD: 1.01 RATIO — SIGNIFICANT CHANGE UP (ref 0.85–1.16)
MCHC RBC-ENTMCNC: 19.9 PG — LOW (ref 27–34)
MCHC RBC-ENTMCNC: 28.5 G/DL — LOW (ref 32–36)
MCV RBC AUTO: 69.9 FL — LOW (ref 80–100)
NRBC # BLD AUTO: 0.06 K/UL — HIGH (ref 0–0)
NRBC # FLD: 0.06 K/UL — HIGH (ref 0–0)
NRBC BLD AUTO-RTO: 0 /100 WBCS — SIGNIFICANT CHANGE UP (ref 0–0)
NT-PROBNP SERPL-SCNC: <36 PG/ML — SIGNIFICANT CHANGE UP
PLATELET # BLD AUTO: 327 K/UL — SIGNIFICANT CHANGE UP (ref 150–400)
PMV BLD: 10.3 FL — SIGNIFICANT CHANGE UP (ref 7–13)
POTASSIUM SERPL-MCNC: 4 MMOL/L — SIGNIFICANT CHANGE UP (ref 3.5–5.3)
POTASSIUM SERPL-SCNC: 4 MMOL/L — SIGNIFICANT CHANGE UP (ref 3.5–5.3)
PROT SERPL-MCNC: 7 G/DL — SIGNIFICANT CHANGE UP (ref 6–8.3)
PROTHROM AB SERPL-ACNC: 11.7 SEC — SIGNIFICANT CHANGE UP (ref 9.9–13.4)
RBC # BLD: 3.26 M/UL — LOW (ref 3.8–5.2)
RBC # FLD: 23 % — HIGH (ref 10.3–14.5)
RH IG SCN BLD-IMP: POSITIVE — SIGNIFICANT CHANGE UP
SARS-COV-2 RNA SPEC QL NAA+PROBE: SIGNIFICANT CHANGE UP
SODIUM SERPL-SCNC: 144 MMOL/L — SIGNIFICANT CHANGE UP (ref 135–145)
TROPONIN T, HIGH SENSITIVITY RESULT: 11 NG/L — SIGNIFICANT CHANGE UP
WBC # BLD: 8.7 K/UL — SIGNIFICANT CHANGE UP (ref 3.8–10.5)
WBC # FLD AUTO: 8.7 K/UL — SIGNIFICANT CHANGE UP (ref 3.8–10.5)

## 2025-07-10 PROCEDURE — 99291 CRITICAL CARE FIRST HOUR: CPT

## 2025-07-10 NOTE — ED ADULT NURSE NOTE - OBJECTIVE STATEMENT
Patient is awake and alert, sent in from [facility] for low H & H. Patient states feeling dizziness at times. No chest pain , no sob. Patient refused this RN to draw he blood or place IV . Rani made aware.

## 2025-07-10 NOTE — ED PROVIDER NOTE - PROGRESS NOTE DETAILS
Kirt Saravia, DO (PGY2): Labs remarkable for anemia to 6.5.  No other actionable findings.  On reassessment, patient states that "blood is a send" and that she will not receive it.  However, patient is unable to state why she does not want blood other than that she feels that it is a sin, and is unable to state back to me that she understands the risks of not receiving blood.  Patient is involuntarily committed to [facility].  At this time, it is my professional opinion that the patient does not have capacity to refuse.  Case discussed with ED psych attending Dr. Bunch, who agrees with my assessment.  Will pursue blood transfusion, with sedation as needed for compliance with management. Zenia INGRAM: Pt is involuntarily committed pt without capacity. Pt at high risk for deterioration if she does not undergo transfusion therefore in her best interest for two physician consent for blood transfusion. Kirt Saravia DO (PGY2): After extensive review of chart, unable to find next of kin contact information for consent for blood transfusion.  2 PC performed for blood transfusion given patient's status.

## 2025-07-10 NOTE — ED PROVIDER NOTE - CLINICAL SUMMARY MEDICAL DECISION MAKING FREE TEXT BOX
54F, poor historian, unclear past medical history, however based off of medication list, presumed past medical history of hypertension, unclear psychiatric history, diabetes, asthma, hyperlipidemia, presenting to ED for anemia.  Patient states that she had routine outpatient labs today.  Patient is a resident of [facility], paperwork from [facility] states the patient has a "uterine mass", however no additional information available regarding this mass from their paperwork.  Patient was found to have fibroid uterus on 7/4/2025.  Patient denies vaginal bleeding, rectal bleeding, petechial bleeding, or any other bleeding etiology.  Endorses dizziness, however no lightheadedness, no chest pain, shortness of breath.    Vitals:  BP 91/65  Resp 16    Temp 98  SpO2 100% room air    Pale appearing 54-year-old female presenting to ED for outpatient anemia.  Concern for symptomatic anemia given lightheadedness, vital signs.  Low concern for CVA in this patient.  Plan for labs, transfusion as needed.  Will pursue additional imaging to assess for source of bleeding as necessary.  Dispo workup and reassessment.    ***Refer to progress notes as a continuation of this MDM, for updates in clinical course, and for reassessments.***

## 2025-07-10 NOTE — ED PROVIDER NOTE - ATTENDING CONTRIBUTION TO CARE
aspiration precautions/fall precautions 54-year-old female presents emergency department with report of anemia.  Was reported to be 6.5.  Patient is a [facility] inpatient.  Patient states she does not want a blood transfusion.  Patient is not having any melena.  She does have conjunctival pallor, otherwise neurologically intact and nontoxic-appearing.  Patient was signed out to oncoming attending pending screening labs and possible need for transfusion.  Likely admission.  The patient's condition was not amenable to outpatient treatment due to either the lack of feasibility of outpatient care coordination, possibility for further decompensation with adverse outcome if discharge, or treatments and diagnostic  modalities only available during an inpatient hospitalization.  Additional time as above spent by myself, separate from billable procedures, included coordination of patient care with consultants/admitting team, performing reassessments on the patient, documentation, and counseling patient/family members on the care provided.

## 2025-07-10 NOTE — ED PROVIDER NOTE - PHYSICAL EXAMINATION
Gen: AAOx3, non-toxic  HEENT: NCAT. PEERLA, EOMI, oral mucosa moist, conjunctival pallor noted   Lung: CTAB, no respiratory distress, no wheezes/rhonchi/rales B/L, speaking in full sentences  CV: Tachycardic, no murmurs, rubs or gallops  Abd: soft, NTND, no guarding, no CVA tenderness  MSK: no visible deformities  Neuro: No focal sensory or motor deficits  Skin: Warm, well perfused, no rash  Psych: Flat affect

## 2025-07-11 DIAGNOSIS — Z29.9 ENCOUNTER FOR PROPHYLACTIC MEASURES, UNSPECIFIED: ICD-10-CM

## 2025-07-11 DIAGNOSIS — D64.9 ANEMIA, UNSPECIFIED: ICD-10-CM

## 2025-07-11 DIAGNOSIS — F20.9 SCHIZOPHRENIA, UNSPECIFIED: ICD-10-CM

## 2025-07-11 DIAGNOSIS — Z79.899 OTHER LONG TERM (CURRENT) DRUG THERAPY: ICD-10-CM

## 2025-07-11 LAB
ADD ON TEST-SPECIMEN IN LAB: SIGNIFICANT CHANGE UP
ANION GAP SERPL CALC-SCNC: 10 MMOL/L — SIGNIFICANT CHANGE UP (ref 7–14)
BUN SERPL-MCNC: 16 MG/DL — SIGNIFICANT CHANGE UP (ref 7–23)
CALCIUM SERPL-MCNC: 9.1 MG/DL — SIGNIFICANT CHANGE UP (ref 8.4–10.5)
CHLORIDE SERPL-SCNC: 106 MMOL/L — SIGNIFICANT CHANGE UP (ref 98–107)
CLOZAPINE SERPL-MCNC: 80 NG/ML — LOW (ref 350–600)
CO2 SERPL-SCNC: 25 MMOL/L — SIGNIFICANT CHANGE UP (ref 22–31)
CREAT SERPL-MCNC: 0.75 MG/DL — SIGNIFICANT CHANGE UP (ref 0.5–1.3)
EGFR: 94 ML/MIN/1.73M2 — SIGNIFICANT CHANGE UP
EGFR: 94 ML/MIN/1.73M2 — SIGNIFICANT CHANGE UP
GLUCOSE SERPL-MCNC: 77 MG/DL — SIGNIFICANT CHANGE UP (ref 70–99)
HAPTOGLOB SERPL-MCNC: 145 MG/DL — SIGNIFICANT CHANGE UP (ref 34–200)
HCT VFR BLD CALC: 27.1 % — LOW (ref 34.5–45)
HGB BLD-MCNC: 7.7 G/DL — LOW (ref 11.5–15.5)
IMMATURE RETICULOCYTE FRACTION %: 40.8 % — SIGNIFICANT CHANGE UP
IRON SATN MFR SERPL: 304 UG/DL — HIGH (ref 30–160)
IRON SATN MFR SERPL: 76 % — HIGH (ref 14–50)
LDH SERPL L TO P-CCNC: 156 U/L — SIGNIFICANT CHANGE UP (ref 135–225)
LITHIUM SERPL-MCNC: 0.1 MMOL/L — LOW (ref 0.6–1.2)
MAGNESIUM SERPL-MCNC: 1.8 MG/DL — SIGNIFICANT CHANGE UP (ref 1.6–2.6)
MCHC RBC-ENTMCNC: 20.5 PG — LOW (ref 27–34)
MCHC RBC-ENTMCNC: 28.4 G/DL — LOW (ref 32–36)
MCV RBC AUTO: 72.1 FL — LOW (ref 80–100)
NRBC # BLD AUTO: 0.09 K/UL — HIGH (ref 0–0)
NRBC # FLD: 0.09 K/UL — HIGH (ref 0–0)
NRBC BLD AUTO-RTO: 1 /100 WBCS — HIGH (ref 0–0)
PHOSPHATE SERPL-MCNC: 2.7 MG/DL — SIGNIFICANT CHANGE UP (ref 2.5–4.5)
PLATELET # BLD AUTO: 341 K/UL — SIGNIFICANT CHANGE UP (ref 150–400)
PMV BLD: 10.5 FL — SIGNIFICANT CHANGE UP (ref 7–13)
POTASSIUM SERPL-MCNC: 4.1 MMOL/L — SIGNIFICANT CHANGE UP (ref 3.5–5.3)
POTASSIUM SERPL-SCNC: 4.1 MMOL/L — SIGNIFICANT CHANGE UP (ref 3.5–5.3)
RBC # BLD: 3.76 M/UL — LOW (ref 3.8–5.2)
RBC # FLD: 23.3 % — HIGH (ref 10.3–14.5)
RETICS #: 67.6 K/UL — SIGNIFICANT CHANGE UP (ref 25–125)
RETICS/RBC NFR: 2 % — SIGNIFICANT CHANGE UP (ref 0.5–2.5)
RETICULOCYTE HEMOGLOBIN EQUIVALENT: 20.6 PG — LOW (ref 30.6–40.7)
SODIUM SERPL-SCNC: 141 MMOL/L — SIGNIFICANT CHANGE UP (ref 135–145)
TIBC SERPL-MCNC: 400 UG/DL — SIGNIFICANT CHANGE UP (ref 220–430)
UIBC SERPL-MCNC: 96 UG/DL — LOW (ref 110–370)
WBC # BLD: 8.88 K/UL — SIGNIFICANT CHANGE UP (ref 3.8–10.5)
WBC # FLD AUTO: 8.88 K/UL — SIGNIFICANT CHANGE UP (ref 3.8–10.5)

## 2025-07-11 PROCEDURE — 99223 1ST HOSP IP/OBS HIGH 75: CPT

## 2025-07-11 PROCEDURE — 90792 PSYCH DIAG EVAL W/MED SRVCS: CPT

## 2025-07-11 RX ORDER — CLOZAPINE 100 MG
175 TABLET ORAL AT BEDTIME
Refills: 0 | Status: DISCONTINUED | OUTPATIENT
Start: 2025-07-11 | End: 2025-07-14

## 2025-07-11 RX ORDER — CLOZAPINE 100 MG
150 TABLET ORAL DAILY
Refills: 0 | Status: DISCONTINUED | OUTPATIENT
Start: 2025-07-11 | End: 2025-07-14

## 2025-07-11 RX ORDER — FERROUS SULFATE 137(45) MG
325 TABLET, EXTENDED RELEASE ORAL DAILY
Refills: 0 | Status: DISCONTINUED | OUTPATIENT
Start: 2025-07-11 | End: 2025-07-15

## 2025-07-11 RX ORDER — SENNA 187 MG
2 TABLET ORAL AT BEDTIME
Refills: 0 | Status: DISCONTINUED | OUTPATIENT
Start: 2025-07-11 | End: 2025-07-15

## 2025-07-11 RX ORDER — LITHIUM CARBONATE 600 MG/1
1200 CAPSULE, GELATIN COATED ORAL DAILY
Refills: 0 | Status: DISCONTINUED | OUTPATIENT
Start: 2025-07-11 | End: 2025-07-11

## 2025-07-11 RX ORDER — POLYETHYLENE GLYCOL 3350 17 G/17G
17 POWDER, FOR SOLUTION ORAL
Refills: 0 | Status: DISCONTINUED | OUTPATIENT
Start: 2025-07-11 | End: 2025-07-15

## 2025-07-11 RX ORDER — OLANZAPINE 10 MG/1
5 TABLET ORAL
Refills: 0 | Status: DISCONTINUED | OUTPATIENT
Start: 2025-07-11 | End: 2025-07-13

## 2025-07-11 RX ORDER — OLANZAPINE 10 MG/1
5 TABLET ORAL ONCE
Refills: 0 | Status: COMPLETED | OUTPATIENT
Start: 2025-07-11 | End: 2025-07-11

## 2025-07-11 RX ORDER — OLANZAPINE 10 MG/1
5 TABLET ORAL
Refills: 0 | Status: DISCONTINUED | OUTPATIENT
Start: 2025-07-11 | End: 2025-07-11

## 2025-07-11 RX ORDER — BISACODYL 5 MG
10 TABLET, DELAYED RELEASE (ENTERIC COATED) ORAL AT BEDTIME
Refills: 0 | Status: DISCONTINUED | OUTPATIENT
Start: 2025-07-11 | End: 2025-07-15

## 2025-07-11 RX ORDER — LITHIUM CARBONATE 600 MG/1
1200 CAPSULE, GELATIN COATED ORAL DAILY
Refills: 0 | Status: DISCONTINUED | OUTPATIENT
Start: 2025-07-11 | End: 2025-07-14

## 2025-07-11 RX ADMIN — OLANZAPINE 5 MILLIGRAM(S): 10 TABLET ORAL at 06:05

## 2025-07-11 RX ADMIN — OLANZAPINE 5 MILLIGRAM(S): 10 TABLET ORAL at 18:16

## 2025-07-11 RX ADMIN — LITHIUM CARBONATE 1200 MILLIGRAM(S): 600 CAPSULE, GELATIN COATED ORAL at 18:16

## 2025-07-11 NOTE — H&P ADULT - PROBLEM SELECTOR PLAN 1
longstanding history of anemia per sister of unknown etiology, on iron supplementation, with admission in 2023 for anemia requiring 1u pRBC (the one and only transfusion she ever got) with negative GI workup at that time. Sister does not know of any further anemia workup beyond that given her psychiatric illness have been limiting her ability to receive outpatient care  - without any overt evidence of bleed   - pending 1u pRBC  - f/u repeat CBC   - maintain active T&S, tranfuse for goal hgb >7   - check iron studies, b12, folate, retic count, LDH, haptoglobin   - with unrevealing GI workup in 2023, if above workup unrevealing, consider inpatient GI evaluation

## 2025-07-11 NOTE — PROGRESS NOTE ADULT - PROBLEM SELECTOR PLAN 2
- Home meds confirmed with Dr Odonnell ([phone]), psychiatrist from [facility]. Dr. Odonnell stated they have a court order for medications in case pt refuses, therefore patient cannot refuse medications.    - continue olanzapine 5 mg BID   - continue lithium 1200 mg daily   - continue clozapine solution 150 mg in AM and 175 mg PM  - On Haldol Decanoate 300 mg Q month, next due on 7/17/2025 - Home meds confirmed with Dr Odonnell ([phone]), psychiatrist from [facility]. Dr. Odonnell stated they have a court order for medications in case pt refuses, therefore patient cannot refuse medications.    - continue olanzapine 5 mg BID   - continue lithium 1200 mg daily   - continue clozapine solution 150 mg in AM and 175 mg PM  - On Haldol Decanoate 300 mg Q month, next due on 7/17/2025    Psychiatry consulted, recs appreciated:  -no need for 1:1, no need for transfer to [facility] or other acute psychiatric intervention  -continue home meds  -Agitation: Haldol 5mg q4h prn + Ativan 2mg q6h prn PO/IM/IV   -Monitor EKG qtc interval and hold antipsychotic if qtc>500  -Dispo: return to [facility] when medically stable  -does NOT have the capacity to refuse emergent medical care including labs/investigations, healthcare proxy is pt's nephew

## 2025-07-11 NOTE — PHARMACOTHERAPY INTERVENTION NOTE - COMMENTS
Medication history; incomplete; unable to confirm/obtain med list.   Called [facility] (7A) [phone], spoke with nurse/staff and was not able to retrieve medication list for the patient in the system (?). Suggested to obtain history from the medical doctor (Dr. Starks?) - unsure how to reach.     Will update if able to obtain any additional information regarding patient's medication history.

## 2025-07-11 NOTE — BH CONSULTATION LIAISON ASSESSMENT NOTE - CURRENT MEDICATION
MEDICATIONS  (STANDING):  bisacodyl 10 milliGRAM(s) Oral at bedtime  ferrous    sulfate 325 milliGRAM(s) Oral daily  lithium 1200 milliGRAM(s) Oral daily  OLANZapine 5 milliGRAM(s) Oral two times a day  pantoprazole    Tablet 40 milliGRAM(s) Oral before breakfast  polyethylene glycol 3350 17 Gram(s) Oral two times a day  senna 2 Tablet(s) Oral at bedtime    MEDICATIONS  (PRN):

## 2025-07-11 NOTE — BH CONSULTATION LIAISON ASSESSMENT NOTE - HPI (INCLUDE ILLNESS QUALITY, SEVERITY, DURATION, TIMING, CONTEXT, MODIFYING FACTORS, ASSOCIATED SIGNS AND SYMPTOMS)
55 yo female domiciled on [facility] grounds with PMHx chronic anemia of unknown etiology, constipation, GERD, PPHx schizophrenia, schizoaffective dos, no know hx SIB/SA, hx aggression in setting of medication nonadherence, multiple inpatient psychiatric admissions, has court ordered treatment over objection, on clozapine, lithium, olanzapine, and haldol decanoate, presenting with anemia, psychiatry consulted for psychosis and medication management. Pt was given 1x dose of IM Zyprexa 5 mg this AM for agitation prior to receiving blood transfusion.     Patient seen in ED, calm and cooperative, A&Ox4, soft speech, with blunted affect, disorganized and tangential thought process, denying SI/HI, grandiose and paranoid delusions, denying AVH, poor insight and judgment. Pt able to recognize that they were brought to ED by ambulance because of "low blood counts," though states that they don't want any transfusions because it is "evil" to do so. Pt recognizes that they may die without transfusion, though states they would "rather die with honor" than accept a transfusion. On chart review, pt had already received 1u of pRBCs at the time of this conversation. Pt acknowledges that they were feeling weak and dizzy as a result of low Hgb, though states they would just rest and recover on their own. She reports that because of her personal Mandaen beliefs, she thinks receiving transfusions are wrong; when asked what Roman Catholic she follows, she reports it is "my own." When asked why her Roman Catholic believes it's wrong to get transfusions, she states that "my blood and their blood would not mix".     [facility] and they have jim to go "between other heavens" and that she spends her time "taking care of the gris." When asked who the "gris" are, she points to the medical student and PCA in the room, though does not elaborate. Pt states that her name is "Royal" and that she is a creator, she states, "I created you," pointing to this provider. Pt later acknowledges that she lives at [facility] and that people have diagnosed her with paranoid schizophrenia and think that she is crazy, though she does not believe this. When asked if she takes medications, pt states, "Those are not for me, they are evil." Pt states her mood is "okay," denies any issues sleeping, states that she doesn't eat all the food at [facility] because it might be poisoned. Pt denies any SI/HI, AVH. Pt denies any substance use.

## 2025-07-11 NOTE — H&P ADULT - HISTORY OF PRESENT ILLNESS
Unclear if pt reliable historian. AAOx2 (self, 2025, and home) when asked where she was, replied with "you wouldn't believe me" and answered home. But currently feels well without complaints. [facility]    [facility] sent the medication list for the wrong patient. List of medications was obtained from [facility] staff on 7A at [phone] over the phone. They also said that they only have Mack Avery ([phone]) listed as NOK while there is a Sandra Elias listed as emergency contact in the EMR- they did not have this name on file. Called Mack, who says he is pt's nephew and son to Lester Avery who is pt's sister and had consented pt for blood during her 2023 admission. Neither knows a Sandra Elias. Consent obtained over phone. Both updated for reason for admission and workup thus far.  54F from inpatient [facility] with history of schizophrenia, GERD, iron deficiency anemia, and uterine fibroids sent from [facility] for anemia on outpatient labs requiring blood transfusion. Pt was seen in the ED recently on 7/4 for anemia and elevated potassium on outpatient labs but both were in normal range here with hgb of 7.6. She was also evaluated by GYN team at the time given history of fibroids. However given no obvious history of vaginal bleeding with concern for oligomenorrhea vs postmoenopauseal, they did not think the fibroids were the cause and pt was ultimately discharged back to [facility].     Per chart reivew, underwent EGD/colonoscopy here during 10/2023 admission for anemia requiring blood transfusion. It did not reveal any acute bleeding or obvious cause for anemia but was notable for large hiatal hernia, an 8mm polyp in ascending colon that was removed w/ path showing tubular adenoma, pseudopolyps along terminal ileum s/p biospies that were unremarkable, and sigmoid diverticulosis.      Unclear if pt reliable historian. AAOx2 (self, 2025, and home) when asked where she was, replied with "you wouldn't believe me" and answered home. But currently feels well without complaints. Denies history of vaginal bleeding, melena, hematochezia, or hematuria. States last bowel movement was today and was brown. Have not gotten period in her mid to late 40s.     ED Course:  VS on arrival with mild tachycardia to 103 and soft BP to 91/65 that improved to 87 and 104/75 respectively without intervention. Pt refused blood transfusion, however, is involuntarily admitted to inpatient [facility] and per prior admissions, without capacity. [facility] consented for blood, now pending 1u pRBC.     Pt tried to [facility]'ed in ED for blood, which was ordered however, per ED, reportedly could not 2PC without attempting to call NOK? so blood not yet given.   [facility] sent the medication list for the wrong patient. List of medications was obtained from [facility] staff on 7A at [phone] over the phone.They also said that they only have Mack Avery ([phone]) listed as NOK while there is a Sandra Elias listed as emergency contact in the EMR- they did not have this name on file. Called Mack, who says he is pt's nephew and son to Lester Avery who is pt's sister and had consented pt for blood during her 2023 admission. Neither knows a Sandra Elais. Consent obtained over phone. Both updated for reason for admission and workup thus far.

## 2025-07-11 NOTE — H&P ADULT - PROBLEM SELECTOR PLAN 4
Diet: NPO for now given pending blood transfusion   DVT ppx: SCD's, hold chemical ppx   Dispo: pending clinical course; from inpatient [facility]

## 2025-07-11 NOTE — H&P ADULT - NSHPPHYSICALEXAM_GEN_ALL_CORE
VITALS:   Vital Signs Last 24 Hrs  T(C): 36.4 (10 Jul 2025 22:36), Max: 36.7 (10 Jul 2025 18:06)  T(F): 97.6 (10 Jul 2025 22:36), Max: 98 (10 Jul 2025 18:06)  HR: 87 (10 Jul 2025 22:36) (87 - 103)  BP: 104/75 (10 Jul 2025 22:36) (91/65 - 104/75)  BP(mean): --  RR: 15 (10 Jul 2025 22:36) (15 - 16)  SpO2: 99% (10 Jul 2025 22:36) (99% - 100%)    Parameters below as of 10 Jul 2025 22:36  Patient On (Oxygen Delivery Method): room air      I&O's Summary    CAPILLARY BLOOD GLUCOSE    Physical Exam:  General: NAD, non-toxic appearing   HEENT: PERRL, no scleral icterus  CV: RRR, normal S1 and S2  Lungs: normal respiratory effort on RA, CTAB  Abd: soft, nontender, nondistended  Ext: no edema, 2+ peripheral pulses   Psych: AAOx2 (self, home, 2025), appropriate affect   Neuro: grossly non-focal, moving all extremities spontaneously   Skin: no rashes or lesions VITALS:   Vital Signs Last 24 Hrs  T(C): 36.4 (10 Jul 2025 22:36), Max: 36.7 (10 Jul 2025 18:06)  T(F): 97.6 (10 Jul 2025 22:36), Max: 98 (10 Jul 2025 18:06)  HR: 87 (10 Jul 2025 22:36) (87 - 103)  BP: 104/75 (10 Jul 2025 22:36) (91/65 - 104/75)  BP(mean): --  RR: 15 (10 Jul 2025 22:36) (15 - 16)  SpO2: 99% (10 Jul 2025 22:36) (99% - 100%)    Parameters below as of 10 Jul 2025 22:36  Patient On (Oxygen Delivery Method): room air      I&O's Summary    CAPILLARY BLOOD GLUCOSE    Physical Exam:  General: NAD, non-toxic appearing   HEENT: PERRL, no scleral icterus  CV: RRR, normal S1 and S2  Lungs: normal respiratory effort on RA, CTAB  Abd: soft, nontender, nondistended, rectal not performed given pt in the middle of emergency room hallway and refusing   Ext: no edema, 2+ peripheral pulses   Psych: AAOx2 (self, home, 2025), appropriate affect   Neuro: grossly non-focal, moving all extremities spontaneously   Skin: no rashes or lesions

## 2025-07-11 NOTE — PATIENT PROFILE ADULT - FALL HARM RISK - HARM RISK INTERVENTIONS

## 2025-07-11 NOTE — ED ADULT NURSE REASSESSMENT NOTE - NS ED NURSE REASSESS COMMENT FT1
BREAK RN: Pt. resting in bed, respirations even and unlabored on RA. Pt. refusing to take oral medications at this time. Pt. educated on the importance of taking oral meds and Pt. refused. Pt. refused IV placement at this time. Pt. /38. ACP made aware of the following issues listed above. Safety maintained throughout.
Pt received from [facility]. Pt mental status remains A&Ox3. No respiratory distress noted. Respirations even and unlabored. Pt w/ no complaints at this time. Pt awaiting lab results. Staff from [facility] at bedside. Pt safety precautions maintained. Pt care ongoing
Pt mental status remains at baseline. VS stable. NSR on cardiac monitor.  No respiratory distress noted. Respirations even and unlabored. Blood tx initiated. No s/s of transfusion reaction. R AC 20g WDL. Pt safety precautions maintained. Pt care ongoing.
received patient in #15A resting comfortably, respirations even and unlabored. patient is from # [facility]. [facility] staff by bed side. PRBC 1 unit infusing well via right AC 20G saline lock, NAD. on tele shows NSR. admitted to tele. pending bed. safety maintained.

## 2025-07-11 NOTE — H&P ADULT - PROBLEM SELECTOR PLAN 3
pt did not come with medication list from inpatient [facility]. Called floor and spoke with staff overnight, however connection was poor and they did not seem too familiar with the medications. Dosages given for certain medications including clozapine not compatible with available formularies  - clarify medications with [facility] in AM. [facility] pharmacist also emailed for assistance

## 2025-07-11 NOTE — BH CONSULTATION LIAISON ASSESSMENT NOTE - NSBHATTESTCOMMENTATTENDFT_PSY_A_CORE
Agree with above. 54F, schizoaffective d/o, p/w severe anemia, currently still psychotic and grandiose which appears to be chronic in nature. Denies suicidality/intent or plan and denies homicidality/intent or plan. Denies AH/VH but has notable delusions. Not agitated but refusing blood transfusion. Agree with primary team that patient does NOT demonstrate capacity to refuse blood transfusion or other life saving measure in the context of anemia as patient's perspective on such decision is imbued with a great deal of psychosis, though outside of medical context has her needs met in supportive psych housing and psych f/u at [facility]. Little expectation of improvement with med changes given treatment refractory nature of patient's condition. Psych will follow, no psych c/i to discharge after medical treatment/clearance complete.

## 2025-07-11 NOTE — BH CONSULTATION LIAISON ASSESSMENT NOTE - NSBHCHARTREVIEWLAB_PSY_A_CORE FT
LABS:                          7.7    8.88  )-----------( 341      ( 11 Jul 2025 14:47 )             27.1     07-11    141  |  106  |  16  ----------------------------<  77  4.1   |  25  |  0.75    Ca    9.1      11 Jul 2025 14:47  Phos  2.7     07-11  Mg     1.80     07-11    TPro  7.0  /  Alb  3.6  /  TBili  <0.2  /  DBili  x   /  AST  11  /  ALT  10  /  AlkPhos  117  07-10    LIVER FUNCTIONS - ( 10 Jul 2025 20:31 )  Alb: 3.6 g/dL / Pro: 7.0 g/dL / ALK PHOS: 117 U/L / ALT: 10 U/L / AST: 11 U/L / GGT: x           PT/INR - ( 10 Jul 2025 20:31 )   PT: 11.7 sec;   INR: 1.01 ratio         PTT - ( 10 Jul 2025 20:31 )  PTT:31.0 sec  Urinalysis Basic - ( 11 Jul 2025 14:47 )    Color: x / Appearance: x / SG: x / pH: x  Gluc: 77 mg/dL / Ketone: x  / Bili: x / Urobili: x   Blood: x / Protein: x / Nitrite: x   Leuk Esterase: x / RBC: x / WBC x   Sq Epi: x / Non Sq Epi: x / Bacteria: x

## 2025-07-11 NOTE — BH CONSULTATION LIAISON ASSESSMENT NOTE - SUMMARY
55 yo female domiciled on [facility] grounds with PMHx chronic anemia of unknown etiology, constipation, GERD, PPHx schizophrenia, schizoaffective dos, no know hx SIB/SA, hx aggression in setting of medication nonadherence, multiple inpatient psychiatric admissions, has court ordered treatment over objection, on clozapine, lithium, olanzapine, and haldol decanoate, presenting with anemia, psychiatry consulted for psychosis and medication management. Pt was given 1x dose of IM Zyprexa 5 mg this AM for agitation prior to receiving 1u pRBC transfusion.     Patient seen in ED, calm and cooperative, A&Ox4, soft speech, with blunted affect, disorganized and tangential thought process, denying SI/HI, grandiose, hyperreligious, and paranoid delusions, denying AVH, poor insight and judgment. Pt appears to be at her psychotic baseline, documented hx of lacking capacity for declining life saving treatments such as blood transfusions given pt's reasoning is based on delusional beliefs. Primary team confirmed med regimen with [facility] psychiatrist Dr. Richardson. Our recommendation is to continue with this regimen, which has been court ordered, see below. No indication for transfer to [facility] or other acute psychiatric intervention, no need for 1:1.    Discussed with primary team recommendations.    PLAN:  -no need for 1:1, no need for transfer to [facility] or other acute psychiatric intervention  -continue home regimen: clozapine solution 150 mg AM and 175 mg qhs, lithium solution 1200 mg daily, zyprexa crushed in food 5 mg BID, haldol dec 300 mg due 7/17  -Labs: clozapine and lithium level  -Agitation: Haldol 5mg q4h prn + Ativan 2mg q6h prn PO/IM/IV   -Monitor EKG qtc interval and hold antipsychotic if qtc>500  -Dispo: return to [facility] when medically stable  -does NOT have the capacity to refuse emergent medical care including labs/investigations, healthcare proxy is pt's nephew

## 2025-07-11 NOTE — H&P ADULT - NSHPLABSRESULTS_GEN_ALL_CORE
.  LABS:                         6.5    8.70  )-----------( 327      ( 10 Jul 2025 23:10 )             22.8     07-10    144  |  106  |  18  ----------------------------<  99  4.0   |  28  |  0.80    Ca    9.3      10 Jul 2025 20:31    TPro  7.0  /  Alb  3.6  /  TBili  <0.2  /  DBili  x   /  AST  11  /  ALT  10  /  AlkPhos  117  07-10    PT/INR - ( 10 Jul 2025 20:31 )   PT: 11.7 sec;   INR: 1.01 ratio         PTT - ( 10 Jul 2025 20:31 )  PTT:31.0 sec  Urinalysis Basic - ( 10 Jul 2025 20:31 )    Color: x / Appearance: x / SG: x / pH: x  Gluc: 99 mg/dL / Ketone: x  / Bili: x / Urobili: x   Blood: x / Protein: x / Nitrite: x   Leuk Esterase: x / RBC: x / WBC x   Sq Epi: x / Non Sq Epi: x / Bacteria: x      RADIOLOGY, EKG & ADDITIONAL TESTS: n/a

## 2025-07-11 NOTE — CHART NOTE - NSCHARTNOTEFT_GEN_A_CORE
Called by Dr Odonnell [phone] psychiatrist from [facility] about medications for patient Lolis Avery. Patient is currently on Clozapine solution 150 mg in am and 175 mg pm. Lithium solution 1200 mg QD, Zyprexa 5 mg BID crushed in food BID, Haldol Decanoate 300 mg Q month due on 7/17/25. Dr Odonnell  stated they have a court order for medications over objections, therefore patient cannot refuse medications.   Case discussed with psychiatry team.

## 2025-07-11 NOTE — H&P ADULT - PROBLEM SELECTOR PLAN 2
- continue olanzapine 5mg BID and lithium 1200mg daily for now  - hold home clozapine for now until confirming dosage as below   - check lithium and clozapine levels

## 2025-07-11 NOTE — H&P ADULT - ASSESSMENT
54F from inpatient [facility] with history of schizophrenia, GERD, iron deficiency anemia, and uterine fibroids sent from [facility] for anemia on outpatient labs requiring blood transfusion.

## 2025-07-12 DIAGNOSIS — E66.01 MORBID (SEVERE) OBESITY DUE TO EXCESS CALORIES: ICD-10-CM

## 2025-07-12 LAB
ANION GAP SERPL CALC-SCNC: 7 MMOL/L — SIGNIFICANT CHANGE UP (ref 7–14)
BUN SERPL-MCNC: 11 MG/DL — SIGNIFICANT CHANGE UP (ref 7–23)
CALCIUM SERPL-MCNC: 9.1 MG/DL — SIGNIFICANT CHANGE UP (ref 8.4–10.5)
CHLORIDE SERPL-SCNC: 108 MMOL/L — HIGH (ref 98–107)
CO2 SERPL-SCNC: 25 MMOL/L — SIGNIFICANT CHANGE UP (ref 22–31)
CREAT SERPL-MCNC: 0.7 MG/DL — SIGNIFICANT CHANGE UP (ref 0.5–1.3)
EGFR: 102 ML/MIN/1.73M2 — SIGNIFICANT CHANGE UP
EGFR: 102 ML/MIN/1.73M2 — SIGNIFICANT CHANGE UP
GLUCOSE SERPL-MCNC: 103 MG/DL — HIGH (ref 70–99)
HCT VFR BLD CALC: 26.5 % — LOW (ref 34.5–45)
HGB BLD-MCNC: 7.6 G/DL — LOW (ref 11.5–15.5)
MAGNESIUM SERPL-MCNC: 2 MG/DL — SIGNIFICANT CHANGE UP (ref 1.6–2.6)
MCHC RBC-ENTMCNC: 20.7 PG — LOW (ref 27–34)
MCHC RBC-ENTMCNC: 28.7 G/DL — LOW (ref 32–36)
MCV RBC AUTO: 72 FL — LOW (ref 80–100)
MRSA PCR RESULT.: SIGNIFICANT CHANGE UP
NRBC # BLD AUTO: 0.03 K/UL — HIGH (ref 0–0)
NRBC # FLD: 0.03 K/UL — HIGH (ref 0–0)
NRBC BLD AUTO-RTO: 0 /100 WBCS — SIGNIFICANT CHANGE UP (ref 0–0)
PHOSPHATE SERPL-MCNC: 2 MG/DL — LOW (ref 2.5–4.5)
PLATELET # BLD AUTO: 311 K/UL — SIGNIFICANT CHANGE UP (ref 150–400)
PMV BLD: 9.4 FL — SIGNIFICANT CHANGE UP (ref 7–13)
POTASSIUM SERPL-MCNC: 4.1 MMOL/L — SIGNIFICANT CHANGE UP (ref 3.5–5.3)
POTASSIUM SERPL-SCNC: 4.1 MMOL/L — SIGNIFICANT CHANGE UP (ref 3.5–5.3)
RBC # BLD: 3.68 M/UL — LOW (ref 3.8–5.2)
RBC # FLD: 23.1 % — HIGH (ref 10.3–14.5)
S AUREUS DNA NOSE QL NAA+PROBE: SIGNIFICANT CHANGE UP
SODIUM SERPL-SCNC: 140 MMOL/L — SIGNIFICANT CHANGE UP (ref 135–145)
WBC # BLD: 8.04 K/UL — SIGNIFICANT CHANGE UP (ref 3.8–10.5)
WBC # FLD AUTO: 8.04 K/UL — SIGNIFICANT CHANGE UP (ref 3.8–10.5)

## 2025-07-12 PROCEDURE — 99233 SBSQ HOSP IP/OBS HIGH 50: CPT

## 2025-07-12 PROCEDURE — 93010 ELECTROCARDIOGRAM REPORT: CPT

## 2025-07-12 RX ORDER — LITHIUM CARBONATE 600 MG/1
2 CAPSULE, GELATIN COATED ORAL
Refills: 0 | DISCHARGE

## 2025-07-12 RX ORDER — FERROUS SULFATE 137(45) MG
5 TABLET, EXTENDED RELEASE ORAL
Refills: 0 | DISCHARGE

## 2025-07-12 RX ORDER — CLOZAPINE 100 MG
0 TABLET ORAL
Refills: 0 | DISCHARGE

## 2025-07-12 RX ORDER — OLANZAPINE 10 MG/1
5 TABLET ORAL ONCE
Refills: 0 | Status: DISCONTINUED | OUTPATIENT
Start: 2025-07-12 | End: 2025-07-12

## 2025-07-12 RX ORDER — POTASSIUM PHOSPHATE, MONOBASIC POTASSIUM PHOSPHATE, DIBASIC INJECTION, 236; 224 MG/ML; MG/ML
15 SOLUTION, CONCENTRATE INTRAVENOUS ONCE
Refills: 0 | Status: COMPLETED | OUTPATIENT
Start: 2025-07-12 | End: 2025-07-13

## 2025-07-12 RX ORDER — FERROUS SULFATE 137(45) MG
0 TABLET, EXTENDED RELEASE ORAL
Refills: 0 | DISCHARGE

## 2025-07-12 RX ORDER — LACTULOSE 10 G/15ML
30 SOLUTION ORAL
Refills: 0 | DISCHARGE

## 2025-07-12 RX ORDER — DOCUSATE SODIUM 100 MG
1 CAPSULE ORAL
Refills: 0 | DISCHARGE

## 2025-07-12 RX ORDER — OLANZAPINE 10 MG/1
1 TABLET ORAL
Refills: 0 | DISCHARGE

## 2025-07-12 RX ORDER — HALOPERIDOL 10 MG/1
100 TABLET ORAL
Refills: 0 | DISCHARGE

## 2025-07-12 RX ORDER — HALOPERIDOL 10 MG/1
300 TABLET ORAL
Refills: 0 | DISCHARGE

## 2025-07-12 RX ORDER — OMEPRAZOLE 20 MG/1
1 CAPSULE, DELAYED RELEASE ORAL
Refills: 0 | DISCHARGE

## 2025-07-12 NOTE — DIETITIAN INITIAL EVALUATION ADULT - PROBLEM SELECTOR PLAN 2
- continue olanzapine 5mg BID and lithium 1200mg daily for now  - hold home clozapine for now until confirming dosage as below   - check lithium and clozapine levels sleep

## 2025-07-12 NOTE — PROGRESS NOTE ADULT - PROBLEM SELECTOR PLAN 2
- Home meds confirmed with Dr Odonnell ([phone]), psychiatrist from [facility]. Dr. Odonnell stated they have a court order for medications in case pt refuses, therefore patient cannot refuse medications.    - continue olanzapine 5 mg BID   - continue lithium 1200 mg daily   - continue clozapine solution 150 mg in AM and 175 mg PM  - On Haldol Decanoate 300 mg Q month, next due on 7/17/2025    Psychiatry consulted, recs appreciated:  -no need for 1:1, no need for transfer to [facility] or other acute psychiatric intervention  -continue home meds  -Agitation: Haldol 5mg q4h prn + Ativan 2mg q6h prn PO/IM/IV   -Monitor EKG qtc interval and hold antipsychotic if qtc>500  -Dispo: return to [facility] when medically stable  -does NOT have the capacity to refuse emergent medical care including labs/investigations, healthcare proxy is pt's nephew

## 2025-07-12 NOTE — PHARMACOTHERAPY INTERVENTION NOTE - COMMENTS
Medication history is complete. Medication list updated in Outpatient Medication Record (OMR) via medical record from inpatient [facility].     Home Medications:  bisacodyl 5 mg oral delayed release tablet: 2 tab(s) orally once a day (at bedtime) as needed for constipation   cholecalciferol 1250 mcg (50,000 intl units) oral capsule: 1 cap(s) orally every 30 days   cloZAPine 50 mg/mL oral suspension: 3.5 milliliter(s) orally once a day (at bedtime)   docusate sodium 100 mg oral capsule: 1 cap(s) orally 2 times a day as needed for  constipation   ferrous sulfate 300 mg/5 mL (60 mg/5 mL elemental iron) oral liquid: 5 milliliter(s) orally 2 times a day  haloperidol decanoate 100 mg/mL intramuscular solution: 300 milligram(s) intramuscularly every 28 days (next dose due 7/17/25)  lactulose 10 g/15 mL oral syrup: 30 milliliter(s) orally 3 times a day as needed for  constipation   lithium 600 mg oral capsule: 2 cap(s) orally once a day   OLANZapine 5 mg oral tablet: 1 tab(s) orally 2 times a day  omeprazole 40 mg oral delayed release capsule: 1 cap(s) orally once a day     Please call spectra [phone] if you have any questions.  Medication history follow up.    Medication history is complete. Medication list updated in Outpatient Medication Record (OMR) via medical record from inpatient [facility].     Home Medications:  bisacodyl 5 mg oral delayed release tablet: 2 tab(s) orally once a day (at bedtime) as needed for constipation   cholecalciferol 1250 mcg (50,000 intl units) oral capsule: 1 cap(s) orally every 30 days   cloZAPine 50 mg/mL oral suspension: 3.5 milliliter(s) orally once a day (at bedtime)   docusate sodium 100 mg oral capsule: 1 cap(s) orally 2 times a day as needed for  constipation   ferrous sulfate 300 mg/5 mL (60 mg/5 mL elemental iron) oral liquid: 5 milliliter(s) orally 2 times a day  haloperidol decanoate 100 mg/mL intramuscular solution: 300 milligram(s) intramuscularly every 28 days (next dose due 7/17/25)  lactulose 10 g/15 mL oral syrup: 30 milliliter(s) orally 3 times a day as needed for  constipation   lithium 600 mg oral capsule: 2 cap(s) orally once a day   OLANZapine 5 mg oral tablet: 1 tab(s) orally 2 times a day  omeprazole 40 mg oral delayed release capsule: 1 cap(s) orally once a day     Please call spectra [phone] if you have any questions.  Medication history follow up.    Medication history is complete. Medication list updated in Outpatient Medication Record (OMR) via medical record from inpatient [facility].   Spoke with ACP provider to notify OMR updated.     Home Medications:  bisacodyl 5 mg oral delayed release tablet: 2 tab(s) orally once a day (at bedtime) as needed for constipation   cholecalciferol 1250 mcg (50,000 intl units) oral capsule: 1 cap(s) orally every 30 days   cloZAPine 50 mg/mL oral suspension: 3.5 milliliter(s) orally once a day (at bedtime)   docusate sodium 100 mg oral capsule: 1 cap(s) orally 2 times a day as needed for  constipation   ferrous sulfate 300 mg/5 mL (60 mg/5 mL elemental iron) oral liquid: 5 milliliter(s) orally 2 times a day  haloperidol decanoate 100 mg/mL intramuscular solution: 300 milligram(s) intramuscularly every 28 days (next dose due 7/17/25)  lactulose 10 g/15 mL oral syrup: 30 milliliter(s) orally 3 times a day as needed for  constipation   lithium 600 mg oral capsule: 2 cap(s) orally once a day   OLANZapine 5 mg oral tablet: 1 tab(s) orally 2 times a day  omeprazole 40 mg oral delayed release capsule: 1 cap(s) orally once a day     Please call spectra [phone] if you have any questions.

## 2025-07-12 NOTE — DIETITIAN INITIAL EVALUATION ADULT - PERTINENT MEDS FT
MEDICATIONS  (STANDING):  bisacodyl 10 milliGRAM(s) Oral at bedtime  chlorhexidine 2% Cloths 1 Application(s) Topical daily  cloZAPine 150 milliGRAM(s) Oral daily  cloZAPine 175 milliGRAM(s) Oral at bedtime  ferrous    sulfate 325 milliGRAM(s) Oral daily  lithium 1200 milliGRAM(s) Oral daily  OLANZapine 5 milliGRAM(s) Oral two times a day  pantoprazole    Tablet 40 milliGRAM(s) Oral before breakfast  polyethylene glycol 3350 17 Gram(s) Oral two times a day  senna 2 Tablet(s) Oral at bedtime    MEDICATIONS  (PRN):

## 2025-07-12 NOTE — DIETITIAN INITIAL EVALUATION ADULT - PERTINENT LABORATORY DATA
07-12    140  |  108[H]  |  11  ----------------------------<  103[H]  4.1   |  25  |  0.70    Ca    9.1      12 Jul 2025 11:09  Phos  2.0     07-12  Mg     2.00     07-12    TPro  7.0  /  Alb  3.6  /  TBili  <0.2  /  DBili  x   /  AST  11  /  ALT  10  /  AlkPhos  117  07-10

## 2025-07-12 NOTE — DIETITIAN INITIAL EVALUATION ADULT - PROBLEM SELECTOR PLAN 3
pt did not come with medication list from inpatient [facility]. Called floor and spoke with staff overnight, however connection was poor and they did not seem too familiar with the medications. Dosages given for certain medications including clozapine not compatible with available formularies  - clarify medications with [facility] in AM. [facility] pharmacist also emailed for assistance

## 2025-07-12 NOTE — DIETITIAN INITIAL EVALUATION ADULT - OTHER INFO
Met patient at bedside. Patient alert, but not willing to talk a lot. Observed breakfast meal tray, ~60% of intake. Patient reports fair appetite in house. Patient denies any in house nausea, vomiting, diarrhea, or constipation. Last BM noted on 7/10 per RN flowsheets. Bowel regimen is in placed. No reported chewing/swallowing issues. No known food allergies. Patient amenable to a trial of Mighty Shake 1x daily (220kcal, 6gm pro per each serving). Encourage PO intake and honor food preferences as able. Education deferred by patient. RD to remain available for further nutritional interventions as indicated

## 2025-07-12 NOTE — DIETITIAN INITIAL EVALUATION ADULT - REASON FOR ADMISSION
Per chart review, 54-year-old female from inpatient [place] with history of schizophrenia, GERD, iron deficiency anemia, and uterine fibroids sent from [place] for anemia on outpatient labs requiring blood transfusion.

## 2025-07-12 NOTE — DIETITIAN INITIAL EVALUATION ADULT - ADD RECOMMEND
1. RD will provide Mighty Shake 1x daily (220kcal, 6gm pro per each serving).   2. Please consistently document %PO intake in nursing flowsheets   3. Monitor PO intake, Labs, weights, BMs, and skin integrity.

## 2025-07-12 NOTE — DIETITIAN INITIAL EVALUATION ADULT - PHYSCIAL ASSESSMENT
Patient reports UBW of ~249lbs w/o time frame. Dosing weight 126kg/277.7lbs (7/11). Patient denies any weight changes.  Weight is uptrend per dosing weight 126kg (7/11) and  [facility] weight history: 112.9kg (7/10); 112.9kg (6/6/25). Edema noted, which is masking her weight loss.

## 2025-07-12 NOTE — DIETITIAN INITIAL EVALUATION ADULT - NUTRITIONGOAL OUTCOME1
Below is copied and pasted communication between this RN and provider.   Mri  (Newest Message First)  View All Conversations on this Encounter  Nolberto Beltran MD  You10 minutes ago (7:42 AM)       No change to plan from my end, she can see ortho for a second opinion if desired. Thanks.     You  Nolberto Beltran MD15 hours ago (4:05 PM)     GF  R shoulder MRI was resulted 12/11/23 by Radiologist. Seeing you next 1/3/24 for right side c spine MBB#1.  Given MRI findings, refer to Ortho for torn tendons? Read her message.    Farrah     
From: Christine Adame  To: Nolberto Cervantes  Sent: 12/18/2023 3:56 PM CST  Subject: Mri    The right shoulder mri showed torn tendons could that be causing headaches? Do I need to see ortho or will steroid possibly help? Thanks in advance for your time.   
Patient to gradually lose weight towards ideal body weight range

## 2025-07-12 NOTE — DIETITIAN INITIAL EVALUATION ADULT - PROBLEM SELECTOR PLAN 4
Diet: NPO for now given pending blood transfusion   DVT ppx: SCD's, hold chemical ppx   Dispo: pending clinical course; from inpatient [facility]

## 2025-07-13 PROCEDURE — 99232 SBSQ HOSP IP/OBS MODERATE 35: CPT

## 2025-07-13 RX ORDER — OLANZAPINE 10 MG/1
5 TABLET ORAL EVERY 12 HOURS
Refills: 0 | Status: DISCONTINUED | OUTPATIENT
Start: 2025-07-13 | End: 2025-07-15

## 2025-07-13 RX ADMIN — OLANZAPINE 5 MILLIGRAM(S): 10 TABLET ORAL at 17:56

## 2025-07-13 RX ADMIN — Medication 325 MILLIGRAM(S): at 11:50

## 2025-07-13 RX ADMIN — Medication 1 APPLICATION(S): at 11:50

## 2025-07-13 RX ADMIN — POTASSIUM PHOSPHATE, MONOBASIC POTASSIUM PHOSPHATE, DIBASIC INJECTION, 62.5 MILLIMOLE(S): 236; 224 SOLUTION, CONCENTRATE INTRAVENOUS at 15:36

## 2025-07-13 NOTE — DISCHARGE NOTE PROVIDER - NSDCCPCAREPLAN_GEN_ALL_CORE_FT
PRINCIPAL DISCHARGE DIAGNOSIS  Diagnosis: Anemia  Assessment and Plan of Treatment: You were admitted for anemia requiring 1 unit blood transfusion.  Your blood counts and symptoms improved and you are now stable to be discharged back to [place].  Please take your medications as directed and follow-up with your primary care physician and Psychiatrist after discharge.        PRINCIPAL DISCHARGE DIAGNOSIS  Diagnosis: Anemia  Assessment and Plan of Treatment: You were admitted for anemia requiring 1 unit blood transfusion.  Your blood counts and symptoms improved and you are now stable to be discharged back to [facility].  Please take your medications as directed and follow-up with your primary care physician and Psychiatrist after discharge.         SECONDARY DISCHARGE DIAGNOSES  Diagnosis: Schizophrenia  Assessment and Plan of Treatment: Your clozapine was interrupted 7/12-13 due to med non-compliance. psychiatry rec restarting clozapine at 25mg dose with plan to titrate up. continue zyprexa and lithium at home dose

## 2025-07-13 NOTE — PROGRESS NOTE ADULT - PROBLEM SELECTOR PLAN 2
- Home meds confirmed with Dr Odonnell ([phone]), psychiatrist from [facility]. Dr. Odonnell stated they have a court order for medications in case pt refuses, therefore patient cannot refuse medications.    - continue olanzapine 5 mg BID   - continue lithium 1200 mg daily   - continue clozapine solution 150 mg in AM and 175 mg PM  - On Haldol Decanoate 300 mg Q month, next due on 7/17/2025    Psychiatry consulted, recs appreciated:  -no need for 1:1, no need for transfer to [facility] or other acute psychiatric intervention  -continue home meds  -Agitation: Haldol 5mg q4h prn + Ativan 2mg q6h prn PO/IM/IV   -Monitor EKG qtc interval and hold antipsychotic if qtc>500  -Dispo: return to [place] [facility] when medically stable  -does NOT have the capacity to refuse emergent medical care including labs/investigations, healthcare proxy is pt's nephew

## 2025-07-13 NOTE — DISCHARGE NOTE PROVIDER - CARE PROVIDER_API CALL
PRIMARY CARE DOCTOR,   Primary Care Clinic  Phone: (   )    -  Fax: (   )    -  Follow Up Time: 2 weeks

## 2025-07-13 NOTE — DISCHARGE NOTE PROVIDER - NSDCMRMEDTOKEN_GEN_ALL_CORE_FT
bisacodyl 5 mg oral delayed release tablet: 2 tab(s) orally once a day (at bedtime) as needed for constipation  cholecalciferol 1250 mcg (50,000 intl units) oral capsule: 1 cap(s) orally every 30 days  cloZAPine 50 mg/mL oral suspension: 3.5 milliliter(s) orally once a day (at bedtime)  docusate sodium 100 mg oral capsule: 1 cap(s) orally 2 times a day as needed for  constipation  ferrous sulfate 300 mg/5 mL (60 mg/5 mL elemental iron) oral liquid: 5 milliliter(s) orally 2 times a day  haloperidol decanoate 100 mg/mL intramuscular solution: 300 milligram(s) intramuscularly every 28 days (next dose due 7/17/25)  lactulose 10 g/15 mL oral syrup: 30 milliliter(s) orally 3 times a day as needed for  constipation  lithium 600 mg oral capsule: 2 cap(s) orally once a day  OLANZapine 5 mg oral tablet: 1 tab(s) orally 2 times a day  omeprazole 40 mg oral delayed release capsule: 1 cap(s) orally once a day   bisacodyl 5 mg oral delayed release tablet: 2 tab(s) orally once a day (at bedtime) as needed for constipation  cholecalciferol 1250 mcg (50,000 intl units) oral capsule: 1 cap(s) orally every 30 days  cloZAPine 25 mg oral tablet: 1 tab(s) orally once a day (at bedtime) please titrate med to home dose as per [facility] provider  docusate sodium 100 mg oral capsule: 1 cap(s) orally 2 times a day as needed for  constipation  ferrous sulfate 300 mg/5 mL (60 mg/5 mL elemental iron) oral liquid: 5 milliliter(s) orally 2 times a day  haloperidol decanoate 100 mg/mL intramuscular solution: 300 milligram(s) intramuscularly every 28 days (next dose due 7/17/25)  lactulose 10 g/15 mL oral syrup: 30 milliliter(s) orally 3 times a day as needed for  constipation  lithium 600 mg oral capsule: 2 cap(s) orally once a day  OLANZapine 5 mg oral tablet: 1 tab(s) orally 2 times a day  omeprazole 40 mg oral delayed release capsule: 1 cap(s) orally once a day

## 2025-07-13 NOTE — DISCHARGE NOTE PROVIDER - DETAILS OF MALNUTRITION DIAGNOSIS/DIAGNOSES
This patient has been assessed with a concern for Malnutrition and was treated during this hospitalization for the following Nutrition diagnosis/diagnoses:     -  07/12/2025: Morbid obesity (BMI > 40)

## 2025-07-13 NOTE — DISCHARGE NOTE PROVIDER - PROVIDER TOKENS
FREE:[LAST:[PRIMARY CARE DOCTOR],PHONE:[(   )    -],FAX:[(   )    -],ADDRESS:[Primary Care Clinic],FOLLOWUP:[2 weeks]]

## 2025-07-13 NOTE — DISCHARGE NOTE PROVIDER - HOSPITAL COURSE
Admitting Diagnosis: Anemia requiring transfusion    Discharge Diagnoses:    Anemia (likely iron deficiency)  Schizophrenia  Morbid obesity  GERD  Uterine fibroids    History of Present Illness: The patient, a 54-year-old female with a history of schizophrenia, GERD, iron deficiency anemia, and uterine fibroids, was admitted from [facility] for symptomatic anemia discovered on outpatient labs. She required blood transfusion. Her sister reports a longstanding history of anemia and iron supplementation, with a prior admission in 2023 for anemia requiring one unit of packed red blood cells (pRBCs). That prior admission included a negative gastrointestinal workup. Further workup has been limited due to the patient's psychiatric illness.    Hospital Course: The patient received one unit of pRBCs on 07/11/2025. Her hemoglobin subsequently improved from 6.5 to 7.7 g/dL and then stabilized at 7.6 g/dL. Type and screen remained active. No overt signs of bleeding were observed. Given her history of negative GI workup in 2023, no further GI workup was pursued during this admission. Psychiatry was consulted and determined the patient does not have capacity to refuse emergent medical care. Her nephew is her healthcare proxy. The patient's home psychiatric medications were confirmed with her psychiatrist at [facility] (Dr. Odonnell, [phone]). A court order is in place mandating medication compliance. Dietary counseling was provided regarding her morbid obesity, and a registered dietician was consulted.    Discharge Medications:    Olanzapine 5 mg BID  Lithium 1200 mg daily  Clozapine solution 150 mg AM and 175 mg PM  Haldol Decanoate 300 mg IM monthly (next due 7/17/2025)    PRN Medications:    Haldol 5 mg q4h prn agitation  Ativan 2 mg q6h prn agitation (PO/IM/IV)    Discharge Instructions:    Return to [facility] when medically stable.  Continue all prescribed medications.  Monitor CBC regularly per [facility] recommendations.  Continue iron supplementation as prescribed.  Follow dietary recommendations provided by the dietician, including Mighty Shake supplementation.  Follow up with outpatient psychiatry per [facility] recommendations.    Monitoring Instructions:    Monitor hemoglobin levels. Transfuse for goal hemoglobin > 7 g/dL.  Monitor EKG for QTc interval and hold antipsychotics if QTc > 500 ms.  Monitor and document percentage of oral intake.  Monitor weight, bowel movements, and skin integrity.    Consultations:  Psychiatry  Dietician    Disposition: Discharge back to [facility] Admitting Diagnosis: Anemia requiring transfusion    Discharge Diagnoses:    Anemia (likely iron deficiency)  Schizophrenia  Morbid obesity  GERD  Uterine fibroids    History of Present Illness: The patient, a 54-year-old female with a history of schizophrenia, GERD, iron deficiency anemia, and uterine fibroids, was admitted from [facility] for symptomatic anemia discovered on outpatient labs. She required blood transfusion. Her sister reports a longstanding history of anemia and iron supplementation, with a prior admission in 2023 for anemia requiring one unit of packed red blood cells (pRBCs). That prior admission included a negative gastrointestinal workup. Further workup has been limited due to the patient's psychiatric illness.    Hospital Course: The patient received one unit of pRBCs on 07/11/2025. Her hemoglobin subsequently improved from 6.5 to 8.3 g/dL and remained stable.  No overt signs of bleeding were observed. Given her history of negative GI workup in 2023, no further GI workup was pursued during this admission.  Pt was refusing her psychiatric meds in the first few days of admission- she became compliant after meds were changed to liquid form and added to her food. Because of interruption of clozapine dosing, psychiatry rec dose clozapine at 25mg and titrate up to her home dose when back to [facility].  Pt was calm throughout her hospital stay, no prn meds for sedation was required     Discharge Medications:    Olanzapine 5 mg BID  Lithium 1200 mg daily  Clozapine solution 25mg- with plan to titrate up back up to home dose   Haldol Decanoate 300 mg IM monthly (next due 7/17/2025)    PRN Medications:    Haldol 5 mg q4h prn agitation  Ativan 2 mg q6h prn agitation (PO/IM/IV)    Discharge Instructions:    Return to [facility] when medically stable.  Continue all prescribed medications.  Monitor CBC regularly per [facility] recommendations.  Continue iron supplementation as prescribed.  Follow dietary recommendations provided by the dietician, including Mighty Shake supplementation.  Follow up with outpatient psychiatry per [facility] recommendations.    Monitoring Instructions:    Monitor hemoglobin levels. Transfuse for goal hemoglobin > 7 g/dL.  Monitor EKG for QTc interval and hold antipsychotics if QTc > 500 ms.  Monitor and document percentage of oral intake.  Monitor weight, bowel movements, and skin integrity.    Consultations:  Psychiatry  Dietician    Disposition: Discharge back to [facility]

## 2025-07-14 LAB
ALBUMIN SERPL ELPH-MCNC: 3.6 G/DL — SIGNIFICANT CHANGE UP (ref 3.3–5)
ALP SERPL-CCNC: 107 U/L — SIGNIFICANT CHANGE UP (ref 40–120)
ALT FLD-CCNC: 11 U/L — SIGNIFICANT CHANGE UP (ref 4–33)
ANION GAP SERPL CALC-SCNC: 14 MMOL/L — SIGNIFICANT CHANGE UP (ref 7–14)
AST SERPL-CCNC: 14 U/L — SIGNIFICANT CHANGE UP (ref 4–32)
BILIRUB DIRECT SERPL-MCNC: <0.2 MG/DL — SIGNIFICANT CHANGE UP (ref 0–0.3)
BILIRUB INDIRECT FLD-MCNC: >0 MG/DL — SIGNIFICANT CHANGE UP (ref 0–1)
BILIRUB SERPL-MCNC: 0.2 MG/DL — SIGNIFICANT CHANGE UP (ref 0.2–1.2)
BUN SERPL-MCNC: 15 MG/DL — SIGNIFICANT CHANGE UP (ref 7–23)
CALCIUM SERPL-MCNC: 8.9 MG/DL — SIGNIFICANT CHANGE UP (ref 8.4–10.5)
CHLORIDE SERPL-SCNC: 106 MMOL/L — SIGNIFICANT CHANGE UP (ref 98–107)
CO2 SERPL-SCNC: 21 MMOL/L — LOW (ref 22–31)
CREAT SERPL-MCNC: 0.74 MG/DL — SIGNIFICANT CHANGE UP (ref 0.5–1.3)
EGFR: 95 ML/MIN/1.73M2 — SIGNIFICANT CHANGE UP
EGFR: 95 ML/MIN/1.73M2 — SIGNIFICANT CHANGE UP
GLUCOSE SERPL-MCNC: 83 MG/DL — SIGNIFICANT CHANGE UP (ref 70–99)
HCT VFR BLD CALC: 27.5 % — LOW (ref 34.5–45)
HGB BLD-MCNC: 7.9 G/DL — LOW (ref 11.5–15.5)
MAGNESIUM SERPL-MCNC: 1.9 MG/DL — SIGNIFICANT CHANGE UP (ref 1.6–2.6)
MCHC RBC-ENTMCNC: 20.7 PG — LOW (ref 27–34)
MCHC RBC-ENTMCNC: 28.7 G/DL — LOW (ref 32–36)
MCV RBC AUTO: 72.2 FL — LOW (ref 80–100)
NRBC # BLD AUTO: 0.04 K/UL — HIGH (ref 0–0)
NRBC # FLD: 0.04 K/UL — HIGH (ref 0–0)
NRBC BLD AUTO-RTO: 0 /100 WBCS — SIGNIFICANT CHANGE UP (ref 0–0)
PHOSPHATE SERPL-MCNC: 3.1 MG/DL — SIGNIFICANT CHANGE UP (ref 2.5–4.5)
PLATELET # BLD AUTO: 386 K/UL — SIGNIFICANT CHANGE UP (ref 150–400)
PMV BLD: 10.5 FL — SIGNIFICANT CHANGE UP (ref 7–13)
POTASSIUM SERPL-MCNC: 4 MMOL/L — SIGNIFICANT CHANGE UP (ref 3.5–5.3)
POTASSIUM SERPL-SCNC: 4 MMOL/L — SIGNIFICANT CHANGE UP (ref 3.5–5.3)
PROT SERPL-MCNC: 7.4 G/DL — SIGNIFICANT CHANGE UP (ref 6–8.3)
RBC # BLD: 3.81 M/UL — SIGNIFICANT CHANGE UP (ref 3.8–5.2)
RBC # FLD: 24.9 % — HIGH (ref 10.3–14.5)
SODIUM SERPL-SCNC: 141 MMOL/L — SIGNIFICANT CHANGE UP (ref 135–145)
WBC # BLD: 7.1 K/UL — SIGNIFICANT CHANGE UP (ref 3.8–10.5)
WBC # FLD AUTO: 7.1 K/UL — SIGNIFICANT CHANGE UP (ref 3.8–10.5)

## 2025-07-14 PROCEDURE — 99232 SBSQ HOSP IP/OBS MODERATE 35: CPT

## 2025-07-14 PROCEDURE — 99233 SBSQ HOSP IP/OBS HIGH 50: CPT

## 2025-07-14 RX ORDER — CLOZAPINE 100 MG
25 TABLET ORAL AT BEDTIME
Refills: 0 | Status: DISCONTINUED | OUTPATIENT
Start: 2025-07-14 | End: 2025-07-15

## 2025-07-14 RX ORDER — LITHIUM CARBONATE 600 MG/1
600 CAPSULE, GELATIN COATED ORAL
Refills: 0 | Status: DISCONTINUED | OUTPATIENT
Start: 2025-07-14 | End: 2025-07-15

## 2025-07-14 RX ADMIN — Medication 2 TABLET(S): at 22:54

## 2025-07-14 RX ADMIN — Medication 10 MILLIGRAM(S): at 22:54

## 2025-07-14 RX ADMIN — LITHIUM CARBONATE 600 MILLIGRAM(S): 600 CAPSULE, GELATIN COATED ORAL at 18:24

## 2025-07-14 RX ADMIN — Medication 25 MILLIGRAM(S): at 22:55

## 2025-07-14 RX ADMIN — OLANZAPINE 5 MILLIGRAM(S): 10 TABLET ORAL at 18:21

## 2025-07-14 NOTE — PROVIDER CONTACT NOTE (MEDICATION) - SITUATION
Patient non-compliant with medications despite 3 RN's education of the importance of medication compliance.

## 2025-07-14 NOTE — PROVIDER CONTACT NOTE (OTHER) - DATE AND TIME:
11-Jul-2025 22:40
13-Jul-2025 21:20
12-Jul-2025 05:20
12-Jul-2025 14:46
12-Jul-2025 23:13
13-Jul-2025 04:41

## 2025-07-14 NOTE — BH CONSULTATION LIAISON PROGRESS NOTE - NSBHASSESSMENTFT_PSY_ALL_CORE
53 yo female domiciled on [place] grounds with PMHx chronic anemia of unknown etiology, constipation, GERD, PPHx schizophrenia, schizoaffective dos, no know hx SIB/SA, hx aggression in setting of medication nonadherence, multiple inpatient psychiatric admissions, has court ordered treatment over objection, on clozapine, lithium, olanzapine, and haldol decanoate, presenting with anemia, psychiatry consulted for psychosis and medication management. Pt was given 1x dose of IM Zyprexa 5 mg this AM for agitation prior to receiving 1u pRBC transfusion.     On initial assessment, patient was seen in ED, calm and cooperative, A&Ox4, soft speech, with blunted affect, disorganized and tangential thought process, denying SI/HI, grandiose, hyperreligious, and paranoid delusions, denying AVH, poor insight and judgment. Pt appears to be at her psychotic baseline, documented hx of lacking capacity for declining life saving treatments such as blood transfusions given pt's reasoning is based on delusional beliefs. Primary team confirmed med regimen with [place] psychiatrist Dr. Richardson. Our recommendation is to continue with this regimen, which has been court ordered, see below. No indication for transfer to [facility] or other acute psychiatric intervention, no need for 1:1.    7/14: Pt declining all meds over weekend, continues to be calm and cooperative though psychosis ongoing. Plan to DISCONTINUE clozapine given missed doses and restart at 25 mg qhs. Pt continues to lack capacity to decline emergent medical care.    PLAN:  -no need for 1:1, no need for transfer to [facility] or other acute psychiatric intervention  - DISCONTINUE current dose of clozapine 150 mg AM and 175 mg qhs, RESTART at 25 mg qhs (pt has been declining doses, continuing on this same dose would increase seizure risk)  - continue home lithium solution 1200 mg daily, Zyprexa crushed in food 5 mg BID, Haldol dec 300 mg due 7/17  -Agitation: Haldol 5mg q4h prn + Ativan 2mg q6h prn PO/IM/IV   -Monitor EKG qtc interval and hold antipsychotic if qtc>500  -Dispo: return to [place] when medically stable  -Pt continues TO LACK THE CAPACITY to refuse emergent medical care including labs/investigations, healthcare proxy is pt's nephew     53 yo female domiciled on [place] grounds with PMHx chronic anemia of unknown etiology, constipation, GERD, PPHx schizophrenia, schizoaffective dos, no know hx SIB/SA, hx aggression in setting of medication nonadherence, multiple inpatient psychiatric admissions, has court ordered treatment over objection, on clozapine, lithium, olanzapine, and haldol decanoate, presenting with anemia, psychiatry consulted for psychosis and medication management. Pt was given 1x dose of IM Zyprexa 5 mg this AM for agitation prior to receiving 1u pRBC transfusion.     On initial assessment, patient was seen in ED, calm and cooperative, A&Ox4, soft speech, with blunted affect, disorganized and tangential thought process, denying SI/HI, grandiose, hyperreligious, and paranoid delusions, denying AVH, poor insight and judgment. Pt appears to be at her psychotic baseline, documented hx of lacking capacity for declining life saving treatments such as blood transfusions given pt's reasoning is based on delusional beliefs. Primary team confirmed med regimen with [place] [facility] psychiatrist Dr. Richardson. Our recommendation is to continue with this regimen, which has been court ordered, see below. No indication for transfer to [facility] or other acute psychiatric intervention, no need for 1:1.    7/14: Pt declining all meds over weekend, continues to be calm and cooperative though psychosis ongoing. Plan to DISCONTINUE clozapine given missed doses and restart at 25 mg qhs. Pt continues to lack capacity to decline emergent medical care.    PLAN:  - no need for 1:1 from psych. perspective   - DISCONTINUE current dose of clozapine 150 mg AM and 175 mg qhs, RESTART at 25 mg qhs (pt has been declining doses, continuing on this same dose would increase seizure risk)  - continue home lithium solution 1200 mg daily, Zyprexa crushed in food 5 mg BID, Haldol dec 300 mg due 7/17  -Agitation: Haldol 5mg q4h prn + Ativan 2mg q6h prn PO/IM/IV   -Monitor EKG qtc interval and hold antipsychotic if qtc>500  -Dispo: return to [facility] when medically stable  -Pt continues TO LACK THE CAPACITY to refuse emergent medical care including labs/investigations, healthcare proxy is pt's nephew

## 2025-07-14 NOTE — PROVIDER CONTACT NOTE (OTHER) - BACKGROUND
Patient admitted for anemia, history of schizophrenia A&Ox4.
54 has a history of schizophrenia, anemia
55 y/o admitted for anemia. PMH of schizophrenia, GERD, iron deficiency anemia
Patient admitted for anemia, history of schizophrenia A&Ox4.
Patient admitted for anemia, history of schizophrenia A&Ox4.
55 y/o admitted for anemia. PMH of schizophrenia, GERD, iron deficiency anemia

## 2025-07-14 NOTE — PROVIDER CONTACT NOTE (MEDICATION) - ACTION/TREATMENT ORDERED:
Per provider, med's were discontinued due to several missed dose and will be reordered to start at a lower dose. No new orders received at this time.

## 2025-07-14 NOTE — PROVIDER CONTACT NOTE (OTHER) - ACTION/TREATMENT ORDERED:
ACP notified and made aware of situation. Patient educated on importance of medications.
Provider made aware. 2 RNs spoke with the patient and still refused. Patient educated on risks and benefits of the medications.
ACP notified and made aware of situation. Patient educated on importance of labs and medications.
ACP notified and made aware of situation. Patient educated on importance of medications.
Provider aware
Provider made aware. Patient educated on risks and benefits of the medications.

## 2025-07-14 NOTE — PROVIDER CONTACT NOTE (OTHER) - NAME OF MD/NP/PA/DO NOTIFIED:
Juan Jose Guerrero
Yoli Yadav

## 2025-07-14 NOTE — BH CONSULTATION LIAISON PROGRESS NOTE - NSBHFUPINTERVALHXFT_PSY_A_CORE
Over the weekend, pt declined almost all medications (both psychiatric and medical) and got one dose of olanzapine 5 mg IM yesterday, otherwise no acute events over weekend.    Saw pt at bedside this AM; on assessment, pt continues to be calm and cooperative, presenting as psychotic with ongoing grandiose and hyper-Buddhist delusions, denying AVH, denying SI/HI. Pt reports she would refuse any blood transfusions and believes that she has not ever received a blood transfusion because it's "wrong" according to her Holiness, which she states is the "Holiness that everyone should be following." She states that she created this Holiness and wrote the entire bible herself. 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  Over the weekend, pt declined almost all medications (both psychiatric and medical) and got one dose of olanzapine 5 mg IM yesterday, otherwise no acute events over weekend.    Saw pt at bedside this AM; on assessment, pt continues to be calm and cooperative, presenting as psychotic with ongoing grandiose and hyper-Latter-day delusions, denying AVH, denying SI/HI. Pt reports she would refuse any blood transfusions and believes that she has not ever received a blood transfusion because it's "wrong" according to her Anabaptist, which she states is the "Anabaptist that everyone should be following." She states that she created this Anabaptist and wrote the entire bible herself. 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

## 2025-07-14 NOTE — PROGRESS NOTE ADULT - PROBLEM SELECTOR PLAN 2
- Home meds confirmed with Dr Odonnell ([phone]), psychiatrist from [facility]. Dr. Odonnell stated they have a court order for medications in case pt refuses, therefore patient cannot refuse medications.    - continue olanzapine 5 mg BID   - continue lithium 600mg bid oral solution  -on clozapine solution 150 mg in AM and 175 mg PM at home. dose reduce to 25 per psych as pt has been missing doses since admission   - On Haldol Decanoate 300 mg Q month, next due on 7/17/2025    Psychiatry consulted, recs appreciated:  -no need for 1:1, no need for transfer to [facility] or other acute psychiatric intervention  -continue home meds  -Agitation: Haldol 5mg q4h prn + Ativan 2mg q6h prn PO/IM/IV   -Monitor EKG qtc interval and hold antipsychotic if qtc>500  -Dispo: return to [place] when medically stable  -does NOT have the capacity to refuse emergent medical care including labs/investigations, healthcare proxy is pt's nephew

## 2025-07-14 NOTE — PROVIDER CONTACT NOTE (OTHER) - REASON
Patient refuses medication
Patient refused AM labs and 6 AM meds
Patient refused 22:00 meds
Patient refusing medications
Patient refusing medications and AM blood draw
Patient refusing P.M medications as well as potassium phosphate IVPB

## 2025-07-14 NOTE — PROVIDER CONTACT NOTE (OTHER) - SITUATION
Patient refusing medications and AM blood draw
Patient refused AM labs and 6 AM meds
Patient refusing medications.
Patient refused 22:00 meds
Patient refuses medication
Patient refusing medications and AM blood draw

## 2025-07-14 NOTE — PROVIDER CONTACT NOTE (OTHER) - ASSESSMENT
A&Ox4.
Patient is A&Ox4 admitted for anemia. patient stated "he will not medications, they are of use" education provider Patient verbalized understanding.
Patient is AOx4, educated on risks and benefits of the medications.
Patient is AOx4, educated on risks and benefits of the medications.

## 2025-07-14 NOTE — BH CONSULTATION LIAISON PROGRESS NOTE - NSBHMSETHTPROC_PSY_A_CORE
Disorganized/Circumstantial/Impaired reasoning Disorganized/Circumstantial/Illogical/Impaired reasoning

## 2025-07-14 NOTE — PROVIDER CONTACT NOTE (MEDICATION) - BACKGROUND
PMHx chronic anemia of unknown etiology, constipation, GERD, PPHx schizophrenia, schizoaffective dos, no know hx SIB/SA, hx aggression in setting of medication nonadherence, multiple inpatient psychiatric admissions

## 2025-07-15 ENCOUNTER — TRANSCRIPTION ENCOUNTER (OUTPATIENT)
Age: 55
End: 2025-07-15

## 2025-07-15 VITALS
SYSTOLIC BLOOD PRESSURE: 111 MMHG | RESPIRATION RATE: 17 BRPM | HEART RATE: 88 BPM | OXYGEN SATURATION: 100 % | DIASTOLIC BLOOD PRESSURE: 74 MMHG | TEMPERATURE: 98 F

## 2025-07-15 LAB
ANION GAP SERPL CALC-SCNC: 11 MMOL/L — SIGNIFICANT CHANGE UP (ref 7–14)
BUN SERPL-MCNC: 10 MG/DL — SIGNIFICANT CHANGE UP (ref 7–23)
CALCIUM SERPL-MCNC: 9.5 MG/DL — SIGNIFICANT CHANGE UP (ref 8.4–10.5)
CHLORIDE SERPL-SCNC: 107 MMOL/L — SIGNIFICANT CHANGE UP (ref 98–107)
CO2 SERPL-SCNC: 23 MMOL/L — SIGNIFICANT CHANGE UP (ref 22–31)
CREAT SERPL-MCNC: 0.71 MG/DL — SIGNIFICANT CHANGE UP (ref 0.5–1.3)
EGFR: 100 ML/MIN/1.73M2 — SIGNIFICANT CHANGE UP
EGFR: 100 ML/MIN/1.73M2 — SIGNIFICANT CHANGE UP
GLUCOSE SERPL-MCNC: 100 MG/DL — HIGH (ref 70–99)
HCT VFR BLD CALC: 28.9 % — LOW (ref 34.5–45)
HGB BLD-MCNC: 8.3 G/DL — LOW (ref 11.5–15.5)
MAGNESIUM SERPL-MCNC: 2.1 MG/DL — SIGNIFICANT CHANGE UP (ref 1.6–2.6)
MCHC RBC-ENTMCNC: 21 PG — LOW (ref 27–34)
MCHC RBC-ENTMCNC: 28.7 G/DL — LOW (ref 32–36)
MCV RBC AUTO: 73 FL — LOW (ref 80–100)
NRBC # BLD AUTO: 0 K/UL — SIGNIFICANT CHANGE UP (ref 0–0)
NRBC # FLD: 0 K/UL — SIGNIFICANT CHANGE UP (ref 0–0)
NRBC BLD AUTO-RTO: 0 /100 WBCS — SIGNIFICANT CHANGE UP (ref 0–0)
PHOSPHATE SERPL-MCNC: 2.6 MG/DL — SIGNIFICANT CHANGE UP (ref 2.5–4.5)
PLATELET # BLD AUTO: 412 K/UL — HIGH (ref 150–400)
PMV BLD: 10.2 FL — SIGNIFICANT CHANGE UP (ref 7–13)
POTASSIUM SERPL-MCNC: 4.1 MMOL/L — SIGNIFICANT CHANGE UP (ref 3.5–5.3)
POTASSIUM SERPL-SCNC: 4.1 MMOL/L — SIGNIFICANT CHANGE UP (ref 3.5–5.3)
RBC # BLD: 3.96 M/UL — SIGNIFICANT CHANGE UP (ref 3.8–5.2)
RBC # FLD: 25.2 % — HIGH (ref 10.3–14.5)
SODIUM SERPL-SCNC: 141 MMOL/L — SIGNIFICANT CHANGE UP (ref 135–145)
WBC # BLD: 9.2 K/UL — SIGNIFICANT CHANGE UP (ref 3.8–10.5)
WBC # FLD AUTO: 9.2 K/UL — SIGNIFICANT CHANGE UP (ref 3.8–10.5)

## 2025-07-15 PROCEDURE — 99239 HOSP IP/OBS DSCHRG MGMT >30: CPT

## 2025-07-15 PROCEDURE — 99233 SBSQ HOSP IP/OBS HIGH 50: CPT

## 2025-07-15 RX ORDER — CLOZAPINE 100 MG
1 TABLET ORAL
Qty: 0 | Refills: 0 | DISCHARGE
Start: 2025-07-15

## 2025-07-15 RX ORDER — OLANZAPINE 10 MG/1
5 TABLET ORAL
Refills: 0 | Status: DISCONTINUED | OUTPATIENT
Start: 2025-07-15 | End: 2025-07-15

## 2025-07-15 RX ORDER — CLOZAPINE 100 MG
3.5 TABLET ORAL
Refills: 0 | DISCHARGE

## 2025-07-15 RX ADMIN — LITHIUM CARBONATE 600 MILLIGRAM(S): 600 CAPSULE, GELATIN COATED ORAL at 06:07

## 2025-07-15 RX ADMIN — Medication 1 APPLICATION(S): at 13:12

## 2025-07-15 RX ADMIN — Medication 40 MILLIGRAM(S): at 06:24

## 2025-07-15 RX ADMIN — OLANZAPINE 5 MILLIGRAM(S): 10 TABLET ORAL at 06:22

## 2025-07-15 RX ADMIN — POLYETHYLENE GLYCOL 3350 17 GRAM(S): 17 POWDER, FOR SOLUTION ORAL at 06:26

## 2025-07-15 RX ADMIN — Medication 325 MILLIGRAM(S): at 13:15

## 2025-07-15 NOTE — BH CONSULTATION LIAISON PROGRESS NOTE - NSBHTIMEACTIVITIESPERFORMED_PSY_A_CORE
Care coordinated with pt.'s sister,  I have spent 50 minutes of time on the encounter which excludes teaching.

## 2025-07-15 NOTE — BH CONSULTATION LIAISON PROGRESS NOTE - NSBHCHARTREVIEWLAB_PSY_A_CORE FT
LABS:                          7.9    7.10  )-----------( 386      ( 14 Jul 2025 06:17 )             27.5     07-14    141  |  106  |  15  ----------------------------<  83  4.0   |  21[L]  |  0.74    Ca    8.9      14 Jul 2025 06:17  Phos  3.1     07-14  Mg     1.90     07-14    TPro  7.4  /  Alb  3.6  /  TBili  0.2  /  DBili  <0.2  /  AST  14  /  ALT  11  /  AlkPhos  107  07-14    LIVER FUNCTIONS - ( 14 Jul 2025 06:17 )  Alb: 3.6 g/dL / Pro: 7.4 g/dL / ALK PHOS: 107 U/L / ALT: 11 U/L / AST: 14 U/L / GGT: x             Urinalysis Basic - ( 14 Jul 2025 06:17 )    Color: x / Appearance: x / SG: x / pH: x  Gluc: 83 mg/dL / Ketone: x  / Bili: x / Urobili: x   Blood: x / Protein: x / Nitrite: x   Leuk Esterase: x / RBC: x / WBC x   Sq Epi: x / Non Sq Epi: x / Bacteria: x      
  LABS:                          8.3    9.20  )-----------( 412      ( 15 Jul 2025 06:24 )             28.9     07-15    141  |  107  |  10  ----------------------------<  100[H]  4.1   |  23  |  0.71    Ca    9.5      15 Jul 2025 06:24  Phos  2.6     07-15  Mg     2.10     07-15    TPro  7.4  /  Alb  3.6  /  TBili  0.2  /  DBili  <0.2  /  AST  14  /  ALT  11  /  AlkPhos  107  07-14    LIVER FUNCTIONS - ( 14 Jul 2025 06:17 )  Alb: 3.6 g/dL / Pro: 7.4 g/dL / ALK PHOS: 107 U/L / ALT: 11 U/L / AST: 14 U/L / GGT: x             Urinalysis Basic - ( 15 Jul 2025 06:24 )    Color: x / Appearance: x / SG: x / pH: x  Gluc: 100 mg/dL / Ketone: x  / Bili: x / Urobili: x   Blood: x / Protein: x / Nitrite: x   Leuk Esterase: x / RBC: x / WBC x   Sq Epi: x / Non Sq Epi: x / Bacteria: x

## 2025-07-15 NOTE — BH CONSULTATION LIAISON PROGRESS NOTE - CURRENT MEDICATION
MEDICATIONS  (STANDING):  bisacodyl 10 milliGRAM(s) Oral at bedtime  chlorhexidine 2% Cloths 1 Application(s) Topical daily  cloZAPine 25 milliGRAM(s) Oral at bedtime  ferrous    sulfate 325 milliGRAM(s) Oral daily  lithium citrate Solution 600 milliGRAM(s) Oral two times a day  OLANZapine Disintegrating Tablet 5 milliGRAM(s) Oral two times a day  pantoprazole    Tablet 40 milliGRAM(s) Oral before breakfast  polyethylene glycol 3350 17 Gram(s) Oral two times a day  senna 2 Tablet(s) Oral at bedtime    MEDICATIONS  (PRN):  
MEDICATIONS  (STANDING):  bisacodyl 10 milliGRAM(s) Oral at bedtime  chlorhexidine 2% Cloths 1 Application(s) Topical daily  cloZAPine 150 milliGRAM(s) Oral daily  cloZAPine 175 milliGRAM(s) Oral at bedtime  ferrous    sulfate 325 milliGRAM(s) Oral daily  lithium 1200 milliGRAM(s) Oral daily  OLANZapine Injectable 5 milliGRAM(s) IntraMuscular every 12 hours  pantoprazole    Tablet 40 milliGRAM(s) Oral before breakfast  polyethylene glycol 3350 17 Gram(s) Oral two times a day  senna 2 Tablet(s) Oral at bedtime    MEDICATIONS  (PRN):

## 2025-07-15 NOTE — DISCHARGE NOTE NURSING/CASE MANAGEMENT/SOCIAL WORK - PATIENT PORTAL LINK FT
You can access the FollowMyHealth Patient Portal offered by [facility] by registering at the following website: [email or web address]. By joining [facility]’s FollowMyHealth portal, you will also be able to view your health information using other applications (apps) compatible with our system.

## 2025-07-15 NOTE — DISCHARGE NOTE NURSING/CASE MANAGEMENT/SOCIAL WORK - FINANCIAL ASSISTANCE
[facility] provides services at a reduced cost to those who are determined to be eligible through [facility]’s financial assistance program. Information regarding [facility]’s financial assistance program can be found by going to [email or web address] or by calling [phone].

## 2025-07-15 NOTE — BH CONSULTATION LIAISON PROGRESS NOTE - NSBHCHARTREVIEWVS_PSY_A_CORE FT
Vital Signs Last 24 Hrs  T(C): 36.7 (14 Jul 2025 05:00), Max: 36.7 (14 Jul 2025 05:00)  T(F): 98 (14 Jul 2025 05:00), Max: 98 (14 Jul 2025 05:00)  HR: 75 (14 Jul 2025 05:00) (75 - 77)  BP: 115/61 (14 Jul 2025 05:00) (107/50 - 115/61)  BP(mean): --  RR: 16 (14 Jul 2025 05:00) (16 - 17)  SpO2: 100% (14 Jul 2025 05:00) (99% - 100%)    Parameters below as of 14 Jul 2025 05:00  Patient On (Oxygen Delivery Method): room air    
Vital Signs Last 24 Hrs  T(C): 36.4 (15 Jul 2025 06:00), Max: 36.7 (14 Jul 2025 12:40)  T(F): 97.6 (15 Jul 2025 06:00), Max: 98.1 (14 Jul 2025 12:40)  HR: 81 (15 Jul 2025 06:00) (74 - 84)  BP: 109/61 (15 Jul 2025 06:00) (109/61 - 147/60)  BP(mean): --  RR: 18 (15 Jul 2025 06:00) (17 - 18)  SpO2: 100% (15 Jul 2025 06:00) (100% - 100%)    Parameters below as of 15 Jul 2025 06:00  Patient On (Oxygen Delivery Method): room air

## 2025-07-15 NOTE — BH CONSULTATION LIAISON PROGRESS NOTE - NSBHATTESTCOMMENTATTENDFT_PSY_A_CORE
Handoff received from Dr. Stephen, chart reviewed, seen/evaluated with trainee, agree with above assessment/recs. Patient calm/cooperative, though acutely psychotic, +grandiose delusions, religiously preoccupied. Denies avh, denies si and hi. Poor insight/judgment. Plan as above, will follow
Chart reviewed, seen separately, agree with above assessment/recs. 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 Denies avh, denies si and hi. Care coordinated with pt.'s sister at bedside (with pt.'s consent), all questions/concerns answered. Plan as above, will follow

## 2025-07-15 NOTE — PROGRESS NOTE ADULT - NUTRITIONAL ASSESSMENT
This patient has been assessed with a concern for Malnutrition and has been determined to have a diagnosis/diagnoses of Morbid obesity (BMI > 40).    This patient is being managed with:   Diet DASH/TLC-  Sodium & Cholesterol Restricted  Supplement Feeding Modality:  Oral  Ensure Clear Cans or Servings Per Day:  1       Frequency:  Daily  Entered: Jul 13 2025 11:07AM  
This patient has been assessed with a concern for Malnutrition and has been determined to have a diagnosis/diagnoses of Morbid obesity (BMI > 40).    This patient is being managed with:   Diet DASH/TLC-  Sodium & Cholesterol Restricted  Entered: Jul 11 2025  4:45PM

## 2025-07-15 NOTE — PROGRESS NOTE ADULT - PROBLEM SELECTOR PLAN 1
longstanding history of anemia per sister, on iron supplementation, with admission in 2023 for anemia requiring 1u pRBC (the only transfusion she previously received) with negative GI workup at that time. Sister does not know of any further anemia workup beyond that given her psychiatric illness have been limiting her ability to receive outpatient care  - without any overt evidence of bleeding   - s/p 1u pRBC 07/11  - f/u repeat CBC with improvement of Hgb to 7.7 gm/dL  - maintain active T&S, transfuse for goal hgb >7   - monitor CBC daily  - with unrevealing GI workup in 2023
longstanding history of anemia per sister, on iron supplementation, with admission in 2023 for anemia requiring 1u pRBC (the only transfusion she previously received) with negative GI workup at that time. Sister does not know of any further anemia workup beyond that given her psychiatric illness have been limiting her ability to receive outpatient care  - without any overt evidence of bleeding. hgb stable   - s/p 1u pRBC 07/11  - maintain active T&S, transfuse for goal hgb >7   - monitor CBC daily
longstanding history of anemia per sister, on iron supplementation, with admission in 2023 for anemia requiring 1u pRBC (the only transfusion she previously received) with negative GI workup at that time. Sister does not know of any further anemia workup beyond that given her psychiatric illness have been limiting her ability to receive outpatient care  - without any overt evidence of bleeding   - s/p 1u pRBC 07/11  - f/u repeat CBC with improvement of Hgb to 7.7 gm/dL -> 7.6  - maintain active T&S, transfuse for goal hgb >7   - monitor CBC daily  - with unrevealing GI workup in 2023
longstanding history of anemia per sister, on iron supplementation, with admission in 2023 for anemia requiring 1u pRBC (the only transfusion she previously received) with negative GI workup at that time. Sister does not know of any further anemia workup beyond that given her psychiatric illness have been limiting her ability to receive outpatient care  - without any overt evidence of bleeding. hgb stable   - s/p 1u pRBC 07/11  - maintain active T&S, transfuse for goal hgb >7   - monitor CBC daily
longstanding history of anemia per sister, on iron supplementation, with admission in 2023 for anemia requiring 1u pRBC (the only transfusion she previously received) with negative GI workup at that time. Sister does not know of any further anemia workup beyond that given her psychiatric illness have been limiting her ability to receive outpatient care  - without any overt evidence of bleeding   - s/p 1u pRBC 07/11  - f/u repeat CBC with improvement of Hgb to 7.7 gm/dL -> 7.6  - maintain active T&S, transfuse for goal hgb >7   - monitor CBC daily  - with unrevealing GI workup in 2023

## 2025-07-15 NOTE — DISCHARGE NOTE NURSING/CASE MANAGEMENT/SOCIAL WORK - NSDCPEFALRISK_GEN_ALL_CORE
For information on Fall & Injury Prevention, visit: [email or web address] OR  [email or web address] OR  https://www.cdc.gov/steadi/patient.html

## 2025-07-15 NOTE — BH CONSULTATION LIAISON PROGRESS NOTE - NSBHFUPINTERVALHXFT_PSY_A_CORE
Pt accepted medications last night (clozapine dose lowered to 25 mg due to poor adherence) and this morning, no PRNs needed.    Saw pt at bedside this AM; pt adamantly declining to speak to providers despite multiple attempts to engage pt. Attempted to ask pt if something happened to upset them, though pt continued to decline conversation. Pt was not visibly agitated, though seemed mildly upset.

## 2025-07-15 NOTE — PROGRESS NOTE ADULT - PROBLEM SELECTOR PLAN 3
- BMI: > 40  - Extensively counseled pt on dietary modifications and weight loss  - Dietician recs appreciated:
- BMI: > 40  - Extensively counseled pt on dietary modifications and weight loss  - Dietician recs appreciated:
DVT ppx: SCDs, hold chemical ppx   Dispo: Return to inpatient [place], likely Monday, if H/H remains stable
- BMI: > 40  - Extensively counseled pt on dietary modifications and weight loss  - Dietician recs appreciated:  1. RD will provide Mighty Shake 1x daily (220kcal, 6gm pro per each serving).   2. Please consistently document %PO intake in nursing flowsheets   3. Monitor PO intake, Labs, weights, BMs, and skin integrity.
- BMI: > 40  - Extensively counseled pt on dietary modifications and weight loss  - Dietician recs appreciated:  1. RD will provide Mighty Shake 1x daily (220kcal, 6gm pro per each serving).   2. Please consistently document %PO intake in nursing flowsheets   3. Monitor PO intake, Labs, weights, BMs, and skin integrity.

## 2025-07-15 NOTE — BH CONSULTATION LIAISON PROGRESS NOTE - NSBHASSESSMENTFT_PSY_ALL_CORE
55 yo female domiciled on [place] grounds with PMHx chronic anemia of unknown etiology, constipation, GERD, PPHx schizophrenia, schizoaffective dos, no know hx SIB/SA, hx aggression in setting of medication nonadherence, multiple inpatient psychiatric admissions, has court ordered treatment over objection, on clozapine, lithium, olanzapine, and haldol decanoate, presenting with anemia, psychiatry consulted for psychosis and medication management. Pt was given 1x dose of IM Zyprexa 5 mg this AM for agitation prior to receiving 1u pRBC transfusion.     On initial assessment, patient was seen in ED, calm and cooperative, A&Ox4, soft speech, with blunted affect, disorganized and tangential thought process, denying SI/HI, grandiose, hyperreligious, and paranoid delusions, denying AVH, poor insight and judgment. Pt appears to be at her psychotic baseline, documented hx of lacking capacity for declining life saving treatments such as blood transfusions given pt's reasoning is based on delusional beliefs. Primary team confirmed med regimen with [place] psychiatrist Dr. Richardson. Our recommendation is to continue with this regimen, which has been court ordered, see below. No indication for transfer to [facility] or other acute psychiatric intervention, no need for 1:1.    7/14: Pt declining all meds over weekend, continues to be calm and cooperative though psychosis ongoing. Plan to DISCONTINUE clozapine given missed doses and restart at 25 mg qhs. Pt continues to lack capacity to decline emergent medical care.    7/15: Restarted clozapine at 25 mg qhs last night, pt accepted all meds since last night and into this morning, no PRNs needed. Pt declining to speak to providers today despite repeated attempts to engage.    PLAN:  - no need for 1:1 from psych. perspective   - Continue clozapine 25 mg qhs (discontinued pt's home dose of 150mg AM + 175mg PM due to poor adherence, increased seizure risk)  - continue home lithium solution 1200 mg daily, Zyprexa crushed in food 5 mg BID, Haldol dec 300 mg due 7/17  -Agitation: Haldol 5mg q4h prn + Ativan 2mg q6h prn PO/IM/IV   -Monitor EKG qtc interval and hold antipsychotic if qtc>500  -Dispo: return to [facility] when medically stable  -Pt continues TO LACK THE CAPACITY to refuse emergent medical care including labs/investigations, healthcare proxy is pt's nephew     55 yo female domiciled on [place] grounds with PMHx chronic anemia of unknown etiology, constipation, GERD, PPHx schizophrenia, schizoaffective dos, no know hx SIB/SA, hx aggression in setting of medication nonadherence, multiple inpatient psychiatric admissions, has court ordered treatment over objection, on clozapine, lithium, olanzapine, and haldol decanoate, presenting with anemia, psychiatry consulted for psychosis and medication management. Pt was given 1x dose of IM Zyprexa 5 mg this AM for agitation prior to receiving 1u pRBC transfusion.     On initial assessment, patient was seen in ED, calm and cooperative, A&Ox4, soft speech, with blunted affect, disorganized and tangential thought process, denying SI/HI, grandiose, hyperreligious, and paranoid delusions, denying AVH, poor insight and judgment. Pt appears to be at her psychotic baseline, documented hx of lacking capacity for declining life saving treatments such as blood transfusions given pt's reasoning is based on delusional beliefs. Primary team confirmed med regimen with [place] psychiatrist Dr. Richardson. Our recommendation is to continue with this regimen, which has been court ordered, see below. No indication for transfer to [facility] or other acute psychiatric intervention, no need for 1:1.    7/14: Pt declining all meds over weekend, continues to be calm and cooperative though psychosis ongoing. Plan to DISCONTINUE clozapine given missed doses and restart at 25 mg qhs. Pt continues to lack capacity to decline emergent medical care.    7/15: Restarted clozapine at 25 mg qhs last night, pt accepted all meds since last night and into this morning, no PRNs needed. Pt declining to speak to providers today despite repeated attempts to engage.    PLAN:  - no need for 1:1 from psych. perspective   - Continue clozapine 25 mg qhs (discontinued pt's home dose of 150mg AM + 175mg PM due to poor adherence, increased seizure risk)  - continue home lithium solution 1200 mg daily, Zyprexa crushed in food 5 mg BID, Haldol dec 300 mg due 7/17  -Agitation: Haldol 5mg q4h prn + Ativan 2mg q6h prn PO/IM/IV   -Monitor EKG qtc interval and hold antipsychotic if qtc>500  -Coordination: Attempted to speak with [place] inpatient psychiatrist, unable to reach.   -Dispo: return to [place] inpatient when medically stable  -Pt continues TO LACK THE CAPACITY to refuse emergent medical care including labs/investigations, healthcare proxy is pt's nephew

## 2025-07-15 NOTE — PROGRESS NOTE ADULT - PROBLEM SELECTOR PLAN 4
DVT ppx: SCDs, hold chemical ppx   Dispo: Return to inpatient [place], likely Monday, if H/H remains stable
DVT ppx: SCDs, hold chemical ppx   Dispo: Return to inpatient [place]
DVT ppx: SCDs, hold chemical ppx   Dispo: Return to inpatient [place]
DVT ppx: SCDs, hold chemical ppx   Dispo: Return to inpatient [place] tomorrow if H/H remains stable

## 2025-07-15 NOTE — PROGRESS NOTE ADULT - PROBLEM SELECTOR PLAN 2
- Home meds confirmed with Dr Odonnell ([phone]), psychiatrist from [facility]. Dr. Odonnell stated they have a court order for medications in case pt refuses, therefore patient cannot refuse medications.    - continue olanzapine 5 mg BID   - continue lithium 600mg bid oral solution  -on clozapine solution 150 mg in AM and 175 mg PM at home. dose reduce to 25 per psych as pt has been missing doses since admission   - On Haldol Decanoate 300 mg Q month, next due on 7/17/2025    Psychiatry consulted, recs appreciated:  -no need for 1:1, no need for transfer to [facility] or other acute psychiatric intervention  -continue home meds  -Agitation: Haldol 5mg q4h prn + Ativan 2mg q6h prn PO/IM/IV   -Monitor EKG qtc interval and hold antipsychotic if qtc>500  -Dispo: return to [place] when medically stable  -does NOT have the capacity to refuse emergent medical care including labs/investigations, healthcare proxy is pt's nephew

## 2025-07-15 NOTE — PROGRESS NOTE ADULT - SUBJECTIVE AND OBJECTIVE BOX
[facility] Division of Hospital Medicine  Jose Asencio MD  Pager [phone]      Patient is a 54y old  Female who presents with a chief complaint of anemia (14 Jul 2025 20:03)      SUBJECTIVE / OVERNIGHT EVENTS:    no acute event o/n. pt took all meds since yesterday appears calm. no new complaints     ADDITIONAL REVIEW OF SYSTEMS:    RESPIRATORY: No cough, wheezing, chills or hemoptysis; No shortness of breath  CARDIOVASCULAR: No chest pain, palpitations, dizziness, or leg swelling  GASTROINTESTINAL: No abdominal or epigastric pain. No nausea, vomiting, or hematemesis; No diarrhea or constipation. No melena or hematochezia.    MEDICATIONS  (STANDING):  bisacodyl 10 milliGRAM(s) Oral at bedtime  chlorhexidine 2% Cloths 1 Application(s) Topical daily  cloZAPine 25 milliGRAM(s) Oral at bedtime  ferrous    sulfate 325 milliGRAM(s) Oral daily  lithium citrate Solution 600 milliGRAM(s) Oral two times a day  OLANZapine Disintegrating Tablet 5 milliGRAM(s) Oral two times a day  pantoprazole    Tablet 40 milliGRAM(s) Oral before breakfast  polyethylene glycol 3350 17 Gram(s) Oral two times a day  senna 2 Tablet(s) Oral at bedtime    MEDICATIONS  (PRN):      CAPILLARY BLOOD GLUCOSE        I&O's Summary      PHYSICAL EXAM:  Vital Signs Last 24 Hrs  T(C): 36.4 (15 Jul 2025 06:00), Max: 36.7 (14 Jul 2025 18:20)  T(F): 97.6 (15 Jul 2025 06:00), Max: 98 (14 Jul 2025 18:20)  HR: 81 (15 Jul 2025 06:00) (76 - 84)  BP: 109/61 (15 Jul 2025 06:00) (109/61 - 117/75)  BP(mean): --  RR: 18 (15 Jul 2025 06:00) (17 - 18)  SpO2: 100% (15 Jul 2025 06:00) (100% - 100%)    Parameters below as of 15 Jul 2025 06:00  Patient On (Oxygen Delivery Method): room air        CONSTITUTIONAL: NAD,  EYES: PERRLA; conjunctiva and sclera clear  ENMT: Moist oral mucosa, no pharyngeal injection or exudates;   NECK: Supple, no palpable masses;  RESPIRATORY: Normal respiratory effort; lungs are clear to auscultation bilaterally  CARDIOVASCULAR: Regular rate and rhythm, normal S1 and S2, no murmur/rub/gallop; No lower extremity edema; Peripheral pulses are 2+ bilaterally  ABDOMEN: Nontender to palpation, normoactive bowel sounds, no rebound/guarding;   MUSCLOSKELETAL:   no clubbing or cyanosis of digits; no joint swelling or tenderness to palpation  PSYCH: A+O to person,   NEUROLOGY: CN 2-12 are intact and symmetric; no gross sensory deficits;   SKIN: No rashes;     LABS:                        8.3    9.20  )-----------( 412      ( 15 Jul 2025 06:24 )             28.9     07-15    141  |  107  |  10  ----------------------------<  100[H]  4.1   |  23  |  0.71    Ca    9.5      15 Jul 2025 06:24  Phos  2.6     07-15  Mg     2.10     07-15    TPro  7.4  /  Alb  3.6  /  TBili  0.2  /  DBili  <0.2  /  AST  14  /  ALT  11  /  AlkPhos  107  07-14          Urinalysis Basic - ( 15 Jul 2025 06:24 )    Color: x / Appearance: x / SG: x / pH: x  Gluc: 100 mg/dL / Ketone: x  / Bili: x / Urobili: x   Blood: x / Protein: x / Nitrite: x   Leuk Esterase: x / RBC: x / WBC x   Sq Epi: x / Non Sq Epi: x / Bacteria: x          RADIOLOGY & ADDITIONAL TESTS:  Results Reviewed:   Imaging Personally Reviewed:  Electrocardiogram Personally Reviewed:    COORDINATION OF CARE:  Care Discussed with Consultants/Other Providers [Y/N]:  Prior or Outpatient Records Reviewed [Y/N]:  
Division of Hospital Medicine  Ann Pineda MD  Available via MS Teams    SUBJECTIVE / OVERNIGHT EVENTS: No active issues overnight; pt refused blood work today, denies any new symptoms    MEDICATIONS  (STANDING):  bisacodyl 10 milliGRAM(s) Oral at bedtime  chlorhexidine 2% Cloths 1 Application(s) Topical daily  cloZAPine 150 milliGRAM(s) Oral daily  cloZAPine 175 milliGRAM(s) Oral at bedtime  ferrous    sulfate 325 milliGRAM(s) Oral daily  lithium 1200 milliGRAM(s) Oral daily  OLANZapine Injectable 5 milliGRAM(s) IntraMuscular every 12 hours  pantoprazole    Tablet 40 milliGRAM(s) Oral before breakfast  polyethylene glycol 3350 17 Gram(s) Oral two times a day  senna 2 Tablet(s) Oral at bedtime    MEDICATIONS  (PRN):      I&O's Summary      PHYSICAL EXAM:  Vital Signs Last 24 Hrs  T(C): 36.5 (13 Jul 2025 13:00), Max: 36.6 (12 Jul 2025 22:00)  T(F): 97.7 (13 Jul 2025 13:00), Max: 97.8 (12 Jul 2025 22:00)  HR: 71 (13 Jul 2025 13:00) (71 - 94)  BP: 114/66 (13 Jul 2025 13:00) (105/59 - 117/79)  BP(mean): --  RR: 18 (13 Jul 2025 13:00) (17 - 18)  SpO2: 100% (13 Jul 2025 13:00) (100% - 100%)    Parameters below as of 13 Jul 2025 13:00  Patient On (Oxygen Delivery Method): room air      CONSTITUTIONAL: NAD  EYES: PERRLA; conjunctiva and sclera clear  ENMT: Moist oral mucosa, no pharyngeal injection or exudates; normal dentition  NECK: Supple, no palpable masses; no thyromegaly  RESPIRATORY: Normal respiratory effort; lungs are clear to auscultation bilaterally; no rales, rhonchi, or wheezing  CARDIOVASCULAR: Regular rate and rhythm, normal S1 and S2, no murmur/rub/gallop; Peripheral pulses are 2+ bilaterally  ABDOMEN: Nontender to palpation, normoactive bowel sounds, no rebound/guarding; No hepatosplenomegaly  MUSCULOSKELETAL: no clubbing or cyanosis of digits  NEUROLOGY: Awake and alert to person, place; no focal neurological deficits   SKIN: No rashes; no palpable lesions    LABS:                        7.6    8.04  )-----------( 311      ( 12 Jul 2025 11:09 )             26.5     07-12    140  |  108[H]  |  11  ----------------------------<  103[H]  4.1   |  25  |  0.70    Ca    9.1      12 Jul 2025 11:09  Phos  2.0     07-12  Mg     2.00     07-12            Urinalysis Basic - ( 12 Jul 2025 11:09 )    Color: x / Appearance: x / SG: x / pH: x  Gluc: 103 mg/dL / Ketone: x  / Bili: x / Urobili: x   Blood: x / Protein: x / Nitrite: x   Leuk Esterase: x / RBC: x / WBC x   Sq Epi: x / Non Sq Epi: x / Bacteria: x        COVID-19 PCR: NotDetec (10 Jul 2025 23:10)      Imaging and consultant notes reviewed.     
[facility] Division of Hospital Medicine  Jose Asencio MD  Pager [phone]      Patient is a 54y old  Female who presents with a chief complaint of anemia (13 Jul 2025 17:56)      SUBJECTIVE / OVERNIGHT EVENTS:    no acute event. Pt is calm but cont to refuse meds.  no active bleed    ADDITIONAL REVIEW OF SYSTEMS:    RESPIRATORY: No cough, wheezing, chills or hemoptysis; No shortness of breath  CARDIOVASCULAR: No chest pain, palpitations, dizziness, or leg swelling  GASTROINTESTINAL: No abdominal or epigastric pain. No nausea, vomiting, or hematemesis; No diarrhea or constipation. No melena or hematochezia.      MEDICATIONS  (STANDING):  bisacodyl 10 milliGRAM(s) Oral at bedtime  chlorhexidine 2% Cloths 1 Application(s) Topical daily  cloZAPine 25 milliGRAM(s) Oral at bedtime  ferrous    sulfate 325 milliGRAM(s) Oral daily  lithium citrate Solution 600 milliGRAM(s) Oral two times a day  OLANZapine Injectable 5 milliGRAM(s) IntraMuscular every 12 hours  pantoprazole    Tablet 40 milliGRAM(s) Oral before breakfast  polyethylene glycol 3350 17 Gram(s) Oral two times a day  senna 2 Tablet(s) Oral at bedtime    MEDICATIONS  (PRN):      CAPILLARY BLOOD GLUCOSE        I&O's Summary      PHYSICAL EXAM:  Vital Signs Last 24 Hrs  T(C): 36.7 (14 Jul 2025 18:20), Max: 36.7 (14 Jul 2025 05:00)  T(F): 98 (14 Jul 2025 18:20), Max: 98.1 (14 Jul 2025 12:40)  HR: 76 (14 Jul 2025 18:20) (74 - 77)  BP: 117/75 (14 Jul 2025 18:20) (113/65 - 147/60)  BP(mean): --  RR: 17 (14 Jul 2025 18:20) (16 - 18)  SpO2: 100% (14 Jul 2025 18:20) (100% - 100%)    Parameters below as of 14 Jul 2025 18:20  Patient On (Oxygen Delivery Method): room air        CONSTITUTIONAL: NAD,  EYES: PERRLA; conjunctiva and sclera clear  ENMT: Moist oral mucosa, no pharyngeal injection or exudates;   NECK: Supple, no palpable masses;  RESPIRATORY: Normal respiratory effort; lungs are clear to auscultation bilaterally  CARDIOVASCULAR: Regular rate and rhythm, normal S1 and S2, no murmur/rub/gallop; No lower extremity edema; Peripheral pulses are 2+ bilaterally  ABDOMEN: Nontender to palpation, normoactive bowel sounds, no rebound/guarding;   MUSCLOSKELETAL:   no clubbing or cyanosis of digits; no joint swelling or tenderness to palpation  PSYCH: A+O to person,  NEUROLOGY: CN 2-12 are intact and symmetric; no gross sensory deficits;   SKIN: No rashes;     LABS:                        7.9    7.10  )-----------( 386      ( 14 Jul 2025 06:17 )             27.5     07-14    141  |  106  |  15  ----------------------------<  83  4.0   |  21[L]  |  0.74    Ca    8.9      14 Jul 2025 06:17  Phos  3.1     07-14  Mg     1.90     07-14    TPro  7.4  /  Alb  3.6  /  TBili  0.2  /  DBili  <0.2  /  AST  14  /  ALT  11  /  AlkPhos  107  07-14          Urinalysis Basic - ( 14 Jul 2025 06:17 )    Color: x / Appearance: x / SG: x / pH: x  Gluc: 83 mg/dL / Ketone: x  / Bili: x / Urobili: x   Blood: x / Protein: x / Nitrite: x   Leuk Esterase: x / RBC: x / WBC x   Sq Epi: x / Non Sq Epi: x / Bacteria: x          RADIOLOGY & ADDITIONAL TESTS:  Results Reviewed:   Imaging Personally Reviewed:  Electrocardiogram Personally Reviewed:    COORDINATION OF CARE:  Care Discussed with Consultants/Other Providers [Y/N]:  Prior or Outpatient Records Reviewed [Y/N]:  
Division of Hospital Medicine  Ann Pineda MD  Available via MS Teams    SUBJECTIVE / OVERNIGHT EVENTS: No active issues overnight; pt denies any new symptoms    MEDICATIONS  (STANDING):  bisacodyl 10 milliGRAM(s) Oral at bedtime  ferrous    sulfate 325 milliGRAM(s) Oral daily  lithium citrate Solution 1200 milliGRAM(s) Oral daily  OLANZapine 5 milliGRAM(s) Oral two times a day  pantoprazole    Tablet 40 milliGRAM(s) Oral before breakfast  polyethylene glycol 3350 17 Gram(s) Oral two times a day  senna 2 Tablet(s) Oral at bedtime    MEDICATIONS  (PRN):      I&O's Summary      PHYSICAL EXAM:  Vital Signs Last 24 Hrs  T(C): 36.3 (11 Jul 2025 09:37), Max: 36.7 (10 Jul 2025 18:06)  T(F): 97.4 (11 Jul 2025 09:37), Max: 98 (10 Jul 2025 18:06)  HR: 77 (11 Jul 2025 09:37) (67 - 103)  BP: 106/66 (11 Jul 2025 09:37) (91/65 - 108/74)  BP(mean): 85 (11 Jul 2025 08:25) (51 - 85)  RR: 18 (11 Jul 2025 09:37) (15 - 18)  SpO2: 100% (11 Jul 2025 09:37) (99% - 100%)    Parameters below as of 11 Jul 2025 09:37  Patient On (Oxygen Delivery Method): room air      CONSTITUTIONAL: NAD  EYES: PERRLA; conjunctiva and sclera clear  ENMT: Moist oral mucosa, no pharyngeal injection or exudates; normal dentition  NECK: Supple, no palpable masses; no thyromegaly  RESPIRATORY: Normal respiratory effort; lungs are clear to auscultation bilaterally; no rales, rhonchi, or wheezing  CARDIOVASCULAR: Regular rate and rhythm, normal S1 and S2, no murmur/rub/gallop; Peripheral pulses are 2+ bilaterally  ABDOMEN: Nontender to palpation, normoactive bowel sounds, no rebound/guarding; No hepatosplenomegaly  MUSCULOSKELETAL: no clubbing or cyanosis of digits  NEUROLOGY: Awake and alert to person, place; no focal neurological deficits   SKIN: No rashes; no palpable lesions    LABS:                        7.7    8.88  )-----------( 341      ( 11 Jul 2025 14:47 )             27.1     07-11    141  |  106  |  16  ----------------------------<  77  4.1   |  25  |  0.75    Ca    9.1      11 Jul 2025 14:47  Phos  2.7     07-11  Mg     1.80     07-11    TPro  7.0  /  Alb  3.6  /  TBili  <0.2  /  DBili  x   /  AST  11  /  ALT  10  /  AlkPhos  117  07-10    PT/INR - ( 10 Jul 2025 20:31 )   PT: 11.7 sec;   INR: 1.01 ratio         PTT - ( 10 Jul 2025 20:31 )  PTT:31.0 sec      Urinalysis Basic - ( 11 Jul 2025 14:47 )    Color: x / Appearance: x / SG: x / pH: x  Gluc: 77 mg/dL / Ketone: x  / Bili: x / Urobili: x   Blood: x / Protein: x / Nitrite: x   Leuk Esterase: x / RBC: x / WBC x   Sq Epi: x / Non Sq Epi: x / Bacteria: x        COVID-19 PCR: NotDetec (10 Jul 2025 23:10)      Imaging and consultant notes reviewed.     
Division of Hospital Medicine  Ann Pineda MD  Available via MS Teams    SUBJECTIVE / OVERNIGHT EVENTS: No active issues overnight; pt denies any new symptoms    MEDICATIONS  (STANDING):  bisacodyl 10 milliGRAM(s) Oral at bedtime  chlorhexidine 2% Cloths 1 Application(s) Topical daily  cloZAPine 150 milliGRAM(s) Oral daily  cloZAPine 175 milliGRAM(s) Oral at bedtime  ferrous    sulfate 325 milliGRAM(s) Oral daily  lithium 1200 milliGRAM(s) Oral daily  OLANZapine 5 milliGRAM(s) Oral two times a day  pantoprazole    Tablet 40 milliGRAM(s) Oral before breakfast  polyethylene glycol 3350 17 Gram(s) Oral two times a day  potassium phosphate IVPB 15 milliMole(s) IV Intermittent once  senna 2 Tablet(s) Oral at bedtime    MEDICATIONS  (PRN):      I&O's Summary      PHYSICAL EXAM:  Vital Signs Last 24 Hrs  T(C): 36.7 (12 Jul 2025 08:22), Max: 36.7 (12 Jul 2025 08:22)  T(F): 98 (12 Jul 2025 08:22), Max: 98 (12 Jul 2025 08:22)  HR: 88 (12 Jul 2025 08:22) (67 - 90)  BP: 122/68 (12 Jul 2025 08:22) (111/68 - 129/78)  BP(mean): --  RR: 18 (12 Jul 2025 08:22) (17 - 18)  SpO2: 100% (12 Jul 2025 08:22) (100% - 100%)    Parameters below as of 12 Jul 2025 08:22  Patient On (Oxygen Delivery Method): room air      CONSTITUTIONAL: NAD  EYES: PERRLA; conjunctiva and sclera clear  ENMT: Moist oral mucosa, no pharyngeal injection or exudates; normal dentition  NECK: Supple, no palpable masses; no thyromegaly  RESPIRATORY: Normal respiratory effort; lungs are clear to auscultation bilaterally; no rales, rhonchi, or wheezing  CARDIOVASCULAR: Regular rate and rhythm, normal S1 and S2, no murmur/rub/gallop; Peripheral pulses are 2+ bilaterally  ABDOMEN: Nontender to palpation, normoactive bowel sounds, no rebound/guarding; No hepatosplenomegaly  MUSCULOSKELETAL: no clubbing or cyanosis of digits  NEUROLOGY: Awake and alert to person, place; no focal neurological deficits   SKIN: No rashes; no palpable lesions    LABS:                        7.6    8.04  )-----------( 311      ( 12 Jul 2025 11:09 )             26.5     07-12    140  |  108[H]  |  11  ----------------------------<  103[H]  4.1   |  25  |  0.70    Ca    9.1      12 Jul 2025 11:09  Phos  2.0     07-12  Mg     2.00     07-12    TPro  7.0  /  Alb  3.6  /  TBili  <0.2  /  DBili  x   /  AST  11  /  ALT  10  /  AlkPhos  117  07-10    PT/INR - ( 10 Jul 2025 20:31 )   PT: 11.7 sec;   INR: 1.01 ratio         PTT - ( 10 Jul 2025 20:31 )  PTT:31.0 sec      Urinalysis Basic - ( 12 Jul 2025 11:09 )    Color: x / Appearance: x / SG: x / pH: x  Gluc: 103 mg/dL / Ketone: x  / Bili: x / Urobili: x   Blood: x / Protein: x / Nitrite: x   Leuk Esterase: x / RBC: x / WBC x   Sq Epi: x / Non Sq Epi: x / Bacteria: x        COVID-19 PCR: NotDetec (10 Jul 2025 23:10)      Imaging and consultant notes reviewed.

## 2025-07-15 NOTE — PROGRESS NOTE ADULT - ASSESSMENT
54F from inpatient [place] with history of schizophrenia, GERD, iron deficiency anemia, and uterine fibroids sent from [place] for anemia on outpatient labs requiring blood transfusion.
54F from inpatient [place] with history of schizophrenia, GERD, iron deficiency anemia, and uterine fibroids sent from [place] for anemia on outpatient labs requiring blood transfusion.
54F from inpatient [place] with history of schizophrenia, GERD, iron deficiency anemia, and uterine fibroids sent from [place] for anemia on outpatient labs requiring blood transfusion.
54F from inpatient [place] with history of schizophrenia, GERD, iron deficiency anemia, and uterine fibroids sent from [place] for anemia on outpatient labs requiring blood transfusion.
54F from inpatient [place] with history of schizophrenia, GERD, iron deficiency anemia, and uterine fibroids sent from [place] for anemia on outpatient labs requiring blood transfusion.

## 2025-07-15 NOTE — BH CONSULTATION LIAISON PROGRESS NOTE - NSBHATTESTBILLING_PSY_A_CORE
91888-Bkwkddtsma OBS or IP - high complexity OR 50-79 mins
08822-Stianxuhzg OBS or IP - moderate complexity OR 35-49 mins

## 2025-07-21 ENCOUNTER — EMERGENCY (EMERGENCY)
Facility: HOSPITAL | Age: 55
LOS: 1 days | End: 2025-07-21
Attending: STUDENT IN AN ORGANIZED HEALTH CARE EDUCATION/TRAINING PROGRAM | Admitting: STUDENT IN AN ORGANIZED HEALTH CARE EDUCATION/TRAINING PROGRAM
Payer: MEDICAID

## 2025-07-21 VITALS
TEMPERATURE: 97 F | HEART RATE: 84 BPM | SYSTOLIC BLOOD PRESSURE: 113 MMHG | DIASTOLIC BLOOD PRESSURE: 68 MMHG | OXYGEN SATURATION: 100 % | RESPIRATION RATE: 17 BRPM

## 2025-07-21 VITALS
OXYGEN SATURATION: 100 % | DIASTOLIC BLOOD PRESSURE: 72 MMHG | WEIGHT: 164.91 LBS | HEART RATE: 79 BPM | TEMPERATURE: 98 F | RESPIRATION RATE: 16 BRPM | SYSTOLIC BLOOD PRESSURE: 103 MMHG

## 2025-07-21 LAB
ALBUMIN SERPL ELPH-MCNC: 3.6 G/DL — SIGNIFICANT CHANGE UP (ref 3.3–5)
ALP SERPL-CCNC: 117 U/L — SIGNIFICANT CHANGE UP (ref 40–120)
ALT FLD-CCNC: 10 U/L — SIGNIFICANT CHANGE UP (ref 4–33)
ANION GAP SERPL CALC-SCNC: 10 MMOL/L — SIGNIFICANT CHANGE UP (ref 7–14)
APTT BLD: 32.7 SEC — SIGNIFICANT CHANGE UP (ref 26.1–36.8)
AST SERPL-CCNC: 12 U/L — SIGNIFICANT CHANGE UP (ref 4–32)
BASOPHILS # BLD AUTO: 0.02 K/UL — SIGNIFICANT CHANGE UP (ref 0–0.2)
BASOPHILS NFR BLD AUTO: 0.3 % — SIGNIFICANT CHANGE UP (ref 0–2)
BILIRUB SERPL-MCNC: 0.2 MG/DL — SIGNIFICANT CHANGE UP (ref 0.2–1.2)
BLD GP AB SCN SERPL QL: NEGATIVE — SIGNIFICANT CHANGE UP
BUN SERPL-MCNC: 14 MG/DL — SIGNIFICANT CHANGE UP (ref 7–23)
CALCIUM SERPL-MCNC: 9.2 MG/DL — SIGNIFICANT CHANGE UP (ref 8.4–10.5)
CHLORIDE SERPL-SCNC: 110 MMOL/L — HIGH (ref 98–107)
CO2 SERPL-SCNC: 23 MMOL/L — SIGNIFICANT CHANGE UP (ref 22–31)
CREAT SERPL-MCNC: 0.75 MG/DL — SIGNIFICANT CHANGE UP (ref 0.5–1.3)
EGFR: 94 ML/MIN/1.73M2 — SIGNIFICANT CHANGE UP
EGFR: 94 ML/MIN/1.73M2 — SIGNIFICANT CHANGE UP
EOSINOPHIL # BLD AUTO: 0 K/UL — SIGNIFICANT CHANGE UP (ref 0–0.5)
EOSINOPHIL NFR BLD AUTO: 0 % — SIGNIFICANT CHANGE UP (ref 0–6)
GLUCOSE SERPL-MCNC: 81 MG/DL — SIGNIFICANT CHANGE UP (ref 70–99)
HCT VFR BLD CALC: 26.6 % — LOW (ref 34.5–45)
HGB BLD-MCNC: 7.8 G/DL — LOW (ref 11.5–15.5)
IMM GRANULOCYTES # BLD AUTO: 0.01 K/UL — SIGNIFICANT CHANGE UP (ref 0–0.07)
IMM GRANULOCYTES NFR BLD AUTO: 0.1 % — SIGNIFICANT CHANGE UP (ref 0–0.9)
INR BLD: 0.98 RATIO — SIGNIFICANT CHANGE UP (ref 0.85–1.16)
LYMPHOCYTES # BLD AUTO: 1.71 K/UL — SIGNIFICANT CHANGE UP (ref 1–3.3)
LYMPHOCYTES NFR BLD AUTO: 24.8 % — SIGNIFICANT CHANGE UP (ref 13–44)
MCHC RBC-ENTMCNC: 21.3 PG — LOW (ref 27–34)
MCHC RBC-ENTMCNC: 29.3 G/DL — LOW (ref 32–36)
MCV RBC AUTO: 72.7 FL — LOW (ref 80–100)
MONOCYTES # BLD AUTO: 0.54 K/UL — SIGNIFICANT CHANGE UP (ref 0–0.9)
MONOCYTES NFR BLD AUTO: 7.8 % — SIGNIFICANT CHANGE UP (ref 2–14)
NEUTROPHILS # BLD AUTO: 4.61 K/UL — SIGNIFICANT CHANGE UP (ref 1.8–7.4)
NEUTROPHILS NFR BLD AUTO: 67 % — SIGNIFICANT CHANGE UP (ref 43–77)
NRBC # BLD AUTO: 0 K/UL — SIGNIFICANT CHANGE UP (ref 0–0)
NRBC # FLD: 0 K/UL — SIGNIFICANT CHANGE UP (ref 0–0)
NRBC BLD AUTO-RTO: 0 /100 WBCS — SIGNIFICANT CHANGE UP (ref 0–0)
PLATELET # BLD AUTO: 496 K/UL — HIGH (ref 150–400)
PMV BLD: 9.7 FL — SIGNIFICANT CHANGE UP (ref 7–13)
POTASSIUM SERPL-MCNC: 3.8 MMOL/L — SIGNIFICANT CHANGE UP (ref 3.5–5.3)
POTASSIUM SERPL-SCNC: 3.8 MMOL/L — SIGNIFICANT CHANGE UP (ref 3.5–5.3)
PROT SERPL-MCNC: 7.2 G/DL — SIGNIFICANT CHANGE UP (ref 6–8.3)
PROTHROM AB SERPL-ACNC: 11.4 SEC — SIGNIFICANT CHANGE UP (ref 9.9–13.4)
RBC # BLD: 3.66 M/UL — LOW (ref 3.8–5.2)
RBC # FLD: 24.8 % — HIGH (ref 10.3–14.5)
RH IG SCN BLD-IMP: POSITIVE — SIGNIFICANT CHANGE UP
SODIUM SERPL-SCNC: 143 MMOL/L — SIGNIFICANT CHANGE UP (ref 135–145)
WBC # BLD: 6.89 K/UL — SIGNIFICANT CHANGE UP (ref 3.8–10.5)
WBC # FLD AUTO: 6.89 K/UL — SIGNIFICANT CHANGE UP (ref 3.8–10.5)

## 2025-07-21 PROCEDURE — 99285 EMERGENCY DEPT VISIT HI MDM: CPT

## 2025-07-21 RX ORDER — OLANZAPINE 10 MG/1
5 TABLET ORAL ONCE
Refills: 0 | Status: DISCONTINUED | OUTPATIENT
Start: 2025-07-21 | End: 2025-07-25

## 2025-07-21 NOTE — ED PROVIDER NOTE - NSFOLLOWUPINSTRUCTIONS_ED_ALL_ED_FT
Your hemoglobin was 7.8 in the emergency department.    Please follow up with your primary care provider within the next week.    Anemia is a condition in which the concentration of red blood cells or hemoglobin in the blood is below normal. Hemoglobin is a substance in red blood cells that carries oxygen to the tissues of the body. Anemia results in not enough oxygen reaching these tissues which can cause symptoms such as weakness, dizziness/lightheadedness, shortness of breath, chest pain, paleness, or nausea. The cause of your anemia may or may not be determined immediately. If your hemoglobin was dangerously low, you may have received a blood transfusion. Usually reactions to transfusions occur immediately but monitor yourself for any fevers, rash, or shortness of breath.    SEEK IMMEDIATE MEDICAL CARE IF YOU HAVE ANY OF THE FOLLOWING SYMPTOMS: extreme weakness/chest pain/shortness of breath, black or bloody stools, vomiting blood, fainting, fever, or any signs of dehydration.

## 2025-07-21 NOTE — ED PROVIDER NOTE - PHYSICAL EXAMINATION
General: no acute distress  Psych: flat affect  Head: normocephalic; atraumatic  Eyes: sclerae anicteric  ENT: no nasal flaring, patent nares  Cardio: regular rate and rhythm; normal heart sounds  Resp: clear to auscultation bilaterally  GI: abdomen soft, nontender, nondistended  Neuro: A&Ox3  Skin: no rashes or bruising noted  MSK: normal movement of extremities  Lymph/Vasc: no LE edema

## 2025-07-21 NOTE — PROVIDER CONTACT NOTE (OTHER) - ASSESSMENT
Called unit, spoke with RN Ms. Marshall, who reports that MD to MD hand off needed with [place] MD Dr. Dorsey [phone].  When pt is accepted back, S ambulance transport to be arranged. Called unit, spoke with RN Ms. Marshall, who reports that MD to MD hand off needed with [place] MD Dr. Dorsey [phone].  When pt is accepted back, S ambulance transport to be arranged.  Update-per MD Doran, pt is accepted back to unit.  Ambulance transport needed.  Verbal huddle complete.

## 2025-07-21 NOTE — ED ADULT TRIAGE NOTE - CHIEF COMPLAINT QUOTE
pt complains of sent in from MD at [facility] for low hgb of 6.4  . pt denies blood thinners, signs of active bleeding,  changes in vision, chest pain, headache, nausea, dizziness, vomiting,  SOB, fever, and  chills. pt past medical hx of  MDD, BPD, schizophrenia, and uterine mass not receiving treatment . pt complains of sent in from MD at [facility] for low hgb of 6.4  . pt denies blood thinners, signs of active bleeding,  changes in vision, chest pain, headache, nausea, dizziness, vomiting,  SOB, fever, and  chills. pt past medical hx of  anemia,  schizophrenia, and uterine mass not receiving treatment .

## 2025-07-21 NOTE — ED ADULT NURSE NOTE - CHIEF COMPLAINT QUOTE
pt complains of sent in from MD at [facility] for low hgb of 6.4  . pt denies blood thinners, signs of active bleeding,  changes in vision, chest pain, headache, nausea, dizziness, vomiting,  SOB, fever, and  chills. pt past medical hx of  anemia,  schizophrenia, and uterine mass not receiving treatment .

## 2025-07-21 NOTE — ED ADULT NURSE NOTE - OBJECTIVE STATEMENT
Pt received to [facility]. Pt is a 55 year old female w/ hx of anemia, anxiety, schizophrenia. Presents to ED from [facility] by MD for anemia. Per outpatient labs Hgb 6.4. Pt states she was feeling weak and lightheaded after eating lunch. Pt denies any chest pain, sob, headache/dizziness, abd pain, n/v/d or urinary symptoms. Pt is  A&Ox4. Respirations even and unlabored. Skin intact. Spontaneous movement of all extremities. L AC 20g placed +blood return/+flush, labs collected and sent. Pt safety precautions maintained. Pt care ongoing. Pt awaiting lab results

## 2025-07-21 NOTE — ED PROVIDER NOTE - ATTENDING CONTRIBUTION TO CARE
54-year-old past medical history of schizophrenia inpatient [facility] presents to the emergency department with report of possible anemia.  Hemoglobin is reported to be less than 7.  Patient has no symptoms of shortness of breath or chest pain.  No acute pain.  No melena.  Patient's abdomen soft not tenderness then moving extremities 5 out of 5.  Patient does have a history of coronary artery disease therefore transfusion not indicated.  Discussed case with attending physician at [facility].  Patient stable for discharge.  Discharge back with ambulance.

## 2025-07-21 NOTE — ED PROVIDER NOTE - OBJECTIVE STATEMENT
The patient is a 54-year-old female with past medical history of anemia, anxiety, schizophrenia presenting from [facility] for further evaluation of anemia.  Patient with known uterine fibroids, has had multiple recent visits to the ED for anemia, requiring transfusion 1 time.  Today has been complaining of some weakness and hemoglobin on outpatient labs was 6.4.  Patient denies any active vaginal or rectal bleeding, no chest pain, shortness of breath, syncope, dizziness, lightheadedness, or any other symptoms.

## 2025-07-21 NOTE — ED PROVIDER NOTE - PROGRESS NOTE DETAILS
AYUSH Parra PGY3- AYUSH Parra PGY3- attending spoke with jeana Dorsey, ok to go back to jeana. will dc

## 2025-07-21 NOTE — ED PROVIDER NOTE - PATIENT PORTAL LINK FT
You can access the FollowMyHealth Patient Portal offered by [facility] by registering at the following website: [email or web address]. By joining [facility]’s FollowMyHealth portal, you will also be able to view your health information using other applications (apps) compatible with our system.

## (undated) DEVICE — CATH IV SAFE BC 22G X 1" (BLUE)

## (undated) DEVICE — TUBING MEDI-VAC W MAXIGRIP CONNECTORS 1/4"X6'

## (undated) DEVICE — TUBING SUCTION NONCONDUCTIVE 6MM X 12FT

## (undated) DEVICE — BASIN EMESIS 10IN GRADUATED MAUVE

## (undated) DEVICE — LUBRICATING JELLY HR ONE SHOT 3G

## (undated) DEVICE — ELCTR ECG CONDUCTIVE ADHESIVE

## (undated) DEVICE — TUBING IV SET GRAVITY 3Y 100" MACRO

## (undated) DEVICE — GOWN LG

## (undated) DEVICE — DRSG BANDAID 0.75X3"

## (undated) DEVICE — CONTAINER FORMALIN 10% 20ML

## (undated) DEVICE — DRSG 2X2

## (undated) DEVICE — SALIVA EJECTOR (BLUE)

## (undated) DEVICE — BIOPSY FORCEP RADIAL JAW 4 STANDARD WITH NEEDLE

## (undated) DEVICE — CONTAINER FORMALIN 80ML YELLOW

## (undated) DEVICE — PACK IV START WITH CHG

## (undated) DEVICE — ELCTR GROUNDING PAD ADULT COVIDIEN

## (undated) DEVICE — POLY TRAP ETRAP

## (undated) DEVICE — BIOPSY FORCEP COLD DISP

## (undated) DEVICE — LINE BREATHE SAMPLNG

## (undated) DEVICE — DRSG CURITY GAUZE SPONGE 4 X 4" 12-PLY NON-STERILE